# Patient Record
Sex: FEMALE | Race: WHITE | Employment: UNEMPLOYED | URBAN - METROPOLITAN AREA
[De-identification: names, ages, dates, MRNs, and addresses within clinical notes are randomized per-mention and may not be internally consistent; named-entity substitution may affect disease eponyms.]

---

## 2018-09-11 ENCOUNTER — MEDICAL CORRESPONDENCE (OUTPATIENT)
Dept: HEALTH INFORMATION MANAGEMENT | Facility: CLINIC | Age: 48
End: 2018-09-11

## 2018-09-24 ENCOUNTER — APPOINTMENT (OUTPATIENT)
Dept: ULTRASOUND IMAGING | Facility: CLINIC | Age: 48
DRG: 857 | End: 2018-09-24
Attending: PHYSICIAN ASSISTANT
Payer: COMMERCIAL

## 2018-09-24 ENCOUNTER — APPOINTMENT (OUTPATIENT)
Dept: GENERAL RADIOLOGY | Facility: CLINIC | Age: 48
DRG: 857 | End: 2018-09-24
Attending: PHYSICIAN ASSISTANT
Payer: COMMERCIAL

## 2018-09-24 ENCOUNTER — HOSPITAL ENCOUNTER (INPATIENT)
Facility: CLINIC | Age: 48
LOS: 2 days | Discharge: HOME | DRG: 857 | End: 2018-09-26
Attending: EMERGENCY MEDICINE | Admitting: ORTHOPAEDIC SURGERY
Payer: COMMERCIAL

## 2018-09-24 DIAGNOSIS — L03.116 CELLULITIS OF LEFT LOWER EXTREMITY: ICD-10-CM

## 2018-09-24 DIAGNOSIS — B99.9 INFECTION: Primary | ICD-10-CM

## 2018-09-24 DIAGNOSIS — T81.40XA POSTOPERATIVE INFECTION, INITIAL ENCOUNTER: ICD-10-CM

## 2018-09-24 LAB
ANION GAP SERPL CALCULATED.3IONS-SCNC: 11 MMOL/L (ref 3–14)
BASOPHILS # BLD AUTO: 0 10E9/L (ref 0–0.2)
BASOPHILS NFR BLD AUTO: 0.3 %
BUN SERPL-MCNC: 12 MG/DL (ref 7–30)
CALCIUM SERPL-MCNC: 9.1 MG/DL (ref 8.5–10.1)
CHLORIDE SERPL-SCNC: 108 MMOL/L (ref 94–109)
CO2 SERPL-SCNC: 23 MMOL/L (ref 20–32)
CREAT SERPL-MCNC: 0.78 MG/DL (ref 0.52–1.04)
DIFFERENTIAL METHOD BLD: NORMAL
EOSINOPHIL # BLD AUTO: 0.2 10E9/L (ref 0–0.7)
EOSINOPHIL NFR BLD AUTO: 2.3 %
ERYTHROCYTE [DISTWIDTH] IN BLOOD BY AUTOMATED COUNT: 13.5 % (ref 10–15)
GFR SERPL CREATININE-BSD FRML MDRD: 79 ML/MIN/1.7M2
GLUCOSE SERPL-MCNC: 137 MG/DL (ref 70–99)
HCT VFR BLD AUTO: 35.1 % (ref 35–47)
HGB BLD-MCNC: 12.1 G/DL (ref 11.7–15.7)
IMM GRANULOCYTES # BLD: 0 10E9/L (ref 0–0.4)
IMM GRANULOCYTES NFR BLD: 0.2 %
LYMPHOCYTES # BLD AUTO: 1.8 10E9/L (ref 0.8–5.3)
LYMPHOCYTES NFR BLD AUTO: 18.7 %
MCH RBC QN AUTO: 29.1 PG (ref 26.5–33)
MCHC RBC AUTO-ENTMCNC: 34.5 G/DL (ref 31.5–36.5)
MCV RBC AUTO: 84 FL (ref 78–100)
MONOCYTES # BLD AUTO: 0.6 10E9/L (ref 0–1.3)
MONOCYTES NFR BLD AUTO: 6.3 %
NEUTROPHILS # BLD AUTO: 7 10E9/L (ref 1.6–8.3)
NEUTROPHILS NFR BLD AUTO: 72.2 %
NRBC # BLD AUTO: 0 10*3/UL
NRBC BLD AUTO-RTO: 0 /100
PLATELET # BLD AUTO: 317 10E9/L (ref 150–450)
POTASSIUM SERPL-SCNC: 2.9 MMOL/L (ref 3.4–5.3)
RBC # BLD AUTO: 4.16 10E12/L (ref 3.8–5.2)
SODIUM SERPL-SCNC: 142 MMOL/L (ref 133–144)
WBC # BLD AUTO: 9.7 10E9/L (ref 4–11)

## 2018-09-24 PROCEDURE — 12000007 ZZH R&B INTERMEDIATE

## 2018-09-24 PROCEDURE — 87186 SC STD MICRODIL/AGAR DIL: CPT | Performed by: PHYSICIAN ASSISTANT

## 2018-09-24 PROCEDURE — 96375 TX/PRO/DX INJ NEW DRUG ADDON: CPT

## 2018-09-24 PROCEDURE — 99207 ZZC CONSULT E&M CHANGED TO INITIAL LEVEL: CPT | Performed by: PHYSICIAN ASSISTANT

## 2018-09-24 PROCEDURE — 80048 BASIC METABOLIC PNL TOTAL CA: CPT | Performed by: PHYSICIAN ASSISTANT

## 2018-09-24 PROCEDURE — 25000128 H RX IP 250 OP 636: Performed by: EMERGENCY MEDICINE

## 2018-09-24 PROCEDURE — 99222 1ST HOSP IP/OBS MODERATE 55: CPT | Performed by: PHYSICIAN ASSISTANT

## 2018-09-24 PROCEDURE — 25000128 H RX IP 250 OP 636: Performed by: PHYSICIAN ASSISTANT

## 2018-09-24 PROCEDURE — 25000132 ZZH RX MED GY IP 250 OP 250 PS 637: Performed by: PHYSICIAN ASSISTANT

## 2018-09-24 PROCEDURE — 87040 BLOOD CULTURE FOR BACTERIA: CPT | Performed by: PHYSICIAN ASSISTANT

## 2018-09-24 PROCEDURE — 87077 CULTURE AEROBIC IDENTIFY: CPT | Performed by: PHYSICIAN ASSISTANT

## 2018-09-24 PROCEDURE — 73630 X-RAY EXAM OF FOOT: CPT | Mod: LT

## 2018-09-24 PROCEDURE — 99285 EMERGENCY DEPT VISIT HI MDM: CPT | Mod: 25

## 2018-09-24 PROCEDURE — 93971 EXTREMITY STUDY: CPT | Mod: LT

## 2018-09-24 PROCEDURE — 96376 TX/PRO/DX INJ SAME DRUG ADON: CPT

## 2018-09-24 PROCEDURE — 85025 COMPLETE CBC W/AUTO DIFF WBC: CPT | Performed by: PHYSICIAN ASSISTANT

## 2018-09-24 PROCEDURE — 87070 CULTURE OTHR SPECIMN AEROBIC: CPT | Performed by: PHYSICIAN ASSISTANT

## 2018-09-24 PROCEDURE — 87205 SMEAR GRAM STAIN: CPT | Performed by: PHYSICIAN ASSISTANT

## 2018-09-24 PROCEDURE — 96365 THER/PROPH/DIAG IV INF INIT: CPT

## 2018-09-24 RX ORDER — TOPIRAMATE 100 MG/1
100 TABLET, FILM COATED ORAL AT BEDTIME
Status: DISCONTINUED | OUTPATIENT
Start: 2018-09-24 | End: 2018-09-26 | Stop reason: HOSPADM

## 2018-09-24 RX ORDER — PROCHLORPERAZINE MALEATE 10 MG
10 TABLET ORAL EVERY 6 HOURS PRN
Status: DISCONTINUED | OUTPATIENT
Start: 2018-09-24 | End: 2018-09-26 | Stop reason: HOSPADM

## 2018-09-24 RX ORDER — ONDANSETRON 2 MG/ML
4 INJECTION INTRAMUSCULAR; INTRAVENOUS EVERY 6 HOURS PRN
Status: DISCONTINUED | OUTPATIENT
Start: 2018-09-24 | End: 2018-09-26 | Stop reason: HOSPADM

## 2018-09-24 RX ORDER — HYDROMORPHONE HYDROCHLORIDE 1 MG/ML
.3-.5 INJECTION, SOLUTION INTRAMUSCULAR; INTRAVENOUS; SUBCUTANEOUS
Status: DISCONTINUED | OUTPATIENT
Start: 2018-09-24 | End: 2018-09-26 | Stop reason: HOSPADM

## 2018-09-24 RX ORDER — AMMONIUM LACTATE 12 G/100G
CREAM TOPICAL DAILY PRN
COMMUNITY
End: 2019-05-22

## 2018-09-24 RX ORDER — METOCLOPRAMIDE 10 MG/1
10 TABLET ORAL EVERY 6 HOURS PRN
Status: DISCONTINUED | OUTPATIENT
Start: 2018-09-24 | End: 2018-09-26 | Stop reason: HOSPADM

## 2018-09-24 RX ORDER — POTASSIUM CL/LIDO/0.9 % NACL 10MEQ/0.1L
10 INTRAVENOUS SOLUTION, PIGGYBACK (ML) INTRAVENOUS
Status: DISCONTINUED | OUTPATIENT
Start: 2018-09-24 | End: 2018-09-26 | Stop reason: HOSPADM

## 2018-09-24 RX ORDER — NALOXONE HYDROCHLORIDE 0.4 MG/ML
.1-.4 INJECTION, SOLUTION INTRAMUSCULAR; INTRAVENOUS; SUBCUTANEOUS
Status: DISCONTINUED | OUTPATIENT
Start: 2018-09-24 | End: 2018-09-26 | Stop reason: HOSPADM

## 2018-09-24 RX ORDER — AMOXICILLIN 250 MG
2 CAPSULE ORAL 2 TIMES DAILY PRN
Status: DISCONTINUED | OUTPATIENT
Start: 2018-09-24 | End: 2018-09-26 | Stop reason: HOSPADM

## 2018-09-24 RX ORDER — PROCHLORPERAZINE 25 MG
25 SUPPOSITORY, RECTAL RECTAL EVERY 12 HOURS PRN
Status: DISCONTINUED | OUTPATIENT
Start: 2018-09-24 | End: 2018-09-26 | Stop reason: HOSPADM

## 2018-09-24 RX ORDER — CEFAZOLIN SODIUM 1 G/50ML
1250 SOLUTION INTRAVENOUS EVERY 12 HOURS
Status: DISCONTINUED | OUTPATIENT
Start: 2018-09-25 | End: 2018-09-26

## 2018-09-24 RX ORDER — POTASSIUM CHLORIDE 1500 MG/1
20-40 TABLET, EXTENDED RELEASE ORAL
Status: DISCONTINUED | OUTPATIENT
Start: 2018-09-24 | End: 2018-09-26 | Stop reason: HOSPADM

## 2018-09-24 RX ORDER — OXYCODONE AND ACETAMINOPHEN 5; 325 MG/1; MG/1
1-2 TABLET ORAL EVERY 4 HOURS PRN
Status: ON HOLD | COMMUNITY
End: 2018-10-18

## 2018-09-24 RX ORDER — HYDROXYZINE PAMOATE 25 MG/1
25 CAPSULE ORAL EVERY 4 HOURS PRN
Status: DISCONTINUED | OUTPATIENT
Start: 2018-09-24 | End: 2018-09-26 | Stop reason: HOSPADM

## 2018-09-24 RX ORDER — OXYCODONE AND ACETAMINOPHEN 5; 325 MG/1; MG/1
1-2 TABLET ORAL EVERY 4 HOURS PRN
Status: DISCONTINUED | OUTPATIENT
Start: 2018-09-24 | End: 2018-09-26 | Stop reason: HOSPADM

## 2018-09-24 RX ORDER — GABAPENTIN 800 MG/1
800 TABLET ORAL 3 TIMES DAILY
COMMUNITY

## 2018-09-24 RX ORDER — BISACODYL 10 MG
10 SUPPOSITORY, RECTAL RECTAL DAILY PRN
Status: DISCONTINUED | OUTPATIENT
Start: 2018-09-24 | End: 2018-09-26 | Stop reason: HOSPADM

## 2018-09-24 RX ORDER — PHENTERMINE HYDROCHLORIDE 30 MG/1
30 CAPSULE ORAL EVERY MORNING
Status: DISCONTINUED | OUTPATIENT
Start: 2018-09-25 | End: 2018-09-24

## 2018-09-24 RX ORDER — GABAPENTIN 800 MG/1
800 TABLET ORAL 3 TIMES DAILY
Status: DISCONTINUED | OUTPATIENT
Start: 2018-09-24 | End: 2018-09-26 | Stop reason: HOSPADM

## 2018-09-24 RX ORDER — POTASSIUM CHLORIDE 7.45 MG/ML
10 INJECTION INTRAVENOUS
Status: DISCONTINUED | OUTPATIENT
Start: 2018-09-24 | End: 2018-09-26 | Stop reason: HOSPADM

## 2018-09-24 RX ORDER — BUSPIRONE HYDROCHLORIDE 15 MG/1
15 TABLET ORAL 2 TIMES DAILY
Status: DISCONTINUED | OUTPATIENT
Start: 2018-09-24 | End: 2018-09-26 | Stop reason: HOSPADM

## 2018-09-24 RX ORDER — SODIUM CHLORIDE, SODIUM LACTATE, POTASSIUM CHLORIDE, CALCIUM CHLORIDE 600; 310; 30; 20 MG/100ML; MG/100ML; MG/100ML; MG/100ML
INJECTION, SOLUTION INTRAVENOUS CONTINUOUS
Status: DISCONTINUED | OUTPATIENT
Start: 2018-09-24 | End: 2018-09-26 | Stop reason: HOSPADM

## 2018-09-24 RX ORDER — AMOXICILLIN 250 MG
2 CAPSULE ORAL 2 TIMES DAILY
Status: DISCONTINUED | OUTPATIENT
Start: 2018-09-24 | End: 2018-09-26 | Stop reason: HOSPADM

## 2018-09-24 RX ORDER — MORPHINE SULFATE 4 MG/ML
4 INJECTION, SOLUTION INTRAMUSCULAR; INTRAVENOUS
Status: DISCONTINUED | OUTPATIENT
Start: 2018-09-24 | End: 2018-09-26 | Stop reason: HOSPADM

## 2018-09-24 RX ORDER — POTASSIUM CHLORIDE 1.5 G/1.58G
20-40 POWDER, FOR SOLUTION ORAL
Status: DISCONTINUED | OUTPATIENT
Start: 2018-09-24 | End: 2018-09-26 | Stop reason: HOSPADM

## 2018-09-24 RX ORDER — ONDANSETRON 4 MG/1
4 TABLET, ORALLY DISINTEGRATING ORAL EVERY 6 HOURS PRN
Status: DISCONTINUED | OUTPATIENT
Start: 2018-09-24 | End: 2018-09-26 | Stop reason: HOSPADM

## 2018-09-24 RX ORDER — AMOXICILLIN 250 MG
1 CAPSULE ORAL 2 TIMES DAILY
Status: DISCONTINUED | OUTPATIENT
Start: 2018-09-24 | End: 2018-09-26 | Stop reason: HOSPADM

## 2018-09-24 RX ORDER — AMOXICILLIN 250 MG
1 CAPSULE ORAL 2 TIMES DAILY PRN
Status: DISCONTINUED | OUTPATIENT
Start: 2018-09-24 | End: 2018-09-26 | Stop reason: HOSPADM

## 2018-09-24 RX ORDER — METOCLOPRAMIDE HYDROCHLORIDE 5 MG/ML
10 INJECTION INTRAMUSCULAR; INTRAVENOUS EVERY 6 HOURS PRN
Status: DISCONTINUED | OUTPATIENT
Start: 2018-09-24 | End: 2018-09-26 | Stop reason: HOSPADM

## 2018-09-24 RX ORDER — CLINDAMYCIN PHOSPHATE 11.9 MG/ML
SOLUTION TOPICAL
COMMUNITY

## 2018-09-24 RX ORDER — POTASSIUM CHLORIDE 29.8 MG/ML
20 INJECTION INTRAVENOUS
Status: DISCONTINUED | OUTPATIENT
Start: 2018-09-24 | End: 2018-09-26 | Stop reason: HOSPADM

## 2018-09-24 RX ORDER — BUPROPION HYDROCHLORIDE 300 MG/1
300 TABLET ORAL DAILY
Status: DISCONTINUED | OUTPATIENT
Start: 2018-09-25 | End: 2018-09-26 | Stop reason: HOSPADM

## 2018-09-24 RX ORDER — POTASSIUM CHLORIDE 1500 MG/1
80 TABLET, EXTENDED RELEASE ORAL DAILY
Status: DISCONTINUED | OUTPATIENT
Start: 2018-09-25 | End: 2018-09-26 | Stop reason: HOSPADM

## 2018-09-24 RX ADMIN — POTASSIUM CHLORIDE 40 MEQ: 1500 TABLET, EXTENDED RELEASE ORAL at 23:29

## 2018-09-24 RX ADMIN — GABAPENTIN 800 MG: 800 TABLET, FILM COATED ORAL at 22:04

## 2018-09-24 RX ADMIN — SENNOSIDES AND DOCUSATE SODIUM 1 TABLET: 8.6; 5 TABLET ORAL at 21:09

## 2018-09-24 RX ADMIN — POTASSIUM CHLORIDE 40 MEQ: 1500 TABLET, EXTENDED RELEASE ORAL at 21:08

## 2018-09-24 RX ADMIN — VANCOMYCIN HYDROCHLORIDE 1750 MG: 10 INJECTION, POWDER, LYOPHILIZED, FOR SOLUTION INTRAVENOUS at 17:46

## 2018-09-24 RX ADMIN — TOPIRAMATE 100 MG: 100 TABLET, FILM COATED ORAL at 22:04

## 2018-09-24 RX ADMIN — MORPHINE SULFATE 4 MG: 4 INJECTION INTRAVENOUS at 17:13

## 2018-09-24 RX ADMIN — MORPHINE SULFATE 4 MG: 4 INJECTION INTRAVENOUS at 17:53

## 2018-09-24 RX ADMIN — BUSPIRONE HYDROCHLORIDE 15 MG: 15 TABLET ORAL at 22:03

## 2018-09-24 RX ADMIN — OXYCODONE HYDROCHLORIDE AND ACETAMINOPHEN 2 TABLET: 5; 325 TABLET ORAL at 21:08

## 2018-09-24 RX ADMIN — CEFEPIME HYDROCHLORIDE 2 G: 2 INJECTION, POWDER, FOR SOLUTION INTRAVENOUS at 17:13

## 2018-09-24 ASSESSMENT — ACTIVITIES OF DAILY LIVING (ADL): ADLS_ACUITY_SCORE: 17

## 2018-09-24 ASSESSMENT — ENCOUNTER SYMPTOMS
WOUND: 1
COLOR CHANGE: 1
SHORTNESS OF BREATH: 0
FEVER: 0

## 2018-09-24 NOTE — PROGRESS NOTES
RECEIVING UNIT ED HANDOFF REVIEW    ED Nurse Handoff Report was reviewed by: Alvaro Valente on September 24, 2018 at 5:47 PM

## 2018-09-24 NOTE — PHARMACY-ADMISSION MEDICATION HISTORY
Admission medication history interview status for the 9/24/2018  admission is complete. See EPIC admission navigator for prior to admission medications     Medication history source reliability:Good    Actions taken by pharmacist (provider contacted, etc): interview with patient and Care Everywhere.     Additional medication history information not noted on PTA med list :None    Medication reconciliation/reorder completed by provider prior to medication history? No    Time spent in this activity: 20 min    Prior to Admission medications    Medication Sig Last Dose Taking? Auth Provider   ammonium lactate (AMLACTIN) 12 % cream Apply topically daily as needed (to heels)  prn Yes Unknown, Entered By History   BuPROPion HCl (WELLBUTRIN XL PO) Take 300 mg by mouth daily 9/24/2018 at am Yes Reported, Patient   BusPIRone HCl (BUSPAR PO) Take 15 mg by mouth 2 times daily 9/24/2018 at am Yes Reported, Patient   clindamycin (CLEOCIN T) 1 % solution Apply topically At Bedtime 9/23/2018 at hs Yes Unknown, Entered By History   Furosemide (LASIX PO) Take 160 mg by mouth daily  9/23/2018 at am Yes Reported, Patient   gabapentin (NEURONTIN) 800 MG tablet Take 800 mg by mouth 3 times daily Am, supper, hs 9/24/2018 at am Yes Unknown, Entered By History   HydrOXYzine Pamoate (VISTARIL PO) Take 25 mg by mouth every 4 hours as needed  9/24/2018 at 1100 Yes Reported, Patient   Omeprazole (PRILOSEC PO) Take 40 mg by mouth 2 times daily (before meals) 2 CAPSULES IN A.M.  9/24/2018 at am Yes Reported, Patient   Ondansetron HCl (ZOFRAN PO) Take 8 mg by mouth as needed for nausea or vomiting prn Yes Reported, Patient   oxyCODONE-acetaminophen (PERCOCET) 5-325 MG per tablet Take 1-2 tablets by mouth every 4 hours as needed for severe pain 9/24/2018 at 1100 Yes Unknown, Entered By History   phentermine 30 MG capsule Take 30 mg by mouth every morning 9/24/2018 at am Yes Reported, Patient   polyethylene glycol (MIRALAX/GLYCOLAX) packet Take 17 g  by mouth as needed for constipation prn Yes Reported, Patient   Potassium Chloride Shameka CR (K-DUR PO) Take 80 mEq by mouth daily  9/23/2018 at am Yes Reported, Patient   Topiramate (TOPAMAX PO) Take 100 mg by mouth At Bedtime  9/23/2018 at hs Yes Reported, Patient

## 2018-09-24 NOTE — IP AVS SNAPSHOT
[facility]    [address]    [place] [address]    Phone:  [phone]    Fax:  [phone]                                       After Visit Summary   9/24/2018    Shalonda Howard    MRN: [id number]           After Visit Summary Signature Page     I have received my discharge instructions, and my questions have been answered. I have discussed any challenges I see with this plan with the nurse or doctor.    ..........................................................................................................................................  Patient/Patient Representative Signature      ..........................................................................................................................................  Patient Representative Print Name and Relationship to Patient    ..................................................               ................................................  Date                                   Time    ..........................................................................................................................................  Reviewed by Signature/Title    ...................................................              ..............................................  Date                                               Time          22EPIC Rev 08/18

## 2018-09-24 NOTE — ED PROVIDER NOTES
History     Chief Complaint:  Post-op problem     HPI   Shalonda Howard is a 48 year old female who presents with postop problem.  Patient states that at the end of August she had a left bunionectomy of bilateral feet.  The procedure was completed by Dr. Harman of [facility].  Patient states that her right foot has been healing well but that she has noted increase in pain, redness, drainage in her left foot for the past few weeks.  On 9/11 she was seen at the orthopedic clinic and started on clindamycin for likely infection.  Since that time, the patient notes that her symptoms have continued to worsen.  She was then seen this past Friday and placed on ciprofloxacin.  She notes that with the antibiotics which she is continued to not see an improvement in her symptoms.  The redness has continued to increase, pain has worsened, drainage has also been constant.  The patient also notes increased swelling to the foot and ankle as well as discomfort in this area.  She notes that she has been elevating the extremity, using ice and heat to help with her discomfort.  Denies any fever, chest pain, shortness of breath.  She reports he continually taking her antibiotics.  She notes that she does have allergies to sulfa, erythromycin, penicillins. Of note, the patient has a history of peripheral neuropathy.     Allergies:  Sulfa Drugs  Demerol [Meperidine]  Erythromycin  Metformin  Codeine  Penicillins     Medications:      BuPROPion HCl (WELLBUTRIN XL PO)   BusPIRone HCl (BUSPAR PO)   clindamycin (CLINDAMAX) 1 % gel   DULoxetine HCl (CYMBALTA PO)   Furosemide (LASIX PO)   Gabapentin (NEURONTIN PO)   HydrOXYzine Pamoate (VISTARIL PO)   lidocaine-prilocaine (EMLA) cream   Omeprazole (PRILOSEC PO)   Ondansetron HCl (ZOFRAN PO)   Oseltamivir Phosphate (TAMIFLU PO)   oxyCODONE (OXYCONTIN) 20 MG 12 hr tablet   oxyCODONE-acetaminophen (PERCOCET)  MG per tablet   phentermine 30 MG capsule   polyethylene glycol  (MIRALAX/GLYCOLAX) packet   Potassium Chloride Shameka CR (K-DUR PO)   senna-docusate (SENOKOT-S;PERICOLACE) 8.6-50 MG per tablet   Topiramate (TOPAMAX PO)   TRAZODONE HCL PO   triamcinolone (NASACORT) 55 MCG/ACT nasal inhaler   Urea 45 % LOTN     Past Medical History:    Past Medical History:   Diagnosis Date     Allergic rhinitis, cause unspecified      Anxiety state, unspecified      Cellulitis and abscess of leg, except foot      Closed fracture of unspecified part of tibia      Disuse osteoporosis      Dysthymic disorder      Edema      Encounter for long-term (current) use of other medications      Esophageal reflux      Kidney stones      Myalgia and myositis, unspecified      Obesity, unspecified      Open wound of foot except toe(s) alone, complicated      Opioid type dependence, unspecified      Other acne      Other congenital valgus deformity of feet      Other ventral hernia without mention of obstruction or gangrene      Polycystic ovaries      Systemic lupus erythematosus (H)      Tibialis tendinitis      Unspecified hereditary and idiopathic peripheral neuropathy      Past Surgical History:    Past Surgical History:   Procedure Laterality Date     APPENDECTOMY       ARTHRODESIS FOOT Left 2/4/2015    Procedure: ARTHRODESIS FOOT;  Surgeon: Andre Harman MD;  Location: [facility]     DILATION AND CURETTAGE       EXCISE TOENAIL(S) Left 2/4/2015    Procedure: EXCISE TOENAIL(S);  Surgeon: Andre Harman MD;  Location: [facility]     HERNIA REPAIR      umbilical     HERNIA REPAIR      ventral open x 2     TONSILLECTOMY          Family History:    None     Social History:   reports that she quit smoking about 15 years ago. Her smoking use included Cigarettes. She has a 6.00 pack-year smoking history. She does not have any smokeless tobacco history on file. She reports that she drinks alcohol. She reports that she does not use illicit drugs.  Presents to the ED today with her daughter and .  PCP:  "Rosa Weller     Review of Systems   Constitutional: Negative for fever.   Respiratory: Negative for shortness of breath.    Cardiovascular: Positive for leg swelling. Negative for chest pain.   Skin: Positive for color change and wound (Left foot).   All other systems reviewed and are negative.    Physical Exam     Patient Vitals for the past 24 hrs:   BP Temp Temp src Pulse Resp SpO2 Height Weight   09/24/18 1802 142/71 99.8  F (37.7  C) Oral 90 18 97 % - -   09/24/18 1544 141/90 98.8  F (37.1  C) Oral 96 18 95 % 1.651 m (5' 5\") 81.6 kg (180 lb)      Physical Exam  General: Well appearing, well nourished. Normal mood and affect.  Skin: Healing ulcer to the left heel. Edema and erythema to the left foot surrounding the region of previous surgical site. Right foot normal in size, healing surgical lesions. See photos below.      HEENT: Head: Normocephalic, atraumatic, no visible masses.   Eyes: Conjunctiva clear, sclera non-icteric, EOM intact, PERRL.   Cardiac: Mildly elevated heart rate, regular rhythm, no murmur or gallop. Dorsalis pedis and posterior tibialis pulses intact bilaterally. Capillary refill intact in bilateral lower extremities.   Lungs: Clear to auscultation.  Abdomen: Bowel sounds normal, no tenderness, organomegaly, masses, or hernia. No guarding or rebound tenderness.   Musculoskeletal: Tenderness to palpation throughout the left lower calf, ankle, and foot. Able to move toes of the left foot but discomfort with this movement. Difficulty with dorsiflexion and plantar flexion due to pain.   Neurologic: Oriented x 3. Sensation to light touch intact throughout bilateral lower extremities.   Psychiatric: Intact recent and remote memory, judgment and insight, normal mood and affect.     Emergency Department Course   Imaging:  US Lower Extremity Venous Duplex Left   Final Result   IMPRESSION: There is no evidence of deep venous thrombosis in the left   lower extremity.      BURKE DRIVER MD    "   XR Foot Left G/E 3 Views   Final Result   IMPRESSION: Postoperative changes of bunionectomy are noted. Some mild   callus formation is noted at the surgical site within the distal   portion of the left first metatarsal. No convincing radiographic   evidence for osteomyelitis. Additional surgical hardware is noted   involving the tarsal and calcaneal bones. Small plantar calcaneal   spur.      LEVAR CRISTOBAL MD           Laboratory:  Labs Ordered and Resulted from Time of ED Arrival Up to the Time of Departure from the ED   BASIC METABOLIC PANEL - Abnormal; Notable for the following:        Result Value    Potassium 2.9 (*)     Glucose 137 (*)     All other components within normal limits   CBC WITH PLATELETS DIFFERENTIAL   BLOOD CULTURE   BLOOD CULTURE     Procedures:  None     Interventions:  Medications   morphine (PF) injection 4 mg (4 mg Intravenous Given 9/24/18 1753)   ceFEPIme (MAXIPIME) 2 g vial to attach to  ml bag for ADULTS or 50 ml bag for PEDS (0 g Intravenous Stopped 9/24/18 1747)   vancomycin (VANCOCIN) 1,750 mg in sodium chloride 0.9 % 500 mL intermittent infusion (0 mg Intravenous ED Infusing on Admission/transfer 9/24/18 1747)      Emergency Department Course:  Past medical records, nursing notes, and vitals reviewed.  I performed an exam of the patient and obtained history, as documented above.    1610 Discussed the patient with Dr. Harman who came to the ED to see the patient.     I rechecked the patient. Findings and plan explained to the Patient and her family. All questions answered prior to admission.     Impression & Plan    Medical Decision Making:  Shalonda Howard is a 48-year-old female who presented the ED today for evaluation of postoperative problem.  Details of the patient's history can be known in the HPI.  Differential diagnosis included cellulitis, osteomyelitis, DVT, malalignment of hardware, amongst others.  Upon my exam, the patient had surrounding erythema and drainage  to her incisional site of the left foot.  Photographs can be noted above.  Sensation and pulses were intact.  The patient's surgeon, Dr. Harman, did come to the ED to evaluate the patient.  He advised admission and IV antibiotics.  Possible washout was discussed the patient does not begin to improve.  Additional workup here included a CBC, BMP, blood cultures.  Antibiotics were ordered, vancomycin and cefepime.  Morphine was provided for pain control.  X-ray left foot indicated no a evidence for osteomyelitis.  Ultrasound left lower extremity did not show evidence for DVT. All questions were answered prior the patient's admission.  She was in agreement with the treatment plan as stated above.    Diagnosis:    ICD-10-CM   1. Cellulitis of left lower extremity L03.116   2. Postoperative infection, initial encounter T81.4XXA        Dispostion   Admit to Dr. Harman and his staff     9/24/2018   Nia Knowles*     MARIA ESTHER Duval    This was created at least in part with a voice recognition software. Mistakes/typos may be present.        Danya Maynard PA  09/24/18 1814

## 2018-09-24 NOTE — PLAN OF CARE
Problem: Patient Care Overview  Goal: Plan of Care/Patient Progress Review  Outcome: No Change  Pt up from ED at 1800. VSS, denies pain at this time. RLE surgical sites are healed.  LLE red, warm with 2 open sores. Covered wounds and marked red area. Pt states she is NWB on RLE. Ax1 stand pivot to BSC.

## 2018-09-24 NOTE — IP AVS SNAPSHOT
MRN:[id number]                      After Visit Summary   9/24/2018    Shalonda Howard    MRN: [id number]           Thank you!     Thank you for choosing [facility] for your care. Our goal is always to provide you with excellent care. Hearing back from our patients is one way we can continue to improve our services. Please take a few minutes to complete the written survey that you may receive in the mail after you visit with us. Thank you!        Patient Information     Date Of Birth          1970        Designated Caregiver       Most Recent Value    Caregiver    Will someone help with your care after discharge? yes    Name of designated caregiver     Phone number of caregiver [phone]    Caregiver address same      About your hospital stay     You were admitted on:  September 24, 2018 You last received care in the:  [facility] Same Day [facility]    You were discharged on:  September 26, 2018       Who to Call     For medical emergencies, please call 911.  For non-urgent questions about your medical care, please call your primary care provider or clinic, [phone]  For questions related to your surgery, please call your surgery clinic        Attending Provider     Provider Specialty    Nia Knowles MD Emergency Medicine    [facility], Andre BOWER MD Orthopaedic Surgery       Primary Care Provider Office Phone # Fax #    Linda Bernstein PA-C [phone]      After Care Instructions     Discharge Instructions       Review discharge instructions as directed by Provider.            Elevate affected extremity           Ice to affected area       Ice pack to affected area PRN (as needed).            No Aspirin, Ibuprofen or Naproxen products        for 7 - 10 days post surgery            No dressing change       until follow up clinic appointment.            No driving or operating machinery       until the day after procedure            Weight bearing  status - Partial                 Further instructions from your care team       Same Day Surgery Discharge Instructions for  Sedation and General Anesthesia       It's not unusual to feel dizzy, light-headed or faint for up to 24 hours after surgery or while taking pain medication.  If you have these symptoms: sit for a few minutes before standing and have someone assist you when you get up to walk or use the bathroom.      You should rest and relax for the next 24 hours. We recommend you make arrangements to have an adult stay with you for at least 24 hours after your discharge.  Avoid hazardous and strenuous activity.      DO NOT DRIVE any vehicle or operate mechanical equipment for 24 hours following the end of your surgery.  Even though you may feel normal, your reactions may be affected by the medication you have received.      Do not drink alcoholic beverages for 24 hours following surgery.       Slowly progress to your regular diet as you feel able. It's not unusual to feel nauseated and/or vomit after receiving anesthesia.  If you develop these symptoms, drink clear liquids (apple juice, ginger ale, broth, 7-up, etc. ) until you feel better.  If your nausea and vomiting persists for 24 hours, please notify your surgeon.        All narcotic pain medications, along with inactivity and anesthesia, can cause constipation. Drinking plenty of liquids and increasing fiber intake will help.      For any questions of a medical nature, call your surgeon.      Do not make important decisions for 24 hours.      If you had general anesthesia, you may have a sore throat for a couple of days related to the breathing tube used during surgery.  You may use Cepacol lozenges to help with this discomfort.  If it worsens or if you develop a fever, contact your surgeon.       If you feel your pain is not well managed with the pain medications prescribed by your surgeon, please contact your surgeon's office to let them know so  they can address your concerns.         Information for Patients Discharging with a Transderm Scopolamine Patch       Dry mouth is a common side effect.    Drowsiness is another common side effect especially when combined with pain medication.  Please avoid activities that require mental alertness such as driving a car or making important legal decisions.    Since Scopolamine can cause temporary dilation of the pupils and blurred vision if it comes in contact with the eyes; be sure to wash your hands thoroughly with soap and water immediately after handling the patch.   When you remove your patch, please stick it to a tissue or paper towel for disposal.      Remove the patch immediately and contact a physician in the unlikely event that you experience symptoms of acute glaucoma (pain and reddening of the eyes, accompanied by dilated pupils).    Remove the patch if you develop any difficulties urinating.  If you cannot urinate after removing your patch, please notify your surgeon.    Remove the patch 24 hours after surgery.       POST-OPERATIVE INSTRUCTIONS  FOOT AND ANKLE SURGERY  TERESA Harman M.D.  Jon Malik P.A.-C    These precautions MUST be followed for the first 24 hours after surgery:    Upon discharge, go directly home.  POST-OPERATIVE CARE GUIDELINES    The following are general guidelines about what to expect the first days/weeks after surgery.  They are not specific, and your recovery may be slightly different.    Blood clots are not common, but are emergencies.  If you have sudden onset pain in your calf or leg, or have sudden shortness of breath, go to the Emergency Department.      Elevation of your operative foot/ankle is key to reducing the swelling in the immediate post-operative phase (first 3-5 days).  When you are at rest, elevate your foot at or above the level of your heart.  When sitting, your foot should be elevated on a chair or stool; not hanging down.      You should plan to rest  the day after surgery no matter how minor the procedure.  More complicated procedures will require more time to return to normal activity.      Foot and ankle surgery is painful in most cases - use your medication as directed for the first 24 hours after surgery.  It is not unusual for the pain to be worse a day or two after surgery than on the day of.  If your pain is more than 8/10 contact our office.  If you don t have pain, gradually decrease your pain meds by substituting Tylenol.  Please don t use Advil/ibuprofen unless we order it for you.      You will be prescribed Percocet or Vicodin for pain, Vistaril for spasms/pain adjunct, Senokot for constipation.  If you have had trouble taking these meds before or experience nausea or vomiting after surgery from your medication, please advise the recovery room nurses.  If you are already at home, try drinking only clear liquids and/or call our office.      All pain medications, along with inactivity can cause constipation.  Use the Senakot as directed, increase fluid intake to 1 quart per day and increase your dietary fiber.  (The  P  fruits - peaches, plums, pears, and prunes as well as anise/black licorice are recommended.)    It is not unusual to run a low-grade fever after surgery.  If your temperature is elevated above 102 , lasts longer than 24 hours, or is questionable in any way, contact our office.      Drainage from your cast/dressing is normal.  Reinforce your cast/dressing with 4x4 dressings and cover with an Ace wrap.  Wait until 24 hours after surgery to change your dressings; by this time most of your bleeding will have stopped.  If you have drainage through your dressings 2 days after surgery, contact our office.      You cast/dressing will be changed at your 2-week follow up appointment.  They should be kept clean and DRY.  If your cast/dressing is damaged before that, contact our office.      It is normal to experience some bruising in the toes  after surgery and they may appear  dark  when your foot hangs down.  It is important to actively wiggle your toes for at least 5-10 minutes each hour UNLESS you had surgery on those toes.      Use ice on your foot/ankle over the dressing/cast for 20 minutes per hour, 10 or more times per day.  A large bag of frozen peas works well.      Bathing: take a tub or sponge bath instead of a shower if possible during the first two weeks.  If you choose to shower, cover the dressing/cast with a waterproof covering, these may be ordered from www.seal-tight.com or are available at some pharmacies/medical supply stores.  Another option is XeroSox, which is the original vacuum sealed bandage and cast cover.  The cast cover has a vacuum seal and is absolutely waterproof.  [phone] or www.xerosox.[employer] for more information.      Driving:  For surgery on the left foot/ankle, you may drive as soon as narcotic medications are stopped.  For surgery on the right foot/ankle, you may drive when you are out of a cast, off pain medications, and you feel secure with braking.      In general, listen to your foot/ankle.  If you have a sudden, dramatic increase in pain that does not resolve after an hour or so, something is wrong - call our office.  If you fall or bump your foot/ankle and have sudden pain that resolves, give it 24 hours before you call.      Many of your questions can be addressed at your 2-week follow-up appointment - please make a list of things to ask us as they come up during your recovery.      If you had a nerve block it is common to have numbness in your leg and foot for up to 30 hours after surgery.  If your leg or foot is still numb more than 30 hours after surgery, please call the office.      FUTURE DENTIST VISITS:  If you have had a total ankle arthroplasty please be advised you must be on antibiotics prior to ANY dental work.  Please call your dentist office ahead of time and make them aware of this as your  "dentist will be able to order the prescription.  If you have had any other surgery this is not a concern.    If you have any further questions or concerns, please call our office at [phone]         Pending Results     Date and Time Order Name Status Description    9/26/2018 1632 Gram stain In process     9/26/2018 1632 Fluid Culture Aerobic Bacterial In process     9/26/2018 1632 Anaerobic bacterial culture In process     9/24/2018 1935 Wound Culture Aerobic Bacterial Preliminary     9/24/2018 1558 Blood culture Preliminary     9/24/2018 1558 Blood culture Preliminary             Statement of Approval     Ordered          09/26/18 1656  I have reviewed and agree with all the recommendations and orders detailed in this document.  EFFECTIVE NOW     Approved and electronically signed by:  Jett Malik PA-C           09/26/18 1659  I have reviewed and agree with all the recommendations and orders detailed in this document.  EFFECTIVE NOW     Approved and electronically signed by:  Jett Malik PA-C             Admission Information     Date & Time Provider Department Dept. Phone    9/24/2018 Andre Harman MD [facility] Same Day Surgery [phone]      Your Vitals Were     Blood Pressure Pulse Temperature Respirations Height Weight    109/76 74 97.3  F (36.3  C) (Temporal) 12 1.651 m (5' 5\") 81.6 kg (180 lb)    Last Period Pulse Oximetry BMI (Body Mass Index)             09/25/2018 (Exact Date) 93% 29.95 kg/m2         [employer] Information     [facility] lets you send messages to your doctor, view your test results, renew your prescriptions, schedule appointments and more. To sign up, go to [email or web address] . Click on \"Log in\" on the left side of the screen, which will take you to the Welcome page. Then click on \"Sign up Now\" on the right side of the page.     You will be asked to enter the access code listed below, as well as some personal information. Please follow the " directions to create your username and password.     Your access code is: [email or web address]  Expires: 2018  4:57 PM     Your access code will  in 90 days. If you need help or a new code, please call your [facility] clinic or [phone].        Care EveryWhere ID     This is your Care EveryWhere ID. This could be used by other organizations to access your [facility] medical records  [id number]        Equal Access to Services     YUE EDWARDS : Hadii aad ku hadasho Soomaali, waaxda luqadaha, qaybta kaalmada adeegyada, waxay idiin hayaan adecarmencita louisirasematomasa layoseph . So [facility] [phone].    ATENCIÓN: Si habla español, tiene a pascual disposición servicios gratuitos de asistencia lingüística. Llame al [phone].    We comply with applicable federal civil rights laws and [place] laws. We do not discriminate on the basis of race, color, national origin, age, disability, sex, sexual orientation, or gender identity.               Review of your medicines      START taking        Dose / Directions    ciprofloxacin 500 MG tablet   Commonly known as:  CIPRO   Used for:  Postoperative infection, initial encounter        Dose:  500 mg   Take 1 tablet (500 mg) by mouth 2 times daily   Quantity:  14 tablet   Refills:  0         CONTINUE these medicines which may have CHANGED, or have new prescriptions. If we are uncertain of the size of tablets/capsules you have at home, strength may be listed as something that might have changed.        Dose / Directions    * oxyCODONE-acetaminophen 5-325 MG per tablet   Commonly known as:  PERCOCET   This may have changed:  Another medication with the same name was added. Make sure you understand how and when to take each.   Notes to Patient:  2 TABLETS TAKEN AT 6 PM        Dose:  1-2 tablet   Take 1-2 tablets by mouth every 4 hours as needed for severe pain   Refills:  0       * oxyCODONE-acetaminophen 5-325 MG per tablet   Commonly known as:  PERCOCET   This may have changed:  You were  already taking a medication with the same name, and this prescription was added. Make sure you understand how and when to take each.        Dose:  1-2 tablet   Take 1-2 tablets by mouth every 4 hours as needed   Quantity:  20 tablet   Refills:  0       * Notice:  This list has 2 medication(s) that are the same as other medications prescribed for you. Read the directions carefully, and ask your doctor or other care provider to review them with you.      CONTINUE these medicines which have NOT CHANGED        Dose / Directions    ammonium lactate 12 % cream   Commonly known as:  AMLACTIN        Apply topically daily as needed (to heels)   Refills:  0       BUSPAR PO        Dose:  15 mg   Take 15 mg by mouth 2 times daily   Refills:  0       clindamycin 1 % solution   Commonly known as:  CLEOCIN T        Apply topically At Bedtime   Refills:  0       gabapentin 800 MG tablet   Commonly known as:  NEURONTIN        Dose:  800 mg   Take 800 mg by mouth 3 times daily Am, supper, hs   Refills:  0       K-DUR PO        Dose:  80 mEq   Take 80 mEq by mouth daily   Refills:  0       LASIX PO        Dose:  160 mg   Take 160 mg by mouth daily   Refills:  0       phentermine 30 MG capsule        Dose:  30 mg   Take 30 mg by mouth every morning   Refills:  0       polyethylene glycol Packet   Commonly known as:  MIRALAX/GLYCOLAX        Dose:  17 g   Take 17 g by mouth as needed for constipation   Refills:  0       PRILOSEC PO        Dose:  40 mg   Take 40 mg by mouth 2 times daily (before meals) 2 CAPSULES IN A.M.   Refills:  0       TOPAMAX PO        Dose:  100 mg   Take 100 mg by mouth At Bedtime   Refills:  0       VISTARIL PO        Dose:  25 mg   Take 25 mg by mouth every 4 hours as needed   Refills:  0       WELLBUTRIN XL PO        Dose:  300 mg   Take 300 mg by mouth daily   Refills:  0       ZOFRAN PO        Dose:  8 mg   Take 8 mg by mouth as needed for nausea or vomiting   Refills:  0            Where to get your  medicines      These medications were sent to [facility] [place], [place] - [address]  [address], [place] [address]     Phone:  [phone]     ciprofloxacin 500 MG tablet         Some of these will need a paper prescription and others can be bought over the counter. Ask your nurse if you have questions.     Bring a paper prescription for each of these medications     oxyCODONE-acetaminophen 5-325 MG per tablet                Protect others around you: Learn how to safely use, store and throw away your medicines at www.disposemymeds.org.        ANTIBIOTIC INSTRUCTION     You've Been Prescribed an Antibiotic - Now What?  Your healthcare team thinks that you or your loved one might have an infection. Some infections can be treated with antibiotics, which are powerful, life-saving drugs. Like all medications, antibiotics have side effects and should only be used when necessary. There are some important things you should know about your antibiotic treatment.      Your healthcare team may run tests before you start taking an antibiotic.    Your team may take samples (e.g., from your blood, urine or other areas) to run tests to look for bacteria. These test can be important to determine if you need an antibiotic at all and, if you do, which antibiotic will work best.      Within a few days, your healthcare team might change or even stop your antibiotic.    Your team may start you on an antibiotic while they are working to find out what is making you sick.    Your team might change your antibiotic because test results show that a different antibiotic would be better to treat your infection.    In some cases, once your team has more information, they learn that you do not need an antibiotic at all. They may find out that you don't have an infection, or that the antibiotic you're taking won't work against your infection. For example, an infection caused by a virus can't be treated with  antibiotics. Staying on an antibiotic when you don't need it is more likely to be harmful than helpful.      You may experience side effects from your antibiotic.    Like all medications, antibiotics have side effects. Some of these can be serious.    Let you healthcare team know if you have any known allergies when you are admitted to the hospital.    One significant side effect of nearly all antibiotics is the risk of severe and sometimes deadly diarrhea caused by Clostridium difficile (C. Difficile). This occurs when a person takes antibiotics because some good germs are destroyed. Antibiotic use allows C. diificile to take over, putting patients at high risk for this serious infection.    As a patient or caregiver, it is important to understand your or your loved one's antibiotic treatment. It is especially important for caregivers to speak up when patients can't speak for themselves. Here are some important questions to ask your healthcare team.    What infection is this antibiotic treating and how do you know I have that infection?    What side effects might occur from this antibiotic?    How long will I need to take this antibiotic?    Is it safe to take this antibiotic with other medications or supplements (e.g., vitamins) that I am taking?     Are there any special directions I need to know about taking this antibiotic? For example, should I take it with food?    How will I be monitored to know whether my infection is responding to the antibiotic?    What tests may help to make sure the right antibiotic is prescribed for me?      Information provided by:  www.cdc.gov/getsmart  U.S. Department of Health and Human Services  Centers for disease Control and Prevention  National Center for Emerging and Zoonotic Infectious Diseases  Division of Healthcare Quality Promotion        Information about OPIOIDS     PRESCRIPTION OPIOIDS: WHAT YOU NEED TO KNOW   We gave you an opioid (narcotic) pain medicine. It is  important to manage your pain, but opioids are not always the best choice. You should first try all the other options your care team gave you. Take this medicine for as short a time (and as few doses) as possible.    Some activities can increase your pain, such as bandage changes or therapy sessions. It may help to take your pain medicine 30 to 60 minutes before these activities. Reduce your stress by getting enough sleep, working on hobbies you enjoy and practicing relaxation or meditation. Talk to your care team about ways to manage your pain beyond prescription opioids.    These medicines have risks:    DO NOT drive when on new or higher doses of pain medicine. These medicines can affect your alertness and reaction times, and you could be arrested for driving under the influence (DUI). If you need to use opioids long-term, talk to your care team about driving.    DO NOT operate heavy machinery    DO NOT do any other dangerous activities while taking these medicines.    DO NOT drink any alcohol while taking these medicines.     If the opioid prescribed includes acetaminophen, DO NOT take with any other medicines that contain acetaminophen. Read all labels carefully. Look for the word  acetaminophen  or  Tylenol.  Ask your pharmacist if you have questions or are unsure.    You can get addicted to pain medicines, especially if you have a history of addiction (chemical, alcohol or substance dependence). Talk to your care team about ways to reduce this risk.    All opioids tend to cause constipation. Drink plenty of water and eat foods that have a lot of fiber, such as fruits, vegetables, prune juice, apple juice and high-fiber cereal. Take a laxative (Miralax, milk of magnesia, Colace, Senna) if you don t move your bowels at least every other day. Other side effects include upset stomach, sleepiness, dizziness, throwing up, tolerance (needing more of the medicine to have the same effect), physical dependence and  slowed breathing.    Store your pills in a secure place, locked if possible. We will not replace any lost or stolen medicine. If you don t finish your medicine, please throw away (dispose) as directed by your pharmacist. The [place] Pollution Control Agency has more information about safe disposal: https://www.pca.state.[place].us/living-green/managing-unwanted-medications             Medication List: This is a list of all your medications and when to take them. Check marks below indicate your daily home schedule. Keep this list as a reference.      Medications           Morning Afternoon Evening Bedtime As Needed    ammonium lactate 12 % cream   Commonly known as:  AMLACTIN   Apply topically daily as needed (to heels)                                BUSPAR PO   Take 15 mg by mouth 2 times daily   Last time this was given:  15 mg on 9/26/2018  8:39 AM                                ciprofloxacin 500 MG tablet   Commonly known as:  CIPRO   Take 1 tablet (500 mg) by mouth 2 times daily                                clindamycin 1 % solution   Commonly known as:  CLEOCIN T   Apply topically At Bedtime                                gabapentin 800 MG tablet   Commonly known as:  NEURONTIN   Take 800 mg by mouth 3 times daily Am, supper, hs   Last time this was given:  800 mg on 9/26/2018  8:39 AM                                K-DUR PO   Take 80 mEq by mouth daily   Last time this was given:  80 mEq on 9/26/2018  8:39 AM                                LASIX PO   Take 160 mg by mouth daily                                * oxyCODONE-acetaminophen 5-325 MG per tablet   Commonly known as:  PERCOCET   Take 1-2 tablets by mouth every 4 hours as needed for severe pain   Last time this was given:  2 tablets on 9/26/2018 10:56 AM   Notes to Patient:  2 TABLETS TAKEN AT 6 PM                                * oxyCODONE-acetaminophen 5-325 MG per tablet   Commonly known as:  PERCOCET   Take 1-2 tablets by mouth every 4 hours as needed    Last time this was given:  2 tablets on 9/26/2018 10:56 AM                                phentermine 30 MG capsule   Take 30 mg by mouth every morning                                polyethylene glycol Packet   Commonly known as:  MIRALAX/GLYCOLAX   Take 17 g by mouth as needed for constipation                                PRILOSEC PO   Take 40 mg by mouth 2 times daily (before meals) 2 CAPSULES IN A.M.   Last time this was given:  40 mg on 9/26/2018  6:21 AM                                TOPAMAX PO   Take 100 mg by mouth At Bedtime   Last time this was given:  100 mg on 9/25/2018 10:15 PM                                VISTARIL PO   Take 25 mg by mouth every 4 hours as needed                                WELLBUTRIN XL PO   Take 300 mg by mouth daily   Last time this was given:  300 mg on 9/26/2018  8:39 AM                                ZOFRAN PO   Take 8 mg by mouth as needed for nausea or vomiting                                * Notice:  This list has 2 medication(s) that are the same as other medications prescribed for you. Read the directions carefully, and ask your doctor or other care provider to review them with you.

## 2018-09-24 NOTE — ED NOTES
"[facility]  ED Nurse Handoff Report    ED Chief complaint: Post-op Problem (left foot infection post bunion surgery, several antibiotic changes, currently taking Cipro PO)      ED Diagnosis:   Final diagnoses:   Cellulitis of left lower extremity   Postoperative infection, initial encounter       Code Status: Full Code    Allergies:   Allergies   Allergen Reactions     Sulfa Drugs Shortness Of Breath and Rash     Demerol [Meperidine] Nausea and Vomiting     Erythromycin Nausea and Vomiting     Metformin Nausea and Vomiting     Codeine Rash     Penicillins Rash       Activity level - Baseline/Home:  Stand with Assist    Activity Level - Current:   Stand with Assist     Needed?: No    Isolation: No  Infection: Not Applicable  Bariatric?: No    Vital Signs:   Vitals:    09/24/18 1544   BP: 141/90   Pulse: 96   Resp: 18   Temp: 98.8  F (37.1  C)   TempSrc: Oral   SpO2: 95%   Weight: 81.6 kg (180 lb)   Height: 1.651 m (5' 5\")       Cardiac Rhythm: ,        Pain level: 0-10 Pain Scale: 7    Is this patient confused?: No   [place] - Suicide Severity Rating Scale Completed?  Yes  If yes, what color did the patient score?  White    Patient Report: Initial Complaint: Post op foot redness and pain  Focused Assessment: Patient had bunion surgery 8/29.  On abx since 9/11 for increased redness and drainage.  Wound continues to get worse.  Patient says wound is very painful  Tests Performed: labs, ultrasound, xray  Abnormal Results: pending  Treatments provided: pain meds, ultrasound    Family Comments: daughter and  at bedside    OBS brochure/video discussed/provided to patient/family: N/A              Name of person given brochure if not patient:               Relationship to patient:     ED Medications:   Medications   ceFEPIme (MAXIPIME) 2 g vial to attach to  ml bag for ADULTS or 50 ml bag for PEDS (not administered)       Drips infusing?:  Yes    For the majority of the shift this " patient was Green.   Interventions performed were .    Severe Sepsis OR Septic Shock Diagnosis Present: No    To be done/followed up on inpatient unit:  Victorina justin    ED NURSE PHONE NUMBER: [phone]

## 2018-09-25 LAB
ANION GAP SERPL CALCULATED.3IONS-SCNC: 8 MMOL/L (ref 3–14)
BASOPHILS # BLD AUTO: 0 10E9/L (ref 0–0.2)
BASOPHILS NFR BLD AUTO: 0.4 %
BUN SERPL-MCNC: 9 MG/DL (ref 7–30)
CALCIUM SERPL-MCNC: 8.2 MG/DL (ref 8.5–10.1)
CHLORIDE SERPL-SCNC: 110 MMOL/L (ref 94–109)
CO2 SERPL-SCNC: 23 MMOL/L (ref 20–32)
CREAT SERPL-MCNC: 0.74 MG/DL (ref 0.52–1.04)
DIFFERENTIAL METHOD BLD: ABNORMAL
EOSINOPHIL # BLD AUTO: 0.3 10E9/L (ref 0–0.7)
EOSINOPHIL NFR BLD AUTO: 4.8 %
ERYTHROCYTE [DISTWIDTH] IN BLOOD BY AUTOMATED COUNT: 13.5 % (ref 10–15)
GFR SERPL CREATININE-BSD FRML MDRD: 84 ML/MIN/1.7M2
GLUCOSE SERPL-MCNC: 139 MG/DL (ref 70–99)
GRAM STN SPEC: ABNORMAL
GRAM STN SPEC: ABNORMAL
HCT VFR BLD AUTO: 32.7 % (ref 35–47)
HGB BLD-MCNC: 10.9 G/DL (ref 11.7–15.7)
IMM GRANULOCYTES # BLD: 0 10E9/L (ref 0–0.4)
IMM GRANULOCYTES NFR BLD: 0.4 %
LYMPHOCYTES # BLD AUTO: 1.9 10E9/L (ref 0.8–5.3)
LYMPHOCYTES NFR BLD AUTO: 28 %
Lab: ABNORMAL
MCH RBC QN AUTO: 28.5 PG (ref 26.5–33)
MCHC RBC AUTO-ENTMCNC: 33.3 G/DL (ref 31.5–36.5)
MCV RBC AUTO: 85 FL (ref 78–100)
MONOCYTES # BLD AUTO: 0.4 10E9/L (ref 0–1.3)
MONOCYTES NFR BLD AUTO: 6.5 %
NEUTROPHILS # BLD AUTO: 4 10E9/L (ref 1.6–8.3)
NEUTROPHILS NFR BLD AUTO: 59.9 %
NRBC # BLD AUTO: 0 10*3/UL
NRBC BLD AUTO-RTO: 0 /100
PLATELET # BLD AUTO: 283 10E9/L (ref 150–450)
POTASSIUM SERPL-SCNC: 3.1 MMOL/L (ref 3.4–5.3)
POTASSIUM SERPL-SCNC: 3.8 MMOL/L (ref 3.4–5.3)
RBC # BLD AUTO: 3.83 10E12/L (ref 3.8–5.2)
SODIUM SERPL-SCNC: 141 MMOL/L (ref 133–144)
SPECIMEN SOURCE: ABNORMAL
WBC # BLD AUTO: 6.7 10E9/L (ref 4–11)

## 2018-09-25 PROCEDURE — 36415 COLL VENOUS BLD VENIPUNCTURE: CPT | Performed by: PHYSICIAN ASSISTANT

## 2018-09-25 PROCEDURE — 12000007 ZZH R&B INTERMEDIATE

## 2018-09-25 PROCEDURE — 84132 ASSAY OF SERUM POTASSIUM: CPT | Performed by: ORTHOPAEDIC SURGERY

## 2018-09-25 PROCEDURE — 36415 COLL VENOUS BLD VENIPUNCTURE: CPT | Performed by: ORTHOPAEDIC SURGERY

## 2018-09-25 PROCEDURE — 99232 SBSQ HOSP IP/OBS MODERATE 35: CPT | Performed by: INTERNAL MEDICINE

## 2018-09-25 PROCEDURE — 99207 ZZC CDG-MDM COMPONENT: MEETS MODERATE - UP CODED: CPT | Performed by: INTERNAL MEDICINE

## 2018-09-25 PROCEDURE — 25000132 ZZH RX MED GY IP 250 OP 250 PS 637: Performed by: PHYSICIAN ASSISTANT

## 2018-09-25 PROCEDURE — 25000128 H RX IP 250 OP 636: Performed by: ORTHOPAEDIC SURGERY

## 2018-09-25 PROCEDURE — 85025 COMPLETE CBC W/AUTO DIFF WBC: CPT | Performed by: PHYSICIAN ASSISTANT

## 2018-09-25 PROCEDURE — 80048 BASIC METABOLIC PNL TOTAL CA: CPT | Performed by: PHYSICIAN ASSISTANT

## 2018-09-25 RX ADMIN — OXYCODONE HYDROCHLORIDE AND ACETAMINOPHEN 2 TABLET: 5; 325 TABLET ORAL at 10:13

## 2018-09-25 RX ADMIN — BUSPIRONE HYDROCHLORIDE 15 MG: 15 TABLET ORAL at 22:15

## 2018-09-25 RX ADMIN — OXYCODONE HYDROCHLORIDE AND ACETAMINOPHEN 2 TABLET: 5; 325 TABLET ORAL at 23:19

## 2018-09-25 RX ADMIN — POTASSIUM CHLORIDE 40 MEQ: 1500 TABLET, EXTENDED RELEASE ORAL at 08:19

## 2018-09-25 RX ADMIN — BUSPIRONE HYDROCHLORIDE 15 MG: 15 TABLET ORAL at 08:07

## 2018-09-25 RX ADMIN — GABAPENTIN 800 MG: 800 TABLET, FILM COATED ORAL at 08:07

## 2018-09-25 RX ADMIN — OXYCODONE HYDROCHLORIDE AND ACETAMINOPHEN 2 TABLET: 5; 325 TABLET ORAL at 18:33

## 2018-09-25 RX ADMIN — OXYCODONE HYDROCHLORIDE AND ACETAMINOPHEN 2 TABLET: 5; 325 TABLET ORAL at 02:06

## 2018-09-25 RX ADMIN — POTASSIUM CHLORIDE 80 MEQ: 1500 TABLET, EXTENDED RELEASE ORAL at 08:06

## 2018-09-25 RX ADMIN — VANCOMYCIN HYDROCHLORIDE 1250 MG: 5 INJECTION, POWDER, LYOPHILIZED, FOR SOLUTION INTRAVENOUS at 17:32

## 2018-09-25 RX ADMIN — OMEPRAZOLE 40 MG: 20 CAPSULE, DELAYED RELEASE ORAL at 06:09

## 2018-09-25 RX ADMIN — GABAPENTIN 800 MG: 800 TABLET, FILM COATED ORAL at 22:15

## 2018-09-25 RX ADMIN — OXYCODONE HYDROCHLORIDE AND ACETAMINOPHEN 2 TABLET: 5; 325 TABLET ORAL at 14:03

## 2018-09-25 RX ADMIN — OXYCODONE HYDROCHLORIDE AND ACETAMINOPHEN 2 TABLET: 5; 325 TABLET ORAL at 06:09

## 2018-09-25 RX ADMIN — BUPROPION HYDROCHLORIDE 300 MG: 300 TABLET, FILM COATED, EXTENDED RELEASE ORAL at 08:07

## 2018-09-25 RX ADMIN — OMEPRAZOLE 40 MG: 20 CAPSULE, DELAYED RELEASE ORAL at 16:26

## 2018-09-25 RX ADMIN — GABAPENTIN 800 MG: 800 TABLET, FILM COATED ORAL at 16:26

## 2018-09-25 RX ADMIN — SENNOSIDES AND DOCUSATE SODIUM 1 TABLET: 8.6; 5 TABLET ORAL at 08:07

## 2018-09-25 RX ADMIN — VANCOMYCIN HYDROCHLORIDE 1250 MG: 5 INJECTION, POWDER, LYOPHILIZED, FOR SOLUTION INTRAVENOUS at 06:00

## 2018-09-25 RX ADMIN — POTASSIUM CHLORIDE 20 MEQ: 1500 TABLET, EXTENDED RELEASE ORAL at 10:14

## 2018-09-25 RX ADMIN — TOPIRAMATE 100 MG: 100 TABLET, FILM COATED ORAL at 22:15

## 2018-09-25 RX ADMIN — SENNOSIDES AND DOCUSATE SODIUM 1 TABLET: 8.6; 5 TABLET ORAL at 22:15

## 2018-09-25 ASSESSMENT — ACTIVITIES OF DAILY LIVING (ADL)
ADLS_ACUITY_SCORE: 13
ADLS_ACUITY_SCORE: 17
ADLS_ACUITY_SCORE: 13

## 2018-09-25 NOTE — PLAN OF CARE
Problem: Patient Care Overview  Goal: Plan of Care/Patient Progress Review  Outcome: No Change  A/OX4,baseline numbness BLE.Pain well control with Percocet.Dreg to left heel has small drainage. Up with SBA,use scooter.Voiding well per bathroom.Family at bedside.Plan may be likely  I&D tomorrow.

## 2018-09-25 NOTE — PHARMACY
"The following home medications were NOT continued on inpatient admission per \"Discontinuation of nonessential home medications during hospitalization\" policy: Phentermine    If a therapeutic holiday is deemed inappropriate per the prescriber, please notify the pharmacist regarding the medication order.    The pharmacist is available to answer any questions and/or concerns the patient may have regarding discontinuation of non-essential medications.    Please ensure that these medications are restarted as needed upon discharge via the medication reconciliation discharge process and included on the discharge medication reconciliation report.    Thank you,  Elizabeth Sales    "

## 2018-09-25 NOTE — PLAN OF CARE
Problem: Patient Care Overview  Goal: Plan of Care/Patient Progress Review  Pt has wound left medial foot and heal. New dressing in place. PA will evaluate this HS for need of surgery.

## 2018-09-25 NOTE — CONSULTS
Consult Date:  09/24/2018      PRIMARY CARE PROVIDER:  Linda Bernstein PA-C      REQUESTING PHYSICIAN:  Dr. Harman of Orthopedic Surgery.      REASON FOR CONSULTATION:  Hypokalemia.      HISTORY OF PRESENT ILLNESS:  Shalonda Howard is a very pleasant 48-year-old female with a past medical history of systemic lupus erythematosus, polycystic ovaries, lower extremity edema, obesity, GERD, kidney stones, dysthymic disorder and anxiety who presented to the Emergency Department with complaints of right foot pain and swelling postoperatively.  The patient states that in the end of August she had bunionectomy on bilateral feet.  The procedure was completed by Dr. Harman of [facility] and was without any immediate complications.  The patient states that her right foot has been healing well, but she has noted increased pain, redness, drainage and swelling of her left foot for the past couple of weeks.  On 09/11 she was seen in Orthopedic Clinic and was started on a course of clindamycin.  She did not improve on that regimen and then she was switched to ciprofloxacin this past Friday.  Her foot still did not continue to improve, and the redness has continued to increase.  The pain also worsened, and the drainage has been constant.  She notes that the pain extends up into her calf.  She has been elevating the extremity and using ice for her discomfort.  She goes has been using Percocet at home as prescribed, which has had limited effect.  She denies any fever, chills, chest pain, shortness of breath, abdominal pain, nausea, vomiting, diarrhea or dysuria.  SHE DOES HAVE ALLERGIES TO SULFA, ERYTHROMYCIN AND PENICILLIN.  She also has a history of peripheral neuropathy.      In the Emergency Department, patient was evaluated by Dr. Knowles.  There she was found to have blood pressure of 141/90 with heart rate of 96 and temperature 98.8.  Her wound was inspected.  She had imaging including an x-ray that did not show  any signs for osteomyelitis.  She had an ultrasound of the left lower extremity that was negative for DVT.  Lab work showed normal WBC 9.7 with hemoglobin 12.1 and platelet count 317.  Her BMP was significant for potassium of 2.9, creatinine 0.78, glucose 137.  Blood cultures were obtained.  Wound culture was obtained.  The patient was started on IV cefepime and vancomycin.  She is admitted to the hospital under Orthopedic Surgery's care.      The patient is currently resting comfortably in bed on my arrival.  She still has a mild to moderate amount of pain currently.  She is afebrile.  She is on a higher dose of Lasix for chronic leg edema, which is likely the culprit for her low potassium.  She also does take potassium supplementation daily.  She denies any recent changes in medications or changes in her diet.      PAST MEDICAL HISTORY:   1.  Allergic rhinitis.   2.  Anxiety.   3.  Cellulitis.   4.  Closed fracture of tibia.   5.  Osteoporosis.   6.  Dysthymic disorder.   7.  Chronic lower extremity edema.   8.  GERD.   9.  Kidney stones.   10.  Myalgias and myositis.   11.  Obesity.   12.  History of opioid dependence.   13.  Congenital valgus deformity of feet.   14.  Ventral hernia.   15.  Polycystic ovaries.   16.  Systemic lupus erythematosus.   17.  Tibial tendonitis.   18.  Peripheral neuropathy.      PAST SURGICAL HISTORY:    Past Surgical History:   Procedure Laterality Date     APPENDECTOMY       ARTHRODESIS FOOT Left 2/4/2015    Procedure: ARTHRODESIS FOOT;  Surgeon: Andre Harman MD;  Location: [facility]     BUNIONECTOMY RT/LT  08/29/2018     DILATION AND CURETTAGE       EXCISE TOENAIL(S) Left 2/4/2015    Procedure: EXCISE TOENAIL(S);  Surgeon: Andre Harman MD;  Location: [facility]     HERNIA REPAIR      umbilical     HERNIA REPAIR      ventral open x 2     IRRIGATION AND DEBRIDEMENT FOOT, COMBINED Left 9/26/2018    Procedure: COMBINED IRRIGATION AND DEBRIDEMENT FOOT;  IRRIGATION AND DEBRIDEMENT  LEFT FOOT;  Surgeon: Andre Harman MD;  Location: [facility]     TONSILLECTOMY       FAMILY HISTORY:  Reviewed with the patient, noncontributory to this presentation.      SOCIAL HISTORY:  The patient is a former smoker and quit in 2002.  She has a 6-pack year history.  She drinks wine socially.  No illicit drug use.      PRIOR TO ADMISSION MEDICATIONS:    Prior to Admission medications    Medication Sig Last Dose Taking? Auth Provider   ammonium lactate (AMLACTIN) 12 % cream Apply topically daily as needed (to heels)  prn Yes Unknown, Entered By History   BuPROPion HCl (WELLBUTRIN XL PO) Take 300 mg by mouth daily 9/24/2018 at am Yes Reported, Patient   BusPIRone HCl (BUSPAR PO) Take 15 mg by mouth 2 times daily 9/24/2018 at am Yes Reported, Patient   clindamycin (CLEOCIN T) 1 % solution Apply topically At Bedtime 9/23/2018 at hs Yes Unknown, Entered By History   Furosemide (LASIX PO) Take 160 mg by mouth daily  9/23/2018 at am Yes Reported, Patient   gabapentin (NEURONTIN) 800 MG tablet Take 800 mg by mouth 3 times daily Am, supper, hs 9/24/2018 at am Yes Unknown, Entered By History   HydrOXYzine Pamoate (VISTARIL PO) Take 25 mg by mouth every 4 hours as needed  9/24/2018 at 1100 Yes Reported, Patient   Omeprazole (PRILOSEC PO) Take 40 mg by mouth 2 times daily (before meals) 2 CAPSULES IN A.M.  9/24/2018 at am Yes Reported, Patient   Ondansetron HCl (ZOFRAN PO) Take 8 mg by mouth as needed for nausea or vomiting prn Yes Reported, Patient   oxyCODONE-acetaminophen (PERCOCET) 5-325 MG per tablet Take 1-2 tablets by mouth every 4 hours as needed for severe pain 9/24/2018 at 1100 Yes Unknown, Entered By History   phentermine 30 MG capsule Take 30 mg by mouth every morning 9/24/2018 at am Yes Reported, Patient   polyethylene glycol (MIRALAX/GLYCOLAX) packet Take 17 g by mouth as needed for constipation prn Yes Reported, Patient   Potassium Chloride Shameka CR (K-DUR PO) Take 80 mEq by mouth daily  9/23/2018 at am Yes  Reported, Patient   Topiramate (TOPAMAX PO) Take 100 mg by mouth At Bedtime  9/23/2018 at hs Yes Reported, Patient           ALLERGIES:  SULFA DRUGS, DEMEROL, ERYTHROMYCIN, METFORMIN, CODEINE AND PENICILLIN.      REVIEW OF SYSTEMS:  Complete 10-point review of systems was performed and was negative other than the episodes to mention above HPI.      PHYSICAL EXAMINATION:   VITAL SIGNS:  Blood pressure 118/68, heart rate 95 beats per minute, temperature 98.3, respiratory rate 18, oxygen saturation 97% on room air.   GENERAL:  The patient is alert, oriented to person, place and situation, cooperative, lying in bed, no apparent distress.   EYES:  Pupils equal and round.  Extraocular movements grossly intact.   HENT:  Head normocephalic.  Throat, mucosa and tongue appear moist.   NECK:  Supple.  No cervical adenopathy.   CARDIOVASCULAR:  Heart regular rate and rhythm.  No murmurs, rubs or gallops.   EXTREMITIES:  She has 1+ edema to the left foot.  Trace edema on the right foot.   PULMONARY:  Lungs are clear to auscultation bilaterally.  No crackles, wheeze or rhonchi.  Breathing unlabored.     GASTROINTESTINAL:  Abdomen soft, nontender, nondistended with normoactive bowel sounds.   MUSCULOSKELETAL:  The patient's left foot wound has a dressing in place.  Erythema is seen on the dorsum of the foot extending up just proximal to the ankle.  This area has been marked.  There is associated swelling, warmth and tenderness to palpation without any fluid collections felt.  Right lower extremity appears to be well healing.   NEUROLOGIC:  Alert.  Cranial nerves II-XII grossly intact.  Motor function is intact.   SKIN:  Warm, dry.  Erythema to the left foot as described.  Wound is currently with a dressing in place.   PSYCHIATRIC:  Normal mood and affect.      LABORATORY DATA:  CBC:  WBC 9.7, hemoglobin 12.1, hematocrit 35.1, platelet count 317.  BMP:  Potassium 2.9, creatinine 0.78, glucose 137, otherwise within normal limits.   Blood cultures pending.  Wound culture pending.      Ultrasound of left lower extremity.  There is no evidence for DVT of the left lower extremity per Radiology.        X-ray of the left foot per Radiology, postoperative changes of bunionectomy are noted.  Small callus formation is noted at the surgical site within the distal portion of the left first metatarsal.  No convincing radiographic evidence for osteomyelitis.  There is no surgical hardware is noted involving the tarsal and calcaneal bones.  Small plantar calcaneal spur.      ASSESSMENT AND PLAN:  Shalonda Howard is a very pleasant 48-year-old female with a past medical history of dysthymic disorder, anxiety, GERD, polycystic ovaries, systemic lupus erythematosus who underwent recent bilateral bunionectomy in the end of August.  She subsequently has developed a wound on the left foot which has not been responsive to oral antibiotics and thus was admitted to the hospital for further cares.     1.  Postoperative infection of the left foot with associated cellulitis.  The patient has failed outpatient management with antibiotics including clindamycin and ciprofloxacin.  Dr. Harman has evaluated the patient.  She has been started on cefepime and vancomycin.  Blood cultures and wound cultures were obtained.  She does have antibiotic allergies as noted above.  X-ray was without signs of osteomyelitis.  Her ultrasound was negative for any DVT.  The erythema has been marked.  We will monitor for improvement.  Pain control per the primary team.  Currently, she is afebrile and without any leukocytosis.  We will repeat a CBC in the morning.     2.  Hypokalemia.  The patient's potassium was found to be 2.9.  She is on potassium supplementation at home 80 mEq daily.  She is on Lasix at a rather high dose of 160 mg daily for lower extremity edema.  This is likely the culprit for her hypokalemia.  We will hold Lasix right now and place her on a potassium replacement  protocol.  We will recheck potassium and a full BMP.  We will also follow in the morning.     3.  Gastroesophageal reflux disease.  Continue prior to admission omeprazole.     4.  Depressive disorder and generalized anxiety.  Continue with prior to admission Wellbutrin, BuSpar and Topamax.     5.  Peripheral neuropathy.  Continue as prior to admission gabapentin.     6.  Deep venous thrombosis prophylaxis.  We will defer this to the primary service.      CODE STATUS:  Full code.      The patient was discussed with Dr. Jonathon Dyson of [facility].  He in agreement with my assessment and plan of care.         JONATHON DYSON MD       As dictated by OMEGA HERNANDEZ PA-C            D: 2018   T: 2018   MT: JONO      Name:     CASSANDRA FRANCO   MRN:      [id number]        Account:       [id number]   :      1970           Consult Date:  2018      Document: [id number]

## 2018-09-25 NOTE — PLAN OF CARE
Problem: Patient Care Overview  Goal: Plan of Care/Patient Progress Review  Outcome: No Change  VSS. A&O x4. Pain improved with percocet. Left bunionectomy site wound red with small amount of serosanginous/tan drainage. Surrounding skin red, area marked, unchanged overnight. A second wound on her left heel is covered. Mild edema in feet. On IV vanco. Potassium replaced per protocol.

## 2018-09-25 NOTE — PHARMACY-VANCOMYCIN DOSING SERVICE
Pharmacy Vancomycin Initial Note  Date of Service 2018  Patient's  1970  48 year old, female    Indication: Skin and Soft Tissue Infection    Current estimated CrCl = Estimated Creatinine Clearance: 95 mL/min (based on Cr of 0.78).    Creatinine for last 3 days  2018:  4:20 PM Creatinine 0.78 mg/dL    Recent Vancomycin Level(s) for last 3 days  No results found for requested labs within last 72 hours.      Vancomycin IV Administrations (past 72 hours)                   vancomycin (VANCOCIN) 1,750 mg in sodium chloride 0.9 % 500 mL intermittent infusion (mg) 1,750 mg New Bag 18 1746                Nephrotoxins and other renal medications (Future)    Start     Dose/Rate Route Frequency Ordered Stop    18 0600  vancomycin (VANCOCIN) 1,250 mg in sodium chloride 0.9 % 250 mL intermittent infusion      1,250 mg  over 90 Minutes Intravenous EVERY 12 HOURS 18 2037            Contrast Orders - past 72 hours     None                Plan:  1.  Start vancomycin  1250 mg IV q12h.   2.  Goal Trough Level: 10-15 mg/L   3.  Pharmacy will check trough levels as appropriate in 1-3 Days.    4. Serum creatinine levels will be ordered daily for the first week of therapy and at least twice weekly for subsequent weeks.    5. Weston method utilized to dose vancomycin therapy: Method 1    Elizabeth Sales, ChelseaD

## 2018-09-25 NOTE — PROGRESS NOTES
Shalonda Howard  2018  Hospital day 1    L Foot red, painful.  Appearance unchanged from yesterday.  Temperatures:  Current - Temp: 98.5  F (36.9  C); Max - Temp  Av.9  F (37.2  C)  Min: 98.3  F (36.8  C)  Max: 99.8  F (37.7  C)  Pulse range: Pulse  Av.8  Min: 90  Max: 98  Blood pressure range: Systolic (24hrs), Av , Min:117 , Max:142   ; Diastolic (24hrs), Av, Min:67, Max:90    CMS: intact to L LE  Labs:wound culture pending    PLAN:Will recheck this afternoon.  Likely I&D tomorrow.  Continue IV ABX today.

## 2018-09-26 ENCOUNTER — SURGERY (OUTPATIENT)
Age: 48
End: 2018-09-26

## 2018-09-26 ENCOUNTER — ANESTHESIA (OUTPATIENT)
Dept: SURGERY | Facility: CLINIC | Age: 48
DRG: 857 | End: 2018-09-26
Payer: COMMERCIAL

## 2018-09-26 ENCOUNTER — ANESTHESIA EVENT (OUTPATIENT)
Dept: SURGERY | Facility: CLINIC | Age: 48
DRG: 857 | End: 2018-09-26
Payer: COMMERCIAL

## 2018-09-26 VITALS
TEMPERATURE: 97.3 F | RESPIRATION RATE: 16 BRPM | HEART RATE: 74 BPM | OXYGEN SATURATION: 98 % | DIASTOLIC BLOOD PRESSURE: 70 MMHG | WEIGHT: 180 LBS | SYSTOLIC BLOOD PRESSURE: 110 MMHG | HEIGHT: 65 IN | BODY MASS INDEX: 29.99 KG/M2

## 2018-09-26 LAB
ERYTHROCYTE [DISTWIDTH] IN BLOOD BY AUTOMATED COUNT: 13.5 % (ref 10–15)
GRAM STN SPEC: ABNORMAL
GRAM STN SPEC: ABNORMAL
HCG UR QL: NEGATIVE
HCT VFR BLD AUTO: 33 % (ref 35–47)
HGB BLD-MCNC: 11 G/DL (ref 11.7–15.7)
Lab: ABNORMAL
MCH RBC QN AUTO: 28.9 PG (ref 26.5–33)
MCHC RBC AUTO-ENTMCNC: 33.3 G/DL (ref 31.5–36.5)
MCV RBC AUTO: 87 FL (ref 78–100)
PLATELET # BLD AUTO: 264 10E9/L (ref 150–450)
POTASSIUM SERPL-SCNC: 4.2 MMOL/L (ref 3.4–5.3)
RBC # BLD AUTO: 3.8 10E12/L (ref 3.8–5.2)
SPECIMEN SOURCE: ABNORMAL
VANCOMYCIN SERPL-MCNC: 8.9 MG/L
WBC # BLD AUTO: 6.9 10E9/L (ref 4–11)

## 2018-09-26 PROCEDURE — 0QBP0ZZ EXCISION OF LEFT METATARSAL, OPEN APPROACH: ICD-10-PCS | Performed by: ORTHOPAEDIC SURGERY

## 2018-09-26 PROCEDURE — 25000125 ZZHC RX 250: Performed by: NURSE ANESTHETIST, CERTIFIED REGISTERED

## 2018-09-26 PROCEDURE — 36000050 ZZH SURGERY LEVEL 2 1ST 30 MIN: Performed by: ORTHOPAEDIC SURGERY

## 2018-09-26 PROCEDURE — 87070 CULTURE OTHR SPECIMN AEROBIC: CPT | Performed by: ORTHOPAEDIC SURGERY

## 2018-09-26 PROCEDURE — 25000125 ZZHC RX 250: Performed by: ANESTHESIOLOGY

## 2018-09-26 PROCEDURE — 37000008 ZZH ANESTHESIA TECHNICAL FEE, 1ST 30 MIN: Performed by: ORTHOPAEDIC SURGERY

## 2018-09-26 PROCEDURE — 36000052 ZZH SURGERY LEVEL 2 EA 15 ADDTL MIN: Performed by: ORTHOPAEDIC SURGERY

## 2018-09-26 PROCEDURE — 87186 SC STD MICRODIL/AGAR DIL: CPT | Performed by: ORTHOPAEDIC SURGERY

## 2018-09-26 PROCEDURE — 80202 ASSAY OF VANCOMYCIN: CPT | Performed by: ORTHOPAEDIC SURGERY

## 2018-09-26 PROCEDURE — 25000128 H RX IP 250 OP 636: Performed by: ORTHOPAEDIC SURGERY

## 2018-09-26 PROCEDURE — 81025 URINE PREGNANCY TEST: CPT | Performed by: ANESTHESIOLOGY

## 2018-09-26 PROCEDURE — 40000170 ZZH STATISTIC PRE-PROCEDURE ASSESSMENT II: Performed by: ORTHOPAEDIC SURGERY

## 2018-09-26 PROCEDURE — 36415 COLL VENOUS BLD VENIPUNCTURE: CPT | Performed by: INTERNAL MEDICINE

## 2018-09-26 PROCEDURE — 25000132 ZZH RX MED GY IP 250 OP 250 PS 637: Performed by: PHYSICIAN ASSISTANT

## 2018-09-26 PROCEDURE — 0QCP0ZZ EXTIRPATION OF MATTER FROM LEFT METATARSAL, OPEN APPROACH: ICD-10-PCS | Performed by: ORTHOPAEDIC SURGERY

## 2018-09-26 PROCEDURE — 71000013 ZZH RECOVERY PHASE 1 LEVEL 1 EA ADDTL HR: Performed by: ORTHOPAEDIC SURGERY

## 2018-09-26 PROCEDURE — 85027 COMPLETE CBC AUTOMATED: CPT | Performed by: INTERNAL MEDICINE

## 2018-09-26 PROCEDURE — 25000128 H RX IP 250 OP 636: Performed by: NURSE ANESTHETIST, CERTIFIED REGISTERED

## 2018-09-26 PROCEDURE — 87205 SMEAR GRAM STAIN: CPT | Performed by: ORTHOPAEDIC SURGERY

## 2018-09-26 PROCEDURE — 25800025 ZZH RX 258: Performed by: ORTHOPAEDIC SURGERY

## 2018-09-26 PROCEDURE — 37000009 ZZH ANESTHESIA TECHNICAL FEE, EACH ADDTL 15 MIN: Performed by: ORTHOPAEDIC SURGERY

## 2018-09-26 PROCEDURE — 87075 CULTR BACTERIA EXCEPT BLOOD: CPT | Performed by: ORTHOPAEDIC SURGERY

## 2018-09-26 PROCEDURE — 87077 CULTURE AEROBIC IDENTIFY: CPT | Performed by: ORTHOPAEDIC SURGERY

## 2018-09-26 PROCEDURE — 27110028 ZZH OR GENERAL SUPPLY NON-STERILE: Performed by: ORTHOPAEDIC SURGERY

## 2018-09-26 PROCEDURE — 27210794 ZZH OR GENERAL SUPPLY STERILE: Performed by: ORTHOPAEDIC SURGERY

## 2018-09-26 PROCEDURE — 71000012 ZZH RECOVERY PHASE 1 LEVEL 1 FIRST HR: Performed by: ORTHOPAEDIC SURGERY

## 2018-09-26 PROCEDURE — 84132 ASSAY OF SERUM POTASSIUM: CPT | Performed by: INTERNAL MEDICINE

## 2018-09-26 RX ORDER — SODIUM CHLORIDE, SODIUM LACTATE, POTASSIUM CHLORIDE, CALCIUM CHLORIDE 600; 310; 30; 20 MG/100ML; MG/100ML; MG/100ML; MG/100ML
INJECTION, SOLUTION INTRAVENOUS CONTINUOUS PRN
Status: DISCONTINUED | OUTPATIENT
Start: 2018-09-26 | End: 2018-09-26

## 2018-09-26 RX ORDER — NALOXONE HYDROCHLORIDE 0.4 MG/ML
.1-.4 INJECTION, SOLUTION INTRAMUSCULAR; INTRAVENOUS; SUBCUTANEOUS
Status: DISCONTINUED | OUTPATIENT
Start: 2018-09-26 | End: 2018-09-26 | Stop reason: HOSPADM

## 2018-09-26 RX ORDER — PROPOFOL 10 MG/ML
INJECTION, EMULSION INTRAVENOUS CONTINUOUS PRN
Status: DISCONTINUED | OUTPATIENT
Start: 2018-09-26 | End: 2018-09-26

## 2018-09-26 RX ORDER — OXYCODONE AND ACETAMINOPHEN 5; 325 MG/1; MG/1
1-2 TABLET ORAL
Status: COMPLETED | OUTPATIENT
Start: 2018-09-26 | End: 2018-09-26

## 2018-09-26 RX ORDER — CIPROFLOXACIN 500 MG/1
500 TABLET, FILM COATED ORAL 2 TIMES DAILY
Qty: 14 TABLET | Refills: 0 | Status: ON HOLD | OUTPATIENT
Start: 2018-09-26 | End: 2018-11-24

## 2018-09-26 RX ORDER — LIDOCAINE HYDROCHLORIDE 20 MG/ML
INJECTION, SOLUTION INFILTRATION; PERINEURAL PRN
Status: DISCONTINUED | OUTPATIENT
Start: 2018-09-26 | End: 2018-09-26

## 2018-09-26 RX ORDER — ONDANSETRON 4 MG/1
4 TABLET, ORALLY DISINTEGRATING ORAL EVERY 30 MIN PRN
Status: DISCONTINUED | OUTPATIENT
Start: 2018-09-26 | End: 2018-09-26 | Stop reason: HOSPADM

## 2018-09-26 RX ORDER — SCOLOPAMINE TRANSDERMAL SYSTEM 1 MG/1
1 PATCH, EXTENDED RELEASE TRANSDERMAL ONCE
Status: COMPLETED | OUTPATIENT
Start: 2018-09-26 | End: 2018-09-26

## 2018-09-26 RX ORDER — ROPIVACAINE HYDROCHLORIDE 5 MG/ML
INJECTION, SOLUTION EPIDURAL; INFILTRATION; PERINEURAL PRN
Status: DISCONTINUED | OUTPATIENT
Start: 2018-09-26 | End: 2018-09-26 | Stop reason: HOSPADM

## 2018-09-26 RX ORDER — OXYCODONE AND ACETAMINOPHEN 5; 325 MG/1; MG/1
1-2 TABLET ORAL EVERY 4 HOURS PRN
Qty: 20 TABLET | Refills: 0 | Status: ON HOLD | OUTPATIENT
Start: 2018-09-26 | End: 2018-10-15

## 2018-09-26 RX ORDER — EPHEDRINE SULFATE 50 MG/ML
INJECTION, SOLUTION INTRAMUSCULAR; INTRAVENOUS; SUBCUTANEOUS PRN
Status: DISCONTINUED | OUTPATIENT
Start: 2018-09-26 | End: 2018-09-26

## 2018-09-26 RX ORDER — MEPERIDINE HYDROCHLORIDE 25 MG/ML
12.5 INJECTION INTRAMUSCULAR; INTRAVENOUS; SUBCUTANEOUS
Status: DISCONTINUED | OUTPATIENT
Start: 2018-09-26 | End: 2018-09-26 | Stop reason: HOSPADM

## 2018-09-26 RX ORDER — ONDANSETRON 4 MG/1
4 TABLET, ORALLY DISINTEGRATING ORAL
Status: CANCELLED | OUTPATIENT
Start: 2018-09-26

## 2018-09-26 RX ORDER — CEFAZOLIN SODIUM 1 G/3ML
1 INJECTION, POWDER, FOR SOLUTION INTRAMUSCULAR; INTRAVENOUS SEE ADMIN INSTRUCTIONS
Status: DISCONTINUED | OUTPATIENT
Start: 2018-09-26 | End: 2018-09-26 | Stop reason: HOSPADM

## 2018-09-26 RX ORDER — HYDROMORPHONE HYDROCHLORIDE 1 MG/ML
.3-.5 INJECTION, SOLUTION INTRAMUSCULAR; INTRAVENOUS; SUBCUTANEOUS EVERY 10 MIN PRN
Status: DISCONTINUED | OUTPATIENT
Start: 2018-09-26 | End: 2018-09-26 | Stop reason: HOSPADM

## 2018-09-26 RX ORDER — CEFAZOLIN SODIUM 2 G/100ML
2 INJECTION, SOLUTION INTRAVENOUS
Status: DISCONTINUED | OUTPATIENT
Start: 2018-09-26 | End: 2018-09-26 | Stop reason: HOSPADM

## 2018-09-26 RX ORDER — ONDANSETRON 2 MG/ML
4 INJECTION INTRAMUSCULAR; INTRAVENOUS EVERY 30 MIN PRN
Status: DISCONTINUED | OUTPATIENT
Start: 2018-09-26 | End: 2018-09-26 | Stop reason: HOSPADM

## 2018-09-26 RX ORDER — FENTANYL CITRATE 50 UG/ML
25-50 INJECTION, SOLUTION INTRAMUSCULAR; INTRAVENOUS
Status: DISCONTINUED | OUTPATIENT
Start: 2018-09-26 | End: 2018-09-26 | Stop reason: HOSPADM

## 2018-09-26 RX ORDER — FENTANYL CITRATE 50 UG/ML
INJECTION, SOLUTION INTRAMUSCULAR; INTRAVENOUS PRN
Status: DISCONTINUED | OUTPATIENT
Start: 2018-09-26 | End: 2018-09-26

## 2018-09-26 RX ORDER — SODIUM CHLORIDE, SODIUM LACTATE, POTASSIUM CHLORIDE, CALCIUM CHLORIDE 600; 310; 30; 20 MG/100ML; MG/100ML; MG/100ML; MG/100ML
INJECTION, SOLUTION INTRAVENOUS CONTINUOUS
Status: DISCONTINUED | OUTPATIENT
Start: 2018-09-26 | End: 2018-09-26 | Stop reason: HOSPADM

## 2018-09-26 RX ORDER — ONDANSETRON 2 MG/ML
INJECTION INTRAMUSCULAR; INTRAVENOUS PRN
Status: DISCONTINUED | OUTPATIENT
Start: 2018-09-26 | End: 2018-09-26

## 2018-09-26 RX ORDER — PROPOFOL 10 MG/ML
INJECTION, EMULSION INTRAVENOUS PRN
Status: DISCONTINUED | OUTPATIENT
Start: 2018-09-26 | End: 2018-09-26

## 2018-09-26 RX ADMIN — OMEPRAZOLE 40 MG: 20 CAPSULE, DELAYED RELEASE ORAL at 06:21

## 2018-09-26 RX ADMIN — ROPIVACAINE HYDROCHLORIDE 10 ML: 5 INJECTION, SOLUTION EPIDURAL; INFILTRATION; PERINEURAL at 16:39

## 2018-09-26 RX ADMIN — OXYCODONE HYDROCHLORIDE AND ACETAMINOPHEN 2 TABLET: 5; 325 TABLET ORAL at 04:05

## 2018-09-26 RX ADMIN — PROPOFOL 200 MG: 10 INJECTION, EMULSION INTRAVENOUS at 16:21

## 2018-09-26 RX ADMIN — BUPROPION HYDROCHLORIDE 300 MG: 300 TABLET, FILM COATED, EXTENDED RELEASE ORAL at 08:39

## 2018-09-26 RX ADMIN — SODIUM CHLORIDE, POTASSIUM CHLORIDE, SODIUM LACTATE AND CALCIUM CHLORIDE: 600; 310; 30; 20 INJECTION, SOLUTION INTRAVENOUS at 16:19

## 2018-09-26 RX ADMIN — OXYCODONE HYDROCHLORIDE AND ACETAMINOPHEN 2 TABLET: 5; 325 TABLET ORAL at 10:56

## 2018-09-26 RX ADMIN — OXYCODONE HYDROCHLORIDE AND ACETAMINOPHEN 2 TABLET: 5; 325 TABLET ORAL at 02:00

## 2018-09-26 RX ADMIN — OXYCODONE HYDROCHLORIDE AND ACETAMINOPHEN 2 TABLET: 5; 325 TABLET ORAL at 07:00

## 2018-09-26 RX ADMIN — GABAPENTIN 800 MG: 800 TABLET, FILM COATED ORAL at 08:39

## 2018-09-26 RX ADMIN — SCOPALAMINE 1 PATCH: 1 PATCH, EXTENDED RELEASE TRANSDERMAL at 13:41

## 2018-09-26 RX ADMIN — FENTANYL CITRATE 50 MCG: 50 INJECTION, SOLUTION INTRAMUSCULAR; INTRAVENOUS at 16:21

## 2018-09-26 RX ADMIN — GENTAMICIN SULFATE 1000 ML: 40 INJECTION, SOLUTION INTRAMUSCULAR; INTRAVENOUS at 16:50

## 2018-09-26 RX ADMIN — FENTANYL CITRATE 50 MCG: 50 INJECTION, SOLUTION INTRAMUSCULAR; INTRAVENOUS at 16:53

## 2018-09-26 RX ADMIN — POTASSIUM CHLORIDE 80 MEQ: 1500 TABLET, EXTENDED RELEASE ORAL at 08:39

## 2018-09-26 RX ADMIN — DEXMEDETOMIDINE HYDROCHLORIDE 12 MCG: 100 INJECTION, SOLUTION INTRAVENOUS at 16:21

## 2018-09-26 RX ADMIN — Medication 5 MG: at 16:48

## 2018-09-26 RX ADMIN — VANCOMYCIN HYDROCHLORIDE 1750 MG: 5 INJECTION, POWDER, LYOPHILIZED, FOR SOLUTION INTRAVENOUS at 16:23

## 2018-09-26 RX ADMIN — LIDOCAINE HYDROCHLORIDE 60 MG: 20 INJECTION, SOLUTION INFILTRATION; PERINEURAL at 16:21

## 2018-09-26 RX ADMIN — PROPOFOL 200 MCG/KG/MIN: 10 INJECTION, EMULSION INTRAVENOUS at 16:21

## 2018-09-26 RX ADMIN — BUSPIRONE HYDROCHLORIDE 15 MG: 15 TABLET ORAL at 08:39

## 2018-09-26 RX ADMIN — VANCOMYCIN HYDROCHLORIDE 1250 MG: 5 INJECTION, POWDER, LYOPHILIZED, FOR SOLUTION INTRAVENOUS at 06:19

## 2018-09-26 RX ADMIN — ONDANSETRON 4 MG: 2 INJECTION INTRAMUSCULAR; INTRAVENOUS at 16:39

## 2018-09-26 ASSESSMENT — ACTIVITIES OF DAILY LIVING (ADL)
ADLS_ACUITY_SCORE: 13

## 2018-09-26 NOTE — PHARMACY-VANCOMYCIN DOSING SERVICE
Pharmacy Vancomycin Note  Date of Service 2018  Patient's  1970   48 year old, female  ABW = 81.6 kg    Indication: Skin and Soft Tissue Infection   Postoperative infection (bunionectomy) of the left foot with associated cellulitis.  X-ray showed no sign of osteomyelitis.   Goal Trough Level: 10-15 mg/L  Day of Therapy: 3 (started 2018)  Current Vancomycin regimen:  1250 mg IV q12h    Current estimated CrCl = Estimated Creatinine Clearance: 100.2 mL/min (based on Cr of 0.74).    Creatinine for last 3 days  2018:  4:20 PM Creatinine 0.78 mg/dL  2018:  5:50 AM Creatinine 0.74 mg/dL    Recent Vancomycin Levels (past 3 days)  2018:  5:45 AM Vancomycin Level 8.9 mg/L    Vancomycin IV Administrations (past 72 hours)                   vancomycin (VANCOCIN) 1,250 mg in sodium chloride 0.9 % 250 mL intermittent infusion (mg) 1,250 mg New Bag 18 0619     1,250 mg New Bag 18 1732     1,250 mg New Bag  0600    vancomycin (VANCOCIN) 1,750 mg in sodium chloride 0.9 % 500 mL intermittent infusion (mg) 1,750 mg New Bag 18 1746                Nephrotoxins and other renal medications (Future)    Start     Dose/Rate Route Frequency Ordered Stop    18 1600  [Auto Hold]  vancomycin (VANCOCIN) 1,750 mg in sodium chloride 0.9 % 500 mL intermittent infusion     (Auto Hold since 18 1301)    1,750 mg  over 2 Hours Intravenous EVERY 12 HOURS 18 0809               Contrast Orders - past 72 hours     None          Interpretation of levels and current regimen:  Trough level is  Subtherapeutic    Has serum creatinine changed > 50% in last 72 hours: No    Urine output:  unable to determine    Renal Function: Stable    Plan:  1.  Increase Dose to 1750 mg IV q12h  2.  Pharmacy will check trough levels as appropriate in 1-3 Days.    3. Serum creatinine levels will be ordered a minimum of twice weekly.      Dennise ePdraza, ChelseaD        .

## 2018-09-26 NOTE — ANESTHESIA PREPROCEDURE EVALUATION
Anesthesia Evaluation     .             ROS/MED HX    ENT/Pulmonary:      (-) sleep apnea   Neurologic:       Cardiovascular:         METS/Exercise Tolerance:     Hematologic:     (+) Other Hematologic Disorder-SLE      Musculoskeletal:         GI/Hepatic:     (+) GERD Asymptomatic on medication,       Renal/Genitourinary:     (+) Nephrolithiasis ,       Endo:     (+) Obesity, .      Psychiatric:     (+) psychiatric history anxiety and depression      Infectious Disease:         Malignancy:         Other:                     Physical Exam  Normal systems: cardiovascular, pulmonary and dental    Airway   TM distance: >3 FB  Neck ROM: full    Dental     Cardiovascular       Pulmonary                     Anesthesia Plan      History & Physical Review  History and physical reviewed and following examination; no interval change.    ASA Status:  2 .    NPO Status:  > 8 hours    Plan for General and LMA with Intravenous induction. Maintenance will be TIVA.    PONV prophylaxis:  Ondansetron (or other 5HT-3), Dexamethasone or Solumedrol and Scopolamine patch       Postoperative Care  Postoperative pain management:  IV analgesics.      Consents  Anesthetic plan, risks, benefits and alternatives discussed with:  Patient..        Procedure: Procedure(s):  COMBINED IRRIGATION AND DEBRIDEMENT FOOT  Preop diagnosis: UNKNOWN    Allergies   Allergen Reactions     Sulfa Drugs Shortness Of Breath and Rash     Demerol [Meperidine] Nausea and Vomiting     Erythromycin Nausea and Vomiting     Metformin Nausea and Vomiting     Codeine Rash     Penicillins Rash     Past Medical History:   Diagnosis Date     Allergic rhinitis, cause unspecified      Anxiety state, unspecified      Cellulitis and abscess of leg, except foot      Closed fracture of unspecified part of tibia      Disuse osteoporosis      Dysthymic disorder      Edema      Encounter for long-term (current) use of other medications      Esophageal reflux      Kidney stones       Myalgia and myositis, unspecified      Obesity, unspecified      Open wound of foot except toe(s) alone, complicated      Opioid type dependence, unspecified      Other acne      Other congenital valgus deformity of feet      Other ventral hernia without mention of obstruction or gangrene      Polycystic ovaries      Systemic lupus erythematosus (H)      Tibialis tendinitis      Unspecified hereditary and idiopathic peripheral neuropathy      Past Surgical History:   Procedure Laterality Date     APPENDECTOMY       ARTHRODESIS FOOT Left 2/4/2015    Procedure: ARTHRODESIS FOOT;  Surgeon: Andre Harman MD;  Location: [facility]     DILATION AND CURETTAGE       EXCISE TOENAIL(S) Left 2/4/2015    Procedure: EXCISE TOENAIL(S);  Surgeon: Andre Harman MD;  Location: [facility]     HERNIA REPAIR      umbilical     HERNIA REPAIR      ventral open x 2     TONSILLECTOMY       Social History   Substance Use Topics     Smoking status: Former Smoker     Packs/day: 0.50     Years: 12.00     Types: Cigarettes     Quit date: 10/1/2002     Smokeless tobacco: Not on file     Alcohol use Yes      Comment: wine with dinner occas     Prior to Admission medications    Medication Sig Start Date End Date Taking? Authorizing Provider   ammonium lactate (AMLACTIN) 12 % cream Apply topically daily as needed (to heels)    Yes Unknown, Entered By History   BuPROPion HCl (WELLBUTRIN XL PO) Take 300 mg by mouth daily   Yes Reported, Patient   BusPIRone HCl (BUSPAR PO) Take 15 mg by mouth 2 times daily   Yes Reported, Patient   clindamycin (CLEOCIN T) 1 % solution Apply topically At Bedtime   Yes Unknown, Entered By History   Furosemide (LASIX PO) Take 160 mg by mouth daily    Yes Reported, Patient   gabapentin (NEURONTIN) 800 MG tablet Take 800 mg by mouth 3 times daily Am, supper, hs   Yes Unknown, Entered By History   HydrOXYzine Pamoate (VISTARIL PO) Take 25 mg by mouth every 4 hours as needed    Yes Reported, Patient   Omeprazole  (PRILOSEC PO) Take 40 mg by mouth 2 times daily (before meals) 2 CAPSULES IN A.M.    Yes Reported, Patient   Ondansetron HCl (ZOFRAN PO) Take 8 mg by mouth as needed for nausea or vomiting   Yes Reported, Patient   oxyCODONE-acetaminophen (PERCOCET) 5-325 MG per tablet Take 1-2 tablets by mouth every 4 hours as needed for severe pain   Yes Unknown, Entered By History   phentermine 30 MG capsule Take 30 mg by mouth every morning   Yes Reported, Patient   polyethylene glycol (MIRALAX/GLYCOLAX) packet Take 17 g by mouth as needed for constipation   Yes Reported, Patient   Potassium Chloride Shameka CR (K-DUR PO) Take 80 mEq by mouth daily    Yes Reported, Patient   Topiramate (TOPAMAX PO) Take 100 mg by mouth At Bedtime    Yes Reported, Patient     Current Facility-Administered Medications Ordered in Epic   Medication Dose Route Frequency Last Rate Last Dose     [Auto Hold] bisacodyl (DULCOLAX) Suppository 10 mg  10 mg Rectal Daily PRN         [Auto Hold] buPROPion (WELLBUTRIN XL) 24 hr tablet 300 mg  300 mg Oral Daily   300 mg at 09/26/18 0839     [Auto Hold] busPIRone (BUSPAR) tablet 15 mg  15 mg Oral BID   15 mg at 09/26/18 0839     ceFAZolin (ANCEF) 1 g vial to attach to  ml bag for ADULT or 50 ml bag for PEDS  1 g Intravenous See Admin Instructions         ceFAZolin (ANCEF) intermittent infusion 2 g in 100 mL dextrose PRE-MIX  2 g Intravenous Pre-Op/Pre-procedure x 1 dose         [Auto Hold] gabapentin (NEURONTIN) tablet 800 mg  800 mg Oral TID   800 mg at 09/26/18 0839     [Auto Hold] HYDROmorphone (PF) (DILAUDID) injection 0.3-0.5 mg  0.3-0.5 mg Intravenous Q1H PRN         [Auto Hold] hydrOXYzine (VISTARIL) capsule 25 mg  25 mg Oral Q4H PRN         influenza quadrivalent (PF) vacc (FLUZONE or Flulaval or FLUARIX) injection 0.5 mL  0.5 mL Intramuscular Prior to discharge         lactated ringers infusion   Intravenous Continuous         [Auto Hold] melatonin tablet 1 mg  1 mg Oral At Bedtime PRN         [Auto  Hold] metoclopramide (REGLAN) tablet 10 mg  10 mg Oral Q6H PRN        Or     [Auto Hold] metoclopramide (REGLAN) injection 10 mg  10 mg Intravenous Q6H PRN         [Auto Hold] morphine (PF) injection 4 mg  4 mg Intravenous Q15 Min PRN   4 mg at 09/24/18 1753     [Auto Hold] naloxone (NARCAN) injection 0.1-0.4 mg  0.1-0.4 mg Intravenous Q2 Min PRN         [Auto Hold] omeprazole (priLOSEC) CR capsule 40 mg  40 mg Oral BID AC   40 mg at 09/26/18 0621     [Auto Hold] ondansetron (ZOFRAN-ODT) ODT tab 4 mg  4 mg Oral Q6H PRN        Or     [Auto Hold] ondansetron (ZOFRAN) injection 4 mg  4 mg Intravenous Q6H PRN         [Auto Hold] oxyCODONE-acetaminophen (PERCOCET) 5-325 MG per tablet 1-2 tablet  1-2 tablet Oral Q4H PRN   2 tablet at 09/26/18 1056     [Auto Hold] potassium chloride (KLOR-CON) Packet 20-40 mEq  20-40 mEq Oral or Feeding Tube Q2H PRN         [Auto Hold] potassium chloride 10 mEq in 100 mL intermittent infusion with 10 mg lidocaine  10 mEq Intravenous Q1H PRN         [Auto Hold] potassium chloride 10 mEq in 100 mL sterile water intermittent infusion (premix)  10 mEq Intravenous Q1H PRN         [Auto Hold] potassium chloride 20 mEq in 50 mL intermittent infusion  20 mEq Intravenous Q1H PRN         [Auto Hold] potassium chloride SA (K-DUR/KLOR-CON M) CR tablet 20-40 mEq  20-40 mEq Oral Q2H PRN   20 mEq at 09/25/18 1014     [Auto Hold] potassium chloride SA (K-DUR/KLOR-CON M) CR tablet 80 mEq  80 mEq Oral Daily   80 mEq at 09/26/18 0839     [Auto Hold] prochlorperazine (COMPAZINE) injection 10 mg  10 mg Intravenous Q6H PRN        Or     [Auto Hold] prochlorperazine (COMPAZINE) tablet 10 mg  10 mg Oral Q6H PRN        Or     [Auto Hold] prochlorperazine (COMPAZINE) Suppository 25 mg  25 mg Rectal Q12H PRN         [Auto Hold] senna-docusate (SENOKOT-S;PERICOLACE) 8.6-50 MG per tablet 1 tablet  1 tablet Oral BID   1 tablet at 09/25/18 9525    Or     [Auto Hold] senna-docusate (SENOKOT-S;PERICOLACE) 8.6-50 MG per  tablet 2 tablet  2 tablet Oral BID         [Auto Hold] senna-docusate (SENOKOT-S;PERICOLACE) 8.6-50 MG per tablet 1 tablet  1 tablet Oral BID PRN        Or     [Auto Hold] senna-docusate (SENOKOT-S;PERICOLACE) 8.6-50 MG per tablet 2 tablet  2 tablet Oral BID PRN         [Auto Hold] topiramate (TOPAMAX) tablet 100 mg  100 mg Oral At Bedtime   100 mg at 09/25/18 2215     [Auto Hold] vancomycin (VANCOCIN) 1,750 mg in sodium chloride 0.9 % 500 mL intermittent infusion  1,750 mg Intravenous Q12H         No current [facility]-ordered outpatient prescriptions on file.       lactated ringers       Wt Readings from Last 1 Encounters:   09/24/18 81.6 kg (180 lb)     Temp Readings from Last 1 Encounters:   09/26/18 36.4  C (97.6  F) (Oral)     BP Readings from Last 6 Encounters:   09/26/18 106/69   02/04/15 122/78     Pulse Readings from Last 4 Encounters:   09/26/18 74     Resp Readings from Last 1 Encounters:   09/26/18 16     SpO2 Readings from Last 1 Encounters:   09/26/18 97%     Recent Labs   Lab Test  09/26/18   0545  09/25/18   1415  09/25/18   0550  09/24/18   1620   NA   --    --   141  142   POTASSIUM  4.2  3.8  3.1*  2.9*   CHLORIDE   --    --   110*  108   CO2   --    --   23  23   ANIONGAP   --    --   8  11   GLC   --    --   139*  137*   BUN   --    --   9  12   CR   --    --   0.74  0.78   PAULINE   --    --   8.2*  9.1     No results for input(s): AST, ALT, ALKPHOS, BILITOTAL, LIPASE in the last 81773 hours.  Recent Labs   Lab Test  09/26/18   0545  09/25/18   0550   WBC  6.9  6.7   HGB  11.0*  10.9*   PLT  264  283     No results for input(s): ABO, RH in the last 87789 hours.  No results for input(s): INR, PTT in the last 90068 hours.   No results for input(s): TROPI in the last 28101 hours.  No results for input(s): PH, PCO2, PO2, HCO3 in the last 51097 hours.  Recent Labs   Lab Test  02/04/15   0855   HCG  Negative     Recent Results (from the past 744 hour(s))   XR Foot Left G/E 3 Views    Narrative    LEFT FOOT  THREE VIEWS  9/24/2018 4:14 PM     HISTORY: Follow-up bunionectomy.     COMPARISON: None.      Impression    IMPRESSION: Postoperative changes of bunionectomy are noted. Some mild  callus formation is noted at the surgical site within the distal  portion of the left first metatarsal. No convincing radiographic  evidence for osteomyelitis. Additional surgical hardware is noted  involving the tarsal and calcaneal bones. Small plantar calcaneal  spur.    LEVAR CRISTOBAL MD   US Lower Extremity Venous Duplex Left    Narrative    LEFT LOWER EXTREMITY VENOUS DOPPLER ULTRASOUND  9/24/2018 4:57 PM    HISTORY: Left lower extremity pain and swelling. The concern is for  deep venous thrombosis.    COMPARISON: None.    FINDINGS: Color flow and Doppler spectral waveform analysis  demonstrates normal blood flow in the common femoral, femoral,  popliteal, posterior tibial, and greater saphenous veins of the left  lower extremity. No thrombus is seen.      Impression    IMPRESSION: There is no evidence of deep venous thrombosis in the left  lower extremity.    BURKE DRIVER MD       RECENT LABS:   ECG:   ECHO:                       .

## 2018-09-26 NOTE — PLAN OF CARE
Problem: Patient Care Overview  Goal: Plan of Care/Patient Progress Review  Outcome: No Change  Pt A&Ox4. VSS on RA. Up Independently to bathroom, calls appropriately. NPO at midnight for I&D tomorrow morning. CMS intact except baseline BLE numbness and +1-2 Left foot edema. RLE NWB- uses scooter. Left foot wrapped with gauze and nonadherent dressing-CDI. L heel dressing small dried serosanguinous drainage. Continue to monitor.

## 2018-09-26 NOTE — DISCHARGE INSTRUCTIONS
Same Day Surgery Discharge Instructions for  Sedation and General Anesthesia       It's not unusual to feel dizzy, light-headed or faint for up to 24 hours after surgery or while taking pain medication.  If you have these symptoms: sit for a few minutes before standing and have someone assist you when you get up to walk or use the bathroom.      You should rest and relax for the next 24 hours. We recommend you make arrangements to have an adult stay with you for at least 24 hours after your discharge.  Avoid hazardous and strenuous activity.      DO NOT DRIVE any vehicle or operate mechanical equipment for 24 hours following the end of your surgery.  Even though you may feel normal, your reactions may be affected by the medication you have received.      Do not drink alcoholic beverages for 24 hours following surgery.       Slowly progress to your regular diet as you feel able. It's not unusual to feel nauseated and/or vomit after receiving anesthesia.  If you develop these symptoms, drink clear liquids (apple juice, ginger ale, broth, 7-up, etc. ) until you feel better.  If your nausea and vomiting persists for 24 hours, please notify your surgeon.        All narcotic pain medications, along with inactivity and anesthesia, can cause constipation. Drinking plenty of liquids and increasing fiber intake will help.      For any questions of a medical nature, call your surgeon.      Do not make important decisions for 24 hours.      If you had general anesthesia, you may have a sore throat for a couple of days related to the breathing tube used during surgery.  You may use Cepacol lozenges to help with this discomfort.  If it worsens or if you develop a fever, contact your surgeon.       If you feel your pain is not well managed with the pain medications prescribed by your surgeon, please contact your surgeon's office to let them know so they can address your concerns.         Information for Patients Discharging with a  Transderm Scopolamine Patch       Dry mouth is a common side effect.    Drowsiness is another common side effect especially when combined with pain medication.  Please avoid activities that require mental alertness such as driving a car or making important legal decisions.    Since Scopolamine can cause temporary dilation of the pupils and blurred vision if it comes in contact with the eyes; be sure to wash your hands thoroughly with soap and water immediately after handling the patch.   When you remove your patch, please stick it to a tissue or paper towel for disposal.      Remove the patch immediately and contact a physician in the unlikely event that you experience symptoms of acute glaucoma (pain and reddening of the eyes, accompanied by dilated pupils).    Remove the patch if you develop any difficulties urinating.  If you cannot urinate after removing your patch, please notify your surgeon.    Remove the patch 24 hours after surgery.       POST-OPERATIVE INSTRUCTIONS  FOOT AND ANKLE SURGERY  TERESA Harman M.D.  Jon Malik P.A.-C    These precautions MUST be followed for the first 24 hours after surgery:    Upon discharge, go directly home.  POST-OPERATIVE CARE GUIDELINES    The following are general guidelines about what to expect the first days/weeks after surgery.  They are not specific, and your recovery may be slightly different.    Blood clots are not common, but are emergencies.  If you have sudden onset pain in your calf or leg, or have sudden shortness of breath, go to the Emergency Department.      Elevation of your operative foot/ankle is key to reducing the swelling in the immediate post-operative phase (first 3-5 days).  When you are at rest, elevate your foot at or above the level of your heart.  When sitting, your foot should be elevated on a chair or stool; not hanging down.      You should plan to rest the day after surgery no matter how minor the procedure.  More complicated  procedures will require more time to return to normal activity.      Foot and ankle surgery is painful in most cases - use your medication as directed for the first 24 hours after surgery.  It is not unusual for the pain to be worse a day or two after surgery than on the day of.  If your pain is more than 8/10 contact our office.  If you don t have pain, gradually decrease your pain meds by substituting Tylenol.  Please don t use Advil/ibuprofen unless we order it for you.      You will be prescribed Percocet or Vicodin for pain, Vistaril for spasms/pain adjunct, Senokot for constipation.  If you have had trouble taking these meds before or experience nausea or vomiting after surgery from your medication, please advise the recovery room nurses.  If you are already at home, try drinking only clear liquids and/or call our office.      All pain medications, along with inactivity can cause constipation.  Use the Senakot as directed, increase fluid intake to 1 quart per day and increase your dietary fiber.  (The  P  fruits - peaches, plums, pears, and prunes as well as anise/black licorice are recommended.)    It is not unusual to run a low-grade fever after surgery.  If your temperature is elevated above 102 , lasts longer than 24 hours, or is questionable in any way, contact our office.      Drainage from your cast/dressing is normal.  Reinforce your cast/dressing with 4x4 dressings and cover with an Ace wrap.  Wait until 24 hours after surgery to change your dressings; by this time most of your bleeding will have stopped.  If you have drainage through your dressings 2 days after surgery, contact our office.      You cast/dressing will be changed at your 2-week follow up appointment.  They should be kept clean and DRY.  If your cast/dressing is damaged before that, contact our office.      It is normal to experience some bruising in the toes after surgery and they may appear  dark  when your foot hangs down.  It is  important to actively wiggle your toes for at least 5-10 minutes each hour UNLESS you had surgery on those toes.      Use ice on your foot/ankle over the dressing/cast for 20 minutes per hour, 10 or more times per day.  A large bag of frozen peas works well.      Bathing: take a tub or sponge bath instead of a shower if possible during the first two weeks.  If you choose to shower, cover the dressing/cast with a waterproof covering, these may be ordered from www.seal-tight.com or are available at some pharmacies/medical supply stores.  Another option is XeroSox, which is the original vacuum sealed bandage and cast cover.  The cast cover has a vacuum seal and is absolutely waterproof.  1-917.420.8633 or www.xerosox.MetaCert for more information.      Driving:  For surgery on the left foot/ankle, you may drive as soon as narcotic medications are stopped.  For surgery on the right foot/ankle, you may drive when you are out of a cast, off pain medications, and you feel secure with braking.      In general, listen to your foot/ankle.  If you have a sudden, dramatic increase in pain that does not resolve after an hour or so, something is wrong - call our office.  If you fall or bump your foot/ankle and have sudden pain that resolves, give it 24 hours before you call.      Many of your questions can be addressed at your 2-week follow-up appointment - please make a list of things to ask us as they come up during your recovery.      If you had a nerve block it is common to have numbness in your leg and foot for up to 30 hours after surgery.  If your leg or foot is still numb more than 30 hours after surgery, please call the office.      FUTURE DENTIST VISITS:  If you have had a total ankle arthroplasty please be advised you must be on antibiotics prior to ANY dental work.  Please call your dentist office ahead of time and make them aware of this as your dentist will be able to order the prescription.  If you have had any other  surgery this is not a concern.    If you have any further questions or concerns, please call our office at [phone]

## 2018-09-26 NOTE — ANESTHESIA CARE TRANSFER NOTE
Patient: Shalonda Howard    Procedure(s):  IRRIGATION AND DEBRIDEMENT LEFT FOOT - Wound Class: II-Clean Contaminated    Diagnosis: UNKNOWN  Diagnosis Additional Information: No value filed.    Anesthesia Type:   General, LMA     Note:  Airway :Face Mask and LMA  Patient transferred to:PACU  Comments: 1648:  To par spontaneous respirationsHandoff Report: Identifed the Patient, Identified the Reponsible Provider, Reviewed the pertinent medical history, Discussed the surgical course, Reviewed Intra-OP anesthesia mangement and issues during anesthesia, Set expectations for post-procedure period and Allowed opportunity for questions and acknowledgement of understanding      Vitals: (Last set prior to Anesthesia Care Transfer)    CRNA VITALS  9/26/2018 1617 - 9/26/2018 1647      9/26/2018             Pulse: 69    SpO2: 98 %    Resp Rate (set): 10                Electronically Signed By: CRISTIN Lam CRNA  September 26, 2018  4:47 PM

## 2018-09-26 NOTE — PLAN OF CARE
Problem: Patient Care Overview  Goal: Plan of Care/Patient Progress Review  Outcome: Improving  A&Ox4. VSS. LLE +2 edema, hot, mary ellen/red, baseline numbness. RLE +1 edema, warm, mary ellen, baseline numbness. Non wt bear RLE, uses scooter independently. Using percocet for pain control. NPO at midnight. Consent printed and filled out, in chart. On vanco. Plan for I&D today to clean out L foot wound.

## 2018-09-27 LAB
BACTERIA SPEC CULT: ABNORMAL
Lab: ABNORMAL
SPECIMEN SOURCE: ABNORMAL

## 2018-09-27 NOTE — OP NOTE
Procedure Date: 09/26/2018      PREOPERATIVE DIAGNOSIS:  Infected left foot after previous bunion repair.      POSTOPERATIVE DIAGNOSIS:  Infected left foot after previous bunion repair.      PROCEDURES:  Open debridement of skin, subcutaneous tissue, fascia and bone as well as removal of 2 screws from the right first metatarsal.      ANESTHESIA:  General.      SURGEON:  Andre Harman MD       ASSISTANT:  MARIA ESTHER Oshea       PREAMBLE:  Ms. Franco had a bunion repair done about 6 weeks ago.  She became infected a few days ago, and we admitted her for IV antibiotics.  Informed consent was obtained for the above-mentioned procedure.      Two grams of Ancef was given prior to surgery.  There is no need for postoperative antibiotic prophylaxis.      DESCRIPTION OF PROCEDURE:  After adequate induction of a general anesthetic, the patient was positioned supine on the operating table.  The left leg was sterilely prepped and free draped in the usual fashion.  Tourniquet around the thigh was inflated to 300 mmHg.      Ellipse was excised around the skin breakdown over the medial side of the joint.  An excisional debridement was done excising what appear to be infected skin, subcutaneous tissue, fascia and bone.  The 2 screws were loose and both were removed.      Tissue samples were sent for culture and sensitivity.        A pulse lavage was then used to do a thorough soft tissue debridement with saline and clindamycin.      After an adequate debridement, the tourniquet was deflated.  The wound closed loosely with nylon sutures.  A sterile dressing and a light compressive bandage applied.  She tolerated the procedure well and was taken to the recovery room in satisfactory condition.  She can ambulate weightbearing as tolerated.  Sutures will be removed in 2 weeks.         ANDRE HARMAN MD             D: 09/26/2018   T: 09/27/2018   MT: JONO      Name:     CASSANDRA FRANCO   MRN:      [id number]        Account:         [id number]   :      1970           Procedure Date: 2018      Document: [id number]

## 2018-09-27 NOTE — ANESTHESIA POSTPROCEDURE EVALUATION
Patient: Shalonda Howard    Procedure(s):  IRRIGATION AND DEBRIDEMENT LEFT FOOT - Wound Class: II-Clean Contaminated    Diagnosis:UNKNOWN  Diagnosis Additional Information: No value filed.    Anesthesia Type:  General, LMA    Note:  Anesthesia Post Evaluation    Patient location during evaluation: PACU  Patient participation: Able to fully participate in evaluation  Level of consciousness: awake and alert  Pain management: adequate  Airway patency: patent  Cardiovascular status: acceptable and hemodynamically stable  Respiratory status: nonlabored ventilation, unassisted and acceptable  Hydration status: acceptable  PONV: none             Last vitals:  Vitals:    09/26/18 1745 09/26/18 1800 09/26/18 1842   BP: 109/76 104/72 110/70   Pulse:      Resp: 12 19 16   Temp: 36.3  C (97.3  F)     SpO2: 93% 96% 98%         Electronically Signed By: Ghassan Mason MD  September 27, 2018  1:10 AM

## 2018-09-29 LAB
BACTERIA SPEC CULT: ABNORMAL
BACTERIA SPEC CULT: ABNORMAL
Lab: ABNORMAL
SPECIMEN SOURCE: ABNORMAL

## 2018-09-30 LAB
BACTERIA SPEC CULT: NO GROWTH
BACTERIA SPEC CULT: NO GROWTH
Lab: NORMAL
Lab: NORMAL
SPECIMEN SOURCE: NORMAL
SPECIMEN SOURCE: NORMAL

## 2018-10-02 ENCOUNTER — HEALTH MAINTENANCE LETTER (OUTPATIENT)
Age: 48
End: 2018-10-02

## 2018-10-03 LAB
BACTERIA SPEC CULT: NORMAL
Lab: NORMAL
SPECIMEN SOURCE: NORMAL

## 2018-10-08 NOTE — DISCHARGE SUMMARY
Admit Date:     2018   Discharge Date:     2018      ADMISSION DIAGNOSIS:  Left foot infection following left bunion repair.      DISCHARGE DIAGNOSIS:  Left foot infection following a left bunion repair.        Ms. Franco was admitted to [facility] after admission through the emergency department following a worsening of left foot infection.  Cassandra had surgery in early September including bilateral bunion repairs.  The right side did well.  The left side unfortunately had some dehiscence of the surgical incision.  Angela was placed on p.o. antibiotics which did not help.  She ultimately presented to the emergency department with worsening infection, was admitted to the orthopedic floor and it was decided that she needed I and D of the left foot along with hardware removal.  On , Cassandra was taken back to the operating room and I and D was performed including debridement of the skin, subcutaneous tissue, fascia and bone as well as 2 mini-frag screws hardware removal.  The incision was resutured and Cassandra was placed in a soft splint.  She will follow up with us at 2- and 6-week intervals.  She was discharged directly from the postanesthesia care unit.         HUGO LEÓN MD       As dictated by MARIA ESTHER PIERRE            D: 10/08/2018   T: 10/08/2018   MT: BETTYE      Name:     CASSANDRA FRANCO   MRN:      [id number]        Account:        [id number]   :      1970           Admit Date:     2018                                  Discharge Date: 2018      Document: [id number]

## 2018-10-15 ENCOUNTER — HOSPITAL ENCOUNTER (INPATIENT)
Facility: CLINIC | Age: 48
LOS: 3 days | Discharge: HOME | DRG: 920 | End: 2018-10-18
Attending: ORTHOPAEDIC SURGERY | Admitting: ORTHOPAEDIC SURGERY
Payer: COMMERCIAL

## 2018-10-15 DIAGNOSIS — B99.9 INFECTION: ICD-10-CM

## 2018-10-15 DIAGNOSIS — M86.60 CHRONIC OSTEOMYELITIS (H): Primary | ICD-10-CM

## 2018-10-15 LAB
CREAT SERPL-MCNC: 0.83 MG/DL (ref 0.52–1.04)
GFR SERPL CREATININE-BSD FRML MDRD: 74 ML/MIN/1.7M2
GLUCOSE BLDC GLUCOMTR-MCNC: 84 MG/DL (ref 70–99)
HBA1C MFR BLD: 5.6 % (ref 0–5.6)
PLATELET # BLD AUTO: 324 10E9/L (ref 150–450)

## 2018-10-15 PROCEDURE — 00000146 ZZHCL STATISTIC GLUCOSE BY METER IP

## 2018-10-15 PROCEDURE — 85049 AUTOMATED PLATELET COUNT: CPT | Performed by: PHYSICIAN ASSISTANT

## 2018-10-15 PROCEDURE — 25000128 H RX IP 250 OP 636: Performed by: PHYSICIAN ASSISTANT

## 2018-10-15 PROCEDURE — 82565 ASSAY OF CREATININE: CPT | Performed by: PHYSICIAN ASSISTANT

## 2018-10-15 PROCEDURE — 12000007 ZZH R&B INTERMEDIATE

## 2018-10-15 PROCEDURE — 25000132 ZZH RX MED GY IP 250 OP 250 PS 637: Performed by: PHYSICIAN ASSISTANT

## 2018-10-15 PROCEDURE — 83036 HEMOGLOBIN GLYCOSYLATED A1C: CPT | Performed by: PHYSICIAN ASSISTANT

## 2018-10-15 PROCEDURE — 36415 COLL VENOUS BLD VENIPUNCTURE: CPT | Performed by: PHYSICIAN ASSISTANT

## 2018-10-15 PROCEDURE — 25000128 H RX IP 250 OP 636

## 2018-10-15 RX ORDER — TOPIRAMATE 100 MG/1
100 TABLET, FILM COATED ORAL AT BEDTIME
Status: DISCONTINUED | OUTPATIENT
Start: 2018-10-15 | End: 2018-10-18 | Stop reason: HOSPADM

## 2018-10-15 RX ORDER — ONDANSETRON 4 MG/1
4 TABLET, ORALLY DISINTEGRATING ORAL EVERY 6 HOURS PRN
Status: DISCONTINUED | OUTPATIENT
Start: 2018-10-15 | End: 2018-10-18 | Stop reason: HOSPADM

## 2018-10-15 RX ORDER — NALOXONE HYDROCHLORIDE 0.4 MG/ML
.1-.4 INJECTION, SOLUTION INTRAMUSCULAR; INTRAVENOUS; SUBCUTANEOUS
Status: DISCONTINUED | OUTPATIENT
Start: 2018-10-15 | End: 2018-10-15

## 2018-10-15 RX ORDER — POTASSIUM CHLORIDE 7.45 MG/ML
10 INJECTION INTRAVENOUS
Status: DISCONTINUED | OUTPATIENT
Start: 2018-10-15 | End: 2018-10-18 | Stop reason: HOSPADM

## 2018-10-15 RX ORDER — NICOTINE POLACRILEX 4 MG
15-30 LOZENGE BUCCAL
Status: DISCONTINUED | OUTPATIENT
Start: 2018-10-15 | End: 2018-10-18 | Stop reason: HOSPADM

## 2018-10-15 RX ORDER — PROCHLORPERAZINE MALEATE 10 MG
10 TABLET ORAL EVERY 6 HOURS PRN
Status: DISCONTINUED | OUTPATIENT
Start: 2018-10-15 | End: 2018-10-18 | Stop reason: HOSPADM

## 2018-10-15 RX ORDER — POTASSIUM CHLORIDE 29.8 MG/ML
20 INJECTION INTRAVENOUS
Status: DISCONTINUED | OUTPATIENT
Start: 2018-10-15 | End: 2018-10-18 | Stop reason: HOSPADM

## 2018-10-15 RX ORDER — DEXTROSE MONOHYDRATE 25 G/50ML
25-50 INJECTION, SOLUTION INTRAVENOUS
Status: DISCONTINUED | OUTPATIENT
Start: 2018-10-15 | End: 2018-10-18 | Stop reason: HOSPADM

## 2018-10-15 RX ORDER — POTASSIUM CHLORIDE 1.5 G/1.58G
20-40 POWDER, FOR SOLUTION ORAL
Status: DISCONTINUED | OUTPATIENT
Start: 2018-10-15 | End: 2018-10-18 | Stop reason: HOSPADM

## 2018-10-15 RX ORDER — AMOXICILLIN 250 MG
1 CAPSULE ORAL 2 TIMES DAILY
Status: DISCONTINUED | OUTPATIENT
Start: 2018-10-15 | End: 2018-10-18 | Stop reason: HOSPADM

## 2018-10-15 RX ORDER — HYDROMORPHONE HYDROCHLORIDE 1 MG/ML
.3-.5 INJECTION, SOLUTION INTRAMUSCULAR; INTRAVENOUS; SUBCUTANEOUS
Status: DISCONTINUED | OUTPATIENT
Start: 2018-10-15 | End: 2018-10-18 | Stop reason: HOSPADM

## 2018-10-15 RX ORDER — POTASSIUM CL/LIDO/0.9 % NACL 10MEQ/0.1L
10 INTRAVENOUS SOLUTION, PIGGYBACK (ML) INTRAVENOUS
Status: DISCONTINUED | OUTPATIENT
Start: 2018-10-15 | End: 2018-10-18 | Stop reason: HOSPADM

## 2018-10-15 RX ORDER — NALOXONE HYDROCHLORIDE 0.4 MG/ML
.1-.4 INJECTION, SOLUTION INTRAMUSCULAR; INTRAVENOUS; SUBCUTANEOUS
Status: DISCONTINUED | OUTPATIENT
Start: 2018-10-15 | End: 2018-10-18 | Stop reason: HOSPADM

## 2018-10-15 RX ORDER — PROCHLORPERAZINE 25 MG
25 SUPPOSITORY, RECTAL RECTAL EVERY 12 HOURS PRN
Status: DISCONTINUED | OUTPATIENT
Start: 2018-10-15 | End: 2018-10-18 | Stop reason: HOSPADM

## 2018-10-15 RX ORDER — CEFAZOLIN SODIUM 1 G/50ML
1250 SOLUTION INTRAVENOUS EVERY 8 HOURS
Status: DISCONTINUED | OUTPATIENT
Start: 2018-10-15 | End: 2018-10-16

## 2018-10-15 RX ORDER — GABAPENTIN 800 MG/1
800 TABLET ORAL 3 TIMES DAILY
Status: DISCONTINUED | OUTPATIENT
Start: 2018-10-15 | End: 2018-10-18 | Stop reason: HOSPADM

## 2018-10-15 RX ORDER — POTASSIUM CHLORIDE 1500 MG/1
20-40 TABLET, EXTENDED RELEASE ORAL
Status: DISCONTINUED | OUTPATIENT
Start: 2018-10-15 | End: 2018-10-18 | Stop reason: HOSPADM

## 2018-10-15 RX ORDER — AMOXICILLIN 250 MG
2 CAPSULE ORAL 2 TIMES DAILY
Status: DISCONTINUED | OUTPATIENT
Start: 2018-10-15 | End: 2018-10-18 | Stop reason: HOSPADM

## 2018-10-15 RX ORDER — ONDANSETRON 2 MG/ML
4 INJECTION INTRAMUSCULAR; INTRAVENOUS EVERY 6 HOURS PRN
Status: DISCONTINUED | OUTPATIENT
Start: 2018-10-15 | End: 2018-10-18 | Stop reason: HOSPADM

## 2018-10-15 RX ORDER — OXYCODONE HYDROCHLORIDE 5 MG/1
5 TABLET ORAL
Status: DISCONTINUED | OUTPATIENT
Start: 2018-10-15 | End: 2018-10-16

## 2018-10-15 RX ORDER — MAGNESIUM SULFATE HEPTAHYDRATE 40 MG/ML
4 INJECTION, SOLUTION INTRAVENOUS EVERY 4 HOURS PRN
Status: DISCONTINUED | OUTPATIENT
Start: 2018-10-15 | End: 2018-10-18 | Stop reason: HOSPADM

## 2018-10-15 RX ADMIN — VANCOMYCIN HYDROCHLORIDE 1250 MG: 5 INJECTION, POWDER, LYOPHILIZED, FOR SOLUTION INTRAVENOUS at 19:32

## 2018-10-15 RX ADMIN — ENOXAPARIN SODIUM 40 MG: 40 INJECTION SUBCUTANEOUS at 21:38

## 2018-10-15 RX ADMIN — OXYCODONE HYDROCHLORIDE 5 MG: 5 TABLET ORAL at 19:29

## 2018-10-15 RX ADMIN — OXYCODONE HYDROCHLORIDE 5 MG: 5 TABLET ORAL at 22:31

## 2018-10-15 RX ADMIN — SENNOSIDES AND DOCUSATE SODIUM 1 TABLET: 8.6; 5 TABLET ORAL at 21:38

## 2018-10-15 NOTE — PHARMACY-VANCOMYCIN DOSING SERVICE
Pharmacy Vancomycin Initial Note  Date of Service October 15, 2018  Patient's  1970  48 year old, female    Indication: Postoperative Infection with possible osteomyelitis    Current estimated CrCl = Estimated Creatinine Clearance: 105.7 mL/min (based on Cr of 0.74).    Creatinine for last 3 days  No results found for requested labs within last 72 hours.    Recent Vancomycin Level(s) for last 3 days  No results found for requested labs within last 72 hours.      Vancomycin IV Administrations (past 72 hours)      No vancomycin orders with administrations in past 72 hours.                Nephrotoxins and other renal medications (Future)    Start     Dose/Rate Route Frequency Ordered Stop    10/15/18 1815  vancomycin (VANCOCIN) 1,250 mg in sodium chloride 0.9 % 250 mL intermittent infusion      1,250 mg  over 90 Minutes Intravenous EVERY 8 HOURS 10/15/18 181            Contrast Orders - past 72 hours     None                Plan:  1.  Start vancomycin  1250 mg IV q8h.   2.  Goal Trough Level: 15-20 mg/L   3.  Pharmacy will check trough levels as appropriate in 1-3 Days.    4. Serum creatinine levels will be ordered daily for the first week of therapy and at least twice weekly for subsequent weeks.    5. Lexa method utilized to dose vancomycin therapy: Method 1    Louann Marsh

## 2018-10-15 NOTE — IP AVS SNAPSHOT
MRN:[id number]                      After Visit Summary   10/15/2018    Shalonda Howard    MRN: [id number]           Thank you!     Thank you for choosing [facility] for your care. Our goal is always to provide you with excellent care. Hearing back from our patients is one way we can continue to improve our services. Please take a few minutes to complete the written survey that you may receive in the mail after you visit with us. Thank you!        Patient Information     Date Of Birth          1970        Designated Caregiver       Most Recent Value    Caregiver    Will someone help with your care after discharge? yes    Name of designated caregiver Thomas Howard ()    Phone number of caregiver [phone]    Caregiver address on file      About your hospital stay     You were admitted on:  October 15, 2018 You last received care in the:  [place] [address] [facility]    You were discharged on:  October 18, 2018        Reason for your hospital stay       L foot infection.                  Who to Call     For medical emergencies, please call 911.  For non-urgent questions about your medical care, please call your primary care provider or clinic, [phone]          Attending Provider     Provider Specialty    Andre Harman MD Orthopaedic Surgery       Primary Care Provider Office Phone # Fax #    Linda Bernstein PA-C [phone]      After Care Instructions     Activity       Your activity upon discharge: ambulate in house.  Keep L foot elevated as much as possible.            Supplies       List the supplies the pt needs to go home.  Please provide patient with dressing change supplies for wound care until home RN visit.                  Follow-up Appointments     Follow-up and recommended labs and tests        Follow up with Dr. Harman , at (location with clinic name or city) [place], within 2 weeks  to evaluate treatment change. No follow up labs or  test are needed.                  Additional Services     Home care nursing referral       RN skilled nursing visit. RN to assess pain level and activity tolerance and incision for signs/symptoms of infection.    Your provider has ordered home care nursing services. If you have not been contacted within 2 days of your discharge please call the inpatient department phone number at [phone] .            Home infusion referral       Your provider has referred you to: [facility]: [facility] [phone]   [email or web address]    Local Address (if different from home address): N/A    Anticipated Length of Therapy: 6 weeks    Home Infusion Pharmacist to adjust therapy based on labs and clinical assessments: Yes    Labs:  May draw labs from Venous Catheter: Yes  Home Infusion Pharmacist to order labs based on therapy type and clinical assessments: Yes  Call/Fax Lab Results to: Dr. Canada    Agency Staff to assess nursing needs for Infusion Therapy.    Access Device Management:  IV Access Type: PICC  Flush with Heparin and Normal Saline IVP PRN and routine site care (per agency protocol) to maintain access device? Yes            Home infusion referral       Your provider has referred you to: [facility]: [facility] [phone]   [email or web address]    Local Address (if different from home address):     Anticipated Length of Therapy: 4-6 weeks    Home Infusion Pharmacist to adjust therapy based on labs and clinical assessments: Yes    Labs:  May draw labs from Venous Catheter: Yes  Home Infusion Pharmacist to order labs based on therapy type and clinical assessments: Yes  [facility]    Agency Staff to assess nursing needs for Infusion Therapy.    Access Device Management:  IV Access Type: PICC  Flush with Heparin and Normal Saline IVP PRN and routine site care (per agency protocol) to  "maintain access device? Yes                  Pending Results     Date and Time Order Name Status Description    10/16/2018 0632 T4 free In process             Statement of Approval     Ordered          10/18/18 1553  I have reviewed and agree with all the recommendations and orders detailed in this document.  EFFECTIVE NOW     Approved and electronically signed by:  Jett Malik PA-C           10/18/18 1046  I have reviewed and agree with all the recommendations and orders detailed in this document.     Approved and electronically signed by:  Thang Canada MD             Admission Information     Date & Time Provider Department Dept. Phone    10/15/2018 Andre Harman MD [place] [address] [facility] [phone]      Your Vitals Were     Blood Pressure Pulse Temperature Respirations Height Weight    120/73 (BP Location: Left arm) 86 98.2  F (36.8  C) (Oral) 16 1.651 m (5' 5\") 90.7 kg (200 lb)    Last Period Pulse Oximetry BMI (Body Mass Index)             09/25/2018 (Exact Date) 94% 33.28 kg/m2         [facility] Information     [facility] gives you secure access to your electronic health record. If you see a primary care provider, you can also send messages to your care team and make appointments. If you have questions, please call your primary care clinic.  If you do not have a primary care provider, please call [phone] and they will assist you.        Care EveryWhere ID     This is your Care EveryWhere ID. This could be used by other organizations to access your [facility] medical records  [id number]        Equal Access to Services     [facility] : Hadii benito watto Sosia, waaxda luqadaha, qaybta kaalmada abhayyajeferson, waxay aurelia thacker . [facility] [phone].    ATENCIÓN: Si habla español, tiene a pascual disposición servicios gratuitos de asistencia lingüística. Llame al [phone].    We comply with applicable federal civil rights laws and [place] laws. " We do not discriminate on the basis of race, color, national origin, age, disability, sex, sexual orientation, or gender identity.               Review of your medicines      START taking        Dose / Directions    ceFAZolin 1 GM vial   Commonly known as:  ANCEF   Used for:  Chronic osteomyelitis (H)        Dose:  2 g   Inject 2 g into the vein every 8 hours ESR,CRP,CBC with differential, creatinine, SGOT weekly while on this medication to be faxed to Dr. Bocanegra office.   Quantity:  180 g   Refills:  1         CONTINUE these medicines which have NOT CHANGED        Dose / Directions    ammonium lactate 12 % cream   Commonly known as:  AMLACTIN        Apply topically daily as needed (to heels)   Refills:  0       BUSPAR PO        Dose:  15 mg   Take 15 mg by mouth 2 times daily   Refills:  0       ciprofloxacin 500 MG tablet   Commonly known as:  CIPRO   Used for:  Postoperative infection, initial encounter        Dose:  500 mg   Take 1 tablet (500 mg) by mouth 2 times daily   Quantity:  14 tablet   Refills:  0       clindamycin 1 % solution   Commonly known as:  CLEOCIN T        Apply topically nightly as needed   Refills:  0       gabapentin 800 MG tablet   Commonly known as:  NEURONTIN        Dose:  800 mg   Take 800 mg by mouth 3 times daily Am, supper, hs   Refills:  0       K-DUR PO        Dose:  80 mEq   Take 80 mEq by mouth daily   Refills:  0       LASIX PO        Dose:  160 mg   Take 160 mg by mouth daily   Refills:  0       oxyCODONE-acetaminophen 5-325 MG per tablet   Commonly known as:  PERCOCET   Used for:  Infection        Dose:  1-2 tablet   Take 1-2 tablets by mouth every 4 hours as needed for severe pain   Quantity:  30 tablet   Refills:  0       phentermine 30 MG capsule        Dose:  30 mg   Take 30 mg by mouth every morning   Refills:  0       polyethylene glycol Packet   Commonly known as:  MIRALAX/GLYCOLAX        Dose:  17 g   Take 17 g by mouth as needed for constipation   Refills:  0        PRILOSEC PO        Dose:  40 mg   Take 40 mg by mouth 2 times daily (before meals) 2 CAPSULES IN A.M.   Refills:  0       TOPAMAX PO        Dose:  100 mg   Take 100 mg by mouth At Bedtime   Refills:  0       VISTARIL PO        Dose:  25 mg   Take 25 mg by mouth every 4 hours as needed   Refills:  0       WELLBUTRIN XL PO        Dose:  300 mg   Take 300 mg by mouth daily   Refills:  0       ZOFRAN PO        Dose:  8 mg   Take 8 mg by mouth as needed for nausea or vomiting   Refills:  0            Where to get your medicines      Some of these will need a paper prescription and others can be bought over the counter. Ask your nurse if you have questions.     Bring a paper prescription for each of these medications     oxyCODONE-acetaminophen 5-325 MG per tablet         Information about where to get these medications is not yet available     ! Ask your nurse or doctor about these medications     ceFAZolin 1 GM vial                Protect others around you: Learn how to safely use, store and throw away your medicines at www.disposemymeds.org.        ANTIBIOTIC INSTRUCTION     You've Been Prescribed an Antibiotic - Now What?  Your healthcare team thinks that you or your loved one might have an infection. Some infections can be treated with antibiotics, which are powerful, life-saving drugs. Like all medications, antibiotics have side effects and should only be used when necessary. There are some important things you should know about your antibiotic treatment.      Your healthcare team may run tests before you start taking an antibiotic.    Your team may take samples (e.g., from your blood, urine or other areas) to run tests to look for bacteria. These test can be important to determine if you need an antibiotic at all and, if you do, which antibiotic will work best.      Within a few days, your healthcare team might change or even stop your antibiotic.    Your team may start you on an antibiotic while they are working  to find out what is making you sick.    Your team might change your antibiotic because test results show that a different antibiotic would be better to treat your infection.    In some cases, once your team has more information, they learn that you do not need an antibiotic at all. They may find out that you don't have an infection, or that the antibiotic you're taking won't work against your infection. For example, an infection caused by a virus can't be treated with antibiotics. Staying on an antibiotic when you don't need it is more likely to be harmful than helpful.      You may experience side effects from your antibiotic.    Like all medications, antibiotics have side effects. Some of these can be serious.    Let you healthcare team know if you have any known allergies when you are admitted to the hospital.    One significant side effect of nearly all antibiotics is the risk of severe and sometimes deadly diarrhea caused by Clostridium difficile (C. Difficile). This occurs when a person takes antibiotics because some good germs are destroyed. Antibiotic use allows C. diificile to take over, putting patients at high risk for this serious infection.    As a patient or caregiver, it is important to understand your or your loved one's antibiotic treatment. It is especially important for caregivers to speak up when patients can't speak for themselves. Here are some important questions to ask your healthcare team.    What infection is this antibiotic treating and how do you know I have that infection?    What side effects might occur from this antibiotic?    How long will I need to take this antibiotic?    Is it safe to take this antibiotic with other medications or supplements (e.g., vitamins) that I am taking?     Are there any special directions I need to know about taking this antibiotic? For example, should I take it with food?    How will I be monitored to know whether my infection is responding to the  antibiotic?    What tests may help to make sure the right antibiotic is prescribed for me?      Information provided by:  www.cdc.gov/getsmart  U.S. Department of Health and Human Services  Centers for disease Control and Prevention  National Center for Emerging and Zoonotic Infectious Diseases  Division of Healthcare Quality Promotion        Information about OPIOIDS     PRESCRIPTION OPIOIDS: WHAT YOU NEED TO KNOW   We gave you an opioid (narcotic) pain medicine. It is important to manage your pain, but opioids are not always the best choice. You should first try all the other options your care team gave you. Take this medicine for as short a time (and as few doses) as possible.    Some activities can increase your pain, such as bandage changes or therapy sessions. It may help to take your pain medicine 30 to 60 minutes before these activities. Reduce your stress by getting enough sleep, working on hobbies you enjoy and practicing relaxation or meditation. Talk to your care team about ways to manage your pain beyond prescription opioids.    These medicines have risks:    DO NOT drive when on new or higher doses of pain medicine. These medicines can affect your alertness and reaction times, and you could be arrested for driving under the influence (DUI). If you need to use opioids long-term, talk to your care team about driving.    DO NOT operate heavy machinery    DO NOT do any other dangerous activities while taking these medicines.    DO NOT drink any alcohol while taking these medicines.     If the opioid prescribed includes acetaminophen, DO NOT take with any other medicines that contain acetaminophen. Read all labels carefully. Look for the word  acetaminophen  or  Tylenol.  Ask your pharmacist if you have questions or are unsure.    You can get addicted to pain medicines, especially if you have a history of addiction (chemical, alcohol or substance dependence). Talk to your care team about ways to reduce this  risk.    All opioids tend to cause constipation. Drink plenty of water and eat foods that have a lot of fiber, such as fruits, vegetables, prune juice, apple juice and high-fiber cereal. Take a laxative (Miralax, milk of magnesia, Colace, Senna) if you don t move your bowels at least every other day. Other side effects include upset stomach, sleepiness, dizziness, throwing up, tolerance (needing more of the medicine to have the same effect), physical dependence and slowed breathing.    Store your pills in a secure place, locked if possible. We will not replace any lost or stolen medicine. If you don t finish your medicine, please throw away (dispose) as directed by your pharmacist. The [place] Pollution Control Agency has more information about safe disposal: https://www.pca.[facility].[place].us/living-green/managing-unwanted-medications             Medication List: This is a list of all your medications and when to take them. Check marks below indicate your daily home schedule. Keep this list as a reference.      Medications           Morning Afternoon Evening Bedtime As Needed    ammonium lactate 12 % cream   Commonly known as:  AMLACTIN   Apply topically daily as needed (to heels)                                BUSPAR PO   Take 15 mg by mouth 2 times daily   Last time this was given:  15 mg on 10/18/2018  8:13 AM                                ceFAZolin 1 GM vial   Commonly known as:  ANCEF   Inject 2 g into the vein every 8 hours ESR,CRP,CBC with differential, creatinine, SGOT weekly while on this medication to be faxed to Dr. Daljit valles.                                ciprofloxacin 500 MG tablet   Commonly known as:  CIPRO   Take 1 tablet (500 mg) by mouth 2 times daily                                clindamycin 1 % solution   Commonly known as:  CLEOCIN T   Apply topically nightly as needed                                gabapentin 800 MG tablet   Commonly known as:  NEURONTIN   Take 800 mg by mouth 3 times daily  Am, supper, hs   Last time this was given:  800 mg on 10/18/2018  5:24 PM                                K-DUR PO   Take 80 mEq by mouth daily   Last time this was given:  80 mEq on 10/18/2018  5:24 PM                                LASIX PO   Take 160 mg by mouth daily   Last time this was given:  40 mg on 10/18/2018  8:13 AM                                oxyCODONE-acetaminophen 5-325 MG per tablet   Commonly known as:  PERCOCET   Take 1-2 tablets by mouth every 4 hours as needed for severe pain                                phentermine 30 MG capsule   Take 30 mg by mouth every morning                                polyethylene glycol Packet   Commonly known as:  MIRALAX/GLYCOLAX   Take 17 g by mouth as needed for constipation                                PRILOSEC PO   Take 40 mg by mouth 2 times daily (before meals) 2 CAPSULES IN A.M.   Last time this was given:  40 mg on 10/18/2018  5:24 PM                                TOPAMAX PO   Take 100 mg by mouth At Bedtime   Last time this was given:  100 mg on 10/17/2018 10:20 PM                                VISTARIL PO   Take 25 mg by mouth every 4 hours as needed                                WELLBUTRIN XL PO   Take 300 mg by mouth daily   Last time this was given:  300 mg on 10/18/2018  8:13 AM                                ZOFRAN PO   Take 8 mg by mouth as needed for nausea or vomiting

## 2018-10-15 NOTE — IP AVS SNAPSHOT
[address] [facility]    [address] [place] [address]    Phone:  [phone]                                       After Visit Summary   10/15/2018    Shalonda Howard    MRN: [id number]           After Visit Summary Signature Page     I have received my discharge instructions, and my questions have been answered. I have discussed any challenges I see with this plan with the nurse or doctor.    ..........................................................................................................................................  Patient/Patient Representative Signature      ..........................................................................................................................................  Patient Representative Print Name and Relationship to Patient    ..................................................               ................................................  Date                                   Time    ..........................................................................................................................................  Reviewed by Signature/Title    ...................................................              ..............................................  Date                                               Time          22EPIC Rev 08/18

## 2018-10-15 NOTE — PLAN OF CARE
Problem: Patient Care Overview  Goal: Plan of Care/Patient Progress Review  Outcome: No Change  A/OX4,baseline numbness BLE.Regular diet.Up independent with use of the scooter.IV SL.Dreg to left foot has some shadow drainage with dreg in place.Rt foot has boot on.+2 edema on left foot.Oxycodone for pain.Family at bedside.Will continue to monitor.

## 2018-10-15 NOTE — IP AVS SNAPSHOT
"[place] [address] [facility]: [phone]                                              INTERAGENCY TRANSFER FORM - PHYSICIAN ORDERS   10/15/2018                    Hospital Admission Date: 10/15/2018  CASSANDRA FRANCO   : 1970  Sex: Female        Attending Provider: Andre Harman MD     Allergies:  Sulfa Drugs, Demerol [Meperidine], Erythromycin, Metformin, Codeine, Penicillins    Infection:  None   Service:  ORTHOPEDICS    Ht:  1.651 m (5' 5\")   Wt:  90.7 kg (200 lb)   Admission Wt:  90.7 kg (200 lb)    BMI:  33.28 kg/m 2   BSA:  2.04 m 2            Patient PCP Information     Provider PCP Type    Linda Bernstein PA-C General      ED Clinical Impression     Diagnosis Description Comment Added By Time Added    Chronic osteomyelitis (H) [M86.60] Chronic osteomyelitis (H) [M86.60]  Thang Canada MD 10/18/2018 10:45 AM    Infection [B99.9] Infection [B99.9]  Lena Mansfield RN 10/18/2018  3:34 PM      Hospital Problems as of 10/18/2018              Priority Class Noted POA    Infection Medium  2018 Yes      Non-Hospital Problems as of 10/18/2018     None      Code Status History     Date Active Date Inactive Code Status Order ID Comments User Context    2018  7:35 PM 2018  9:28 PM Full Code [id number]  Jett Malik PA-C Inpatient         Medication Review      START taking        Dose / Directions Comments    ceFAZolin 1 GM vial   Commonly known as:  ANCEF   Used for:  Chronic osteomyelitis (H)        Dose:  2 g   Inject 2 g into the vein every 8 hours ESR,CRP,CBC with differential, creatinine, SGOT weekly while on this medication to be faxed to Dr. Bocanegra office.   Quantity:  180 g   Refills:  1          CONTINUE these medications which have NOT CHANGED        Dose / Directions Comments    ammonium lactate 12 % cream   Commonly known as:  AMLACTIN        Apply topically daily as needed (to heels)   Refills:  0        BUSPAR PO        Dose:  15 mg   Take 15 mg by " mouth 2 times daily   Refills:  0        ciprofloxacin 500 MG tablet   Commonly known as:  CIPRO   Used for:  Postoperative infection, initial encounter        Dose:  500 mg   Take 1 tablet (500 mg) by mouth 2 times daily   Quantity:  14 tablet   Refills:  0        clindamycin 1 % solution   Commonly known as:  CLEOCIN T        Apply topically nightly as needed   Refills:  0        gabapentin 800 MG tablet   Commonly known as:  NEURONTIN        Dose:  800 mg   Take 800 mg by mouth 3 times daily Am, supper, hs   Refills:  0        K-DUR PO        Dose:  80 mEq   Take 80 mEq by mouth daily   Refills:  0        LASIX PO        Dose:  160 mg   Take 160 mg by mouth daily   Refills:  0        oxyCODONE-acetaminophen 5-325 MG per tablet   Commonly known as:  PERCOCET   Used for:  Infection        Dose:  1-2 tablet   Take 1-2 tablets by mouth every 4 hours as needed for severe pain   Quantity:  30 tablet   Refills:  0        phentermine 30 MG capsule        Dose:  30 mg   Take 30 mg by mouth every morning   Refills:  0        polyethylene glycol Packet   Commonly known as:  MIRALAX/GLYCOLAX        Dose:  17 g   Take 17 g by mouth as needed for constipation   Refills:  0        PRILOSEC PO        Dose:  40 mg   Take 40 mg by mouth 2 times daily (before meals) 2 CAPSULES IN A.M.   Refills:  0        TOPAMAX PO        Dose:  100 mg   Take 100 mg by mouth At Bedtime   Refills:  0        VISTARIL PO        Dose:  25 mg   Take 25 mg by mouth every 4 hours as needed   Refills:  0        WELLBUTRIN XL PO        Dose:  300 mg   Take 300 mg by mouth daily   Refills:  0        ZOFRAN PO        Dose:  8 mg   Take 8 mg by mouth as needed for nausea or vomiting   Refills:  0                Summary of Visit     Reason for your hospital stay       L foot infection.             After Care     Activity       Your activity upon discharge: ambulate in house.  Keep L foot elevated as much as possible.       Supplies       List the supplies the  pt needs to go home.  Please provide patient with dressing change supplies for wound care until home RN visit.             Referrals     Home care nursing referral       RN skilled nursing visit. RN to assess pain level and activity tolerance and incision for signs/symptoms of infection.    Your provider has ordered home care nursing services. If you have not been contacted within 2 days of your discharge please call the inpatient department phone number at [phone] .       Home infusion referral       Your provider has referred you to: [facility]: [facility] [phone]   [email or web address]    Local Address (if different from home address): N/A    Anticipated Length of Therapy: 6 weeks    Home Infusion Pharmacist to adjust therapy based on labs and clinical assessments: Yes    Labs:  May draw labs from Venous Catheter: Yes  Home Infusion Pharmacist to order labs based on therapy type and clinical assessments: Yes  Call/Fax Lab Results to: Dr. Canada    Agency Staff to assess nursing needs for Infusion Therapy.    Access Device Management:  IV Access Type: PICC  Flush with Heparin and Normal Saline IVP PRN and routine site care (per agency protocol) to maintain access device? Yes       Home infusion referral       Your provider has referred you to: [facility]: [facility] [place] [phone]   [email or web address]    Local Address (if different from home address):     Anticipated Length of Therapy: 4-6 weeks    Home Infusion Pharmacist to adjust therapy based on labs and clinical assessments: Yes    Labs:  May draw labs from Venous Catheter: Yes  Home Infusion Pharmacist to order labs based on therapy type and clinical assessments: Yes  Call/Fax Lab Results to: [facility]    Agency Staff to assess nursing needs for Infusion Therapy.    Access Device Management:  IV Access Type: PICC  Flush with Heparin and  Normal Saline IVP PRN and routine site care (per agency protocol) to maintain access device? Yes             Follow-Up Appointment Instructions     Future Labs/Procedures    Follow-up and recommended labs and tests      Comments:    Follow up with Dr. Harman , at (location with clinic name or city) [place], within 2 weeks  to evaluate treatment change. No follow up labs or test are needed.      Follow-Up Appointment Instructions     Follow-up and recommended labs and tests        Follow up with Dr. Harman at (location with clinic name or city) [place], within 2 weeks  to evaluate treatment change. No follow up labs or test are needed.             Statement of Approval     Ordered          10/18/18 5140  I have reviewed and agree with all the recommendations and orders detailed in this document.  EFFECTIVE NOW     Approved and electronically signed by:  Jett Malik PA-C           10/18/18 8955  I have reviewed and agree with all the recommendations and orders detailed in this document.     Approved and electronically signed by:  Thang Canada MD

## 2018-10-16 ENCOUNTER — HOME INFUSION (PRE-WILLOW HOME INFUSION) (OUTPATIENT)
Dept: PHARMACY | Facility: CLINIC | Age: 48
End: 2018-10-16

## 2018-10-16 LAB
ANION GAP SERPL CALCULATED.3IONS-SCNC: 6 MMOL/L (ref 3–14)
BUN SERPL-MCNC: 13 MG/DL (ref 7–30)
CALCIUM SERPL-MCNC: 8.4 MG/DL (ref 8.5–10.1)
CHLORIDE SERPL-SCNC: 106 MMOL/L (ref 94–109)
CO2 SERPL-SCNC: 27 MMOL/L (ref 20–32)
CREAT SERPL-MCNC: 0.71 MG/DL (ref 0.52–1.04)
CRP SERPL-MCNC: 11.8 MG/L (ref 0–8)
ERYTHROCYTE [DISTWIDTH] IN BLOOD BY AUTOMATED COUNT: 13.8 % (ref 10–15)
ERYTHROCYTE [SEDIMENTATION RATE] IN BLOOD BY WESTERGREN METHOD: 26 MM/H (ref 0–20)
GFR SERPL CREATININE-BSD FRML MDRD: 89 ML/MIN/1.7M2
GLUCOSE BLDC GLUCOMTR-MCNC: 125 MG/DL (ref 70–99)
GLUCOSE BLDC GLUCOMTR-MCNC: 134 MG/DL (ref 70–99)
GLUCOSE BLDC GLUCOMTR-MCNC: 141 MG/DL (ref 70–99)
GLUCOSE BLDC GLUCOMTR-MCNC: 148 MG/DL (ref 70–99)
GLUCOSE SERPL-MCNC: 91 MG/DL (ref 70–99)
HCT VFR BLD AUTO: 38.9 % (ref 35–47)
HGB BLD-MCNC: 13 G/DL (ref 11.7–15.7)
LACTATE BLD-SCNC: 0.8 MMOL/L (ref 0.7–2)
MAGNESIUM SERPL-MCNC: 2.2 MG/DL (ref 1.6–2.3)
MCH RBC QN AUTO: 28.4 PG (ref 26.5–33)
MCHC RBC AUTO-ENTMCNC: 33.4 G/DL (ref 31.5–36.5)
MCV RBC AUTO: 85 FL (ref 78–100)
PLATELET # BLD AUTO: 283 10E9/L (ref 150–450)
POTASSIUM SERPL-SCNC: 3.5 MMOL/L (ref 3.4–5.3)
RBC # BLD AUTO: 4.57 10E12/L (ref 3.8–5.2)
SODIUM SERPL-SCNC: 139 MMOL/L (ref 133–144)
T4 FREE SERPL-MCNC: 0.85 NG/DL (ref 0.76–1.46)
TSH SERPL DL<=0.005 MIU/L-ACNC: 4.36 MU/L (ref 0.4–4)
WBC # BLD AUTO: 7.7 10E9/L (ref 4–11)

## 2018-10-16 PROCEDURE — 36415 COLL VENOUS BLD VENIPUNCTURE: CPT | Performed by: PHYSICIAN ASSISTANT

## 2018-10-16 PROCEDURE — G0463 HOSPITAL OUTPT CLINIC VISIT: HCPCS

## 2018-10-16 PROCEDURE — 00000146 ZZHCL STATISTIC GLUCOSE BY METER IP

## 2018-10-16 PROCEDURE — 84439 ASSAY OF FREE THYROXINE: CPT | Performed by: PHYSICIAN ASSISTANT

## 2018-10-16 PROCEDURE — 85027 COMPLETE CBC AUTOMATED: CPT | Performed by: NURSE PRACTITIONER

## 2018-10-16 PROCEDURE — 99221 1ST HOSP IP/OBS SF/LOW 40: CPT | Performed by: NURSE PRACTITIONER

## 2018-10-16 PROCEDURE — 93010 ELECTROCARDIOGRAM REPORT: CPT | Performed by: INTERNAL MEDICINE

## 2018-10-16 PROCEDURE — 99207 ZZC CONSULT E&M CHANGED TO INITIAL LEVEL: CPT | Performed by: NURSE PRACTITIONER

## 2018-10-16 PROCEDURE — 25000132 ZZH RX MED GY IP 250 OP 250 PS 637: Performed by: PHYSICIAN ASSISTANT

## 2018-10-16 PROCEDURE — 83605 ASSAY OF LACTIC ACID: CPT | Performed by: NURSE PRACTITIONER

## 2018-10-16 PROCEDURE — 86140 C-REACTIVE PROTEIN: CPT | Performed by: PHYSICIAN ASSISTANT

## 2018-10-16 PROCEDURE — 25000128 H RX IP 250 OP 636

## 2018-10-16 PROCEDURE — 25000128 H RX IP 250 OP 636: Performed by: PHYSICIAN ASSISTANT

## 2018-10-16 PROCEDURE — 83735 ASSAY OF MAGNESIUM: CPT | Performed by: PHYSICIAN ASSISTANT

## 2018-10-16 PROCEDURE — 93005 ELECTROCARDIOGRAM TRACING: CPT

## 2018-10-16 PROCEDURE — 12000007 ZZH R&B INTERMEDIATE

## 2018-10-16 PROCEDURE — 36415 COLL VENOUS BLD VENIPUNCTURE: CPT | Performed by: NURSE PRACTITIONER

## 2018-10-16 PROCEDURE — 84443 ASSAY THYROID STIM HORMONE: CPT | Performed by: PHYSICIAN ASSISTANT

## 2018-10-16 PROCEDURE — 90686 IIV4 VACC NO PRSV 0.5 ML IM: CPT | Performed by: PHYSICIAN ASSISTANT

## 2018-10-16 PROCEDURE — 85652 RBC SED RATE AUTOMATED: CPT | Performed by: NURSE PRACTITIONER

## 2018-10-16 PROCEDURE — 25000128 H RX IP 250 OP 636: Performed by: INTERNAL MEDICINE

## 2018-10-16 PROCEDURE — 25000132 ZZH RX MED GY IP 250 OP 250 PS 637: Performed by: ORTHOPAEDIC SURGERY

## 2018-10-16 PROCEDURE — 80048 BASIC METABOLIC PNL TOTAL CA: CPT | Performed by: PHYSICIAN ASSISTANT

## 2018-10-16 PROCEDURE — 25000132 ZZH RX MED GY IP 250 OP 250 PS 637: Performed by: NURSE PRACTITIONER

## 2018-10-16 RX ORDER — OXYCODONE HYDROCHLORIDE 5 MG/1
5-10 TABLET ORAL
Status: DISCONTINUED | OUTPATIENT
Start: 2018-10-16 | End: 2018-10-18 | Stop reason: HOSPADM

## 2018-10-16 RX ORDER — SERTRALINE HYDROCHLORIDE 100 MG/1
100 TABLET, FILM COATED ORAL DAILY
Status: DISCONTINUED | OUTPATIENT
Start: 2018-10-16 | End: 2018-10-18 | Stop reason: HOSPADM

## 2018-10-16 RX ORDER — PRAVASTATIN SODIUM 20 MG
20 TABLET ORAL DAILY
Status: DISCONTINUED | OUTPATIENT
Start: 2018-10-16 | End: 2018-10-18 | Stop reason: HOSPADM

## 2018-10-16 RX ORDER — BUSPIRONE HYDROCHLORIDE 15 MG/1
15 TABLET ORAL 2 TIMES DAILY
Status: DISCONTINUED | OUTPATIENT
Start: 2018-10-16 | End: 2018-10-18 | Stop reason: HOSPADM

## 2018-10-16 RX ORDER — CEFAZOLIN SODIUM 2 G/100ML
2 INJECTION, SOLUTION INTRAVENOUS EVERY 8 HOURS
Status: DISCONTINUED | OUTPATIENT
Start: 2018-10-16 | End: 2018-10-18 | Stop reason: HOSPADM

## 2018-10-16 RX ORDER — BUPROPION HYDROCHLORIDE 300 MG/1
300 TABLET ORAL DAILY
Status: DISCONTINUED | OUTPATIENT
Start: 2018-10-16 | End: 2018-10-18 | Stop reason: HOSPADM

## 2018-10-16 RX ADMIN — BUSPIRONE HYDROCHLORIDE 15 MG: 15 TABLET ORAL at 20:20

## 2018-10-16 RX ADMIN — OXYCODONE HYDROCHLORIDE 10 MG: 5 TABLET ORAL at 19:41

## 2018-10-16 RX ADMIN — TOPIRAMATE 100 MG: 100 TABLET, FILM COATED ORAL at 21:56

## 2018-10-16 RX ADMIN — INFLUENZA A VIRUS A/MICHIGAN/45/2015 X-275 (H1N1) ANTIGEN (FORMALDEHYDE INACTIVATED), INFLUENZA A VIRUS A/SINGAPORE/INFIMH-16-0019/2016 IVR-186 (H3N2) ANTIGEN (FORMALDEHYDE INACTIVATED), INFLUENZA B VIRUS B/PHUKET/3073/2013 ANTIGEN (FORMALDEHYDE INACTIVATED), AND INFLUENZA B VIRUS B/MARYLAND/15/2016 BX-69A ANTIGEN (FORMALDEHYDE INACTIVATED) 0.5 ML: 15; 15; 15; 15 INJECTION, SUSPENSION INTRAMUSCULAR at 09:57

## 2018-10-16 RX ADMIN — GABAPENTIN 800 MG: 800 TABLET, FILM COATED ORAL at 08:47

## 2018-10-16 RX ADMIN — GABAPENTIN 800 MG: 800 TABLET, FILM COATED ORAL at 21:56

## 2018-10-16 RX ADMIN — GABAPENTIN 800 MG: 800 TABLET, FILM COATED ORAL at 15:49

## 2018-10-16 RX ADMIN — OXYCODONE HYDROCHLORIDE 10 MG: 5 TABLET ORAL at 08:56

## 2018-10-16 RX ADMIN — BUPROPION HYDROCHLORIDE 300 MG: 300 TABLET, FILM COATED, EXTENDED RELEASE ORAL at 08:47

## 2018-10-16 RX ADMIN — BUSPIRONE HYDROCHLORIDE 15 MG: 15 TABLET ORAL at 09:57

## 2018-10-16 RX ADMIN — VANCOMYCIN HYDROCHLORIDE 1250 MG: 5 INJECTION, POWDER, LYOPHILIZED, FOR SOLUTION INTRAVENOUS at 03:40

## 2018-10-16 RX ADMIN — OMEPRAZOLE 40 MG: 20 CAPSULE, DELAYED RELEASE ORAL at 08:47

## 2018-10-16 RX ADMIN — OXYCODONE HYDROCHLORIDE 10 MG: 5 TABLET ORAL at 15:49

## 2018-10-16 RX ADMIN — Medication 0.5 MG: at 03:50

## 2018-10-16 RX ADMIN — PRAVASTATIN SODIUM 20 MG: 20 TABLET ORAL at 21:56

## 2018-10-16 RX ADMIN — CEFAZOLIN SODIUM 2 G: 2 INJECTION, SOLUTION INTRAVENOUS at 10:55

## 2018-10-16 RX ADMIN — OXYCODONE HYDROCHLORIDE 5 MG: 5 TABLET ORAL at 01:31

## 2018-10-16 RX ADMIN — ENOXAPARIN SODIUM 40 MG: 40 INJECTION SUBCUTANEOUS at 20:21

## 2018-10-16 RX ADMIN — SENNOSIDES AND DOCUSATE SODIUM 2 TABLET: 8.6; 5 TABLET ORAL at 08:47

## 2018-10-16 RX ADMIN — SERTRALINE HYDROCHLORIDE 100 MG: 100 TABLET ORAL at 09:57

## 2018-10-16 RX ADMIN — OXYCODONE HYDROCHLORIDE 10 MG: 5 TABLET ORAL at 11:57

## 2018-10-16 RX ADMIN — OMEPRAZOLE 40 MG: 20 CAPSULE, DELAYED RELEASE ORAL at 15:49

## 2018-10-16 RX ADMIN — OXYCODONE HYDROCHLORIDE 5 MG: 5 TABLET ORAL at 04:41

## 2018-10-16 RX ADMIN — CEFAZOLIN SODIUM 2 G: 2 INJECTION, SOLUTION INTRAVENOUS at 18:10

## 2018-10-16 RX ADMIN — OXYCODONE HYDROCHLORIDE 10 MG: 5 TABLET ORAL at 22:55

## 2018-10-16 RX ADMIN — SENNOSIDES AND DOCUSATE SODIUM 2 TABLET: 8.6; 5 TABLET ORAL at 20:20

## 2018-10-16 ASSESSMENT — ACTIVITIES OF DAILY LIVING (ADL)
ADLS_ACUITY_SCORE: 11

## 2018-10-16 NOTE — CONSULTS
[facility]    Hospitalist Consultation    Date of Admission:  10/15/2018    Assessment & Plan   Shalonda Howard is a 48 year old female who was admitted on 10/15/2018. I was asked to see the patient for co-medical management. The patient has a PMH of idiopathic peripheral neuropathy, SLE, opioid dependence, esophageal reflex, dysthymic disorder, osteoporosis, anxiety     Suspected Osteomyelitis s/p left bunion repair Aug/2018  S/p I&D, hardware removal 9/26/18: The patient initially underwent bilateral bunionectomies 8/2018, and has subsequently required admission for in 9/2018 for I&D and has been on 6 weeks of PO Clindamycin changed to Ciprofloxacin therapy since without significant improvement. The patient was admitted to the orthopedic service for left foot infection, suspected osteomyelitis. Since admission, BP WNL and stable, afebrile. BMP unremarkable. Of note, the patient has allergies to Sulfa, erythromycin and PCN.  Pre-Operative Risk Stratification: RCRI: Class I risk = 0.4% risk of major cardiac event. HCG negative in 9/2018, patient denies sexual activity since previous admission. History of PONV. EKG pending.  --ID consult, will defer antibiotic regimen, and cultures (Vancomycin has been initiated already)  --Imaging per primary service   --CBC, CRP, ESR, lactic acid pending    Lower Extremity Edema  Hx of Hypokalemia: The patient has historically been on high dose Lasix for her ongoing edema. Hypokalemia has been present in past admissions.   --Hold Lasix for now in light of suspected osteomyelitis  --BMP daily, Magnesium (2.2)    GERD: Chronic and stable on omeprazole  --Continue omeprazole    Depressive Disorder  Anxiety: Labile moods, difficult to manage depression in otupatient setting d/t her family dynamics and health issues. PTA regimen -  BuSpar and Topamax, zoloft and Wellbutrin added recently. PCP has recommended [facility] and behavioral health care. She has not seen a  therapist in the outpatient setting. No recent TSH.  --TSH pending  --Continue PTA regimen    Peripheral Neuropathy: Chronic and stable on Gabapentin  --Continue Gabapentin    Obesity: BMI 33.28. Hgb A1C 5.6% (previously 6.0%). Shalonda denies any formal DM diagnosis. PTA medication list includes phentermine.    --Hold phentermine currently   --Primary team has started QID glucose monitoring with medium sliding scale insulin coverage, if glucose monitoring remains stable, ok to discontinue after 24 hours    Hyperlipidemia: total Cholesterol 220, , triglycerides 195.  --Continue pravastatin    DVT Prophylaxis: Defer to primary service  Code Status: Full Code    Disposition: Expected discharge in 2-3 days at the discretion of orthopedics, disposition identified, and pain well controlled.    CRISTIN [facility]  Hospitalist Service  Pager: [phone] (7a - 6p)    Reason for Consult   Reason for consult: I was asked by Jon Malik PA-C to evaluate this patient for co medical management while hospitalized.    Primary Care Physician   Linda Bernstein    Chief Complaint   Non healing left foot wound    History is obtained from the patient    History of Present Illness   Shalonda Howard is a 48 year old female who presents with a non healing left foot wound following bilateral bunionectomies in 8/2018. The right side has healed well, however the left has required one admission since the initial surgery for I&D, and has been unresponsive to oral antibiotic therapy in the outpatient setting. The patient has a PMH of diopathic peripheral neuropathy, SLE, opioid dependence, esophageal reflex, dysthymic disorder, osteoporosis, anxiety.    I evaluated the patient on the orthopedic floor.  Patient is calm and comfortable sitting up in bed.  Subjectively, she endorses ongoing pain, which is been well controlled in the hospital.  She denies any chest pain, shortness of breath, palpitations, nausea/vomiting, urinary  symptoms.  Patient is concerned that she has a history of of postoperative nausea and vomiting.  We discussed optimization of her ongoing medical issues prior to potential surgical intervention.      Past Medical History    I have reviewed this patient's medical history and updated it with pertinent information if needed.   Past Medical History:   Diagnosis Date     Allergic rhinitis, cause unspecified      Anxiety state, unspecified      Cellulitis and abscess of leg, except foot      Closed fracture of unspecified part of tibia      Disuse osteoporosis      Dysthymic disorder      Edema      Encounter for long-term (current) use of other medications      Esophageal reflux      Kidney stones      Myalgia and myositis, unspecified      Obesity, unspecified      Open wound of foot except toe(s) alone, complicated      Opioid type dependence, unspecified      Other acne      Other congenital valgus deformity of feet      Other ventral hernia without mention of obstruction or gangrene      Polycystic ovaries      PONV (postoperative nausea and vomiting)      Systemic lupus erythematosus (H)      Tibialis tendinitis      Unspecified hereditary and idiopathic peripheral neuropathy        Past Surgical History   I have reviewed this patient's surgical history and updated it with pertinent information if needed.  Past Surgical History:   Procedure Laterality Date     APPENDECTOMY       ARTHRODESIS FOOT Left 2/4/2015    Procedure: ARTHRODESIS FOOT;  Surgeon: Andre Harman MD;  Location: [facility]     BUNIONECTOMY RT/LT  08/29/2018     DILATION AND CURETTAGE       EXCISE TOENAIL(S) Left 2/4/2015    Procedure: EXCISE TOENAIL(S);  Surgeon: Andre Harman MD;  Location: [facility]     HERNIA REPAIR      umbilical     HERNIA REPAIR      ventral open x 2     IRRIGATION AND DEBRIDEMENT FOOT, COMBINED Left 9/26/2018    Procedure: COMBINED IRRIGATION AND DEBRIDEMENT FOOT;  IRRIGATION AND DEBRIDEMENT LEFT FOOT;  Surgeon: Ghazal  Adnre BOWER MD;  Location: [facility]     TONSILLECTOMY         Prior to Admission Medications   Prior to Admission Medications   Prescriptions Last Dose Informant Patient Reported? Taking?   BuPROPion HCl (WELLBUTRIN XL PO) 10/15/2018 at am Self Yes Yes   Sig: Take 300 mg by mouth daily   BusPIRone HCl (BUSPAR PO) 10/15/2018 at am Self Yes Yes   Sig: Take 15 mg by mouth 2 times daily   Furosemide (LASIX PO) 10/15/2018 at am Self Yes Yes   Sig: Take 160 mg by mouth daily    HydrOXYzine Pamoate (VISTARIL PO)  Self Yes No   Sig: Take 25 mg by mouth every 4 hours as needed    Omeprazole (PRILOSEC PO) 10/15/2018 at am Self Yes Yes   Sig: Take 40 mg by mouth 2 times daily (before meals) 2 CAPSULES IN A.M.    Ondansetron HCl (ZOFRAN PO) Past Week at Unknown time Self Yes Yes   Sig: Take 8 mg by mouth as needed for nausea or vomiting   Potassium Chloride Shameka CR (K-DUR PO) 10/14/2018 at pm Self Yes Yes   Sig: Take 80 mEq by mouth daily    Topiramate (TOPAMAX PO) 10/14/2018 at pm Self Yes Yes   Sig: Take 100 mg by mouth At Bedtime    ammonium lactate (AMLACTIN) 12 % cream  Self Yes No   Sig: Apply topically daily as needed (to heels)    ciprofloxacin (CIPRO) 500 MG tablet 10/15/2018 at am  No Yes   Sig: Take 1 tablet (500 mg) by mouth 2 times daily   clindamycin (CLEOCIN T) 1 % solution Past Week at Unknown time Self Yes Yes   Sig: Apply topically nightly as needed    gabapentin (NEURONTIN) 800 MG tablet 10/15/2018 at X2 doses Self Yes Yes   Sig: Take 800 mg by mouth 3 times daily Am, supper, hs   oxyCODONE-acetaminophen (PERCOCET) 5-325 MG per tablet Past Week at Unknown time Self Yes Yes   Sig: Take 1-2 tablets by mouth every 4 hours as needed for severe pain   phentermine 30 MG capsule 10/15/2018 at am Self Yes Yes   Sig: Take 30 mg by mouth every morning   polyethylene glycol (MIRALAX/GLYCOLAX) packet Past Week at Unknown time Self Yes Yes   Sig: Take 17 g by mouth as needed for constipation      Facility-Administered  Medications: None     Allergies   Allergies   Allergen Reactions     Sulfa Drugs Shortness Of Breath and Rash     Demerol [Meperidine] Nausea and Vomiting     Erythromycin Nausea and Vomiting     Metformin Nausea and Vomiting     Codeine Rash     Penicillins Rash       Social History   I have reviewed this patient's social history and updated it with pertinent information if needed. Shalonda Howard  reports that she quit smoking about 16 years ago. Her smoking use included Cigarettes. She has a 6.00 pack-year smoking history. She has never used smokeless tobacco. She reports that she drinks alcohol. She reports that she does not use illicit drugs.    Family History   I have reviewed this patient's family history and updated it with pertinent information if needed.     Father: Esophageal cancer,  at age 52  Paternal grandfather: esophageal cancer,   Mother: Pulmonary hypertension    Review of Systems   CONSTITUTIONAL: NEGATIVE for fever, chills, change in weight  INTEGUMENTARY/SKIN: NEGATIVE for worrisome rashes, moles or lesions -with exception of ongoing left foot wound   ENT/MOUTH: NEGATIVE for ear, mouth and throat problems  RESP: NEGATIVE for significant cough or SOB  CV: NEGATIVE for chest pain, palpitations or peripheral edema  GI: NEGATIVE for nausea, abdominal pain, heartburn, or change in bowel habits  : normal menstrual cycles, negative for, frequency, hematuria and hesitancy  PSYCHIATRIC: POSITIVE foranxiety, depressed mood and Hx depression    Physical Exam   Temp: 98.2  F (36.8  C) Temp src: Oral BP: 129/84 Pulse: 87   Resp: 16 SpO2: 98 % O2 Device: None (Room air)    Vital Signs with Ranges  Temp:  [98  F (36.7  C)-98.4  F (36.9  C)] 98.2  F (36.8  C)  Pulse:  [87] 87  Resp:  [16-18] 16  BP: (118-133)/(74-84) 129/84  SpO2:  [96 %-98 %] 98 %  200 lbs 0 oz    Constitutional: Awake, alert, cooperative, no apparent distress.  Eyes: Conjunctiva and pupils examined and normal.  HEENT: Moist  mucous membranes, normal dentition.  Respiratory: Clear to auscultation bilaterally, no crackles or wheezing. Trace LE edema.  Cardiovascular: Regular rate and rhythm, normal S1 and S2, and no murmur noted.  GI: Soft, non-distended, non-tender, normal bowel sounds.  Skin: Incision is covered, dressing clean dry intact. Pale, warm and dry otherwise  Musculoskeletal: Left foot pain  Neurologic: Cranial nerves 2-12 intact, normal strength and sensation.  Psychiatric: Alert, oriented to person, place and time, no obvious anxiety or depression.    Data   -Data reviewed today: All pertinent laboratory and imaging results from this encounter were reviewed. I personally reviewed no images or EKG's today.    EKG: NSR, no ischemic change      Recent Labs  Lab 10/15/18  2125      CR 0.83       Imaging:  No results found for this or any previous visit (from the past 24 hour(s)).

## 2018-10-16 NOTE — PROGRESS NOTES
[facility] Nurse Inpatient Wound Assessment     Initial Assessment of wound(s) on pt's:   Left foot        Data:   Patient History:      per MD note(s): Shalonda Howard is a 48 year old female who was admitted on 10/15/2018.   Impression:  1. 48 y.o s/p bilateral bunion repair done end of August.   2. Seen in September for concern of infection in the left.   3. S/p I and D with removal of the hardware end of August.   4. Admitted now with continued redness, swelling in the area     Eduardo Assessment and sub scores:   Eduardo Score  Av.3  Min: 20  Max: 21    Positioning: Pillows    Mattress:  Standard , Atmos Air mattress    Moisture Management: continent    Current Diet / Nutrition:           Active Diet Order      Combination Diet    Labs:   Recent Labs   Lab Test  10/16/18   0930  10/16/18   0632  10/15/18   2125   HGB  13.0   --    --    WBC  7.7   --    --    A1C   --    --   5.6   CRP   --   11.8*   --        Wound Assessment (location):   Left foot bunion and plantar left heel  Wound History:  Bilateral bunionectomy 2018.  States soaking in betadine solution and applying dry gauze daily.  Nails on toes 2-5 have come off since soaking in betadine.  Dry gauze sometimes sticks to wound beds.    Wound Base: pink red with approximately 25% yellow slough in bunion wound    Specific Dimensions (length x width x depth, in cm) :   Bunion 4x1.2x0.3 cm; plantar heel 1.2x1.5x0.2 cm    Tunneling:  N/A    Undermining: N/A    Palpation of the wound bed:  normal    Slough appearance:  yellow    Eschar appearance:  none    Periwound Skin: intact, edema and erythema,      Color: red    Temperature  normal     Drainage:  serosanguineous     Odor: none    Pain:  minimal , tender          Intervention:     Patient's chart evaluated.      Wound(s) assessed and redressed    Wound Care: Daily to left foot wounds on bunion area and plantar left heel:    Cleanse with Microklenz; Iodosorb to wound beds  about the thickness of a nickel; gauze secured with gauze wrap and ACE    Orders  Written    Supplies  gathered    Discussed plan of care with Patient             All patient / family questions answered:  YES          Assessment:          Recently I&D wounds on left foot        Plan:     Nursing to notify the Provider(s) and re-consult the WOC Nurse if wound(s) deteriorate(s) or if the wound care plan needs reevaluation.    Plan of care for wound located on Daily to left foot wounds on bunion area and plantar left heel:    Cleanse with Microklenz; Iodosorb to wound beds about the thickness of a nickel; gauze secured with gauze wrap and ACE    WOC Nurse will return: 2 x per week and prn

## 2018-10-16 NOTE — CONSULTS
[facility]    Infectious Disease Consultation     Date of Admission:  10/15/2018  Date of Consult (When I saw the patient): 10/16/18    Assessment & Plan   Shalonda Howard is a 48 year old female who was admitted on 10/15/2018.     Impression:  1. 48 y.o s/p bilateral bunion repair done end of August.   2. Seen in September for concern of infection in the left.   3. S/p I and D with removal of the hardware end of August.   4. Admitted now with continued redness, swelling in the area.     Recommendations:   Continues to have an open incision, surgical plan defer to ortho.   ? Role of MRI to see bone involvement.   Start IV ancef, anticipate 6 weeks course.       Ignacia Bocanegra MD    Reason for Consult   Reason for consult: I was asked by Jett Malik Pa-C to evaluate this patient for post surgical infection of the left foot .    Primary Care Physician   Linda Bernstein    Chief Complaint   Continued redness and swelling of the left foot with open incision     History is obtained from the patient and medical records    History of Present Illness   Shalonda Howard is a 48 year old female who is s/p bunion repair done about 7-8 weeks ago.  She became infected and had a surgery done ( I and D with removal of the screws ) on 9/26. She was prescribed oral antibiotics after the surgery. She is coming in with continued swelling, redness and drainage in the foot. Cultures positive for MSSA at that time.       Past Medical History   I have reviewed this patient's medical history and updated it with pertinent information if needed.   Past Medical History:   Diagnosis Date     Allergic rhinitis, cause unspecified      Anxiety state, unspecified      Cellulitis and abscess of leg, except foot      Closed fracture of unspecified part of tibia      Disuse osteoporosis      Dysthymic disorder      Edema      Encounter for long-term (current) use of other medications      Esophageal reflux      Kidney stones       Myalgia and myositis, unspecified      Obesity, unspecified      Open wound of foot except toe(s) alone, complicated      Opioid type dependence, unspecified      Other acne      Other congenital valgus deformity of feet      Other ventral hernia without mention of obstruction or gangrene      Polycystic ovaries      PONV (postoperative nausea and vomiting)      Systemic lupus erythematosus (H)      Tibialis tendinitis      Unspecified hereditary and idiopathic peripheral neuropathy        Past Surgical History   I have reviewed this patient's surgical history and updated it with pertinent information if needed.  Past Surgical History:   Procedure Laterality Date     APPENDECTOMY       ARTHRODESIS FOOT Left 2/4/2015    Procedure: ARTHRODESIS FOOT;  Surgeon: Andre Harman MD;  Location: [facility]     BUNIONECTOMY RT/LT  08/29/2018     DILATION AND CURETTAGE       EXCISE TOENAIL(S) Left 2/4/2015    Procedure: EXCISE TOENAIL(S);  Surgeon: Andre Harman MD;  Location: [facility]     HERNIA REPAIR      umbilical     HERNIA REPAIR      ventral open x 2     IRRIGATION AND DEBRIDEMENT FOOT, COMBINED Left 9/26/2018    Procedure: COMBINED IRRIGATION AND DEBRIDEMENT FOOT;  IRRIGATION AND DEBRIDEMENT LEFT FOOT;  Surgeon: Andre Harman MD;  Location: [facility]     TONSILLECTOMY         Prior to Admission Medications   Prior to Admission Medications   Prescriptions Last Dose Informant Patient Reported? Taking?   BuPROPion HCl (WELLBUTRIN XL PO) 10/15/2018 at am Self Yes Yes   Sig: Take 300 mg by mouth daily   BusPIRone HCl (BUSPAR PO) 10/15/2018 at am Self Yes Yes   Sig: Take 15 mg by mouth 2 times daily   Furosemide (LASIX PO) 10/15/2018 at am Self Yes Yes   Sig: Take 160 mg by mouth daily    HydrOXYzine Pamoate (VISTARIL PO)  Self Yes No   Sig: Take 25 mg by mouth every 4 hours as needed    Omeprazole (PRILOSEC PO) 10/15/2018 at am Self Yes Yes   Sig: Take 40 mg by mouth 2 times daily (before meals) 2 CAPSULES IN A.M.     Ondansetron HCl (ZOFRAN PO) Past Week at Unknown time Self Yes Yes   Sig: Take 8 mg by mouth as needed for nausea or vomiting   Potassium Chloride Shameka CR (K-DUR PO) 10/14/2018 at pm Self Yes Yes   Sig: Take 80 mEq by mouth daily    Topiramate (TOPAMAX PO) 10/14/2018 at pm Self Yes Yes   Sig: Take 100 mg by mouth At Bedtime    ammonium lactate (AMLACTIN) 12 % cream  Self Yes No   Sig: Apply topically daily as needed (to heels)    ciprofloxacin (CIPRO) 500 MG tablet 10/15/2018 at am  No Yes   Sig: Take 1 tablet (500 mg) by mouth 2 times daily   clindamycin (CLEOCIN T) 1 % solution Past Week at Unknown time Self Yes Yes   Sig: Apply topically nightly as needed    gabapentin (NEURONTIN) 800 MG tablet 10/15/2018 at X2 doses Self Yes Yes   Sig: Take 800 mg by mouth 3 times daily Am, supper, hs   oxyCODONE-acetaminophen (PERCOCET) 5-325 MG per tablet Past Week at Unknown time Self Yes Yes   Sig: Take 1-2 tablets by mouth every 4 hours as needed for severe pain   phentermine 30 MG capsule 10/15/2018 at am Self Yes Yes   Sig: Take 30 mg by mouth every morning   polyethylene glycol (MIRALAX/GLYCOLAX) packet Past Week at Unknown time Self Yes Yes   Sig: Take 17 g by mouth as needed for constipation      Facility-Administered Medications: None     Allergies   Allergies   Allergen Reactions     Sulfa Drugs Shortness Of Breath and Rash     Demerol [Meperidine] Nausea and Vomiting     Erythromycin Nausea and Vomiting     Metformin Nausea and Vomiting     Codeine Rash     Penicillins Rash       Immunization History   Immunization History   Administered Date(s) Administered     Influenza Vaccine IM 3yrs+ 4 Valent IIV4 10/16/2018       Social History   I have reviewed this patient's social history and updated it with pertinent information if needed. Shalonda Howard  reports that she quit smoking about 16 years ago. Her smoking use included Cigarettes. She has a 6.00 pack-year smoking history. She has never used smokeless tobacco.  She reports that she drinks alcohol. She reports that she does not use illicit drugs.    Family History   I have reviewed this patient's family history and updated it with pertinent information if needed.   No family history on file.    Review of Systems   The 10 point Review of Systems is negative other than noted in the HPI or here.     Physical Exam   Temp: 97.8  F (36.6  C) Temp src: Oral BP: 126/64 Pulse: 66   Resp: 16 SpO2: 100 % O2 Device: None (Room air)    Vital Signs with Ranges  Temp:  [97.8  F (36.6  C)-98.4  F (36.9  C)] 97.8  F (36.6  C)  Pulse:  [66-87] 66  Resp:  [16-18] 16  BP: (118-133)/(64-84) 126/64  SpO2:  [96 %-100 %] 100 %  200 lbs 0 oz  Body mass index is 33.28 kg/(m^2).    GENERAL APPEARANCE:  alert and no distress  EYES: Eyes grossly normal to inspection, PERRL and conjunctivae and sclerae normal  HENT: ear canals and TM's normal and nose and mouth without ulcers or lesions  NECK: no adenopathy, no asymmetry, masses, or scars and thyroid normal to palpation  RESP: lungs clear to auscultation - no rales, rhonchi or wheezes  CV: regular rates and rhythm, normal S1 S2, no S3 or S4 and no murmur, click or rub  LYMPHATICS: normal ant/post cervical and supraclavicular nodes  ABDOMEN: soft, nontender, without hepatosplenomegaly or masses and bowel sounds normal  MS: left foot has an incision which is open and an ulcer on the heel   Also multiple nails are missing   SKIN: no suspicious lesions or rashes      Data   Lab Results   Component Value Date    WBC 7.7 10/16/2018    HGB 13.0 10/16/2018    HCT 38.9 10/16/2018     10/16/2018     10/16/2018    POTASSIUM 3.5 10/16/2018    CHLORIDE 106 10/16/2018    CO2 27 10/16/2018    BUN 13 10/16/2018    CR 0.71 10/16/2018    GLC 91 10/16/2018    SED 26 (H) 10/16/2018     No results for input(s): CULT in the last 168 hours.  Recent Labs   Lab Test  09/26/18   1631  09/24/18   2115  09/24/18   1708  09/24/18   1620   CULT  No anaerobes isolated   Light growth  Staphylococcus aureus  *  Light growth  Normal skin kenyatta    Moderate growth  Staphylococcus aureus  *  No growth  No growth

## 2018-10-16 NOTE — PLAN OF CARE
Problem: Patient Care Overview  Goal: Plan of Care/Patient Progress Review  Outcome: No Change  Patient is A&O x4. VSS on RA. CMS intact. Left foot is red in color, +2  edema, palpable pulses, pain around wound. C/o of 6-7/10 foot pain, controlled with prn oxycodone with IV dilaudid x1 for breakthrough. BS audible and active, passing gas, denies nausea. Voiding in BR. Regular diet. Up independently. Plan on continuing to monitor and manage pain.

## 2018-10-16 NOTE — PHARMACY-ADMISSION MEDICATION HISTORY
Admission medication history interview status for the 10/15/2018  admission is complete. See EPIC admission navigator for prior to admission medications     Medication history source reliability:Good    Actions taken by pharmacist (provider contacted, etc):interviewed patient     Additional medication history information not noted on PTA med list :None    Medication reconciliation/reorder completed by provider prior to medication history? No    Time spent in this activity: 10 minutes    Prior to Admission medications    Medication Sig Last Dose Taking? Auth Provider   BuPROPion HCl (WELLBUTRIN XL PO) Take 300 mg by mouth daily 10/15/2018 at am Yes Reported, Patient   BusPIRone HCl (BUSPAR PO) Take 15 mg by mouth 2 times daily 10/15/2018 at am Yes Reported, Patient   ciprofloxacin (CIPRO) 500 MG tablet Take 1 tablet (500 mg) by mouth 2 times daily 10/15/2018 at am Yes Jett Malik, CECILIA   clindamycin (CLEOCIN T) 1 % solution Apply topically nightly as needed  Past Week at Unknown time Yes Unknown, Entered By History   Furosemide (LASIX PO) Take 160 mg by mouth daily  10/15/2018 at am Yes Reported, Patient   gabapentin (NEURONTIN) 800 MG tablet Take 800 mg by mouth 3 times daily Am, supper, hs 10/15/2018 at X2 doses Yes Unknown, Entered By History   Omeprazole (PRILOSEC PO) Take 40 mg by mouth 2 times daily (before meals) 2 CAPSULES IN A.M.  10/15/2018 at am Yes Reported, Patient   Ondansetron HCl (ZOFRAN PO) Take 8 mg by mouth as needed for nausea or vomiting Past Week at Unknown time Yes Reported, Patient   oxyCODONE-acetaminophen (PERCOCET) 5-325 MG per tablet Take 1-2 tablets by mouth every 4 hours as needed for severe pain Past Week at Unknown time Yes Unknown, Entered By History   phentermine 30 MG capsule Take 30 mg by mouth every morning 10/15/2018 at am Yes Reported, Patient   polyethylene glycol (MIRALAX/GLYCOLAX) packet Take 17 g by mouth as needed for constipation Past Week at Unknown time Yes  Reported, Patient   Potassium Chloride Shameka CR (K-DUR PO) Take 80 mEq by mouth daily  10/14/2018 at pm Yes Reported, Patient   Topiramate (TOPAMAX PO) Take 100 mg by mouth At Bedtime  10/14/2018 at pm Yes Reported, Patient   ammonium lactate (AMLACTIN) 12 % cream Apply topically daily as needed (to heels)    Unknown, Entered By History   HydrOXYzine Pamoate (VISTARIL PO) Take 25 mg by mouth every 4 hours as needed    Reported, Patient

## 2018-10-16 NOTE — PROGRESS NOTES
Therapy: IV abx  Insurance: [employer]  Ded: $1000  Met: $1000  Co-Insurance:80%  Max Out of Pocket: $4500   Met: $4500    In reference to admission on 10/15/18 to check IV abx coverage    Please contact Intake with any questions, [phone] or In Basket pool, [facility] ([phone]).

## 2018-10-16 NOTE — PLAN OF CARE
Problem: Patient Care Overview  Goal: Plan of Care/Patient Progress Review  Outcome: No Change  A/OX4,baseline numbness BLE.Pain well control with oxycodone.IV SL.Up independent, use scooter.Voiding well per bathroom.Dreg CDI on left foot,changed by wound nurse.On IV antibiotics.Plan on continue to monitor.

## 2018-10-16 NOTE — PROGRESS NOTES
Shalonda Howard  10/16/2018  Hospital day 1 L foot infection.    Re-admit with L foot infection/non-healing wound.  Still red/draining/painful.  Hardware removed almost 1 month ago.  PO ABX for 5+ weeks without resolution.  Will ask ID to see for antibiotic recs.  Temperatures:  Current - Temp: 98.2  F (36.8  C); Max - Temp  Av.2  F (36.8  C)  Min: 98  F (36.7  C)  Max: 98.4  F (36.9  C)  Pulse range: Pulse  Av  Min: 87  Max: 87  Blood pressure range: Systolic (24hrs), Av , Min:118 , Max:133   ; Diastolic (24hrs), Av, Min:74, Max:84    CMS: intact to L LE  Labs:per ID/Hospitalist service    PLAN:Wound/ID consult.  Do not plan repeat I&D as hardware is already removed.

## 2018-10-16 NOTE — CONSULTS
Patient may need IV antibiotics at discharge. [facility] was contacted and benefits were checked. Please see below for Antibiotic Coverage:    Shalonda Howard MRN [id number]. Pt has a [employer] plan with a ded $1000 (all met), then 75% up to max OOP $4500 (all met). Pt is covered at 100% for IV abx in the home.  Thank you   Daniel Thakkar  Intake    [address] [address]  [email or web address]  Phone: [phone]  Fax: [phone]

## 2018-10-17 ENCOUNTER — APPOINTMENT (OUTPATIENT)
Dept: MRI IMAGING | Facility: CLINIC | Age: 48
DRG: 920 | End: 2018-10-17
Attending: PHYSICIAN ASSISTANT
Payer: COMMERCIAL

## 2018-10-17 LAB
CREAT SERPL-MCNC: 0.77 MG/DL (ref 0.52–1.04)
GFR SERPL CREATININE-BSD FRML MDRD: 81 ML/MIN/1.7M2
GLUCOSE BLDC GLUCOMTR-MCNC: 104 MG/DL (ref 70–99)
GLUCOSE BLDC GLUCOMTR-MCNC: 107 MG/DL (ref 70–99)
GLUCOSE BLDC GLUCOMTR-MCNC: 146 MG/DL (ref 70–99)
GLUCOSE BLDC GLUCOMTR-MCNC: 165 MG/DL (ref 70–99)
GLUCOSE BLDC GLUCOMTR-MCNC: 87 MG/DL (ref 70–99)
NT-PROBNP SERPL-MCNC: 66 PG/ML (ref 0–450)

## 2018-10-17 PROCEDURE — 73720 MRI LWR EXTREMITY W/O&W/DYE: CPT | Mod: LT

## 2018-10-17 PROCEDURE — 25000132 ZZH RX MED GY IP 250 OP 250 PS 637: Performed by: PHYSICIAN ASSISTANT

## 2018-10-17 PROCEDURE — 36415 COLL VENOUS BLD VENIPUNCTURE: CPT | Performed by: ORTHOPAEDIC SURGERY

## 2018-10-17 PROCEDURE — 25000128 H RX IP 250 OP 636: Performed by: PHYSICIAN ASSISTANT

## 2018-10-17 PROCEDURE — 25500064 ZZH RX 255 OP 636: Performed by: ORTHOPAEDIC SURGERY

## 2018-10-17 PROCEDURE — 83880 ASSAY OF NATRIURETIC PEPTIDE: CPT | Performed by: ORTHOPAEDIC SURGERY

## 2018-10-17 PROCEDURE — 82565 ASSAY OF CREATININE: CPT | Performed by: ORTHOPAEDIC SURGERY

## 2018-10-17 PROCEDURE — 25000128 H RX IP 250 OP 636: Performed by: INTERNAL MEDICINE

## 2018-10-17 PROCEDURE — 25000132 ZZH RX MED GY IP 250 OP 250 PS 637: Performed by: ORTHOPAEDIC SURGERY

## 2018-10-17 PROCEDURE — 12000007 ZZH R&B INTERMEDIATE

## 2018-10-17 PROCEDURE — 00000146 ZZHCL STATISTIC GLUCOSE BY METER IP

## 2018-10-17 PROCEDURE — A9585 GADOBUTROL INJECTION: HCPCS | Performed by: ORTHOPAEDIC SURGERY

## 2018-10-17 PROCEDURE — 25000132 ZZH RX MED GY IP 250 OP 250 PS 637: Performed by: HOSPITALIST

## 2018-10-17 PROCEDURE — 25000132 ZZH RX MED GY IP 250 OP 250 PS 637: Performed by: NURSE PRACTITIONER

## 2018-10-17 PROCEDURE — 99232 SBSQ HOSP IP/OBS MODERATE 35: CPT | Performed by: HOSPITALIST

## 2018-10-17 RX ORDER — LORAZEPAM 0.5 MG/1
0.5 TABLET ORAL ONCE
Status: COMPLETED | OUTPATIENT
Start: 2018-10-17 | End: 2018-10-17

## 2018-10-17 RX ORDER — FUROSEMIDE 10 MG/ML
40 INJECTION INTRAMUSCULAR; INTRAVENOUS 2 TIMES DAILY
Status: DISCONTINUED | OUTPATIENT
Start: 2018-10-17 | End: 2018-10-17

## 2018-10-17 RX ORDER — GADOBUTROL 604.72 MG/ML
9 INJECTION INTRAVENOUS ONCE
Status: COMPLETED | OUTPATIENT
Start: 2018-10-17 | End: 2018-10-17

## 2018-10-17 RX ORDER — FUROSEMIDE 40 MG
40 TABLET ORAL
Status: DISCONTINUED | OUTPATIENT
Start: 2018-10-17 | End: 2018-10-18

## 2018-10-17 RX ADMIN — BUSPIRONE HYDROCHLORIDE 15 MG: 15 TABLET ORAL at 20:42

## 2018-10-17 RX ADMIN — GADOBUTROL 9 ML: 604.72 INJECTION INTRAVENOUS at 18:46

## 2018-10-17 RX ADMIN — LORAZEPAM 0.5 MG: 0.5 TABLET ORAL at 17:42

## 2018-10-17 RX ADMIN — OXYCODONE HYDROCHLORIDE 10 MG: 5 TABLET ORAL at 01:53

## 2018-10-17 RX ADMIN — CEFAZOLIN SODIUM 2 G: 2 INJECTION, SOLUTION INTRAVENOUS at 10:47

## 2018-10-17 RX ADMIN — CEFAZOLIN SODIUM 2 G: 2 INJECTION, SOLUTION INTRAVENOUS at 19:16

## 2018-10-17 RX ADMIN — OXYCODONE HYDROCHLORIDE 10 MG: 5 TABLET ORAL at 09:36

## 2018-10-17 RX ADMIN — FUROSEMIDE 40 MG: 40 TABLET ORAL at 17:09

## 2018-10-17 RX ADMIN — TOPIRAMATE 100 MG: 100 TABLET, FILM COATED ORAL at 22:20

## 2018-10-17 RX ADMIN — CEFAZOLIN SODIUM 2 G: 2 INJECTION, SOLUTION INTRAVENOUS at 01:54

## 2018-10-17 RX ADMIN — BUSPIRONE HYDROCHLORIDE 15 MG: 15 TABLET ORAL at 08:24

## 2018-10-17 RX ADMIN — SENNOSIDES AND DOCUSATE SODIUM 2 TABLET: 8.6; 5 TABLET ORAL at 08:24

## 2018-10-17 RX ADMIN — OXYCODONE HYDROCHLORIDE 10 MG: 5 TABLET ORAL at 22:20

## 2018-10-17 RX ADMIN — OXYCODONE HYDROCHLORIDE 10 MG: 5 TABLET ORAL at 19:16

## 2018-10-17 RX ADMIN — OMEPRAZOLE 40 MG: 20 CAPSULE, DELAYED RELEASE ORAL at 17:09

## 2018-10-17 RX ADMIN — SENNOSIDES AND DOCUSATE SODIUM 2 TABLET: 8.6; 5 TABLET ORAL at 20:42

## 2018-10-17 RX ADMIN — GABAPENTIN 800 MG: 800 TABLET, FILM COATED ORAL at 22:20

## 2018-10-17 RX ADMIN — GABAPENTIN 800 MG: 800 TABLET, FILM COATED ORAL at 17:09

## 2018-10-17 RX ADMIN — OMEPRAZOLE 40 MG: 20 CAPSULE, DELAYED RELEASE ORAL at 08:24

## 2018-10-17 RX ADMIN — OXYCODONE HYDROCHLORIDE 10 MG: 5 TABLET ORAL at 05:51

## 2018-10-17 RX ADMIN — GABAPENTIN 800 MG: 800 TABLET, FILM COATED ORAL at 08:24

## 2018-10-17 RX ADMIN — BUPROPION HYDROCHLORIDE 300 MG: 300 TABLET, FILM COATED, EXTENDED RELEASE ORAL at 08:24

## 2018-10-17 RX ADMIN — SERTRALINE HYDROCHLORIDE 100 MG: 100 TABLET ORAL at 08:24

## 2018-10-17 RX ADMIN — ENOXAPARIN SODIUM 40 MG: 40 INJECTION SUBCUTANEOUS at 20:42

## 2018-10-17 RX ADMIN — PRAVASTATIN SODIUM 20 MG: 20 TABLET ORAL at 22:20

## 2018-10-17 RX ADMIN — OXYCODONE HYDROCHLORIDE 10 MG: 5 TABLET ORAL at 14:52

## 2018-10-17 ASSESSMENT — ACTIVITIES OF DAILY LIVING (ADL)
ADLS_ACUITY_SCORE: 10

## 2018-10-17 NOTE — PROGRESS NOTES
[facility]  Hospitalist Progress Note   10/17/2018          Assessment and Plan:       Shalonda Howard is a 48 year old female with medical history of  idiopathic peripheral neuropathy, SLE, opioid dependence, esophageal reflex, dysthymic disorder, osteoporosis, anxiety admitted on 10/15/2018 for suspected osteomyelitis.     Suspected Osteomyelitis - Left forefoot   s/p left bunion repair Aug/2018  S/p I&D, hardware removal 9/26/18:   The patient initially underwent bilateral bunionectomies 8/2018, and has subsequently required admission for in 9/2018 for I&D and has been on 6 weeks of PO Clindamycin changed to Ciprofloxacin therapy since without significant improvement. The patient was admitted to the orthopedic service for left foot infection, suspected osteomyelitis. Since admission, BP WNL and stable, afebrile. BMP unremarkable. Of note, the patient has allergies to Sulfa, erythromycin and PCN.  Sodium 139, creatinine 0.71.  CRP 11.8.  Lactic acid 0.8.  WBC 7.7.  Infectious disease following along, recommend 6 weeks of IV antibiotic.  Orthopedic team recommend MRI of left foot for evaluation of osteomyelitis.  No plan for further surgery or incision and drainage at this time.  Continue wound care.  Pain management as per Ortho team.    Chronic lower Extremity Edema  Hx of Hypokalemia:  PTA on 160 mg oral Lasix daily with potassium supplements.    PTA Lasix was on hold, will start on 40 mg oral twice daily Lasix.  We will check proBNP, hepatic function panel..  Will need to see vascular team and consider echocardiogram as outpatient.      Depressive Disorder  Anxiety: Labile moods, difficult to manage depression in otupatient setting d/t her family dynamics and health issues. PTA regimen -  BuSpar and Topamax, zoloft and Wellbutrin added recently. PCP has recommended [facility] and behavioral health care. She has not seen a therapist in the outpatient setting.   TSH 4.36, T4 0.85.  QTc 423  on EKG.  Continue PTA regimen     Peripheral Neuropathy:   Chronic and stable on Gabapentin  Continue Gabapentin     Obesity: BMI 33.28.   Prediabetes with Hgb A1C 5.6% (previously 6.0%).   Shalonda denies any formal DM diagnosis. PTA medication list includes phentermine.    Hold phentermine currently   Blood sugars have been running between 100-150.  Continue low intensity sliding scale insulin.     Hyperlipidemia:   Total Cholesterol 220, , triglycerides 195.  Continue pravastatin    GERD: Chronic and stable on omeprazole  Continue omeprazole    Active Diet Order      Combination Diet    DVT Prophylaxis:  Sequential compression device.  Code Status: Full Code  Disposition: Expected discharge in 1-2 days pending clinical improvement.    Patient, interdisciplinary team involved in care and agrees with plan.  Total time - Greater than 25 min. More than 50% of time spent in direct patient care, care coordination, patient counseling, and formalizing plan of care.     Hallie Russo MD        Interval History:      Patient appears comfortable.  Lying in bed.  Denies any chest pain or shortness of breath.  Afebrile since admission.  No nausea or vomiting.       Physical Exam   Temp:  [97.9  F (36.6  C)-98.3  F (36.8  C)] 97.9  F (36.6  C)  Pulse:  [79-81] 79  Resp:  [16] 16  BP: (120-135)/(75-85) 135/85  SpO2:  [95 %-99 %] 95 %    Intake/Output Summary (Last 24 hours) at 10/17/18 1111  Last data filed at 10/16/18 2000   Gross per 24 hour   Intake              440 ml   Output                0 ml   Net              440 ml       Admission Weight: 90.7 kg (200 lb)  Current Weight: 90.7 kg (200 lb)    PHYSICAL EXAM  GENERAL: Patient is in no distress. Alert and oriented.  HEART: Regular rate and rhythm. S1S2. No murmurs  LUNGS: Clear to auscultation bilaterally. No expiratory wheeze.  ABDOMEN: Soft, no abdominal tenderness, bowel sounds heard   NEURO: Moving all extremities.  EXTREMITIES: Right foot pedal edema 1+..  2+ peripheral pulses.  Left foot wrapped in Ace wrap.  SKIN: Warm, dry         Medications:          buPROPion (WELLBUTRIN XL) 24 hr tablet 300 mg  300 mg Oral Daily     busPIRone (BUSPAR) tablet 15 mg  15 mg Oral BID     ceFAZolin  2 g Intravenous Q8H     enoxaparin  40 mg Subcutaneous Q24H     gabapentin  800 mg Oral TID     insulin aspart  1-7 Units Subcutaneous TID AC     insulin aspart  1-5 Units Subcutaneous At Bedtime     omeprazole (priLOSEC) CR capsule 40 mg  40 mg Oral BID AC     pravastatin  20 mg Oral Daily     senna-docusate  1 tablet Oral BID    Or     senna-docusate  2 tablet Oral BID     sertraline  100 mg Oral Daily     topiramate (TOPAMAX) tablet 100 mg  100 mg Oral At Bedtime     glucose **OR** dextrose **OR** glucagon, HYDROmorphone, magnesium sulfate, melatonin, naloxone, ondansetron **OR** ondansetron, oxyCODONE IR, potassium chloride, potassium chloride with lidocaine, potassium chloride, potassium chloride, potassium chloride, prochlorperazine **OR** prochlorperazine **OR** prochlorperazine         Data:      All new lab and imaging data was reviewed.   EKG reviewed normal sinus rhythm heart rate 73 .

## 2018-10-17 NOTE — PLAN OF CARE
Problem: Patient Care Overview  Goal: Plan of Care/Patient Progress Review  Outcome: No Change  A/Ox4. Up independently. Voiding, eating and drinking well. IV antibiotics administered. PIV SL. 2 senna given last night. BG of 148 at bedtime and 146 at 0200. Pain controlled with Oxycodone q3h x4. Baseline BLE/UE numbness/neuropathy. Clarified need for BMP lab draw, is not needed today. MRI ordered. Dressing changed this am by PA when he stopped by.  Will continue to monitor

## 2018-10-17 NOTE — PLAN OF CARE
Problem: Patient Care Overview  Goal: Plan of Care/Patient Progress Review  Outcome: Improving  A/OX4,cms intact except with baseline numbness BLE.Up independent and use scooter.Pain well control with Oxycodone.On IV antibiotics,going for MRI now and I got verbal order from MD for  Ativen 0.5 one time dose for her anxiety.IV SL.Will continue to monitor.

## 2018-10-17 NOTE — PROGRESS NOTES
Shalonda Howard  10/17/2018  Hospital Day 2    Pain well-controlled.  Wound looks essentially unchanged.  Re-dressed this AM.  Pain slightly better and well controlled.  Temperatures:  Current - Temp: 98.3  F (36.8  C); Max - Temp  Av.1  F (36.7  C)  Min: 97.8  F (36.6  C)  Max: 98.3  F (36.8  C)  Pulse range: Pulse  Av.7  Min: 66  Max: 81  Blood pressure range: Systolic (24hrs), Av , Min:120 , Max:127   ; Diastolic (24hrs), Av, Min:64, Max:75    CMS: intact to L LE  Labs:per hospitalist/ID    PLAN:MRI of L forefoot to eval for osteomyelitis.  Plan PICC line and 6 week IV Abx course.  Continue wound treatments.  No plan for further surgery/I&D at this point.  Potential discharge tomorrow if pending MRI results.    Additional problems to be followed by medicine physician

## 2018-10-17 NOTE — PROGRESS NOTES
[facility]    Infectious Disease Progress Note    Date of Service (when I saw the patient): 10/17/2018     Assessment & Plan   Shalonda Howard is a 48 year old female who was admitted on 10/15/2018.     Impression:  1. 48 y.o s/p bilateral bunion repair done end of August.   2. Seen in September for concern of infection in the left.   3. S/p I and D with removal of the hardware end of August.   4. Admitted now with continued redness, swelling in the area.      Recommendations:   Continues to have an open incision, surgical plan defer to ortho.   Started IV ancef, anticipate 6 weeks course.   MRI pending will follow.             Ignacia Bocanegra MD    Interval History   Afebrile     Physical Exam   Temp: 97.9  F (36.6  C) Temp src: Oral BP: 135/85 Pulse: 79   Resp: 16 SpO2: 95 % O2 Device: None (Room air)    Vitals:    10/15/18 1730   Weight: 90.7 kg (200 lb)     Vital Signs with Ranges  Temp:  [97.9  F (36.6  C)-98.3  F (36.8  C)] 97.9  F (36.6  C)  Pulse:  [79-81] 79  Resp:  [16] 16  BP: (120-135)/(75-85) 135/85  SpO2:  [95 %-99 %] 95 %    Constitutional: Awake, alert, cooperative, no apparent distress  Lungs: Clear to auscultation bilaterally, no crackles or wheezing  Cardiovascular: Regular rate and rhythm, normal S1 and S2, and no murmur noted  Abdomen: Normal bowel sounds, soft, non-distended, non-tender  Skin: No rashes, no cyanosis, no edema  Other:    Medications       buPROPion (WELLBUTRIN XL) 24 hr tablet 300 mg  300 mg Oral Daily     busPIRone (BUSPAR) tablet 15 mg  15 mg Oral BID     ceFAZolin  2 g Intravenous Q8H     enoxaparin  40 mg Subcutaneous Q24H     furosemide  40 mg Oral BID     gabapentin  800 mg Oral TID     insulin aspart  1-7 Units Subcutaneous TID AC     insulin aspart  1-5 Units Subcutaneous At Bedtime     omeprazole (priLOSEC) CR capsule 40 mg  40 mg Oral BID AC     pravastatin  20 mg Oral Daily     senna-docusate  1 tablet Oral BID    Or     senna-docusate  2 tablet Oral BID      sertraline  100 mg Oral Daily     topiramate (TOPAMAX) tablet 100 mg  100 mg Oral At Bedtime       Data   All microbiology laboratory data reviewed.  Recent Labs   Lab Test  10/16/18   0930  10/15/18   2125  09/26/18   0545  09/25/18   0550   WBC  7.7   --   6.9  6.7   HGB  13.0   --   11.0*  10.9*   HCT  38.9   --   33.0*  32.7*   MCV  85   --   87  85   PLT  283  324  264  283     Recent Labs   Lab Test  10/17/18   0700  10/16/18   0632  10/15/18   2125   CR  0.77  0.71  0.83     Recent Labs   Lab Test  10/16/18   0930   SED  26*     Recent Labs   Lab Test  09/26/18   1631  09/24/18   2115  09/24/18   1708  09/24/18   1620   CULT  No anaerobes isolated  Light growth  Staphylococcus aureus  *  Light growth  Normal skin kenyatta    Moderate growth  Staphylococcus aureus  *  No growth  No growth

## 2018-10-18 ENCOUNTER — HOME INFUSION (PRE-WILLOW HOME INFUSION) (OUTPATIENT)
Dept: PHARMACY | Facility: CLINIC | Age: 48
End: 2018-10-18

## 2018-10-18 VITALS
WEIGHT: 200 LBS | RESPIRATION RATE: 16 BRPM | BODY MASS INDEX: 33.32 KG/M2 | TEMPERATURE: 98.2 F | DIASTOLIC BLOOD PRESSURE: 73 MMHG | HEART RATE: 86 BPM | OXYGEN SATURATION: 94 % | SYSTOLIC BLOOD PRESSURE: 120 MMHG | HEIGHT: 65 IN

## 2018-10-18 LAB
ALBUMIN SERPL-MCNC: 3 G/DL (ref 3.4–5)
ALP SERPL-CCNC: 124 U/L (ref 40–150)
ALT SERPL W P-5'-P-CCNC: 15 U/L (ref 0–50)
ANION GAP SERPL CALCULATED.3IONS-SCNC: 6 MMOL/L (ref 3–14)
AST SERPL W P-5'-P-CCNC: 12 U/L (ref 0–45)
BILIRUB SERPL-MCNC: 0.1 MG/DL (ref 0.2–1.3)
BUN SERPL-MCNC: 13 MG/DL (ref 7–30)
CALCIUM SERPL-MCNC: 8.3 MG/DL (ref 8.5–10.1)
CHLORIDE SERPL-SCNC: 106 MMOL/L (ref 94–109)
CK SERPL-CCNC: 39 U/L (ref 30–225)
CO2 SERPL-SCNC: 27 MMOL/L (ref 20–32)
CREAT SERPL-MCNC: 0.71 MG/DL (ref 0.52–1.04)
CRP SERPL-MCNC: 8.3 MG/L (ref 0–8)
ERYTHROCYTE [DISTWIDTH] IN BLOOD BY AUTOMATED COUNT: 13.8 % (ref 10–15)
GFR SERPL CREATININE-BSD FRML MDRD: 89 ML/MIN/1.7M2
GLUCOSE BLDC GLUCOMTR-MCNC: 106 MG/DL (ref 70–99)
GLUCOSE BLDC GLUCOMTR-MCNC: 135 MG/DL (ref 70–99)
GLUCOSE BLDC GLUCOMTR-MCNC: 92 MG/DL (ref 70–99)
GLUCOSE SERPL-MCNC: 141 MG/DL (ref 70–99)
HCT VFR BLD AUTO: 35.4 % (ref 35–47)
HGB BLD-MCNC: 11.7 G/DL (ref 11.7–15.7)
INTERPRETATION ECG - MUSE: NORMAL
MCH RBC QN AUTO: 28.1 PG (ref 26.5–33)
MCHC RBC AUTO-ENTMCNC: 33.1 G/DL (ref 31.5–36.5)
MCV RBC AUTO: 85 FL (ref 78–100)
PLATELET # BLD AUTO: 289 10E9/L (ref 150–450)
POTASSIUM SERPL-SCNC: 3.2 MMOL/L (ref 3.4–5.3)
PROT SERPL-MCNC: 7.1 G/DL (ref 6.8–8.8)
RBC # BLD AUTO: 4.16 10E12/L (ref 3.8–5.2)
SODIUM SERPL-SCNC: 139 MMOL/L (ref 133–144)
WBC # BLD AUTO: 7.1 10E9/L (ref 4–11)

## 2018-10-18 PROCEDURE — 99232 SBSQ HOSP IP/OBS MODERATE 35: CPT | Performed by: HOSPITALIST

## 2018-10-18 PROCEDURE — 27210219 ZZH KIT SHRLOCK 5FR DBL LUM PWR P

## 2018-10-18 PROCEDURE — 25000132 ZZH RX MED GY IP 250 OP 250 PS 637: Performed by: NURSE PRACTITIONER

## 2018-10-18 PROCEDURE — 80053 COMPREHEN METABOLIC PANEL: CPT | Performed by: HOSPITALIST

## 2018-10-18 PROCEDURE — 82565 ASSAY OF CREATININE: CPT | Performed by: HOSPITALIST

## 2018-10-18 PROCEDURE — 25000132 ZZH RX MED GY IP 250 OP 250 PS 637: Performed by: HOSPITALIST

## 2018-10-18 PROCEDURE — 25000132 ZZH RX MED GY IP 250 OP 250 PS 637: Performed by: PHYSICIAN ASSISTANT

## 2018-10-18 PROCEDURE — 36415 COLL VENOUS BLD VENIPUNCTURE: CPT | Performed by: HOSPITALIST

## 2018-10-18 PROCEDURE — 36569 INSJ PICC 5 YR+ W/O IMAGING: CPT

## 2018-10-18 PROCEDURE — 82550 ASSAY OF CK (CPK): CPT | Performed by: HOSPITALIST

## 2018-10-18 PROCEDURE — 85027 COMPLETE CBC AUTOMATED: CPT | Performed by: HOSPITALIST

## 2018-10-18 PROCEDURE — 25000128 H RX IP 250 OP 636: Performed by: INTERNAL MEDICINE

## 2018-10-18 PROCEDURE — 25000132 ZZH RX MED GY IP 250 OP 250 PS 637: Performed by: ORTHOPAEDIC SURGERY

## 2018-10-18 PROCEDURE — 00000146 ZZHCL STATISTIC GLUCOSE BY METER IP

## 2018-10-18 PROCEDURE — 86140 C-REACTIVE PROTEIN: CPT | Performed by: HOSPITALIST

## 2018-10-18 RX ORDER — POTASSIUM CHLORIDE 1500 MG/1
80 TABLET, EXTENDED RELEASE ORAL DAILY
Status: DISCONTINUED | OUTPATIENT
Start: 2018-10-18 | End: 2018-10-18 | Stop reason: HOSPADM

## 2018-10-18 RX ORDER — OXYCODONE AND ACETAMINOPHEN 5; 325 MG/1; MG/1
1-2 TABLET ORAL EVERY 4 HOURS PRN
Qty: 30 TABLET | Refills: 0 | Status: ON HOLD | OUTPATIENT
Start: 2018-10-18 | End: 2018-11-27

## 2018-10-18 RX ORDER — CEFAZOLIN SODIUM 1 G/3ML
2 INJECTION, POWDER, FOR SOLUTION INTRAMUSCULAR; INTRAVENOUS EVERY 8 HOURS
Qty: 180 G | Refills: 1 | Status: ON HOLD | OUTPATIENT
Start: 2018-10-18 | End: 2019-03-19

## 2018-10-18 RX ADMIN — OXYCODONE HYDROCHLORIDE 10 MG: 5 TABLET ORAL at 04:39

## 2018-10-18 RX ADMIN — BUSPIRONE HYDROCHLORIDE 15 MG: 15 TABLET ORAL at 08:13

## 2018-10-18 RX ADMIN — OMEPRAZOLE 40 MG: 20 CAPSULE, DELAYED RELEASE ORAL at 17:24

## 2018-10-18 RX ADMIN — FUROSEMIDE 40 MG: 40 TABLET ORAL at 08:13

## 2018-10-18 RX ADMIN — OXYCODONE HYDROCHLORIDE 10 MG: 5 TABLET ORAL at 14:06

## 2018-10-18 RX ADMIN — OXYCODONE HYDROCHLORIDE 10 MG: 5 TABLET ORAL at 08:13

## 2018-10-18 RX ADMIN — GABAPENTIN 800 MG: 800 TABLET, FILM COATED ORAL at 08:13

## 2018-10-18 RX ADMIN — POTASSIUM CHLORIDE 80 MEQ: 1500 TABLET, EXTENDED RELEASE ORAL at 17:24

## 2018-10-18 RX ADMIN — SERTRALINE HYDROCHLORIDE 100 MG: 100 TABLET ORAL at 08:13

## 2018-10-18 RX ADMIN — OXYCODONE HYDROCHLORIDE 10 MG: 5 TABLET ORAL at 17:24

## 2018-10-18 RX ADMIN — GABAPENTIN 800 MG: 800 TABLET, FILM COATED ORAL at 17:24

## 2018-10-18 RX ADMIN — CEFAZOLIN SODIUM 2 G: 2 INJECTION, SOLUTION INTRAVENOUS at 17:22

## 2018-10-18 RX ADMIN — SENNOSIDES AND DOCUSATE SODIUM 2 TABLET: 8.6; 5 TABLET ORAL at 08:12

## 2018-10-18 RX ADMIN — CEFAZOLIN SODIUM 2 G: 2 INJECTION, SOLUTION INTRAVENOUS at 01:30

## 2018-10-18 RX ADMIN — OMEPRAZOLE 40 MG: 20 CAPSULE, DELAYED RELEASE ORAL at 06:55

## 2018-10-18 RX ADMIN — OXYCODONE HYDROCHLORIDE 10 MG: 5 TABLET ORAL at 01:26

## 2018-10-18 RX ADMIN — BUPROPION HYDROCHLORIDE 300 MG: 300 TABLET, FILM COATED, EXTENDED RELEASE ORAL at 08:13

## 2018-10-18 RX ADMIN — CEFAZOLIN SODIUM 2 G: 2 INJECTION, SOLUTION INTRAVENOUS at 10:56

## 2018-10-18 RX ADMIN — OXYCODONE HYDROCHLORIDE 10 MG: 5 TABLET ORAL at 11:16

## 2018-10-18 ASSESSMENT — ACTIVITIES OF DAILY LIVING (ADL)
ADLS_ACUITY_SCORE: 10

## 2018-10-18 NOTE — PLAN OF CARE
Problem: Patient Care Overview  Goal: Plan of Care/Patient Progress Review  Outcome: Improving  ([id number]) Pt is A&Ox4, baseline numbness on BLE. Up independent, voiding adequately. Pain well managed w/ oxycodone. Will continue to monitor.

## 2018-10-18 NOTE — PLAN OF CARE
Problem: Patient Care Overview  Goal: Plan of Care/Patient Progress Review  Outcome: Improving  Up independent in room with rolling scooter. Taking oxycodone for pain. Dressing change done with PA here to view incision, pt was sent home with supplies needed for dressing changes at home. Discharge instructions discussed with  present. Medications given to pt at the time of discharge.

## 2018-10-18 NOTE — PROGRESS NOTES
[facility]    Infectious Disease Progress Note    Date of Service (when I saw the patient): 10/18/2018     Assessment & Plan   Shalonda Howard is a 48 year old female who was admitted on 10/15/2018.     Impression:  1. 48 y.o s/p bilateral bunion repair done end of August.   2. Seen in September for concern of infection in the left.   3. S/p I and D with removal of the hardware end of August.   4. Admitted now with continued redness, swelling in the area.      Recommendations:   Continues to have an open incision, surgical plan defer to ortho.     MRI with inflamm and screw still in, no objection to IV course but usual approach to this is I and D repeat and all hardware out, PICC and IV orders in             Thang Canada MD    Interval History   Afebrile MRI seen    Physical Exam   Temp: 97.9  F (36.6  C) Temp src: Oral BP: 144/87 Pulse: 77   Resp: 16 SpO2: 100 % O2 Device: None (Room air)    Vitals:    10/15/18 1730   Weight: 90.7 kg (200 lb)     Vital Signs with Ranges  Temp:  [97.9  F (36.6  C)-98.4  F (36.9  C)] 97.9  F (36.6  C)  Pulse:  [74-84] 77  Resp:  [16] 16  BP: (115-144)/(77-87) 144/87  SpO2:  [100 %] 100 %    Constitutional: Awake, alert, cooperative, no apparent distress  Lungs: Clear to auscultation bilaterally, no crackles or wheezing  Cardiovascular: Regular rate and rhythm, normal S1 and S2, and no murmur noted  Abdomen: Normal bowel sounds, soft, non-distended, non-tender  Skin: No rashes, no cyanosis, no edema  Other:    Medications       buPROPion (WELLBUTRIN XL) 24 hr tablet 300 mg  300 mg Oral Daily     busPIRone (BUSPAR) tablet 15 mg  15 mg Oral BID     ceFAZolin  2 g Intravenous Q8H     enoxaparin  40 mg Subcutaneous Q24H     furosemide  40 mg Oral BID     gabapentin  800 mg Oral TID     insulin aspart  1-7 Units Subcutaneous TID AC     insulin aspart  1-5 Units Subcutaneous At Bedtime     omeprazole (priLOSEC) CR capsule 40 mg  40 mg Oral BID AC     pravastatin  20 mg Oral  Daily     senna-docusate  1 tablet Oral BID    Or     senna-docusate  2 tablet Oral BID     sertraline  100 mg Oral Daily     topiramate (TOPAMAX) tablet 100 mg  100 mg Oral At Bedtime       Data   All microbiology laboratory data reviewed.  Recent Labs   Lab Test  10/18/18   0659  10/16/18   0930  10/15/18   2125  09/26/18   0545   WBC  7.1  7.7   --   6.9   HGB  11.7  13.0   --   11.0*   HCT  35.4  38.9   --   33.0*   MCV  85  85   --   87   PLT  289  283  324  264     Recent Labs   Lab Test  10/18/18   0659  10/17/18   0700  10/16/18   0632   CR  0.71  0.77  0.71     Recent Labs   Lab Test  10/16/18   0930   SED  26*     Recent Labs   Lab Test  09/26/18   1631  09/24/18   2115  09/24/18   1708  09/24/18   1620   CULT  No anaerobes isolated  Light growth  Staphylococcus aureus  *  Light growth  Normal skin kenyatta    Moderate growth  Staphylococcus aureus  *  No growth  No growth

## 2018-10-18 NOTE — PROGRESS NOTES
[facility]  Hospitalist Progress Note   10/18/2018          Assessment and Plan:       Shalonda Howard is a 48 year old female with medical history of  idiopathic peripheral neuropathy, SLE, opioid dependence, esophageal reflex, dysthymic disorder, osteoporosis, anxiety admitted on 10/15/2018 for suspected osteomyelitis.     Suspected Osteomyelitis - Left forefoot   s/p left bunion repair Aug/2018  S/p I&D, hardware removal 9/26/18:   The patient initially underwent bilateral bunionectomies 8/2018, and has subsequently required admission for in 9/2018 for I&D and has been on 6 weeks of PO Clindamycin changed to Ciprofloxacin therapy since without significant improvement. The patient was admitted to the orthopedic service for left foot infection, suspected osteomyelitis. Since admission, BP WNL and stable, afebrile. BMP unremarkable. Of note, the patient has allergies to Sulfa, erythromycin and PCN.  Sodium 139, creatinine 0.71.  CRP 11.8.  Lactic acid 0.8.  WBC 7.7.  MRI left foot - There is a first metatarsal osteotomy. There is extensive bone marrow edema throughout the first metatarsal more than typically seen  for osteotomy. There is a wound on the medial aspect of the foot and surrounding soft tissue edema. Some of the bone marrow edema is likely  related to the osteotomy.    Infectious disease following along, recommend 6 weeks of IV ancef.  PICC line placement today  Orthopedic team -pending follow-up today.  No plan for further surgery or incision and drainage on 10/17.  Continue wound care.  Pain management as per Ortho team.    Chronic lower Extremity Edema  Hx of Hypokalemia:  PTA on 160 mg oral Lasix daily with potassium supplements.    PTA Lasix was on hold since admission, started on 40 mg oral twice daily [10/17] and will increase to 60 mg twice daily today.  Potassium of 3.2, electrolyte replacement protocol in place.  Start PTA 80 mEq oral potassium replacement standing dose  daily.  Will need to see vascular team and consider echocardiogram as outpatient.      Depressive Disorder  Anxiety: Labile moods, difficult to manage depression in otupatient setting d/t her family dynamics and health issues.   PTA regimen -  BuSpar and Topamax, zoloft and Wellbutrin added recently.   PCP has recommended [facility] and behavioral health care. She has not seen a therapist in the outpatient setting.   TSH 4.36, T4 0.85. QTc 423 on EKG.  Continue PTA regimen     Peripheral Neuropathy:   Chronic and stable on Gabapentin  Continue Gabapentin     Obesity: BMI 33.28.   Prediabetes with Hgb A1C 5.6% (previously 6.0%).   Shalonda denies any formal DM diagnosis. PTA medication list includes phentermine.    Hold phentermine currently   Blood sugars have been running between 100-150.  Continue low intensity sliding scale insulin.  Check lipid panel, age appropriate health maintenance as outpatient.    GERD: Chronic and stable on omeprazole  Continue omeprazole    Active Diet Order      Combination Diet    DVT Prophylaxis:  Sequential compression device.  Code Status: Full Code  Disposition: Expected discharge as per Ortho team.    Patient, interdisciplinary team involved in care and agrees with plan.  Total time - 25 min. More than 50% of time spent in direct patient care, care coordination, patient counseling, and formalizing plan of care.     Hallie Russo MD        Interval History:      Patient appears comfortable.  Lying in bed.  Denies any chest pain or shortness of breath.  Afebrile since admission.  No nausea or vomiting.       Physical Exam   Temp:  [98.2  F (36.8  C)-98.4  F (36.9  C)] 98.3  F (36.8  C)  Pulse:  [74-84] 74  Resp:  [16] 16  BP: (115-133)/(77-82) 133/80  SpO2:  [100 %] 100 %    Intake/Output Summary (Last 24 hours) at 10/17/18 1111  Last data filed at 10/16/18 2000   Gross per 24 hour   Intake              440 ml   Output                0 ml   Net              440 ml      Admission Weight: 90.7 kg (200 lb)  Current Weight: 90.7 kg (200 lb)    PHYSICAL EXAM  GENERAL: Patient is in no distress. Alert and oriented.  HEART: Regular rate and rhythm. S1S2. No murmurs  LUNGS: Clear to auscultation bilaterally. No expiratory wheeze.  ABDOMEN: Soft, no abdominal tenderness, bowel sounds heard   NEURO: Moving all extremities.  EXTREMITIES: Right foot pedal edema 1+.. 2+ peripheral pulses.  Left foot wrapped in Ace wrap.  SKIN: Warm, dry         Medications:          buPROPion (WELLBUTRIN XL) 24 hr tablet 300 mg  300 mg Oral Daily     busPIRone (BUSPAR) tablet 15 mg  15 mg Oral BID     ceFAZolin  2 g Intravenous Q8H     enoxaparin  40 mg Subcutaneous Q24H     furosemide  40 mg Oral BID     gabapentin  800 mg Oral TID     insulin aspart  1-7 Units Subcutaneous TID AC     insulin aspart  1-5 Units Subcutaneous At Bedtime     omeprazole (priLOSEC) CR capsule 40 mg  40 mg Oral BID AC     pravastatin  20 mg Oral Daily     senna-docusate  1 tablet Oral BID    Or     senna-docusate  2 tablet Oral BID     sertraline  100 mg Oral Daily     topiramate (TOPAMAX) tablet 100 mg  100 mg Oral At Bedtime     glucose **OR** dextrose **OR** glucagon, HYDROmorphone, magnesium sulfate, melatonin, naloxone, ondansetron **OR** ondansetron, oxyCODONE IR, potassium chloride, potassium chloride with lidocaine, potassium chloride, potassium chloride, potassium chloride, prochlorperazine **OR** prochlorperazine **OR** prochlorperazine         Data:      All new lab and imaging data was reviewed.   EKG reviewed normal sinus rhythm heart rate 73 .

## 2018-10-18 NOTE — PLAN OF CARE
Problem: Patient Care Overview  Goal: Plan of Care/Patient Progress Review  Outcome: Improving  Pt up IND in room. Taking oxycodone for pain. Dressing CDI. Continue to monitor.

## 2018-10-18 NOTE — PROGRESS NOTES
Shalonda Anita  10/18/2018  Hospital Day 3    Doing well.  Wound making progress.  MRI showing inflammation and fluid collection, but without discernable abcess.  Temperatures:  Current - Temp: 98  F (36.7  C); Max - Temp  Av.2  F (36.8  C)  Min: 97.9  F (36.6  C)  Max: 98.4  F (36.9  C)  Pulse range: Pulse  Av.8  Min: 74  Max: 84  Blood pressure range: Systolic (24hrs), Av , Min:106 , Max:144   ; Diastolic (24hrs), Av, Min:57, Max:87    CMS: intact  Labs:per hospitalist/ID    PLAN:DC home on IV Abx. every other day dressing changes. Followup in 2 weeks with Dr. Harman  Additional problems to be followed by medicine physician

## 2018-10-18 NOTE — PROVIDER NOTIFICATION
Called MARIA ESTHER Webb to see if he has looked at the MRI results. Jon stated he has not looked at them but will look at them this afternoon and come to see the pt around 1500, after clinic. Asked if pt should be made NPO in case of surgery and Jon said not at this time.

## 2018-10-19 ENCOUNTER — HOME INFUSION (PRE-WILLOW HOME INFUSION) (OUTPATIENT)
Dept: PHARMACY | Facility: CLINIC | Age: 48
End: 2018-10-19

## 2018-10-19 NOTE — PROGRESS NOTES
This is a recent snapshot of the patient's [facility] medical record.  For current drug dose and complete information and questions, call [phone] or In Basket pool, fv home infusion ([id number])  CSN Number:  [id number]

## 2018-10-22 NOTE — PROGRESS NOTES
This is a recent snapshot of the patient's [facility] medical record.  For current drug dose and complete information and questions, call [phone] or In Basket pool, fv home infusion ([id number])  CSN Number:  [id number]

## 2018-10-23 ENCOUNTER — HOME INFUSION (PRE-WILLOW HOME INFUSION) (OUTPATIENT)
Dept: PHARMACY | Facility: CLINIC | Age: 48
End: 2018-10-23

## 2018-10-23 LAB
AST SERPL W P-5'-P-CCNC: 18 U/L (ref 0–45)
BASOPHILS # BLD AUTO: 0.1 10E9/L (ref 0–0.2)
BASOPHILS NFR BLD AUTO: 0.8 %
BUN SERPL-MCNC: 10 MG/DL (ref 7–30)
CREAT SERPL-MCNC: 0.72 MG/DL (ref 0.52–1.04)
CRP SERPL-MCNC: 3 MG/L (ref 0–8)
DIFFERENTIAL METHOD BLD: NORMAL
EOSINOPHIL # BLD AUTO: 0.2 10E9/L (ref 0–0.7)
EOSINOPHIL NFR BLD AUTO: 2.8 %
ERYTHROCYTE [DISTWIDTH] IN BLOOD BY AUTOMATED COUNT: 13.3 % (ref 10–15)
ERYTHROCYTE [SEDIMENTATION RATE] IN BLOOD BY WESTERGREN METHOD: 14 MM/H (ref 0–20)
GFR SERPL CREATININE-BSD FRML MDRD: 87 ML/MIN/1.7M2
HCT VFR BLD AUTO: 40.6 % (ref 35–47)
HGB BLD-MCNC: 13.1 G/DL (ref 11.7–15.7)
IMM GRANULOCYTES # BLD: 0 10E9/L (ref 0–0.4)
IMM GRANULOCYTES NFR BLD: 0.2 %
LYMPHOCYTES # BLD AUTO: 2.5 10E9/L (ref 0.8–5.3)
LYMPHOCYTES NFR BLD AUTO: 29.5 %
MCH RBC QN AUTO: 27.8 PG (ref 26.5–33)
MCHC RBC AUTO-ENTMCNC: 32.3 G/DL (ref 31.5–36.5)
MCV RBC AUTO: 86 FL (ref 78–100)
MONOCYTES # BLD AUTO: 0.4 10E9/L (ref 0–1.3)
MONOCYTES NFR BLD AUTO: 4.4 %
NEUTROPHILS # BLD AUTO: 5.4 10E9/L (ref 1.6–8.3)
NEUTROPHILS NFR BLD AUTO: 62.3 %
NRBC # BLD AUTO: 0 10*3/UL
NRBC BLD AUTO-RTO: 0 /100
PLATELET # BLD AUTO: 344 10E9/L (ref 150–450)
RBC # BLD AUTO: 4.72 10E12/L (ref 3.8–5.2)
WBC # BLD AUTO: 8.6 10E9/L (ref 4–11)

## 2018-10-23 PROCEDURE — 84450 TRANSFERASE (AST) (SGOT): CPT | Performed by: INTERNAL MEDICINE

## 2018-10-23 PROCEDURE — 82565 ASSAY OF CREATININE: CPT | Performed by: INTERNAL MEDICINE

## 2018-10-23 PROCEDURE — 85025 COMPLETE CBC W/AUTO DIFF WBC: CPT | Performed by: INTERNAL MEDICINE

## 2018-10-23 PROCEDURE — 84520 ASSAY OF UREA NITROGEN: CPT | Performed by: INTERNAL MEDICINE

## 2018-10-23 PROCEDURE — 85652 RBC SED RATE AUTOMATED: CPT | Performed by: INTERNAL MEDICINE

## 2018-10-23 PROCEDURE — 86140 C-REACTIVE PROTEIN: CPT | Performed by: INTERNAL MEDICINE

## 2018-10-24 ENCOUNTER — HOME INFUSION (PRE-WILLOW HOME INFUSION) (OUTPATIENT)
Dept: PHARMACY | Facility: CLINIC | Age: 48
End: 2018-10-24

## 2018-10-24 NOTE — PROGRESS NOTES
This is a recent snapshot of the patient's [facility] medical record.  For current drug dose and complete information and questions, call [phone] or In Basket pool, fv home infusion ([id number])  CSN Number:  [id number]

## 2018-10-25 NOTE — PROGRESS NOTES
This is a recent snapshot of the patient's [facility] medical record.  For current drug dose and complete information and questions, call [phone] or In Basket pool, fv home infusion ([id number])  CSN Number:  [id number]

## 2018-10-26 ENCOUNTER — HOME INFUSION (PRE-WILLOW HOME INFUSION) (OUTPATIENT)
Dept: PHARMACY | Facility: CLINIC | Age: 48
End: 2018-10-26

## 2018-10-29 ENCOUNTER — HOME INFUSION (PRE-WILLOW HOME INFUSION) (OUTPATIENT)
Dept: PHARMACY | Facility: CLINIC | Age: 48
End: 2018-10-29

## 2018-10-29 LAB
AST SERPL W P-5'-P-CCNC: 16 U/L (ref 0–45)
BASOPHILS # BLD AUTO: 0.1 10E9/L (ref 0–0.2)
BASOPHILS NFR BLD AUTO: 0.6 %
BUN SERPL-MCNC: 12 MG/DL (ref 7–30)
CREAT SERPL-MCNC: 0.8 MG/DL (ref 0.52–1.04)
CRP SERPL-MCNC: 10.4 MG/L (ref 0–8)
DIFFERENTIAL METHOD BLD: NORMAL
EOSINOPHIL # BLD AUTO: 0.2 10E9/L (ref 0–0.7)
EOSINOPHIL NFR BLD AUTO: 1.9 %
ERYTHROCYTE [DISTWIDTH] IN BLOOD BY AUTOMATED COUNT: 13.7 % (ref 10–15)
ERYTHROCYTE [SEDIMENTATION RATE] IN BLOOD BY WESTERGREN METHOD: 17 MM/H (ref 0–20)
GFR SERPL CREATININE-BSD FRML MDRD: 77 ML/MIN/1.7M2
HCT VFR BLD AUTO: 39.5 % (ref 35–47)
HGB BLD-MCNC: 12.8 G/DL (ref 11.7–15.7)
IMM GRANULOCYTES # BLD: 0 10E9/L (ref 0–0.4)
IMM GRANULOCYTES NFR BLD: 0.2 %
LYMPHOCYTES # BLD AUTO: 2.9 10E9/L (ref 0.8–5.3)
LYMPHOCYTES NFR BLD AUTO: 27.9 %
MCH RBC QN AUTO: 28.1 PG (ref 26.5–33)
MCHC RBC AUTO-ENTMCNC: 32.4 G/DL (ref 31.5–36.5)
MCV RBC AUTO: 87 FL (ref 78–100)
MONOCYTES # BLD AUTO: 0.6 10E9/L (ref 0–1.3)
MONOCYTES NFR BLD AUTO: 5.7 %
NEUTROPHILS # BLD AUTO: 6.7 10E9/L (ref 1.6–8.3)
NEUTROPHILS NFR BLD AUTO: 63.7 %
NRBC # BLD AUTO: 0 10*3/UL
NRBC BLD AUTO-RTO: 0 /100
PLATELET # BLD AUTO: 378 10E9/L (ref 150–450)
RBC # BLD AUTO: 4.55 10E12/L (ref 3.8–5.2)
WBC # BLD AUTO: 10.4 10E9/L (ref 4–11)

## 2018-10-29 PROCEDURE — 84520 ASSAY OF UREA NITROGEN: CPT | Performed by: INTERNAL MEDICINE

## 2018-10-29 PROCEDURE — 86140 C-REACTIVE PROTEIN: CPT | Performed by: INTERNAL MEDICINE

## 2018-10-29 PROCEDURE — 84450 TRANSFERASE (AST) (SGOT): CPT | Performed by: INTERNAL MEDICINE

## 2018-10-29 PROCEDURE — 82565 ASSAY OF CREATININE: CPT | Performed by: INTERNAL MEDICINE

## 2018-10-29 PROCEDURE — 85652 RBC SED RATE AUTOMATED: CPT | Performed by: INTERNAL MEDICINE

## 2018-10-29 PROCEDURE — 85025 COMPLETE CBC W/AUTO DIFF WBC: CPT | Performed by: INTERNAL MEDICINE

## 2018-10-29 NOTE — PROGRESS NOTES
This is a recent snapshot of the patient's [facility] medical record.  For current drug dose and complete information and questions, call [phone] or In Basket pool, fv home infusion ([id number])  CSN Number:  [id number]

## 2018-10-30 ENCOUNTER — HOME INFUSION (PRE-WILLOW HOME INFUSION) (OUTPATIENT)
Dept: PHARMACY | Facility: CLINIC | Age: 48
End: 2018-10-30

## 2018-10-30 NOTE — PROGRESS NOTES
This is a recent snapshot of the patient's [facility] medical record.  For current drug dose and complete information and questions, call [phone] or In [facility] pool, fv home infusion ([id number])  CSN Number:  [id number]

## 2018-10-31 NOTE — PROGRESS NOTES
This is a recent snapshot of the patient's [facility] medical record.  For current drug dose and complete information and questions, call [phone] or In Basket pool, fv home infusion ([id number])  CSN Number:  [id number]

## 2018-11-05 ENCOUNTER — HOME INFUSION (PRE-WILLOW HOME INFUSION) (OUTPATIENT)
Dept: PHARMACY | Facility: CLINIC | Age: 48
End: 2018-11-05

## 2018-11-05 LAB
AST SERPL W P-5'-P-CCNC: 13 U/L (ref 0–45)
BASOPHILS # BLD AUTO: 0.1 10E9/L (ref 0–0.2)
BASOPHILS NFR BLD AUTO: 0.8 %
BUN SERPL-MCNC: 11 MG/DL (ref 7–30)
CREAT SERPL-MCNC: 0.76 MG/DL (ref 0.52–1.04)
CRP SERPL-MCNC: 9.4 MG/L (ref 0–8)
DIFFERENTIAL METHOD BLD: NORMAL
EOSINOPHIL # BLD AUTO: 0.2 10E9/L (ref 0–0.7)
EOSINOPHIL NFR BLD AUTO: 2.2 %
ERYTHROCYTE [DISTWIDTH] IN BLOOD BY AUTOMATED COUNT: 13.4 % (ref 10–15)
ERYTHROCYTE [SEDIMENTATION RATE] IN BLOOD BY WESTERGREN METHOD: 18 MM/H (ref 0–20)
GFR SERPL CREATININE-BSD FRML MDRD: 81 ML/MIN/1.7M2
HCT VFR BLD AUTO: 41.2 % (ref 35–47)
HGB BLD-MCNC: 13.1 G/DL (ref 11.7–15.7)
IMM GRANULOCYTES # BLD: 0 10E9/L (ref 0–0.4)
IMM GRANULOCYTES NFR BLD: 0.2 %
LYMPHOCYTES # BLD AUTO: 2.3 10E9/L (ref 0.8–5.3)
LYMPHOCYTES NFR BLD AUTO: 27.3 %
MCH RBC QN AUTO: 27.8 PG (ref 26.5–33)
MCHC RBC AUTO-ENTMCNC: 31.8 G/DL (ref 31.5–36.5)
MCV RBC AUTO: 87 FL (ref 78–100)
MONOCYTES # BLD AUTO: 0.5 10E9/L (ref 0–1.3)
MONOCYTES NFR BLD AUTO: 5.9 %
NEUTROPHILS # BLD AUTO: 5.3 10E9/L (ref 1.6–8.3)
NEUTROPHILS NFR BLD AUTO: 63.6 %
NRBC # BLD AUTO: 0 10*3/UL
NRBC BLD AUTO-RTO: 0 /100
PLATELET # BLD AUTO: 395 10E9/L (ref 150–450)
RBC # BLD AUTO: 4.72 10E12/L (ref 3.8–5.2)
WBC # BLD AUTO: 8.4 10E9/L (ref 4–11)

## 2018-11-05 PROCEDURE — 82565 ASSAY OF CREATININE: CPT | Performed by: INTERNAL MEDICINE

## 2018-11-05 PROCEDURE — 84450 TRANSFERASE (AST) (SGOT): CPT | Performed by: INTERNAL MEDICINE

## 2018-11-05 PROCEDURE — 84520 ASSAY OF UREA NITROGEN: CPT | Performed by: INTERNAL MEDICINE

## 2018-11-05 PROCEDURE — 86140 C-REACTIVE PROTEIN: CPT | Performed by: INTERNAL MEDICINE

## 2018-11-05 PROCEDURE — 85025 COMPLETE CBC W/AUTO DIFF WBC: CPT | Performed by: INTERNAL MEDICINE

## 2018-11-05 PROCEDURE — 85652 RBC SED RATE AUTOMATED: CPT | Performed by: INTERNAL MEDICINE

## 2018-11-06 NOTE — PROGRESS NOTES
This is a recent snapshot of the patient's [facility] medical record.  For current drug dose and complete information and questions, call [phone] or In Basket pool, fv home infusion ([id number])  CSN Number:  [id number]

## 2018-11-06 NOTE — DISCHARGE SUMMARY
Admit Date:     10/15/2018   Discharge Date:     10/18/2018      ADMISSION DIAGNOSIS:  Left foot infection.      DISCHARGE DIAGNOSIS:  Left foot infection.      OPERATIVE PROCEDURE:  None.      Ms. Franco was admitted to [facility] via direct admission secondary to continued pain and redness from a nonhealing wound on her left foot.  She underwent a bunion procedure at the beginning of September where the wound unfortunately did not ever heal.  She was admitted to [facility] previously with drainage and infection, and underwent irrigation and debridement of the wound along with hardware removal.  Cassandra unfortunately still has not been improving secondary to likely poor circulation.  She also is likely not compliant with keeping her foot elevated for extended periods of time.  Cassandra continues to have pain in the foot when it is in a dependent position.  There is notable redness, but no appreciable drainage.  There is some devitalized tissue at the base of the wound.  During her admission the wound care/ostomy nurse was consulted.  They did place Iodosorb in the wound beds as well as clean the area with MicroKlenz.  Infectious Disease consult was also obtained and they recommended an MRI scan.  The MRI showed no definitive sign of deep bone infection.  It was therefore decided not to have Cassandra return to the operating room for further irrigation and debridement.  On hospital day #3, Cassandra was able to discharge home.  She will continue on oral antibiotics and with wound care at home.  She will follow up with us in 2 weeks' time for reevaluation.         HUGO LEÓN MD       As dictated by MARIA ESTHER PIERRE            D: 2018   T: 2018   MT: BETTYE      Name:     CASSANDRA FRANCO   MRN:      [id number]        Account:        [id number]   :      1970           Admit Date:     10/15/2018                                  Discharge Date: 10/18/2018       Document: [id number]

## 2018-11-07 ENCOUNTER — HOME INFUSION (PRE-WILLOW HOME INFUSION) (OUTPATIENT)
Dept: PHARMACY | Facility: CLINIC | Age: 48
End: 2018-11-07

## 2018-11-08 NOTE — PROGRESS NOTES
This is a recent snapshot of the patient's [facility] medical record.  For current drug dose and complete information and questions, call [phone] or In Basket pool, fv home infusion ([id number])  CSN Number:  [id number]

## 2018-11-12 ENCOUNTER — HOME INFUSION (PRE-WILLOW HOME INFUSION) (OUTPATIENT)
Dept: PHARMACY | Facility: CLINIC | Age: 48
End: 2018-11-12

## 2018-11-12 LAB
AST SERPL W P-5'-P-CCNC: 15 U/L (ref 0–45)
BASOPHILS # BLD AUTO: 0 10E9/L (ref 0–0.2)
BASOPHILS NFR BLD AUTO: 0.3 %
BUN SERPL-MCNC: 13 MG/DL (ref 7–30)
CREAT SERPL-MCNC: 0.75 MG/DL (ref 0.52–1.04)
CRP SERPL-MCNC: 12 MG/L (ref 0–8)
DIFFERENTIAL METHOD BLD: ABNORMAL
EOSINOPHIL # BLD AUTO: 0.2 10E9/L (ref 0–0.7)
EOSINOPHIL NFR BLD AUTO: 1.7 %
ERYTHROCYTE [DISTWIDTH] IN BLOOD BY AUTOMATED COUNT: 13.9 % (ref 10–15)
ERYTHROCYTE [SEDIMENTATION RATE] IN BLOOD BY WESTERGREN METHOD: 20 MM/H (ref 0–20)
GFR SERPL CREATININE-BSD FRML MDRD: 83 ML/MIN/1.7M2
HCT VFR BLD AUTO: 40 % (ref 35–47)
HGB BLD-MCNC: 13 G/DL (ref 11.7–15.7)
IMM GRANULOCYTES # BLD: 0 10E9/L (ref 0–0.4)
IMM GRANULOCYTES NFR BLD: 0.3 %
LYMPHOCYTES # BLD AUTO: 2.8 10E9/L (ref 0.8–5.3)
LYMPHOCYTES NFR BLD AUTO: 23.8 %
MCH RBC QN AUTO: 28 PG (ref 26.5–33)
MCHC RBC AUTO-ENTMCNC: 32.5 G/DL (ref 31.5–36.5)
MCV RBC AUTO: 86 FL (ref 78–100)
MONOCYTES # BLD AUTO: 0.6 10E9/L (ref 0–1.3)
MONOCYTES NFR BLD AUTO: 5.1 %
NEUTROPHILS # BLD AUTO: 8 10E9/L (ref 1.6–8.3)
NEUTROPHILS NFR BLD AUTO: 68.8 %
NRBC # BLD AUTO: 0 10*3/UL
NRBC BLD AUTO-RTO: 0 /100
PLATELET # BLD AUTO: 387 10E9/L (ref 150–450)
RBC # BLD AUTO: 4.64 10E12/L (ref 3.8–5.2)
WBC # BLD AUTO: 11.7 10E9/L (ref 4–11)

## 2018-11-12 PROCEDURE — 85652 RBC SED RATE AUTOMATED: CPT | Performed by: INTERNAL MEDICINE

## 2018-11-12 PROCEDURE — 86140 C-REACTIVE PROTEIN: CPT | Performed by: INTERNAL MEDICINE

## 2018-11-12 PROCEDURE — 84520 ASSAY OF UREA NITROGEN: CPT | Performed by: INTERNAL MEDICINE

## 2018-11-12 PROCEDURE — 85025 COMPLETE CBC W/AUTO DIFF WBC: CPT | Performed by: INTERNAL MEDICINE

## 2018-11-12 PROCEDURE — 82565 ASSAY OF CREATININE: CPT | Performed by: INTERNAL MEDICINE

## 2018-11-12 PROCEDURE — 84450 TRANSFERASE (AST) (SGOT): CPT | Performed by: INTERNAL MEDICINE

## 2018-11-13 ENCOUNTER — HOME INFUSION (PRE-WILLOW HOME INFUSION) (OUTPATIENT)
Dept: PHARMACY | Facility: CLINIC | Age: 48
End: 2018-11-13

## 2018-11-13 NOTE — PROGRESS NOTES
This is a recent snapshot of the patient's [facility] medical record.  For current drug dose and complete information and questions, call [phone] or In [facility] pool, fv home infusion ([id number])  CSN Number:  [id number]

## 2018-11-15 NOTE — PROGRESS NOTES
This is a recent snapshot of the patient's [facility] medical record.  For current drug dose and complete information and questions, call [phone] or In Basket pool, fv home infusion ([id number])  CSN Number:  [id number]

## 2018-11-19 ENCOUNTER — HOME INFUSION (PRE-WILLOW HOME INFUSION) (OUTPATIENT)
Dept: PHARMACY | Facility: CLINIC | Age: 48
End: 2018-11-19

## 2018-11-19 LAB
AST SERPL W P-5'-P-CCNC: 11 U/L (ref 0–45)
BASOPHILS # BLD AUTO: 0 10E9/L (ref 0–0.2)
BASOPHILS NFR BLD AUTO: 0.3 %
BUN SERPL-MCNC: 14 MG/DL (ref 7–30)
CREAT SERPL-MCNC: 0.64 MG/DL (ref 0.52–1.04)
CRP SERPL-MCNC: 9.8 MG/L (ref 0–8)
DIFFERENTIAL METHOD BLD: NORMAL
EOSINOPHIL # BLD AUTO: 0.2 10E9/L (ref 0–0.7)
EOSINOPHIL NFR BLD AUTO: 2.8 %
ERYTHROCYTE [DISTWIDTH] IN BLOOD BY AUTOMATED COUNT: 14.2 % (ref 10–15)
ERYTHROCYTE [SEDIMENTATION RATE] IN BLOOD BY WESTERGREN METHOD: 27 MM/H (ref 0–20)
GFR SERPL CREATININE-BSD FRML MDRD: >90 ML/MIN/1.7M2
HCT VFR BLD AUTO: 38.5 % (ref 35–47)
HGB BLD-MCNC: 12.3 G/DL (ref 11.7–15.7)
IMM GRANULOCYTES # BLD: 0 10E9/L (ref 0–0.4)
IMM GRANULOCYTES NFR BLD: 0.2 %
LYMPHOCYTES # BLD AUTO: 2.3 10E9/L (ref 0.8–5.3)
LYMPHOCYTES NFR BLD AUTO: 26.3 %
MCH RBC QN AUTO: 27.6 PG (ref 26.5–33)
MCHC RBC AUTO-ENTMCNC: 31.9 G/DL (ref 31.5–36.5)
MCV RBC AUTO: 86 FL (ref 78–100)
MONOCYTES # BLD AUTO: 0.5 10E9/L (ref 0–1.3)
MONOCYTES NFR BLD AUTO: 6.1 %
NEUTROPHILS # BLD AUTO: 5.6 10E9/L (ref 1.6–8.3)
NEUTROPHILS NFR BLD AUTO: 64.3 %
NRBC # BLD AUTO: 0 10*3/UL
NRBC BLD AUTO-RTO: 0 /100
PLATELET # BLD AUTO: 393 10E9/L (ref 150–450)
RBC # BLD AUTO: 4.46 10E12/L (ref 3.8–5.2)
WBC # BLD AUTO: 8.7 10E9/L (ref 4–11)

## 2018-11-19 PROCEDURE — 84450 TRANSFERASE (AST) (SGOT): CPT | Performed by: INTERNAL MEDICINE

## 2018-11-19 PROCEDURE — 85025 COMPLETE CBC W/AUTO DIFF WBC: CPT | Performed by: INTERNAL MEDICINE

## 2018-11-19 PROCEDURE — 82565 ASSAY OF CREATININE: CPT | Performed by: INTERNAL MEDICINE

## 2018-11-19 PROCEDURE — 86140 C-REACTIVE PROTEIN: CPT | Performed by: INTERNAL MEDICINE

## 2018-11-19 PROCEDURE — 85652 RBC SED RATE AUTOMATED: CPT | Performed by: INTERNAL MEDICINE

## 2018-11-19 PROCEDURE — 84520 ASSAY OF UREA NITROGEN: CPT | Performed by: INTERNAL MEDICINE

## 2018-11-20 ENCOUNTER — HOSPITAL ENCOUNTER (EMERGENCY)
Facility: CLINIC | Age: 48
Discharge: HOME | End: 2018-11-20
Attending: EMERGENCY MEDICINE | Admitting: EMERGENCY MEDICINE
Payer: COMMERCIAL

## 2018-11-20 VITALS
HEART RATE: 105 BPM | SYSTOLIC BLOOD PRESSURE: 162 MMHG | BODY MASS INDEX: 33.32 KG/M2 | RESPIRATION RATE: 16 BRPM | HEIGHT: 65 IN | OXYGEN SATURATION: 99 % | DIASTOLIC BLOOD PRESSURE: 95 MMHG | TEMPERATURE: 98.7 F | WEIGHT: 200 LBS

## 2018-11-20 DIAGNOSIS — Z48.89 ENCOUNTER FOR POSTOPERATIVE WOUND CHECK: ICD-10-CM

## 2018-11-20 LAB
ANION GAP SERPL CALCULATED.3IONS-SCNC: 7 MMOL/L (ref 3–14)
BASOPHILS # BLD AUTO: 0.1 10E9/L (ref 0–0.2)
BASOPHILS NFR BLD AUTO: 0.7 %
BUN SERPL-MCNC: 13 MG/DL (ref 7–30)
CALCIUM SERPL-MCNC: 8.5 MG/DL (ref 8.5–10.1)
CHLORIDE SERPL-SCNC: 110 MMOL/L (ref 94–109)
CO2 SERPL-SCNC: 22 MMOL/L (ref 20–32)
CREAT SERPL-MCNC: 0.64 MG/DL (ref 0.52–1.04)
CRP SERPL-MCNC: 7 MG/L (ref 0–8)
DIFFERENTIAL METHOD BLD: NORMAL
EOSINOPHIL # BLD AUTO: 0.2 10E9/L (ref 0–0.7)
EOSINOPHIL NFR BLD AUTO: 1.9 %
ERYTHROCYTE [DISTWIDTH] IN BLOOD BY AUTOMATED COUNT: 14.6 % (ref 10–15)
ERYTHROCYTE [SEDIMENTATION RATE] IN BLOOD BY WESTERGREN METHOD: 49 MM/H (ref 0–20)
GFR SERPL CREATININE-BSD FRML MDRD: >90 ML/MIN/1.7M2
GLUCOSE SERPL-MCNC: 125 MG/DL (ref 70–99)
HCT VFR BLD AUTO: 35.2 % (ref 35–47)
HGB BLD-MCNC: 12.1 G/DL (ref 11.7–15.7)
IMM GRANULOCYTES # BLD: 0 10E9/L (ref 0–0.4)
IMM GRANULOCYTES NFR BLD: 0.2 %
LYMPHOCYTES # BLD AUTO: 2.4 10E9/L (ref 0.8–5.3)
LYMPHOCYTES NFR BLD AUTO: 22.3 %
MCH RBC QN AUTO: 28.6 PG (ref 26.5–33)
MCHC RBC AUTO-ENTMCNC: 34.4 G/DL (ref 31.5–36.5)
MCV RBC AUTO: 83 FL (ref 78–100)
MONOCYTES # BLD AUTO: 0.6 10E9/L (ref 0–1.3)
MONOCYTES NFR BLD AUTO: 5.8 %
NEUTROPHILS # BLD AUTO: 7.4 10E9/L (ref 1.6–8.3)
NEUTROPHILS NFR BLD AUTO: 69.1 %
NRBC # BLD AUTO: 0 10*3/UL
NRBC BLD AUTO-RTO: 0 /100
PLATELET # BLD AUTO: 364 10E9/L (ref 150–450)
POTASSIUM SERPL-SCNC: 3.4 MMOL/L (ref 3.4–5.3)
RBC # BLD AUTO: 4.23 10E12/L (ref 3.8–5.2)
SODIUM SERPL-SCNC: 139 MMOL/L (ref 133–144)
WBC # BLD AUTO: 10.7 10E9/L (ref 4–11)

## 2018-11-20 PROCEDURE — 86140 C-REACTIVE PROTEIN: CPT | Performed by: EMERGENCY MEDICINE

## 2018-11-20 PROCEDURE — 99283 EMERGENCY DEPT VISIT LOW MDM: CPT

## 2018-11-20 PROCEDURE — 85025 COMPLETE CBC W/AUTO DIFF WBC: CPT | Performed by: EMERGENCY MEDICINE

## 2018-11-20 PROCEDURE — 87040 BLOOD CULTURE FOR BACTERIA: CPT | Performed by: EMERGENCY MEDICINE

## 2018-11-20 PROCEDURE — 36415 COLL VENOUS BLD VENIPUNCTURE: CPT

## 2018-11-20 PROCEDURE — 80048 BASIC METABOLIC PNL TOTAL CA: CPT | Performed by: EMERGENCY MEDICINE

## 2018-11-20 PROCEDURE — 25000128 H RX IP 250 OP 636: Performed by: EMERGENCY MEDICINE

## 2018-11-20 PROCEDURE — 85652 RBC SED RATE AUTOMATED: CPT | Performed by: EMERGENCY MEDICINE

## 2018-11-20 RX ADMIN — SODIUM CHLORIDE 1000 ML: 9 INJECTION, SOLUTION INTRAVENOUS at 20:04

## 2018-11-20 ASSESSMENT — ENCOUNTER SYMPTOMS
RHINORRHEA: 0
JOINT SWELLING: 1
DIARRHEA: 0
ARTHRALGIAS: 1
COUGH: 0
WOUND: 1
FEVER: 1

## 2018-11-20 NOTE — ED PROVIDER NOTES
History     Chief Complaint:  Wound Check    HPI   Shalonda Howard is a 48 year old female with a history of osteomyelitis who presents to the ED for a wound check. Ms. Howard had surgery in early September including bilateral bunion repairs.  The right side did well.  The left side unfortunately had some dehiscence of the surgical incision.  Shalonda was placed on PO antibiotics which did not help.  She ultimately presented to the emergency department in September with a worsening infection, was admitted to the orthopedic floor, and it was decided that she needed I&D of the left foot along with hardware removal.  On 09/26, Shalonda was taken back to the operating room and an I&D was performed including debridement of the skin, subcutaneous tissue, fascia and bone as well as 2 mini-frag screws hardware removal.  The incision was resutured and Shalonda was placed in a soft splint.  Approximately one month ago the patient was readmitted with concerns for osteomyelitis in her left foot, localized to her left great toe. This improved with IV antibiotics (she still in on outpt Ancef via PICC). Recently, she began to develop increasing pain and swelling again at the wound that radiates to the ankle. She additionally has been running fevers for the past week (highest 101.9). Today, she ultimately called Dr. Bocanegra who called Dr. Harman, and he referred her to the ED for evaluation. Here in the ED, she denies any other concerns or symptoms.     Allergies:  Sulfa drugs  Demerol  Erythromycin  Metformin  Codeine  Penicillins     Medications:    Amlactin  Ancef  Wellbutrin  Buspar  Clindamycin  Lasix  Gabapentin  Vistaril  Prilosec  Phentermine  Miralax  Topamax    Past Medical History:    Anxiety  Cellulitis  Abscess  Osteoporosis  Dysthymic disorder  Edema  GERD  Kidney stones  Myositis  Obesity  Opioid dependence  Congenital valgus deformity  Hernia  PCOS  Tibialis tendonitis  Peripheral neuropathy    Past Surgical History:   "  Appendectomy  Foot arthrodesis  Bunionectomy  Dilation and curettage  Toenail excision  Hernia repair  I&D  Tonsillectomy    Family History:    No past pertinent family history.    Social History:  Marital Status:   [2]  Former Smoker  Occasional alcohol use     Review of Systems   Constitutional: Positive for fever.   HENT: Negative for rhinorrhea.    Respiratory: Negative for cough.    Gastrointestinal: Negative for diarrhea.   Musculoskeletal: Positive for arthralgias and joint swelling.   Skin: Positive for wound.   All other systems reviewed and are negative.    Physical Exam     Patient Vitals for the past 24 hrs:   BP Temp Temp src Heart Rate Resp SpO2 Height Weight   11/20/18 1628 - - - - - 99 % - -   11/20/18 1627 146/89 - - - - - - -   11/20/18 1523 (!) 191/105 98.7  F (37.1  C) Oral 117 16 99 % 1.651 m (5' 5\") 90.7 kg (200 lb)     Physical Exam  General/Appearance: appears stated age, well-groomed, appears comfortable  Eyes: EOMI, no scleral injection, no icterus  ENT: MMM  Neck: supple, nl ROM, no stiffness  Cardiovascular: tachy but regular, nl S1S2, no m/r/g, 2+ pulses in all 4 extremities, cap refill <2sec  Respiratory: CTAB, good air movement throughout, no wheezes/rhonchi/rales, no increased WOB, no retractions  Back: no lesions  GI: abd soft, non-distended, nttp,  no HSM, no rebound, no guarding, nl BS  MSK: CARRERA, good tone, no bony abnormality  Skin: warm and well-perfused, open wound to heel and L MTP joint with slight erythema (1-2 cm) and ttp but no warmth surrounding R MTP joint, ulceration without surrounding erythema/warmth/ttp to distal L 2nd toe  Neuro: GCS 15, alert and oriented, no gross focal neuro deficits  Psych: interacts appropriately  Heme: no petechia, no purpura, no active bleeding    Emergency Department Course     Laboratory:  CBC: WBC: 10.7, HGB: 12.1, PLT: 364  BMP: Glucose 125 (H), Chloride 110 (H), o/w WNL (Creatinine: 0.64)    CRP: 7.0  ESR: 49 (H)    Blood " cultures (X2): pending    Emergency Department Course:  Nursing notes and vitals reviewed. (1632) I performed an exam of the patient as documented above.     IV inserted. Blood drawn. This was sent to the lab for further testing, results above.    (1903) I consulted with an Orthopedics PA regarding the patient's history and presentation here in the emergency department. She is going to try to get a hold of [facility] or one of the other Orthopedic attendings.     (1925) I consulted with an Orthopedics PA regarding the patient's history and presentation here in the emergency department. Plan will be for discharge home.    (1929) I rechecked the patient and discussed the results of her workup thus far.     Findings and plan explained to the Patient. Patient discharged home with instructions regarding supportive care, medications, and reasons to return. The importance of close follow-up was reviewed.     I personally reviewed the laboratory results with the Patient and answered all related questions prior to discharge.     Impression & Plan      Medical Decision Making:  Shalonda Howard is a 48 year old female with a complicated postoperative course status post bilateral bunionectomy with wound infection of the surgical site on the left foot who presents with concerns for repeat infection.  She is already getting daily Ancef through her PICC line.  She notes some slight increased redness and pain over her forefoot that made her concerned for worsening infection.  Her exam is fairly benign.  Her ESR is increasing but other inflammatory markers are improved.  Her white count is normal and she is afebrile here.  I touched base with the PA of her orthopedist who feels that, if her exam is reassuring, then she can be discharged.  I do feel her exam is reassuring and that she can follow-up as an outpatient.    Diagnosis:    ICD-10-CM    1. Encounter for postoperative wound check Z48.89      Disposition:  discharged to  home    Scribe Disclosure:  I, Lia Orion, am serving as a scribe on 11/20/2018 at 4:03 PM to personally document services performed by Nia Knowles* based on my observations and the provider's statements to me.     Lia Sears  11/20/2018    EMERGENCY DEPARTMENT       Nia Knowles MD  11/20/18 2000

## 2018-11-20 NOTE — PROGRESS NOTES
This is a recent snapshot of the patient's [facility] medical record.  For current drug dose and complete information and questions, call [phone] or In Basket pool, fv home infusion ([id number])  CSN Number:  [id number]

## 2018-11-20 NOTE — ED AVS SNAPSHOT
Emergency Department    [address] [facility] [address]    Phone:  [phone]    Fax:  [phone]                                       Shalonda Howard   MRN: [id number]    Department:   Emergency Department   Date of Visit:  11/20/2018           Patient Information     Date Of Birth          1970        Your diagnoses for this visit were:     Encounter for postoperative wound check        You were seen by Nia Knowles MD.      Follow-up Information     Schedule an appointment as soon as possible for a visit with Andre Harman MD.    Specialty:  Orthopaedic Surgery    Contact information:    [facility]  [address] [facility] [address] [phone]          Follow up with  Emergency Department.    Specialty:  EMERGENCY MEDICINE    Why:  As needed    Contact information:    [address] [facility] [address] [phone]      Discharge References/Attachments     POST OP WOUND CHECK, GENERAL (ENGLISH)    POST OP WOUND CHECK, PAIN (ENGLISH)      24 Hour Appointment Hotline       To make an appointment at any [facility], call [phone] ([phone]). If you don't have a family doctor or clinic, we will help you find one. [facility] are conveniently located to serve the needs of you and your family.             Review of your medicines      Our records show that you are taking the medicines listed below. If these are incorrect, please call your family doctor or clinic.        Dose / Directions Last dose taken    ammonium lactate 12 % cream   Commonly known as:  AMLACTIN        Apply topically daily as needed (to heels)   Refills:  0        BUSPAR PO   Dose:  15 mg        Take 15 mg by mouth 2 times daily   Refills:  0        ceFAZolin 1 GM vial   Commonly known as:  ANCEF   Dose:  2 g   Quantity:  180 g        Inject 2 g into the vein every 8 hours ESR,CRP,CBC with differential, creatinine, SGOT weekly while on this  medication to be faxed to Dr. Bocanegra office.   Refills:  1        ciprofloxacin 500 MG tablet   Commonly known as:  CIPRO   Dose:  500 mg   Quantity:  14 tablet        Take 1 tablet (500 mg) by mouth 2 times daily   Refills:  0        clindamycin 1 % solution   Commonly known as:  CLEOCIN T        Apply topically nightly as needed   Refills:  0        gabapentin 800 MG tablet   Commonly known as:  NEURONTIN   Dose:  800 mg        Take 800 mg by mouth 3 times daily Am, supper, hs   Refills:  0        K-DUR PO   Dose:  80 mEq        Take 80 mEq by mouth daily   Refills:  0        LASIX PO   Dose:  160 mg        Take 160 mg by mouth daily   Refills:  0        oxyCODONE-acetaminophen 5-325 MG per tablet   Commonly known as:  PERCOCET   Dose:  1-2 tablet   Quantity:  30 tablet        Take 1-2 tablets by mouth every 4 hours as needed for severe pain   Refills:  0        phentermine 30 MG capsule   Commonly known as:  ADIPEX-P   Dose:  30 mg        Take 30 mg by mouth every morning   Refills:  0        polyethylene glycol Packet   Commonly known as:  MIRALAX/GLYCOLAX   Dose:  17 g        Take 17 g by mouth as needed for constipation   Refills:  0        PRILOSEC PO   Dose:  40 mg        Take 40 mg by mouth 2 times daily (before meals) 2 CAPSULES IN A.M.   Refills:  0        TOPAMAX PO   Dose:  100 mg        Take 100 mg by mouth At Bedtime   Refills:  0        VISTARIL PO   Dose:  25 mg        Take 25 mg by mouth every 4 hours as needed   Refills:  0        WELLBUTRIN XL PO   Dose:  300 mg        Take 300 mg by mouth daily   Refills:  0        ZOFRAN PO   Dose:  8 mg        Take 8 mg by mouth as needed for nausea or vomiting   Refills:  0                Procedures and tests performed during your visit     Procedure/Test Number of Times Performed    Basic metabolic panel 1    Blood culture 2    CBC with platelets differential 1    CRP inflammation 1    Erythrocyte sedimentation rate auto 1      Orders Needing Specimen  Collection     None      Pending Results     Date and Time Order Name Status Description    11/20/2018 1637 Blood culture Preliminary     11/20/2018 1637 Blood culture Preliminary             Pending Culture Results     Date and Time Order Name Status Description    11/20/2018 1637 Blood culture Preliminary     11/20/2018 1637 Blood culture Preliminary             Pending Results Instructions     If you had any lab results that were not finalized at the time of your Discharge, you can call the ED Lab Result RN at [phone]. You will be contacted by this team for any positive Lab results or changes in treatment. The nurses are available 7 days a week from 10A to 6:30P.  You can leave a message 24 hours per day and they will return your call.        Test Results From Your Hospital Stay        11/20/2018  5:32 PM      Component Results     Component Value Ref Range & Units Status    Sed Rate 49 (H) 0 - 20 mm/h Final         11/20/2018  5:08 PM      Component Results     Component Value Ref Range & Units Status    CRP Inflammation 7.0 0.0 - 8.0 mg/L Final         11/20/2018  4:50 PM      Component Results     Component Value Ref Range & Units Status    WBC 10.7 4.0 - 11.0 10e9/L Final    RBC Count 4.23 3.8 - 5.2 10e12/L Final    Hemoglobin 12.1 11.7 - 15.7 g/dL Final    Hematocrit 35.2 35.0 - 47.0 % Final    MCV 83 78 - 100 fl Final    MCH 28.6 26.5 - 33.0 pg Final    MCHC 34.4 31.5 - 36.5 g/dL Final    RDW 14.6 10.0 - 15.0 % Final    Platelet Count 364 150 - 450 10e9/L Final    Diff Method Automated Method  Final    % Neutrophils 69.1 % Final    % Lymphocytes 22.3 % Final    % Monocytes 5.8 % Final    % Eosinophils 1.9 % Final    % Basophils 0.7 % Final    % Immature Granulocytes 0.2 % Final    Nucleated RBCs 0 0 /100 Final    Absolute Neutrophil 7.4 1.6 - 8.3 10e9/L Final    Absolute Lymphocytes 2.4 0.8 - 5.3 10e9/L Final    Absolute Monocytes 0.6 0.0 - 1.3 10e9/L Final    Absolute Eosinophils 0.2 0.0 - 0.7 10e9/L  Final    Absolute Basophils 0.1 0.0 - 0.2 10e9/L Final    Abs Immature Granulocytes 0.0 0 - 0.4 10e9/L Final    Absolute Nucleated RBC 0.0  Final         11/20/2018  7:44 PM      Component Results     Component    Specimen Description    Blood PICC    Special Requests    Aerobic and anaerobic bottles received    Culture Micro    No growth after 2 hours         11/20/2018  7:44 PM      Component Results     Component    Specimen Description    Blood Left Arm    Special Requests    Aerobic and anaerobic bottles received    Culture Micro    No growth after 2 hours         11/20/2018  5:08 PM      Component Results     Component Value Ref Range & Units Status    Sodium 139 133 - 144 mmol/L Final    Potassium 3.4 3.4 - 5.3 mmol/L Final    Chloride 110 (H) 94 - 109 mmol/L Final    Carbon Dioxide 22 20 - 32 mmol/L Final    Anion Gap 7 3 - 14 mmol/L Final    Glucose 125 (H) 70 - 99 mg/dL Final    Urea Nitrogen 13 7 - 30 mg/dL Final    Creatinine 0.64 0.52 - 1.04 mg/dL Final    GFR Estimate >90 >60 mL/min/1.7m2 Final    Non  GFR Calc    GFR Estimate If Black >90 >60 mL/min/1.7m2 Final    African American GFR Calc    Calcium 8.5 8.5 - 10.1 mg/dL Final                Clinical Quality Measure: Blood Pressure Screening     Your blood pressure was checked while you were in the emergency department today. The last reading we obtained was  BP: 146/89 . Please read the guidelines below about what these numbers mean and what you should do about them.  If your systolic blood pressure (the top number) is less than 120 and your diastolic blood pressure (the bottom number) is less than 80, then your blood pressure is normal. There is nothing more that you need to do about it.  If your systolic blood pressure (the top number) is 120-139 or your diastolic blood pressure (the bottom number) is 80-89, your blood pressure may be higher than it should be. You should have your blood pressure rechecked within a year by a primary  care provider.  If your systolic blood pressure (the top number) is 140 or greater or your diastolic blood pressure (the bottom number) is 90 or greater, you may have high blood pressure. High blood pressure is treatable, but if left untreated over time it can put you at risk for heart attack, stroke, or kidney failure. You should have your blood pressure rechecked by a primary care provider within the next 4 weeks.  If your provider in the emergency department today gave you specific instructions to follow-up with your doctor or provider even sooner than that, you should follow that instruction and not wait for up to 4 weeks for your follow-up visit.        Thank you for choosing [facility]       Thank you for choosing [facility] for your care. Our goal is always to provide you with excellent care. Hearing back from our patients is one way we can continue to improve our services. Please take a few minutes to complete the written survey that you may receive in the mail after you visit with us. Thank you!        [facility] Information     [facility] gives you secure access to your electronic health record. If you see a primary care provider, you can also send messages to your care team and make appointments. If you have questions, please call your primary care clinic.  If you do not have a primary care provider, please call [phone] and they will assist you.        Care EveryWhere ID     This is your Care EveryWhere ID. This could be used by other organizations to access your [facility] medical records  [id number]        Equal Access to Services     HERMAN EDWADRS : Hadii benito Goyal, wanancyda clementine, qaybta davidaloscar whipple. So [facility] [phone].    ATENCIÓN: Si habla español, tiene a pascual disposición servicios gratuitos de asistencia lingüística. Llame al [phone].    We comply with applicable federal civil rights laws and [place] laws. We do not discriminate on  the basis of race, color, national origin, age, disability, sex, sexual orientation, or gender identity.            After Visit Summary       This is your record. Keep this with you and show to your community pharmacist(s) and doctor(s) at your next visit.

## 2018-11-20 NOTE — ED AVS SNAPSHOT
Emergency Department    [address] [place] [address]    Phone:  [phone]    Fax:  [phone]                                       Shalonda Howard   MRN: [id number]    Department:   Emergency Department   Date of Visit:  11/20/2018           After Visit Summary Signature Page     I have received my discharge instructions, and my questions have been answered. I have discussed any challenges I see with this plan with the nurse or doctor.    ..........................................................................................................................................  Patient/Patient Representative Signature      ..........................................................................................................................................  Patient Representative Print Name and Relationship to Patient    ..................................................               ................................................  Date                                   Time    ..........................................................................................................................................  Reviewed by Signature/Title    ...................................................              ..............................................  Date                                               Time          22EPIC Rev 08/18

## 2018-11-24 ENCOUNTER — HOSPITAL ENCOUNTER (INPATIENT)
Facility: CLINIC | Age: 48
LOS: 3 days | Discharge: HOME HEALTH | DRG: 908 | End: 2018-11-27
Attending: ORTHOPAEDIC SURGERY | Admitting: ORTHOPAEDIC SURGERY
Payer: COMMERCIAL

## 2018-11-24 DIAGNOSIS — B99.9 INFECTION: ICD-10-CM

## 2018-11-24 LAB
ANION GAP SERPL CALCULATED.3IONS-SCNC: 6 MMOL/L (ref 3–14)
BUN SERPL-MCNC: 12 MG/DL (ref 7–30)
CALCIUM SERPL-MCNC: 8.5 MG/DL (ref 8.5–10.1)
CHLORIDE SERPL-SCNC: 112 MMOL/L (ref 94–109)
CO2 SERPL-SCNC: 22 MMOL/L (ref 20–32)
CREAT SERPL-MCNC: 0.61 MG/DL (ref 0.52–1.04)
CRP SERPL-MCNC: 13.4 MG/L (ref 0–8)
ERYTHROCYTE [DISTWIDTH] IN BLOOD BY AUTOMATED COUNT: 14.6 % (ref 10–15)
ERYTHROCYTE [SEDIMENTATION RATE] IN BLOOD BY WESTERGREN METHOD: 28 MM/H (ref 0–20)
GFR SERPL CREATININE-BSD FRML MDRD: >90 ML/MIN/1.7M2
GLUCOSE SERPL-MCNC: 86 MG/DL (ref 70–99)
HCT VFR BLD AUTO: 33.3 % (ref 35–47)
HGB BLD-MCNC: 11.4 G/DL (ref 11.7–15.7)
MCH RBC QN AUTO: 28.4 PG (ref 26.5–33)
MCHC RBC AUTO-ENTMCNC: 34.2 G/DL (ref 31.5–36.5)
MCV RBC AUTO: 83 FL (ref 78–100)
PLATELET # BLD AUTO: 395 10E9/L (ref 150–450)
POTASSIUM SERPL-SCNC: 3.5 MMOL/L (ref 3.4–5.3)
RBC # BLD AUTO: 4.01 10E12/L (ref 3.8–5.2)
SODIUM SERPL-SCNC: 140 MMOL/L (ref 133–144)
WBC # BLD AUTO: 10.5 10E9/L (ref 4–11)

## 2018-11-24 PROCEDURE — 12000007 ZZH R&B INTERMEDIATE

## 2018-11-24 PROCEDURE — 25000132 ZZH RX MED GY IP 250 OP 250 PS 637: Performed by: PHYSICIAN ASSISTANT

## 2018-11-24 PROCEDURE — 99222 1ST HOSP IP/OBS MODERATE 55: CPT | Performed by: PHYSICIAN ASSISTANT

## 2018-11-24 PROCEDURE — 85652 RBC SED RATE AUTOMATED: CPT | Performed by: PHYSICIAN ASSISTANT

## 2018-11-24 PROCEDURE — 25000128 H RX IP 250 OP 636: Performed by: PHYSICIAN ASSISTANT

## 2018-11-24 PROCEDURE — 87040 BLOOD CULTURE FOR BACTERIA: CPT | Performed by: PHYSICIAN ASSISTANT

## 2018-11-24 PROCEDURE — 36415 COLL VENOUS BLD VENIPUNCTURE: CPT | Performed by: PHYSICIAN ASSISTANT

## 2018-11-24 PROCEDURE — 80048 BASIC METABOLIC PNL TOTAL CA: CPT | Performed by: PHYSICIAN ASSISTANT

## 2018-11-24 PROCEDURE — 86140 C-REACTIVE PROTEIN: CPT | Performed by: PHYSICIAN ASSISTANT

## 2018-11-24 PROCEDURE — 99207 ZZC CONSULT E&M CHANGED TO INITIAL LEVEL: CPT | Performed by: PHYSICIAN ASSISTANT

## 2018-11-24 PROCEDURE — 85027 COMPLETE CBC AUTOMATED: CPT | Performed by: PHYSICIAN ASSISTANT

## 2018-11-24 RX ORDER — HYDROXYZINE PAMOATE 25 MG/1
25 CAPSULE ORAL EVERY 4 HOURS PRN
Status: DISCONTINUED | OUTPATIENT
Start: 2018-11-24 | End: 2018-11-27 | Stop reason: HOSPADM

## 2018-11-24 RX ORDER — GABAPENTIN 800 MG/1
800 TABLET ORAL 3 TIMES DAILY
Status: DISCONTINUED | OUTPATIENT
Start: 2018-11-24 | End: 2018-11-27 | Stop reason: HOSPADM

## 2018-11-24 RX ORDER — SODIUM CHLORIDE, SODIUM LACTATE, POTASSIUM CHLORIDE, CALCIUM CHLORIDE 600; 310; 30; 20 MG/100ML; MG/100ML; MG/100ML; MG/100ML
INJECTION, SOLUTION INTRAVENOUS CONTINUOUS
Status: DISCONTINUED | OUTPATIENT
Start: 2018-11-24 | End: 2018-11-26

## 2018-11-24 RX ORDER — BUSPIRONE HYDROCHLORIDE 7.5 MG/1
15 TABLET ORAL 2 TIMES DAILY
Status: DISCONTINUED | OUTPATIENT
Start: 2018-11-24 | End: 2018-11-27 | Stop reason: HOSPADM

## 2018-11-24 RX ORDER — HYDROMORPHONE HYDROCHLORIDE 1 MG/ML
.3-.5 INJECTION, SOLUTION INTRAMUSCULAR; INTRAVENOUS; SUBCUTANEOUS
Status: DISCONTINUED | OUTPATIENT
Start: 2018-11-24 | End: 2018-11-27 | Stop reason: HOSPADM

## 2018-11-24 RX ORDER — NALOXONE HYDROCHLORIDE 0.4 MG/ML
.1-.4 INJECTION, SOLUTION INTRAMUSCULAR; INTRAVENOUS; SUBCUTANEOUS
Status: DISCONTINUED | OUTPATIENT
Start: 2018-11-24 | End: 2018-11-26

## 2018-11-24 RX ORDER — CEFAZOLIN SODIUM 1 G/3ML
1 INJECTION, POWDER, FOR SOLUTION INTRAMUSCULAR; INTRAVENOUS EVERY 8 HOURS
Status: DISCONTINUED | OUTPATIENT
Start: 2018-11-24 | End: 2018-11-27 | Stop reason: HOSPADM

## 2018-11-24 RX ORDER — OXYCODONE HYDROCHLORIDE 5 MG/1
5-10 TABLET ORAL
Status: DISCONTINUED | OUTPATIENT
Start: 2018-11-24 | End: 2018-11-25

## 2018-11-24 RX ORDER — TOPIRAMATE 100 MG/1
100 TABLET, FILM COATED ORAL AT BEDTIME
Status: DISCONTINUED | OUTPATIENT
Start: 2018-11-24 | End: 2018-11-27 | Stop reason: HOSPADM

## 2018-11-24 RX ORDER — BUPROPION HYDROCHLORIDE 300 MG/1
300 TABLET ORAL DAILY
Status: DISCONTINUED | OUTPATIENT
Start: 2018-11-25 | End: 2018-11-27 | Stop reason: HOSPADM

## 2018-11-24 RX ADMIN — TOPIRAMATE 100 MG: 100 TABLET, FILM COATED ORAL at 21:04

## 2018-11-24 RX ADMIN — OXYCODONE HYDROCHLORIDE 5 MG: 5 TABLET ORAL at 21:04

## 2018-11-24 RX ADMIN — GABAPENTIN 800 MG: 800 TABLET, FILM COATED ORAL at 21:04

## 2018-11-24 RX ADMIN — OMEPRAZOLE 40 MG: 20 CAPSULE, DELAYED RELEASE ORAL at 20:04

## 2018-11-24 RX ADMIN — BUSPIRONE HYDROCHLORIDE 15 MG: 7.5 TABLET ORAL at 21:04

## 2018-11-24 RX ADMIN — SODIUM CHLORIDE, POTASSIUM CHLORIDE, SODIUM LACTATE AND CALCIUM CHLORIDE: 600; 310; 30; 20 INJECTION, SOLUTION INTRAVENOUS at 17:52

## 2018-11-24 RX ADMIN — OXYCODONE HYDROCHLORIDE 5 MG: 5 TABLET ORAL at 17:51

## 2018-11-24 RX ADMIN — CEFAZOLIN SODIUM 1 G: 1 INJECTION, POWDER, FOR SOLUTION INTRAMUSCULAR; INTRAVENOUS at 20:06

## 2018-11-24 ASSESSMENT — ACTIVITIES OF DAILY LIVING (ADL): ADLS_ACUITY_SCORE: 10

## 2018-11-24 NOTE — IP AVS SNAPSHOT
[address] [facility]    [address] [place] [address]    Phone:  [phone]                                       After Visit Summary   11/24/2018    Shalonda Howard    MRN: [id number]           After Visit Summary Signature Page     I have received my discharge instructions, and my questions have been answered. I have discussed any challenges I see with this plan with the nurse or doctor.    ..........................................................................................................................................  Patient/Patient Representative Signature      ..........................................................................................................................................  Patient Representative Print Name and Relationship to Patient    ..................................................               ................................................  Date                                   Time    ..........................................................................................................................................  Reviewed by Signature/Title    ...................................................              ..............................................  Date                                               Time          22EPIC Rev 08/18

## 2018-11-24 NOTE — IP AVS SNAPSHOT
"[place] [address] [facility]: [phone]                                              INTERAGENCY TRANSFER FORM - PHYSICIAN ORDERS   2018                    Hospital Admission Date: 2018  CASSANDRA FRANCO   : 1970  Sex: Female        Attending Provider: Andre Harman MD     Allergies:  Sulfa Drugs, Demerol [Meperidine], Erythromycin, Metformin, Codeine, Penicillins    Infection:  None   Service:  ORTHOPEDICS    Ht:  1.651 m (5' 5\")   Wt:  90.7 kg (200 lb)   Admission Wt:  93 kg (205 lb)    BMI:  33.28 kg/m 2   BSA:  2.04 m 2            Patient PCP Information     Provider PCP Type    Linda Bernstein PA-C General      ED Clinical Impression     Diagnosis Description Comment Added By Time Added    Infection [B99.9] Infection [B99.9]  Jett Malik PA-C 2018  6:23 AM      Hospital Problems as of 2018              Priority Class Noted POA    Infection Medium  2018 Yes      Non-Hospital Problems as of 2018     None      Code Status History     Date Active Date Inactive Code Status Order ID Comments User Context    2018  4:52 PM 2018  1:45 PM Full Code [id number]  Jett Malik PA-C Inpatient    10/15/2018  8:44 PM 10/18/2018  9:09 PM Full Code [id number]  Jett Malik PA-C Inpatient    2018  7:35 PM 2018  9:28 PM Full Code [id number]  Jett Malik PA-C Inpatient         Medication Review      START taking        Dose / Directions Comments    senna-docusate 8.6-50 MG tablet   Commonly known as:  SENOKOT-S/PERICOLACE   Used for:  Infection        Dose:  1 tablet   Take 1 tablet by mouth 2 times daily   Quantity:  40 tablet   Refills:  1          CONTINUE these medications which have NOT CHANGED        Dose / Directions Comments    ammonium lactate 12 % cream   Commonly known as:  AMLACTIN        Apply topically daily as needed (to heels)   Refills:  0        BUSPAR PO        Dose:  15 mg   Take 15 mg by mouth " 2 times daily   Refills:  0        ceFAZolin 1 GM vial   Commonly known as:  ANCEF   Used for:  Chronic osteomyelitis (H)        Dose:  2 g   Inject 2 g into the vein every 8 hours ESR,CRP,CBC with differential, creatinine, SGOT weekly while on this medication to be faxed to Dr. Bocanegra office.   Quantity:  180 g   Refills:  1        clindamycin 1 % solution   Commonly known as:  CLEOCIN T        Apply topically nightly as needed   Refills:  0        gabapentin 800 MG tablet   Commonly known as:  NEURONTIN        Dose:  800 mg   Take 800 mg by mouth 3 times daily Am, supper, hs   Refills:  0        hydrOXYzine 25 MG capsule   Commonly known as:  VISTARIL   Used for:  Infection        Dose:  25 mg   Take 1 capsule (25 mg) by mouth every 4 hours as needed for anxiety   Quantity:  40 capsule   Refills:  0        K-DUR PO        Dose:  80 mEq   Take 80 mEq by mouth daily   Refills:  0        LASIX PO        Dose:  160 mg   Take 160 mg by mouth daily   Refills:  0        oxyCODONE-acetaminophen 5-325 MG tablet   Commonly known as:  PERCOCET   Used for:  Infection        Dose:  1-2 tablet   Take 1-2 tablets by mouth every 4 hours as needed for severe pain   Quantity:  40 tablet   Refills:  0        phentermine 30 MG capsule   Commonly known as:  ADIPEX-P        Dose:  30 mg   Take 30 mg by mouth every morning   Refills:  0        polyethylene glycol packet   Commonly known as:  MIRALAX/GLYCOLAX        Dose:  17 g   Take 17 g by mouth as needed for constipation   Refills:  0        PRILOSEC PO        Dose:  40 mg   Take 40 mg by mouth 2 times daily (before meals) 2 CAPSULES IN A.M.   Refills:  0        TOPAMAX PO        Dose:  100 mg   Take 100 mg by mouth At Bedtime   Refills:  0        WELLBUTRIN XL PO        Dose:  300 mg   Take 300 mg by mouth daily   Refills:  0        ZOFRAN PO        Dose:  8 mg   Take 8 mg by mouth as needed for nausea or vomiting   Refills:  0                  Further instructions from your care  team       Your doctor has ordered IV antibiotics after your hospital stay.  This service will be provided by [facility]. They will contact you regarding your first visit.   If you have any questions about this service, please call them at [phone].      Summary of Visit     Reason for your hospital stay       L foot infection             After Care     Activity       Your activity upon discharge: ambulate in house.  Maintain partial/NWB on scooter as much as possible.       Diet       Follow this diet upon discharge: Orders Placed This Encounter      Advance Diet as Tolerated: Regular Diet Adult       Wound care and dressings       Instructions to care for your wound at home: keep wound clean and dry. Leave dressings in place until wound care followup.             Follow-Up Appointment Instructions     Future Labs/Procedures    Follow-up and recommended labs and tests      Comments:    Follow up with Dr. Harman , at (location with clinic name or city) [facility] , within 2 weeks  to evaluate after surgery. No follow up labs or test are needed.      Follow-Up Appointment Instructions     Follow-up and recommended labs and tests        Follow up with Dr. Harman , at (location with clinic name or city) [facility] , within 2 weeks  to evaluate after surgery. No follow up labs or test are needed.             Statement of Approval     Ordered          11/27/18 0628  I have reviewed and agree with all the recommendations and orders detailed in this document.  EFFECTIVE NOW     Approved and electronically signed by:  Jett Malik PA-C

## 2018-11-24 NOTE — IP AVS SNAPSHOT
` [facility]: [phone]            Medication Administration Report for Shalonda Howard as of 11/27/18 1142   Legend:    Given Hold Not Given Due Canceled Entry Other Actions    Time Time (Time) Time  Time-Action       Inactive    Active    Linked        Medications 11/21/18 11/22/18 11/23/18 11/24/18 11/25/18 11/26/18 11/27/18    benzocaine-menthol (CHLORASEPTIC) 6-10 MG lozenge 1 lozenge  Dose: 1 lozenge  Freq: EVERY 1 HOUR PRN Route: BU  PRN Reason: sore throat  Start: 11/26/18 1345   Admin Instructions: For sore throat without fever.    Admin. Amount: 1 lozenge  Dispense Loc:  SUYAPA LujanC  POC: Post-procedure               buPROPion (WELLBUTRIN XL) 24 hr tablet 300 mg  Dose: 300 mg  Freq: DAILY Route: PO  Start: 11/25/18 0900   Admin. Amount: 1 tablet (1 × 300 mg tablet)  Last Admin: 11/27/18 0821  Dispense Loc:  SUYAPA Lujan         0803 (300 mg)-Given        0813 (300 mg)-Given       1029-Auto Hold       1342-Unhold        0821 (300 mg)-Given           busPIRone (BUSPAR) tablet 15 mg  Dose: 15 mg  Freq: 2 TIMES DAILY Route: PO  Start: 11/24/18 2100   Admin. Amount: 2 tablet (2 × 7.5 mg tablet)  Last Admin: 11/27/18 0907  Dispense Loc:  SUYAPA Lujan        2104 (15 mg)-Given        0804 (15 mg)-Given       2036 (15 mg)-Given        0813 (15 mg)-Given       1029-Auto Hold       1342-Unhold       2112 (15 mg)-Given        0907 (15 mg)-Given       [ ] 2100           ceFAZolin (ANCEF) 1 g vial to attach to  ml bag for ADULT or 50 ml bag for PEDS  Dose: 1 g  Freq: EVERY 8 HOURS Route: IV  Indications of Use: SKIN AND SOFT TISSUE INFECTION  Start: 11/24/18 2000   Admin. Amount: 1 g  Last Admin: 11/27/18 0323  Dispense Loc:  SUYAPA Lujan  Infused Over: 30 Minutes   Current Line: PICC Single Lumen 10/18/18 Right Basilic       2006 (1 g)-New Bag        0352 (1 g)-New Bag       1117 (1 g)-New Bag       1924 (1 g)-New Bag        0327 (1 g)-New Bag       1029-Auto Hold       1200 Auto  Hold-Automatically Held       1342-Unhold       1917 (1 g)-New Bag        0323 (1 g)-New Bag       [ ] 1200       [ ] 2000           Contraindications to both pharmacological and mechanical prophylaxis (must document contraindications for both in this order)  Freq: CONTINUOUS PRN Route: XX  Start: 11/24/18 1647   Admin Instructions: Contraindications to both pharmacological and mechanical prophylaxis (must document contraindications for both in this order)    Planned surgery Monday AM.  Unable to tolerate mechanical compression secondary to non-healing wound.    Order specific questions:  Contraindication to pharmacological prophylaxis Other Comment (required to enter a comment for reason)  Contraindication to mechanical prophylaxis Other (please detail in the administration instructions)     Dispense Loc:  Main Pharmacy               diazepam (VALIUM) tablet 5 mg  Dose: 5 mg  Freq: EVERY 6 HOURS PRN Route: PO  PRN Reason: other  PRN Comment: muscle spasms  Start: 11/26/18 1345   Admin Instructions: Caution to be used when administering multiple CNS depressing medications within a short time frame.    Admin. Amount: 1 tablet (1 × 5 mg tablet)  Dispense Loc: [facility] 55C  POC: Post-procedure               enoxaparin (LOVENOX) injection 40 mg  Dose: 40 mg  Freq: EVERY 24 HOURS Route: SC  Start: 11/27/18 1200   Admin Instructions: Check to make sure start date/time is 12-24 hours post op unless documented complication, AND no sooner than 22 hours post op if spinal anesthesia used.   Continue until discharge to home. HOLD if platelet count falls below 50% of baseline or less than 100,000/ L and notify provider.    Admin. Amount: 40 mg = 0.4 mL Conc: 40 mg/0.4 mL  Dispense Loc:  [facility]C  Volume: 0.4 mL  POC: Post-procedure           [ ] 1200           gabapentin (NEURONTIN) tablet 800 mg  Dose: 800 mg  Freq: 3 TIMES DAILY Route: PO  Start: 11/24/18 2200   Admin. Amount: 1 tablet (1 × 800 mg tablet)  Last Admin:  "11/27/18 0820  Dispense Loc: [address]        2104 (800 mg)-Given        0803 (800 mg)-Given       1548 (800 mg)-Given       2035 (800 mg)-Given [C]               0813 (800 mg)-Given       1029-Auto Hold       1342-Unhold       1606 (800 mg)-Given       2112 (800 mg)-Given        0820 (800 mg)-Given       [ ] 1600       [ ] 2200           HYDROmorphone (PF) (DILAUDID) injection 0.3-0.5 mg  Dose: 0.3-0.5 mg  Freq: EVERY 1 HOUR PRN Route: IV  PRN Reasons: severe pain,other  PRN Comment: pain control or improvement in physical function. Hold dose for analgesic side effects.  Start: 11/24/18 1651   Admin Instructions: Notify provider to assess for uncontrolled pain or analgesic side effects. Hold while on PCA or with regular IV opioid dosing.  For ordered IV doses 0.1-4 mg give IV Push undiluted. Administer each 2mg over 2-5 minutes.    Admin. Amount: 0.3-0.5 mg  Dispense Loc: [address]          1029-Auto Hold       1342-Unhold            hydrOXYzine (VISTARIL) capsule 25 mg  Dose: 25 mg  Freq: EVERY 4 HOURS PRN Route: PO  PRN Reason: anxiety  Start: 11/24/18 1814   Admin. Amount: 1 capsule (1 × 25 mg capsule)  Last Admin: 11/27/18 0820  Dispense Loc: [address]          1029-Auto Hold       1342-Unhold        0820 (25 mg)-Given           lactated ringers infusion  Rate: 100 mL/hr   Freq: CONTINUOUS Route: IV  Start: 11/26/18 1400   Admin Instructions: Change to saline lock when PO well tolerated.    Last Admin: 11/26/18 1918  Dispense Loc: [facility] Floor Stock  Volume: 1,000 mL  POC: Post-procedure   Current Line: PICC Single Lumen 10/18/18 Right Basilic         1358 ( )-New Bag       1918 ( )-New Bag            lidocaine (LMX4) cream  Freq: EVERY 1 HOUR PRN Route: Top  PRN Reason: pain  PRN Comment: with VAD insertion or accessing implanted port.  Start: 11/26/18 1345   Admin Instructions: Do NOT give if patient has a history of allergy to any local anesthetic or any \"aide\" product.   Apply 30 minutes prior to VAD " "insertion or port access.  MAX Dose:  2.5 g (  of 5 g tube)    Dispense Loc: Contact Rx for dose  POC: Post-procedure               lidocaine 1 % 1 mL  Dose: 1 mL  Freq: EVERY 1 HOUR PRN Route: OTHER  PRN Comment: mild pain with VAD insertion or accessing implanted port  Start: 11/26/18 1345   Admin Instructions: Do NOT give if patient has a history of allergy to any local anesthetic or any \"aide\" product. MAX dose 1 mL subcutaneous OR intradermal in divided doses.    Admin. Amount: 1 mL  Dispense Loc: [address]  Volume: 20 mL  POC: Post-procedure               melatonin tablet 1 mg  Dose: 1 mg  Freq: AT BEDTIME PRN Route: PO  PRN Reason: sleep  Start: 11/24/18 1642   Admin Instructions: Do not give unless at least 6 hours of uninterrupted sleep is expected.    Admin. Amount: 1 tablet (1 × 1 mg tablet)  Dispense Loc: [address]          1029-Auto Hold       1342-Unhold            naloxone (NARCAN) injection 0.1-0.4 mg  Dose: 0.1-0.4 mg  Freq: EVERY 2 MIN PRN Route: IV  PRN Reason: opioid reversal  Start: 11/26/18 1345   Admin Instructions: For respiratory rate LESS than or EQUAL to 8.  Partial reversal dose:  0.1 mg titrated q 2 minutes for Analgesia Side Effects Monitoring Sedation Level of 3 (frequently drowsy, arousable, drifts to sleep during conversation).Full reversal dose:  0.4 mg bolus for Analgesia Side Effects Monitoring Sedation Level of 4 (somnolent, minimal or no response to stimulation).  For ordered IV doses 0.1-2mg give IVP. Give each 0.4mg over 15 seconds in emergency situations. For non-emergent situations further dilute in 9mL of NS to facilitate titration of response.    Admin. Amount: 0.1-0.4 mg = 0.25-1 mL Conc: 0.4 mg/mL  Dispense Loc: [address]  Volume: 1 mL  POC: Post-procedure               omeprazole (priLOSEC) CR capsule 40 mg  Dose: 40 mg  Freq: 2 TIMES DAILY BEFORE MEALS Route: PO  Start: 11/24/18 1830   Admin. Amount: 2 capsule (2 × 20 mg capsule)  Last Admin: 11/27/18 " 0625  Dispense Loc: [facility] 55C         (40 mg)-Given        0803 (40 mg)-Given       1547 (40 mg)-Given        (0640)-Not Given [C]       1029-Auto Hold       1342-Unhold       1606 (40 mg)-Given        0625 (40 mg)-Given              [ ] 1630           ondansetron (ZOFRAN-ODT) ODT tab 4 mg  Dose: 4 mg  Freq: EVERY 30 MIN PRN Route: PO  PRN Reason: nausea  Start: 18   Admin Instructions: MAX total dose = 8 mg, including OR dosing. If not resolved in 15 minutes, then go to step 2 [prochlorperazine (COMPAZINE), if ordered].  With dry hands, peel back foil backing and gently remove tablet; do not push oral disintegrating tablet through foil backing; administer immediately on tongue and oral disintegrating tablet dissolves in seconds; then swallow with saliva; liquid not required.    Admin. Amount: 1 tablet (1 × 4 mg tablet)  Dispense Loc: [facility] 55C  Administrations Remainin              Or  ondansetron (ZOFRAN) injection 4 mg  Dose: 4 mg  Freq: EVERY 30 MIN PRN Route: IV  PRN Reason: nausea  Start: 18   Admin Instructions: MAX total dose = 8 mg, including OR dosing. If not resolved in 15 minutes, then go to step 2 [prochlorperazine (COMPAZINE), if ordered].  Irritant. For ordered IV doses 0.1-4 mg, give IV Push undiluted over 2-5 minutes.    Admin. Amount: 4 mg = 2 mL Conc: 4 mg/2 mL  Dispense Loc: [facility] 55C  Infused Over: 2-5 Minutes  Administrations Remainin  Volume: 2 mL               oxyCODONE (ROXICODONE) tablet 10 mg  Dose: 10 mg  Freq: EVERY 3 HOURS PRN Route: PO  PRN Reason: other  PRN Comment: pain control or improvement in physical function. Hold dose for analgesic side effects.  Start: 18 1421   Admin Instructions: Start with the lowest dose.  May adjust dose by 5 mg every 3 hours as needed. Notify provider to assess for uncontrolled pain or analgesic side effects. Hold while on PCA or with regular IV opioid dosing.    Admin. Amount: 2 tablet (2 × 5 mg tablet)  Last  Admin: 18 0950  Dispense Loc:  [facility]         1424 (10 mg)-Given       1731 (10 mg)-Given       2036 (10 mg)-Given       2358 (10 mg)-Given        0326 (10 mg)-Given       0650 (10 mg)-Given       0948 (10 mg)-Given       1029-Auto Hold       1342-Unhold       1605 (10 mg)-Given       1916 (10 mg)-Given       2330 (10 mg)-Given        0325 (10 mg)-Given       0625 (10 mg)-Given       0950 (10 mg)-Given           prochlorperazine (COMPAZINE) injection 10 mg  Dose: 10 mg  Freq: EVERY 6 HOURS PRN Route: IV  PRN Reasons: nausea,vomiting  Start: 18 1218   Admin Instructions: This is Step 2 of the nausea and vomiting protocol.   If nausea not resolved in 15 minutes, give metoclopramide (REGLAN) if ordered (step 3 of nausea and vomiting protocol)  For ordered IV doses 0.1-10 mg, give IV Push undiluted. Each 5mg over 1 minute.    Admin. Amount: 10 mg = 2 mL Conc: 5 mg/mL  Dispense Loc: [facility] 55C  Infused Over: 1-2 Minutes  Volume: 2 mL               scopolamine (TRANSDERM-SCOP) Patch in Place  Freq: EVERY 8 HOURS Route: TD  Start: 18 1230   End: 18 1229   Admin Instructions: Chart every shift, confirming that patch is still in place on patient (no barcode scan needed). See patch order for dose information.    Last Admin: 18  Dispense Loc:  Main Pharmacy  Administrations Remainin                  ( )-Patch Removed [C]        033 ( )-Given       1229-Med Discontinued       senna-docusate (SENOKOT-S;PERICOLACE) 8.6-50 MG per tablet 1 tablet  Dose: 1 tablet  Freq: 2 TIMES DAILY Route: PO  Start: 18   Admin. Amount: 1 tablet  Last Admin: 18 08  Dispense Loc:  [facility]          (1 tablet)-Given        (0814)-Not Given       1029-Auto Hold       1342-Unhold        (1 tablet)-Given        08 (1 tablet)-Given       [ ] 2100           sodium chloride (PF) 0.9% PF flush 3 mL  Dose: 3 mL  Freq: EVERY 8 HOURS Route: IK  Start: 18 1400   Admin  Instructions: And Q1H PRN, to lock peripheral IV dormant line.    Admin. Amount: 3 mL  Last Admin: 18 0625  Dispense Loc: [facility] Floor Stock  Volume: 3 mL  POC: Post-procedure   Current Line: PICC Single Lumen 10/18/18 Right Basilic         (1402)-Not Given       ()-Not Given        0625 (3 mL)-Given       [ ] 1400       [ ] 2200           sodium chloride (PF) 0.9% PF flush 3 mL  Dose: 3 mL  Freq: EVERY 1 HOUR PRN Route: IK  PRN Reason: line flush  PRN Comment: for peripheral IV flush post IV meds  Start: 18 1345   Admin. Amount: 3 mL  Dispense Loc: [facility] Stock  Volume: 3 mL  POC: Post-procedure               topiramate (TOPAMAX) tablet 100 mg  Dose: 100 mg  Freq: AT BEDTIME Route: PO  Start: 180   Admin. Amount: 1 tablet (1 × 100 mg tablet)  Last Admin: 18  Dispense Loc:  [facility]         (100 mg)-Given         (100 mg)-Given [C]               1029-Auto Hold       1342-Unhold       2113 (100 mg)-Given        [ ] 2200          Future Medications  Medications 18       scopolamine (TRANSDERM-SCOP) patch REMOVAL  Freq: ONCE Route: TD  Start: 18 1600   Admin Instructions: Remove at 16:00 on P0D # 1.  Remove patch 24 hours after it was placed.    Dispense Loc:  Main Pharmacy  Administrations Remainin           [ ] 1600          Completed Medications  Medications 18         Dose: 2 g  Freq: PRE-OP/PRE-PROCEDURE Route: IV  Indications of Use: PERIOPERATIVE PHARMACOPROPHYLAXIS  Start: 18 1033   End: 18 1154   Admin Instructions: Give first dose within 1 hour PRIOR to incision. If patient weight is greater than or equal to 120 kg increase dose to 3 g.    Admin. Amount: 2 g = 100 mL Conc: 2 g/100 mL  Last Admin: 18 1154  Dispense Loc: [facility]  Infused Over: 30 Minutes  Administrations Remainin  Volume: 100 mL  POC:  Pre-procedure          1154 (2 g)-Given              Dose: 9 mL  Freq: ONCE Route: IV  Start: 18 0115   End: 18   Admin. Amount: 9 mL  Last Admin: 18  Dispense Loc: [facility] FLOOR STOCK  Administrations Remainin  Volume: 9 mL   Current Line: PICC Single Lumen 10/18/18 Right Basilic        0107 (9 mL)-Given               Dose: 1 patch  Freq: ONCE Route: TD  Start: 18 1100   End: 18 114   Admin Instructions: Apply patch to skin, behind ear.  Place in Pre-Op.  Remove after 24 hours.  Each 1.5 mg patch delivers 1 mg of scopolamine.    Admin. Amount: 1 patch  Last Admin: 18  Dispense Loc: [facility] SD PACU  Administrations Remainin  POC: Pre-procedure          114 (1 patch)-Given           Discontinued Medications  Medications 18         Dose: 1 g  Freq: SEE ADMIN INSTRUCTIONS Route: IV  Indications of Use: PERIOPERATIVE PHARMACOPROPHYLAXIS  Start: 18 1033   End: 18 1224   Admin Instructions: Intra-Op Dose.  Give every 2 hours while patient in surgery, starting 2 hours after pre-op dose.  DO NOT GIVE intra-op dose if CrCl less than 10 mL/min (on dialysis).  If CrCL less than 50 mL/min, double the time interval between doses.    Admin. Amount: 1 g  Dispense Loc:  Main Pharmacy  Infused Over: 30 Minutes  POC: Pre-procedure          1224-Med Discontinued          Dose: 25-50 mcg  Freq: EVERY 2 MIN PRN Route: IV  PRN Reason: other  PRN Comment: acute pain  Start: 18 1218   End: 18 1357   Admin Instructions: MAX cumulative dose = 250 mcg.   Use fentaNYL (SUBLIMAZE) initially, as a short acting agent for acute pain control. If insufficient, or a longer acting agent is needed, begin morphine or HYDROmorphone (DILAUDID) if ordered.  For ordered IV doses 1-100 mcg give IV Push undiluted over a minimum of 3-5 minutes.    Admin. Amount: 25-50 mcg = 0.5-1 mL Conc: 50 mcg/mL  Last Admin:  11/26/18 1257  Dispense Loc: [facility] 55C  Volume: 2 mL   Current Line: PICC Single Lumen 10/18/18 Right Basilic         1240 (50 mcg)-Given       1257 (50 mcg)-Given       1357-Med Discontinued          Dose: 0.3-0.5 mg  Freq: EVERY 5 MIN PRN Route: IV  PRN Reason: other  PRN Comment: acute pain.  May administer if Respiratory Rate is greater than 10  Start: 11/26/18 1218   End: 11/26/18 1357   Admin Instructions: Max cumulative dose = 2 mg  If fentaNYL (SUBLIMAZE) is also ordered, use HYDROmorphone (DILAUDID) if pain control insufficient with fentaNYL (SUBLIMAZE) or a longer acting agent is needed.  For ordered IV doses 0.1-4 mg give IV Push undiluted. Administer each 2mg over 2-5 minutes.    Admin. Amount: 0.3-0.5 mg  Last Admin: 11/26/18 1315  Dispense Loc: [facility] 55C   Current Line: PICC Single Lumen 10/18/18 Right Basilic         1315 (0.5 mg)-Given       1357-Med Discontinued          Rate: 100 mL/hr   Freq: CONTINUOUS Route: IV  Start: 11/26/18 1230   End: 11/26/18 1357   Admin Instructions: Continue until IV catheter is weaned    Dispense Loc: Count includes the [facility] Floor Stock  Volume: 1,000 mL          1357-Med Discontinued                Rate: 25 mL/hr Dose: 500 mL  Freq: CONTINUOUS Route: IV  Start: 11/26/18 1045   End: 11/26/18 1224   Admin Instructions: IF patient NOT on dialysis.    Admin. Amount: 500 mL  Dispense Loc: Count includes the [facility] Floor Stock  Volume: 500 mL  POC: Pre-procedure                 1224-Med Discontinued          Rate: 10 mL/hr   Freq: CONTINUOUS Route: IV  Start: 11/24/18 1700   End: 11/26/18 1357   Last Admin: 11/25/18 0806  Dispense Loc: Count includes the [facility] Floor Stock  Volume: 1,000 mL   Current Line: PICC Single Lumen 10/18/18 Right Basilic       1752 ( )-New Bag        0806 ( )-Rate/Dose Verify        1222 ( )-Anesthesia Volume Adjustment       1357-Med Discontinued          Freq: EVERY 1 HOUR PRN Route: Top  PRN Reason: pain  PRN Comment: with VAD insertion or accessing implanted port.  Start: 11/26/18 1037   End: 11/26/18  "1224   Admin Instructions: Do NOT give if patient has a history of allergy to any local anesthetic or any \"aide\" product.   Apply 30 minutes prior to VAD insertion or port access.  MAX Dose:  2.5 g (  of 5 g tube)    Dispense Loc: Contact Rx for dose  POC: Pre-procedure          1224-Med Discontinued          Dose: 1 mL  Freq: EVERY 1 HOUR PRN Route: OTHER  PRN Comment: mild pain with VAD insertion or accessing implanted port  Start: 11/26/18 1037   End: 11/26/18 1224   Admin Instructions: Do NOT give if patient has a history of allergy to any local anesthetic or any \"aide\" product. MAX dose 1 mL subcutaneous OR intradermal in divided doses.    Admin. Amount: 1 mL  Dispense Loc: [facility] SD PACU  Volume: 2 mL  POC: Pre-procedure          1224-Med Discontinued          Dose: 0.1-0.4 mg  Freq: EVERY 2 MIN PRN Route: IV  PRN Reason: opioid reversal  Start: 11/26/18 1223   End: 11/26/18 1352   Admin Instructions: For apnea or imminent respiratory arrest: give 0.4 mg IV undiluted Q 2 minutes PRN until desired degree of reversal is obtained, stop opioid and notify provider. Continue monitoring until discharge criteria are met for a minimum of 2 hours.  For severe sedation, decrease in respiratory depth, quality or respiratory rate less than 8: give 0.1 mg IV Q 2 minutes x 3 doses, stop opioid and notify provider.  Try to minimize reversal of analgesia especially in end-of-life patients  For ordered IV doses 0.1-2mg give IVP. Give each 0.4mg over 15 seconds in emergency situations. For non-emergent situations further dilute in 9mL of NS to facilitate titration of response.    Admin. Amount: 0.1-0.4 mg = 0.25-1 mL Conc: 0.4 mg/mL  Dispense Loc:  [facility]  Volume: 1 mL          1352-Med Discontinued          Dose: 0.1-0.4 mg  Freq: EVERY 2 MIN PRN Route: IV  PRN Reason: opioid reversal  Start: 11/24/18 1642   End: 11/26/18 1352   Admin Instructions: For respiratory rate LESS than or EQUAL to 8.  Partial reversal dose:  0.1 " mg titrated q 2 minutes for Analgesia Side Effects Monitoring Sedation Level of 3 (frequently drowsy, arousable, drifts to sleep during conversation).Full reversal dose:  0.4 mg bolus for Analgesia Side Effects Monitoring Sedation Level of 4 (somnolent, minimal or no response to stimulation).  For ordered IV doses 0.1-2mg give IVP. Give each 0.4mg over 15 seconds in emergency situations. For non-emergent situations further dilute in 9mL of NS to facilitate titration of response.    Admin. Amount: 0.1-0.4 mg = 0.25-1 mL Conc: 0.4 mg/mL  Dispense Loc: [facility] 55C  Volume: 1 mL          1029-Auto Hold       1342-Unhold       1352-Med Discontinued          Dose: 5-10 mg  Freq: EVERY 3 HOURS PRN Route: PO  PRN Reason: other  PRN Comment: pain control or improvement in physical function. Hold dose for analgesic side effects.  Start: 11/24/18 1648   End: 11/25/18 1421   Admin Instructions: Start with the lowest dose.  May adjust dose by 5 mg every 3 hours as needed. Notify provider to assess for uncontrolled pain or analgesic side effects. Hold while on PCA or with regular IV opioid dosing.    Admin. Amount: 1-2 tablet (1-2 × 5 mg tablet)  Last Admin: 11/25/18 1117  Dispense Loc: [facility] 55C        1751 (5 mg)-Given       2104 (5 mg)-Given        0015 (10 mg)-Given       0352 (5 mg)-Given       0804 (5 mg)-Given       1117 (10 mg)-Given       1421-Med Discontinued           Freq: PRN  Start: 11/26/18 1215   End: 11/26/18 1342   Last Admin: 11/26/18 1215  POC: Intra-procedure          1215 (30 mL)-Given       1342-Med Discontinued          Dose: 1-2 tablet  Freq: 2 TIMES DAILY PRN Route: PO  PRN Reason: constipation  Start: 11/25/18 1609   End: 11/25/18 1610   Admin. Amount: 1-2 tablet         1610-Med Discontinued           Dose: 3 mL  Freq: EVERY 8 HOURS Route: IK  Start: 11/26/18 1045   End: 11/26/18 1224   Admin Instructions: And Q1H PRN, to lock peripheral IV dormant line.    Admin. Amount: 3 mL  Dispense Loc: [facility] Floor  Stock  Volume: 3 mL  POC: Pre-procedure                 1224-Med Discontinued          Dose: 3 mL  Freq: EVERY 1 HOUR PRN Route: IK  PRN Reason: line flush  PRN Comment: for peripheral IV flush post IV meds  Start: 11/26/18 1037   End: 11/26/18 1224   Admin. Amount: 3 mL  Dispense Loc: [facility] Stock  Volume: 3 mL  POC: Pre-procedure          1224-Med Discontinued          Freq: PRN  Start: 11/26/18 1210   End: 11/26/18 1342   Last Admin: 11/26/18 1210  POC: Intra-procedure          1210 (1,000 mL)-Given       1342-Med Discontinued     Medications 11/21/18 11/22/18 11/23/18 11/24/18 11/25/18 11/26/18 11/27/18

## 2018-11-24 NOTE — IP AVS SNAPSHOT
"` `     [place] [address] [facility]: [phone]                                              INTERAGENCY TRANSFER FORM - NURSING   2018                    Hospital Admission Date: 2018  CASSANDRA FRANCO   : 1970  Sex: Female        Attending Provider: Andre Harman MD     Allergies:  Sulfa Drugs, Demerol [Meperidine], Erythromycin, Metformin, Codeine, Penicillins    Infection:  None   Service:  ORTHOPEDICS    Ht:  1.651 m (5' 5\")   Wt:  90.7 kg (200 lb)   Admission Wt:  93 kg (205 lb)    BMI:  33.28 kg/m 2   BSA:  2.04 m 2            Patient PCP Information     Provider PCP Type    Linda Bernstein PA-C General      Current Code Status     Date Active Code Status Order ID Comments User Context       2018  1:45 PM Full Code [id number]  Jett Malik PA-C Inpatient       Questions for Current Code Status     Question Answer Comment    Code status determined by: Discussion with patient/legal decision maker       Code Status History     Date Active Date Inactive Code Status Order ID Comments User Context    2018  4:52 PM 2018  1:45 PM Full Code [id number]  Jett Malik PA-C Inpatient    10/15/2018  8:44 PM 10/18/2018  9:09 PM Full Code [id number]  Jett Malik PA-C Inpatient    2018  7:35 PM 2018  9:28 PM Full Code [id number]  Jett Malik PA-C Inpatient      Advance Directives        Scanned docmt in ACP Activity?           No scanned doc        Hospital Problems as of 2018              Priority Class Noted POA    Infection Medium  2018 Yes      Non-Hospital Problems as of 2018     None      Immunizations     Name Date      Influenza Vaccine IM 3yrs+ 4 Valent IIV4 10/16/18          END      ASSESSMENT     Discharge Profile Flowsheet     DISCHARGE NEEDS ASSESSMENT     Full except areas not inspected   Heel, left;Foot, left 18 0907    Transportation Available  family or friend will provide 18 1006   " "Inspection under devices  Full 11/27/18 0907    GASTROINTESTINAL (ADULT,PEDIATRIC,OB)     Skin WDL  ex 11/27/18 0907    GI WDL  WDL 11/27/18 0907   Skin Temperature  warm 11/27/18 0907    All Quadrants Bowel Sounds  audible and active in all quadrants 11/27/18 0907   Skin Moisture  dry 11/27/18 0907    Last Bowel Movement  11/23/18 11/25/18 1003   Skin Elasticity  quick return to original state 11/27/18 0907    Passing flatus  yes 11/27/18 0147   Skin Integrity  incision(s);wound(s);scar(s) 11/27/18 0907    COMMUNICATION ASSESSMENT     SAFETY      Patient's communication style  spoken language (English or Bilingual) 11/26/18 1058   Safety WDL  WDL 11/27/18 0907    SKIN     All Alarms  alarm(s) activated and audible 11/27/18 0907    Inspection of bony prominences  Full 11/27/18 0907                      Assessment WDL (Within Defined Limits) Definitions           Safety WDL     Effective: 09/28/15    Row Information: <b>WDL Definition:</b> Bed in low position, wheels locked; call light in reach; upper side rails up x 2; ID band on<br> <font color=\"gray\"><i>Item=AS safety wdl>>List=AS safety wdl>>Version=F14</i></font>      Skin WDL     Effective: 09/28/15    Row Information: <b>WDL Definition:</b> Warm; dry; intact; elastic; without discoloration; pressure points without redness<br> <font color=\"gray\"><i>Item=AS skin wdl>>List=AS skin wdl>>Version=F14</i></font>      Vitals     Vital Signs Flowsheet     VITAL SIGNS     ANALGESIA SIDE EFFECTS MONITORING      Temp  98.1  F (36.7  C) 11/27/18 0748   Side Effects Monitoring: Respiratory Quality  R 11/27/18 1013    Temp src  Oral 11/27/18 0748   Side Effects Monitoring: Respiratory Depth  N 11/27/18 1013    Resp  16 11/27/18 1013   Side Effects Monitoring: Sedation Level  1 11/27/18 1013    Pulse  81 11/27/18 0328   HEIGHT AND WEIGHT      Heart Rate  83 11/27/18 0748   Height  1.651 m (5' 5\") 11/26/18 1056    Pulse/Heart Rate Source  Monitor 11/27/18 0748   Weight  90.7 " kg (200 lb) 11/26/18 1056    BP  123/72 11/27/18 0748   BSA (Calculated - sq m)  2.04 11/26/18 1056    BP Location  Left arm 11/27/18 0748   BMI (Calculated)  33.35 11/26/18 1056    OXYGEN THERAPY     POSITIONING      SpO2  96 % 11/27/18 0748   Body Position  independently positioning 11/27/18 0859    O2 Device  None (Room air) 11/27/18 0748   Head of Bed (HOB)  HOB at 30-45 degrees 11/27/18 0859    Oxygen Delivery  8 LPM 11/26/18 1250   Positioning/Transfer Devices  pillows 11/27/18 0859    PAIN/COMFORT     DAILY CARE      Patient Currently in Pain  yes 11/27/18 0630   Activity Management  ambulated to bathroom 11/27/18 0637    Preferred Pain Scale  number (Numeric Rating Pain Scale) 11/27/18 0630   Activity Assistance Provided  assistance, 1 person 11/27/18 0637    Patient's Stated Pain Goal  2 11/27/18 0602   ECG      0-10 Pain Scale  7 11/27/18 0951   ECG Rhythm  Sinus rhythm 11/26/18 1250    Pain Location  Foot 11/27/18 0630   Lead Monitored  Lead II 11/26/18 1250    Pain Orientation  Left 11/27/18 0630   RESPIRATORY MONITORING      Pain Descriptors  Aching;Constant 11/27/18 0630   Respiratory Monitoring (EtCO2)  44 mmHg 11/26/18 1339    Pain Intervention(s)  Medication (See eMAR) 11/27/18 0630   Integrated Pulmonary Index (IPI)  10 11/26/18 1339    Response to Interventions  Decrease in pain 11/27/18 1013                 Patient Lines/Drains/Airways Status    Active LINES/DRAINS/AIRWAYS     Name: Placement date: Placement time: Site: Days: Last dressing change:    Pressure Injury 11/24/18 Left Heel NA 11/24/18   2017    2             Patient Lines/Drains/Airways Status    Active PICC/CVC     Name: Placement date: Placement time: Site: Days: Additional Info Last dressing change:    PICC Single Lumen 10/18/18 Right Basilic 10/18/18   1431   Basilic   39 External Cath Length (cm): 10 cm            Size (Fr): 4 Fr            Orientation: Right            Extremity Circumference (cm): 33 cm            Catheter  Brand: Bard Solo            Dressing Intervention: Transparent;Securing device;New dressing            Description: Valved;Power PICC            Total Catheter Length (cm) Trimmed: 51 cm            Site Prep: Chlorhexidine            Local Anesthetic: Injectable            Inserted by: Jennifer Zendejas RN            Insertion attempts with ultrasound: 1            Placement Verification: Other (Comment)            Tip location: SVC            Full barrier precautions done: Yes            Consent Signed: Yes            Time Out performed: Yes            Lot #: [id number]            Use for : OK for immediate Use               Intake/Output Detail Report     Date Intake     Output Net    Shift P.O. I.V. IV Piggyback Total Urine Total       Noc 11/25/18 2300 - 11/26/18 0659 -- -- -- -- -- -- 0    Day 11/26/18 0700 - 11/26/18 1459 50 741.66 -- 791.66 -- -- 791.66    Rema 11/26/18 1500 - 11/26/18 2259 -- -- -- -- 250 250 -250    Noc 11/26/18 2300 - 11/27/18 0659 360 1130 -- 1490     Day 11/27/18 0700 - 11/27/18 1459 -- -- -- -- -- -- 0      Last Void/BM       Most Recent Value    Urine Occurrence 1 at 11/26/2018 1918    Stool Occurrence       Case Management/Discharge Planning     Case Management/Discharge Planning Flowsheet     LIVING ENVIRONMENT     ABUSE RISK SCREEN      Lives With  spouse 11/27/18 1006   QUESTION TO PATIENT:  Has a member of your family or a partner(now or in the past) intimidated, hurt, manipulated, or controlled you in any way?  no 11/26/18 1053    Living Arrangements  [place] 11/27/18 1006   QUESTION TO PATIENT: Do you feel safe going back to the place where you are living?  yes 11/26/18 1053    COPING/STRESS     OBSERVATION: Is there reason to believe there has been maltreatment of a vulnerable adult (ie. Physical/Sexual/Emotional abuse, self neglect, lack of adequate food, shelter, medical care, or financial exploitation)?  no 11/26/18 1053    Major Change/Loss/Stressor  none 11/26/18  1058   OTHER      DISCHARGE PLANNING     Are you depressed or being treated for depression?  No 11/26/18 1058    Transportation Available  family or friend will provide 11/27/18 1003

## 2018-11-24 NOTE — PHARMACY-ADMISSION MEDICATION HISTORY
Admission Medication History    Admission medication history interview status for the 11/24/2018 admission is complete. See EPIC admission navigator for prior to admission medications     Medication history source reliability:Good    Actions taken by pharmacist (provider contacted, etc):None     Additional medication history information not noted on PTA med list :None    Medication reconciliation/reorder completed by provider prior to medication history? No    Time spent in this activity: 15 minutes    Prior to Admission medications    Medication Sig Last Dose Taking? Auth Provider   ammonium lactate (AMLACTIN) 12 % cream Apply topically daily as needed (to heels)   at PRN Yes Unknown, Entered By History   BuPROPion HCl (WELLBUTRIN XL PO) Take 300 mg by mouth daily 11/24/2018 at AM Yes Reported, Patient   BusPIRone HCl (BUSPAR PO) Take 15 mg by mouth 2 times daily 11/24/2018 at AM Yes Reported, Patient   ceFAZolin (ANCEF) 1 GM vial Inject 2 g into the vein every 8 hours ESR,CRP,CBC with differential, creatinine, SGOT weekly while on this medication to be faxed to Dr. Bocanegra office. 11/24/2018 at AM Yes Thang Canada MD   clindamycin (CLEOCIN T) 1 % solution Apply topically nightly as needed   at PRN Yes Unknown, Entered By History   Furosemide (LASIX PO) Take 160 mg by mouth daily  11/23/2018 at AM Yes Reported, Patient   gabapentin (NEURONTIN) 800 MG tablet Take 800 mg by mouth 3 times daily Am, supper, hs 11/24/2018 at AM Yes Unknown, Entered By History   HydrOXYzine Pamoate (VISTARIL PO) Take 25 mg by mouth every 4 hours as needed for anxiety  Past Week at PRN Yes Reported, Patient   Omeprazole (PRILOSEC PO) Take 40 mg by mouth 2 times daily (before meals) 2 CAPSULES IN A.M.  11/24/2018 at AM Yes Reported, Patient   Ondansetron HCl (ZOFRAN PO) Take 8 mg by mouth as needed for nausea or vomiting  at PRN Yes Reported, Patient   oxyCODONE-acetaminophen (PERCOCET) 5-325 MG per tablet Take 1-2 tablets by mouth every  4 hours as needed for severe pain 11/24/2018 at PRN Yes Jett Malik, CECILIA   phentermine 30 MG capsule Take 30 mg by mouth every morning 11/24/2018 at AM Yes Reported, Patient   polyethylene glycol (MIRALAX/GLYCOLAX) packet Take 17 g by mouth as needed for constipation  at PRN Yes Reported, Patient   Potassium Chloride Shameka CR (K-DUR PO) Take 80 mEq by mouth daily  11/23/2018 at PM Yes Reported, Patient   Topiramate (TOPAMAX PO) Take 100 mg by mouth At Bedtime  11/23/2018 at PM Yes Reported, Patient       Scooby Villarreal, PharmD

## 2018-11-24 NOTE — IP AVS SNAPSHOT
MRN:[id number]                      After Visit Summary   11/24/2018    Shalonda Howard    MRN: [id number]           Thank you!     Thank you for choosing [facility] for your care. Our goal is always to provide you with excellent care. Hearing back from our patients is one way we can continue to improve our services. Please take a few minutes to complete the written survey that you may receive in the mail after you visit with us. Thank you!        Patient Information     Date Of Birth          1970        Designated Caregiver       Most Recent Value    Caregiver    Will someone help with your care after discharge? yes    Name of designated caregiver Thomas    Phone number of caregiver     Caregiver address same as pt      About your hospital stay     You were admitted on:  November 24, 2018 You last received care in the:  [place] [address] [facility]    You were discharged on:  November 27, 2018        Reason for your hospital stay       L foot infection                  Who to Call     For medical emergencies, please call 911.  For non-urgent questions about your medical care, please call your primary care provider or clinic, [phone]  For questions related to your surgery, please call your surgery clinic        Attending Provider     Provider Specialty    Andre Harman MD Orthopaedic Surgery       Primary Care Provider Office Phone # Fax #    Linda Bernstein PA-C [phone]      After Care Instructions     Activity       Your activity upon discharge: ambulate in house.  Maintain partial/NWB on scooter as much as possible.            Diet       Follow this diet upon discharge: Orders Placed This Encounter      Advance Diet as Tolerated: Regular Diet Adult            Wound care and dressings       Instructions to care for your wound at home: keep wound clean and dry. Leave dressings in place until wound care followup.                  Follow-up  Appointments     Follow-up and recommended labs and tests        Follow up with Dr. Harman , at (location with clinic name or city) [facility] , within 2 weeks  to evaluate after surgery. No follow up labs or test are needed.                  Additional Services     Home care nursing referral       RN skilled nursing visit. RN to assess incision for signs/symptoms of infection.    Your provider has ordered home care nursing services. If you have not been contacted within 2 days of your discharge please call the inpatient department phone number at [phone] .            Home infusion referral       Your provider has referred you to: [facility]: [facility] [place] [phone]   [email or web address]    Local Address (if different from home address):     Anticipated Length of Therapy: 6 weeks    Home Infusion Pharmacist to adjust therapy based on labs and clinical assessments: Yes    Labs:  May draw labs from Venous Catheter: Yes  Home Infusion Pharmacist to order labs based on therapy type and clinical assessments: Yes  Call/Fax Lab Results to: Jessica/Dr. Bocanegra    Agency Staff to assess nursing needs for Infusion Therapy.    Access Device Management:  IV Access Type: PICC  Flush with Heparin and Normal Saline IVP PRN and routine site care (per agency protocol) to maintain access device? Yes                  Further instructions from your care team       Your doctor has ordered IV antibiotics after your hospital stay.  This service will be provided by [facility]. They will contact you regarding your first visit.   If you have any questions about this service, please call them at [phone].      Pending Results     Date and Time Order Name Status Description    11/26/2018 1402 US JJ Doppler with Exercise Bilateral Preliminary     11/26/2018 1217 Wound Culture Aerobic Bacterial Preliminary     11/26/2018 1217 Anaerobic bacterial culture Preliminary      "11/24/2018 1652 Blood culture Preliminary     11/24/2018 1652 Blood culture Preliminary             Statement of Approval     Ordered          11/27/18 1356  I have reviewed and agree with all the recommendations and orders detailed in this document.  EFFECTIVE NOW     Approved and electronically signed by:  Jett Malik PA-C           11/27/18 0628  I have reviewed and agree with all the recommendations and orders detailed in this document.  EFFECTIVE NOW     Approved and electronically signed by:  Jett Malik PA-C             Admission Information     Date & Time Provider Department Dept. Phone    11/24/2018 Andre Harman MD [place] [address] [facility] [phone]      Your Vitals Were     Blood Pressure Pulse Temperature Respirations Height Weight    123/72 (BP Location: Left arm) 81 98.1  F (36.7  C) (Oral) 16 1.651 m (5' 5\") 90.7 kg (200 lb)    Last Period Pulse Oximetry BMI (Body Mass Index)             11/25/2018 96% 33.28 kg/m2         [facility] Information     [facility] gives you secure access to your electronic health record. If you see a primary care provider, you can also send messages to your care team and make appointments. If you have questions, please call your primary care clinic.  If you do not have a primary care provider, please call [phone] and they will assist you.        Care EveryWhere ID     This is your Care EveryWhere ID. This could be used by other organizations to access your [facility] medical records  [id number]        Equal Access to Services     HERMAN EDWARDS AH: Hadii benito dukes Sosia, waaxda luqadaha, qaybta kaalmada oscar reyes adecarmencita pelletier. So [facility] [phone].    ATENCIÓN: Si habla español, tiene a pascual disposición servicios gratuitos de asistencia lingüística. Llame al [phone].    We comply with applicable federal civil rights laws and [place] laws. We do not discriminate on the basis of race, color, " national origin, age, disability, sex, sexual orientation, or gender identity.               Review of your medicines      START taking        Dose / Directions    senna-docusate 8.6-50 MG tablet   Commonly known as:  SENOKOT-S/PERICOLACE   Used for:  Infection        Dose:  1 tablet   Take 1 tablet by mouth 2 times daily   Quantity:  40 tablet   Refills:  1         CONTINUE these medicines which have NOT CHANGED        Dose / Directions    ammonium lactate 12 % cream   Commonly known as:  AMLACTIN        Apply topically daily as needed (to heels)   Refills:  0       BUSPAR PO        Dose:  15 mg   Take 15 mg by mouth 2 times daily   Refills:  0       ceFAZolin 1 GM vial   Commonly known as:  ANCEF   Used for:  Chronic osteomyelitis (H)        Dose:  2 g   Inject 2 g into the vein every 8 hours ESR,CRP,CBC with differential, creatinine, SGOT weekly while on this medication to be faxed to Dr. Bocanegra office.   Quantity:  180 g   Refills:  1       clindamycin 1 % solution   Commonly known as:  CLEOCIN T        Apply topically nightly as needed   Refills:  0       gabapentin 800 MG tablet   Commonly known as:  NEURONTIN        Dose:  800 mg   Take 800 mg by mouth 3 times daily Am, supper, hs   Refills:  0       hydrOXYzine 25 MG capsule   Commonly known as:  VISTARIL   Used for:  Infection        Dose:  25 mg   Take 1 capsule (25 mg) by mouth every 4 hours as needed for anxiety   Quantity:  40 capsule   Refills:  0       K-DUR PO        Dose:  80 mEq   Take 80 mEq by mouth daily   Refills:  0       LASIX PO        Dose:  160 mg   Take 160 mg by mouth daily   Refills:  0       oxyCODONE-acetaminophen 5-325 MG tablet   Commonly known as:  PERCOCET   Used for:  Infection        Dose:  1-2 tablet   Take 1-2 tablets by mouth every 4 hours as needed for severe pain   Quantity:  40 tablet   Refills:  0       phentermine 30 MG capsule   Commonly known as:  ADIPEX-P        Dose:  30 mg   Take 30 mg by mouth every morning    Refills:  0       polyethylene glycol packet   Commonly known as:  MIRALAX/GLYCOLAX        Dose:  17 g   Take 17 g by mouth as needed for constipation   Refills:  0       PRILOSEC PO        Dose:  40 mg   Take 40 mg by mouth 2 times daily (before meals) 2 CAPSULES IN A.M.   Refills:  0       TOPAMAX PO        Dose:  100 mg   Take 100 mg by mouth At Bedtime   Refills:  0       WELLBUTRIN XL PO        Dose:  300 mg   Take 300 mg by mouth daily   Refills:  0       ZOFRAN PO        Dose:  8 mg   Take 8 mg by mouth as needed for nausea or vomiting   Refills:  0            Where to get your medicines      These medications were sent to [facility] [place], [place] - [address]  [address], [place] [address]     Phone:  [phone]     hydrOXYzine 25 MG capsule    senna-docusate 8.6-50 MG tablet         Some of these will need a paper prescription and others can be bought over the counter. Ask your nurse if you have questions.     Bring a paper prescription for each of these medications     oxyCODONE-acetaminophen 5-325 MG tablet                Protect others around you: Learn how to safely use, store and throw away your medicines at www.disposemymeds.org.        Information about OPIOIDS     PRESCRIPTION OPIOIDS: WHAT YOU NEED TO KNOW   We gave you an opioid (narcotic) pain medicine. It is important to manage your pain, but opioids are not always the best choice. You should first try all the other options your care team gave you. Take this medicine for as short a time (and as few doses) as possible.    Some activities can increase your pain, such as bandage changes or therapy sessions. It may help to take your pain medicine 30 to 60 minutes before these activities. Reduce your stress by getting enough sleep, working on hobbies you enjoy and practicing relaxation or meditation. Talk to your care team about ways to manage your pain beyond prescription opioids.    These medicines have  risks:    DO NOT drive when on new or higher doses of pain medicine. These medicines can affect your alertness and reaction times, and you could be arrested for driving under the influence (DUI). If you need to use opioids long-term, talk to your care team about driving.    DO NOT operate heavy machinery    DO NOT do any other dangerous activities while taking these medicines.    DO NOT drink any alcohol while taking these medicines.     If the opioid prescribed includes acetaminophen, DO NOT take with any other medicines that contain acetaminophen. Read all labels carefully. Look for the word  acetaminophen  or  Tylenol.  Ask your pharmacist if you have questions or are unsure.    You can get addicted to pain medicines, especially if you have a history of addiction (chemical, alcohol or substance dependence). Talk to your care team about ways to reduce this risk.    All opioids tend to cause constipation. Drink plenty of water and eat foods that have a lot of fiber, such as fruits, vegetables, prune juice, apple juice and high-fiber cereal. Take a laxative (Miralax, milk of magnesia, Colace, Senna) if you don t move your bowels at least every other day. Other side effects include upset stomach, sleepiness, dizziness, throwing up, tolerance (needing more of the medicine to have the same effect), physical dependence and slowed breathing.    Store your pills in a secure place, locked if possible. We will not replace any lost or stolen medicine. If you don t finish your medicine, please throw away (dispose) as directed by your pharmacist. The [place] [employer]ution [employer] has more information about safe disposal: https://www.pca.[email or web address]/living-green/managing-unwanted-medications             Medication List: This is a list of all your medications and when to take them. Check marks below indicate your daily home schedule. Keep this list as a reference.      Medications           Morning Afternoon Evening Bedtime  As Needed    ammonium lactate 12 % cream   Commonly known as:  AMLACTIN   Apply topically daily as needed (to heels)                                BUSPAR PO   Take 15 mg by mouth 2 times daily                                ceFAZolin 1 GM vial   Commonly known as:  ANCEF   Inject 2 g into the vein every 8 hours ESR,CRP,CBC with differential, creatinine, SGOT weekly while on this medication to be faxed to Dr. Daljit valles.   Last time this was given:  1 g on 11/27/2018 12:32 PM                                clindamycin 1 % solution   Commonly known as:  CLEOCIN T   Apply topically nightly as needed                                gabapentin 800 MG tablet   Commonly known as:  NEURONTIN   Take 800 mg by mouth 3 times daily Am, supper, hs   Last time this was given:  800 mg on 11/27/2018  8:20 AM                                hydrOXYzine 25 MG capsule   Commonly known as:  VISTARIL   Take 1 capsule (25 mg) by mouth every 4 hours as needed for anxiety   Last time this was given:  25 mg on 11/27/2018  8:20 AM                                K-DUR PO   Take 80 mEq by mouth daily                                LASIX PO   Take 160 mg by mouth daily                                oxyCODONE-acetaminophen 5-325 MG tablet   Commonly known as:  PERCOCET   Take 1-2 tablets by mouth every 4 hours as needed for severe pain                                phentermine 30 MG capsule   Commonly known as:  ADIPEX-P   Take 30 mg by mouth every morning                                polyethylene glycol packet   Commonly known as:  MIRALAX/GLYCOLAX   Take 17 g by mouth as needed for constipation                                PRILOSEC PO   Take 40 mg by mouth 2 times daily (before meals) 2 CAPSULES IN A.M.   Last time this was given:  40 mg on 11/27/2018  6:25 AM                                senna-docusate 8.6-50 MG tablet   Commonly known as:  SENOKOT-S/PERICOLACE   Take 1 tablet by mouth 2 times daily   Last time this was given:  1  tablet on 11/27/2018  8:21 AM                                TOPAMAX PO   Take 100 mg by mouth At Bedtime   Last time this was given:  100 mg on 11/26/2018  9:13 PM                                WELLBUTRIN XL PO   Take 300 mg by mouth daily   Last time this was given:  300 mg on 11/27/2018  8:21 AM                                ZOFRAN PO   Take 8 mg by mouth as needed for nausea or vomiting

## 2018-11-25 ENCOUNTER — APPOINTMENT (OUTPATIENT)
Dept: MRI IMAGING | Facility: CLINIC | Age: 48
DRG: 908 | End: 2018-11-25
Attending: PHYSICIAN ASSISTANT
Payer: COMMERCIAL

## 2018-11-25 LAB
ANION GAP SERPL CALCULATED.3IONS-SCNC: 6 MMOL/L (ref 3–14)
BUN SERPL-MCNC: 10 MG/DL (ref 7–30)
CALCIUM SERPL-MCNC: 8 MG/DL (ref 8.5–10.1)
CHLORIDE SERPL-SCNC: 111 MMOL/L (ref 94–109)
CO2 SERPL-SCNC: 25 MMOL/L (ref 20–32)
CREAT SERPL-MCNC: 0.66 MG/DL (ref 0.52–1.04)
ERYTHROCYTE [DISTWIDTH] IN BLOOD BY AUTOMATED COUNT: 14.8 % (ref 10–15)
GFR SERPL CREATININE-BSD FRML MDRD: >90 ML/MIN/1.7M2
GLUCOSE SERPL-MCNC: 124 MG/DL (ref 70–99)
HCT VFR BLD AUTO: 32.3 % (ref 35–47)
HGB BLD-MCNC: 10.8 G/DL (ref 11.7–15.7)
MCH RBC QN AUTO: 27.7 PG (ref 26.5–33)
MCHC RBC AUTO-ENTMCNC: 33.4 G/DL (ref 31.5–36.5)
MCV RBC AUTO: 83 FL (ref 78–100)
PLATELET # BLD AUTO: 354 10E9/L (ref 150–450)
POTASSIUM SERPL-SCNC: 3.2 MMOL/L (ref 3.4–5.3)
RBC # BLD AUTO: 3.9 10E12/L (ref 3.8–5.2)
SODIUM SERPL-SCNC: 142 MMOL/L (ref 133–144)
WBC # BLD AUTO: 8 10E9/L (ref 4–11)

## 2018-11-25 PROCEDURE — A9585 GADOBUTROL INJECTION: HCPCS | Performed by: ORTHOPAEDIC SURGERY

## 2018-11-25 PROCEDURE — 25500064 ZZH RX 255 OP 636: Performed by: ORTHOPAEDIC SURGERY

## 2018-11-25 PROCEDURE — 25000132 ZZH RX MED GY IP 250 OP 250 PS 637: Performed by: PHYSICIAN ASSISTANT

## 2018-11-25 PROCEDURE — 99232 SBSQ HOSP IP/OBS MODERATE 35: CPT | Performed by: INTERNAL MEDICINE

## 2018-11-25 PROCEDURE — 99207 ZZC CDG-MDM COMPONENT: MEETS LOW - DOWN CODED: CPT | Performed by: INTERNAL MEDICINE

## 2018-11-25 PROCEDURE — 99207 ZZC MOONLIGHTING INDICATOR: CPT | Performed by: INTERNAL MEDICINE

## 2018-11-25 PROCEDURE — 73720 MRI LWR EXTREMITY W/O&W/DYE: CPT | Mod: LT

## 2018-11-25 PROCEDURE — 80048 BASIC METABOLIC PNL TOTAL CA: CPT | Performed by: PHYSICIAN ASSISTANT

## 2018-11-25 PROCEDURE — 25000132 ZZH RX MED GY IP 250 OP 250 PS 637: Performed by: INTERNAL MEDICINE

## 2018-11-25 PROCEDURE — 85027 COMPLETE CBC AUTOMATED: CPT | Performed by: PHYSICIAN ASSISTANT

## 2018-11-25 PROCEDURE — 25000128 H RX IP 250 OP 636: Performed by: PHYSICIAN ASSISTANT

## 2018-11-25 PROCEDURE — 12000007 ZZH R&B INTERMEDIATE

## 2018-11-25 RX ORDER — SENNOSIDES 8.6 MG
1-2 TABLET ORAL 2 TIMES DAILY PRN
Status: DISCONTINUED | OUTPATIENT
Start: 2018-11-25 | End: 2018-11-25

## 2018-11-25 RX ORDER — AMOXICILLIN 250 MG
1 CAPSULE ORAL 2 TIMES DAILY
Status: DISCONTINUED | OUTPATIENT
Start: 2018-11-25 | End: 2018-11-27 | Stop reason: HOSPADM

## 2018-11-25 RX ORDER — OXYCODONE HYDROCHLORIDE 5 MG/1
10 TABLET ORAL
Status: DISCONTINUED | OUTPATIENT
Start: 2018-11-25 | End: 2018-11-27 | Stop reason: HOSPADM

## 2018-11-25 RX ORDER — GADOBUTROL 604.72 MG/ML
9 INJECTION INTRAVENOUS ONCE
Status: COMPLETED | OUTPATIENT
Start: 2018-11-25 | End: 2018-11-25

## 2018-11-25 RX ADMIN — OXYCODONE HYDROCHLORIDE 10 MG: 5 TABLET ORAL at 23:58

## 2018-11-25 RX ADMIN — CEFAZOLIN SODIUM 1 G: 1 INJECTION, POWDER, FOR SOLUTION INTRAMUSCULAR; INTRAVENOUS at 11:17

## 2018-11-25 RX ADMIN — SENNOSIDES AND DOCUSATE SODIUM 1 TABLET: 8.6; 5 TABLET ORAL at 20:36

## 2018-11-25 RX ADMIN — OXYCODONE HYDROCHLORIDE 10 MG: 5 TABLET ORAL at 20:36

## 2018-11-25 RX ADMIN — OXYCODONE HYDROCHLORIDE 10 MG: 5 TABLET ORAL at 11:17

## 2018-11-25 RX ADMIN — OXYCODONE HYDROCHLORIDE 10 MG: 5 TABLET ORAL at 14:24

## 2018-11-25 RX ADMIN — BUSPIRONE HYDROCHLORIDE 15 MG: 7.5 TABLET ORAL at 08:04

## 2018-11-25 RX ADMIN — TOPIRAMATE 100 MG: 100 TABLET, FILM COATED ORAL at 20:36

## 2018-11-25 RX ADMIN — GABAPENTIN 800 MG: 800 TABLET, FILM COATED ORAL at 15:48

## 2018-11-25 RX ADMIN — OXYCODONE HYDROCHLORIDE 5 MG: 5 TABLET ORAL at 03:52

## 2018-11-25 RX ADMIN — GADOBUTROL 9 ML: 604.72 INJECTION INTRAVENOUS at 01:07

## 2018-11-25 RX ADMIN — GABAPENTIN 800 MG: 800 TABLET, FILM COATED ORAL at 08:03

## 2018-11-25 RX ADMIN — GABAPENTIN 800 MG: 800 TABLET, FILM COATED ORAL at 20:35

## 2018-11-25 RX ADMIN — OXYCODONE HYDROCHLORIDE 5 MG: 5 TABLET ORAL at 08:04

## 2018-11-25 RX ADMIN — CEFAZOLIN SODIUM 1 G: 1 INJECTION, POWDER, FOR SOLUTION INTRAMUSCULAR; INTRAVENOUS at 03:52

## 2018-11-25 RX ADMIN — OMEPRAZOLE 40 MG: 20 CAPSULE, DELAYED RELEASE ORAL at 15:47

## 2018-11-25 RX ADMIN — OXYCODONE HYDROCHLORIDE 10 MG: 5 TABLET ORAL at 17:31

## 2018-11-25 RX ADMIN — OMEPRAZOLE 40 MG: 20 CAPSULE, DELAYED RELEASE ORAL at 08:03

## 2018-11-25 RX ADMIN — BUSPIRONE HYDROCHLORIDE 15 MG: 7.5 TABLET ORAL at 20:36

## 2018-11-25 RX ADMIN — OXYCODONE HYDROCHLORIDE 10 MG: 5 TABLET ORAL at 00:15

## 2018-11-25 RX ADMIN — CEFAZOLIN SODIUM 1 G: 1 INJECTION, POWDER, FOR SOLUTION INTRAMUSCULAR; INTRAVENOUS at 19:24

## 2018-11-25 RX ADMIN — BUPROPION HYDROCHLORIDE 300 MG: 300 TABLET, FILM COATED, EXTENDED RELEASE ORAL at 08:03

## 2018-11-25 ASSESSMENT — ACTIVITIES OF DAILY LIVING (ADL)
ADLS_ACUITY_SCORE: 10

## 2018-11-25 ASSESSMENT — PAIN DESCRIPTION - DESCRIPTORS
DESCRIPTORS: SHARP;SHOOTING;THROBBING
DESCRIPTORS: SHOOTING;SHARP;THROBBING
DESCRIPTORS: SHARP;SHOOTING;THROBBING

## 2018-11-25 NOTE — PLAN OF CARE
Problem: Patient Care Overview  Goal: Plan of Care/Patient Progress Review  Outcome: No Change  DA.  A&O.  VSS, RA.  CMS intact. Dressing changed on left foot wound and heel ulcer.  Did not put a WOC nurse consult yet for left heel ulcer, waiting for ortho order.  PICC patent and infusing.  Pain managed with oxy.  Bedrest with BR privileges.Culture pending.  Ortho and ID consult pending.  Waiting for MRI, check list faxed.  Continue to monitor.

## 2018-11-25 NOTE — PLAN OF CARE
Problem: Patient Care Overview  Goal: Plan of Care/Patient Progress Review  Outcome: No Change  A&Ox4. CMS LLE baseline numbness. Wound to left heel & side of left foot-dressing changed x2 due to drainage. Bowel sounds audible, +flatus. VSS. Bedrest with bathroom privilages-voiding adequately. Tolerating regular diet, will be NPO at midnight tonight for debridement tomorrow. C/o 6/10 left foot pain, decreased with oxycodone 5mg x1 with little relief, 10mg x2, and ice applied.

## 2018-11-25 NOTE — CONSULTS
[facility]    Infectious Disease Consultation     Date of Admission:  11/24/2018  Date of Consult (When I saw the patient): 11/25/18    Assessment & Plan   Shalonda Howard is a 48 year old female who was admitted on 11/24/2018.     Impression:  1. This is a 48 y.o familiar to me from her prior admission.   2. She has a history of bilateral bunion repair surgery done in August this year. The right is all healed up but the left side the insion has not been healing even after 4 months, 1 I and D and 6 weeks of IV antibiotics and many more weeks of oral antibiotics since the end of August.   3. She is finishing the 6 weeks of IV ancef on the 27th of November, her cultures have previously been positive for MSSA.   4. She also has some hardware in the ankle on that side along with a non healing ulcer on the heel.   5. The plan is for her to have repeat I and D.   6. She is currently on Ancef, her WBC is normal, no fevers, and continued elevated inflammatory markers.     Recommendations:   Continue IV ancef.   Will follow up on the OR cultures  Blood cultures done and pending.     Ignacia Bocanegra MD    Reason for Consult   Reason for consult: I was asked by Rei Moe PA-C  to evaluate this patient for chronic infection in the left foot.    Primary Care Physician   Linda Bernstein    Chief Complaint   Non healing foot incision     History is obtained from the patient and medical records    History of Present Illness    From [facility]   Shalonda Howard is a 48 year old female from [facility] : Shalonda Howard is a pleasant 48-year-old female with a past medical history of systemic lupus erythematosus, polycystic ovarian syndrome, chronic lower extremity edema, GERD, kidney stones, anxiety, dysthymia and obesity who was admitted to the hospital from home at the recommendation of her orthopedic surgeon, Dr. Harman, due to concern for a worsening left foot infection.  The patient had a bilateral bunionectomy in 08/2018, of  which the right foot healed normally but her left foot became infected.  She was admitted in 09/2018 due to wound dehiscence and on 09/26/2018 had I and D as well as hardware removal with Orthopedic Surgery.  She was treated with antibiotics and discharged.  She did improve for a while before returning in 10/2018 due to concerns for worsening infection.  She had an MRI at that time that Orthopedics reviewed and stated did not seem concerning for osteomyelitis.  She was treated with IV Ancef and was seen by Infectious Disease who discharged her on 6 weeks of IV Ancef through a PICC line.  She was most recently seen in the Emergency Department on 11/20/2018 here where there was concern for repeat infection given increasing redness, swelling and drainage.  She has been compliant with her Ancef t.i.d.  Her ESR is increasing, but other inflammatory markers were improved.  Her white blood cell count was normal and she was afebrile but was having fevers at home.  The ED provider did discuss with her orthopedist that felt that if her exam was reassuring that she could be discharged home.  The patient reports that her symptoms have progressed since then and thus, at the recommendation of her orthopedic provider, she was directly admitted to the [facility] orthopedic floor.     Past Medical History   I have reviewed this patient's medical history and updated it with pertinent information if needed.   Past Medical History:   Diagnosis Date     Allergic rhinitis, cause unspecified      Anxiety state, unspecified      Cellulitis and abscess of leg, except foot      Closed fracture of unspecified part of tibia      Disuse osteoporosis      Dysthymic disorder      Edema      Encounter for long-term (current) use of other medications      Esophageal reflux      Kidney stones      Myalgia and myositis, unspecified      Obesity, unspecified      Open wound of foot except toe(s) alone, complicated      Opioid type  dependence, unspecified      Other acne      Other congenital valgus deformity of feet      Other ventral hernia without mention of obstruction or gangrene      Polycystic ovaries      PONV (postoperative nausea and vomiting)      Systemic lupus erythematosus (H)      Tibialis tendinitis      Unspecified hereditary and idiopathic peripheral neuropathy        Past Surgical History   I have reviewed this patient's surgical history and updated it with pertinent information if needed.  Past Surgical History:   Procedure Laterality Date     APPENDECTOMY       ARTHRODESIS FOOT Left 2/4/2015    Procedure: ARTHRODESIS FOOT;  Surgeon: Andre Harman MD;  Location: [facility]     BUNIONECTOMY RT/LT  08/29/2018     DILATION AND CURETTAGE       EXCISE TOENAIL(S) Left 2/4/2015    Procedure: EXCISE TOENAIL(S);  Surgeon: Andre Harman MD;  Location: [facility]     HERNIA REPAIR      umbilical     HERNIA REPAIR      ventral open x 2     IRRIGATION AND DEBRIDEMENT FOOT, COMBINED Left 9/26/2018    Procedure: COMBINED IRRIGATION AND DEBRIDEMENT FOOT;  IRRIGATION AND DEBRIDEMENT LEFT FOOT;  Surgeon: Andre Harman MD;  Location: [facility]     TONSILLECTOMY         Prior to Admission Medications   Prior to Admission Medications   Prescriptions Last Dose Informant Patient Reported? Taking?   BuPROPion HCl (WELLBUTRIN XL PO) 11/24/2018 at AM Self Yes Yes   Sig: Take 300 mg by mouth daily   BusPIRone HCl (BUSPAR PO) 11/24/2018 at AM Self Yes Yes   Sig: Take 15 mg by mouth 2 times daily   Furosemide (LASIX PO) 11/23/2018 at AM Self Yes Yes   Sig: Take 160 mg by mouth daily    HydrOXYzine Pamoate (VISTARIL PO) Past Week at PRN Self Yes Yes   Sig: Take 25 mg by mouth every 4 hours as needed for anxiety    Omeprazole (PRILOSEC PO) 11/24/2018 at AM Self Yes Yes   Sig: Take 40 mg by mouth 2 times daily (before meals) 2 CAPSULES IN A.M.    Ondansetron HCl (ZOFRAN PO)  at PRN Self Yes Yes   Sig: Take 8 mg by mouth as needed for nausea or  vomiting   Potassium Chloride Shameka CR (K-DUR PO) 11/23/2018 at PM Self Yes Yes   Sig: Take 80 mEq by mouth daily    Topiramate (TOPAMAX PO) 11/23/2018 at PM Self Yes Yes   Sig: Take 100 mg by mouth At Bedtime    ammonium lactate (AMLACTIN) 12 % cream  at PRN Self Yes Yes   Sig: Apply topically daily as needed (to heels)    ceFAZolin (ANCEF) 1 GM vial 11/24/2018 at AM Self No Yes   Sig: Inject 2 g into the vein every 8 hours ESR,CRP,CBC with differential, creatinine, SGOT weekly while on this medication to be faxed to Dr. Bocanegra office.   clindamycin (CLEOCIN T) 1 % solution  at PRN Self Yes Yes   Sig: Apply topically nightly as needed    gabapentin (NEURONTIN) 800 MG tablet 11/24/2018 at AM Self Yes Yes   Sig: Take 800 mg by mouth 3 times daily Am, supper, hs   oxyCODONE-acetaminophen (PERCOCET) 5-325 MG per tablet 11/24/2018 at PRN Self No Yes   Sig: Take 1-2 tablets by mouth every 4 hours as needed for severe pain   phentermine 30 MG capsule 11/24/2018 at AM Self Yes Yes   Sig: Take 30 mg by mouth every morning   polyethylene glycol (MIRALAX/GLYCOLAX) packet  at PRN Self Yes Yes   Sig: Take 17 g by mouth as needed for constipation      Facility-Administered Medications: None     Allergies   Allergies   Allergen Reactions     Sulfa Drugs Shortness Of Breath and Rash     Demerol [Meperidine] Nausea and Vomiting     Erythromycin Nausea and Vomiting     Metformin Nausea and Vomiting     Codeine Rash     Penicillins Rash       Immunization History   Immunization History   Administered Date(s) Administered     Influenza Vaccine IM 3yrs+ 4 Valent IIV4 10/16/2018       Social History   I have reviewed this patient's social history and updated it with pertinent information if needed. Shalonda Howard  reports that she quit smoking about 16 years ago. Her smoking use included Cigarettes. She has a 6.00 pack-year smoking history. She has never used smokeless tobacco. She reports that she drinks alcohol. She reports that she  does not use illicit drugs.    Family History   I have reviewed this patient's family history and updated it with pertinent information if needed.   No family history on file.    Review of Systems   The 10 point Review of Systems is negative other than noted in the HPI or here.     Physical Exam   Temp: 98.1  F (36.7  C) Temp src: Oral BP: 122/77   Heart Rate: 75 Resp: 16 SpO2: 96 % O2 Device: None (Room air)    Vital Signs with Ranges  Temp:  [97.7  F (36.5  C)-98.7  F (37.1  C)] 98.1  F (36.7  C)  Heart Rate:  [75-98] 75  Resp:  [16] 16  BP: (122-157)/(77-89) 122/77  SpO2:  [96 %-98 %] 96 %  0 lbs 0 oz  Body mass index is 33.28 kg/(m^2).    GENERAL APPEARANCE:  alert and no distress  EYES: Eyes grossly normal to inspection, PERRL and conjunctivae and sclerae normal  HENT: ear canals and TM's normal and nose and mouth without ulcers or lesions  NECK: no adenopathy, no asymmetry, masses, or scars and thyroid normal to palpation  RESP: lungs clear to auscultation - no rales, rhonchi or wheezes  CV: regular rates and rhythm, normal S1 S2, no S3 or S4 and no murmur, click or rub  LYMPHATICS: normal ant/post cervical and supraclavicular nodes  ABDOMEN: soft, nontender, without hepatosplenomegaly or masses and bowel sounds normal  MS: open area on the incision on the left foot   Also continues to have an ulceration on the heel   SKIN: no suspicious lesions or rashes      Data   Lab Results   Component Value Date    WBC 8.0 11/25/2018    HGB 10.8 (L) 11/25/2018    HCT 32.3 (L) 11/25/2018     11/25/2018     11/25/2018    POTASSIUM 3.2 (L) 11/25/2018    CHLORIDE 111 (H) 11/25/2018    CO2 25 11/25/2018    BUN 10 11/25/2018    CR 0.66 11/25/2018     (H) 11/25/2018    SED 28 (H) 11/24/2018    NTBNPI 66 10/17/2018    AST 11 11/19/2018    ALT 15 10/18/2018    ALKPHOS 124 10/18/2018    BILITOTAL 0.1 (L) 10/18/2018       Recent Labs  Lab 11/24/18  1950 11/24/18  1814 11/20/18  1700 11/20/18  1634   CULT No  growth after 7 hours No growth after 8 hours No growth after 5 days No growth after 5 days     Recent Labs   Lab Test  11/24/18   1950  11/24/18   1814  11/20/18   1700  11/20/18   1634  09/26/18   1631  09/24/18   2115  09/24/18   1708  09/24/18   1620   CULT  No growth after 7 hours  No growth after 8 hours  No growth after 5 days  No growth after 5 days  No anaerobes isolated  Light growth  Staphylococcus aureus  *  Light growth  Normal skin kenyatta    Moderate growth  Staphylococcus aureus  *  No growth  No growth

## 2018-11-25 NOTE — PROGRESS NOTES
[facility]    Hospitalist Progress Note    Assessment & Plan   Shalonda Howard is a pleasant 48-year-old female with a past medical history of lupus, PCOS, chronic lower extremity edema, kidney stones, anxiety, dysthymia and obesity who returns to the hospital with a postoperative nonhealing left foot wound and infection, admitted by Orthopedic Surgery.  The Hospitalist Service was consulted for medical management.      1.  Left postoperative foot infection/nonhealing wound.  As above, the patient had bilateral bunionectomy in 08/2018.  Right foot healed well, but left foot had dehiscence and required I and D and removal of hardware on 09/26/2018.  The patient was hospitalized again in 10/2018 due to concerns for infection.  MRI was not convincing for osteomyelitis at that time per ortho.  She was seen by Infectious Disease who recommended IV Ancef for 6 weeks.  She has remained on this.  She did not have any procedures at that hospitalization.  She returned to the ED on 11/20/2018 due to increased redness, swelling and drainage from her wound.  She also has a new left heel ulceration which is worsening along with edema and redness of her left second and fourth toes.  She ultimately was discharged home but now is admitted under the recommendation of Orthopedic Surgery for further evaluation.    -We will continue IV Ancef. Consult Infectious Disease.  MRI of the foot yesterday showed osteomyelitis.       2.  Chronic lower extremity edema.  This is at baseline per patient, although her left foot and ankle are more swollen than baseline due to her infection.  Prior to admission on Lasix 160 mg daily with potassium 80 mEq daily. Holding for now, likely resume tomorrow.      3.  Dysthymia and anxiety disorder.  Continue with prior to admission BuSpar, Wellbutrin and Topamax.  Continue p.r.n. Vistaril for anxiety.   4.  Gastroesophageal reflux disease.  Continue prior to admission PPI.   5.  Neuropathy.   Continue PTA gabapentin 800 mg t.i.d.   6.  Obesity.  BMI of 32.28.  Management per primary care provider.   7.  Systemic lupus erythematosus.  Not a current concern.   8.  PCOS.  Continue any PTA medication when verified.   9.  DVT prophylaxis.  Mechanical PCDs and ambulation.       CODE STATUS:  Full code.        The Hospitalist Service would like to thank Dr. Harman for the consult.  We will continue to follow.       Amilcar Heredia MD   Text Page (7am to 6pm)    Interval History   Possible surgical debridement tomorrow.     -Data reviewed today: I reviewed all new labs and imaging results over the last 24 hours. I personally reviewed no images or EKG's today.    Physical Exam   Temp: 98.1  F (36.7  C) Temp src: Oral BP: 122/77   Heart Rate: 75 Resp: 16 SpO2: 96 % O2 Device: None (Room air)    There were no vitals filed for this visit.  Vital Signs with Ranges  Temp:  [97.7  F (36.5  C)-98.7  F (37.1  C)] 98.1  F (36.7  C)  Heart Rate:  [75-98] 75  Resp:  [16] 16  BP: (122-157)/(77-89) 122/77  SpO2:  [96 %-98 %] 96 %       EYES:  Pupils equal and round.  Extraocular movements intact.     HENT:  Head normocephalic.  Throat, lips, gums and tongue appear moist.  Posterior pharynx clear.   NECK:  Supple.  No cervical adenopathy.   CARDIOVASCULAR:  Heart regular rate and rhythm.  No murmurs, rubs or gallops.  Distal pulses are intact.  She has 1+ bilateral lower extremity edema.   PULMONARY:  Lungs clear to auscultation bilaterally.  No crackles, wheezes or rhonchi.  Breathing is unlabored.   GASTROINTESTINAL:  Abdomen is soft, obese, nontender, nondistended with normoactive bowel sounds.   MUSCULOSKELETAL:  The patient is able to move all 4 extremities equally with normal strength.  Her right lower extremity is wrapped with a dressing, which was left in place.  I did remove Band-Aids on her second and fourth left toes which reveal significant redness, swelling and desquamation at the end of the toe with loss of toenail.   She also has an ulceration of the left heel to at least the dermis with some associated weeping.  No open sinuses or drainage.  Left foot is red and swollen up to ankle.  Right lower extremity appears to be well-healed.  Pulses are intact bilaterally.   SKIN:  Warm, dry.  Left foot erythema as above.     Medications     - MEDICATION INSTRUCTIONS -       lactated ringers 10 mL/hr at 11/25/18 0806       buPROPion (WELLBUTRIN XL) 24 hr tablet 300 mg  300 mg Oral Daily     busPIRone  15 mg Oral BID     ceFAZolin  1 g Intravenous Q8H     gabapentin  800 mg Oral TID     omeprazole (priLOSEC) CR capsule 40 mg  40 mg Oral BID AC     topiramate (TOPAMAX) tablet 100 mg  100 mg Oral At Bedtime       Data     Recent Labs  Lab 11/25/18  0610 11/24/18  1950 11/20/18  1637 11/19/18  1000   WBC 8.0 10.5 10.7 8.7   HGB 10.8* 11.4* 12.1 12.3   MCV 83 83 83 86    395 364 393    140 139  --    POTASSIUM 3.2* 3.5 3.4  --    CHLORIDE 111* 112* 110*  --    CO2 25 22 22  --    BUN 10 12 13 14   CR 0.66 0.61 0.64 0.64   ANIONGAP 6 6 7  --    PAULINE 8.0* 8.5 8.5  --    * 86 125*  --    AST  --   --   --  11       Imaging:   Recent Results (from the past 24 hour(s))   MR Foot Left w/o & w Contrast    Narrative    MRI LEFT FOOT WITHOUT AND WITH INTRAVENOUS CONTRAST   11/25/2018 1:40  AM    HISTORY: Non-healing left foot wound, evaluate for osteomyelitis.     COMPARISON: An MRI on 10/17/2018.    TECHNIQUE: Multiplanar MR imaging was performed through the left foot  before and after the uneventful intravenous administration of 9 mL  Gadavist contrast.     FINDINGS: This study is moderately limited. There is motion artifact  due to the patient falling asleep and suddenly moving when waking.  Because of this, imaging of the posterior heel wound was not able to  be performed as this area is obscured by motion and surgical metal  artifact. Again seen are surgical changes of a first metatarsal  osteotomy. There continues to be  extensive bone marrow edema on both  sides of the osteotomy. There continues to be an adjacent open wound  with prominent edema in the surrounding soft tissues. A distinct  abscess is not definitely seen.    No other osseous abnormality is demonstrated. No other focal soft  tissue pathology is seen.      Impression    IMPRESSION:  1. Imaging is limited as described above. Evaluation of the heel  including a reported heel wound was not able to be performed.  2. First metatarsal osteotomy. There continues to be extensive marrow  edema in this region, greater than expected following a simple  osteotomy. There is an adjacent soft tissue defect with surrounding  cellulitis. No abscess is seen. This combination of findings is  suspicious for osteomyelitis of the first metatarsal.     BURKE DRIVER MD

## 2018-11-25 NOTE — PROGRESS NOTES
Shalonda Howard  2018  Hospital day 1 L foot non-healing wound/infection    Dressings changed. Bloody/serosang drainage present.  Medicine and ID following.  Plan I&D tomorrow and potential 1st ray amputation secondary to suspected osteomyelitis.  Temperatures:  Current - Temp: 98.1  F (36.7  C); Max - Temp  Av.2  F (36.8  C)  Min: 97.7  F (36.5  C)  Max: 98.7  F (37.1  C)  Pulse range: No Data Recorded  Blood pressure range: Systolic (24hrs), Av , Min:122 , Max:157   ; Diastolic (24hrs), Av, Min:77, Max:89    CMS: intact  Labs:blood cultures negative.    PLAN:NPO after midnight.  Plan OR tomorrow.  Additional problems to be followed by medicine physician     erin all pertinent systems normal

## 2018-11-25 NOTE — PLAN OF CARE
Problem: Patient Care Overview  Goal: Plan of Care/Patient Progress Review  Outcome: No Change  A&Ox4.  VSS.  C/o pain L foot/ankle, managed with PRN oxycodone.  LS clear.  BS normoactive, +flatus.  L foot dressing x2 CDI.  Heel ulcer to be assessed by hospitalist, no WOC consult ordered yet.  MRI completed.  R PICC infusing.  Clear liquid diet.  Bedrest, otherwise independent.

## 2018-11-25 NOTE — PROGRESS NOTES
Sunday [facility] ortho rounder  Left foot infection  [facility] patient  Direct admitted per Dr. Harman yesterday  MRI consistent with first MT osteomyelitis  Patient reports no complaints  No chills or sweats  No CP, SOB, N/V  AVSS  CMS intact  Dressings not taken down  Per patient she may have surgical debridement performed tomorrow  Will keep NPO after midnight pending Dr. Harman evaluation    Scooby Vallejo

## 2018-11-25 NOTE — CONSULTS
Consult Date:  11/24/2018      PRIMARY CARE PROVIDER:  Linda Bernstein PA-C.      REQUESTING PHYSICIAN:  Dr. Harman, Orthopedic Surgery.      REASON FOR CONSULTATION:  Left foot infection and medical management.      HISTORY OF PRESENT ILLNESS:  Shalonda Howard is a pleasant 48-year-old female with a past medical history of systemic lupus erythematosus, polycystic ovarian syndrome, chronic lower extremity edema, GERD, kidney stones, anxiety, dysthymia and obesity who was admitted to the hospital from home at the recommendation of her orthopedic surgeon, Dr. Harman, due to concern for a worsening left foot infection.  The patient had a bilateral bunionectomy in 08/2018, of which the right foot healed normally but her left foot became infected.  She was admitted in 09/2018 due to wound dehiscence and on 09/26/2018 had I and D as well as hardware removal with Orthopedic Surgery.  She was treated with antibiotics and discharged.  She did improve for a while before returning in 10/2018 due to concerns for worsening infection.  She had an MRI at that time that Orthopedics reviewed and stated did not seem concerning for osteomyelitis.  She was treated with IV Ancef and was seen by Infectious Disease who discharged her on 6 weeks of IV Ancef through a PICC line.  She was most recently seen in the Emergency Department on 11/20/2018 here where there was concern for repeat infection given increasing redness, swelling and drainage.  She has been compliant with her Ancef t.i.d.  Her ESR is increasing, but other inflammatory markers were improved.  Her white blood cell count was normal and she was afebrile but was having fevers at home.  The ED provider did discuss with her orthopedist that felt that if her exam was reassuring that she could be discharged home.  The patient reports that her symptoms have progressed since then and thus, at the recommendation of her orthopedic provider, she was directly admitted to the [facility] orthopedic floor.      The patient was currently resting in bed on my arrival.  She was afebrile here, but reports fevers up to 102.5 at home.  Repeat blood cultures are pending, but her blood cultures taken in the ED on 11/20 are so far no growth to date.  Last WBC was 10.7.  She displays no signs for sepsis.  Last CRP is 7.0 and ESR is 49.  She also shows me that she has a left heel ulcer which has progressed since her previous hospitalization as well as increased redness and swelling and desquamation of her left second and fourth toes.  She has been doing frequent foot soaks and trying to elevate her foot as able.  She denies any headache, lightheadedness, chest pain, shortness of breath, abdominal pain, nausea, vomiting, diarrhea, dysuria or focal weakness.  No new injuries to the affected extremity.      PAST MEDICAL HISTORY:   1.  Allergic rhinitis.   2.  Generalized anxiety.   3.  Postoperative left foot infection as detailed above.   4.  Osteoporosis.   5.  Dysthymic disorder.   6.  Chronic bilateral lower extremity edema.   7.  Obesity.   8.  GERD.   9.  Kidney stones.   10.  Myalgia and myositis.   11.  Opioid dependence.   12.  Polycystic ovarian syndrome.   13.  Ventral hernia without obstruction.   14.  PONV.   15.  Systemic lupus erythematosus.   16.  Tibialis tendinitis.   17.  Peripheral neuropathy.      PAST SURGICAL HISTORY:   1.  Tonsillectomy.   2.  Irrigation and debridement of left foot 09/26/2018.   3.  Bilateral bunionectomy in 08/2018.   4.  Hernia repair, umbilical.   5.  Hernia repair, ventral x 2.   6.  D and C.   7.  Left foot arthrodesis 02/2015.   8.  Appendectomy.      FAMILY HISTORY:  Reviewed with patient, noncontributory to this presentation.      SOCIAL HISTORY:  The patient is a former smoker and quit in 2002.  She has a 6 pack-year history.  She has wine occasionally with dinner.  No illicit drug use.  She is .      PRIOR TO ADMISSION MEDICATIONS:     Prior to Admission medications    Medication Sig Last Dose Taking? Auth Provider   ammonium lactate (AMLACTIN) 12 % cream Apply topically daily as needed (to heels)   at PRN Yes Unknown, Entered By History   BuPROPion HCl (WELLBUTRIN XL PO) Take 300 mg by mouth daily 11/24/2018 at AM Yes Reported, Patient   BusPIRone HCl (BUSPAR PO) Take 15 mg by mouth 2 times daily 11/24/2018 at AM Yes Reported, Patient   ceFAZolin (ANCEF) 1 GM vial Inject 2 g into the vein every 8 hours ESR,CRP,CBC with differential, creatinine, SGOT weekly while on this medication to be faxed to Dr. Bocanegra office. 11/24/2018 at AM Yes Thang Canada MD   clindamycin (CLEOCIN T) 1 % solution Apply topically nightly as needed   at PRN Yes Unknown, Entered By History   Furosemide (LASIX PO) Take 160 mg by mouth daily  11/23/2018 at AM Yes Reported, Patient   gabapentin (NEURONTIN) 800 MG tablet Take 800 mg by mouth 3 times daily Am, supper, hs 11/24/2018 at AM Yes Unknown, Entered By History   HydrOXYzine Pamoate (VISTARIL PO) Take 25 mg by mouth every 4 hours as needed for anxiety  Past Week at PRN Yes Reported, Patient   Omeprazole (PRILOSEC PO) Take 40 mg by mouth 2 times daily (before meals) 2 CAPSULES IN A.M.  11/24/2018 at AM Yes Reported, Patient   Ondansetron HCl (ZOFRAN PO) Take 8 mg by mouth as needed for nausea or vomiting  at PRN Yes Reported, Patient   oxyCODONE-acetaminophen (PERCOCET) 5-325 MG per tablet Take 1-2 tablets by mouth every 4 hours as needed for severe pain 11/24/2018 at PRN Yes Jett Malik, PA-C   phentermine 30 MG capsule Take 30 mg by mouth every morning 11/24/2018 at AM Yes Reported, Patient   polyethylene glycol (MIRALAX/GLYCOLAX) packet Take 17 g by mouth as needed for constipation  at PRN Yes Reported, Patient   Potassium Chloride Shameka CR (K-DUR PO) Take 80 mEq by mouth daily  11/23/2018 at PM Yes Reported, Patient   Topiramate (TOPAMAX PO) Take 100 mg by mouth At Bedtime  11/23/2018 at PM Yes Reported,  Patient        ALLERGIES:  SULFA DRUGS, CODEINE, PENICILLINS, METFORMIN, ERYTHROMYCIN AND DEMEROL.      REVIEW OF SYSTEMS:  A complete 10-point review of systems was performed and is negative other than as previously mentioned in above HPI.      PHYSICAL EXAMINATION:   VITAL SIGNS:  Blood pressure 157/89, heart rate 98 beats per minute, temperature 98.7, respiratory rate 16, oxygen saturation 98% on room air.   GENERAL:  The patient is alert, oriented to person, place and situation, cooperative, lying in bed, in no apparent distress.   EYES:  Pupils equal and round.  Extraocular movements intact.     HENT:  Head normocephalic.  Throat, lips, gums and tongue appear moist.  Posterior pharynx clear.   NECK:  Supple.  No cervical adenopathy.   CARDIOVASCULAR:  Heart regular rate and rhythm.  No murmurs, rubs or gallops.  Distal pulses are intact.  She has 1+ bilateral lower extremity edema.   PULMONARY:  Lungs clear to auscultation bilaterally.  No crackles, wheezes or rhonchi.  Breathing is unlabored.   GASTROINTESTINAL:  Abdomen is soft, obese, nontender, nondistended with normoactive bowel sounds.   MUSCULOSKELETAL:  The patient is able to move all 4 extremities equally with normal strength.  Her right lower extremity is wrapped with a dressing, which was left in place.  I did remove Band-Aids on her second and fourth left toes which reveal significant redness, swelling and desquamation at the end of the toe with loss of toenail.  She also has an ulceration of the left heel to at least the dermis with some associated weeping.  No open sinuses or drainage.  Left foot is red and swollen up to ankle.  Right lower extremity appears to be well-healed.  Pulses are intact bilaterally.   SKIN:  Warm, dry.  Left foot erythema as above.   PSYCHIATRIC:  Normal mood and affect.      LABORATORY DATA:  BMP:  Potassium 3.4, chloride 110, glucose 125, otherwise within normal limits.  CBC:  WBC 10.7, hemoglobin 12.1, hematocrit 35.2,  platelet count 364.  CRP 7.0, ESR 49.  Blood cultures from 11/20/2018 are no growth to date.  Blood culture from today is pending.  CBC and BMP from today are also pending.      ASSESSMENT AND PLAN:  Shalonda Howard is a pleasant 48-year-old female with a past medical history of lupus, PCOS, chronic lower extremity edema, kidney stones, anxiety, dysthymia and obesity who returns to the hospital with a postoperative nonhealing left foot wound and infection, admitted by Orthopedic Surgery.  The Hospitalist Service was consulted for medical management.     1.  Left postoperative foot infection/nonhealing wound.  As above, the patient had bilateral bunionectomy in 08/2018.  Right foot healed well, but left foot had dehiscence and required I and D and removal of hardware on 09/26/2018.  The patient was hospitalized again in 10/2018 due to concerns for infection.  MRI was not convincing for osteomyelitis at that time per ortho.  She was seen by Infectious Disease who recommended IV Ancef for 6 weeks.  She has remained on this.  She did not have any procedures at that hospitalization.  She returned to the ED on 11/20/2018 due to increased redness, swelling and drainage from her wound.  She also has a new left heel ulceration which is worsening along with edema and redness of her left second and fourth toes.  She ultimately was discharged home but now is admitted under the recommendation of Orthopedic Surgery for further evaluation.  We will continue IV Ancef. Consult Infectious Disease.  MRI of the foot ordered to assess for osteomyelitis.  Recheck inflammatory markers, CRP and ESR.  Recheck a CBC.  Blood culture pending.  Blood culture from 11/20, no growth to date.  Fevers at home up to 102.5, but afebrile here.  No other signs for sepsis.  IV fluids.  Elevate foot.  Pain medications p.r.n.     2.  Chronic lower extremity edema.  This is at baseline per patient, although her left foot and ankle are more swollen than  baseline due to her infection.  Prior to admission on Lasix 160 mg daily with potassium 80 mEq daily.  We will hold Lasix and potassium pending repeat blood work and likely resume at a lower dose.     3.  Dysthymia and anxiety disorder.  Continue with prior to admission BuSpar, Wellbutrin and Topamax.  Continue p.r.n. Vistaril for anxiety.   4.  Gastroesophageal reflux disease.  Continue prior to admission PPI.   5.  Neuropathy.  Continue PTA gabapentin 800 mg t.i.d.   6.  Obesity.  BMI of 32.28.  Management per primary care provider.   7.  Systemic lupus erythematosus.  Not a current concern.   8.  PCOS.  Continue any PTA medication when verified.   9.  DVT prophylaxis.  Mechanical PCDs and ambulation.      CODE STATUS:  Full code.      The patient was discussed with Dr. Olivia Stanford of the [facility] Hospitalist Service.  She is in agreement with my assessment and plan of care.      The Hospitalist Service would like to thank Dr. Harman for the consult.  We will continue to follow.         OLIVIA STANOFRD MD       As dictated by OMEGA HERNANDEZ PA-C            D: 2018   T: 2018   MT: HOLDEN      Name:     CASSANDRA FRANCO   MRN:      [id number]        Account:       [id number]   :      1970           Consult Date:  2018      Document: [id number]       cc: Andre Bernstein PA-C

## 2018-11-26 ENCOUNTER — ANESTHESIA EVENT (OUTPATIENT)
Dept: SURGERY | Facility: CLINIC | Age: 48
DRG: 908 | End: 2018-11-26
Payer: COMMERCIAL

## 2018-11-26 ENCOUNTER — APPOINTMENT (OUTPATIENT)
Dept: ULTRASOUND IMAGING | Facility: CLINIC | Age: 48
DRG: 908 | End: 2018-11-26
Attending: INTERNAL MEDICINE
Payer: COMMERCIAL

## 2018-11-26 ENCOUNTER — ANESTHESIA (OUTPATIENT)
Dept: SURGERY | Facility: CLINIC | Age: 48
DRG: 908 | End: 2018-11-26
Payer: COMMERCIAL

## 2018-11-26 LAB
ALBUMIN SERPL-MCNC: 2.8 G/DL (ref 3.4–5)
ALP SERPL-CCNC: 117 U/L (ref 40–150)
ALT SERPL W P-5'-P-CCNC: 16 U/L (ref 0–50)
ANION GAP SERPL CALCULATED.3IONS-SCNC: 9 MMOL/L (ref 3–14)
AST SERPL W P-5'-P-CCNC: 12 U/L (ref 0–45)
BACTERIA SPEC CULT: NO GROWTH
BACTERIA SPEC CULT: NO GROWTH
BILIRUB SERPL-MCNC: <0.1 MG/DL (ref 0.2–1.3)
BUN SERPL-MCNC: 14 MG/DL (ref 7–30)
CALCIUM SERPL-MCNC: 7.9 MG/DL (ref 8.5–10.1)
CHLORIDE SERPL-SCNC: 111 MMOL/L (ref 94–109)
CO2 SERPL-SCNC: 23 MMOL/L (ref 20–32)
CREAT SERPL-MCNC: 0.63 MG/DL (ref 0.52–1.04)
ERYTHROCYTE [DISTWIDTH] IN BLOOD BY AUTOMATED COUNT: 14.9 % (ref 10–15)
GFR SERPL CREATININE-BSD FRML MDRD: >90 ML/MIN/1.7M2
GLUCOSE SERPL-MCNC: 117 MG/DL (ref 70–99)
HCG UR QL: NEGATIVE
HCT VFR BLD AUTO: 32.1 % (ref 35–47)
HGB BLD-MCNC: 10.7 G/DL (ref 11.7–15.7)
Lab: NORMAL
Lab: NORMAL
MCH RBC QN AUTO: 27.9 PG (ref 26.5–33)
MCHC RBC AUTO-ENTMCNC: 33.3 G/DL (ref 31.5–36.5)
MCV RBC AUTO: 84 FL (ref 78–100)
PLATELET # BLD AUTO: 353 10E9/L (ref 150–450)
POTASSIUM SERPL-SCNC: 3.3 MMOL/L (ref 3.4–5.3)
PROT SERPL-MCNC: 6.7 G/DL (ref 6.8–8.8)
RBC # BLD AUTO: 3.84 10E12/L (ref 3.8–5.2)
SODIUM SERPL-SCNC: 143 MMOL/L (ref 133–144)
SPECIMEN SOURCE: NORMAL
SPECIMEN SOURCE: NORMAL
WBC # BLD AUTO: 6.4 10E9/L (ref 4–11)

## 2018-11-26 PROCEDURE — 87077 CULTURE AEROBIC IDENTIFY: CPT | Performed by: ORTHOPAEDIC SURGERY

## 2018-11-26 PROCEDURE — 25000132 ZZH RX MED GY IP 250 OP 250 PS 637: Performed by: PHYSICIAN ASSISTANT

## 2018-11-26 PROCEDURE — 80053 COMPREHEN METABOLIC PANEL: CPT | Performed by: INTERNAL MEDICINE

## 2018-11-26 PROCEDURE — 12000007 ZZH R&B INTERMEDIATE

## 2018-11-26 PROCEDURE — 25000128 H RX IP 250 OP 636: Performed by: PHYSICIAN ASSISTANT

## 2018-11-26 PROCEDURE — 25000128 H RX IP 250 OP 636: Performed by: NURSE ANESTHETIST, CERTIFIED REGISTERED

## 2018-11-26 PROCEDURE — 81025 URINE PREGNANCY TEST: CPT | Performed by: ANESTHESIOLOGY

## 2018-11-26 PROCEDURE — 25000132 ZZH RX MED GY IP 250 OP 250 PS 637: Performed by: INTERNAL MEDICINE

## 2018-11-26 PROCEDURE — 27210794 ZZH OR GENERAL SUPPLY STERILE: Performed by: ORTHOPAEDIC SURGERY

## 2018-11-26 PROCEDURE — 87075 CULTR BACTERIA EXCEPT BLOOD: CPT | Performed by: ORTHOPAEDIC SURGERY

## 2018-11-26 PROCEDURE — 36000056 ZZH SURGERY LEVEL 3 1ST 30 MIN: Performed by: ORTHOPAEDIC SURGERY

## 2018-11-26 PROCEDURE — 36000058 ZZH SURGERY LEVEL 3 EA 15 ADDTL MIN: Performed by: ORTHOPAEDIC SURGERY

## 2018-11-26 PROCEDURE — 0L8W3ZZ DIVISION OF LEFT FOOT TENDON, PERCUTANEOUS APPROACH: ICD-10-PCS | Performed by: ORTHOPAEDIC SURGERY

## 2018-11-26 PROCEDURE — 93924 LWR XTR VASC STDY BILAT: CPT

## 2018-11-26 PROCEDURE — 87186 SC STD MICRODIL/AGAR DIL: CPT | Performed by: ORTHOPAEDIC SURGERY

## 2018-11-26 PROCEDURE — 87070 CULTURE OTHR SPECIMN AEROBIC: CPT | Performed by: ORTHOPAEDIC SURGERY

## 2018-11-26 PROCEDURE — 27110028 ZZH OR GENERAL SUPPLY NON-STERILE: Performed by: ORTHOPAEDIC SURGERY

## 2018-11-26 PROCEDURE — 0QBP0ZZ EXCISION OF LEFT METATARSAL, OPEN APPROACH: ICD-10-PCS | Performed by: ORTHOPAEDIC SURGERY

## 2018-11-26 PROCEDURE — 25000125 ZZHC RX 250: Performed by: ANESTHESIOLOGY

## 2018-11-26 PROCEDURE — 25800025 ZZH RX 258: Performed by: ORTHOPAEDIC SURGERY

## 2018-11-26 PROCEDURE — 25000125 ZZHC RX 250: Performed by: NURSE ANESTHETIST, CERTIFIED REGISTERED

## 2018-11-26 PROCEDURE — 85027 COMPLETE CBC AUTOMATED: CPT | Performed by: INTERNAL MEDICINE

## 2018-11-26 PROCEDURE — 99223 1ST HOSP IP/OBS HIGH 75: CPT | Performed by: INTERNAL MEDICINE

## 2018-11-26 PROCEDURE — 71000012 ZZH RECOVERY PHASE 1 LEVEL 1 FIRST HR: Performed by: ORTHOPAEDIC SURGERY

## 2018-11-26 PROCEDURE — 37000008 ZZH ANESTHESIA TECHNICAL FEE, 1ST 30 MIN: Performed by: ORTHOPAEDIC SURGERY

## 2018-11-26 PROCEDURE — 71000013 ZZH RECOVERY PHASE 1 LEVEL 1 EA ADDTL HR: Performed by: ORTHOPAEDIC SURGERY

## 2018-11-26 PROCEDURE — 99232 SBSQ HOSP IP/OBS MODERATE 35: CPT | Performed by: HOSPITALIST

## 2018-11-26 PROCEDURE — 37000009 ZZH ANESTHESIA TECHNICAL FEE, EACH ADDTL 15 MIN: Performed by: ORTHOPAEDIC SURGERY

## 2018-11-26 PROCEDURE — 25000128 H RX IP 250 OP 636: Performed by: ORTHOPAEDIC SURGERY

## 2018-11-26 PROCEDURE — 25000128 H RX IP 250 OP 636: Performed by: ANESTHESIOLOGY

## 2018-11-26 PROCEDURE — 40000170 ZZH STATISTIC PRE-PROCEDURE ASSESSMENT II: Performed by: ORTHOPAEDIC SURGERY

## 2018-11-26 RX ORDER — SCOLOPAMINE TRANSDERMAL SYSTEM 1 MG/1
1 PATCH, EXTENDED RELEASE TRANSDERMAL ONCE
Status: COMPLETED | OUTPATIENT
Start: 2018-11-26 | End: 2018-11-26

## 2018-11-26 RX ORDER — PROPOFOL 10 MG/ML
INJECTION, EMULSION INTRAVENOUS CONTINUOUS PRN
Status: DISCONTINUED | OUTPATIENT
Start: 2018-11-26 | End: 2018-11-26

## 2018-11-26 RX ORDER — ONDANSETRON 4 MG/1
4 TABLET, ORALLY DISINTEGRATING ORAL EVERY 30 MIN PRN
Status: DISCONTINUED | OUTPATIENT
Start: 2018-11-26 | End: 2018-11-27 | Stop reason: HOSPADM

## 2018-11-26 RX ORDER — NALOXONE HYDROCHLORIDE 0.4 MG/ML
.1-.4 INJECTION, SOLUTION INTRAMUSCULAR; INTRAVENOUS; SUBCUTANEOUS
Status: DISCONTINUED | OUTPATIENT
Start: 2018-11-26 | End: 2018-11-27 | Stop reason: HOSPADM

## 2018-11-26 RX ORDER — SODIUM CHLORIDE, SODIUM LACTATE, POTASSIUM CHLORIDE, CALCIUM CHLORIDE 600; 310; 30; 20 MG/100ML; MG/100ML; MG/100ML; MG/100ML
INJECTION, SOLUTION INTRAVENOUS CONTINUOUS
Status: DISCONTINUED | OUTPATIENT
Start: 2018-11-26 | End: 2018-11-27 | Stop reason: HOSPADM

## 2018-11-26 RX ORDER — FENTANYL CITRATE 50 UG/ML
25-50 INJECTION, SOLUTION INTRAMUSCULAR; INTRAVENOUS
Status: DISCONTINUED | OUTPATIENT
Start: 2018-11-26 | End: 2018-11-26

## 2018-11-26 RX ORDER — CEFAZOLIN SODIUM 2 G/100ML
2 INJECTION, SOLUTION INTRAVENOUS
Status: COMPLETED | OUTPATIENT
Start: 2018-11-26 | End: 2018-11-26

## 2018-11-26 RX ORDER — ONDANSETRON 2 MG/ML
INJECTION INTRAMUSCULAR; INTRAVENOUS PRN
Status: DISCONTINUED | OUTPATIENT
Start: 2018-11-26 | End: 2018-11-26

## 2018-11-26 RX ORDER — LIDOCAINE 40 MG/G
CREAM TOPICAL
Status: DISCONTINUED | OUTPATIENT
Start: 2018-11-26 | End: 2018-11-27 | Stop reason: HOSPADM

## 2018-11-26 RX ORDER — DIAZEPAM 5 MG
5 TABLET ORAL EVERY 6 HOURS PRN
Status: DISCONTINUED | OUTPATIENT
Start: 2018-11-26 | End: 2018-11-27 | Stop reason: HOSPADM

## 2018-11-26 RX ORDER — ROPIVACAINE HYDROCHLORIDE 5 MG/ML
INJECTION, SOLUTION EPIDURAL; INFILTRATION; PERINEURAL PRN
Status: DISCONTINUED | OUTPATIENT
Start: 2018-11-26 | End: 2018-11-26 | Stop reason: HOSPADM

## 2018-11-26 RX ORDER — ONDANSETRON 2 MG/ML
4 INJECTION INTRAMUSCULAR; INTRAVENOUS EVERY 30 MIN PRN
Status: DISCONTINUED | OUTPATIENT
Start: 2018-11-26 | End: 2018-11-27 | Stop reason: HOSPADM

## 2018-11-26 RX ORDER — SODIUM CHLORIDE, SODIUM LACTATE, POTASSIUM CHLORIDE, CALCIUM CHLORIDE 600; 310; 30; 20 MG/100ML; MG/100ML; MG/100ML; MG/100ML
500 INJECTION, SOLUTION INTRAVENOUS CONTINUOUS
Status: DISCONTINUED | OUTPATIENT
Start: 2018-11-26 | End: 2018-11-26 | Stop reason: HOSPADM

## 2018-11-26 RX ORDER — NALOXONE HYDROCHLORIDE 0.4 MG/ML
.1-.4 INJECTION, SOLUTION INTRAMUSCULAR; INTRAVENOUS; SUBCUTANEOUS
Status: DISCONTINUED | OUTPATIENT
Start: 2018-11-26 | End: 2018-11-26

## 2018-11-26 RX ORDER — PROPOFOL 10 MG/ML
INJECTION, EMULSION INTRAVENOUS PRN
Status: DISCONTINUED | OUTPATIENT
Start: 2018-11-26 | End: 2018-11-26

## 2018-11-26 RX ORDER — CEFAZOLIN SODIUM 1 G/3ML
1 INJECTION, POWDER, FOR SOLUTION INTRAMUSCULAR; INTRAVENOUS SEE ADMIN INSTRUCTIONS
Status: DISCONTINUED | OUTPATIENT
Start: 2018-11-26 | End: 2018-11-26 | Stop reason: HOSPADM

## 2018-11-26 RX ORDER — HYDROMORPHONE HYDROCHLORIDE 1 MG/ML
.3-.5 INJECTION, SOLUTION INTRAMUSCULAR; INTRAVENOUS; SUBCUTANEOUS EVERY 5 MIN PRN
Status: DISCONTINUED | OUTPATIENT
Start: 2018-11-26 | End: 2018-11-26

## 2018-11-26 RX ORDER — LIDOCAINE 40 MG/G
CREAM TOPICAL
Status: DISCONTINUED | OUTPATIENT
Start: 2018-11-26 | End: 2018-11-26 | Stop reason: HOSPADM

## 2018-11-26 RX ORDER — FENTANYL CITRATE 50 UG/ML
INJECTION, SOLUTION INTRAMUSCULAR; INTRAVENOUS PRN
Status: DISCONTINUED | OUTPATIENT
Start: 2018-11-26 | End: 2018-11-26

## 2018-11-26 RX ORDER — SODIUM CHLORIDE, SODIUM LACTATE, POTASSIUM CHLORIDE, CALCIUM CHLORIDE 600; 310; 30; 20 MG/100ML; MG/100ML; MG/100ML; MG/100ML
INJECTION, SOLUTION INTRAVENOUS CONTINUOUS
Status: DISCONTINUED | OUTPATIENT
Start: 2018-11-26 | End: 2018-11-26

## 2018-11-26 RX ADMIN — OXYCODONE HYDROCHLORIDE 10 MG: 5 TABLET ORAL at 09:48

## 2018-11-26 RX ADMIN — PHENYLEPHRINE HYDROCHLORIDE 100 MCG: 10 INJECTION, SOLUTION INTRAMUSCULAR; INTRAVENOUS; SUBCUTANEOUS at 12:04

## 2018-11-26 RX ADMIN — PROPOFOL 200 MCG/KG/MIN: 10 INJECTION, EMULSION INTRAVENOUS at 11:50

## 2018-11-26 RX ADMIN — OXYCODONE HYDROCHLORIDE 10 MG: 5 TABLET ORAL at 16:05

## 2018-11-26 RX ADMIN — TOPIRAMATE 100 MG: 100 TABLET, FILM COATED ORAL at 21:13

## 2018-11-26 RX ADMIN — SCOLOPAMINE TRANSDERMAL SYSTEM 1 PATCH: 1 PATCH, EXTENDED RELEASE TRANSDERMAL at 11:42

## 2018-11-26 RX ADMIN — MIDAZOLAM 2 MG: 1 INJECTION INTRAMUSCULAR; INTRAVENOUS at 11:44

## 2018-11-26 RX ADMIN — OMEPRAZOLE 40 MG: 20 CAPSULE, DELAYED RELEASE ORAL at 16:06

## 2018-11-26 RX ADMIN — FENTANYL CITRATE 50 MCG: 50 INJECTION, SOLUTION INTRAMUSCULAR; INTRAVENOUS at 12:57

## 2018-11-26 RX ADMIN — FENTANYL CITRATE 50 MCG: 50 INJECTION, SOLUTION INTRAMUSCULAR; INTRAVENOUS at 11:44

## 2018-11-26 RX ADMIN — OXYCODONE HYDROCHLORIDE 10 MG: 5 TABLET ORAL at 23:30

## 2018-11-26 RX ADMIN — CEFAZOLIN SODIUM 1 G: 1 INJECTION, POWDER, FOR SOLUTION INTRAMUSCULAR; INTRAVENOUS at 19:17

## 2018-11-26 RX ADMIN — OXYCODONE HYDROCHLORIDE 10 MG: 5 TABLET ORAL at 06:50

## 2018-11-26 RX ADMIN — BUPROPION HYDROCHLORIDE 300 MG: 300 TABLET, FILM COATED, EXTENDED RELEASE ORAL at 08:13

## 2018-11-26 RX ADMIN — GABAPENTIN 800 MG: 800 TABLET, FILM COATED ORAL at 21:12

## 2018-11-26 RX ADMIN — SODIUM CHLORIDE, POTASSIUM CHLORIDE, SODIUM LACTATE AND CALCIUM CHLORIDE: 600; 310; 30; 20 INJECTION, SOLUTION INTRAVENOUS at 19:18

## 2018-11-26 RX ADMIN — SENNOSIDES AND DOCUSATE SODIUM 1 TABLET: 8.6; 5 TABLET ORAL at 21:13

## 2018-11-26 RX ADMIN — ONDANSETRON 4 MG: 2 INJECTION INTRAMUSCULAR; INTRAVENOUS at 11:55

## 2018-11-26 RX ADMIN — BUSPIRONE HYDROCHLORIDE 15 MG: 7.5 TABLET ORAL at 21:12

## 2018-11-26 RX ADMIN — PROPOFOL 200 MG: 10 INJECTION, EMULSION INTRAVENOUS at 11:50

## 2018-11-26 RX ADMIN — SODIUM CHLORIDE, POTASSIUM CHLORIDE, SODIUM LACTATE AND CALCIUM CHLORIDE: 600; 310; 30; 20 INJECTION, SOLUTION INTRAVENOUS at 13:58

## 2018-11-26 RX ADMIN — FENTANYL CITRATE 50 MCG: 50 INJECTION, SOLUTION INTRAMUSCULAR; INTRAVENOUS at 12:40

## 2018-11-26 RX ADMIN — CEFAZOLIN SODIUM 2 G: 2 INJECTION, SOLUTION INTRAVENOUS at 11:54

## 2018-11-26 RX ADMIN — CEFAZOLIN SODIUM 1 G: 1 INJECTION, POWDER, FOR SOLUTION INTRAMUSCULAR; INTRAVENOUS at 03:27

## 2018-11-26 RX ADMIN — OXYCODONE HYDROCHLORIDE 10 MG: 5 TABLET ORAL at 03:26

## 2018-11-26 RX ADMIN — BUSPIRONE HYDROCHLORIDE 15 MG: 7.5 TABLET ORAL at 08:13

## 2018-11-26 RX ADMIN — Medication 0.5 MG: at 13:15

## 2018-11-26 RX ADMIN — OXYCODONE HYDROCHLORIDE 10 MG: 5 TABLET ORAL at 19:16

## 2018-11-26 RX ADMIN — GABAPENTIN 800 MG: 800 TABLET, FILM COATED ORAL at 08:13

## 2018-11-26 RX ADMIN — GABAPENTIN 800 MG: 800 TABLET, FILM COATED ORAL at 16:06

## 2018-11-26 ASSESSMENT — ACTIVITIES OF DAILY LIVING (ADL)
ADLS_ACUITY_SCORE: 10
ADLS_ACUITY_SCORE: 11
ADLS_ACUITY_SCORE: 11

## 2018-11-26 NOTE — PROGRESS NOTES
Spoke with [facility] this morning to confirm that patient is still open to them. They will continue to follow along and Care Coordinator will inform them when patient discharges.

## 2018-11-26 NOTE — PLAN OF CARE
Problem: Patient Care Overview  Goal: Plan of Care/Patient Progress Review  Outcome: Improving  Pt A&O. Baseline neuropathy-patient states no change. VSS. Up w/ 1-partial WB. Denies pain meds at the moment. DTV after surgery. Continue to monitor.

## 2018-11-26 NOTE — ANESTHESIA CARE TRANSFER NOTE
Patient: Shalonda Howard    Procedure(s):  COMBINED IRRIGATION AND DEBRIDEMENT LEFT FOOT  REPAIR HAMMER TOE    Diagnosis: UNKNOWN  Diagnosis Additional Information: No value filed.    Anesthesia Type:   General, LMA     Note:  Airway :Face Mask  Patient transferred to:PACU  Handoff Report: Identifed the Patient, Identified the Reponsible Provider, Reviewed the pertinent medical history, Discussed the surgical course, Reviewed Intra-OP anesthesia mangement and issues during anesthesia, Set expectations for post-procedure period and Allowed opportunity for questions and acknowledgement of understanding      Vitals: (Last set prior to Anesthesia Care Transfer)    CRNA VITALS  11/26/2018 1152 - 11/26/2018 1227      11/26/2018             Pulse: 71    SpO2: 99 %    Resp Rate (observed): 11                Electronically Signed By: CRISTIN Gillespie CRNA  November 26, 2018  12:27 PM

## 2018-11-26 NOTE — CONSULTS
[facility]    Vascular Medicine Consultation     Date of Admission:  11/24/2018  Date of Consult (When I saw the patient): 11/26/18    Assessment & Plan      1. Non-healing left foot wound / osteomyelitis of the 1st metatarsal     She has had multiple non-healing wounds in her feet in the past with a negative vascular work-up in 2013 and 2/2017 at [facility]. It was felt at that time that her wounds were pressure related rather than vasculogenic. She now presents with a non-healing left foot wound and an MRI suggestive of osteomyelitis. She has strongly palpable pulses in her right lower extremity as well as her left lower extremity, but the left DP pulse was unable to be evaluated due to her surgical dressing. JJ's were obtained to further evaluate. This reveals that she has triphasic distal pulses with normal JJ's. This is therefore not a vasculogenic etiology presently. Therefore, Vascular Medicine will sign off. Please feel free to contact us if there are any additional questions we could be of help for ([phone]).       Reason for Consult   Reason for consult: Asked by Jett Malik PA-C to evaluate for a possible vascular etiology to her non-healing left foot wounds.     Primary Care Physician   Linda Bernstein      History of Present Illness   Shalonda Howard is a 48 year old female former smoker, non-diabetic with a history of obesity, anxiety, possible lupus (diagnosed at one point in past but not on any medication and unsure if truly has it), and peripheral neuropathy who has had trouble with recurrent feet wounds. She has been followed much of the time in the past at [facility]. In 2013 she was seen by Dr. Viramontes of Vascular Surgery, who apparently felt she had no vascular issues.  In 2/2017 she was seen by Dr. Hernandez, also of Vascular. At that time her JJ's were normal. Her TcPO2s were low, but it was felt that in the context of intermittent swelling and wounds her ABIs and clinical  exam were more accurate. No further vascular work-up was felt to be needed at that time and her wounds were more consistent with weight bearing surfaces. In 8/2018 she underwent bilateral bunionectomy. Her right foot healed up well, but the incisions on her left foot broke open. She underwent I & D and hardware removal in 9/2018. In 10/2018 she returned with concerns of a worsening infection in her left foot. MRI was negative for osteomyelitis, but Infectious Disease treated her with a prolonged course of IV Ancef. She now returns again with concerns of a worsening infection. MRI is now concerning for osteomyelitis of the first metatarsal. She was taken to the OR this morning for debridement. We were asked to evaluate for any vascular etiology of this non-healing wound.     Past Medical History   Past Medical History:   Diagnosis Date     Allergic rhinitis, cause unspecified      Anxiety state, unspecified      Cellulitis and abscess of leg, except foot      Closed fracture of unspecified part of tibia      Disuse osteoporosis      Dysthymic disorder      Edema      Encounter for long-term (current) use of other medications      Esophageal reflux      Kidney stones      Myalgia and myositis, unspecified      Obesity, unspecified      Open wound of foot except toe(s) alone, complicated      Opioid type dependence, unspecified      Other acne      Other congenital valgus deformity of feet      Other ventral hernia without mention of obstruction or gangrene      Polycystic ovaries      PONV (postoperative nausea and vomiting)      Systemic lupus erythematosus (H)      Tibialis tendinitis      Unspecified hereditary and idiopathic peripheral neuropathy        Past Surgical History   Past Surgical History:   Procedure Laterality Date     APPENDECTOMY       ARTHRODESIS FOOT Left 2/4/2015    Procedure: ARTHRODESIS FOOT;  Surgeon: Andre Harman MD;  Location:  [facility]     BUNIONECTOMY RT/LT  08/29/2018     DILATION AND  CURETTAGE       EXCISE TOENAIL(S) Left 2/4/2015    Procedure: EXCISE TOENAIL(S);  Surgeon: Andre Harman MD;  Location: [facility]     HERNIA REPAIR      umbilical     HERNIA REPAIR      ventral open x 2     IRRIGATION AND DEBRIDEMENT FOOT, COMBINED Left 9/26/2018    Procedure: COMBINED IRRIGATION AND DEBRIDEMENT FOOT;  IRRIGATION AND DEBRIDEMENT LEFT FOOT;  Surgeon: Andre Harman MD;  Location: [facility]     TONSILLECTOMY         Prior to Admission Medications   Prior to Admission Medications   Prescriptions Last Dose Informant Patient Reported? Taking?   BuPROPion HCl (WELLBUTRIN XL PO) 11/24/2018 at AM Self Yes Yes   Sig: Take 300 mg by mouth daily   BusPIRone HCl (BUSPAR PO) 11/24/2018 at AM Self Yes Yes   Sig: Take 15 mg by mouth 2 times daily   Furosemide (LASIX PO) 11/23/2018 at AM Self Yes Yes   Sig: Take 160 mg by mouth daily    HydrOXYzine Pamoate (VISTARIL PO) Past Week at PRN Self Yes Yes   Sig: Take 25 mg by mouth every 4 hours as needed for anxiety    Omeprazole (PRILOSEC PO) 11/24/2018 at AM Self Yes Yes   Sig: Take 40 mg by mouth 2 times daily (before meals) 2 CAPSULES IN A.M.    Ondansetron HCl (ZOFRAN PO)  at PRN Self Yes Yes   Sig: Take 8 mg by mouth as needed for nausea or vomiting   Potassium Chloride Shameka CR (K-DUR PO) 11/23/2018 at PM Self Yes Yes   Sig: Take 80 mEq by mouth daily    Topiramate (TOPAMAX PO) 11/23/2018 at PM Self Yes Yes   Sig: Take 100 mg by mouth At Bedtime    ammonium lactate (AMLACTIN) 12 % cream  at PRN Self Yes Yes   Sig: Apply topically daily as needed (to heels)    ceFAZolin (ANCEF) 1 GM vial 11/24/2018 at AM Self No Yes   Sig: Inject 2 g into the vein every 8 hours ESR,CRP,CBC with differential, creatinine, SGOT weekly while on this medication to be faxed to Dr. Bocanegra office.   clindamycin (CLEOCIN T) 1 % solution  at PRN Self Yes Yes   Sig: Apply topically nightly as needed    gabapentin (NEURONTIN) 800 MG tablet 11/24/2018 at AM Self Yes Yes   Sig: Take 800 mg  by mouth 3 times daily Am, supper, hs   oxyCODONE-acetaminophen (PERCOCET) 5-325 MG per tablet 11/24/2018 at PRN Self No Yes   Sig: Take 1-2 tablets by mouth every 4 hours as needed for severe pain   phentermine 30 MG capsule 11/24/2018 at AM Self Yes Yes   Sig: Take 30 mg by mouth every morning   polyethylene glycol (MIRALAX/GLYCOLAX) packet  at PRN Self Yes Yes   Sig: Take 17 g by mouth as needed for constipation      Facility-Administered Medications: None     Allergies   Allergies   Allergen Reactions     Sulfa Drugs Shortness Of Breath and Rash     Demerol [Meperidine] Nausea and Vomiting     Erythromycin Nausea and Vomiting     Metformin Nausea and Vomiting     Codeine Rash     Penicillins Rash       Social History   Shalonda Howard  reports that she quit smoking about 16 years ago. Her smoking use included Cigarettes. She has a 6.00 pack-year smoking history. She has never used smokeless tobacco. She reports that she drinks alcohol. She reports that she does not use illicit drugs.    Family History   History reviewed. No pertinent family history.    Review of Systems   The 10 point Review of Systems is negative other than noted in the HPI or here.     Physical Exam   Temp: 97.5  F (36.4  C) Temp src: Oral BP: 116/73 Pulse: 84 Heart Rate: 73 Resp: 15 SpO2: 97 % O2 Device: None (Room air) Oxygen Delivery: 8 LPM  Vital Signs with Ranges  Temp:  [95.8  F (35.4  C)-98.3  F (36.8  C)] 97.5  F (36.4  C)  Pulse:  [79-84] 84  Heart Rate:  [73-84] 73  Resp:  [8-18] 15  BP: (100-142)/(56-86) 116/73  SpO2:  [92 %-100 %] 97 %  200 lbs 0 oz    Constitutional: awake, alert, cooperative, no apparent distress, and appears stated age  Eyes: Lids and lashes normal, pupils equal, round and reactive to light, extra ocular muscles intact, sclera clear, conjunctiva normal  ENT: normocepalic, without obvious abnormality, oropharynx pink and moist  Hematologic / Lymphatic: no lymphadenopathy  Respiratory: No increased work of  breathing, good air exchange, clear to auscultation bilaterally, no crackles or wheezing  Cardiovascular: regular rate and rhythm, normal S1 and S2 and no murmur noted  GI: Normal bowel sounds, soft, non-distended, non-tender  Skin: no redness, warmth, or swelling, no rashes. Surgical site left foot bandaged.   Musculoskeletal: There is no redness, warmth, or swelling of the joints.  Full range of motion noted.  Motor strength is 5 out of 5 all extremities bilaterally.  Tone is normal.  Neurologic: Awake, alert, oriented to name, place and time.  Cranial nerves II-XII are grossly intact.  Motor is 5 out of 5 bilaterally.    Neuropsychiatric:  Normal affect, memory, insight.  Pulses: Palpable right femoral, popliteal, DP, and PT pulses. Palpable left femoral, popliteal, and PT pulses. Unable to palpate or doppler left DP pulse due to surgical dressing. No carotid bruits appreciated.     Data   Most Recent 3 CBC's:  Recent Labs   Lab Test  11/26/18   0630  11/25/18   0610  11/24/18   1950   WBC  6.4  8.0  10.5   HGB  10.7*  10.8*  11.4*   MCV  84  83  83   PLT  353  354  395     Most Recent 3 BMP's:  Recent Labs   Lab Test  11/26/18   0630  11/25/18   0610  11/24/18   1950   NA  143  142  140   POTASSIUM  3.3*  3.2*  3.5   CHLORIDE  111*  111*  112*   CO2  23  25  22   BUN  14  10  12   CR  0.63  0.66  0.61   ANIONGAP  9  6  6   PAULINE  7.9*  8.0*  8.5   GLC  117*  124*  86     Most Recent Hemoglobin A1c:  Recent Labs   Lab Test  10/15/18   2125   A1C  5.6

## 2018-11-26 NOTE — PROGRESS NOTES
[facility]  Hospitalist Progress Note   11/26/2018          Assessment and Plan:       Shalonda Howard is a 48 year old female with history of systemic lupus erythematosus, polycystic ovarian syndrome, chronic lower extremity edema, GERD, kidney stones, anxiety, dysthymia and obesity admitted on 11/24/2018 by her orthopedic surgeon, Dr. Harman for worsening left foot infection.    Left postoperative foot infection/nonhealing wound with concern for osteomyelitis.  Had bilateral bunionectomy in 08/2018, of which the right foot healed normally but her left foot became infected. Admitted in 09/2018 due to wound dehiscence and on 09/26/2018 had I and D as well as hardware removal and treated with antibitoics.  There was concerns for worsening infection in October 2018, MRI did not seem concerning for osteomyelitis.  Treated with IV Ancef for 6 weeks through PICC line. In November 2018 had left heel ulcer had increased redness/desquamation of left second and fourth toes.  CRP 13.4, ESR 49.  WBC 6.4 Blood cultures no growth to date.  MRI left foot showing   First metatarsal osteotomy. There continues to be extensive marrow edema in this region, greater than expected following a simple osteotomy. There is an adjacent soft tissue defect with surrounding cellulitis. This combination of findings is suspicious for osteomyelitis of the first metatarsal.     Continue IV Ancef.  Infectious disease following along.  Orthopedic team following along, plan for I&D today.  Will follow intraoperative culture results.  Pain management with Tylenol for mild pain, oxycodone for moderate pain, IV Dilaudid for severe pain.     Chronic lower extremity edema.    At baseline.  PTA Lasix 160 mg daily with potassium 80 mEq daily on hold since admission.  Has been n.p.o. for I&D today, will consider restarting a.m.  Limb elevation, Ace wraps as able to.  Strict input output monitoring.  Avoid volume overload.  Will check  proBNP.    Mild hypokalemia likely dilutional  Potassium 3.3.  High intensity potassium sliding scale.  Monitor potassium level    Acute anemia likely dilutional.  Presented with hemoglobin of 12.1, currently hemoglobin 10.7.  We will check iron studies.  No acute blood loss noted.  Monitor hemoglobin levels periodically.    Mild hyperglycemia.    Glucose levels fluctuating between 170-120s on fasting.  We will check hemoglobin A1c.    Dysthymia and anxiety disorder.    Continue with prior to admission BuSpar, Wellbutrin and Topamax.    Continue p.r.n. Vistaril for anxiety.     Gastroesophageal reflux disease.    Continue prior to admission PPI.     Neuropathy.    Continue PTA gabapentin 800 mg t.i.d.     Obesity.  BMI of 32.28.    Management per primary care provider.   Serial lifestyle modification with diet and exercise as able to.  Consider sleep studies as outpatient.  PTA phentermine on hold during hospitalization     Systemic lupus erythematosus.    Not a current concern.     PCOS.    No acute issues.      Active Diet Order      NPO per Anesthesia Guidelines for Procedure/Surgery Except for: Meds    DVT Prophylaxis:  Ventral compression device, pharmacological prophylaxis as per orthopedic team.  Code Status: Full Code  Disposition: Expected discharge pending clinical improvement/ per ortho    Patient, interdisciplinary team involved in care and agrees with plan.  Total time - 25 min. More than 50% of time spent in direct patient care, care coordination, patient counseling, and formalizing plan of care.     Hallie Russo MD        Interval History:      Lying in bed.  Denies any chest pain or shortness of breath.  Afebrile in the last 24 hours.  N.p.o. since midnight, plan for I&D left foot         Physical Exam:        Physical Exam   Temp:  [96  F (35.6  C)-98.3  F (36.8  C)] 97.8  F (36.6  C)  Pulse:  [79-84] 84  Heart Rate:  [79-84] 79  Resp:  [16] 16  BP: (117-142)/(56-86) 117/56  SpO2:  [97 %-99 %]  97 %    Intake/Output Summary (Last 24 hours) at 11/26/18 1004  Last data filed at 11/26/18 0800   Gross per 24 hour   Intake           862.33 ml   Output                0 ml   Net           862.33 ml     Admission Weight: 93 kg (205 lb)  Current Weight: 93 kg (205 lb)    PHYSICAL EXAM  GENERAL: Patient is in no distress. Alert and oriented.  HEART: Regular rate and rhythm. S1S2. No murmurs  LUNGS: Clear to auscultation bilaterally. No expiratory wheeze.  ABDOMEN: Soft, no abdominal tenderness, bowel sounds heard   NEURO: moving all extremities   EXTREMITIES: dressing left foot in place, warm peripheries    SKIN: Warm, dry. No rash or bruising.  PSYCHIATRY Cooperative       Medications:          buPROPion (WELLBUTRIN XL) 24 hr tablet 300 mg  300 mg Oral Daily     busPIRone  15 mg Oral BID     ceFAZolin  1 g Intravenous Q8H     gabapentin  800 mg Oral TID     omeprazole (priLOSEC) CR capsule 40 mg  40 mg Oral BID AC     senna-docusate  1 tablet Oral BID     topiramate (TOPAMAX) tablet 100 mg  100 mg Oral At Bedtime     - MEDICATION INSTRUCTIONS -, HYDROmorphone, hydrOXYzine (VISTARIL) capsule 25 mg, melatonin, naloxone, oxyCODONE         Data:      All new lab and imaging data was reviewed.

## 2018-11-26 NOTE — ANESTHESIA PREPROCEDURE EVALUATION
Procedure: Procedure(s):  COMBINED IRRIGATION AND DEBRIDEMENT LEFT FOOT  Preop diagnosis: UNKNOWN    Allergies   Allergen Reactions     Sulfa Drugs Shortness Of Breath and Rash     Demerol [Meperidine] Nausea and Vomiting     Erythromycin Nausea and Vomiting     Metformin Nausea and Vomiting     Codeine Rash     Penicillins Rash     Past Medical History:   Diagnosis Date     Allergic rhinitis, cause unspecified      Anxiety state, unspecified      Cellulitis and abscess of leg, except foot      Closed fracture of unspecified part of tibia      Disuse osteoporosis      Dysthymic disorder      Edema      Encounter for long-term (current) use of other medications      Esophageal reflux      Kidney stones      Myalgia and myositis, unspecified      Obesity, unspecified      Open wound of foot except toe(s) alone, complicated      Opioid type dependence, unspecified      Other acne      Other congenital valgus deformity of feet      Other ventral hernia without mention of obstruction or gangrene      Polycystic ovaries      PONV (postoperative nausea and vomiting)      Systemic lupus erythematosus (H)      Tibialis tendinitis      Unspecified hereditary and idiopathic peripheral neuropathy      Past Surgical History:   Procedure Laterality Date     APPENDECTOMY       ARTHRODESIS FOOT Left 2/4/2015    Procedure: ARTHRODESIS FOOT;  Surgeon: Andre Harman MD;  Location: [facility]     BUNIONECTOMY RT/LT  08/29/2018     DILATION AND CURETTAGE       EXCISE TOENAIL(S) Left 2/4/2015    Procedure: EXCISE TOENAIL(S);  Surgeon: Andre Harman MD;  Location: [facility]     HERNIA REPAIR      umbilical     HERNIA REPAIR      ventral open x 2     IRRIGATION AND DEBRIDEMENT FOOT, COMBINED Left 9/26/2018    Procedure: COMBINED IRRIGATION AND DEBRIDEMENT FOOT;  IRRIGATION AND DEBRIDEMENT LEFT FOOT;  Surgeon: Andre Harman MD;  Location: [facility]     TONSILLECTOMY       Social History   Substance Use Topics     Smoking status:  Former Smoker     Packs/day: 0.50     Years: 12.00     Types: Cigarettes     Quit date: 10/1/2002     Smokeless tobacco: Never Used     Alcohol use Yes      Comment: wine with dinner occas     Prior to Admission medications    Medication Sig Start Date End Date Taking? Authorizing Provider   ammonium lactate (AMLACTIN) 12 % cream Apply topically daily as needed (to heels)    Yes Unknown, Entered By History   BuPROPion HCl (WELLBUTRIN XL PO) Take 300 mg by mouth daily   Yes Reported, Patient   BusPIRone HCl (BUSPAR PO) Take 15 mg by mouth 2 times daily   Yes Reported, Patient   ceFAZolin (ANCEF) 1 GM vial Inject 2 g into the vein every 8 hours ESR,CRP,CBC with differential, creatinine, SGOT weekly while on this medication to be faxed to Dr. Bocanegra office. 10/18/18 11/27/18 Yes Thang Canada MD   clindamycin (CLEOCIN T) 1 % solution Apply topically nightly as needed    Yes Unknown, Entered By History   Furosemide (LASIX PO) Take 160 mg by mouth daily    Yes Reported, Patient   gabapentin (NEURONTIN) 800 MG tablet Take 800 mg by mouth 3 times daily Am, supper, hs   Yes Unknown, Entered By History   HydrOXYzine Pamoate (VISTARIL PO) Take 25 mg by mouth every 4 hours as needed for anxiety    Yes Reported, Patient   Omeprazole (PRILOSEC PO) Take 40 mg by mouth 2 times daily (before meals) 2 CAPSULES IN A.M.    Yes Reported, Patient   Ondansetron HCl (ZOFRAN PO) Take 8 mg by mouth as needed for nausea or vomiting   Yes Reported, Patient   oxyCODONE-acetaminophen (PERCOCET) 5-325 MG per tablet Take 1-2 tablets by mouth every 4 hours as needed for severe pain 10/18/18  Yes Jett Malik PA-C   phentermine 30 MG capsule Take 30 mg by mouth every morning   Yes Reported, Patient   polyethylene glycol (MIRALAX/GLYCOLAX) packet Take 17 g by mouth as needed for constipation   Yes Reported, Patient   Potassium Chloride Shameka CR (K-DUR PO) Take 80 mEq by mouth daily    Yes Reported, Patient   Topiramate (TOPAMAX PO) Take 100  mg by mouth At Bedtime    Yes Reported, Patient     Current Facility-Administered Medications Ordered in Epic   Medication Dose Route Frequency Last Rate Last Dose     buPROPion (WELLBUTRIN XL) 24 hr tablet 300 mg  300 mg Oral Daily   300 mg at 11/26/18 0813     busPIRone (BUSPAR) tablet 15 mg  15 mg Oral BID   15 mg at 11/26/18 0813     ceFAZolin (ANCEF) 1 g vial to attach to  ml bag for ADULT or 50 ml bag for PEDS  1 g Intravenous Q8H   1 g at 11/26/18 0327     Contraindications to both pharmacological and mechanical prophylaxis (must document contraindications for both in this order)   Does not apply Continuous PRN         gabapentin (NEURONTIN) tablet 800 mg  800 mg Oral TID   800 mg at 11/26/18 0813     HYDROmorphone (PF) (DILAUDID) injection 0.3-0.5 mg  0.3-0.5 mg Intravenous Q1H PRN         hydrOXYzine (VISTARIL) capsule 25 mg  25 mg Oral Q4H PRN         lactated ringers infusion   Intravenous Continuous 10 mL/hr at 11/25/18 0806       melatonin tablet 1 mg  1 mg Oral At Bedtime PRN         naloxone (NARCAN) injection 0.1-0.4 mg  0.1-0.4 mg Intravenous Q2 Min PRN         omeprazole (priLOSEC) CR capsule 40 mg  40 mg Oral BID AC   40 mg at 11/25/18 1547     oxyCODONE (ROXICODONE) tablet 10 mg  10 mg Oral Q3H PRN   10 mg at 11/26/18 0650     senna-docusate (SENOKOT-S;PERICOLACE) 8.6-50 MG per tablet 1 tablet  1 tablet Oral BID   1 tablet at 11/25/18 2036     topiramate (TOPAMAX) tablet 100 mg  100 mg Oral At Bedtime   100 mg at 11/25/18 2036     No current [facility]-ordered outpatient prescriptions on file.       - MEDICATION INSTRUCTIONS -       lactated ringers 10 mL/hr at 11/25/18 0806     Wt Readings from Last 1 Encounters:   11/25/18 93 kg (205 lb)     Temp Readings from Last 1 Encounters:   11/26/18 36.6  C (97.8  F) (Oral)     BP Readings from Last 6 Encounters:   11/26/18 117/56   11/20/18 (!) 162/95   10/18/18 120/73   09/26/18 110/70   02/04/15 122/78     Pulse Readings from Last 4 Encounters:    11/25/18 84   11/20/18 105   10/18/18 86   09/26/18 74     Resp Readings from Last 1 Encounters:   11/26/18 16     SpO2 Readings from Last 1 Encounters:   11/26/18 97%     Recent Labs   Lab Test  11/26/18   0630  11/25/18   0610   NA  143  142   POTASSIUM  3.3*  3.2*   CHLORIDE  111*  111*   CO2  23  25   ANIONGAP  9  6   GLC  117*  124*   BUN  14  10   CR  0.63  0.66   PAULINE  7.9*  8.0*     Recent Labs   Lab Test  11/26/18   0630  11/19/18   1000   10/18/18   0659   AST  12  11   < >  12   ALT  16   --    --   15   ALKPHOS  117   --    --   124   BILITOTAL  <0.1*   --    --   0.1*    < > = values in this interval not displayed.     Recent Labs   Lab Test  11/26/18   0630  11/25/18   0610   WBC  6.4  8.0   HGB  10.7*  10.8*   PLT  353  354     No results for input(s): ABO, RH in the last 42699 hours.  No results for input(s): INR, PTT in the last 15286 hours.   No results for input(s): TROPI in the last 19857 hours.  No results for input(s): PH, PCO2, PO2, HCO3 in the last 73164 hours.  Recent Labs   Lab Test  09/26/18   1310   HCG  Negative     Recent Results (from the past 744 hour(s))   MR Foot Left w/o & w Contrast    Narrative    MRI LEFT FOOT WITHOUT AND WITH INTRAVENOUS CONTRAST   11/25/2018 1:40  AM    HISTORY: Non-healing left foot wound, evaluate for osteomyelitis.     COMPARISON: An MRI on 10/17/2018.    TECHNIQUE: Multiplanar MR imaging was performed through the left foot  before and after the uneventful intravenous administration of 9 mL  Gadavist contrast.     FINDINGS: This study is moderately limited. There is motion artifact  due to the patient falling asleep and suddenly moving when waking.  Because of this, imaging of the posterior heel wound was not able to  be performed as this area is obscured by motion and surgical metal  artifact. Again seen are surgical changes of a first metatarsal  osteotomy. There continues to be extensive bone marrow edema on both  sides of the osteotomy. There continues  to be an adjacent open wound  with prominent edema in the surrounding soft tissues. A distinct  abscess is not definitely seen.    No other osseous abnormality is demonstrated. No other focal soft  tissue pathology is seen.      Impression    IMPRESSION:  1. Imaging is limited as described above. Evaluation of the heel  including a reported heel wound was not able to be performed.  2. First metatarsal osteotomy. There continues to be extensive marrow  edema in this region, greater than expected following a simple  osteotomy. There is an adjacent soft tissue defect with surrounding  cellulitis. No abscess is seen. This combination of findings is  suspicious for osteomyelitis of the first metatarsal.     BURKE DRIVER MD     Anesthesia Evaluation     .             ROS/MED HX    ENT/Pulmonary:      (-) sleep apnea   Neurologic:       Cardiovascular:         METS/Exercise Tolerance:     Hematologic:     (+) Other Hematologic Disorder-SLE      Musculoskeletal:         GI/Hepatic:     (+) GERD Asymptomatic on medication,       Renal/Genitourinary:     (+) Nephrolithiasis ,       Endo:     (+) Obesity, .      Psychiatric:     (+) psychiatric history anxiety and depression      Infectious Disease:         Malignancy:         Other:                     Physical Exam  Normal systems: cardiovascular, pulmonary and dental    Airway   TM distance: >3 FB  Neck ROM: full    Dental     Cardiovascular       Pulmonary                         Anesthesia Plan      History & Physical Review  History and physical reviewed and following examination; no interval change.    ASA Status:  2 .    NPO Status:  > 8 hours    Plan for General and LMA with Intravenous induction. Maintenance will be TIVA.    PONV prophylaxis:  Ondansetron (or other 5HT-3), Dexamethasone or Solumedrol and Scopolamine patch       Postoperative Care  Postoperative pain management:  IV analgesics.      Consents  Anesthetic plan, risks, benefits and alternatives  discussed with:  Patient..                            .

## 2018-11-26 NOTE — PROGRESS NOTES
[facility]    Infectious Disease Progress Note    Date of Service (when I saw the patient): 11/26/2018     Assessment & Plan   Shalonda Howard is a 48 year old female who was admitted on 11/24/2018.     Impression:  1. This is a 48 y.o familiar to me from her prior admission.   2. She has a history of bilateral bunion repair surgery done in August this year. The right is all healed up but the left side the insion has not been healing even after 4 months, 1 I and D and 6 weeks of IV antibiotics and many more weeks of oral antibiotics since the end of August.   3. She is finishing the 6 weeks of IV ancef on the 27th of November, her cultures have previously been positive for MSSA.   4. She also has some hardware in the ankle on that side along with a non healing ulcer on the heel.   5. The plan is for her to have repeat I and D. Osteo in the MRI  6. She is currently on Ancef, her WBC is normal, no fevers, and continued elevated inflammatory markers.      Recommendations:   Continue IV ancef.   Will follow up on the OR cultures  Blood cultures done and pending.          Ignacia Bocanegra MD    Interval History   Afebrile   I and D today     Physical Exam   Temp: 97.8  F (36.6  C) Temp src: Oral BP: 117/56 Pulse: 84 Heart Rate: 79 Resp: 16 SpO2: 97 % O2 Device: None (Room air)    Vitals:    11/25/18 1805   Weight: 93 kg (205 lb)     Vital Signs with Ranges  Temp:  [96  F (35.6  C)-98.3  F (36.8  C)] 97.8  F (36.6  C)  Pulse:  [79-84] 84  Heart Rate:  [79-84] 79  Resp:  [16] 16  BP: (117-142)/(56-86) 117/56  SpO2:  [97 %-99 %] 97 %    Constitutional: Awake, alert, cooperative, no apparent distress  Lungs: Clear to auscultation bilaterally, no crackles or wheezing  Cardiovascular: Regular rate and rhythm, normal S1 and S2, and no murmur noted  Abdomen: Normal bowel sounds, soft, non-distended, non-tender  Skin: No rashes, no cyanosis, no edema  Other:    Medications     - MEDICATION INSTRUCTIONS -       lactated  ringers 10 mL/hr at 11/25/18 0806       buPROPion (WELLBUTRIN XL) 24 hr tablet 300 mg  300 mg Oral Daily     busPIRone  15 mg Oral BID     ceFAZolin  1 g Intravenous Q8H     gabapentin  800 mg Oral TID     omeprazole (priLOSEC) CR capsule 40 mg  40 mg Oral BID AC     senna-docusate  1 tablet Oral BID     topiramate (TOPAMAX) tablet 100 mg  100 mg Oral At Bedtime       Data   All microbiology laboratory data reviewed.  Recent Labs   Lab Test  11/26/18   0630 11/25/18   0610  11/24/18 1950   WBC  6.4  8.0  10.5   HGB  10.7*  10.8*  11.4*   HCT  32.1*  32.3*  33.3*   MCV  84  83  83   PLT  353  354  395     Recent Labs   Lab Test  11/26/18   0630  11/25/18   0610  11/24/18 1950   CR  0.63  0.66  0.61     Recent Labs   Lab Test  11/24/18   1950   SED  28*     Recent Labs   Lab Test  11/24/18   1950  11/24/18   1814  11/20/18   1700  11/20/18   1634  09/26/18   1631  09/24/18   2115  09/24/18   1708  09/24/18   1620   CULT  No growth after 2 days  No growth after 2 days  No growth  No growth  No anaerobes isolated  Light growth  Staphylococcus aureus  *  Light growth  Normal skin kenyatta    Moderate growth  Staphylococcus aureus  *  No growth  No growth

## 2018-11-26 NOTE — ANESTHESIA CARE TRANSFER NOTE
Patient: Shalonda Howard    Procedure(s):  COMBINED IRRIGATION AND DEBRIDEMENT LEFT FOOT  REPAIR HAMMER TOE    Diagnosis: UNKNOWN  Diagnosis Additional Information: No value filed.    Anesthesia Type:   General, LMA     Note:  Airway :Face Mask  Patient transferred to:PACU  Comments: Turned supine with alignment maintained. Suctioned and extubated awakw. To PACU with O2      Vitals: (Last set prior to Anesthesia Care Transfer)    CRNA VITALS  11/26/2018 1152 - 11/26/2018 1252      11/26/2018             Pulse: 71    SpO2: 99 %    Resp Rate (observed): 11                Electronically Signed By: CRISTIN Echevarria CRNA  November 26, 2018  1:24 PM

## 2018-11-26 NOTE — PLAN OF CARE
Problem: Patient Care Overview  Goal: Plan of Care/Patient Progress Review  Outcome: No Change  A&O.  VSS, RA.  CMS intact.  Dressing CDI.  Pain managed with oxy.  PICC infusing 10 ml/hr.  Up independent.  NPO after midnight.  Schedule for I&D of left foot tomorrow.  Continue to monitor.

## 2018-11-26 NOTE — ANESTHESIA POSTPROCEDURE EVALUATION
Patient: Shalonda Howard    Procedure(s):  COMBINED IRRIGATION AND DEBRIDEMENT LEFT FOOT  REPAIR HAMMER TOE    Diagnosis:UNKNOWN  Diagnosis Additional Information: No value filed.    Anesthesia Type:  General, LMA    Note:  Anesthesia Post Evaluation    Patient location during evaluation: PACU  Patient participation: Able to fully participate in evaluation  Level of consciousness: awake and alert  Pain management: adequate  Airway patency: patent  Cardiovascular status: acceptable and hemodynamically stable  Respiratory status: acceptable and unassisted  Hydration status: acceptable  PONV: none             Last vitals:  Vitals:    11/26/18 1402 11/26/18 1433 11/26/18 1555   BP: 136/79 116/73 112/67   Pulse:      Resp: 15 15 16   Temp: 36.4  C (97.5  F)  36.7  C (98.1  F)   SpO2: 96% 97% 98%         Electronically Signed By: Keegan Vallejo DO  November 26, 2018  4:07 PM

## 2018-11-26 NOTE — PLAN OF CARE
Problem: Patient Care Overview  Goal: Plan of Care/Patient Progress Review  Outcome: No Change  Pt A/O x4. Baseline numbness in LLE. Dressings are CDI on RLE. VSS on RA. Up independently. Taking oxy Q3 for pain. Remained on bedrest with BR privileges. Voiding adequately. IVF @ 10 mL/hr. Awaiting I&D today. NPO since 0000.

## 2018-11-27 ENCOUNTER — APPOINTMENT (OUTPATIENT)
Dept: PHYSICAL THERAPY | Facility: CLINIC | Age: 48
DRG: 908 | End: 2018-11-27
Attending: PHYSICIAN ASSISTANT
Payer: COMMERCIAL

## 2018-11-27 ENCOUNTER — HOME INFUSION (PRE-WILLOW HOME INFUSION) (OUTPATIENT)
Dept: PHARMACY | Facility: CLINIC | Age: 48
End: 2018-11-27

## 2018-11-27 VITALS
SYSTOLIC BLOOD PRESSURE: 123 MMHG | WEIGHT: 200 LBS | HEART RATE: 81 BPM | HEIGHT: 65 IN | DIASTOLIC BLOOD PRESSURE: 72 MMHG | BODY MASS INDEX: 33.32 KG/M2 | RESPIRATION RATE: 16 BRPM | TEMPERATURE: 98.1 F | OXYGEN SATURATION: 96 %

## 2018-11-27 LAB
ANION GAP SERPL CALCULATED.3IONS-SCNC: 6 MMOL/L (ref 3–14)
BUN SERPL-MCNC: 11 MG/DL (ref 7–30)
CALCIUM SERPL-MCNC: 7.9 MG/DL (ref 8.5–10.1)
CHLORIDE SERPL-SCNC: 110 MMOL/L (ref 94–109)
CO2 SERPL-SCNC: 25 MMOL/L (ref 20–32)
CREAT SERPL-MCNC: 0.62 MG/DL (ref 0.52–1.04)
GFR SERPL CREATININE-BSD FRML MDRD: >90 ML/MIN/1.7M2
GLUCOSE SERPL-MCNC: 91 MG/DL (ref 70–99)
HGB BLD-MCNC: 10.5 G/DL (ref 11.7–15.7)
POTASSIUM SERPL-SCNC: 3.7 MMOL/L (ref 3.4–5.3)
SODIUM SERPL-SCNC: 141 MMOL/L (ref 133–144)

## 2018-11-27 PROCEDURE — 25000128 H RX IP 250 OP 636: Performed by: PHYSICIAN ASSISTANT

## 2018-11-27 PROCEDURE — 25000132 ZZH RX MED GY IP 250 OP 250 PS 637: Performed by: INTERNAL MEDICINE

## 2018-11-27 PROCEDURE — 85018 HEMOGLOBIN: CPT | Performed by: HOSPITALIST

## 2018-11-27 PROCEDURE — 99232 SBSQ HOSP IP/OBS MODERATE 35: CPT | Performed by: HOSPITALIST

## 2018-11-27 PROCEDURE — 25000132 ZZH RX MED GY IP 250 OP 250 PS 637: Performed by: HOSPITALIST

## 2018-11-27 PROCEDURE — 25000132 ZZH RX MED GY IP 250 OP 250 PS 637: Performed by: PHYSICIAN ASSISTANT

## 2018-11-27 PROCEDURE — 80048 BASIC METABOLIC PNL TOTAL CA: CPT | Performed by: HOSPITALIST

## 2018-11-27 PROCEDURE — 97116 GAIT TRAINING THERAPY: CPT | Mod: GP | Performed by: PHYSICAL THERAPIST

## 2018-11-27 PROCEDURE — 40000193 ZZH STATISTIC PT WARD VISIT: Performed by: PHYSICAL THERAPIST

## 2018-11-27 PROCEDURE — 97161 PT EVAL LOW COMPLEX 20 MIN: CPT | Mod: GP | Performed by: PHYSICAL THERAPIST

## 2018-11-27 RX ORDER — OXYCODONE AND ACETAMINOPHEN 5; 325 MG/1; MG/1
1-2 TABLET ORAL EVERY 4 HOURS PRN
Qty: 40 TABLET | Refills: 0 | Status: ON HOLD | OUTPATIENT
Start: 2018-11-27 | End: 2019-01-28

## 2018-11-27 RX ORDER — FUROSEMIDE 40 MG
160 TABLET ORAL DAILY
Status: DISCONTINUED | OUTPATIENT
Start: 2018-11-27 | End: 2018-11-27 | Stop reason: HOSPADM

## 2018-11-27 RX ORDER — POTASSIUM CHLORIDE 1500 MG/1
80 TABLET, EXTENDED RELEASE ORAL DAILY
Status: DISCONTINUED | OUTPATIENT
Start: 2018-11-27 | End: 2018-11-27 | Stop reason: HOSPADM

## 2018-11-27 RX ORDER — AMOXICILLIN 250 MG
1 CAPSULE ORAL 2 TIMES DAILY
Qty: 40 TABLET | Refills: 1 | Status: ON HOLD | OUTPATIENT
Start: 2018-11-27 | End: 2019-04-21

## 2018-11-27 RX ORDER — HYDROXYZINE PAMOATE 25 MG/1
25 CAPSULE ORAL EVERY 4 HOURS PRN
Qty: 40 CAPSULE | Refills: 0 | Status: ON HOLD | OUTPATIENT
Start: 2018-11-27 | End: 2019-04-21

## 2018-11-27 RX ADMIN — ENOXAPARIN SODIUM 40 MG: 40 INJECTION SUBCUTANEOUS at 12:32

## 2018-11-27 RX ADMIN — OMEPRAZOLE 40 MG: 20 CAPSULE, DELAYED RELEASE ORAL at 06:25

## 2018-11-27 RX ADMIN — SENNOSIDES AND DOCUSATE SODIUM 1 TABLET: 8.6; 5 TABLET ORAL at 08:21

## 2018-11-27 RX ADMIN — FUROSEMIDE 160 MG: 40 TABLET ORAL at 14:13

## 2018-11-27 RX ADMIN — BUSPIRONE HYDROCHLORIDE 15 MG: 7.5 TABLET ORAL at 09:07

## 2018-11-27 RX ADMIN — CEFAZOLIN SODIUM 1 G: 1 INJECTION, POWDER, FOR SOLUTION INTRAMUSCULAR; INTRAVENOUS at 03:23

## 2018-11-27 RX ADMIN — OXYCODONE HYDROCHLORIDE 10 MG: 5 TABLET ORAL at 12:32

## 2018-11-27 RX ADMIN — OXYCODONE HYDROCHLORIDE 10 MG: 5 TABLET ORAL at 06:25

## 2018-11-27 RX ADMIN — BUPROPION HYDROCHLORIDE 300 MG: 300 TABLET, FILM COATED, EXTENDED RELEASE ORAL at 08:21

## 2018-11-27 RX ADMIN — HYDROXYZINE PAMOATE 25 MG: 25 CAPSULE ORAL at 08:20

## 2018-11-27 RX ADMIN — POTASSIUM CHLORIDE 80 MEQ: 1500 TABLET, EXTENDED RELEASE ORAL at 14:13

## 2018-11-27 RX ADMIN — OXYCODONE HYDROCHLORIDE 10 MG: 5 TABLET ORAL at 03:25

## 2018-11-27 RX ADMIN — CEFAZOLIN SODIUM 1 G: 1 INJECTION, POWDER, FOR SOLUTION INTRAMUSCULAR; INTRAVENOUS at 12:32

## 2018-11-27 RX ADMIN — GABAPENTIN 800 MG: 800 TABLET, FILM COATED ORAL at 08:20

## 2018-11-27 RX ADMIN — OXYCODONE HYDROCHLORIDE 10 MG: 5 TABLET ORAL at 09:50

## 2018-11-27 ASSESSMENT — ACTIVITIES OF DAILY LIVING (ADL)
ADLS_ACUITY_SCORE: 13
ADLS_ACUITY_SCORE: 10
ADLS_ACUITY_SCORE: 11
ADLS_ACUITY_SCORE: 10

## 2018-11-27 NOTE — PLAN OF CARE
Problem: Patient Care Overview  Goal: Plan of Care/Patient Progress Review  Outcome: Improving  A&O.  VSS, RA.  CMS intact.  Dressing CDI.  Pain managed with oxy.  Up with SBA with eric, partial WB on LLE.  PICC infusing 100 ml/hr.  Vascular signed off.  discharge pending.  Continue to monitor.

## 2018-11-27 NOTE — PLAN OF CARE
Problem: Patient Care Overview  Goal: Plan of Care/Patient Progress Review  Discharge Planner OT   Patient plan for discharge: Home w/ assist  Current status: Orders received and chart reviewed. Pt. is s/p L foot I & D. Per PT, pt. has not skilled OT needs. Will complete order, sign-off.  Barriers to return to prior living situation: Defer to PT  Recommendations for discharge: Defer to PT  Rationale for recommendations: Per PT, no skilled OT needs. Will complete order, sign-off.       Entered by: Shalonda Browne 11/27/2018 1:01 PM

## 2018-11-27 NOTE — PROGRESS NOTES
[facility]    Infectious Disease Progress Note    Date of Service (when I saw the patient): 11/27/2018     Assessment & Plan   Shalonda Howard is a 48 year old female who was admitted on 11/24/2018.     Impression:  1. This is a 48 y.o familiar to me from her prior admission.   2. She has a history of bilateral bunion repair surgery done in August this year. The right is all healed up but the left side the insion has not been healing even after 4 months, 1 I and D and 6 weeks of IV antibiotics and many more weeks of oral antibiotics since the end of August.   3. She is finishing the 6 weeks of IV ancef on the 27th of November, her cultures have previously been positive for MSSA.   4. She also has some hardware in the ankle on that side along with a non healing ulcer on the heel.   5. The plan is for her to have repeat I and D. Osteo in the MRI  6. She is currently on Ancef, her WBC is normal, no fevers, and continued elevated inflammatory markers.      Recommendations:   OR cultures pending, but patient really wants to go home today, continue ancef on discharge, I will follow up o n the OR cultures.        Ignacia Bocanegra MD    Interval History   Afebrile   I and D today     Physical Exam   Temp: 98.1  F (36.7  C) Temp src: Oral BP: 123/72 Pulse: 81 Heart Rate: 83 Resp: 16 SpO2: 96 % O2 Device: None (Room air)    Vitals:    11/25/18 1805 11/26/18 1000   Weight: 93 kg (205 lb) 90.7 kg (200 lb)     Vital Signs with Ranges  Temp:  [97.5  F (36.4  C)-98.4  F (36.9  C)] 98.1  F (36.7  C)  Pulse:  [76-81] 81  Heart Rate:  [73-84] 83  Resp:  [11-18] 16  BP: (100-136)/(67-79) 123/72  SpO2:  [92 %-100 %] 96 %    Constitutional: Awake, alert, cooperative, no apparent distress  Lungs: Clear to auscultation bilaterally, no crackles or wheezing  Cardiovascular: Regular rate and rhythm, normal S1 and S2, and no murmur noted  Abdomen: Normal bowel sounds, soft, non-distended, non-tender  Skin: No rashes, no cyanosis,  no edema  Other:    Medications     - MEDICATION INSTRUCTIONS -       lactated ringers 100 mL/hr at 11/26/18 1918       buPROPion (WELLBUTRIN XL) 24 hr tablet 300 mg  300 mg Oral Daily     busPIRone  15 mg Oral BID     ceFAZolin  1 g Intravenous Q8H     enoxaparin  40 mg Subcutaneous Q24H     gabapentin  800 mg Oral TID     omeprazole (priLOSEC) CR capsule 40 mg  40 mg Oral BID AC     scopolamine   Transdermal Once     senna-docusate  1 tablet Oral BID     sodium chloride (PF)  3 mL Intracatheter Q8H     topiramate (TOPAMAX) tablet 100 mg  100 mg Oral At Bedtime       Data   All microbiology laboratory data reviewed.  Recent Labs   Lab Test  11/27/18   0630  11/26/18   0630  11/25/18   0610  11/24/18   1950   WBC   --   6.4  8.0  10.5   HGB  10.5*  10.7*  10.8*  11.4*   HCT   --   32.1*  32.3*  33.3*   MCV   --   84  83  83   PLT   --   353  354  395     Recent Labs   Lab Test  11/27/18   0630  11/26/18   0630  11/25/18   0610   CR  0.62  0.63  0.66     Recent Labs   Lab Test  11/24/18   1950   SED  28*     Recent Labs   Lab Test  11/26/18   1159  11/24/18   1950  11/24/18   1814  11/20/18   1700  11/20/18   1634  09/26/18   1631  09/24/18   2115  09/24/18   1708  09/24/18   1620   CULT  Culture in progress  PENDING  No growth after 3 days  No growth after 3 days  No growth  No growth  No anaerobes isolated  Light growth  Staphylococcus aureus  *  Light growth  Normal skin kenyatta    Moderate growth  Staphylococcus aureus  *  No growth  No growth

## 2018-11-27 NOTE — DISCHARGE INSTRUCTIONS
Your doctor has ordered IV antibiotics after your hospital stay.  This service will be provided by [facility]. They will contact you regarding your first visit.   If you have any questions about this service, please call them at [phone].

## 2018-11-27 NOTE — PLAN OF CARE
Problem: Patient Care Overview  Goal: Plan of Care/Patient Progress Review  Discharge Planner PT   Patient plan for discharge: Home with assist of  on steps.   Current status: Patient reports she has been NWB on right or left foot frequently lately. She currently has a walking boot on the right and is NWBing on the left. She reports she uses her knee scooter all the time on level surfaces. She has a split level home with all needs on upper level. She has been going up and down the 6 steps to upper level by bumping up and down on her butt.  assists with getting up once she is at the top. She prefers to continue this method as it is working well. She had been having difficult getting out the one step into the garage. Therapist recommended using the walker and patient receptive to trying this. Patient was able to perform this with just CGA and she already has a walker at home. Patient able to demonstrate independence with bed mobility and transfers. Patient reports she dresses herself and has been sponge bathing and prefers to continue this. Therefore no OT needs noted.   Barriers to return to prior living situation: Steps into home and inside home but this has been addressed.   Recommendations for discharge: Home with assist of  on stairs.  Rationale for recommendations: Patient is modified independent on level surface and is able to verbalize how she safely manages steps inside the home. Able to demonstrate how she will safely go up and down one step in garage with assist of .        Entered by: Tasia Mayer 11/27/2018 10:19 AM

## 2018-11-27 NOTE — PROGRESS NOTES
Care Coordination:    Continued to follow to arranging [facility].   Per 11/26 CTS note, pt was open to [facility] previously.     ID did arrive and placed IV antibiotic orders.  Bedside RN helpful in contacting Ortho MD to [facility] orders.     Pt would like to schedule own followups.  I will send a handoff to PCP clinic due to elevated readmission risk per LUMA score.     Concha Barragan RN, BSN  [facility] Care Coordinator   Mobile Phone: [phone]

## 2018-11-27 NOTE — PROGRESS NOTES
Shalonda Anita  2018  POD #1 L foot repeat I&D    No immediate surgical complications identified.  No excessive bleeding  Pain well-controlled.  Temperatures:  Current - Temp: 98.1  F (36.7  C); Max - Temp  Av.5  F (36.4  C)  Min: 95.8  F (35.4  C)  Max: 98.4  F (36.9  C)  Pulse range: Pulse  Av.5  Min: 76  Max: 81  Blood pressure range: Systolic (24hrs), Av , Min:100 , Max:142   ; Diastolic (24hrs), Av, Min:56, Max:82    CMS: intact to baseline  Labs:per medicine/hospitalist    PLAN:ID to see today to determine PICC line duration and Abx course.  OK to discharge from ortho perspective.  Should be partial WB for transfers, but try to keep weight off foot as much as possible  Additional problems to be followed by medicine physician

## 2018-11-27 NOTE — PLAN OF CARE
Problem: Patient Care Overview  Goal: Plan of Care/Patient Progress Review  Outcome: Improving  Pt A/O X4. VSS on RA. CMS intact, except baseline numbness LLE. Partial weight bearing LLE, standby using scooter. Voiding in BR. PICC SL. Managing pain with oxycodone. Continue to monitor.

## 2018-11-27 NOTE — PROGRESS NOTES
[facility]  Hospitalist Progress Note   11/27/2018          Assessment and Plan:       Shalonda Howard is a 48 year old female with history of systemic lupus erythematosus, polycystic ovarian syndrome, chronic lower extremity edema, GERD, kidney stones, anxiety, dysthymia and obesity admitted on 11/24/2018 by her orthopedic surgeon, Dr. Harman for worsening left foot infection.    Left postoperative foot infection/nonhealing wound with concern for osteomyelitis.  Status post I&D left foot 11/26.  Had bilateral bunionectomy in 08/2018, of which the right foot healed normally but her left foot became infected. Admitted in 09/2018 due to wound dehiscence and on 09/26/2018 had I and D as well as hardware removal and treated with antibitoics.  There was concerns for worsening infection in October 2018, MRI did not seem concerning for osteomyelitis.  Treated with IV Ancef for 6 weeks through PICC line. In November 2018 had left heel ulcer had increased redness/desquamation of left second and fourth toes.  CRP 13.4, ESR 49.  WBC 6.4 Blood cultures no growth to date.  MRI left foot showing   First metatarsal osteotomy. There continues to be extensive marrow edema in this region, greater than expected following a simple osteotomy. There is an adjacent soft tissue defect with surrounding cellulitis. This combination of findings is suspicious for osteomyelitis of the first metatarsal.   US JJ bilateral lower extremity - Ankle-brachial indices are in the range of normal.      Evaluated by vascular medicine, had normal ABIs, nonhealing wounds in the feet not vasculogenic etiology.   Continue IV Ancef as per ID  Infectious disease following along.  Follow intraoperative culture results.  Postoperative pain management/weightbearing/rehabilitation as per orthopedic team..     Chronic lower extremity edema.    At baseline.  PTA Lasix 160 mg daily with potassium 80 mEq daily on hold since admission.  Restart PTA  Lasix/potassium today.  Limb elevation, Ace wraps as able to.  Strict input output monitoring.  Avoid volume overload.    Mild hypokalemia likely dilutional - corrected   Potassium 3.3 > 3.7  High intensity potassium sliding scale.  Monitor potassium level    Acute anemia likely dilutional/surgical blood.  Presented with hemoglobin of 12.1, currently hemoglobin 10.7.  No acute blood loss noted.  Monitor hemoglobin levels periodically.    Mild hyperglycemia likely due to stress.    Glucose levels fluctuating between 170-120s on fasting.  hemoglobin A1c 5.6    Dysthymia and anxiety disorder.    Continue with prior to admission BuSpar, Wellbutrin and Topamax.    Continue p.r.n. Vistaril for anxiety.     Gastroesophageal reflux disease.    Continue prior to admission PPI.     Neuropathy.    Continue PTA gabapentin 800 mg t.i.d.     Obesity.  BMI of 32.28.    Management per primary care provider.   Consider lifestyle modification with diet and exercise as able to.  Consider sleep studies as outpatient.  PTA phentermine on hold during hospitalization     Possible lupus.  Diagnosed in the past but not on medication.  No a current concern.     PCOS.    No acute issues.    Active Diet Order      Advance Diet as Tolerated: Regular Diet Adult      Diet    DVT Prophylaxis:  Sequential compression device, pharmacological prophylaxis as per orthopedic team.  Code Status: Full Code  Disposition: Expected discharge per Ortho.    Patient, interdisciplinary team involved in care and agrees with plan.  Total time 25 min. More than 50% of time spent in direct patient care, care coordination, patient counseling, and formalizing plan of care.     Hallie Russo MD        Interval History:      Lying in bed.  Denies any chest pain or shortness of breath.  Tolerating oral diet.  No nausea no vomiting.  Urinary output satisfactory.  Pain ok controlled with pain medications.  Afebrile in the last 24 hours.  Likes to be discharged as soon  as possible today.         Physical Exam:        Physical Exam   Temp:  [97.5  F (36.4  C)-98.4  F (36.9  C)] 98.1  F (36.7  C)  Pulse:  [76-81] 81  Heart Rate:  [73-83] 83  Resp:  [14-16] 16  BP: (111-136)/(67-79) 123/72  SpO2:  [96 %-100 %] 96 %    Intake/Output Summary (Last 24 hours) at 11/26/18 1004  Last data filed at 11/26/18 0800   Gross per 24 hour   Intake           862.33 ml   Output                0 ml   Net           862.33 ml     Admission Weight: 93 kg (205 lb)  Current Weight: 93 kg (205 lb)    PHYSICAL EXAM  GENERAL: Patient is in no distress. Alert and oriented.  HEART: Regular rate and rhythm. S1S2. No murmurs  LUNGS: Clear to auscultation bilaterally. No expiratory wheeze.  ABDOMEN: Soft, no abdominal tenderness, bowel sounds heard   NEURO: moving all extremities   EXTREMITIES: dressing left foot in place, warm peripheries    SKIN: Warm, dry. No rash or bruising.  PSYCHIATRY Cooperative       Medications:          buPROPion (WELLBUTRIN XL) 24 hr tablet 300 mg  300 mg Oral Daily     busPIRone  15 mg Oral BID     ceFAZolin  1 g Intravenous Q8H     enoxaparin  40 mg Subcutaneous Q24H     gabapentin  800 mg Oral TID     omeprazole (priLOSEC) CR capsule 40 mg  40 mg Oral BID AC     scopolamine   Transdermal Once     senna-docusate  1 tablet Oral BID     sodium chloride (PF)  3 mL Intracatheter Q8H     topiramate (TOPAMAX) tablet 100 mg  100 mg Oral At Bedtime     sore throat lozenge, - MEDICATION INSTRUCTIONS -, diazepam, HYDROmorphone, hydrOXYzine (VISTARIL) capsule 25 mg, lidocaine 4%, lidocaine (buffered or not buffered), melatonin, naloxone, ondansetron **OR** ondansetron, oxyCODONE, prochlorperazine, sodium chloride (PF)         Data:      All new lab and imaging data was reviewed.

## 2018-11-27 NOTE — PLAN OF CARE
Problem: Patient Care Overview  Goal: Plan of Care/Patient Progress Review  Outcome: Adequate for Discharge Date Met: 11/27/18  Reviewed discharge instructions and medications with patient. Questions answered. Patient discharged to home with spouse, discharge instructions, medications (atarax, oxycodone and senna ), and belongings at this time. Going to be seen by [facility] for abx.

## 2018-11-27 NOTE — OP NOTE
Procedure Date: 11/26/2018      PREOPERATIVE DIAGNOSIS:  Dehiscence of her left foot wound after previous bunion repair.      POSTOPERATIVE DIAGNOSIS:  Dehiscence of her left foot wound after previous bunion repair.      SURGICAL PROCEDURE:  Ms. Howard presented with ongoing issues with dehiscence of her wound over the medial side of her left foot.  She also has a chronic ulceration of her heel and obviously has a vascular supply issues.  She is having a vascular consult in the next day or so.        Informed consent was obtained for the above-mentioned procedure.      Two grams of Ancef were given prior to surgery.  She will continue with postoperative antibiotic treatment depending upon the cultures.      DESCRIPTION OF PROCEDURE:  After adequate induction of a general anesthetic, the patient was positioned supine on the operating table.  The left leg was sterilely prepped and free draped in the usual fashion.  An ellipse of skin was excised around the wound area.  There was no obvious infection.  There was significant cellulitis, though.  Tissue cultures were sent for culture and sensitivity.      She had significant heterotopic bone formation as part of the healing of her bunion repair, and this was removed.  The pulse lavage system was then used to do an adequate debridement of skin, subcutaneous tissue, fascia and bone.  Excisional debridement was done.      Due to the fact that there was no obvious infection, the primary closure was done using a #1 Prolene suture to loosely close the wound.      Of note, she also had an ulceration at the tip of her second toe due to a hammertoe deformity.      A percutaneous flexor tenotomy was done to correct the deformity.      The tourniquet was deflated.  Hemostasis obtained.  The wounds closed in layers.  A sterile dressing and a light compressive bandage applied.  She tolerated the procedure well and was taken to the recovery room in satisfactory condition.        She  can ambulate weightbearing as tolerated.  Sutures will be removed in 2 weeks.         HUGO LEÓN MD             D: 2018   T: 2018   MT: JONO      Name:     CASSANDRA FRANCO   MRN:      [id number]        Account:        [id number]   :      1970           Procedure Date: 2018      Document: [id number]

## 2018-11-27 NOTE — PROGRESS NOTES
11/27/18 1002   Quick Adds   Type of Visit Initial PT Evaluation   Living Environment   Lives With spouse   Living Arrangements house   Home Accessibility (Split level home)   Number of Stairs to Enter Home 1   Number of Stairs Within Home (6 up and 6 down. )   Transportation Available family or friend will provide   Living Environment Comment Patient reports all her needs are on one level once she gets up the 6 steps. She has been bumping up and down the 6 steps on her butt and  assists her up when she gets to the top. She reports this has been working well. She has had difficulty going out the one step into the garage but going in she holds a shelf and the door.    Self-Care   Activity/Exercise/Self-Care Comment Activity has been limited due to surgeries on both feet. Currently has a walking boot on the right.    Functional Level Prior   Ambulation 1-->assistive equipment   Transferring 1-->assistive equipment   Toileting 1-->assistive equipment   Bathing (Has been sponge bathing since August due to surgeries. )   Dressing 0-->independent   Eating 0-->independent   Communication 0-->understands/communicates without difficulty   Swallowing 0-->swallows foods/liquids without difficulty   Cognition 0 - no cognition issues reported   Fall history within last six months no   Which of the above functional risks had a recent onset or change? none   General Information   Onset of Illness/Injury or Date of Surgery - Date 11/24/18   Referring Physician Jett Womack PA-C   Patient/Family Goals Statement To go home with assist of  on steps   Pertinent History of Current Problem (include personal factors and/or comorbidities that impact the POC) Ms. Howard presented with ongoing issues with dehiscence of her wound over the medial side of her left foot.  She also has a chronic ulceration of her heel and obviously has a vascular supply issues. She has an I and D of left foot.    Precautions/Limitations fall  precautions   Weight-Bearing Status - LLE nonweight-bearing   Weight-Bearing Status - RLE (She is WBAT in walking boot. )   Cognitive Status Examination   Orientation orientation to person, place and time   Level of Consciousness alert   Follows Commands and Answers Questions 100% of the time   Personal Safety and Judgment intact   Memory intact   Pain Assessment   Patient Currently in Pain (6-7 in left foot. Pain meds provided by nurse. )   Integumentary/Edema   Integumentary/Edema Comments Ace bandage over left foot. Walking boot on right foot.   Posture    Posture Not impaired   Range of Motion (ROM)   ROM Comment Right ankle deferred due to boot. Left ankle range WFL. Bilateral UE's WNL.    Strength   Strength Comments UE's functional for using walker on level surface and stairs.    Bed Mobility   Bed Mobility Comments Independent   Transfer Skills   Transfer Comments Independent   Gait   Gait Comments Independent with knee scooter (long distance) and with walker (short distance)   Balance   Balance Comments Good balance with support of walker and with support of knee scooter.    General Therapy Interventions   Planned Therapy Interventions (Stair training on one step with walker. )   Clinical Impression   Criteria for Skilled Therapeutic Intervention yes, treatment indicated   PT Diagnosis Impaired independence on steps.    Influenced by the following impairments NWB on left.   Functional limitations due to impairments Patient has been having difficulty coming down one step into garage due to not having much to hold onto.    Clinical Presentation Stable/Uncomplicated   Clinical Presentation Rationale Patient already fairly independent due to multiple previous surgeries.    Clinical Decision Making (Complexity) Low complexity   Therapy Frequency` (One time only. )   Predicted Duration of Therapy Intervention (days/wks) One time only.    Anticipated Equipment Needs at Discharge (Has a walker and knee scooter  "already.)   Anticipated Discharge Disposition Home with Assist   Risk & Benefits of therapy have been explained Yes   Patient, Family & other staff in agreement with plan of care Yes   [facility] TM \"6 Clicks\"   2016, Trustees of [facility], under license to [employer].  All rights reserved.   6 Clicks Short Forms Basic Mobility Inpatient Short Form   [facility] AM-PAC  \"6 Clicks\" V.2 Basic Mobility Inpatient Short Form   1. Turning from your back to your side while in a flat bed without using bedrails? 4 - None   2. Moving from lying on your back to sitting on the side of a flat bed without using bedrails? 4 - None   3. Moving to and from a bed to a chair (including a wheelchair)? 4 - None   4. Standing up from a chair using your arms (e.g., wheelchair, or bedside chair)? 4 - None   5. To walk in hospital room? 4 - None   6. Climbing 3-5 steps with a railing? 3 - A Little   Basic Mobility Raw Score (Score out of 24.Lower scores equate to lower levels of function) 23   Total Evaluation Time   Total Evaluation Time (Minutes) 5     "

## 2018-11-28 NOTE — PROGRESS NOTES
This is a recent snapshot of the patient's [facility] medical record.  For current drug dose and complete information and questions, call [phone] or In Basket pool, [facility] home infusion ([id number])  CSN Number:  [id number]

## 2018-11-30 ENCOUNTER — HOSPITAL ENCOUNTER (EMERGENCY)
Facility: CLINIC | Age: 48
End: 2018-11-30
Payer: COMMERCIAL

## 2018-11-30 LAB
BACTERIA SPEC CULT: ABNORMAL
BACTERIA SPEC CULT: NO GROWTH
BACTERIA SPEC CULT: NO GROWTH
Lab: ABNORMAL
Lab: NORMAL
Lab: NORMAL
SPECIMEN SOURCE: ABNORMAL
SPECIMEN SOURCE: NORMAL
SPECIMEN SOURCE: NORMAL

## 2018-12-03 LAB
BACTERIA SPEC CULT: NORMAL
Lab: NORMAL
SPECIMEN SOURCE: NORMAL

## 2018-12-04 ENCOUNTER — HOME INFUSION (PRE-WILLOW HOME INFUSION) (OUTPATIENT)
Dept: PHARMACY | Facility: CLINIC | Age: 48
End: 2018-12-04

## 2018-12-04 LAB
AST SERPL W P-5'-P-CCNC: 16 U/L (ref 0–45)
BASOPHILS # BLD AUTO: 0.1 10E9/L (ref 0–0.2)
BASOPHILS NFR BLD AUTO: 0.7 %
BUN SERPL-MCNC: 19 MG/DL (ref 7–30)
CREAT SERPL-MCNC: 0.61 MG/DL (ref 0.52–1.04)
CRP SERPL-MCNC: 14.5 MG/L (ref 0–8)
DIFFERENTIAL METHOD BLD: ABNORMAL
EOSINOPHIL # BLD AUTO: 0.4 10E9/L (ref 0–0.7)
EOSINOPHIL NFR BLD AUTO: 4.5 %
ERYTHROCYTE [DISTWIDTH] IN BLOOD BY AUTOMATED COUNT: 14.1 % (ref 10–15)
ERYTHROCYTE [SEDIMENTATION RATE] IN BLOOD BY WESTERGREN METHOD: 33 MM/H (ref 0–20)
GFR SERPL CREATININE-BSD FRML MDRD: >90 ML/MIN/1.7M2
HCT VFR BLD AUTO: 38 % (ref 35–47)
HGB BLD-MCNC: 11.8 G/DL (ref 11.7–15.7)
IMM GRANULOCYTES # BLD: 0 10E9/L (ref 0–0.4)
IMM GRANULOCYTES NFR BLD: 0.3 %
LYMPHOCYTES # BLD AUTO: 3.4 10E9/L (ref 0.8–5.3)
LYMPHOCYTES NFR BLD AUTO: 39.3 %
MCH RBC QN AUTO: 26.4 PG (ref 26.5–33)
MCHC RBC AUTO-ENTMCNC: 31.1 G/DL (ref 31.5–36.5)
MCV RBC AUTO: 85 FL (ref 78–100)
MONOCYTES # BLD AUTO: 0.6 10E9/L (ref 0–1.3)
MONOCYTES NFR BLD AUTO: 6.3 %
NEUTROPHILS # BLD AUTO: 4.3 10E9/L (ref 1.6–8.3)
NEUTROPHILS NFR BLD AUTO: 48.9 %
NRBC # BLD AUTO: 0 10*3/UL
NRBC BLD AUTO-RTO: 0 /100
PLATELET # BLD AUTO: 407 10E9/L (ref 150–450)
RBC # BLD AUTO: 4.47 10E12/L (ref 3.8–5.2)
WBC # BLD AUTO: 8.8 10E9/L (ref 4–11)

## 2018-12-04 PROCEDURE — 82565 ASSAY OF CREATININE: CPT | Performed by: INTERNAL MEDICINE

## 2018-12-04 PROCEDURE — 86140 C-REACTIVE PROTEIN: CPT | Performed by: INTERNAL MEDICINE

## 2018-12-04 PROCEDURE — 84520 ASSAY OF UREA NITROGEN: CPT | Performed by: INTERNAL MEDICINE

## 2018-12-04 PROCEDURE — 85652 RBC SED RATE AUTOMATED: CPT | Performed by: INTERNAL MEDICINE

## 2018-12-04 PROCEDURE — 84450 TRANSFERASE (AST) (SGOT): CPT | Performed by: INTERNAL MEDICINE

## 2018-12-04 PROCEDURE — 85025 COMPLETE CBC W/AUTO DIFF WBC: CPT | Performed by: INTERNAL MEDICINE

## 2018-12-05 ENCOUNTER — HOME INFUSION (PRE-WILLOW HOME INFUSION) (OUTPATIENT)
Dept: PHARMACY | Facility: CLINIC | Age: 48
End: 2018-12-05

## 2018-12-05 NOTE — PROGRESS NOTES
This is a recent snapshot of the patient's [facility] medical record.  For current drug dose and complete information and questions, call [phone] or In Basket pool, [facility] infusion ([id number])  CSN Number:  [id number]

## 2018-12-06 NOTE — PROGRESS NOTES
This is a recent snapshot of the patient's [facility] medical record.  For current drug dose and complete information and questions, call [phone] or In Basket pool, [facility] infusion ([id number])  CSN Number:  [id number]

## 2018-12-10 NOTE — DISCHARGE SUMMARY
Admit Date:     2018   Discharge Date:     2018      ADMISSION DIAGNOSIS:  Dehiscence left foot with previous surgical incision, also chronic heel ulcer.      DISCHARGE DIAGNOSIS:  Dehiscence left foot with previous surgical incision, also chronic heel ulcer.      OPERATIVE PROCEDURE:  Irrigation and debridement previous surgical would, also heel ulcer.      ATTENDING PHYSICIAN:  Andre Harman MD.      HOSPITAL COURSE:  Ms. Franco was admitted to [facility] on Saturday, , following persistent pain and drainage as well as nonhealing wound of her left foot.  She had a bunion procedure done in 2018, which unfortunately did not do well.  The hardware was removed 10/2018, but she still continues to have a nonhealing wound.  There is some concern for vascular supply.  On 2018, she was taken to the operating room and the aforementioned procedure was performed by Dr. Harman.  She tolerated the procedure well, was transferred from the operating room to the postanesthesia care unit and then to the Orthopedic floor where she underwent continuation of IV antibiotics, which was made by Dr. Bocanegra from Infectious Disease.  She will be on a planned continued course of IV antibiotics.  She just finished a 6-week course of IV antibiotics.  She will continue IV antibiotics at Dr. Bocanegra's discretion pending intraoperative cultures, which were done in the OR on .  She will be sent home with [facility] and will follow up with us accordingly.         ANDRE HARMAN MD       As dictated by MARIA ESTHER PIERRE            D: 2018   T: 2018   MT: DARYL      Name:     CASSANDRA FRANCO   MRN:      [id number]        Account:        [id number]   :      1970           Admit Date:     2018                                  Discharge Date: 2018      Document: [id number]       cc: Linda Bernstein PA-C

## 2018-12-11 ENCOUNTER — HOME INFUSION (PRE-WILLOW HOME INFUSION) (OUTPATIENT)
Dept: PHARMACY | Facility: CLINIC | Age: 48
End: 2018-12-11

## 2018-12-11 LAB
AST SERPL W P-5'-P-CCNC: 17 U/L (ref 0–45)
BASOPHILS # BLD AUTO: 0.1 10E9/L (ref 0–0.2)
BASOPHILS NFR BLD AUTO: 0.7 %
BUN SERPL-MCNC: 18 MG/DL (ref 7–30)
CREAT SERPL-MCNC: 0.73 MG/DL (ref 0.52–1.04)
CRP SERPL-MCNC: 4.4 MG/L (ref 0–8)
DIFFERENTIAL METHOD BLD: ABNORMAL
EOSINOPHIL # BLD AUTO: 0.3 10E9/L (ref 0–0.7)
EOSINOPHIL NFR BLD AUTO: 3.2 %
ERYTHROCYTE [DISTWIDTH] IN BLOOD BY AUTOMATED COUNT: 14.1 % (ref 10–15)
ERYTHROCYTE [SEDIMENTATION RATE] IN BLOOD BY WESTERGREN METHOD: 20 MM/H (ref 0–20)
GFR SERPL CREATININE-BSD FRML MDRD: 86 ML/MIN/1.7M2
HCT VFR BLD AUTO: 40.4 % (ref 35–47)
HGB BLD-MCNC: 12.9 G/DL (ref 11.7–15.7)
IMM GRANULOCYTES # BLD: 0 10E9/L (ref 0–0.4)
IMM GRANULOCYTES NFR BLD: 0.4 %
LYMPHOCYTES # BLD AUTO: 3.6 10E9/L (ref 0.8–5.3)
LYMPHOCYTES NFR BLD AUTO: 34 %
MCH RBC QN AUTO: 27 PG (ref 26.5–33)
MCHC RBC AUTO-ENTMCNC: 31.9 G/DL (ref 31.5–36.5)
MCV RBC AUTO: 85 FL (ref 78–100)
MONOCYTES # BLD AUTO: 0.6 10E9/L (ref 0–1.3)
MONOCYTES NFR BLD AUTO: 6 %
NEUTROPHILS # BLD AUTO: 5.9 10E9/L (ref 1.6–8.3)
NEUTROPHILS NFR BLD AUTO: 55.7 %
NRBC # BLD AUTO: 0 10*3/UL
NRBC BLD AUTO-RTO: 0 /100
PLATELET # BLD AUTO: 456 10E9/L (ref 150–450)
RBC # BLD AUTO: 4.77 10E12/L (ref 3.8–5.2)
WBC # BLD AUTO: 10.6 10E9/L (ref 4–11)

## 2018-12-11 PROCEDURE — 85025 COMPLETE CBC W/AUTO DIFF WBC: CPT | Performed by: INTERNAL MEDICINE

## 2018-12-11 PROCEDURE — 85652 RBC SED RATE AUTOMATED: CPT | Performed by: INTERNAL MEDICINE

## 2018-12-11 PROCEDURE — 84450 TRANSFERASE (AST) (SGOT): CPT | Performed by: INTERNAL MEDICINE

## 2018-12-11 PROCEDURE — 84520 ASSAY OF UREA NITROGEN: CPT | Performed by: INTERNAL MEDICINE

## 2018-12-11 PROCEDURE — 86140 C-REACTIVE PROTEIN: CPT | Performed by: INTERNAL MEDICINE

## 2018-12-11 PROCEDURE — 82565 ASSAY OF CREATININE: CPT | Performed by: INTERNAL MEDICINE

## 2018-12-12 NOTE — PROGRESS NOTES
This is a recent snapshot of the patient's [facility] medical record.  For current drug dose and complete information and questions, call [phone] or In Basket pool, [facility] home infusion ([id number])  CSN Number:  [id number]

## 2018-12-13 ENCOUNTER — AMBULATORY - HEALTHEAST (OUTPATIENT)
Dept: VASCULAR SURGERY | Facility: CLINIC | Age: 48
End: 2018-12-13

## 2018-12-13 DIAGNOSIS — S91.302A OPEN WOUND OF LEFT FOOT: ICD-10-CM

## 2018-12-18 ENCOUNTER — HOME INFUSION (PRE-WILLOW HOME INFUSION) (OUTPATIENT)
Dept: PHARMACY | Facility: CLINIC | Age: 48
End: 2018-12-18

## 2018-12-18 LAB
AST SERPL W P-5'-P-CCNC: 16 U/L (ref 0–45)
BASOPHILS # BLD AUTO: 0 10E9/L (ref 0–0.2)
BASOPHILS NFR BLD AUTO: 0.4 %
BUN SERPL-MCNC: 15 MG/DL (ref 7–30)
CREAT SERPL-MCNC: 0.73 MG/DL (ref 0.52–1.04)
CRP SERPL-MCNC: 7 MG/L (ref 0–8)
DIFFERENTIAL METHOD BLD: NORMAL
EOSINOPHIL # BLD AUTO: 0.2 10E9/L (ref 0–0.7)
EOSINOPHIL NFR BLD AUTO: 2.3 %
ERYTHROCYTE [DISTWIDTH] IN BLOOD BY AUTOMATED COUNT: 14.5 % (ref 10–15)
ERYTHROCYTE [SEDIMENTATION RATE] IN BLOOD BY WESTERGREN METHOD: 22 MM/H (ref 0–20)
GFR SERPL CREATININE-BSD FRML MDRD: 86 ML/MIN/1.7M2
HCT VFR BLD AUTO: 38.8 % (ref 35–47)
HGB BLD-MCNC: 12.3 G/DL (ref 11.7–15.7)
IMM GRANULOCYTES # BLD: 0 10E9/L (ref 0–0.4)
IMM GRANULOCYTES NFR BLD: 0.3 %
LYMPHOCYTES # BLD AUTO: 3.2 10E9/L (ref 0.8–5.3)
LYMPHOCYTES NFR BLD AUTO: 32.3 %
MCH RBC QN AUTO: 26.7 PG (ref 26.5–33)
MCHC RBC AUTO-ENTMCNC: 31.7 G/DL (ref 31.5–36.5)
MCV RBC AUTO: 84 FL (ref 78–100)
MONOCYTES # BLD AUTO: 0.5 10E9/L (ref 0–1.3)
MONOCYTES NFR BLD AUTO: 5.3 %
NEUTROPHILS # BLD AUTO: 5.9 10E9/L (ref 1.6–8.3)
NEUTROPHILS NFR BLD AUTO: 59.4 %
NRBC # BLD AUTO: 0 10*3/UL
NRBC BLD AUTO-RTO: 0 /100
PLATELET # BLD AUTO: 436 10E9/L (ref 150–450)
RBC # BLD AUTO: 4.6 10E12/L (ref 3.8–5.2)
WBC # BLD AUTO: 9.9 10E9/L (ref 4–11)

## 2018-12-18 PROCEDURE — 85652 RBC SED RATE AUTOMATED: CPT | Performed by: INTERNAL MEDICINE

## 2018-12-18 PROCEDURE — 84450 TRANSFERASE (AST) (SGOT): CPT | Performed by: INTERNAL MEDICINE

## 2018-12-18 PROCEDURE — 84520 ASSAY OF UREA NITROGEN: CPT | Performed by: INTERNAL MEDICINE

## 2018-12-18 PROCEDURE — 85025 COMPLETE CBC W/AUTO DIFF WBC: CPT | Performed by: INTERNAL MEDICINE

## 2018-12-18 PROCEDURE — 82565 ASSAY OF CREATININE: CPT | Performed by: INTERNAL MEDICINE

## 2018-12-18 PROCEDURE — 86140 C-REACTIVE PROTEIN: CPT | Performed by: INTERNAL MEDICINE

## 2018-12-19 ENCOUNTER — HOME INFUSION (PRE-WILLOW HOME INFUSION) (OUTPATIENT)
Dept: PHARMACY | Facility: CLINIC | Age: 48
End: 2018-12-19

## 2018-12-19 NOTE — PROGRESS NOTES
This is a recent snapshot of the patient's [facility] medical record.  For current drug dose and complete information and questions, call [phone] or In Basket pool, [facility] home infusion ([id number])  CSN Number:  [id number]

## 2018-12-20 NOTE — PROGRESS NOTES
This is a recent snapshot of the patient's [facility] medical record.  For current drug dose and complete information and questions, call [phone] or In Basket pool, [facility] home infusion ([id number])  CSN Number:  [id number]

## 2018-12-24 ENCOUNTER — HOME INFUSION (PRE-WILLOW HOME INFUSION) (OUTPATIENT)
Dept: PHARMACY | Facility: CLINIC | Age: 48
End: 2018-12-24

## 2018-12-24 LAB
AST SERPL W P-5'-P-CCNC: 13 U/L (ref 0–45)
BASOPHILS # BLD AUTO: 0 10E9/L (ref 0–0.2)
BASOPHILS NFR BLD AUTO: 0.5 %
BUN SERPL-MCNC: 16 MG/DL (ref 7–30)
CREAT SERPL-MCNC: 0.69 MG/DL (ref 0.52–1.04)
CRP SERPL-MCNC: 12.5 MG/L (ref 0–8)
DIFFERENTIAL METHOD BLD: ABNORMAL
EOSINOPHIL # BLD AUTO: 0.3 10E9/L (ref 0–0.7)
EOSINOPHIL NFR BLD AUTO: 3.1 %
ERYTHROCYTE [DISTWIDTH] IN BLOOD BY AUTOMATED COUNT: 14.5 % (ref 10–15)
ERYTHROCYTE [SEDIMENTATION RATE] IN BLOOD BY WESTERGREN METHOD: 19 MM/H (ref 0–20)
GFR SERPL CREATININE-BSD FRML MDRD: >90 ML/MIN/{1.73_M2}
HCT VFR BLD AUTO: 39.4 % (ref 35–47)
HGB BLD-MCNC: 12.3 G/DL (ref 11.7–15.7)
IMM GRANULOCYTES # BLD: 0 10E9/L (ref 0–0.4)
IMM GRANULOCYTES NFR BLD: 0.2 %
LYMPHOCYTES # BLD AUTO: 2.7 10E9/L (ref 0.8–5.3)
LYMPHOCYTES NFR BLD AUTO: 32.1 %
MCH RBC QN AUTO: 26.3 PG (ref 26.5–33)
MCHC RBC AUTO-ENTMCNC: 31.2 G/DL (ref 31.5–36.5)
MCV RBC AUTO: 84 FL (ref 78–100)
MONOCYTES # BLD AUTO: 0.6 10E9/L (ref 0–1.3)
MONOCYTES NFR BLD AUTO: 6.5 %
NEUTROPHILS # BLD AUTO: 4.9 10E9/L (ref 1.6–8.3)
NEUTROPHILS NFR BLD AUTO: 57.6 %
NRBC # BLD AUTO: 0 10*3/UL
NRBC BLD AUTO-RTO: 0 /100
PLATELET # BLD AUTO: 430 10E9/L (ref 150–450)
RBC # BLD AUTO: 4.67 10E12/L (ref 3.8–5.2)
WBC # BLD AUTO: 8.5 10E9/L (ref 4–11)

## 2018-12-24 PROCEDURE — 84520 ASSAY OF UREA NITROGEN: CPT | Performed by: INTERNAL MEDICINE

## 2018-12-24 PROCEDURE — 85652 RBC SED RATE AUTOMATED: CPT | Performed by: INTERNAL MEDICINE

## 2018-12-24 PROCEDURE — 84450 TRANSFERASE (AST) (SGOT): CPT | Performed by: INTERNAL MEDICINE

## 2018-12-24 PROCEDURE — 85025 COMPLETE CBC W/AUTO DIFF WBC: CPT | Performed by: INTERNAL MEDICINE

## 2018-12-24 PROCEDURE — 82565 ASSAY OF CREATININE: CPT | Performed by: INTERNAL MEDICINE

## 2018-12-24 PROCEDURE — 86140 C-REACTIVE PROTEIN: CPT | Performed by: INTERNAL MEDICINE

## 2018-12-26 NOTE — PROGRESS NOTES
This is a recent snapshot of the patient's [facility] medical record.  For current drug dose and complete information and questions, call [phone] or In Basket pool, fv home infusion ([id number])  CSN Number:  [id number]

## 2018-12-27 ENCOUNTER — HOME INFUSION (PRE-WILLOW HOME INFUSION) (OUTPATIENT)
Dept: PHARMACY | Facility: CLINIC | Age: 48
End: 2018-12-27

## 2018-12-28 NOTE — PROGRESS NOTES
This is a recent snapshot of the patient's [facility] medical record.  For current drug dose and complete information and questions, call [phone] or In Basket pool, fv home infusion ([id number])  CSN Number:  [id number]

## 2018-12-31 ENCOUNTER — HOME INFUSION (PRE-WILLOW HOME INFUSION) (OUTPATIENT)
Dept: PHARMACY | Facility: CLINIC | Age: 48
End: 2018-12-31

## 2018-12-31 LAB
AST SERPL W P-5'-P-CCNC: 13 U/L (ref 0–45)
BASOPHILS # BLD AUTO: 0.1 10E9/L (ref 0–0.2)
BASOPHILS NFR BLD AUTO: 0.7 %
BUN SERPL-MCNC: 19 MG/DL (ref 7–30)
CREAT SERPL-MCNC: 0.7 MG/DL (ref 0.52–1.04)
CRP SERPL-MCNC: 13.3 MG/L (ref 0–8)
DIFFERENTIAL METHOD BLD: NORMAL
EOSINOPHIL # BLD AUTO: 0.3 10E9/L (ref 0–0.7)
EOSINOPHIL NFR BLD AUTO: 3.8 %
ERYTHROCYTE [DISTWIDTH] IN BLOOD BY AUTOMATED COUNT: 14.2 % (ref 10–15)
ERYTHROCYTE [SEDIMENTATION RATE] IN BLOOD BY WESTERGREN METHOD: 25 MM/H (ref 0–20)
GFR SERPL CREATININE-BSD FRML MDRD: >90 ML/MIN/{1.73_M2}
HCT VFR BLD AUTO: 37.6 % (ref 35–47)
HGB BLD-MCNC: 12 G/DL (ref 11.7–15.7)
IMM GRANULOCYTES # BLD: 0 10E9/L (ref 0–0.4)
IMM GRANULOCYTES NFR BLD: 0.2 %
LYMPHOCYTES # BLD AUTO: 3.2 10E9/L (ref 0.8–5.3)
LYMPHOCYTES NFR BLD AUTO: 36.6 %
MCH RBC QN AUTO: 26.7 PG (ref 26.5–33)
MCHC RBC AUTO-ENTMCNC: 31.9 G/DL (ref 31.5–36.5)
MCV RBC AUTO: 84 FL (ref 78–100)
MONOCYTES # BLD AUTO: 0.5 10E9/L (ref 0–1.3)
MONOCYTES NFR BLD AUTO: 5.3 %
NEUTROPHILS # BLD AUTO: 4.6 10E9/L (ref 1.6–8.3)
NEUTROPHILS NFR BLD AUTO: 53.4 %
NRBC # BLD AUTO: 0 10*3/UL
NRBC BLD AUTO-RTO: 0 /100
PLATELET # BLD AUTO: 376 10E9/L (ref 150–450)
RBC # BLD AUTO: 4.5 10E12/L (ref 3.8–5.2)
WBC # BLD AUTO: 8.6 10E9/L (ref 4–11)

## 2018-12-31 PROCEDURE — 84450 TRANSFERASE (AST) (SGOT): CPT | Performed by: INTERNAL MEDICINE

## 2018-12-31 PROCEDURE — 82565 ASSAY OF CREATININE: CPT | Performed by: INTERNAL MEDICINE

## 2018-12-31 PROCEDURE — 86140 C-REACTIVE PROTEIN: CPT | Performed by: INTERNAL MEDICINE

## 2018-12-31 PROCEDURE — 85652 RBC SED RATE AUTOMATED: CPT | Performed by: INTERNAL MEDICINE

## 2018-12-31 PROCEDURE — 85025 COMPLETE CBC W/AUTO DIFF WBC: CPT | Performed by: INTERNAL MEDICINE

## 2018-12-31 PROCEDURE — 84520 ASSAY OF UREA NITROGEN: CPT | Performed by: INTERNAL MEDICINE

## 2019-01-01 ENCOUNTER — APPOINTMENT (OUTPATIENT)
Dept: LAB | Facility: CLINIC | Age: 49
End: 2019-01-01
Attending: INTERNAL MEDICINE
Payer: COMMERCIAL

## 2019-01-02 NOTE — PROGRESS NOTES
This is a recent snapshot of the patient's [facility] medical record.  For current drug dose and complete information and questions, call [phone] or In Basket pool, fv home infusion ([id number])  CSN Number:  [id number]

## 2019-01-03 ENCOUNTER — COMMUNICATION - HEALTHEAST (OUTPATIENT)
Dept: VASCULAR SURGERY | Facility: CLINIC | Age: 49
End: 2019-01-03

## 2019-01-04 ENCOUNTER — HOME INFUSION (PRE-WILLOW HOME INFUSION) (OUTPATIENT)
Dept: PHARMACY | Facility: CLINIC | Age: 49
End: 2019-01-04

## 2019-01-22 ENCOUNTER — HOSPITAL ENCOUNTER (INPATIENT)
Facility: CLINIC | Age: 49
LOS: 7 days | Discharge: HOME | DRG: 857 | End: 2019-01-29
Attending: ORTHOPAEDIC SURGERY | Admitting: ORTHOPAEDIC SURGERY
Payer: COMMERCIAL

## 2019-01-22 DIAGNOSIS — T81.40XD POSTOPERATIVE INFECTION, UNSPECIFIED TYPE, SUBSEQUENT ENCOUNTER: ICD-10-CM

## 2019-01-22 DIAGNOSIS — L08.9 LEFT FOOT INFECTION: ICD-10-CM

## 2019-01-22 DIAGNOSIS — T81.42XS INFECTION OF DEEP INCISIONAL SURGICAL SITE AFTER PROCEDURE, SEQUELA: Primary | ICD-10-CM

## 2019-01-22 LAB
CRP SERPL-MCNC: 19.2 MG/L (ref 0–8)
ERYTHROCYTE [SEDIMENTATION RATE] IN BLOOD BY WESTERGREN METHOD: 34 MM/H (ref 0–20)

## 2019-01-22 PROCEDURE — 85652 RBC SED RATE AUTOMATED: CPT | Performed by: PHYSICIAN ASSISTANT

## 2019-01-22 PROCEDURE — 25000132 ZZH RX MED GY IP 250 OP 250 PS 637: Performed by: PHYSICIAN ASSISTANT

## 2019-01-22 PROCEDURE — 87040 BLOOD CULTURE FOR BACTERIA: CPT | Performed by: PHYSICIAN ASSISTANT

## 2019-01-22 PROCEDURE — 99222 1ST HOSP IP/OBS MODERATE 55: CPT | Performed by: PHYSICIAN ASSISTANT

## 2019-01-22 PROCEDURE — 36415 COLL VENOUS BLD VENIPUNCTURE: CPT | Performed by: PHYSICIAN ASSISTANT

## 2019-01-22 PROCEDURE — 25000128 H RX IP 250 OP 636

## 2019-01-22 PROCEDURE — 25000128 H RX IP 250 OP 636: Performed by: PHYSICIAN ASSISTANT

## 2019-01-22 PROCEDURE — 12000000 ZZH R&B MED SURG/OB

## 2019-01-22 PROCEDURE — 86140 C-REACTIVE PROTEIN: CPT | Performed by: PHYSICIAN ASSISTANT

## 2019-01-22 PROCEDURE — 99207 ZZC CONSULT E&M CHANGED TO INITIAL LEVEL: CPT | Performed by: PHYSICIAN ASSISTANT

## 2019-01-22 RX ORDER — SERTRALINE HYDROCHLORIDE 100 MG/1
100 TABLET, FILM COATED ORAL DAILY
COMMUNITY
End: 2019-11-04

## 2019-01-22 RX ORDER — SODIUM CHLORIDE, SODIUM LACTATE, POTASSIUM CHLORIDE, CALCIUM CHLORIDE 600; 310; 30; 20 MG/100ML; MG/100ML; MG/100ML; MG/100ML
INJECTION, SOLUTION INTRAVENOUS CONTINUOUS
Status: DISCONTINUED | OUTPATIENT
Start: 2019-01-22 | End: 2019-01-23

## 2019-01-22 RX ORDER — PROCHLORPERAZINE MALEATE 10 MG
10 TABLET ORAL EVERY 6 HOURS PRN
Status: DISCONTINUED | OUTPATIENT
Start: 2019-01-22 | End: 2019-01-29 | Stop reason: HOSPADM

## 2019-01-22 RX ORDER — SERTRALINE HYDROCHLORIDE 100 MG/1
100 TABLET, FILM COATED ORAL DAILY
Status: DISCONTINUED | OUTPATIENT
Start: 2019-01-23 | End: 2019-01-29 | Stop reason: HOSPADM

## 2019-01-22 RX ORDER — OXYCODONE HYDROCHLORIDE 5 MG/1
5-10 TABLET ORAL
Status: DISCONTINUED | OUTPATIENT
Start: 2019-01-22 | End: 2019-01-29 | Stop reason: HOSPADM

## 2019-01-22 RX ORDER — ONDANSETRON 4 MG/1
4 TABLET, ORALLY DISINTEGRATING ORAL EVERY 6 HOURS PRN
Status: DISCONTINUED | OUTPATIENT
Start: 2019-01-22 | End: 2019-01-29 | Stop reason: HOSPADM

## 2019-01-22 RX ORDER — HYDROXYZINE PAMOATE 25 MG/1
25 CAPSULE ORAL EVERY 4 HOURS PRN
Status: DISCONTINUED | OUTPATIENT
Start: 2019-01-22 | End: 2019-01-29 | Stop reason: HOSPADM

## 2019-01-22 RX ORDER — POTASSIUM CHLORIDE 7.45 MG/ML
10 INJECTION INTRAVENOUS
Status: DISCONTINUED | OUTPATIENT
Start: 2019-01-22 | End: 2019-01-29 | Stop reason: HOSPADM

## 2019-01-22 RX ORDER — BUPROPION HYDROCHLORIDE 300 MG/1
300 TABLET ORAL DAILY
Status: DISCONTINUED | OUTPATIENT
Start: 2019-01-23 | End: 2019-01-29 | Stop reason: HOSPADM

## 2019-01-22 RX ORDER — NALOXONE HYDROCHLORIDE 0.4 MG/ML
.1-.4 INJECTION, SOLUTION INTRAMUSCULAR; INTRAVENOUS; SUBCUTANEOUS
Status: DISCONTINUED | OUTPATIENT
Start: 2019-01-22 | End: 2019-01-29 | Stop reason: HOSPADM

## 2019-01-22 RX ORDER — POTASSIUM CL/LIDO/0.9 % NACL 10MEQ/0.1L
10 INTRAVENOUS SOLUTION, PIGGYBACK (ML) INTRAVENOUS
Status: DISCONTINUED | OUTPATIENT
Start: 2019-01-22 | End: 2019-01-29 | Stop reason: HOSPADM

## 2019-01-22 RX ORDER — PROCHLORPERAZINE 25 MG
25 SUPPOSITORY, RECTAL RECTAL EVERY 12 HOURS PRN
Status: DISCONTINUED | OUTPATIENT
Start: 2019-01-22 | End: 2019-01-29 | Stop reason: HOSPADM

## 2019-01-22 RX ORDER — POTASSIUM CHLORIDE 1.5 G/1.58G
20-40 POWDER, FOR SOLUTION ORAL
Status: DISCONTINUED | OUTPATIENT
Start: 2019-01-22 | End: 2019-01-29 | Stop reason: HOSPADM

## 2019-01-22 RX ORDER — AMOXICILLIN 250 MG
1 CAPSULE ORAL 2 TIMES DAILY
Status: DISCONTINUED | OUTPATIENT
Start: 2019-01-22 | End: 2019-01-29 | Stop reason: HOSPADM

## 2019-01-22 RX ORDER — AMOXICILLIN 250 MG
2 CAPSULE ORAL 2 TIMES DAILY PRN
Status: DISCONTINUED | OUTPATIENT
Start: 2019-01-22 | End: 2019-01-29 | Stop reason: HOSPADM

## 2019-01-22 RX ORDER — TOPIRAMATE 100 MG/1
100 TABLET, FILM COATED ORAL AT BEDTIME
Status: DISCONTINUED | OUTPATIENT
Start: 2019-01-22 | End: 2019-01-29 | Stop reason: HOSPADM

## 2019-01-22 RX ORDER — PHENTERMINE HYDROCHLORIDE 30 MG/1
30 CAPSULE ORAL EVERY MORNING
Status: DISCONTINUED | OUTPATIENT
Start: 2019-01-23 | End: 2019-01-22 | Stop reason: CLARIF

## 2019-01-22 RX ORDER — POTASSIUM CHLORIDE 1500 MG/1
20-40 TABLET, EXTENDED RELEASE ORAL
Status: DISCONTINUED | OUTPATIENT
Start: 2019-01-22 | End: 2019-01-29 | Stop reason: HOSPADM

## 2019-01-22 RX ORDER — METOCLOPRAMIDE HYDROCHLORIDE 5 MG/ML
10 INJECTION INTRAMUSCULAR; INTRAVENOUS EVERY 6 HOURS PRN
Status: DISCONTINUED | OUTPATIENT
Start: 2019-01-22 | End: 2019-01-29 | Stop reason: HOSPADM

## 2019-01-22 RX ORDER — ONDANSETRON 2 MG/ML
4 INJECTION INTRAMUSCULAR; INTRAVENOUS EVERY 6 HOURS PRN
Status: DISCONTINUED | OUTPATIENT
Start: 2019-01-22 | End: 2019-01-29 | Stop reason: HOSPADM

## 2019-01-22 RX ORDER — HYDROMORPHONE HYDROCHLORIDE 1 MG/ML
.3-.5 INJECTION, SOLUTION INTRAMUSCULAR; INTRAVENOUS; SUBCUTANEOUS
Status: DISCONTINUED | OUTPATIENT
Start: 2019-01-22 | End: 2019-01-29 | Stop reason: HOSPADM

## 2019-01-22 RX ORDER — BUSPIRONE HYDROCHLORIDE 5 MG/1
15 TABLET ORAL 2 TIMES DAILY
Status: DISCONTINUED | OUTPATIENT
Start: 2019-01-22 | End: 2019-01-29 | Stop reason: HOSPADM

## 2019-01-22 RX ORDER — AMOXICILLIN 250 MG
1 CAPSULE ORAL 2 TIMES DAILY PRN
Status: DISCONTINUED | OUTPATIENT
Start: 2019-01-22 | End: 2019-01-29 | Stop reason: HOSPADM

## 2019-01-22 RX ORDER — METOCLOPRAMIDE 10 MG/1
10 TABLET ORAL EVERY 6 HOURS PRN
Status: DISCONTINUED | OUTPATIENT
Start: 2019-01-22 | End: 2019-01-29 | Stop reason: HOSPADM

## 2019-01-22 RX ORDER — BISACODYL 10 MG
10 SUPPOSITORY, RECTAL RECTAL DAILY PRN
Status: DISCONTINUED | OUTPATIENT
Start: 2019-01-22 | End: 2019-01-29 | Stop reason: HOSPADM

## 2019-01-22 RX ORDER — GABAPENTIN 800 MG/1
800 TABLET ORAL 3 TIMES DAILY
Status: DISCONTINUED | OUTPATIENT
Start: 2019-01-22 | End: 2019-01-29 | Stop reason: HOSPADM

## 2019-01-22 RX ORDER — AMOXICILLIN 250 MG
2 CAPSULE ORAL 2 TIMES DAILY
Status: DISCONTINUED | OUTPATIENT
Start: 2019-01-22 | End: 2019-01-29 | Stop reason: HOSPADM

## 2019-01-22 RX ORDER — POTASSIUM CHLORIDE 29.8 MG/ML
20 INJECTION INTRAVENOUS
Status: DISCONTINUED | OUTPATIENT
Start: 2019-01-22 | End: 2019-01-29 | Stop reason: HOSPADM

## 2019-01-22 RX ADMIN — TOPIRAMATE 100 MG: 100 TABLET, FILM COATED ORAL at 22:06

## 2019-01-22 RX ADMIN — GABAPENTIN 800 MG: 800 TABLET, FILM COATED ORAL at 22:06

## 2019-01-22 RX ADMIN — VANCOMYCIN HYDROCHLORIDE 2000 MG: 5 INJECTION, POWDER, LYOPHILIZED, FOR SOLUTION INTRAVENOUS at 20:26

## 2019-01-22 RX ADMIN — OXYCODONE HYDROCHLORIDE 10 MG: 5 TABLET ORAL at 20:24

## 2019-01-22 RX ADMIN — BUSPIRONE HYDROCHLORIDE 15 MG: 5 TABLET ORAL at 22:06

## 2019-01-22 RX ADMIN — SODIUM CHLORIDE, POTASSIUM CHLORIDE, SODIUM LACTATE AND CALCIUM CHLORIDE: 600; 310; 30; 20 INJECTION, SOLUTION INTRAVENOUS at 17:50

## 2019-01-22 RX ADMIN — SENNOSIDES AND DOCUSATE SODIUM 2 TABLET: 8.6; 5 TABLET ORAL at 20:25

## 2019-01-22 ASSESSMENT — ACTIVITIES OF DAILY LIVING (ADL)
TOILETING: 0-->INDEPENDENT
ADLS_ACUITY_SCORE: 12
TRANSFERRING: 0-->INDEPENDENT
BATHING: 0-->INDEPENDENT
DRESS: 0-->INDEPENDENT
PRIOR_FUNCTIONAL_LEVEL_COMMENT: INDEPENDENT
COGNITION: 0 - NO COGNITION ISSUES REPORTED
RETIRED_EATING: 0-->INDEPENDENT
FALL_HISTORY_WITHIN_LAST_SIX_MONTHS: NO
SWALLOWING: 0-->SWALLOWS FOODS/LIQUIDS WITHOUT DIFFICULTY
RETIRED_COMMUNICATION: 0-->UNDERSTANDS/COMMUNICATES WITHOUT DIFFICULTY
AMBULATION: 0-->INDEPENDENT

## 2019-01-22 ASSESSMENT — COLUMBIA-SUICIDE SEVERITY RATING SCALE - C-SSRS
1. IN THE PAST MONTH, HAVE YOU WISHED YOU WERE DEAD OR WISHED YOU COULD GO TO SLEEP AND NOT WAKE UP?: NO
2. HAVE YOU ACTUALLY HAD ANY THOUGHTS OF KILLING YOURSELF IN THE PAST MONTH?: NO
6. HAVE YOU EVER DONE ANYTHING, STARTED TO DO ANYTHING, OR PREPARED TO DO ANYTHING TO END YOUR LIFE?: NO

## 2019-01-22 ASSESSMENT — MIFFLIN-ST. JEOR: SCORE: 1543.07

## 2019-01-22 NOTE — PHARMACY-ADMISSION MEDICATION HISTORY
Admission Medication History    Admission medication history interview status for the 1/22/2019 admission is complete. See EPIC admission navigator for prior to admission medications     Medication history source reliability:Good    Actions taken by pharmacist (provider contacted, etc):None     Additional medication history information not noted on PTA med list :None    Medication reconciliation/reorder completed by provider prior to medication history? No    Time spent in this activity: 15 minutes    Prior to Admission medications    Medication Sig Last Dose Taking? Auth Provider   ammonium lactate (AMLACTIN) 12 % cream Apply topically daily as needed (to heels)  Past Week at PRN Yes Unknown, Entered By History   BuPROPion HCl (WELLBUTRIN XL PO) Take 300 mg by mouth daily 1/22/2019 at AM Yes Reported, Patient   BusPIRone HCl (BUSPAR PO) Take 15 mg by mouth 2 times daily 1/22/2019 at AM Yes Reported, Patient   cholecalciferol (VITAMIN D3) 5000 units TABS tablet Take 5,000 Units by mouth daily 1/22/2019 at AM Yes Unknown, Entered By History   clindamycin (CLEOCIN T) 1 % solution Apply topically nightly as needed (Acne outbreak)  Past Month at PRn Yes Unknown, Entered By History   Furosemide (LASIX PO) Take 160 mg by mouth daily  1/22/2019 at AM Yes Reported, Patient   gabapentin (NEURONTIN) 800 MG tablet Take 800 mg by mouth 3 times daily Am, supper, hs 1/22/2019 at x1 Yes Unknown, Entered By History   hydrOXYzine (VISTARIL) 25 MG capsule Take 1 capsule (25 mg) by mouth every 4 hours as needed for anxiety 1/21/2019 at PRN Yes Jett Malik, PAEvgenyC   Omeprazole (PRILOSEC PO) Take 40 mg by mouth 2 times daily (before meals) 2 CAPSULES IN A.M.  1/22/2019 at x1 Yes Reported, Patient   Ondansetron HCl (ZOFRAN PO) Take 8 mg by mouth as needed for nausea or vomiting  at PRN Yes Reported, Patient   oxyCODONE-acetaminophen (PERCOCET) 5-325 MG tablet Take 1-2 tablets by mouth every 4 hours as needed for severe pain  1/22/2019 at PRN Yes Jett Malik PA-C   phentermine 30 MG capsule Take 30 mg by mouth every morning 1/22/2019 at AM Yes Reported, Patient   polyethylene glycol (MIRALAX/GLYCOLAX) packet Take 17 g by mouth as needed for constipation  at PRN Yes Reported, Patient   Potassium Chloride Shameka CR (K-DUR PO) Take 80 mEq by mouth daily  1/22/2019 at AM Yes Reported, Patient   senna-docusate (SENOKOT-S;PERICOLACE) 8.6-50 MG per tablet Take 1 tablet by mouth 2 times daily  Patient taking differently: Take 1 tablet by mouth 2 times daily as needed   at PRN Yes Jett Malik PA-C   sertraline (ZOLOFT) 100 MG tablet Take 100 mg by mouth daily 1/22/2019 at AM Yes Unknown, Entered By History   Topiramate (TOPAMAX PO) Take 100 mg by mouth At Bedtime  1/21/2019 at HS Yes Reported, Patient       Scooby Villarreal, PharmD

## 2019-01-23 LAB
BASOPHILS # BLD AUTO: 0.1 10E9/L (ref 0–0.2)
BASOPHILS NFR BLD AUTO: 0.8 %
CREAT SERPL-MCNC: 0.67 MG/DL (ref 0.52–1.04)
DIFFERENTIAL METHOD BLD: ABNORMAL
EOSINOPHIL # BLD AUTO: 0.3 10E9/L (ref 0–0.7)
EOSINOPHIL NFR BLD AUTO: 3.6 %
ERYTHROCYTE [DISTWIDTH] IN BLOOD BY AUTOMATED COUNT: 14.7 % (ref 10–15)
GFR SERPL CREATININE-BSD FRML MDRD: >90 ML/MIN/{1.73_M2}
HCT VFR BLD AUTO: 33.3 % (ref 35–47)
HGB BLD-MCNC: 10.7 G/DL (ref 11.7–15.7)
IMM GRANULOCYTES # BLD: 0 10E9/L (ref 0–0.4)
IMM GRANULOCYTES NFR BLD: 0.1 %
LYMPHOCYTES # BLD AUTO: 2.2 10E9/L (ref 0.8–5.3)
LYMPHOCYTES NFR BLD AUTO: 29.3 %
MCH RBC QN AUTO: 26 PG (ref 26.5–33)
MCHC RBC AUTO-ENTMCNC: 32.1 G/DL (ref 31.5–36.5)
MCV RBC AUTO: 81 FL (ref 78–100)
MONOCYTES # BLD AUTO: 0.4 10E9/L (ref 0–1.3)
MONOCYTES NFR BLD AUTO: 5.4 %
NEUTROPHILS # BLD AUTO: 4.6 10E9/L (ref 1.6–8.3)
NEUTROPHILS NFR BLD AUTO: 60.8 %
NRBC # BLD AUTO: 0 10*3/UL
NRBC BLD AUTO-RTO: 0 /100
PLATELET # BLD AUTO: 313 10E9/L (ref 150–450)
RBC # BLD AUTO: 4.11 10E12/L (ref 3.8–5.2)
WBC # BLD AUTO: 7.6 10E9/L (ref 4–11)

## 2019-01-23 PROCEDURE — 25000128 H RX IP 250 OP 636: Performed by: PHYSICIAN ASSISTANT

## 2019-01-23 PROCEDURE — 36415 COLL VENOUS BLD VENIPUNCTURE: CPT | Performed by: ORTHOPAEDIC SURGERY

## 2019-01-23 PROCEDURE — G0463 HOSPITAL OUTPT CLINIC VISIT: HCPCS

## 2019-01-23 PROCEDURE — 82565 ASSAY OF CREATININE: CPT | Performed by: ORTHOPAEDIC SURGERY

## 2019-01-23 PROCEDURE — 25000132 ZZH RX MED GY IP 250 OP 250 PS 637: Performed by: PHYSICIAN ASSISTANT

## 2019-01-23 PROCEDURE — 85025 COMPLETE CBC W/AUTO DIFF WBC: CPT | Performed by: ORTHOPAEDIC SURGERY

## 2019-01-23 PROCEDURE — 12000000 ZZH R&B MED SURG/OB

## 2019-01-23 PROCEDURE — 25000128 H RX IP 250 OP 636

## 2019-01-23 PROCEDURE — 99232 SBSQ HOSP IP/OBS MODERATE 35: CPT | Performed by: INTERNAL MEDICINE

## 2019-01-23 PROCEDURE — 25000128 H RX IP 250 OP 636: Performed by: INTERNAL MEDICINE

## 2019-01-23 RX ORDER — PIPERACILLIN SODIUM, TAZOBACTAM SODIUM 3; .375 G/15ML; G/15ML
3.38 INJECTION, POWDER, LYOPHILIZED, FOR SOLUTION INTRAVENOUS EVERY 6 HOURS
Status: DISCONTINUED | OUTPATIENT
Start: 2019-01-23 | End: 2019-01-28

## 2019-01-23 RX ORDER — SODIUM CHLORIDE, SODIUM LACTATE, POTASSIUM CHLORIDE, CALCIUM CHLORIDE 600; 310; 30; 20 MG/100ML; MG/100ML; MG/100ML; MG/100ML
INJECTION, SOLUTION INTRAVENOUS CONTINUOUS
Status: DISCONTINUED | OUTPATIENT
Start: 2019-01-23 | End: 2019-01-24

## 2019-01-23 RX ADMIN — BUPROPION HYDROCHLORIDE 300 MG: 300 TABLET, FILM COATED, EXTENDED RELEASE ORAL at 08:20

## 2019-01-23 RX ADMIN — OXYCODONE HYDROCHLORIDE 10 MG: 5 TABLET ORAL at 11:11

## 2019-01-23 RX ADMIN — SERTRALINE HYDROCHLORIDE 100 MG: 100 TABLET ORAL at 08:19

## 2019-01-23 RX ADMIN — VANCOMYCIN HYDROCHLORIDE 2000 MG: 5 INJECTION, POWDER, LYOPHILIZED, FOR SOLUTION INTRAVENOUS at 08:21

## 2019-01-23 RX ADMIN — HYDROXYZINE PAMOATE 25 MG: 25 CAPSULE ORAL at 02:43

## 2019-01-23 RX ADMIN — TOPIRAMATE 100 MG: 100 TABLET, FILM COATED ORAL at 21:10

## 2019-01-23 RX ADMIN — OXYCODONE HYDROCHLORIDE 10 MG: 5 TABLET ORAL at 21:09

## 2019-01-23 RX ADMIN — BUSPIRONE HYDROCHLORIDE 15 MG: 5 TABLET ORAL at 21:09

## 2019-01-23 RX ADMIN — SENNOSIDES AND DOCUSATE SODIUM 2 TABLET: 8.6; 5 TABLET ORAL at 08:20

## 2019-01-23 RX ADMIN — OMEPRAZOLE 40 MG: 20 CAPSULE, DELAYED RELEASE ORAL at 16:06

## 2019-01-23 RX ADMIN — OMEPRAZOLE 40 MG: 20 CAPSULE, DELAYED RELEASE ORAL at 06:25

## 2019-01-23 RX ADMIN — OXYCODONE HYDROCHLORIDE 10 MG: 5 TABLET ORAL at 06:25

## 2019-01-23 RX ADMIN — SENNOSIDES AND DOCUSATE SODIUM 2 TABLET: 8.6; 5 TABLET ORAL at 21:10

## 2019-01-23 RX ADMIN — BUSPIRONE HYDROCHLORIDE 15 MG: 5 TABLET ORAL at 08:20

## 2019-01-23 RX ADMIN — PIPERACILLIN SODIUM,TAZOBACTAM SODIUM 3.38 G: 3; .375 INJECTION, POWDER, FOR SOLUTION INTRAVENOUS at 16:19

## 2019-01-23 RX ADMIN — OXYCODONE HYDROCHLORIDE 10 MG: 5 TABLET ORAL at 16:31

## 2019-01-23 RX ADMIN — GABAPENTIN 800 MG: 800 TABLET, FILM COATED ORAL at 08:20

## 2019-01-23 RX ADMIN — PIPERACILLIN SODIUM,TAZOBACTAM SODIUM 3.38 G: 3; .375 INJECTION, POWDER, FOR SOLUTION INTRAVENOUS at 09:32

## 2019-01-23 RX ADMIN — OXYCODONE HYDROCHLORIDE 10 MG: 5 TABLET ORAL at 02:43

## 2019-01-23 RX ADMIN — PIPERACILLIN SODIUM,TAZOBACTAM SODIUM 3.38 G: 3; .375 INJECTION, POWDER, FOR SOLUTION INTRAVENOUS at 21:10

## 2019-01-23 RX ADMIN — SODIUM CHLORIDE, POTASSIUM CHLORIDE, SODIUM LACTATE AND CALCIUM CHLORIDE: 600; 310; 30; 20 INJECTION, SOLUTION INTRAVENOUS at 06:31

## 2019-01-23 RX ADMIN — GABAPENTIN 800 MG: 800 TABLET, FILM COATED ORAL at 21:10

## 2019-01-23 RX ADMIN — GABAPENTIN 800 MG: 800 TABLET, FILM COATED ORAL at 16:18

## 2019-01-23 ASSESSMENT — ACTIVITIES OF DAILY LIVING (ADL)
ADLS_ACUITY_SCORE: 10

## 2019-01-23 NOTE — PROGRESS NOTES
Shalonda Howard  2019  Hospital day 1 Recurrent L foot infection/non-healing wound, R foot new onset heel ulcer    No excessive bleeding  Pain well-controlled.  Dressings removed, small amount of devitalized tissue in L bunion wound.  Heel ulcer on L 7-8mm deep.  New ulcer on R heel.  4X4's re-applied.  Temperatures:  Current - Temp: 98  F (36.7  C); Max - Temp  Av.3  F (36.8  C)  Min: 98  F (36.7  C)  Max: 98.8  F (37.1  C)  Pulse range: Pulse  Av  Min: 117  Max: 117  Blood pressure range: Systolic (24hrs), Av , Min:134 , Max:151   ; Diastolic (24hrs), Av, Min:91, Max:96    CMS: intact to B LE  Labs:per ID/hospitalist    PLAN:Awaiting ID consult. OR tomorrow with Dr. Mosher.  Will need indwelling PICC line and IV Abx.  Dr. Mosher plans wound vac placement and bone biopsy.  NPO after midnight tonite.  Additional problems to be followed by medicine physician

## 2019-01-23 NOTE — PLAN OF CARE
Pain controlled w/ Oxycodone. Bedrest w/ bathroom privileges, up independently w/ (L) post-op shoe and (R) air cast/boot. Refused PCDs. Tolerating diet. Voiding in the bathroom. Plan for surgery on Thursday. Continue to monitor.

## 2019-01-23 NOTE — PROGRESS NOTES
"[facility] Nurse Inpatient Pressure Injury Assessment   Reason for consultation: Evaluate and treat BL heels, Ortho following-Left distal foot.      ASSESSMENT  Pressure Injury: on BL heels , present on admission   This is a Medical Device Related Pressure Injury (MDRPI) due to CAM boot to R heel    Pressure Injury is Stage Unstageable     Left heel: Trauma vs neuropathic    Contributing factor of the pressure injury: pressure and immobility  Status: initial assessment  Recommend provider order: Ortho and Orthotic Consult      TREATMENT PLAN  BL heel wounds: Every other day   1. Clean wound with saline or MicroKlenz Spray, pat dry  2. Paint periwound tissue with No Sting Skin Prep (#473130) and allow to dry thoroughly  3. Apply Medihoney (#[id number]) to mepilex and press  Mepilex  Border Dressing (#087871) to the area, making sure to conform nicely to skin curvatures   4. Time and date dressing change and flavio with a \"T\" for treatment of a wound  5. Reposition pt every 1 to 2 hours when in bed, Elevate LE on pillows to offload heels/ankles    Orders Written  WO Nurse follow-up plan:weekly  Nursing to notify the Provider(s) and re-consult the WO Nurse if wound(s) deteriorates or new skin concern.    Patient History  According to provider note(s):  Shalonda Howard is a 49-year-old female with a past medical history significant for lupus, PCOS, bilateral chronic lower extremity edema, GERD, dysthymia, generalized anxiety disorder, obesity, allergic rhinitis, myalgias with myositis, opioid dependence, ventral hernia without incarceration, postoperative nausea and vomiting.  Peripheral neuropathy, hyperlipidemia, history of kidney stones and continued nonhealing left surgical wound with multiple infections who is being evaluated for CO medical management of chronic medical conditions following direct admission by orthopedic service due to left foot nonhealing surgical wound with infection.      Of note, patient has had a " persistent nonhealing surgical wound with infection of the left foot since 8/2018.  The patient has required multiple hospitalizations since initial procedure performed in 8/2018 with a bilateral bunionectomy performed by Dr. Harman.  The patient was subsequently admitted in September due to wound dehiscence and on 9/26 underwent incision and drainage as well as hardware removal.  She had been discharged following that hospitalization with antibiotics, however, this did not improve and patient was subsequently admitted on 10/2018 with concerns for worsening infection.  An MRI was performed at that time which did not reveal or appear to have any concerning signs for osteomyelitis.  She was treated with IV Ancef and followed by infectious disease with a 6 week course of IV Ancef through a PICC line.  The patient was then admitted in November with continued nonhealing surgical wound to the left foot with concerning infection for which she underwent incision and drainage as well as debridement.      The patient has been following with [facility] and indicates that she was started on oral Keflex last Friday, but has shown no improvement in her surgical wound and has noted more redness and pain going up into the foot.  As such, patient was directly admitted to undergo continued workup and evaluation.         Objective Data  Containment of urine/stool: Incontinence Protocol    Current Diet/ Nutrition:  Orders Placed This Encounter      Combination Diet Regular Diet Adult      Output:   No intake/output data recorded.    Risk Assessment:   Sensory Perception: 4-->no impairment  Moisture: 4-->rarely moist  Activity: 3-->walks occasionally  Mobility: 3-->slightly limited  Nutrition: 3-->adequate  Friction and Shear: 3-->no apparent problem  Eduardo Score: 20      Labs:   Recent Labs   Lab 01/23/19  0623 01/22/19  1950   HGB 10.7*  --    WBC 7.6  --    CRP  --  19.2*       Physical Exam  Skin inspection: focused  BL heels      Wound Location:  BL heels  Date of last Photo 1/23/19       Left heel                                                                 Right heel    Wound History: Pt reports she clipped a piece of hanging skin a few years ago and has had a non healing ulcer since. Has been seen by [facility] in past.  Measurements (length x width x depth, in cm) Left 2.5 cm x 2.5 cm  x  0.4 cm                                                                             Right 1.7cm x 1.5cm x 0.5cm   Wound Base:  Left 100 % dermis, darker grey/brown discoloration in center base of the wound                          Right 80% tan/grey non viable tissue, 20% pink dermis    Tunneling N/A  Undermining N/A  Palpation of the wound bed: firm   Periwound skin: dry/scaly  Color: hypopigmented  Temperature: normal   Drainage:, moderate  Description of drainage: serosanguinous  Odor: none  Pain: denies ,  Decreased sensation to BL lower extremities with neuropathy.     Interventions  Current support surface: Standard  Atmos Air mattress  Current off-loading measures: Pillows under calves pt wears CAM boot to RLE  Repositioning aid: Turn and Position System and Pillows  Visual inspection of wound(s) completed   Wound Care: Cleansed with Microklenz and applied Polymem foam until medihoney arrives to unit  Supplies: ordered: Medihoney  Educated provided: importance of repositioning, plan of care, wound progress and Infection prevention   Education provided to: patient  and nurse  Discussed importance of:repositioning every 2 hours, off-loading pressure to wound, wearing off-loading boots and dressing change frequency  Discussed plan of care with Patient and Nurse    Walter Camarillo RN

## 2019-01-23 NOTE — PROGRESS NOTES
[facility]    Hospitalist Progress Note    Interval History   - No acute issues today. Having pain in left foot as usual, worse with walking  - NPO tonight and surgery tomorrow    Assessment & Plan   Summary: Shalonda Howard is a 49 year old female with PMH nonhealing left foot surgical wound, PCOS, bilateral chronic LE sadaf, GERd, PUNEET, obesity, opioid dependence, peripheral neuropathy, HLD who was admitted on 1/22/2019 for further management of her nonhealing left foot surgical wound and infection. Hospitalist consulted for medical co-management.    Persistent postoperative left foot infection with nonhealing wound: Began following bilateral bunionectomy in 8/2018. Admitted 9/2018 for left foot wound dehiscence, s/p I&D and hardware removal. Not improved with further antibiotics. MRI 10/2018 without evidence of osteomyelitis. Then treated with IV ancef by ID for six weeks via PICC. Followed by debridement 11/2018. Now here for further management.  - Orthopedic management   - Vanc   - ID consult   - NPO tonight, I&D tomorrow   - Pain management  - IVF decreased today to 50ml/hr starting at midnight    B/l chronic LE edema: Lasix held this admission  GERD: PTA PPI BID  Anxiety: PTA Wellbutrin, Buspar, Sertraline, Topamax  Peripheral neuropathy: PTA gabapentin  HLD: PTA pravastatin    DVT Prophylaxis: Pneumatic Compression Devices  Code Status: Prior  PT/OT: Ordered    Disposition: Expected discharge per surgical service    Thang Major MD  Text Page  (7am to 6pm)  -Data reviewed today: I reviewed all new labs and imaging results over the last 24 hours.    Physical Exam   Temp: 98.1  F (36.7  C) Temp src: Oral BP: 113/66 Pulse: 78 Heart Rate: 79 Resp: 16 SpO2: 99 % O2 Device: None (Room air)    Vitals:    01/22/19 1605   Weight: 90.7 kg (200 lb)     Vital Signs with Ranges  Temp:  [98  F (36.7  C)-98.8  F (37.1  C)] 98.1  F (36.7  C)  Pulse:  [] 78  Heart Rate:  [] 79  Resp:  [16-18]  16  BP: (113-151)/(66-96) 113/66  SpO2:  [96 %-99 %] 99 %  No intake/output data recorded.  O2 requirements: None    Constitutional: Female in NAD  HEENT: Eyes nonicteric, oral mucosa moist  Cardiovascular: RRR, normal S1/2, no m/r/g  Respiratory: CTAB, no wheezing or crackles  Vascular: LLE wounds covered with dressings, no LE edema  GI: Normoactive bowel sounds, nontender, nondistended  Neuro/Psych: Appropriate affect and mood. A&Ox3, moves all extremities    Medications     - MEDICATION INSTRUCTIONS -       lactated ringers 100 mL/hr at 01/23/19 0631       buPROPion  300 mg Oral Daily     busPIRone  15 mg Oral BID     gabapentin  800 mg Oral TID     omeprazole  40 mg Oral BID AC     piperacillin-tazobactam  3.375 g Intravenous Q6H     senna-docusate  1 tablet Oral BID    Or     senna-docusate  2 tablet Oral BID     sertraline  100 mg Oral Daily     topiramate  100 mg Oral At Bedtime       Data   Recent Labs   Lab 01/23/19  0623   WBC 7.6   HGB 10.7*   MCV 81      CR 0.67       Imaging:   No results found for this or any previous visit (from the past 24 hour(s)).

## 2019-01-23 NOTE — PHARMACY
"The following home medications were NOT continued on inpatient admission per \"Discontinuation of nonessential home medications during hospitalization\" policy: Phentermine for appetite suppression    If a therapeutic holiday is deemed inappropriate per the prescriber, please notify the pharmacist regarding the medication order.    The pharmacist is available to answer any questions and/or concerns the patient may have regarding discontinuation of non-essential medications.    Please ensure that these medications are restarted as needed upon discharge via the medication reconciliation discharge process and included on the discharge medication reconciliation report.    Thank you,  Scooby Villarreal    "

## 2019-01-23 NOTE — CONSULTS
[facility]    Infectious Disease Consultation     Date of Admission:  1/22/2019  Date of Consult (When I saw the patient): 01/23/19    Assessment & Plan   Shalonda Howard is a 49 year old female who was admitted on 1/22/2019.     Impression:  1. This is a 49 y.o familiar to me from her prior admission.   2. She has a history of bilateral bunion repair surgery done in August 2018. The right side healed but the left side the insion has not been healing , 1 I and D and 6 weeks of IV antibiotics and many more weeks of oral antibiotics since the end of August. Then repeat admission in November, more I and D and another course of IV antibiotics.   3. She also has some hardware in the ankle b/l, those incisions look ok. Though both ankle are swollen.   4. She also has chronic heel ulcers bilateral.   5. Last cultures are from the wound and positive for MSSA which was seen previously also in September and acinetobacter and corynebacterium.   6. She has been started on IV vancomycin and zosyn.      Recommendations:   Stop vancomycin, no MRSA in any cultures so far.   Continue zosyn   Bone biopsy in plan will follow along with bone cultures.         Ignacia Bocanegra MD    Reason for Consult   Reason for consult: I was asked by Dr. sweeney  to evaluate this patient for foot osteo.    Primary Care Physician   Linda Bernstein    Chief Complaint   Non healing foot wound    History is obtained from the patient and medical records    History of Present Illness    From the Hospitalist consult note summary:   Shalonda Howard is a 49 year old female  with a past medical history significant for lupus, PCOS, bilateral chronic lower extremity edema, GERD, dysthymia, generalized anxiety disorder, obesity, allergic rhinitis, myalgias with myositis, opioid dependence, ventral hernia without incarceration, postoperative nausea and vomiting.  Peripheral neuropathy, hyperlipidemia, history of kidney stones and continued nonhealing left  surgical wound with multiple infections who is being evaluated for CO medical management of chronic medical conditions following direct admission by orthopedic service due to left foot nonhealing surgical wound with infection.      Of note, patient has had a persistent nonhealing surgical wound with infection of the left foot since 8/2018.  The patient has required multiple hospitalizations since initial procedure performed in 8/2018 with a bilateral bunionectomy performed by Dr. Harman.  The patient was subsequently admitted in September due to wound dehiscence and on 9/26 underwent incision and drainage as well as hardware removal.  She had been discharged following that hospitalization with antibiotics, however, this did not improve and patient was subsequently admitted on 10/2018 with concerns for worsening infection.  An MRI was performed at that time which did not reveal or appear to have any concerning signs for osteomyelitis.  She was treated with IV Ancef and followed by infectious disease with a 6 week course of IV Ancef through a PICC line.  The patient was then admitted in November with continued nonhealing surgical wound to the left foot with concerning infection for which she underwent incision and drainage as well as debridement.      The patient has been following with [facility] and indicates that she was started on oral Keflex last Friday, but has shown no improvement in her surgical wound and has noted more redness and pain going up into the foot.  As such, patient was directly admitted to undergo continued workup and evaluation.      I evaluated the patient in her hospital room.  She offers minimal complaints, denying lightheadedness, dizziness, chest pain, palpitations, shortness of breath, abdominal pain or dysuria.  She states she has occasional numbness and tingling in her feet, worse on the left than the right, and ongoing pain with her foot.  She states that her left foot has  become more swollen and red with tracking up to the dorsum of the foot as well as several toes have become red as well.            Past Medical History   I have reviewed this patient's medical history and updated it with pertinent information if needed.   Past Medical History:   Diagnosis Date     Allergic rhinitis, cause unspecified      Anxiety state, unspecified      Cellulitis and abscess of leg, except foot      Closed fracture of unspecified part of tibia      Disuse osteoporosis      Dysthymic disorder      Edema      Encounter for long-term (current) use of other medications      Esophageal reflux      Kidney stones      Myalgia and myositis, unspecified      Obesity, unspecified      Open wound of foot except toe(s) alone, complicated      Opioid type dependence, unspecified      Other acne      Other congenital valgus deformity of feet      Other ventral hernia without mention of obstruction or gangrene      Polycystic ovaries      PONV (postoperative nausea and vomiting)      Systemic lupus erythematosus (H)      Tibialis tendinitis      Unspecified hereditary and idiopathic peripheral neuropathy        Past Surgical History   I have reviewed this patient's surgical history and updated it with pertinent information if needed.  Past Surgical History:   Procedure Laterality Date     APPENDECTOMY       ARTHRODESIS FOOT Left 2/4/2015    Procedure: ARTHRODESIS FOOT;  Surgeon: Andre Harman MD;  Location: [facility]     BUNIONECTOMY RT/LT  08/29/2018     DILATION AND CURETTAGE       EXCISE TOENAIL(S) Left 2/4/2015    Procedure: EXCISE TOENAIL(S);  Surgeon: Andre Harman MD;  Location: [facility]     HERNIA REPAIR      umbilical     HERNIA REPAIR      ventral open x 2     IRRIGATION AND DEBRIDEMENT FOOT, COMBINED Left 9/26/2018    Procedure: COMBINED IRRIGATION AND DEBRIDEMENT FOOT;  IRRIGATION AND DEBRIDEMENT LEFT FOOT;  Surgeon: Andre Harman MD;  Location: [facility]     IRRIGATION AND DEBRIDEMENT FOOT,  COMBINED Left 11/26/2018    Procedure: COMBINED IRRIGATION AND DEBRIDEMENT LEFT FOOT;  Surgeon: Andre Harman MD;  Location: [facility]     REPAIR HAMMER TOE Left 11/26/2018    Procedure: REPAIR HAMMER TOE;  Surgeon: Andre Harman MD;  Location: [facility]     TONSILLECTOMY         Prior to Admission Medications   Prior to Admission Medications   Prescriptions Last Dose Informant Patient Reported? Taking?   BuPROPion HCl (WELLBUTRIN XL PO) 1/22/2019 at AM Self Yes Yes   Sig: Take 300 mg by mouth daily   BusPIRone HCl (BUSPAR PO) 1/22/2019 at AM Self Yes Yes   Sig: Take 15 mg by mouth 2 times daily   Furosemide (LASIX PO) 1/22/2019 at AM Self Yes Yes   Sig: Take 160 mg by mouth daily    Omeprazole (PRILOSEC PO) 1/22/2019 at x1 Self Yes Yes   Sig: Take 40 mg by mouth 2 times daily (before meals) 2 CAPSULES IN A.M.    Ondansetron HCl (ZOFRAN PO)  at PRN Self Yes Yes   Sig: Take 8 mg by mouth as needed for nausea or vomiting   Potassium Chloride Shameka CR (K-DUR PO) 1/22/2019 at AM Self Yes Yes   Sig: Take 80 mEq by mouth daily    Topiramate (TOPAMAX PO) 1/21/2019 at HS Self Yes Yes   Sig: Take 100 mg by mouth At Bedtime    ammonium lactate (AMLACTIN) 12 % cream Past Week at PRN Self Yes Yes   Sig: Apply topically daily as needed (to heels)    cholecalciferol (VITAMIN D3) 5000 units TABS tablet 1/22/2019 at AM Self Yes Yes   Sig: Take 5,000 Units by mouth daily   clindamycin (CLEOCIN T) 1 % solution Past Month at PRn Self Yes Yes   Sig: Apply topically nightly as needed (Acne outbreak)    gabapentin (NEURONTIN) 800 MG tablet 1/22/2019 at x1 Self Yes Yes   Sig: Take 800 mg by mouth 3 times daily Am, supper, hs   hydrOXYzine (VISTARIL) 25 MG capsule 1/21/2019 at PRN Self No Yes   Sig: Take 1 capsule (25 mg) by mouth every 4 hours as needed for anxiety   oxyCODONE-acetaminophen (PERCOCET) 5-325 MG tablet 1/22/2019 at PRN Self No Yes   Sig: Take 1-2 tablets by mouth every 4 hours as needed for severe pain   phentermine 30  MG capsule 1/22/2019 at AM Self Yes Yes   Sig: Take 30 mg by mouth every morning   polyethylene glycol (MIRALAX/GLYCOLAX) packet  at PRN Self Yes Yes   Sig: Take 17 g by mouth as needed for constipation   senna-docusate (SENOKOT-S;PERICOLACE) 8.6-50 MG per tablet  at PRN Self No Yes   Sig: Take 1 tablet by mouth 2 times daily   Patient taking differently: Take 1 tablet by mouth 2 times daily as needed    sertraline (ZOLOFT) 100 MG tablet 1/22/2019 at AM Self Yes Yes   Sig: Take 100 mg by mouth daily      Facility-Administered Medications: None     Allergies   Allergies   Allergen Reactions     Sulfa Drugs Shortness Of Breath and Rash     Demerol [Meperidine] Nausea and Vomiting     Erythromycin Nausea and Vomiting     Metformin Nausea and Vomiting     Codeine Rash     Penicillins Rash       Immunization History   Immunization History   Administered Date(s) Administered     Influenza Vaccine IM 3yrs+ 4 Valent IIV4 10/16/2018       Social History   I have reviewed this patient's social history and updated it with pertinent information if needed. Shalonda Howard  reports that she quit smoking about 16 years ago. Her smoking use included cigarettes. She has a 6.00 pack-year smoking history. she has never used smokeless tobacco. She reports that she drinks alcohol. She reports that she does not use drugs.    Family History   I have reviewed this patient's family history and updated it with pertinent information if needed.   No family history on file.    Review of Systems   The 10 point Review of Systems is negative other than noted in the HPI or here.     Physical Exam   Temp: 98.6  F (37  C) Temp src: Oral BP: 114/73 Pulse: 117 Heart Rate: 75 Resp: 18 SpO2: 98 % O2 Device: None (Room air)    Vital Signs with Ranges  Temp:  [98  F (36.7  C)-98.8  F (37.1  C)] 98.6  F (37  C)  Pulse:  [117] 117  Heart Rate:  [] 75  Resp:  [16-18] 18  BP: (114-151)/(73-96) 114/73  SpO2:  [96 %-98 %] 98 %  200 lbs 0 oz  Body mass index  is 33.28 kg/m .    GENERAL APPEARANCE:  alert and no distress  EYES: Eyes grossly normal to inspection, PERRL and conjunctivae and sclerae normal  HENT: ear canals and TM's normal and nose and mouth without ulcers or lesions  NECK: no adenopathy, no asymmetry, masses, or scars and thyroid normal to palpation  RESP: lungs clear to auscultation - no rales, rhonchi or wheezes  CV: regular rates and rhythm, normal S1 S2, no S3 or S4 and no murmur, click or rub  LYMPHATICS: normal ant/post cervical and supraclavicular nodes  ABDOMEN: soft, nontender, without hepatosplenomegaly or masses and bowel sounds normal  MS: Both ankle are swollen   Both heel have ulcers   The incision wound on the left foot is open, red and draining   SKIN: no suspicious lesions or rashes      Data   Lab Results   Component Value Date    WBC 7.6 01/23/2019    HGB 10.7 (L) 01/23/2019    HCT 33.3 (L) 01/23/2019     01/23/2019     11/27/2018    POTASSIUM 3.7 11/27/2018    CHLORIDE 110 (H) 11/27/2018    CO2 25 11/27/2018    BUN 19 12/31/2018    CR 0.67 01/23/2019    GLC 91 11/27/2018    SED 34 (H) 01/22/2019    NTBNPI 66 10/17/2018    AST 13 12/31/2018    ALT 16 11/26/2018    ALKPHOS 117 11/26/2018    BILITOTAL <0.1 (L) 11/26/2018     Recent Labs   Lab 01/22/19 1950 01/22/19  1945   CULT No growth after 8 hours No growth after 8 hours     Recent Labs   Lab Test 01/22/19  1950 01/22/19  1945 11/26/18  1159 11/24/18  1950 11/24/18  1814 11/20/18  1700 11/20/18  1634 09/26/18  1631 09/24/18  2115   CULT No growth after 8 hours No growth after 8 hours No anaerobes isolated  Light growth  Staphylococcus aureus  *  Light growth  Acinetobacter baumannii complex  *  Light growth  Corynebacterium species  Identification obtained by MALDI-TOF mass spectrometry research use only database. Test   characteristics determined and verified by the [facility]   ([facility]) [place], [place].  Susceptibility testing not  routinely done  * No growth No growth No growth No growth No anaerobes isolated  Light growth  Staphylococcus aureus  *  Light growth  Normal skin kenyatta   Moderate growth  Staphylococcus aureus  *

## 2019-01-23 NOTE — CONSULTS
Consult Date:  01/22/2019      PRIMARY CARE PROVIDER:  Linda Bernstein PA-C.      REQUESTING PROVIDER:  MARIA ESTHER Stokes.      REASON FOR CONSULTATION:  Assistance with clinical management.      HISTORY OF PRESENT ILLNESS:  Shalonda Howard is a 49-year-old female with a past medical history significant for lupus, PCOS, bilateral chronic lower extremity edema, GERD, dysthymia, generalized anxiety disorder, obesity, allergic rhinitis, myalgias with myositis, opioid dependence, ventral hernia without incarceration, postoperative nausea and vomiting.  Peripheral neuropathy, hyperlipidemia, history of kidney stones and continued nonhealing left surgical wound with multiple infections who is being evaluated for [place] medical management of chronic medical conditions following direct admission by orthopedic service due to left foot nonhealing surgical wound with infection.      Of note, patient has had a persistent nonhealing surgical wound with infection of the left foot since 8/2018.  The patient has required multiple hospitalizations since initial procedure performed in 8/2018 with a bilateral bunionectomy performed by Dr. Harman.  The patient was subsequently admitted in September due to wound dehiscence and on 9/26 underwent incision and drainage as well as hardware removal.  She had been discharged following that hospitalization with antibiotics, however, this did not improve and patient was subsequently admitted on 10/2018 with concerns for worsening infection.  An MRI was performed at that time which did not reveal or appear to have any concerning signs for osteomyelitis.  She was treated with IV Ancef and followed by infectious disease with a 6 week course of IV Ancef through a PICC line.  The patient was then admitted in November with continued nonhealing surgical wound to the left foot with concerning infection for which she underwent incision and drainage as well as debridement.      The patient has been  following with [facility] and indicates that she was started on oral Keflex last Friday, but has shown no improvement in her surgical wound and has noted more redness and pain going up into the foot.  As such, patient was directly admitted to undergo continued workup and evaluation.      I evaluated the patient in her hospital room.  She offers minimal complaints, denying lightheadedness, dizziness, chest pain, palpitations, shortness of breath, abdominal pain or dysuria.  She states she has occasional numbness and tingling in her feet, worse on the left than the right, and ongoing pain with her foot.  She states that her left foot has become more swollen and red with tracking up to the dorsum of the foot as well as several toes have become red as well.      PAST MEDICAL HISTORY:   1.  Systemic lupus erythematosus.   2.  PCOS.   3.  Bilateral chronic lower extremity edema.   4.  GERD.   5.  Dysthymia.   6.  Generalized anxiety disorder.   7.  Obesity.   8.  Allergic rhinitis.   9.  History of myalgias with myositis.   10.  Opioid dependence.   11.  Ventral hernia without incarcerated hernia.   12.  Postoperative nausea and vomiting.   13.  Peripheral neuropathy.   14.  Hyperlipidemia.   15.  History of kidney stones.   16.  Continued left foot postoperative infection and nonhealing surgical site.      PAST SURGICAL HISTORY:   1.  Tonsillectomy.   2.  Irrigation and debridement of the left foot performed in 9/2018 and repeated in 11/2018.   3.  Bilateral bunionectomy performed in 8/2018.   4.  Umbilical hernia repair.   5.  Ventral hernia repair x5.   6.  D and C.   7.  Bilateral foot arthrodesis.   8.  Appendectomy.      PRIOR TO ADMIT MEDICATIONS:    Prior to Admission Medications   Prescriptions Last Dose Informant Patient Reported? Taking?   BuPROPion HCl (WELLBUTRIN XL PO) 1/22/2019 at AM Self Yes Yes   Sig: Take 300 mg by mouth daily   BusPIRone HCl (BUSPAR PO) 1/22/2019 at AM Self Yes Yes   Sig:  Take 15 mg by mouth 2 times daily   Furosemide (LASIX PO) 1/22/2019 at AM Self Yes Yes   Sig: Take 160 mg by mouth daily    Omeprazole (PRILOSEC PO) 1/22/2019 at x1 Self Yes Yes   Sig: Take 40 mg by mouth 2 times daily (before meals) 2 CAPSULES IN A.M.    Ondansetron HCl (ZOFRAN PO)  at PRN Self Yes Yes   Sig: Take 8 mg by mouth as needed for nausea or vomiting   Potassium Chloride Shameka CR (K-DUR PO) 1/22/2019 at AM Self Yes Yes   Sig: Take 80 mEq by mouth daily    Topiramate (TOPAMAX PO) 1/21/2019 at HS Self Yes Yes   Sig: Take 100 mg by mouth At Bedtime    ammonium lactate (AMLACTIN) 12 % cream Past Week at PRN Self Yes Yes   Sig: Apply topically daily as needed (to heels)    cholecalciferol (VITAMIN D3) 5000 units TABS tablet 1/22/2019 at AM Self Yes Yes   Sig: Take 5,000 Units by mouth daily   clindamycin (CLEOCIN T) 1 % solution Past Month at PRn Self Yes Yes   Sig: Apply topically nightly as needed (Acne outbreak)    gabapentin (NEURONTIN) 800 MG tablet 1/22/2019 at x1 Self Yes Yes   Sig: Take 800 mg by mouth 3 times daily Am, supper, hs   hydrOXYzine (VISTARIL) 25 MG capsule 1/21/2019 at PRN Self No Yes   Sig: Take 1 capsule (25 mg) by mouth every 4 hours as needed for anxiety   oxyCODONE-acetaminophen (PERCOCET) 5-325 MG tablet 1/22/2019 at PRN Self No Yes   Sig: Take 1-2 tablets by mouth every 4 hours as needed for severe pain   phentermine 30 MG capsule 1/22/2019 at AM Self Yes Yes   Sig: Take 30 mg by mouth every morning   polyethylene glycol (MIRALAX/GLYCOLAX) packet  at PRN Self Yes Yes   Sig: Take 17 g by mouth as needed for constipation   senna-docusate (SENOKOT-S;PERICOLACE) 8.6-50 MG per tablet  at PRN Self No Yes   Sig: Take 1 tablet by mouth 2 times daily   Patient taking differently: Take 1 tablet by mouth 2 times daily as needed    sertraline (ZOLOFT) 100 MG tablet 1/22/2019 at AM Self Yes Yes   Sig: Take 100 mg by mouth daily      Facility-Administered Medications: None        ALLERGIES:     Allergies   Allergen Reactions     Sulfa Drugs Shortness Of Breath and Rash     Demerol [Meperidine] Nausea and Vomiting     Erythromycin Nausea and Vomiting     Metformin Nausea and Vomiting     Codeine Rash     Penicillins Rash         SOCIAL HISTORY:  The patient currently resides in a house in [place], [place] with her  and daughter.  She is a former smoker and quit in 2002 with a 6-pack-year history.  She indicates she occasionally consumes wine with dinner.  She denies street or illicit drug use.  She does not currently utilize a cane or walker but has on occasion utilized a scooter to support her left foot when it is painful.      FAMILY HISTORY:   1.  Father passed away at the age of 52 due to esophageal cancer.   2.  Mother has pulmonary hypertension.   3.  Paternal grandfather had esophageal cancer.   4.  An uncle (patient's father's half-brother) also had esophageal cancer.      REVIEW OF SYSTEMS:  A 10 point review of systems was performed.  All pertinent positives are listed in the history of present illness, otherwise negative.      PHYSICAL EXAMINATION:   VITAL SIGNS:  Temperature is 98.8 degrees Fahrenheit with a blood pressure of 151/96, heart rate of 117 beats per minute, respiratory rate of 16, O2 saturation 96% on room air.  Patient was rating her pain a 6/10.   GENERAL:  The patient is awake, alert and cooperative, in no apparent distress, alert and oriented x3.   HEENT:  Normocephalic, atraumatic.  Moist mucous membranes present.  No exudates noted in the posterior pharynx.  Uvula is midline.  Eyes, pupils are equal, round, reactive to light.  Extraocular movements are intact.  Normal sclerae.   NECK:  Supple, normal range of motion, no tracheal deviation, no cervical lymphadenopathy present.   CARDIAC:  Regular rate and rhythm though tachycardic, no rubs, murmurs or gallops appreciated.   PULMONARY:  Lungs are clear to auscultation bilaterally, no wheezes, rhonchi is or rales  appreciated.   GASTROINTESTINAL:  Bowel sounds are present in all 4 quadrants, soft, nontender, nondistended, obese.   NEUROLOGIC:  Cranial nerves II-XII are grossly intact.  Patient demonstrates equal sensation, coordination and strength in the upper and lower extremities bilaterally.   EXTREMITIES:  No lower extremity edema noted bilaterally.  Calves were nontender to palpation.   SKIN:  The patient's right lower extremity heel has a pressure ulceration noted.  It does not appear to be infected without erythema or drainage.  The patient has adhesive tape in place for protection.  Left foot with erythema from the medial surgical wound tracking up into the dorsum of the foot with great toe and second toe that are also erythematous and edematous.  The patient also has a right heel pressure ulceration.      ASSESSMENT AND PLAN:  Shalonda Howard is a 49-year-old female with a past medical history significant for lupus, PCOS, bilateral chronic lower extremity edema, GERD, dysthymia, generalized anxiety disorder, obesity, hyperlipidemia, allergic rhinitis, opioid dependence, ventral hernia, postoperative nausea and vomiting, peripheral neuropathy and history of kidney stones and continued postoperative left foot nonhealing wound with infection who was directly admitted from orthopedic clinic due to postoperative left foot infection and nonhealing wound for which the Hospitalist Service has been consulted to assist with medical management.   1.  Persistent postoperative left foot infection with nonhealing wound:  Orthopedic service is managing at this point.  They have ordered for initiation of IV vancomycin and an infectious disease consult.  I believe a plan to proceed with another irrigation and debridement.  Will defer analgesic management to orthopedic service.  We will also defer any imaging study to their service.  I have ordered for blood cultures x2, an ESR and inflammatory CRP to be drawn tonight.   2.  Bilateral  chronic lower extremity edema:  Patient is compliant with Lasix 160 mg daily and 80 mEq of potassium daily.  Will hold both medications at this point as patient is receiving IV fluids at 100 cc per hour.  Will monitor potassium level and initiate replacement protocol as needed.   3.  GERD:  The patient's prior to admit omeprazole 40 mg 2 times daily will be continued.   4.  Dysthymia with anxiety:  Will resume prior to admit Wellbutrin- mg daily, BuSpar 50 mg 2 times daily, sertraline 100 mg daily, and Topamax 100 mg every evening at bedtime.   5.  Peripheral neuropathy:  Patient's prior to admit gabapentin 800 mg 3 times daily will be continued.   6.  Opioid dependence:  Will defer analgesic management to orthopedic service.   7.  Ventral hernia:  Patient indicates she has had 5 ventral hernia repairs and has recurrence of a ventral hernia.  Would recommend follow up with primary care provider and further surgical intervention when able.   10.  Hyperlipidemia:  Patient believes she is on pravastatin.  Will await reconciliation by pharmacy before resumption of any medication.   11.  Obesity:  Patient's BMI is calculated at 33.28.  Will defer ongoing management to primary care provider.   12.  Systemic lupus erythematosus:  Patient indicates that one rheumatologist stated she has lupus and another denied this.  She is not currently on any medications.  No interventions appear necessary.   13.  PCOS:  This appears to be stable.  The patient indicates she is not on any medications.  No interventions appear necessary.   14.  DVT prophylaxis:  Per orthopedic service, we will order for PCDs.      CODE STATUS:  Discussed with the patient.  She would like to be full code.      DISPOSITION:  Ultimately up to Orthopedic Service.      The patient was discussed with Dr. Stew Medrano who agrees with the assessment and plan as stated above.  Dr. Medrano will evaluate the patient independently.      At this time, I would  like to thank physician assistant Jett Malik for consulting the Hospitalist Service.  We will continue to follow.         CAITY SOSA MD       As dictated by BETO FLYNN PA-C            D: 2019   T: 2019   MT: DARYL      Name:     CASSANDRA FRANCO   MRN:      [id number]        Account:       [id number]   :      1970           Consult Date:  2019      Document: [id number]       cc: Linda MAYO

## 2019-01-23 NOTE — PLAN OF CARE
Pt a/ox4. VS stable. C/o L foot pain, oxycodone given. Bedrest w/BR privileges. Small, serous drainage noted on LLE heel drsg. Surgery on Thursday 1/24

## 2019-01-23 NOTE — PHARMACY-VANCOMYCIN DOSING SERVICE
Pharmacy Vancomycin Initial Note  Date of Service 2019  Patient's  1970  49 year old, female    Indication: Postoperative Infection    Current estimated CrCl = Estimated Creatinine Clearance: 110.6 mL/min (based on SCr of 0.7 mg/dL).    Creatinine for last 3 days  No results found for requested labs within last 72 hours.    Recent Vancomycin Level(s) for last 3 days  No results found for requested labs within last 72 hours.      Vancomycin IV Administrations (past 72 hours)      No vancomycin orders with administrations in past 72 hours.                Nephrotoxins and other renal medications (From now, onward)    None          Contrast Orders - past 72 hours (72h ago, onward)    None                Plan:  1.  Start vancomycin  2000 mg IV q12h.   2.  Goal Trough Level: 15-20 mg/L until MRSA can be ruled out  3.  Pharmacy will check trough levels as appropriate in 1-3 Days.    4. Serum creatinine levels will be ordered daily for the first week of therapy and at least twice weekly for subsequent weeks.    5. [place] method utilized to dose vancomycin therapy: Method 2    Scooby Villarreal PharmD

## 2019-01-23 NOTE — CONSULTS
"  BRIEF NUTRITION ASSESSMENT      REASON FOR ASSESSMENT:  Admission screen -  Reduced oral intake over the last month      NUTRITION HISTORY:  Pt follows a regular diet. She states hereappetite has been \"up and down\" since last fall due to her nonhealing foot wound.    CURRENT DIET AND INTAKE:  Diet:  Regular  Pt has not ordered breakfast or lunch, her  is bringing in lunch today.                ANTHROPOMETRICS:  Height: 5' 5\"  Weight: 90.7 kg  BMI: 33.28 kg/m2  IBW:  56.8 kg +/- 10%  Weight Status: Obesity Grade I BMI 30-34.9  %IBW: 160%  Weight History:  Pt verifies that her wt has been stable.  Wt Readings from Last 10 Encounters:   01/22/19 90.7 kg (200 lb)   11/26/18 90.7 kg (200 lb)   11/20/18 90.7 kg (200 lb)   10/15/18 90.7 kg (200 lb)   09/26/18 81.6 kg (180 lb)   02/04/15 121.1 kg (267 lb)         LABS:  Labs noted    MALNUTRITION:  Patient does not meet two of the following criteria necessary for diagnosing malnutrition: significant weight loss, reduced intake, subcutaneous fat loss, muscle loss or fluid retention    NUTRITION INTERVENTION:  Nutrition Diagnosis:  No nutrition diagnosis at this time.    Implementation:  Nutrition Education:  Discussed protein for healing, good sources. Will send ProStat BID for 15 gm protein per serving.    FOLLOW UP/MONITORING:   Will re-evaluate in 7 - 10 days, or sooner, if re-consulted.    Steffi Brandon RD  Pager [phone] (M-F)            [phone] (W/E & Hol)            "

## 2019-01-24 ENCOUNTER — ANESTHESIA EVENT (OUTPATIENT)
Dept: SURGERY | Facility: CLINIC | Age: 49
DRG: 857 | End: 2019-01-24
Payer: COMMERCIAL

## 2019-01-24 ENCOUNTER — ANESTHESIA (OUTPATIENT)
Dept: SURGERY | Facility: CLINIC | Age: 49
DRG: 857 | End: 2019-01-24
Payer: COMMERCIAL

## 2019-01-24 PROBLEM — T81.40XA POST OP INFECTION: Status: ACTIVE | Noted: 2019-01-24

## 2019-01-24 LAB
GRAM STN SPEC: ABNORMAL
HCG UR QL: NEGATIVE
Lab: ABNORMAL
SPECIMEN SOURCE: ABNORMAL

## 2019-01-24 PROCEDURE — 71000012 ZZH RECOVERY PHASE 1 LEVEL 1 FIRST HR: Performed by: ORTHOPAEDIC SURGERY

## 2019-01-24 PROCEDURE — 25000125 ZZHC RX 250: Performed by: ORTHOPAEDIC SURGERY

## 2019-01-24 PROCEDURE — 25000128 H RX IP 250 OP 636: Performed by: INTERNAL MEDICINE

## 2019-01-24 PROCEDURE — 25000128 H RX IP 250 OP 636: Performed by: NURSE ANESTHETIST, CERTIFIED REGISTERED

## 2019-01-24 PROCEDURE — 87070 CULTURE OTHR SPECIMN AEROBIC: CPT | Performed by: ORTHOPAEDIC SURGERY

## 2019-01-24 PROCEDURE — 88311 DECALCIFY TISSUE: CPT | Mod: 26 | Performed by: ORTHOPAEDIC SURGERY

## 2019-01-24 PROCEDURE — 37000008 ZZH ANESTHESIA TECHNICAL FEE, 1ST 30 MIN: Performed by: ORTHOPAEDIC SURGERY

## 2019-01-24 PROCEDURE — 37000009 ZZH ANESTHESIA TECHNICAL FEE, EACH ADDTL 15 MIN: Performed by: ORTHOPAEDIC SURGERY

## 2019-01-24 PROCEDURE — 87186 SC STD MICRODIL/AGAR DIL: CPT | Performed by: ORTHOPAEDIC SURGERY

## 2019-01-24 PROCEDURE — 81025 URINE PREGNANCY TEST: CPT | Performed by: ANESTHESIOLOGY

## 2019-01-24 PROCEDURE — 27210794 ZZH OR GENERAL SUPPLY STERILE: Performed by: ORTHOPAEDIC SURGERY

## 2019-01-24 PROCEDURE — 25000125 ZZHC RX 250: Performed by: NURSE ANESTHETIST, CERTIFIED REGISTERED

## 2019-01-24 PROCEDURE — 87076 CULTURE ANAEROBE IDENT EACH: CPT | Performed by: ORTHOPAEDIC SURGERY

## 2019-01-24 PROCEDURE — 36000067 ZZH SURGERY LEVEL 5 1ST 30 MIN: Performed by: ORTHOPAEDIC SURGERY

## 2019-01-24 PROCEDURE — 0QBP0ZX EXCISION OF LEFT METATARSAL, OPEN APPROACH, DIAGNOSTIC: ICD-10-PCS | Performed by: ORTHOPAEDIC SURGERY

## 2019-01-24 PROCEDURE — 40000170 ZZH STATISTIC PRE-PROCEDURE ASSESSMENT II: Performed by: ORTHOPAEDIC SURGERY

## 2019-01-24 PROCEDURE — 25000128 H RX IP 250 OP 636: Performed by: ANESTHESIOLOGY

## 2019-01-24 PROCEDURE — 88307 TISSUE EXAM BY PATHOLOGIST: CPT | Mod: 26 | Performed by: ORTHOPAEDIC SURGERY

## 2019-01-24 PROCEDURE — 88311 DECALCIFY TISSUE: CPT | Performed by: ORTHOPAEDIC SURGERY

## 2019-01-24 PROCEDURE — 25000128 H RX IP 250 OP 636: Performed by: PHYSICIAN ASSISTANT

## 2019-01-24 PROCEDURE — 87205 SMEAR GRAM STAIN: CPT | Performed by: ORTHOPAEDIC SURGERY

## 2019-01-24 PROCEDURE — 25000125 ZZHC RX 250: Performed by: ANESTHESIOLOGY

## 2019-01-24 PROCEDURE — 0JBR0ZZ EXCISION OF LEFT FOOT SUBCUTANEOUS TISSUE AND FASCIA, OPEN APPROACH: ICD-10-PCS | Performed by: ORTHOPAEDIC SURGERY

## 2019-01-24 PROCEDURE — 25000132 ZZH RX MED GY IP 250 OP 250 PS 637: Performed by: PHYSICIAN ASSISTANT

## 2019-01-24 PROCEDURE — 36000069 ZZH SURGERY LEVEL 5 EA 15 ADDTL MIN: Performed by: ORTHOPAEDIC SURGERY

## 2019-01-24 PROCEDURE — 12000000 ZZH R&B MED SURG/OB

## 2019-01-24 PROCEDURE — 99232 SBSQ HOSP IP/OBS MODERATE 35: CPT | Performed by: INTERNAL MEDICINE

## 2019-01-24 PROCEDURE — 88307 TISSUE EXAM BY PATHOLOGIST: CPT | Performed by: ORTHOPAEDIC SURGERY

## 2019-01-24 PROCEDURE — 87077 CULTURE AEROBIC IDENTIFY: CPT | Performed by: ORTHOPAEDIC SURGERY

## 2019-01-24 PROCEDURE — 87075 CULTR BACTERIA EXCEPT BLOOD: CPT | Performed by: ORTHOPAEDIC SURGERY

## 2019-01-24 RX ORDER — DEXAMETHASONE SODIUM PHOSPHATE 4 MG/ML
INJECTION, SOLUTION INTRA-ARTICULAR; INTRALESIONAL; INTRAMUSCULAR; INTRAVENOUS; SOFT TISSUE PRN
Status: DISCONTINUED | OUTPATIENT
Start: 2019-01-24 | End: 2019-01-24

## 2019-01-24 RX ORDER — HYDROMORPHONE HYDROCHLORIDE 1 MG/ML
.3-.5 INJECTION, SOLUTION INTRAMUSCULAR; INTRAVENOUS; SUBCUTANEOUS EVERY 5 MIN PRN
Status: DISCONTINUED | OUTPATIENT
Start: 2019-01-24 | End: 2019-01-24 | Stop reason: HOSPADM

## 2019-01-24 RX ORDER — FENTANYL CITRATE 50 UG/ML
25-50 INJECTION, SOLUTION INTRAMUSCULAR; INTRAVENOUS
Status: DISCONTINUED | OUTPATIENT
Start: 2019-01-24 | End: 2019-01-24 | Stop reason: HOSPADM

## 2019-01-24 RX ORDER — SCOLOPAMINE TRANSDERMAL SYSTEM 1 MG/1
1 PATCH, EXTENDED RELEASE TRANSDERMAL ONCE
Status: COMPLETED | OUTPATIENT
Start: 2019-01-24 | End: 2019-01-24

## 2019-01-24 RX ORDER — CEFAZOLIN SODIUM 2 G/100ML
2 INJECTION, SOLUTION INTRAVENOUS
Status: DISCONTINUED | OUTPATIENT
Start: 2019-01-24 | End: 2019-01-24 | Stop reason: HOSPADM

## 2019-01-24 RX ORDER — ONDANSETRON 2 MG/ML
4 INJECTION INTRAMUSCULAR; INTRAVENOUS EVERY 30 MIN PRN
Status: DISCONTINUED | OUTPATIENT
Start: 2019-01-24 | End: 2019-01-24 | Stop reason: HOSPADM

## 2019-01-24 RX ORDER — NALOXONE HYDROCHLORIDE 0.4 MG/ML
.1-.4 INJECTION, SOLUTION INTRAMUSCULAR; INTRAVENOUS; SUBCUTANEOUS
Status: DISCONTINUED | OUTPATIENT
Start: 2019-01-24 | End: 2019-01-24

## 2019-01-24 RX ORDER — PROPOFOL 10 MG/ML
INJECTION, EMULSION INTRAVENOUS CONTINUOUS PRN
Status: DISCONTINUED | OUTPATIENT
Start: 2019-01-24 | End: 2019-01-24

## 2019-01-24 RX ORDER — SODIUM CHLORIDE, SODIUM LACTATE, POTASSIUM CHLORIDE, CALCIUM CHLORIDE 600; 310; 30; 20 MG/100ML; MG/100ML; MG/100ML; MG/100ML
INJECTION, SOLUTION INTRAVENOUS CONTINUOUS
Status: DISCONTINUED | OUTPATIENT
Start: 2019-01-24 | End: 2019-01-24 | Stop reason: HOSPADM

## 2019-01-24 RX ORDER — PROPOFOL 10 MG/ML
INJECTION, EMULSION INTRAVENOUS PRN
Status: DISCONTINUED | OUTPATIENT
Start: 2019-01-24 | End: 2019-01-24

## 2019-01-24 RX ORDER — FENTANYL CITRATE 50 UG/ML
INJECTION, SOLUTION INTRAMUSCULAR; INTRAVENOUS PRN
Status: DISCONTINUED | OUTPATIENT
Start: 2019-01-24 | End: 2019-01-24

## 2019-01-24 RX ORDER — CEFAZOLIN SODIUM 1 G/3ML
1 INJECTION, POWDER, FOR SOLUTION INTRAMUSCULAR; INTRAVENOUS SEE ADMIN INSTRUCTIONS
Status: DISCONTINUED | OUTPATIENT
Start: 2019-01-24 | End: 2019-01-24 | Stop reason: HOSPADM

## 2019-01-24 RX ORDER — LIDOCAINE 40 MG/G
CREAM TOPICAL
Status: DISCONTINUED | OUTPATIENT
Start: 2019-01-24 | End: 2019-01-29 | Stop reason: HOSPADM

## 2019-01-24 RX ORDER — MAGNESIUM HYDROXIDE 1200 MG/15ML
LIQUID ORAL PRN
Status: DISCONTINUED | OUTPATIENT
Start: 2019-01-24 | End: 2019-01-24 | Stop reason: HOSPADM

## 2019-01-24 RX ORDER — BUPIVACAINE HYDROCHLORIDE 5 MG/ML
INJECTION, SOLUTION EPIDURAL; INTRACAUDAL
Status: DISCONTINUED
Start: 2019-01-24 | End: 2019-01-24 | Stop reason: HOSPADM

## 2019-01-24 RX ORDER — LIDOCAINE HYDROCHLORIDE 20 MG/ML
INJECTION, SOLUTION INFILTRATION; PERINEURAL PRN
Status: DISCONTINUED | OUTPATIENT
Start: 2019-01-24 | End: 2019-01-24

## 2019-01-24 RX ORDER — ONDANSETRON 4 MG/1
4 TABLET, ORALLY DISINTEGRATING ORAL EVERY 30 MIN PRN
Status: DISCONTINUED | OUTPATIENT
Start: 2019-01-24 | End: 2019-01-24 | Stop reason: HOSPADM

## 2019-01-24 RX ADMIN — HYDROXYZINE PAMOATE 25 MG: 25 CAPSULE ORAL at 00:27

## 2019-01-24 RX ADMIN — SODIUM CHLORIDE, POTASSIUM CHLORIDE, SODIUM LACTATE AND CALCIUM CHLORIDE: 600; 310; 30; 20 INJECTION, SOLUTION INTRAVENOUS at 13:09

## 2019-01-24 RX ADMIN — PROPOFOL 200 MG: 10 INJECTION, EMULSION INTRAVENOUS at 11:00

## 2019-01-24 RX ADMIN — FENTANYL CITRATE 50 MCG: 50 INJECTION, SOLUTION INTRAMUSCULAR; INTRAVENOUS at 11:00

## 2019-01-24 RX ADMIN — SCOPALAMINE 1 PATCH: 1 PATCH, EXTENDED RELEASE TRANSDERMAL at 10:09

## 2019-01-24 RX ADMIN — BUPROPION HYDROCHLORIDE 300 MG: 300 TABLET, FILM COATED, EXTENDED RELEASE ORAL at 13:51

## 2019-01-24 RX ADMIN — HYDROMORPHONE HYDROCHLORIDE 0.5 MG: 1 INJECTION, SOLUTION INTRAMUSCULAR; INTRAVENOUS; SUBCUTANEOUS at 12:16

## 2019-01-24 RX ADMIN — HYDROMORPHONE HYDROCHLORIDE 0.5 MG: 1 INJECTION, SOLUTION INTRAMUSCULAR; INTRAVENOUS; SUBCUTANEOUS at 13:50

## 2019-01-24 RX ADMIN — SENNOSIDES AND DOCUSATE SODIUM 1 TABLET: 8.6; 5 TABLET ORAL at 21:55

## 2019-01-24 RX ADMIN — OXYCODONE HYDROCHLORIDE 10 MG: 5 TABLET ORAL at 18:25

## 2019-01-24 RX ADMIN — LIDOCAINE HYDROCHLORIDE 80 MG: 20 INJECTION, SOLUTION INFILTRATION; PERINEURAL at 11:00

## 2019-01-24 RX ADMIN — OXYCODONE HYDROCHLORIDE 10 MG: 5 TABLET ORAL at 06:49

## 2019-01-24 RX ADMIN — PIPERACILLIN SODIUM,TAZOBACTAM SODIUM 3.38 G: 3; .375 INJECTION, POWDER, FOR SOLUTION INTRAVENOUS at 15:18

## 2019-01-24 RX ADMIN — GABAPENTIN 800 MG: 800 TABLET, FILM COATED ORAL at 15:18

## 2019-01-24 RX ADMIN — CEFAZOLIN SODIUM 2 G: 1 INJECTION, POWDER, FOR SOLUTION INTRAMUSCULAR; INTRAVENOUS at 11:02

## 2019-01-24 RX ADMIN — GABAPENTIN 800 MG: 800 TABLET, FILM COATED ORAL at 10:51

## 2019-01-24 RX ADMIN — SODIUM CHLORIDE, POTASSIUM CHLORIDE, SODIUM LACTATE AND CALCIUM CHLORIDE: 600; 310; 30; 20 INJECTION, SOLUTION INTRAVENOUS at 00:32

## 2019-01-24 RX ADMIN — OMEPRAZOLE 40 MG: 20 CAPSULE, DELAYED RELEASE ORAL at 06:50

## 2019-01-24 RX ADMIN — ONDANSETRON 4 MG: 2 INJECTION INTRAMUSCULAR; INTRAVENOUS at 11:26

## 2019-01-24 RX ADMIN — PIPERACILLIN SODIUM,TAZOBACTAM SODIUM 3.38 G: 3; .375 INJECTION, POWDER, FOR SOLUTION INTRAVENOUS at 09:18

## 2019-01-24 RX ADMIN — PIPERACILLIN SODIUM,TAZOBACTAM SODIUM 3.38 G: 3; .375 INJECTION, POWDER, FOR SOLUTION INTRAVENOUS at 21:57

## 2019-01-24 RX ADMIN — SERTRALINE HYDROCHLORIDE 100 MG: 100 TABLET ORAL at 13:51

## 2019-01-24 RX ADMIN — MIDAZOLAM 2 MG: 1 INJECTION INTRAMUSCULAR; INTRAVENOUS at 10:59

## 2019-01-24 RX ADMIN — PIPERACILLIN SODIUM,TAZOBACTAM SODIUM 3.38 G: 3; .375 INJECTION, POWDER, FOR SOLUTION INTRAVENOUS at 03:31

## 2019-01-24 RX ADMIN — DEXAMETHASONE SODIUM PHOSPHATE 4 MG: 4 INJECTION, SOLUTION INTRA-ARTICULAR; INTRALESIONAL; INTRAMUSCULAR; INTRAVENOUS; SOFT TISSUE at 11:02

## 2019-01-24 RX ADMIN — OXYCODONE HYDROCHLORIDE 10 MG: 5 TABLET ORAL at 00:27

## 2019-01-24 RX ADMIN — OXYCODONE HYDROCHLORIDE 10 MG: 5 TABLET ORAL at 03:31

## 2019-01-24 RX ADMIN — HYDROXYZINE PAMOATE 25 MG: 25 CAPSULE ORAL at 23:34

## 2019-01-24 RX ADMIN — PROPOFOL 175 MCG/KG/MIN: 10 INJECTION, EMULSION INTRAVENOUS at 11:00

## 2019-01-24 RX ADMIN — TOPIRAMATE 100 MG: 100 TABLET, FILM COATED ORAL at 21:55

## 2019-01-24 RX ADMIN — HYDROMORPHONE HYDROCHLORIDE 0.5 MG: 1 INJECTION, SOLUTION INTRAMUSCULAR; INTRAVENOUS; SUBCUTANEOUS at 16:52

## 2019-01-24 RX ADMIN — GABAPENTIN 800 MG: 800 TABLET, FILM COATED ORAL at 21:55

## 2019-01-24 RX ADMIN — OMEPRAZOLE 40 MG: 20 CAPSULE, DELAYED RELEASE ORAL at 16:53

## 2019-01-24 RX ADMIN — OXYCODONE HYDROCHLORIDE 10 MG: 5 TABLET ORAL at 21:55

## 2019-01-24 RX ADMIN — HYDROXYZINE PAMOATE 25 MG: 25 CAPSULE ORAL at 13:51

## 2019-01-24 RX ADMIN — BUSPIRONE HYDROCHLORIDE 15 MG: 5 TABLET ORAL at 21:55

## 2019-01-24 ASSESSMENT — LIFESTYLE VARIABLES: TOBACCO_USE: 0

## 2019-01-24 ASSESSMENT — ACTIVITIES OF DAILY LIVING (ADL)
ADLS_ACUITY_SCORE: 10

## 2019-01-24 NOTE — PLAN OF CARE
Pt is AOx4, CMS intact baseline numbness, dressing CDI, scant serosanguinous drainage, VSS, independent in room, bedrest with bathroom privileges. Pain managed with oxy. NPO at midnight. Pending sx in am.

## 2019-01-24 NOTE — OP NOTE
PREOPERATIVE DIAGNOSIS: Left hallux chronic ulcer with associated wound infection.    POSTOPERATIVE DIAGNOSIS: Left hallux chronic ulcer with associated wound infection.    PROCEDURE(S):   1.  Left hallux wound excisional irrigation and debridement with debridement of nonviable subcutaneous tissue and fascia at the central portion of the wound.  2.  Left medial first metatarsal head bone biopsy.    ATTENDING SURGEON: Dr. Miguel Mosher.    ASSISTANT SURGEON: Kane Foss PA-C.    ANESTHESIA: General.    EBL: Minimal.    SPECIMENS: Left first metatarsal bone sent for culture and permanent pathology.    COMPLICATIONS: None apparent.    A skilled surgical assistant, Kane Foss PA-C, was necessary and utilized during the case to assist with patient positioning, prepping and draping, completing the soft tissue/bone work, wound closure, and dressing/splint application.  The assistant was utilized throughout the entire case.    INDICATIONS: Shalonda is a pleasant 49 year-old female who presented to my clinic for evaluation of a chronic left hallux ulcer with concern for worsening wound infection.  The patient had undergone a previous bunion surgery with my partner last fall and unfortunately has developed significant problems with a nonhealing wound along the medial aspect of the hallux.  The patient has undergone multiple irrigation and debridement procedures and prolonged courses of both IV and oral antibiotics without adequate healing of her wound.  The patient resumed a course of oral antibiotics last week Thursday after noting worsening erythema and drainage around the wound.  The patient has noted persistent drainage and erythema without improvement with oral antibiotics and presented to my clinic this past Tuesday for evaluation as Dr. Harman is currently out of town.  Given the appearance of the wound and lack of improvement on oral antibiotics, admission to the hospital was recommended for IV antibiotics,  infectious disease consultation, and planned return to the operating room today for repeat irrigation and debridement of the wound and to obtain a bone biopsy and bone cultures to help guide further treatment.  After full discussion of the benefits and risks of surgery, the patient provided informed consent to proceed.    The patient was identified in the pre-operative holding area on the date of surgery.  The operative site was marked with indelible marker and the patient was brought back to the operating room and transferred to the operating table in a supine position.  All bony prominences were well-padded.  Anesthesia was administered without complication.  The left lower extremity was prepped and draped in standard sterile fashion.  A pre-operative timeout was performed identifying the correct patient, procedure, operative site, antibiotic administration, and equipment necessary for the procedure.    A curette was utilized to perform sharp debridement of nonviable fibrinous debris and granulation tissue throughout the base of the left hallux ulceration.  Minimal debridement was required for most of the wound though a small portion of subcutaneous tissue and deep fascia were debrided at the very central and deep portion of the wound.    Through the deep portion of the wound, the first MTP joint was obviously exposed.  There was no purulence emanating from the joint.  A deep swab from the joint was obtained and sent for Gram stain and culture.  The medial head of the first metatarsal was identified and there was no significant erosion of the bone encountered.  A rongeur was utilized to collect small bites of bone from the medial first metatarsal head which were sent for both culture and permanent pathology to evaluate for signs of chronic osteomyelitis.  The wound was then copiously irrigated with saline.  Due to the exposed bone and medial first MTP joint at the base of the wound, a wound VAC was applied and set  for 125 mmHg low continuous suction.  The patient was then extubated and brought to the PACU in stable condition for further postoperative care.    Postoperatively, the patient may bear weight on the foot though should take care to limit excessive pressure given her current heel ulcer.  The patient will not require DVT prophylaxis postoperatively given her weightbearing status and negative prior history of DVT.  The patient will return to clinic for follow-up with Dr. Harman in approximately 1-2 weeks for a wound check and to discuss continued treatment for her chronic hallux wound.  The patient will remain in the hospital for IV antibiotic administration and to determine a home antibiotic plan based on the results of the bone biopsy and cultures.    Of note, all counts were correct at the conclusion of the case.

## 2019-01-24 NOTE — PLAN OF CARE
Pt was A  & O x 4. Lungs sound clear, bowel sounds active, cms intact except for edema and intermittent numbness/tingling. Scolopamine patch on. Received dilaudid IV and atarax for pain. IV infusing. Due to void. Dressing is CDI. Wound vac in place. Will continue to monitor.

## 2019-01-24 NOTE — PROGRESS NOTES
"[facility]  Infectious Disease Progress Note          Assessment and Plan:   Assessment & Plan     Shalonda Howard is a 49 year old female who was admitted on 1/22/2019.      Impression:  1. This is a 49 y.o familiar to me from her prior admission.   2. She has a history of bilateral bunion repair surgery done in August 2018. The right side healed but the left side the insion has not been healing , 1 I and D and 6 weeks of IV antibiotics and many more weeks of oral antibiotics since the end of August. Then repeat admission in November, more I and D and another course of IV antibiotics.   3. She also has some hardware in the ankle b/l, those incisions look ok. Though both ankle are swollen.   4. She also has chronic heel ulcers bilateral.   5. Last cultures are from the wound and positive for MSSA which was seen previously also in September and acinetobacter and corynebacterium.   6. She has been started on IV vancomycin and zosyn.      Recommendations:   Stop vancomycin, no MRSA in any cultures so far.   Continue zosyn   Bone biopsy and bone cultures. In OR now                       Interval History:   no new complaints in OR              Medications:       [Auto Hold] buPROPion  300 mg Oral Daily     [Auto Hold] busPIRone  15 mg Oral BID     [Auto Hold] gabapentin  800 mg Oral TID     [Auto Hold] omeprazole  40 mg Oral BID AC     [Auto Hold] piperacillin-tazobactam  3.375 g Intravenous Q6H     [Auto Hold] senna-docusate  1 tablet Oral BID    Or     [Auto Hold] senna-docusate  2 tablet Oral BID     [Auto Hold] sertraline  100 mg Oral Daily     [Auto Hold] topiramate  100 mg Oral At Bedtime                  Physical Exam:   Blood pressure 101/68, pulse 71, temperature 97.6  F (36.4  C), resp. rate 16, height 1.651 m (5' 5\"), weight 90.7 kg (200 lb), SpO2 99 %, not currently breastfeeding.  Wt Readings from Last 2 Encounters:   01/22/19 90.7 kg (200 lb)   11/26/18 90.7 kg (200 lb)     Vital Signs " with Ranges  Temp:  [97.3  F (36.3  C)-98.6  F (37  C)] 97.6  F (36.4  C)  Pulse:  [65-75] 71  Heart Rate:  [73-83] 73  Resp:  [9-20] 16  BP: (100-128)/(67-77) 101/68  SpO2:  [95 %-99 %] 99 %    Constitutional: Awake, alert, cooperative, no apparent distress   Lungs: Clear to auscultation bilaterally, no crackles or wheezing   Cardiovascular: Regular rate and rhythm, normal S1 and S2, and no murmur noted   Abdomen: Normal bowel sounds, soft, non-distended, non-tender   Skin: No rashes, no cyanosis, no edema   Other:           Data:   All microbiology laboratory data reviewed.  Recent Labs   Lab Test 01/23/19  0623 12/31/18  0945 12/24/18  1128   WBC 7.6 8.6 8.5   HGB 10.7* 12.0 12.3   HCT 33.3* 37.6 39.4   MCV 81 84 84    376 430     Recent Labs   Lab Test 01/23/19  0623 12/31/18  0945 12/24/18  1128   CR 0.67 0.70 0.69     Recent Labs   Lab Test 01/22/19  1950   SED 34*     Recent Labs   Lab Test 01/22/19 1950 01/22/19  1945 11/26/18  1159 11/24/18  1950 11/24/18  1814 11/20/18  1700 11/20/18  1634 09/26/18  1631 09/24/18  2115   CULT No growth after 2 days No growth after 2 days No anaerobes isolated  Light growth  Staphylococcus aureus  *  Light growth  Acinetobacter baumannii complex  *  Light growth  Corynebacterium species  Identification obtained by MALDI-TOF mass spectrometry research use only database. Test   characteristics determined and verified by the [facility]   ([facility]) [place], [place].  Susceptibility testing not routinely done  * No growth No growth No growth No growth No anaerobes isolated  Light growth  Staphylococcus aureus  *  Light growth  Normal skin kenyatta   Moderate growth  Staphylococcus aureus  *

## 2019-01-24 NOTE — ANESTHESIA CARE TRANSFER NOTE
Patient: Shalonda Howard    Procedure(s):  LEFT HALLUX WOUND IRRIGATION AND DEBRIDEMENT WITH BONE BIOPSY  WOUND VAC APPLICATION    Diagnosis: LEFT HALLUX WOUND INFECTION  Diagnosis Additional Information: No value filed.    Anesthesia Type:   General, LMA     Note:  Airway :Face Mask  Patient transferred to:PACU  Comments: Transferred to PACU, spontaneous respirations, 6L oxygen via facemask.  All monitors and alarms on and functioning, VSS.  Patient awake, comfortable.  Report to PACU RN.Handoff Report: Identifed the Patient, Identified the Reponsible Provider, Reviewed the pertinent medical history, Discussed the surgical course, Reviewed Intra-OP anesthesia mangement and issues during anesthesia, Set expectations for post-procedure period and Allowed opportunity for questions and acknowledgement of understanding      Vitals: (Last set prior to Anesthesia Care Transfer)    CRNA VITALS  1/24/2019 1110 - 1/24/2019 1145      1/24/2019             Pulse:  82    SpO2:  99 %    Resp Rate (observed):  3  (Abnormal)     Resp Rate (set):  10                Electronically Signed By: CRISTIN Yates CRNA  January 24, 2019  11:45 AM

## 2019-01-24 NOTE — ANESTHESIA POSTPROCEDURE EVALUATION
Patient: Shalonda Howard    Procedure(s):  LEFT HALLUX WOUND IRRIGATION AND DEBRIDEMENT WITH BONE BIOPSY  WOUND VAC APPLICATION    Diagnosis:LEFT HALLUX WOUND INFECTION  Diagnosis Additional Information: No value filed.    Anesthesia Type:  General, LMA    Note:  Anesthesia Post Evaluation    Patient location during evaluation: PACU  Patient participation: Able to fully participate in evaluation  Level of consciousness: awake  Pain management: adequate  Airway patency: patent  Cardiovascular status: acceptable  Respiratory status: acceptable  Hydration status: acceptable  PONV: none     Anesthetic complications: None          Last vitals:  Vitals:    01/24/19 1220 01/24/19 1236 01/24/19 1255   BP: 123/75  106/66   Pulse: 75  69   Resp: 14     Temp:   37.2  C (98.9  F)   SpO2: 99% 98% 96%         Electronically Signed By: Rocío Brennan  January 24, 2019  1:02 PM

## 2019-01-24 NOTE — H&P
[facility]  History and Physical   Orthopaedics/Foot and Ankle Surgery Service    Miguel Mosher MD    Shalonda Howard MRN# [id number]   YOB: 1970 Age: 49 year old      Date of Admission:  1/22/2019           Assessment and Plan:   50 y/o F w/ chronic non-healing L bunion wound, concern for worsening wound and surrounding soft tissue infection despite oral antibiotics over the past 4-5 days.  No concerning signs of systemic infection.  Admitted for IV abx and plan for return to OR on Thursday for I&D with bone biopsy, possible wound VAC.  Concern for deep osteomyelitis limiting continued healing of the wound.  Will obtain ID consultation to assist with antibiotic selection.  Continue dressing changes daily until after OR with new recs to follow.           Code Status:   Full Code         Primary Care Physician:   Linda Bernstein [phone]         Chief Complaint:   L hallux chronic wound infection.         History of Present Illness:   Shalonda Howard is a 49 year old female with chronic non-healing L bunion wound, concern for worsening wound and surrounding soft tissue infection despite oral antibiotics over the past 4-5 days.  Previous management with Dr. Harman who is out of town this week.  Presented to my clinic today for evaluation given worsening appearance of the wound, increased drainage.  Concern for continued wound and surrounding soft tissue infection with failure of PO abx.  Advised to present to the hospital for direct admission for IV antibiotics and anticipated I&D on Thursday.  Please see previous clinic and hospital documentation for details regarding recent history.           Past Medical History:     Patient Active Problem List   Diagnosis     Infection     Left foot infection      Past Medical History:   Diagnosis Date     Allergic rhinitis, cause unspecified      Anxiety state, unspecified      Cellulitis and abscess of leg, except foot      Closed  fracture of unspecified part of tibia      Disuse osteoporosis      Dysthymic disorder      Edema      Encounter for long-term (current) use of other medications      Esophageal reflux      Kidney stones      Myalgia and myositis, unspecified      Obesity, unspecified      Open wound of foot except toe(s) alone, complicated      Opioid type dependence, unspecified      Other acne      Other congenital valgus deformity of feet      Other ventral hernia without mention of obstruction or gangrene      Polycystic ovaries      PONV (postoperative nausea and vomiting)      Systemic lupus erythematosus (H)      Tibialis tendinitis      Unspecified hereditary and idiopathic peripheral neuropathy              Past Surgical History:     Past Surgical History:   Procedure Laterality Date     APPENDECTOMY       ARTHRODESIS FOOT Left 2/4/2015    Procedure: ARTHRODESIS FOOT;  Surgeon: Andre Harman MD;  Location: [facility]     BUNIONECTOMY RT/LT  08/29/2018     DILATION AND CURETTAGE       EXCISE TOENAIL(S) Left 2/4/2015    Procedure: EXCISE TOENAIL(S);  Surgeon: Andre Harman MD;  Location: [facility]     HERNIA REPAIR      umbilical     HERNIA REPAIR      ventral open x 2     IRRIGATION AND DEBRIDEMENT FOOT, COMBINED Left 9/26/2018    Procedure: COMBINED IRRIGATION AND DEBRIDEMENT FOOT;  IRRIGATION AND DEBRIDEMENT LEFT FOOT;  Surgeon: Andre Harman MD;  Location: [facility]     IRRIGATION AND DEBRIDEMENT FOOT, COMBINED Left 11/26/2018    Procedure: COMBINED IRRIGATION AND DEBRIDEMENT LEFT FOOT;  Surgeon: Andre Harman MD;  Location: [facility]     REPAIR HAMMER TOE Left 11/26/2018    Procedure: REPAIR HAMMER TOE;  Surgeon: Andre Harman MD;  Location: [facility]     TONSILLECTOMY              Home Medications:     Prior to Admission medications    Medication Sig Last Dose Taking? Auth Provider   ammonium lactate (AMLACTIN) 12 % cream Apply topically daily as needed (to heels)  Past Week at PRN Yes Unknown, Entered By  History   BuPROPion HCl (WELLBUTRIN XL PO) Take 300 mg by mouth daily 1/22/2019 at AM Yes Reported, Patient   BusPIRone HCl (BUSPAR PO) Take 15 mg by mouth 2 times daily 1/22/2019 at AM Yes Reported, Patient   cholecalciferol (VITAMIN D3) 5000 units TABS tablet Take 5,000 Units by mouth daily 1/22/2019 at AM Yes Unknown, Entered By History   clindamycin (CLEOCIN T) 1 % solution Apply topically nightly as needed (Acne outbreak)  Past Month at PRn Yes Unknown, Entered By History   Furosemide (LASIX PO) Take 160 mg by mouth daily  1/22/2019 at AM Yes Reported, Patient   gabapentin (NEURONTIN) 800 MG tablet Take 800 mg by mouth 3 times daily Am, supper, hs 1/22/2019 at x1 Yes Unknown, Entered By History   hydrOXYzine (VISTARIL) 25 MG capsule Take 1 capsule (25 mg) by mouth every 4 hours as needed for anxiety 1/21/2019 at PRN Yes Jett Malik PA-C   Omeprazole (PRILOSEC PO) Take 40 mg by mouth 2 times daily (before meals) 2 CAPSULES IN A.M.  1/22/2019 at x1 Yes Reported, Patient   Ondansetron HCl (ZOFRAN PO) Take 8 mg by mouth as needed for nausea or vomiting  at PRN Yes Reported, Patient   oxyCODONE-acetaminophen (PERCOCET) 5-325 MG tablet Take 1-2 tablets by mouth every 4 hours as needed for severe pain 1/22/2019 at PRN Yes Jett Malik PA-C   phentermine 30 MG capsule Take 30 mg by mouth every morning 1/22/2019 at AM Yes Reported, Patient   polyethylene glycol (MIRALAX/GLYCOLAX) packet Take 17 g by mouth as needed for constipation  at PRN Yes Reported, Patient   Potassium Chloride Shameka CR (K-DUR PO) Take 80 mEq by mouth daily  1/22/2019 at AM Yes Reported, Patient   senna-docusate (SENOKOT-S;PERICOLACE) 8.6-50 MG per tablet Take 1 tablet by mouth 2 times daily  Patient taking differently: Take 1 tablet by mouth 2 times daily as needed   at PRN Yes Jett Malik PA-C   sertraline (ZOLOFT) 100 MG tablet Take 100 mg by mouth daily 1/22/2019 at AM Yes Unknown, Entered By History   Topiramate  "(TOPAMAX PO) Take 100 mg by mouth At Bedtime  2019 at HS Yes Reported, Patient            Allergies:     Allergies   Allergen Reactions     Sulfa Drugs Shortness Of Breath and Rash     Demerol [Meperidine] Nausea and Vomiting     Erythromycin Nausea and Vomiting     Metformin Nausea and Vomiting     Codeine Rash     Penicillins Rash            Social History:     Social History     Tobacco Use     Smoking status: Former Smoker     Packs/day: 0.50     Years: 12.00     Pack years: 6.00     Types: Cigarettes     Last attempt to quit: 10/1/2002     Years since quittin.3     Smokeless tobacco: Never Used   Substance Use Topics     Alcohol use: Yes     Comment: wine with dinner occas             Family History:   No family history on file.           Review of Systems:   The 10 point Review of Systems is negative other than as noted in the HPI.           Physical Exam:   Blood pressure 116/70, pulse 65, temperature 97.9  F (36.6  C), temperature source Oral, resp. rate 16, height 1.651 m (5' 5\"), weight 90.7 kg (200 lb), SpO2 97 %, not currently breastfeeding.  200 lbs 0 oz      Constitutional:   Awake, alert, cooperative, no apparent distress, and appears stated age.     Musculoskeletal:   L hallux ulcer with worsening erythema, drainage.  Fibrinous and necrotic debris throughout the base of the wound.  No obvious exposed bone.  Stable L heel ulcer with granulation tissue at the base of the ulcer and no concern for acute infection.            Data:   All new lab and imaging data was reviewed.  New inflammatory markers pending at the time of admission.   "

## 2019-01-24 NOTE — ANESTHESIA PREPROCEDURE EVALUATION
Anesthesia Pre-Procedure Evaluation    Patient: Shalonda Howard   MRN: [id number] : 1970          Preoperative Diagnosis: LEFT HALLUX WOUND INFECTION    Procedure(s):  LEFT HALLUX WOUND IRRIGATION AND DEBRIDEMENT WITH BONE BIOPSY  WOUND VAC APPLICATION( MINI C-ARM, WOUND VAC SIZE SMALL)    Past Medical History:   Diagnosis Date     Allergic rhinitis, cause unspecified      Anxiety state, unspecified      Cellulitis and abscess of leg, except foot      Closed fracture of unspecified part of tibia      Disuse osteoporosis      Dysthymic disorder      Edema      Encounter for long-term (current) use of other medications      Esophageal reflux      Kidney stones      Myalgia and myositis, unspecified      Obesity, unspecified      Open wound of foot except toe(s) alone, complicated      Opioid type dependence, unspecified      Other acne      Other congenital valgus deformity of feet      Other ventral hernia without mention of obstruction or gangrene      Polycystic ovaries      PONV (postoperative nausea and vomiting)      Systemic lupus erythematosus (H)      Tibialis tendinitis      Unspecified hereditary and idiopathic peripheral neuropathy      Past Surgical History:   Procedure Laterality Date     APPENDECTOMY       ARTHRODESIS FOOT Left 2015    Procedure: ARTHRODESIS FOOT;  Surgeon: Andre Hamran MD;  Location: [facility]     BUNIONECTOMY RT/LT  2018     DILATION AND CURETTAGE       EXCISE TOENAIL(S) Left 2015    Procedure: EXCISE TOENAIL(S);  Surgeon: Andre Harman MD;  Location: [facility]     HERNIA REPAIR      umbilical     HERNIA REPAIR      ventral open x 2     IRRIGATION AND DEBRIDEMENT FOOT, COMBINED Left 2018    Procedure: COMBINED IRRIGATION AND DEBRIDEMENT FOOT;  IRRIGATION AND DEBRIDEMENT LEFT FOOT;  Surgeon: Andre Harman MD;  Location: [facility]     IRRIGATION AND DEBRIDEMENT FOOT, COMBINED Left 2018    Procedure: COMBINED IRRIGATION AND DEBRIDEMENT LEFT FOOT;   Surgeon: Andre Harman MD;  Location: [facility]     REPAIR HAMMER TOE Left 11/26/2018    Procedure: REPAIR HAMMER TOE;  Surgeon: Andre Harman MD;  Location: [facility]     TONSILLECTOMY         Anesthesia Evaluation     . Pt has had prior anesthetic.     History of anesthetic complications   - PONV        ROS/MED HX    ENT/Pulmonary:      (-) tobacco use, asthma and sleep apnea   Neurologic:     (+)neuropathy     Cardiovascular:  - neg cardiovascular ROS       METS/Exercise Tolerance:     Hematologic:         Musculoskeletal:         GI/Hepatic:     (+) GERD Asymptomatic on medication,       Renal/Genitourinary:     (+) chronic renal disease,       Endo:     (+) Obesity, .      Psychiatric:         Infectious Disease:         Malignancy:         Other: Comment: Cellulitis infection left foot                         Physical Exam      Airway   Mallampati: II  TM distance: >3 FB  Neck ROM: full    Dental   (+) other    Cardiovascular   Rhythm and rate: regular and normal      Pulmonary    breath sounds clear to auscultation            Lab Results   Component Value Date    WBC 7.6 01/23/2019    HGB 10.7 (L) 01/23/2019    HCT 33.3 (L) 01/23/2019     01/23/2019    CRP 19.2 (H) 01/22/2019    SED 34 (H) 01/22/2019     11/27/2018    POTASSIUM 3.7 11/27/2018    CHLORIDE 110 (H) 11/27/2018    CO2 25 11/27/2018    BUN 19 12/31/2018    CR 0.67 01/23/2019    GLC 91 11/27/2018    PAULINE 7.9 (L) 11/27/2018    MAG 2.2 10/16/2018    ALBUMIN 2.8 (L) 11/26/2018    PROTTOTAL 6.7 (L) 11/26/2018    ALT 16 11/26/2018    AST 13 12/31/2018    ALKPHOS 117 11/26/2018    BILITOTAL <0.1 (L) 11/26/2018    TSH 4.36 (H) 10/16/2018    T4 0.85 10/16/2018    HCG Negative 11/26/2018       Preop Vitals  BP Readings from Last 3 Encounters:   01/24/19 116/70   11/27/18 123/72   11/20/18 (!) 162/95    Pulse Readings from Last 3 Encounters:   01/24/19 65   11/27/18 81   11/20/18 105      Resp Readings from Last 3 Encounters:   01/24/19 16  "  11/27/18 16   11/20/18 16    SpO2 Readings from Last 3 Encounters:   01/24/19 97%   11/27/18 96%   11/20/18 99%      Temp Readings from Last 1 Encounters:   01/24/19 36.6  C (97.9  F) (Oral)    Ht Readings from Last 1 Encounters:   01/22/19 1.651 m (5' 5\")      Wt Readings from Last 1 Encounters:   01/22/19 90.7 kg (200 lb)    Estimated body mass index is 33.28 kg/m  as calculated from the following:    Height as of this encounter: 1.651 m (5' 5\").    Weight as of this encounter: 90.7 kg (200 lb).       Anesthesia Plan      History & Physical Review  History and physical reviewed and following examination; no interval change.    ASA Status:  2 .        Plan for General and LMA with Intravenous and Propofol induction. Maintenance will be TIVA.    PONV prophylaxis:  Ondansetron (or other 5HT-3) and Dexamethasone or Solumedrol  Plan for propofol infusion      Postoperative Care  Postoperative pain management:  IV analgesics and Oral pain medications.      Consents  Anesthetic plan, risks, benefits and alternatives discussed with:  Patient..                 Rocío Brennan  "

## 2019-01-24 NOTE — PLAN OF CARE
Pt is A&O x4, Up independent to BR. Baseline neuropathy on BLE per pt. IVF infusing. Pain managed w/ oxycodone. NPO since midnight. Surgery @ 1100. Will continue to monitor.

## 2019-01-24 NOTE — PROGRESS NOTES
[facility]    Hospitalist Progress Note    Interval History   - Left foot I&D today with wound vac, bone biopsy obtained  - Continue abx per ID    Assessment & Plan   Summary: Shalonda Howard is a 49 year old female with PMH nonhealing left foot surgical wound, PCOS, bilateral chronic LE sadaf, GERd, PUNEET, obesity, opioid dependence, peripheral neuropathy, HLD who was admitted on 1/22/2019 for further management of her nonhealing left foot surgical wound and infection. Hospitalist consulted for medical co-management.    Persistent postoperative left foot infection with nonhealing wound: Began following bilateral bunionectomy in 8/2018. Admitted 9/2018 for left foot wound dehiscence, s/p I&D and hardware removal. Not improved with further antibiotics. MRI 10/2018 without evidence of osteomyelitis. Then treated with IV ancef by ID for six weeks via PICC. Followed by debridement 11/2018. Now here for further management.  - Orthopedic management   - Pain management  - ID consulted, appreciate recs   - Continue Zosyn    B/l chronic LE edema: Lasix held this admission, probably restart tomorrow  GERD: PTA PPI BID  Anxiety: PTA Wellbutrin, Buspar, Sertraline, Topamax  Peripheral neuropathy: PTA gabapentin  HLD: PTA pravastatin    DVT Prophylaxis: Pneumatic Compression Devices  Code Status: Prior  PT/OT: Ordered    Disposition: Expected discharge per surgical service    Thang Major MD  Text Page  (7am to 6pm)  -Data reviewed today: I reviewed all new labs and imaging results over the last 24 hours.    Physical Exam   Temp: 98.9  F (37.2  C) Temp src: Oral BP: 106/66 Pulse: 69 Heart Rate: 82 Resp: 14 SpO2: 96 % O2 Device: None (Room air) Oxygen Delivery: 2 LPM  Vitals:    01/22/19 1605   Weight: 90.7 kg (200 lb)     Vital Signs with Ranges  Temp:  [97.3  F (36.3  C)-98.9  F (37.2  C)] 98.9  F (37.2  C)  Pulse:  [65-75] 69  Heart Rate:  [73-83] 82  Resp:  [9-20] 14  BP: (100-128)/(65-77) 106/66  SpO2:   [95 %-99 %] 96 %  I/O last 3 completed shifts:  In: 683 [P.O.:200; I.V.:483]  Out: 100 [Urine:100]  O2 requirements: None    Constitutional: Female in NAD  HEENT: Eyes nonicteric, oral mucosa moist  Cardiovascular: RRR, normal S1/2, no m/r/g  Respiratory: CTAB, no wheezing or crackles  Vascular: LLE wound vac noted  GI: Normoactive bowel sounds, nontender, nondistended  Neuro/Psych: Appropriate affect and mood. A&Ox3, moves all extremities    Medications     - MEDICATION INSTRUCTIONS -       lactated ringers 50 mL/hr at 01/24/19 1309       buPROPion  300 mg Oral Daily     busPIRone  15 mg Oral BID     gabapentin  800 mg Oral TID     omeprazole  40 mg Oral BID AC     piperacillin-tazobactam  3.375 g Intravenous Q6H     scopolamine   Transdermal Q8H     [START ON 1/25/2019] scopolamine   Transdermal Once     senna-docusate  1 tablet Oral BID    Or     senna-docusate  2 tablet Oral BID     sertraline  100 mg Oral Daily     sodium chloride (PF)  3 mL Intracatheter Q8H     topiramate  100 mg Oral At Bedtime       Data   Recent Labs   Lab 01/23/19  0623   WBC 7.6   HGB 10.7*   MCV 81      CR 0.67       Imaging:   No results found for this or any previous visit (from the past 24 hour(s)).

## 2019-01-25 LAB
GLUCOSE BLDC GLUCOMTR-MCNC: 183 MG/DL (ref 70–99)
GRAM STN SPEC: NORMAL
Lab: NORMAL
SPECIMEN SOURCE: NORMAL

## 2019-01-25 PROCEDURE — E2402 NEG PRESS WOUND THERAPY PUMP: HCPCS

## 2019-01-25 PROCEDURE — 12000000 ZZH R&B MED SURG/OB

## 2019-01-25 PROCEDURE — 25000128 H RX IP 250 OP 636: Performed by: INTERNAL MEDICINE

## 2019-01-25 PROCEDURE — 25000132 ZZH RX MED GY IP 250 OP 250 PS 637: Performed by: PHYSICIAN ASSISTANT

## 2019-01-25 PROCEDURE — 00000146 ZZHCL STATISTIC GLUCOSE BY METER IP

## 2019-01-25 PROCEDURE — 99207 ZZC CDG-MDM COMPONENT: MEETS MODERATE - UP CODED: CPT | Performed by: INTERNAL MEDICINE

## 2019-01-25 PROCEDURE — 99232 SBSQ HOSP IP/OBS MODERATE 35: CPT | Performed by: INTERNAL MEDICINE

## 2019-01-25 RX ADMIN — OXYCODONE HYDROCHLORIDE 10 MG: 5 TABLET ORAL at 09:32

## 2019-01-25 RX ADMIN — BUSPIRONE HYDROCHLORIDE 15 MG: 5 TABLET ORAL at 09:29

## 2019-01-25 RX ADMIN — OXYCODONE HYDROCHLORIDE 10 MG: 5 TABLET ORAL at 22:51

## 2019-01-25 RX ADMIN — OXYCODONE HYDROCHLORIDE 10 MG: 5 TABLET ORAL at 19:51

## 2019-01-25 RX ADMIN — PIPERACILLIN SODIUM,TAZOBACTAM SODIUM 3.38 G: 3; .375 INJECTION, POWDER, FOR SOLUTION INTRAVENOUS at 04:07

## 2019-01-25 RX ADMIN — OMEPRAZOLE 40 MG: 20 CAPSULE, DELAYED RELEASE ORAL at 15:34

## 2019-01-25 RX ADMIN — BUPROPION HYDROCHLORIDE 300 MG: 300 TABLET, FILM COATED, EXTENDED RELEASE ORAL at 09:29

## 2019-01-25 RX ADMIN — PIPERACILLIN SODIUM,TAZOBACTAM SODIUM 3.38 G: 3; .375 INJECTION, POWDER, FOR SOLUTION INTRAVENOUS at 09:26

## 2019-01-25 RX ADMIN — SERTRALINE HYDROCHLORIDE 100 MG: 100 TABLET ORAL at 09:29

## 2019-01-25 RX ADMIN — OXYCODONE HYDROCHLORIDE 10 MG: 5 TABLET ORAL at 16:22

## 2019-01-25 RX ADMIN — OMEPRAZOLE 40 MG: 20 CAPSULE, DELAYED RELEASE ORAL at 09:28

## 2019-01-25 RX ADMIN — GABAPENTIN 800 MG: 800 TABLET, FILM COATED ORAL at 21:59

## 2019-01-25 RX ADMIN — PIPERACILLIN SODIUM,TAZOBACTAM SODIUM 3.38 G: 3; .375 INJECTION, POWDER, FOR SOLUTION INTRAVENOUS at 15:07

## 2019-01-25 RX ADMIN — SENNOSIDES AND DOCUSATE SODIUM 1 TABLET: 8.6; 5 TABLET ORAL at 21:59

## 2019-01-25 RX ADMIN — OXYCODONE HYDROCHLORIDE 10 MG: 5 TABLET ORAL at 13:09

## 2019-01-25 RX ADMIN — TOPIRAMATE 100 MG: 100 TABLET, FILM COATED ORAL at 21:59

## 2019-01-25 RX ADMIN — PIPERACILLIN SODIUM,TAZOBACTAM SODIUM 3.38 G: 3; .375 INJECTION, POWDER, FOR SOLUTION INTRAVENOUS at 22:00

## 2019-01-25 RX ADMIN — BUSPIRONE HYDROCHLORIDE 15 MG: 5 TABLET ORAL at 21:58

## 2019-01-25 RX ADMIN — SENNOSIDES AND DOCUSATE SODIUM 2 TABLET: 8.6; 5 TABLET ORAL at 09:29

## 2019-01-25 RX ADMIN — GABAPENTIN 800 MG: 800 TABLET, FILM COATED ORAL at 09:29

## 2019-01-25 RX ADMIN — OXYCODONE HYDROCHLORIDE 10 MG: 5 TABLET ORAL at 05:11

## 2019-01-25 RX ADMIN — GABAPENTIN 800 MG: 800 TABLET, FILM COATED ORAL at 15:34

## 2019-01-25 ASSESSMENT — ACTIVITIES OF DAILY LIVING (ADL)
ADLS_ACUITY_SCORE: 10

## 2019-01-25 NOTE — PROGRESS NOTES
Focus: Lt hallus wound VAC   S:  History, post-op note and progress notes reviewed. Care Coordinator called to request assist with         [facility] VAC paperwork and wound measurements.  Of note Vac was placed in OR following I&D by         Ortho on 1-24-19. Bone bx and cultures taken during procedure. Bone exposed in wound bed.        Ortho and ID note reviewed. Both now recommending pt remain in hospital over w/e until a more definitive plan can be formulated. WOC will plan for VAC change on Monday (if ortho in agreement ) prior to patients discharge. Assisted with completing VAC  Paperwork.    Lena Pringle [facility].   [phone]

## 2019-01-25 NOTE — CONSULTS
Referral sent to [facility] to check benefits. Patient has used [facility] services recently  Per [facility]:  Pt has a [employer] plan with a ded $1000 ($547 met so far), then 75% up to OOP $4500 ($573 met so far). Once ded and OOP are met, pt will be covered at 100% for IV abx.    [employer] order started in [employer]. Await WOC    1500 addendum: [facility] aware and will provide RN for woundvac/dressing changes- will need MWF

## 2019-01-25 NOTE — PROGRESS NOTES
"[facility]  Infectious Disease Progress Note          Assessment and Plan:   Assessment & Plan     Shalonda Howard is a 49 year old female who was admitted on 1/22/2019.      Impression:  1. This is a 49 y.o familiar to me from her prior admission.   2. She has a history of bilateral bunion repair surgery done in August 2018. The right side healed but the left side the insion has not been healing , 1 I and D and 6 weeks of IV antibiotics and many more weeks of oral antibiotics since the end of August. Then repeat admission in November, more I and D and another course of IV antibiotics.   3. She also has some hardware in the ankle b/l, those incisions look ok. Though both ankle are swollen.   4. She also has chronic heel ulcers bilateral.   5. Last cultures are from the wound and positive for MSSA which was seen previously also in September and acinetobacter and corynebacterium.   6 PCN allergy, getting zosyn, sulfa allergy        Recommendations:   Stop vancomycin, no MRSA in any cultures so far.   Continue zosyn while here  Bone biopsy and bone cultures pending, findings noted, wd vac on  Presumably in thru WE, IV get info back to formulate plan, I suppose could ? Discharge on po keflex pending info  Call if issues this WE                       Interval History:   no new complaints in OR              Medications:       buPROPion  300 mg Oral Daily     busPIRone  15 mg Oral BID     gabapentin  800 mg Oral TID     omeprazole  40 mg Oral BID AC     piperacillin-tazobactam  3.375 g Intravenous Q6H     scopolamine   Transdermal Once     senna-docusate  1 tablet Oral BID    Or     senna-docusate  2 tablet Oral BID     sertraline  100 mg Oral Daily     sodium chloride (PF)  3 mL Intracatheter Q8H     topiramate  100 mg Oral At Bedtime                  Physical Exam:   Blood pressure 116/72, pulse 85, temperature 98.3  F (36.8  C), resp. rate 16, height 1.651 m (5' 5\"), weight 90.7 kg (200 lb), SpO2 " 98 %, not currently breastfeeding.  Wt Readings from Last 2 Encounters:   01/22/19 90.7 kg (200 lb)   11/26/18 90.7 kg (200 lb)     Vital Signs with Ranges  Temp:  [96.3  F (35.7  C)-98.9  F (37.2  C)] 98.3  F (36.8  C)  Pulse:  [] 85  Heart Rate:  [73-84] 81  Resp:  [9-17] 16  BP: (100-123)/(44-75) 116/72  SpO2:  [95 %-99 %] 98 %    Constitutional: Awake, alert, cooperative, no apparent distress   Lungs: Clear to auscultation bilaterally, no crackles or wheezing   Cardiovascular: Regular rate and rhythm, normal S1 and S2, and no murmur noted   Abdomen: Normal bowel sounds, soft, non-distended, non-tender   Skin: No rashes, no cyanosis, no edema   Other:           Data:   All microbiology laboratory data reviewed.  Recent Labs   Lab Test 01/23/19  0623 12/31/18  0945 12/24/18  1128   WBC 7.6 8.6 8.5   HGB 10.7* 12.0 12.3   HCT 33.3* 37.6 39.4   MCV 81 84 84    376 430     Recent Labs   Lab Test 01/23/19  0623 12/31/18  0945 12/24/18  1128   CR 0.67 0.70 0.69     Recent Labs   Lab Test 01/22/19  1950   SED 34*     Recent Labs   Lab Test 01/24/19  1122 01/24/19  1120 01/22/19  1950 01/22/19  1945 11/26/18  1159 11/24/18  1950 11/24/18  1814 11/20/18  1700 11/20/18  1634   CULT Culture negative monitoring continues Culture negative monitoring continues  PENDING No growth after 3 days No growth after 3 days No anaerobes isolated  Light growth  Staphylococcus aureus  *  Light growth  Acinetobacter baumannii complex  *  Light growth  Corynebacterium species  Identification obtained by MALDI-TOF mass spectrometry research use only database. Test   characteristics determined and verified by the [facility]   ([facility]) [place], [place].  Susceptibility testing not routinely done  * No growth No growth No growth No growth

## 2019-01-25 NOTE — PLAN OF CARE
Patient A/O, VSS, oxycodone for pain control, wound vac in place, BSC, Long sound clear, continue to monitor.

## 2019-01-25 NOTE — PROGRESS NOTES
[facility]    Hospitalist Progress Note    Interval History   - Awaiting full cultures and biopsy, continue Zosyn, per ID & ortho    Assessment & Plan   Summary: Shalonda Howard is a 49 year old female with PMH nonhealing left foot surgical wound, PCOS, bilateral chronic LE sadaf, GERd, PUNEET, obesity, opioid dependence, peripheral neuropathy, HLD who was admitted on 1/22/2019 for further management of her nonhealing left foot surgical wound and infection. Hospitalist consulted for medical co-management.    Persistent postoperative left foot infection with nonhealing wound: Began following bilateral bunionectomy in 8/2018. Admitted 9/2018 for left foot wound dehiscence, s/p I&D and hardware removal. Not improved with further antibiotics. MRI 10/2018 without evidence of osteomyelitis. Then treated with IV ancef by ID for six weeks via PICC. Followed by debridement 11/2018. Now here for further management.  - Orthopedic management   - Pain management  - ID consulted, appreciate recs   - Continue Zosyn    B/l chronic LE edema: Lasix held this admission, continue to hold  GERD: PTA PPI BID  Anxiety: PTA Wellbutrin, Buspar, Sertraline, Topamax  Peripheral neuropathy: PTA gabapentin  HLD: PTA pravastatin    DVT Prophylaxis: Pneumatic Compression Devices  Code Status: Prior  PT/OT: Ordered    Disposition: Expected discharge on 1/28 or 1/29    Thang Major MD  Text Page  (7am to 6pm)  -Data reviewed today: I reviewed all new labs and imaging results over the last 24 hours.    Physical Exam   Temp: 98.3  F (36.8  C) Temp src: Axillary BP: 116/72 Pulse: 85 Heart Rate: 81 Resp: 16 SpO2: 98 % O2 Device: None (Room air)    Vitals:    01/22/19 1605   Weight: 90.7 kg (200 lb)     Vital Signs with Ranges  Temp:  [96.3  F (35.7  C)-98.9  F (37.2  C)] 98.3  F (36.8  C)  Pulse:  [] 85  Heart Rate:  [74-84] 81  Resp:  [16-17] 16  BP: (100-116)/(44-72) 116/72  SpO2:  [95 %-98 %] 98 %  I/O last 3 completed  shifts:  In: 650 [I.V.:650]  Out: 950 [Urine:950]  O2 requirements: None    Constitutional: Female in NAD  HEENT: Eyes nonicteric, oral mucosa moist  Cardiovascular: RRR, normal S1/2, no m/r/g  Respiratory: CTAB, no wheezing or crackles  Vascular: LLE wound vac noted  GI: Normoactive bowel sounds, nontender, nondistended  Neuro/Psych: Appropriate affect and mood. A&Ox3, moves all extremities    Medications     - MEDICATION INSTRUCTIONS -         buPROPion  300 mg Oral Daily     busPIRone  15 mg Oral BID     gabapentin  800 mg Oral TID     omeprazole  40 mg Oral BID AC     piperacillin-tazobactam  3.375 g Intravenous Q6H     scopolamine   Transdermal Once     senna-docusate  1 tablet Oral BID    Or     senna-docusate  2 tablet Oral BID     sertraline  100 mg Oral Daily     sodium chloride (PF)  3 mL Intracatheter Q8H     topiramate  100 mg Oral At Bedtime       Data   Recent Labs   Lab 01/23/19  0623   WBC 7.6   HGB 10.7*   MCV 81      CR 0.67       Imaging:   No results found for this or any previous visit (from the past 24 hour(s)).

## 2019-01-25 NOTE — PROGRESS NOTES
"[facility]  Orthopaedics/Foot and Ankle Surgery  Daily Post-Op Note    01/25/2019          Assessment and Plan:    Assessment:   Post-operative day #1  Procedure(s):  LEFT HALLUX WOUND IRRIGATION AND DEBRIDEMENT WITH BONE BIOPSY (Left)  WOUND VAC APPLICATION (Left)     Doing well.  Pain is controlled.      Plan:   1. WBAT BLE with assistance. Limit excessive pressure given current heel ulcers.  May keep elevated while at rest to limit swelling and pain.   2. Cont. current pain regimen.  Under appropriate control at this time.  3. Appreciate hospitalist co-management.  4. Continue IV abx per ID.  5. Plan d/c home when ID has definitive results from cultures and pathology to determine appropriate IV abx likely 2-3+ days.              Interval History:   Stable.  Doing well.  Pain is reasonably controlled.  No fevers. WBAT briefly for BMs. The patient is anxious to get home. Denies N/V, tolerating PO intake.              Physical Exam:   Blood pressure 116/72, pulse 85, temperature 98.3  F (36.8  C), resp. rate 16, height 1.651 m (5' 5\"), weight 90.7 kg (200 lb), SpO2 98 %, not currently breastfeeding.  I/O last 3 completed shifts:  In: 650 [I.V.:650]  Out: 950 [Urine:950]    Dressings demonstrate mild serous drainage from bilateral heel ulcers. The ulcers demonstrate red granulation tissue at the base. The left hallux wound vac demonstrates a tight seal with scant serosanguinous drainage noted in the canister. There is no erythema, or cyanosis noted throughout the feet. Patient is able to wiggle her toes. Toes are all warm and well perfused with brisk capillary refill.            Data:   All laboratory data related to this surgery reviewed  Recent Labs   Lab Test 01/23/19  0623 12/31/18  0945 12/24/18  1128 12/18/18  1028 12/11/18  1030   HGB 10.7* 12.0 12.3 12.3 12.9     No lab results found.   Recent Labs   Lab Test 01/23/19  0623  11/27/18  0630   WBC 7.6   < >  --       < >  --    POTASSIUM "  --   --  3.7   CR 0.67   < > 0.62    < > = values in this interval not displayed.

## 2019-01-25 NOTE — PLAN OF CARE
Alert and oriented x 4. Dressing cdi, intermittent numbness-baseline. Wound vac at 125 mmHg continous. Tolerated regular diet. LS clear. Oxycodone and IV dilaudid for pain. IV saline lock. Voids in bsc   Left heel dressing changed

## 2019-01-25 NOTE — PLAN OF CARE
Pt is A & O x 4. Lungs sound clear, bowel sounds active, cms intact except for edema and baseline intermittent numbness. Dressing is CDI. Wound vac in place. On IV antibiotics. Received oxycodone for pain. Will continue to monitor.

## 2019-01-25 NOTE — PROGRESS NOTES
Discussed with Dr Mosher, logical plan given her long difficult course is in thru WE, get path and cx, then formulate a more definitive plan with available data,  Assuming this is plan Dr Bocanegra will Follow-up MON, call if plan changes

## 2019-01-26 LAB
GLUCOSE BLDC GLUCOMTR-MCNC: 123 MG/DL (ref 70–99)
GLUCOSE SERPL-MCNC: 109 MG/DL (ref 70–99)

## 2019-01-26 PROCEDURE — E2402 NEG PRESS WOUND THERAPY PUMP: HCPCS

## 2019-01-26 PROCEDURE — 25000128 H RX IP 250 OP 636: Performed by: INTERNAL MEDICINE

## 2019-01-26 PROCEDURE — 36415 COLL VENOUS BLD VENIPUNCTURE: CPT | Performed by: ORTHOPAEDIC SURGERY

## 2019-01-26 PROCEDURE — 25000132 ZZH RX MED GY IP 250 OP 250 PS 637: Performed by: PHYSICIAN ASSISTANT

## 2019-01-26 PROCEDURE — 12000000 ZZH R&B MED SURG/OB

## 2019-01-26 PROCEDURE — 82947 ASSAY GLUCOSE BLOOD QUANT: CPT | Performed by: ORTHOPAEDIC SURGERY

## 2019-01-26 PROCEDURE — 00000146 ZZHCL STATISTIC GLUCOSE BY METER IP

## 2019-01-26 PROCEDURE — 99232 SBSQ HOSP IP/OBS MODERATE 35: CPT | Performed by: INTERNAL MEDICINE

## 2019-01-26 PROCEDURE — 99207 ZZC CDG-MDM COMPONENT: MEETS MODERATE - UP CODED: CPT | Performed by: INTERNAL MEDICINE

## 2019-01-26 RX ORDER — FUROSEMIDE 40 MG
160 TABLET ORAL DAILY
Status: DISCONTINUED | OUTPATIENT
Start: 2019-01-27 | End: 2019-01-29 | Stop reason: HOSPADM

## 2019-01-26 RX ADMIN — OXYCODONE HYDROCHLORIDE 10 MG: 5 TABLET ORAL at 05:11

## 2019-01-26 RX ADMIN — OXYCODONE HYDROCHLORIDE 10 MG: 5 TABLET ORAL at 14:48

## 2019-01-26 RX ADMIN — OXYCODONE HYDROCHLORIDE 5 MG: 5 TABLET ORAL at 02:01

## 2019-01-26 RX ADMIN — SERTRALINE HYDROCHLORIDE 100 MG: 100 TABLET ORAL at 09:48

## 2019-01-26 RX ADMIN — SENNOSIDES AND DOCUSATE SODIUM 1 TABLET: 8.6; 5 TABLET ORAL at 09:48

## 2019-01-26 RX ADMIN — OXYCODONE HYDROCHLORIDE 10 MG: 5 TABLET ORAL at 11:41

## 2019-01-26 RX ADMIN — GABAPENTIN 800 MG: 800 TABLET, FILM COATED ORAL at 09:48

## 2019-01-26 RX ADMIN — BUSPIRONE HYDROCHLORIDE 15 MG: 5 TABLET ORAL at 21:19

## 2019-01-26 RX ADMIN — OMEPRAZOLE 40 MG: 20 CAPSULE, DELAYED RELEASE ORAL at 16:14

## 2019-01-26 RX ADMIN — OXYCODONE HYDROCHLORIDE 10 MG: 5 TABLET ORAL at 08:22

## 2019-01-26 RX ADMIN — TOPIRAMATE 100 MG: 100 TABLET, FILM COATED ORAL at 21:19

## 2019-01-26 RX ADMIN — PIPERACILLIN SODIUM,TAZOBACTAM SODIUM 3.38 G: 3; .375 INJECTION, POWDER, FOR SOLUTION INTRAVENOUS at 21:26

## 2019-01-26 RX ADMIN — SENNOSIDES AND DOCUSATE SODIUM 1 TABLET: 8.6; 5 TABLET ORAL at 21:19

## 2019-01-26 RX ADMIN — HYDROXYZINE PAMOATE 25 MG: 25 CAPSULE ORAL at 23:14

## 2019-01-26 RX ADMIN — OMEPRAZOLE 40 MG: 20 CAPSULE, DELAYED RELEASE ORAL at 07:05

## 2019-01-26 RX ADMIN — PIPERACILLIN SODIUM,TAZOBACTAM SODIUM 3.38 G: 3; .375 INJECTION, POWDER, FOR SOLUTION INTRAVENOUS at 03:33

## 2019-01-26 RX ADMIN — GABAPENTIN 800 MG: 800 TABLET, FILM COATED ORAL at 21:19

## 2019-01-26 RX ADMIN — OXYCODONE HYDROCHLORIDE 10 MG: 5 TABLET ORAL at 18:41

## 2019-01-26 RX ADMIN — PIPERACILLIN SODIUM,TAZOBACTAM SODIUM 3.38 G: 3; .375 INJECTION, POWDER, FOR SOLUTION INTRAVENOUS at 14:48

## 2019-01-26 RX ADMIN — GABAPENTIN 800 MG: 800 TABLET, FILM COATED ORAL at 16:14

## 2019-01-26 RX ADMIN — PIPERACILLIN SODIUM,TAZOBACTAM SODIUM 3.38 G: 3; .375 INJECTION, POWDER, FOR SOLUTION INTRAVENOUS at 09:44

## 2019-01-26 RX ADMIN — BUSPIRONE HYDROCHLORIDE 15 MG: 5 TABLET ORAL at 09:48

## 2019-01-26 RX ADMIN — BUPROPION HYDROCHLORIDE 300 MG: 300 TABLET, FILM COATED, EXTENDED RELEASE ORAL at 09:48

## 2019-01-26 RX ADMIN — OXYCODONE HYDROCHLORIDE 10 MG: 5 TABLET ORAL at 21:19

## 2019-01-26 ASSESSMENT — COLUMBIA-SUICIDE SEVERITY RATING SCALE - C-SSRS
1. IN THE PAST MONTH, HAVE YOU WISHED YOU WERE DEAD OR WISHED YOU COULD GO TO SLEEP AND NOT WAKE UP?: NO
6. HAVE YOU EVER DONE ANYTHING, STARTED TO DO ANYTHING, OR PREPARED TO DO ANYTHING TO END YOUR LIFE?: NO
2. HAVE YOU ACTUALLY HAD ANY THOUGHTS OF KILLING YOURSELF IN THE PAST MONTH?: NO

## 2019-01-26 ASSESSMENT — ACTIVITIES OF DAILY LIVING (ADL)
TOILETING: 0-->INDEPENDENT
ADLS_ACUITY_SCORE: 10
BATHING: 0-->INDEPENDENT
TRANSFERRING: 0-->INDEPENDENT
ADLS_ACUITY_SCORE: 10
FALL_HISTORY_WITHIN_LAST_SIX_MONTHS: NO
AMBULATION: 0-->INDEPENDENT
RETIRED_EATING: 0-->INDEPENDENT
DRESS: 0-->INDEPENDENT
ADLS_ACUITY_SCORE: 10
COGNITION: 0 - NO COGNITION ISSUES REPORTED
RETIRED_COMMUNICATION: 0-->UNDERSTANDS/COMMUNICATES WITHOUT DIFFICULTY
ADLS_ACUITY_SCORE: 10
SWALLOWING: 0-->SWALLOWS FOODS/LIQUIDS WITHOUT DIFFICULTY

## 2019-01-26 NOTE — PROGRESS NOTES
"[facility]  Orthopaedics/Foot and Ankle Surgery  Daily Post-Op Note    01/26/2019          Assessment and Plan:    Assessment:   Post-operative day #2  Procedure(s):  LEFT HALLUX WOUND IRRIGATION AND DEBRIDEMENT WITH BONE BIOPSY (Left)  WOUND VAC APPLICATION (Left)        Plan:   1. WBAT LLE with cast boot.  Keep elevated while at rest to limit swelling and pain.  2. Cont. current pain regimen.  Under appropriate control at this time.  Better today with keeping on top of medications.  3. Appreciate ID consultation.  Zosyn adequate for covering current organisms noted on 1st MTP joint cultures.  Bone cultures negative to date.  Path pending.  Do anticipate patient will discharge on IV abx and will need PICC line.  Will wait until tomorrow as anticipate sensitivities should be back to confirm plan.  4. Appreciate hospitalist co-management.  5. Wound care visited yesterday.  Continue wound VAC to 125mmHg low, continuous suction.  Change every M and Th until discontinued.  Will need home care for VAC changes and IV abx.  6. Plan d/c likely Monday once abx plan in place and home care approvals met.            Interval History:   No acute events.  Doing well clinically.  Denies f/c.              Physical Exam:   Blood pressure 112/71, pulse 82, temperature 98.6  F (37  C), temperature source Oral, resp. rate 16, height 1.651 m (5' 5\"), weight 90.7 kg (200 lb), SpO2 96 %, not currently breastfeeding.  I/O last 3 completed shifts:  In: -   Out: 950 [Urine:950]    L foot wound VAC intact.  Minimal serosang. drainage.  Markedly improved erythema and swelling throughout forefoot.  Dressing remain intact around b/l heel ulcers.           Data:   All laboratory data related to this surgery reviewed.  1st MTP joint cultures multiple organisms including Staph, Strep, and Enterobacter.  Sensitivities pending.  Bone cultures NGTD.  Bone path pending.    Recent Labs   Lab Test 01/23/19  0623 12/31/18  0945 " 12/24/18  1128 12/18/18  1028 12/11/18  1030   HGB 10.7* 12.0 12.3 12.3 12.9     No lab results found.   Recent Labs   Lab Test 01/23/19  0623  11/27/18  0630   WBC 7.6   < >  --       < >  --    POTASSIUM  --   --  3.7   CR 0.67   < > 0.62    < > = values in this interval not displayed.

## 2019-01-26 NOTE — PLAN OF CARE
Pt A/O X4. VSS on RA, temp 99.4. CMS intact. Wound vac patent, dressing CDI. Up with A1, using scooter. Pt 7/10 pain, oxycodone for pain. Continue to monitor.

## 2019-01-26 NOTE — PLAN OF CARE
Alert and oriented x 4. Dressings cdi, cms intact. Up with assist of 1. VSS on room air. Oxycodone for pain. Wound vac at 125 mmHg continuous. IV saline lock

## 2019-01-26 NOTE — PLAN OF CARE
Pt A&Ox4, VSS, CMS intact with mild edema to bilat feet, dressing C,D,I to left foot with wound vac in place, right heel dressing changed, up with SBA and scooter to BR, voiding well, tolerating regular diet, oxycodone for pain management, IV antibiotics, cultures pending.

## 2019-01-27 LAB
ANION GAP SERPL CALCULATED.3IONS-SCNC: 3 MMOL/L (ref 3–14)
BUN SERPL-MCNC: 10 MG/DL (ref 7–30)
CALCIUM SERPL-MCNC: 8 MG/DL (ref 8.5–10.1)
CHLORIDE SERPL-SCNC: 109 MMOL/L (ref 94–109)
CO2 SERPL-SCNC: 29 MMOL/L (ref 20–32)
CREAT SERPL-MCNC: 0.71 MG/DL (ref 0.52–1.04)
ERYTHROCYTE [DISTWIDTH] IN BLOOD BY AUTOMATED COUNT: 14.5 % (ref 10–15)
GFR SERPL CREATININE-BSD FRML MDRD: >90 ML/MIN/{1.73_M2}
GLUCOSE SERPL-MCNC: 139 MG/DL (ref 70–99)
HCT VFR BLD AUTO: 32 % (ref 35–47)
HGB BLD-MCNC: 10.1 G/DL (ref 11.7–15.7)
MCH RBC QN AUTO: 25.7 PG (ref 26.5–33)
MCHC RBC AUTO-ENTMCNC: 31.6 G/DL (ref 31.5–36.5)
MCV RBC AUTO: 81 FL (ref 78–100)
PLATELET # BLD AUTO: 331 10E9/L (ref 150–450)
POTASSIUM SERPL-SCNC: 2.9 MMOL/L (ref 3.4–5.3)
POTASSIUM SERPL-SCNC: 3.1 MMOL/L (ref 3.4–5.3)
POTASSIUM SERPL-SCNC: 3.1 MMOL/L (ref 3.4–5.3)
POTASSIUM SERPL-SCNC: 3.7 MMOL/L (ref 3.4–5.3)
RBC # BLD AUTO: 3.93 10E12/L (ref 3.8–5.2)
SODIUM SERPL-SCNC: 141 MMOL/L (ref 133–144)
WBC # BLD AUTO: 6.8 10E9/L (ref 4–11)

## 2019-01-27 PROCEDURE — 25000128 H RX IP 250 OP 636: Performed by: INTERNAL MEDICINE

## 2019-01-27 PROCEDURE — 25000132 ZZH RX MED GY IP 250 OP 250 PS 637: Performed by: PHYSICIAN ASSISTANT

## 2019-01-27 PROCEDURE — 25000132 ZZH RX MED GY IP 250 OP 250 PS 637: Performed by: INTERNAL MEDICINE

## 2019-01-27 PROCEDURE — 99232 SBSQ HOSP IP/OBS MODERATE 35: CPT | Performed by: INTERNAL MEDICINE

## 2019-01-27 PROCEDURE — E2402 NEG PRESS WOUND THERAPY PUMP: HCPCS

## 2019-01-27 PROCEDURE — 85027 COMPLETE CBC AUTOMATED: CPT | Performed by: INTERNAL MEDICINE

## 2019-01-27 PROCEDURE — 12000000 ZZH R&B MED SURG/OB

## 2019-01-27 PROCEDURE — 36415 COLL VENOUS BLD VENIPUNCTURE: CPT | Performed by: ORTHOPAEDIC SURGERY

## 2019-01-27 PROCEDURE — 36415 COLL VENOUS BLD VENIPUNCTURE: CPT | Performed by: INTERNAL MEDICINE

## 2019-01-27 PROCEDURE — 84132 ASSAY OF SERUM POTASSIUM: CPT | Performed by: ORTHOPAEDIC SURGERY

## 2019-01-27 PROCEDURE — 80048 BASIC METABOLIC PNL TOTAL CA: CPT | Performed by: INTERNAL MEDICINE

## 2019-01-27 RX ORDER — HEPARIN SODIUM,PORCINE 10 UNIT/ML
2-5 VIAL (ML) INTRAVENOUS
Status: DISCONTINUED | OUTPATIENT
Start: 2019-01-27 | End: 2019-01-29 | Stop reason: HOSPADM

## 2019-01-27 RX ORDER — LIDOCAINE 40 MG/G
CREAM TOPICAL
Status: DISCONTINUED | OUTPATIENT
Start: 2019-01-27 | End: 2019-01-29

## 2019-01-27 RX ORDER — FLUCONAZOLE 150 MG/1
150 TABLET ORAL ONCE
Status: COMPLETED | OUTPATIENT
Start: 2019-01-27 | End: 2019-01-27

## 2019-01-27 RX ADMIN — OMEPRAZOLE 40 MG: 20 CAPSULE, DELAYED RELEASE ORAL at 15:36

## 2019-01-27 RX ADMIN — PIPERACILLIN SODIUM,TAZOBACTAM SODIUM 3.38 G: 3; .375 INJECTION, POWDER, FOR SOLUTION INTRAVENOUS at 03:34

## 2019-01-27 RX ADMIN — POTASSIUM CHLORIDE 20 MEQ: 1.5 POWDER, FOR SOLUTION ORAL at 18:36

## 2019-01-27 RX ADMIN — POTASSIUM CHLORIDE 20 MEQ: 1500 TABLET, EXTENDED RELEASE ORAL at 11:35

## 2019-01-27 RX ADMIN — OXYCODONE HYDROCHLORIDE 10 MG: 5 TABLET ORAL at 06:28

## 2019-01-27 RX ADMIN — BUSPIRONE HYDROCHLORIDE 15 MG: 5 TABLET ORAL at 08:48

## 2019-01-27 RX ADMIN — OXYCODONE HYDROCHLORIDE 10 MG: 5 TABLET ORAL at 00:31

## 2019-01-27 RX ADMIN — POTASSIUM CHLORIDE 40 MEQ: 1500 TABLET, EXTENDED RELEASE ORAL at 08:53

## 2019-01-27 RX ADMIN — PIPERACILLIN SODIUM,TAZOBACTAM SODIUM 3.38 G: 3; .375 INJECTION, POWDER, FOR SOLUTION INTRAVENOUS at 08:55

## 2019-01-27 RX ADMIN — OXYCODONE HYDROCHLORIDE 10 MG: 5 TABLET ORAL at 21:34

## 2019-01-27 RX ADMIN — OXYCODONE HYDROCHLORIDE 10 MG: 5 TABLET ORAL at 03:34

## 2019-01-27 RX ADMIN — TOPIRAMATE 100 MG: 100 TABLET, FILM COATED ORAL at 21:34

## 2019-01-27 RX ADMIN — SENNOSIDES AND DOCUSATE SODIUM 2 TABLET: 8.6; 5 TABLET ORAL at 21:34

## 2019-01-27 RX ADMIN — GABAPENTIN 800 MG: 800 TABLET, FILM COATED ORAL at 08:49

## 2019-01-27 RX ADMIN — BUPROPION HYDROCHLORIDE 300 MG: 300 TABLET, FILM COATED, EXTENDED RELEASE ORAL at 08:49

## 2019-01-27 RX ADMIN — FLUCONAZOLE 150 MG: 150 TABLET ORAL at 19:03

## 2019-01-27 RX ADMIN — OXYCODONE HYDROCHLORIDE 10 MG: 5 TABLET ORAL at 09:28

## 2019-01-27 RX ADMIN — OXYCODONE HYDROCHLORIDE 10 MG: 5 TABLET ORAL at 18:36

## 2019-01-27 RX ADMIN — POTASSIUM CHLORIDE 40 MEQ: 1.5 POWDER, FOR SOLUTION ORAL at 16:28

## 2019-01-27 RX ADMIN — PIPERACILLIN SODIUM,TAZOBACTAM SODIUM 3.38 G: 3; .375 INJECTION, POWDER, FOR SOLUTION INTRAVENOUS at 15:36

## 2019-01-27 RX ADMIN — SERTRALINE HYDROCHLORIDE 100 MG: 100 TABLET ORAL at 08:49

## 2019-01-27 RX ADMIN — BUSPIRONE HYDROCHLORIDE 15 MG: 5 TABLET ORAL at 21:34

## 2019-01-27 RX ADMIN — SENNOSIDES AND DOCUSATE SODIUM 2 TABLET: 8.6; 5 TABLET ORAL at 08:53

## 2019-01-27 RX ADMIN — OXYCODONE HYDROCHLORIDE 10 MG: 5 TABLET ORAL at 13:43

## 2019-01-27 RX ADMIN — GABAPENTIN 800 MG: 800 TABLET, FILM COATED ORAL at 15:36

## 2019-01-27 RX ADMIN — OMEPRAZOLE 40 MG: 20 CAPSULE, DELAYED RELEASE ORAL at 06:30

## 2019-01-27 RX ADMIN — GABAPENTIN 800 MG: 800 TABLET, FILM COATED ORAL at 21:34

## 2019-01-27 RX ADMIN — PIPERACILLIN SODIUM,TAZOBACTAM SODIUM 3.38 G: 3; .375 INJECTION, POWDER, FOR SOLUTION INTRAVENOUS at 21:34

## 2019-01-27 RX ADMIN — FUROSEMIDE 160 MG: 40 TABLET ORAL at 08:48

## 2019-01-27 ASSESSMENT — ACTIVITIES OF DAILY LIVING (ADL)
ADLS_ACUITY_SCORE: 10

## 2019-01-27 NOTE — PROGRESS NOTES
[facility]    Hospitalist Progress Note    Interval History   - Awaiting full cultures sensitivities, biopsy results    Assessment & Plan   Summary: Shalonda Howard is a 49 year old female with PMH nonhealing left foot surgical wound, PCOS, bilateral chronic LE sadaf, GERd, PUNEET, obesity, opioid dependence, peripheral neuropathy, HLD who was admitted on 1/22/2019 for further management of her nonhealing left foot surgical wound and infection. Hospitalist consulted for medical co-management.    Persistent postoperative left foot infection with nonhealing wound: Began following bilateral bunionectomy in 8/2018. Admitted 9/2018 for left foot wound dehiscence, s/p I&D and hardware removal. Not improved with further antibiotics. MRI 10/2018 without evidence of osteomyelitis. Then treated with IV ancef by ID for six weeks via PICC. Followed by debridement 11/2018. Now here for further management. Wound cultures growing polymicrobial kenyatta.  - Orthopedic management   - Pain management  - ID consulted, appreciate recs   - Continue Zosyn    B/l chronic LE edema: Resume Lasix 160mg daily tomorrow    GERD: PTA PPI BID  Anxiety: PTA Wellbutrin, Buspar, Sertraline, Topamax  Peripheral neuropathy: PTA gabapentin  HLD: PTA pravastatin    DVT Prophylaxis: Pneumatic Compression Devices  Code Status: Prior  PT/OT: Ordered    Disposition: Expected discharge on 1/28 or 1/29    Thang Major MD  Text Page  (7am to 6pm)  -Data reviewed today: I reviewed all new labs and imaging results over the last 24 hours.    Physical Exam   Temp: 98.8  F (37.1  C) Temp src: Oral BP: 127/82 Pulse: 83 Heart Rate: 82 Resp: 16 SpO2: 96 % O2 Device: None (Room air)    Vitals:    01/22/19 1605   Weight: 90.7 kg (200 lb)     Vital Signs with Ranges  Temp:  [98.6  F (37  C)-99.8  F (37.7  C)] 98.8  F (37.1  C)  Pulse:  [82-83] 83  Heart Rate:  [82-89] 82  Resp:  [16] 16  BP: (112-129)/(71-82) 127/82  SpO2:  [94 %-96 %] 96 %  I/O last 3  completed shifts:  In: 780 [P.O.:780]  Out: 400 [Urine:400]  O2 requirements: None    Constitutional: Female in NAD  HEENT: Eyes nonicteric, oral mucosa moist  Cardiovascular: RRR, normal S1/2, no m/r/g  Respiratory: CTAB, no wheezing or crackles  Vascular: LLE wound vac noted, trace to 1+ BLE pitting edema  GI: Normoactive bowel sounds, nontender, nondistended  Neuro/Psych: Appropriate affect and mood. A&Ox3, moves all extremities    Medications     - MEDICATION INSTRUCTIONS -         buPROPion  300 mg Oral Daily     busPIRone  15 mg Oral BID     gabapentin  800 mg Oral TID     omeprazole  40 mg Oral BID AC     piperacillin-tazobactam  3.375 g Intravenous Q6H     senna-docusate  1 tablet Oral BID    Or     senna-docusate  2 tablet Oral BID     sertraline  100 mg Oral Daily     sodium chloride (PF)  3 mL Intracatheter Q8H     topiramate  100 mg Oral At Bedtime       Data   Recent Labs   Lab 01/26/19  0633 01/23/19  0623   WBC  --  7.6   HGB  --  10.7*   MCV  --  81   PLT  --  313   CR  --  0.67   *  --        Imaging:   No results found for this or any previous visit (from the past 24 hour(s)).

## 2019-01-27 NOTE — PROGRESS NOTES
Potassium at 1301 is not accurate due to be drawn to soon.  New lab draw to recheck K+ entered for 1530.

## 2019-01-27 NOTE — PLAN OF CARE
Pt A/O X4. VSS on RA. CMS intact. Pressure wound bilateral heels, dressing CDI. Wound vac. Up with standby using scooter. Oxycodone for pain, hydroxyzine X1. Continue to monitor.

## 2019-01-27 NOTE — PLAN OF CARE
Alert and oriented x 4. Dressings cdi, cms intact. Wound vac at 125 continuous. LEs elevated in pillows. Voids in bathroom. Up with assist of 1 and scooter. Oxycodone for pain.

## 2019-01-27 NOTE — PROGRESS NOTES
[facility]    Hospitalist Progress Note    Interval History   - Ordered PICC to be inserted tomorrow  - One time dose fluconazole for some vaginal irritation when urinating    Assessment & Plan   Summary: Shalonda Howard is a 49 year old female with PMH nonhealing left foot surgical wound, PCOS, bilateral chronic LE sadaf, GERd, PUNEET, obesity, opioid dependence, peripheral neuropathy, HLD who was admitted on 1/22/2019 for further management of her nonhealing left foot surgical wound and infection. Hospitalist consulted for medical co-management.    Persistent postoperative left foot infection with nonhealing wound: Began following bilateral bunionectomy in 8/2018. Admitted 9/2018 for left foot wound dehiscence, s/p I&D and hardware removal. Not improved with further antibiotics. MRI 10/2018 without evidence of osteomyelitis. Then treated with IV ancef by ID for six weeks via PICC. Followed by debridement 11/2018. Now here for further management. Wound cultures growing polymicrobial kenyatta.  - Orthopedic management   - Pain management  - ID consulted, appreciate recs   - Continue Zosyn  - PICC ordered today, probably to be inserted tomorrow    B/l chronic LE edema: Resume Lasix 160mg daily tomorrow    GERD: PTA PPI BID  Anxiety: PTA Wellbutrin, Buspar, Sertraline, Topamax  Peripheral neuropathy: PTA gabapentin  HLD: PTA pravastatin  Probable vaginal candidiasis: Fluconazole 150mg one time    DVT Prophylaxis: Pneumatic Compression Devices  Code Status: Prior  PT/OT: Ordered    Disposition: Expected discharge on 1/28 pending final antibiotic plan    Thang Major MD  Text Page  (7am to 6pm)  -Data reviewed today: I reviewed all new labs and imaging results over the last 24 hours.    Physical Exam   Temp: 98.9  F (37.2  C) Temp src: Oral BP: 114/68 Pulse: 84 Heart Rate: 86 Resp: 16 SpO2: 95 % O2 Device: None (Room air)    Vitals:    01/22/19 1605   Weight: 90.7 kg (200 lb)     Vital Signs with  Ranges  Temp:  [98.1  F (36.7  C)-98.9  F (37.2  C)] 98.9  F (37.2  C)  Pulse:  [84-94] 84  Heart Rate:  [80-86] 86  Resp:  [16-17] 16  BP: (114-144)/(68-91) 114/68  SpO2:  [95 %-97 %] 95 %  I/O last 3 completed shifts:  In: 420 [P.O.:420]  Out: 350 [Urine:350]  O2 requirements: None    Constitutional: Female in NAD  HEENT: Eyes nonicteric, oral mucosa moist  Cardiovascular: RRR, normal S1/2, no m/r/g  Respiratory: CTAB, no wheezing or crackles  Vascular: LLE wound vac noted, trace to 1+ BLE pitting edema  GI: Normoactive bowel sounds, nontender, nondistended  Neuro/Psych: Appropriate affect and mood. A&Ox3, moves all extremities    Medications     - MEDICATION INSTRUCTIONS -         buPROPion  300 mg Oral Daily     busPIRone  15 mg Oral BID     fluconazole  150 mg Oral Once     furosemide  160 mg Oral Daily     gabapentin  800 mg Oral TID     omeprazole  40 mg Oral BID AC     piperacillin-tazobactam  3.375 g Intravenous Q6H     senna-docusate  1 tablet Oral BID    Or     senna-docusate  2 tablet Oral BID     sertraline  100 mg Oral Daily     sodium chloride (PF)  3 mL Intracatheter Q8H     topiramate  100 mg Oral At Bedtime       Data   Recent Labs   Lab 01/27/19  1534 01/27/19  1301 01/27/19  0715 01/26/19  0633 01/23/19  0623   WBC  --   --  6.8  --  7.6   HGB  --   --  10.1*  --  10.7*   MCV  --   --  81  --  81   PLT  --   --  331  --  313   NA  --   --  141  --   --    POTASSIUM 3.1* 2.9* 3.1*  --   --    CHLORIDE  --   --  109  --   --    CO2  --   --  29  --   --    BUN  --   --  10  --   --    CR  --   --  0.71  --  0.67   ANIONGAP  --   --  3  --   --    PAULINE  --   --  8.0*  --   --    GLC  --   --  139* 109*  --        Imaging:   No results found for this or any previous visit (from the past 24 hour(s)).

## 2019-01-28 ENCOUNTER — APPOINTMENT (OUTPATIENT)
Dept: GENERAL RADIOLOGY | Facility: CLINIC | Age: 49
DRG: 857 | End: 2019-01-28
Attending: ORTHOPAEDIC SURGERY
Payer: COMMERCIAL

## 2019-01-28 LAB
ANION GAP SERPL CALCULATED.3IONS-SCNC: 6 MMOL/L (ref 3–14)
BACTERIA SPEC CULT: ABNORMAL
BACTERIA SPEC CULT: NO GROWTH
BACTERIA SPEC CULT: NO GROWTH
BUN SERPL-MCNC: 10 MG/DL (ref 7–30)
CALCIUM SERPL-MCNC: 8.3 MG/DL (ref 8.5–10.1)
CHLORIDE SERPL-SCNC: 105 MMOL/L (ref 94–109)
CO2 SERPL-SCNC: 26 MMOL/L (ref 20–32)
CREAT SERPL-MCNC: 0.64 MG/DL (ref 0.52–1.04)
GFR SERPL CREATININE-BSD FRML MDRD: >90 ML/MIN/{1.73_M2}
GLUCOSE SERPL-MCNC: 89 MG/DL (ref 70–99)
Lab: ABNORMAL
Lab: NORMAL
Lab: NORMAL
POTASSIUM SERPL-SCNC: 3.5 MMOL/L (ref 3.4–5.3)
SODIUM SERPL-SCNC: 137 MMOL/L (ref 133–144)
SPECIMEN SOURCE: ABNORMAL
SPECIMEN SOURCE: NORMAL
SPECIMEN SOURCE: NORMAL

## 2019-01-28 PROCEDURE — 71045 X-RAY EXAM CHEST 1 VIEW: CPT

## 2019-01-28 PROCEDURE — G0463 HOSPITAL OUTPT CLINIC VISIT: HCPCS

## 2019-01-28 PROCEDURE — E2402 NEG PRESS WOUND THERAPY PUMP: HCPCS

## 2019-01-28 PROCEDURE — 36569 INSJ PICC 5 YR+ W/O IMAGING: CPT

## 2019-01-28 PROCEDURE — 27210218 ZZH KIT SHRLOCK 4FR SNGL LUM PICC

## 2019-01-28 PROCEDURE — 25000132 ZZH RX MED GY IP 250 OP 250 PS 637: Performed by: INTERNAL MEDICINE

## 2019-01-28 PROCEDURE — 36415 COLL VENOUS BLD VENIPUNCTURE: CPT | Performed by: INTERNAL MEDICINE

## 2019-01-28 PROCEDURE — 25000132 ZZH RX MED GY IP 250 OP 250 PS 637: Performed by: PHYSICIAN ASSISTANT

## 2019-01-28 PROCEDURE — 40000901 ZZH STATISTIC WOC PT EDUCATION, 0-15 MIN

## 2019-01-28 PROCEDURE — 25000128 H RX IP 250 OP 636: Performed by: INTERNAL MEDICINE

## 2019-01-28 PROCEDURE — 97605 NEG PRS WND THER DME<=50SQCM: CPT

## 2019-01-28 PROCEDURE — 80048 BASIC METABOLIC PNL TOTAL CA: CPT | Performed by: INTERNAL MEDICINE

## 2019-01-28 PROCEDURE — 12000000 ZZH R&B MED SURG/OB

## 2019-01-28 PROCEDURE — 25000125 ZZHC RX 250: Performed by: INTERNAL MEDICINE

## 2019-01-28 RX ORDER — OXYCODONE HYDROCHLORIDE 5 MG/1
5-10 TABLET ORAL EVERY 4 HOURS PRN
Qty: 30 TABLET | Refills: 0 | Status: ON HOLD | OUTPATIENT
Start: 2019-01-28 | End: 2019-03-19

## 2019-01-28 RX ORDER — POTASSIUM CHLORIDE 1500 MG/1
80 TABLET, EXTENDED RELEASE ORAL DAILY
Status: DISCONTINUED | OUTPATIENT
Start: 2019-01-28 | End: 2019-01-29 | Stop reason: HOSPADM

## 2019-01-28 RX ORDER — CEFAZOLIN SODIUM 2 G/100ML
2 INJECTION, SOLUTION INTRAVENOUS EVERY 8 HOURS
Status: DISCONTINUED | OUTPATIENT
Start: 2019-01-28 | End: 2019-01-29 | Stop reason: HOSPADM

## 2019-01-28 RX ORDER — ERTAPENEM 1 G/1
1 INJECTION, POWDER, LYOPHILIZED, FOR SOLUTION INTRAMUSCULAR; INTRAVENOUS EVERY 24 HOURS
Status: DISCONTINUED | OUTPATIENT
Start: 2019-01-28 | End: 2019-01-29 | Stop reason: HOSPADM

## 2019-01-28 RX ORDER — CEFAZOLIN SODIUM 1 G/3ML
2 INJECTION, POWDER, FOR SOLUTION INTRAMUSCULAR; INTRAVENOUS EVERY 8 HOURS
Qty: 180 G | Refills: 0 | Status: ON HOLD | DISCHARGE
Start: 2019-01-28 | End: 2019-03-19

## 2019-01-28 RX ADMIN — GABAPENTIN 800 MG: 800 TABLET, FILM COATED ORAL at 15:30

## 2019-01-28 RX ADMIN — BUPROPION HYDROCHLORIDE 300 MG: 300 TABLET, FILM COATED, EXTENDED RELEASE ORAL at 08:32

## 2019-01-28 RX ADMIN — GABAPENTIN 800 MG: 800 TABLET, FILM COATED ORAL at 08:33

## 2019-01-28 RX ADMIN — OXYCODONE HYDROCHLORIDE 10 MG: 5 TABLET ORAL at 14:47

## 2019-01-28 RX ADMIN — POTASSIUM CHLORIDE 80 MEQ: 1500 TABLET, EXTENDED RELEASE ORAL at 17:44

## 2019-01-28 RX ADMIN — GABAPENTIN 800 MG: 800 TABLET, FILM COATED ORAL at 21:36

## 2019-01-28 RX ADMIN — OMEPRAZOLE 40 MG: 20 CAPSULE, DELAYED RELEASE ORAL at 07:16

## 2019-01-28 RX ADMIN — OXYCODONE HYDROCHLORIDE 10 MG: 5 TABLET ORAL at 04:11

## 2019-01-28 RX ADMIN — FUROSEMIDE 160 MG: 40 TABLET ORAL at 08:32

## 2019-01-28 RX ADMIN — CEFAZOLIN SODIUM 2 G: 2 INJECTION, SOLUTION INTRAVENOUS at 21:38

## 2019-01-28 RX ADMIN — OXYCODONE HYDROCHLORIDE 10 MG: 5 TABLET ORAL at 10:05

## 2019-01-28 RX ADMIN — ERTAPENEM SODIUM 1 G: 1 INJECTION, POWDER, LYOPHILIZED, FOR SOLUTION INTRAMUSCULAR; INTRAVENOUS at 15:23

## 2019-01-28 RX ADMIN — SENNOSIDES AND DOCUSATE SODIUM 1 TABLET: 8.6; 5 TABLET ORAL at 08:33

## 2019-01-28 RX ADMIN — OXYCODONE HYDROCHLORIDE 10 MG: 5 TABLET ORAL at 07:16

## 2019-01-28 RX ADMIN — HYDROXYZINE PAMOATE 25 MG: 25 CAPSULE ORAL at 15:31

## 2019-01-28 RX ADMIN — OXYCODONE HYDROCHLORIDE 10 MG: 5 TABLET ORAL at 22:31

## 2019-01-28 RX ADMIN — PIPERACILLIN SODIUM,TAZOBACTAM SODIUM 3.38 G: 3; .375 INJECTION, POWDER, FOR SOLUTION INTRAVENOUS at 08:32

## 2019-01-28 RX ADMIN — LIDOCAINE HYDROCHLORIDE 2 ML: 10 INJECTION, SOLUTION INFILTRATION; PERINEURAL at 11:32

## 2019-01-28 RX ADMIN — BUSPIRONE HYDROCHLORIDE 15 MG: 5 TABLET ORAL at 08:32

## 2019-01-28 RX ADMIN — CEFAZOLIN SODIUM 2 G: 2 INJECTION, SOLUTION INTRAVENOUS at 14:42

## 2019-01-28 RX ADMIN — OXYCODONE HYDROCHLORIDE 10 MG: 5 TABLET ORAL at 19:24

## 2019-01-28 RX ADMIN — OXYCODONE HYDROCHLORIDE 10 MG: 5 TABLET ORAL at 01:18

## 2019-01-28 RX ADMIN — SERTRALINE HYDROCHLORIDE 100 MG: 100 TABLET ORAL at 08:33

## 2019-01-28 RX ADMIN — SENNOSIDES AND DOCUSATE SODIUM 2 TABLET: 8.6; 5 TABLET ORAL at 21:36

## 2019-01-28 RX ADMIN — OMEPRAZOLE 40 MG: 20 CAPSULE, DELAYED RELEASE ORAL at 15:30

## 2019-01-28 RX ADMIN — TOPIRAMATE 100 MG: 100 TABLET, FILM COATED ORAL at 21:36

## 2019-01-28 RX ADMIN — PIPERACILLIN SODIUM,TAZOBACTAM SODIUM 3.38 G: 3; .375 INJECTION, POWDER, FOR SOLUTION INTRAVENOUS at 03:43

## 2019-01-28 RX ADMIN — BUSPIRONE HYDROCHLORIDE 15 MG: 5 TABLET ORAL at 21:37

## 2019-01-28 ASSESSMENT — ACTIVITIES OF DAILY LIVING (ADL)
ADLS_ACUITY_SCORE: 10

## 2019-01-28 NOTE — PROGRESS NOTES
"[facility]  Orthopaedics/Foot and Ankle Surgery  Daily Post-Op Note    01/28/2019          Assessment and Plan:    Assessment:   Post-operative day #4  Procedure(s):  LEFT HALLUX WOUND IRRIGATION AND DEBRIDEMENT WITH BONE BIOPSY (Left)  WOUND VAC APPLICATION (Left)        Plan:   1. WBAT LLE with cast boot.  Keep elevated while at rest to limit swelling and pain.  2. Cont. current pain regimen.  Under appropriate control at this time.    3. Appreciate ID consultation.  Zosyn adequate for covering current organisms noted on 1st MTP joint cultures. Cultures and sensitivities are back. Waiting on PICC line placement and IV abx treatment plan.  4. Appreciate hospitalist co-management.  5. Wound care visiting today.  Continue wound VAC to 125mmHg low, continuous suction.  Change every M and Th until discontinued.  Home care ordered for VAC changes and IV abx.  6. Plan discharge home today pending ID consultation, PICC placement, and home care approval.            Interval History:   No acute events.  Doing well clinically.  Denies f/c.              Physical Exam:   Blood pressure 129/78, pulse 94, temperature 99.1  F (37.3  C), temperature source Oral, resp. rate 16, height 1.651 m (5' 5\"), weight 90.7 kg (200 lb), SpO2 97 %, not currently breastfeeding.  I/O last 3 completed shifts:  In: 550 [P.O.:550]  Out: -     L foot wound vac dressing removed.  Minimal serosang. Drainage noted on wet to dry dressing.  Minimal erythema and swelling throughout forefoot.  Dressing remain intact around b/l heel ulcers.           Data:   All laboratory data related to this surgery reviewed.  1st MTP joint cultures multiple organisms including Staph, Strep, and Enterobacter.  Sensitivities show resistance to penicillin.  Bone cultures NGTD.  Bone path pending.    Recent Labs   Lab Test 01/27/19  0715 01/23/19  0623 12/31/18  0945 12/24/18  1128 12/18/18  1028   HGB 10.1* 10.7* 12.0 12.3 12.3     No lab results found. "   Recent Labs   Lab Test 01/27/19  2205  01/27/19  0715   WBC  --   --  6.8   PLT  --   --  331   POTASSIUM 3.7   < > 3.1*   CR  --   --  0.71    < > = values in this interval not displayed.

## 2019-01-28 NOTE — PROGRESS NOTES
Picc order from Dr. Major noted.  Will await line placement until after ID sees patient and determines type of antibiotic and length of treatment.  This will determine whether patient needs a picc line versus a midline.  Discussed with unit RN.

## 2019-01-28 NOTE — PLAN OF CARE
Pt A&O. CMS intact. VSS. Up independently in room. Taking oxy for pain. Voiding adequately. Awaiting PICC placement and discharge pending ID and WOC nurse. Continue to monitor.

## 2019-01-28 NOTE — DISCHARGE INSTRUCTIONS
Post-Operative Instruction Sheet for Foot and Ankle Surgery  Miguel Mosher M.D.      These precautions MUST be followed for the first 24 hours after surgery:    Upon discharge, go directly home.    You MUST make arrangements for a responsible adult to stay with you the first night following surgery.  Surgery may be cancelled if you do not have someone that can stay with you.    DO NOT DRINK ALCOHOLIC BEVERAGES.    It is not unusual to feel lightheaded up to 24 hours after surgery or while taking pain medications.  If you feel lightheaded, sit up for a few minutes before standing and have someone assist you when you get up to walk or use the restroom.    Do not use any mechanical equipment or heavy machinery.    Do not make any important or legal decisions for 24 hours or while on pain medication.    You may experience dry mouth, sore throat, or sleep disturbances from the anesthesia and medications used during surgery.  Generalized muscle aches can sometimes occur.  These symptoms generally disappear by 24 hours.    The following are general guidelines and instructions about what to expect the first weeks following surgery.  These are not specific, and your recovery may be slightly different.  Please follow the instructions that are specifically written out for you after the surgery if there are any questions.    Pain Management   If you have received a nerve block, the pain relief can last anywhere from 12-30 hours, this also means you may not have sensation or movement in your foot for that amount of time. You will have pain after the block wears off!  Anticipate this and start pain meds prior to the block wearing off.     Take your first dose of the prescribed pain medication a few hours after you get home, even if you have no pain.     Continue to take your medications as prescribed for the first 24-48 hours - ensure that the patient (you) are alert and have no difficulty breathing before taking the  medication.  Often it may be helpful to set an alarm throughout the night to ensure you don t miss a dose of the medication and wake up with a lot more pain.    You can expect that the first two nights will be the most painful and uncomfortable. I will give you strong medication to make you as comfortable as possible, but you may still have some break-through pain.  This is not unexpected, as there are many nerve endings in the foot and ankle and many patients state the pain from foot surgery is worse than most other injuries or procedures!    After the first few days, take the medication as needed for pain.    As your pain improves, you can gradually decrease your pain meds by utilizing Tylenol or an anti-inflammatory medication.  You should also use these medications as directed early in the post-operative process to supplement the narcotic pain medication.    Dressing   Bleeding through the dressings is quite common.    This usually occurs for the first 1-2 hours after surgery. The actual bleeding has stopped by the time you see the drainage through your dressings.    You can reinforce the outside of the dressing with gauze and an Ace wrap unless otherwise directed.  In most cases, your first dressing change will be performed at your first clinic follow-up visit.  In some cases, I may allow you to change your dressings sooner.  Your dressing should be kept DRY at all times - do not shower, bathe, or wet your dressing in any way after surgery!    Wound Care  Once your stitches are removed, you can shower with soapy water and gently cleanse the incision if it is completely dry.     Do not shower or wash the incision(s) if parts of the incision(s) are open or still draining.    Do not soak the incision(s) in a bathtub or hot tub until the incision(s) is completely dry for one week.    Do not soak the incision(s) in lake or ocean water for at least one month post-operatively.    Elevation   I recommend strict  elevation of your foot above the level of your heart for 4-5 days.  Elevation of the foot remains important up to two weeks after surgery to limit swelling and help wound healing.    Elevate your foot/ankle to at least your waist level but above your heart would be best. The more you elevate your foot and ankle, the less pain you will have.     In the first few days following surgery, restrict the time your foot is  down  to 10 minutes or less at a time.    It is also good to keep your blood flowing through the operated leg and limit the risk of blood clots.  The first day following surgery, I would encourage you to get up and move around the house for a few minutes every hour and then return to elevating the foot.    After the strict elevation period (see above) is completed, you may gradually become more active. You should  listen  to your foot/ankle as to when to get off of it and elevate it again.  This may help even months after surgery.  Remember: avoid anything that hurts or makes your foot/ankle swell!    Icing   Icing can be very useful to decrease the pain and swelling of the foot.     Start by first placing a large garbage bag over the dressing.  Ice may then be placed around the extremity by using either bags of ice taped around the extremity   or you may place the extremity in a bucket filled with ice (the dressing MUST be covered with a plastic bag!).  Bags of frozen peas work well!    You should ice for no more than 20 minutes at a time and repeat at 2 hour intervals.     Do NOT place ice directly on your skin or dressing.     Activity     In most cases (unless otherwise instructed), you may not bear weight on your operated foot/ankle for 2 weeks after surgery.  That means the foot may not touch the ground when upright!  Specific weight bearing instructions for your surgery will be provided on the day of surgery.    Your toes may experience bruising after surgery and become darker when the foot hangs  down.  Unless you had surgery on those toes, it is important to actively wiggle your toes for 5-10 minutes each hour.    Many of your questions can be addressed at your 2-week follow-up appointment - please make a list of things to ask us as they come up during your recovery.    What to watch for      Severe swelling and/or pain in the calf: this could indicate a deep vein thrombosis (blood clot in leg) which requires urgent evaluation and treatment!    Profuse bleeding: that which soaks through your dressing and increases in size throughout the first day after surgery.    Blue or white toes: this indicates a lack of blood flow to the foot.     Fever greater than 101.5: fevers less than this are very common the first few days after surgery and are unlikely to indicate infection or any unexpected problem.    Severe pain: that which does not improve after pain medication, except for the first two nights.   If you have any of the above problems or any concerns, please contact my office ([phone]) and further instructions will be provided.  If you are unable to reach anyone or feel you have a medical emergency, please do not hesitate to go to the nearest urgent care or emergency department.    Medications   *Medications may take up to 24 hours to be refilled by my office.*    All pain medications, along with inactivity following surgery, can cause constipation.  Use the stool softeners as recommended, increase fluid intake to at least 1 quart per day, and increase your dietary fiber.  (The  p  fruits - peaches, plums, pears, and prunes - as well as anise/black licorice are generally helpful.)      Antibiotics may be prescribed to limit the risk of infection only in limited circumstances.  Kelfex (cephalexin) 500 mg - This is an antibiotic given in addition to the antibiotic given during surgery to help reduce the chance of post-operative infection.   Dosage: 1 tablet by mouth 4 times a day.  OR   Cleocin  (clindamycin) 600 mg - This is an antibiotic given to those patients who are allergic to penicillin in addition to the antibiotic given during surgery to help reduce the chance of post-operative infection.   Dosage: 1 tablet by mouth 3 times a day.      Anti-nausea and spasms  Hydroxyzine 25 mg  - This medicine should be taken if you experience any nausea or vomiting. If you know you are sensitive to narcotics please take 1 tablet 30 minutes prior to pain medication. This may also help with any mild itching experienced with the pain medication or muscle spasms.  Dosage: 1 tablet by mouth every 6 hours as needed for nausea, itching, spasms, or adjuvant pain control.      Pain medications (you will only be given a prescription for ONE of the following!)   Ultram (tramadol) 50mg - This is a pain medication that should be taken EVERY 4 TO 6 HOURS for pain relief. You should start the medication when you arrive home after surgery, before the nerve block wears off.  The first 1-2 nights you may need to take 2 tablets every 4 hours.  You should be given enough medication to last to the first office visit.  This medication cannot be refilled over the phone!  Dosage: 1-2 tablets every 4-6 hours as needed for pain relief.  OR  Oxycodone 5mg - This is a pain medication that may be taken EVERY 3 to 4 HOURS for pain relief.  You should start the medication when you arrive home after surgery, before the nerve block wears off.  The first 1-2 nights you may need to take up to 2 or even 3 tablets every 3-4 hours. You should be given enough medication to last to the first office visit.  This medication cannot be refilled over the phone!  Dosage: 1-2 (or 3) tablets every 3-4 hours as needed for pain relief.  OR  Norco (hydrocodone/acetominophen) 5/325 mg - This is a pain medication that should be taken EVERY 4 TO 6 HOURS for pain relief. You should start the medication when you arrive home after surgery, before the nerve block wears  off.  The first 1-2 nights you may need to take 2 tablets every 4 hours.  You should be given enough medication to last to the first office visit.  This medication cannot be refilled over the phone!  Dosage: 1-2 tablets every 4-6 hours as needed for pain relief.  *Do NOT take any Tylenol while taking this medication!  You may alternate Tylenol with this medication provided you do not take greater than 3 grams of Tylenol in total over a 24 hour time period.      Blood thinner  Depending on the type of surgery and your personal risk factors, I may prescribe a medication to help limit the risk of developing a blood clot after your surgery.  The length of time to take the recommended medication will be provided and typically lasts until you are moving about normally or bearing weight on the operated foot/ankle.  ECASA (enteric coated aspirin) 325mg tablet daily.  Lovenox (enoxaparin) 40mg subcutaneous injection daily.  Xarelto 10mg tablet daily.      Stool Softener  Take an over the counter stool softener such as senna or Miralax starting the day after your surgery to prevent constipation. This is a common side effect of the narcotic pain medications.  You may stop taking this after you have regular bowel movements or no longer require use of narcotic pain medications.    Dental Implications  Dental procedures should be avoided until your incisions are healed. Furthermore, surgical procedures including hardware or an allograft require taking an antibiotic within one hour of all dental work within 6 months of surgery. Total ankle replacements require indefinite use of antibiotics prior to dental procedures. We would be happy to provide you with the necessary prescription upon request.      You can often find additional information about your procedure or condition on the [facility] website at [email or web address] or [email or web address].    Additional information from  reputable orthopaedic foot and ankle surgeons affiliated with the American Orthopaedic Foot and Ankle Society can be found at [email or web address].footcaremd.com.    Post-operative Foot and Ankle Surgery Instructions and Tips for Pain Control  Dr. Mosher, [facility]    Non-medication Interventions    Read your post-operative instruction handout!    Elevate the leg at the heart level 95% of the time for the first 2-3 days.    Limit the amount of time the foot and ankle are  down  for no more than 10 minutes at a time the first few days.    Ice consistently for the first 2-3 days.  o If on bare skin or a thin dressing, limit icing to 20 minutes per hour.  o If around a splint, apply the ice behind the knee for 20 minutes per hours or over the splint around the ankle as much as tolerated.    Do not plan extra activity for the first 2-3 days after surgery.    Expect the foot or ankle will be painful - surgery hurts!  The pain will get better.  Trust the process.    Non-opiate Medications  Start these medications right away after you get home from surgery and continue on a regular schedule for at least 3 days.  As you pain allows, start to use as needed until your first clinic visit after surgery.  It may be helpful to alternate the ibuprofen/Celebrex and Tylenol to maximize pain relief.  In rare cases, I may recommend avoiding ibuprofen or aleve after surgery - this will be made clear at the time of surgery.    Motrin or Advil (ibuprofen) 600mg every 6 hours OR Celebrex 100mg every 12 hours.    Tylenol (acetaminophen) 650mg every 6 hours.      Neurontin (gabapentin) 300mg every 8 hours for the first 3 days only.      Hydroxyzine 25mg (or 10mg if >65 years of age) every 6 hours.    Opiate Medications  These medications should be used sparingly, just as needed, and for the first few days after surgery.  Please see the below guidelines for details.    Ultram (tramadol) 50mg, 1-2 tablets every 4 hours as  needed.  OR    Oxycodone 5mg, 1-2 tablets every 3 hours as needed.    Opiate pain medications can be very effective, but carry a number of potentially harmful side effects including tolerance and addiction if used inappropriately.  They will be the most effective the first few days following surgery in the following situations:    If you had a nerve block before surgery, take one pill a few hours after you get home from the surgery center.  Keep to a regular schedule with the medication, taking just one pill every 4 hours until the block wears off (tingling, pins and needles, increased movement in the toes, increased pain, etc.).  Take 1-2 pills again 4 hours later.    Take 1-2 pills at bedtime the first few days after surgery to help sleep and limit pain through the night.    Take 1-2 pills for  rescue  when pain is not controlled by the regularly scheduled medications and non-medication interventions.    You should generally feel less need for the opiate pain medication after the first few days after surgery.  Remember, foot and ankle surgery hurts!  The goal of medication management is to  take the edge off  and keep the pain level tolerable.  Trust the process - the pain will get better!    Multiple studies indicate that opioid pain medication is not needed beyond a few days postoperatively and prolonged use of these medications can actually lead to increased perception of pain and tolerance/addiction issues.  The CDC and state boards have been recommending and enforcing restrictions on opioid prescribing in light of the significant consequences that arise from chronic opioid use and [facility] is adopting many of these recommendations for your safety and well-being.      1. Doc et al, Satisfaction with Pain Relief After Operative Treatment of an Ankle Fracture, Injury. 2012; 43(11):1958-61.  2. Doc et al, Pain Relief After Operative Treatment of an Extremity Fracture, JBJS Am. 2017;  99:1908-15.  3. Kinjal et al, Support for Safer Opioid Prescribing Practices, [facility] Am. 2017; 99:1945-55.  4. Ezio et al, Liposomal Bupivacaine in Forefoot Surgery, Foot Ankle Int. 2015; 36(5):503-7.  5. Elizabeth wolf al, Addition of Pregabalin to Multimodal Analgesic Therapy Following Ankle Surgery, Reg Anesth Pain Med. 2012; 37(3):302-7.    Wound care to Bialteral heels and Lt hallux I&D site (dressings last changed on 1-28-19)  -KCI VAC dressing to Lt Hallux and Lt heel: change VAC dressing M-W-F  -Lt heel and hallux sponge can be bridged  -VAC @ 125mm/hg cont suction  -Rt heel dressing: Change dressing M-W-F(dressing changed on 1-18-19)  -Rt heel: Medihoney   -

## 2019-01-28 NOTE — PROGRESS NOTES
Unable to get approval from [facility] for the vac-pt was given financial assistance papers. She has a co coverage of 25%-$31.25 per day. Patient spoke with Chandrika from [facility] regarding cost.

## 2019-01-28 NOTE — DISCHARGE SUMMARY
[facility] Discharge Summary        Shalonda Howard MRN# [id number]   YOB: 1970 Age: 49 year old     Date of Admission:  1/22/2019  Date of Discharge:  1/28/2019  Admitting Physician:  Miguel Mosher MD  Discharge Physician: Miguel Mosher,*  Discharging Service: Orthopaedics/Foot and Ankle Surgery     Primary Provider: Linda Bernstein  Primary Care Physician Phone Number: [phone]         Discharge Diagnoses/Problem Oriented Hospital Course (Providers):    Shalonda Howard was admitted on 1/22/2019 by Miguel Mosher MD and I would refer you to their history and physical.  The following problems were addressed during her hospitalization:  LEFT HALLUX WOUND IRRIGATION AND DEBRIDEMENT WITH BONE BIOPSY, LEFT HALLUX WOUND VAC PLACEMENT. IV ANTIBIOTIC TREATMENT.         Code Status:      Full Code        Brief Hospital Stay Summary Sent Home With Patient in AVS:        Reason for your hospital stay      S/P LEFT HALLUX WOUND IRRIGATION AND DEBRIDEMENT WITH BONE BIOPSY, WITH   WOUND VAC PLACEMENT. IV ANTIBIOTIC TREATMENT.                  Pending Results:        Unresulted Labs Ordered in the Past 30 Days of this Admission     Date and Time Order Name Status Description    1/24/2019 1126 Surgical pathology exam In process     1/24/2019 1126 Anaerobic bacterial culture Preliminary     1/24/2019 1126 Anaerobic bacterial culture Preliminary     1/24/2019 1122 Bone Culture Aerobic Bacterial Preliminary             Discharge Instructions and Follow-Up:      Follow-up Appointments     Follow-up and recommended labs and tests       Follow up with Dr. Harman in 1 week.               Discharge Disposition:      Discharged to home         Discharge Medications:        Current Discharge Medication List      START taking these medications    Details   ceFAZolin (ANCEF) 1 GM vial Inject 2 g into the vein every 8 hours CBC with differential, creatinine, SGOT weekly while on  this medication to be faxed to Dr. Canada office.  Qty: 180 g, Refills: 0    Associated Diagnoses: Left foot infection; Postoperative infection, unspecified type, subsequent encounter      ertapenem (INVANZ) 1 GM injection Inject 1 g into the vein every 24 hours CBC with differential, creatinine, SGOT weekly while on this medication to be faxed to Dr. Canada office.  Qty: 420 mL, Refills: 0    Associated Diagnoses: Left foot infection; Postoperative infection, unspecified type, subsequent encounter      oxyCODONE (ROXICODONE) 5 MG tablet Take 1-2 tablets (5-10 mg) by mouth every 4 hours as needed for moderate to severe pain  Qty: 30 tablet, Refills: 0    Associated Diagnoses: Infection of deep incisional surgical site after procedure, sequela         CONTINUE these medications which have NOT CHANGED    Details   ammonium lactate (AMLACTIN) 12 % cream Apply topically daily as needed (to heels)       BuPROPion HCl (WELLBUTRIN XL PO) Take 300 mg by mouth daily      BusPIRone HCl (BUSPAR PO) Take 15 mg by mouth 2 times daily      cholecalciferol (VITAMIN D3) 5000 units TABS tablet Take 5,000 Units by mouth daily      clindamycin (CLEOCIN T) 1 % solution Apply topically nightly as needed (Acne outbreak)       Furosemide (LASIX PO) Take 160 mg by mouth daily       gabapentin (NEURONTIN) 800 MG tablet Take 800 mg by mouth 3 times daily Am, supper, hs      hydrOXYzine (VISTARIL) 25 MG capsule Take 1 capsule (25 mg) by mouth every 4 hours as needed for anxiety  Qty: 40 capsule, Refills: 0    Associated Diagnoses: Infection      Omeprazole (PRILOSEC PO) Take 40 mg by mouth 2 times daily (before meals) 2 CAPSULES IN A.M.       Ondansetron HCl (ZOFRAN PO) Take 8 mg by mouth as needed for nausea or vomiting      phentermine 30 MG capsule Take 30 mg by mouth every morning      polyethylene glycol (MIRALAX/GLYCOLAX) packet Take 17 g by mouth as needed for constipation      Potassium Chloride Shameka CR (K-DUR PO) Take 80 mEq by mouth  "daily       senna-docusate (SENOKOT-S;PERICOLACE) 8.6-50 MG per tablet Take 1 tablet by mouth 2 times daily  Qty: 40 tablet, Refills: 1    Associated Diagnoses: Infection      sertraline (ZOLOFT) 100 MG tablet Take 100 mg by mouth daily      Topiramate (TOPAMAX PO) Take 100 mg by mouth At Bedtime          STOP taking these medications       oxyCODONE-acetaminophen (PERCOCET) 5-325 MG tablet Comments:   Reason for Stopping:                 Allergies:         Allergies   Allergen Reactions     Sulfa Drugs Shortness Of Breath and Rash     Demerol [Meperidine] Nausea and Vomiting     Erythromycin Nausea and Vomiting     Metformin Nausea and Vomiting     Codeine Rash     Penicillins Rash           Consultations This Hospital Stay:      Consultation during this admission received from infectious disease and internal medicine         Condition and Physical on Discharge:      Discharge condition: Stable   Vitals: Blood pressure 116/78, pulse 83, temperature 98.1  F (36.7  C), temperature source Oral, resp. rate 16, height 1.651 m (5' 5\"), weight 90.7 kg (200 lb), SpO2 97 %, not currently breastfeeding.  200 lbs 0 oz      Constitutional: A&Ox3 in no acute distress.   Musculoskeletal: Wound vac placement over 1st MTP joint on the left hallux.   Skin: Minimal erythema is noted throughout the left forefoot. Bilateral heel ulcers are present with red granulation tissue healing in. Scant serosanguinous drainage is noted from heel ulcers and left hallux wound.   Other: Toes are all warm and well perfused with brisk capillary refill. Dorsalis pedis pulses are intact. Sensation is grossly intact distally. The patient is able to wiggle her toes. She denies any focal calf tenderness bilaterally           Discharge Time:      N/A        Image Results From This Hospital Stay (For Non-EPIC Providers):        Results for orders placed or performed during the hospital encounter of 01/22/19   XR Chest 1 View    Narrative    XR CHEST 1 VW " 1/28/2019 1:33 PM    COMPARISON: None.    HISTORY: PICC placement.      Impression    IMPRESSION: RIGHT upper cavity PICC tip in the high SVC. Lungs are  clear. No pleural effusion or pneumothorax on either side.    ANNA ROSALES MD           Most Recent Lab Results In EP[facility] (For Non-EPIC Providers):      Results for orders placed or performed during the hospital encounter of 01/22/19 (from the past 24 hour(s))   Potassium   Result Value Ref Range    Potassium 3.1 (L) 3.4 - 5.3 mmol/L   Potassium   Result Value Ref Range    Potassium 3.7 3.4 - 5.3 mmol/L   Basic metabolic panel   Result Value Ref Range    Sodium 137 133 - 144 mmol/L    Potassium 3.5 3.4 - 5.3 mmol/L    Chloride 105 94 - 109 mmol/L    Carbon Dioxide 26 20 - 32 mmol/L    Anion Gap 6 3 - 14 mmol/L    Glucose 89 70 - 99 mg/dL    Urea Nitrogen 10 7 - 30 mg/dL    Creatinine 0.64 0.52 - 1.04 mg/dL    GFR Estimate >90 >60 mL/min/[1.73_m2]    GFR Estimate If Black >90 >60 mL/min/[1.73_m2]    Calcium 8.3 (L) 8.5 - 10.1 mg/dL   XR Chest 1 View    Narrative    XR CHEST 1 VW 1/28/2019 1:33 PM    COMPARISON: None.    HISTORY: PICC placement.      Impression    IMPRESSION: RIGHT upper cavity PICC tip in the high SVC. Lungs are  clear. No pleural effusion or pneumothorax on either side.    ANNA ROSALES MD

## 2019-01-28 NOTE — PROGRESS NOTES
Pt sent down to ED Xray for picc placement verification.    Verified in proper place per result:  Study Result     XR CHEST 1 VW 1/28/2019 1:33 PM     COMPARISON: None.     HISTORY: PICC placement.                                                                      IMPRESSION: RIGHT upper cavity PICC tip in the high SVC. Lungs are  clear. No pleural effusion or pneumothorax on either side.     ANNA ROSALES MD

## 2019-01-28 NOTE — PROGRESS NOTES
[facility]    Hospitalist Progress Note    Assessment & Plan   Shalonda Howard is a 49 year old female with PMH nonhealing left foot surgical wound, PCOS, bilateral chronic LE sadaf, GERd, PUNEET, obesity, opioid dependence, peripheral neuropathy, HLD who was admitted on 1/22/2019 for further management of her nonhealing left foot surgical wound and infection. Hospitalist consulted for medical co-management.     Persistent postoperative left foot infection with nonhealing wound: Began following bilateral bunionectomy in 8/2018. Admitted 9/2018 for left foot wound dehiscence, s/p I&D and hardware removal. Not improved with further antibiotics. MRI 10/2018 without evidence of osteomyelitis. Then treated with IV ancef by ID for six weeks via PICC. Followed by debridement 11/2018. Now here for further management. Wound cultures growing polymicrobial kenyatta.  Bone bx an bone cx pending.   - Orthopedic management                - Pain management  - ID consulted, appreciate recs                -  Ancef and ertapenem for 6 weeks iv, picc place today     B/l chronic LE edema: Resume Lasix 160mg daily today with kcl, bmp in 5 days with pcp     GERD: PTA PPI BID  Anxiety: PTA Wellbutrin, Buspar, Sertraline, Topamax  Peripheral neuropathy: PTA gabapentin  HLD: PTA pravastatin  Probable vaginal candidiasis: Fluconazole 150mg one time     DVT Prophylaxis: Pneumatic Compression Devices  Code Status: Prior  PT/OT: Ordered     Disposition: Expected discharge tomorrow    hospitalist service will sign off, call with questions        Daniel Aponte MD  Text Page  (7am to 6pm)  Interval History   Doing well. No n/v/d/abd pain, cp or sob  Discharge held  Given issue with abx coverage      -Data reviewed today: I reviewed all new labs and imaging results over the last 24 hours. I personally reviewed labs today    Physical Exam   Temp: 98.1  F (36.7  C) Temp src: Oral BP: 116/78 Pulse: 83 Heart Rate: 86 Resp: 16 SpO2: 97 % O2  Device: None (Room air)    Vitals:    01/22/19 1605   Weight: 90.7 kg (200 lb)     Vital Signs with Ranges  Temp:  [98.1  F (36.7  C)-99.1  F (37.3  C)] 98.1  F (36.7  C)  Pulse:  [83-94] 83  Heart Rate:  [86-92] 86  Resp:  [16] 16  BP: (116-129)/(78) 116/78  SpO2:  [97 %] 97 %  I/O last 3 completed shifts:  In: 250 [P.O.:250]  Out: -     Constitutional: Nad, nontoxic, sitting on edge of bed  Respiratory: cTAB  Cardiovascular: RRR no r/g/m  GI: soft, obese, nT, Nd, umbilical hernia- NT  Skin/Integumen: no rash or edema  Msk: wound vac bilateral heels  Neuro: grossly nonfocal, alert, appropriate, nl speech  Psych: nl affect,       Medications     - MEDICATION INSTRUCTIONS -         buPROPion  300 mg Oral Daily     busPIRone  15 mg Oral BID     ceFAZolin  2 g Intravenous Q8H     ertapenem (INVanz) IV  1 g Intravenous Q24H     furosemide  160 mg Oral Daily     gabapentin  800 mg Oral TID     omeprazole  40 mg Oral BID AC     senna-docusate  1 tablet Oral BID    Or     senna-docusate  2 tablet Oral BID     sertraline  100 mg Oral Daily     sodium chloride (PF)  10 mL Intracatheter Q8H     sodium chloride (PF)  3 mL Intracatheter Q8H     topiramate  100 mg Oral At Bedtime       Data   Recent Labs   Lab 01/28/19  1246 01/27/19  2205 01/27/19  1534  01/27/19  0715 01/26/19  0633 01/23/19  0623   WBC  --   --   --   --  6.8  --  7.6   HGB  --   --   --   --  10.1*  --  10.7*   MCV  --   --   --   --  81  --  81   PLT  --   --   --   --  331  --  313     --   --   --  141  --   --    POTASSIUM 3.5 3.7 3.1*   < > 3.1*  --   --    CHLORIDE 105  --   --   --  109  --   --    CO2 26  --   --   --  29  --   --    BUN 10  --   --   --  10  --   --    CR 0.64  --   --   --  0.71  --  0.67   ANIONGAP 6  --   --   --  3  --   --    PAULINE 8.3*  --   --   --  8.0*  --   --    GLC 89  --   --   --  139* 109*  --     < > = values in this interval not displayed.       Imaging:   Recent Results (from the past 24 hour(s))   XR Chest 1  View    Narrative    XR CHEST 1 VW 1/28/2019 1:33 PM    COMPARISON: None.    HISTORY: PICC placement.      Impression    IMPRESSION: RIGHT upper cavity PICC tip in the high SVC. Lungs are  clear. No pleural effusion or pneumothorax on either side.    ANNA ROSALES MD

## 2019-01-28 NOTE — PROGRESS NOTES
"[facility] Nurse Inpatient Wound Assessment  Reason for consultation: Evaluate and treat BL heel PI - community acquired                                           Lt hallux I&D site with KCI VAC dressing       ASSESSMENT  Pressure Injury: on BL heels , present on admission   This is a Medical Device Related Pressure Injury (MDRPI) due to CAM boot to R heel    Pressure Injury is Stage Unstageable     Left heel: Trauma vs neuropathic    1-24-19:  LEFT HALLUX WOUND IRRIGATION AND DEBRIDEMENT WITH BONE BIOPSY (Left)  WOUND VAC APPLICATION to  Hallux in OR        Contributing factor of the pressure injury: pressure and immobility  Status: initial assessment  Recommend provider order: Ortho and Orthotic Consult      TREATMENT PLAN  Rt heel:  M-W-F  1. Clean wound with saline or MicroKlenz Spray, pat dry  2. Paint periwound tissue with No Sting Skin Prep (#[id number]) and allow to dry thoroughly  3. Apply Medihoney ) to  2x2  and press to the area, secure with tape  4. Time and date dressing change and flavio with a \"T\" for treatment of a wound  5. Reposition pt every 1 to 2 hours when in bed, Elevate LE on pillows to offload heels/ankles    Lt heel PI  and Lt hallux I&D site: KCI wound VAC  1. Clean wound with MicroKlenz  2. No Sting 3M Skin Prep to nida-wound skin. Let dry  3. Black sponge to each site. Secure with VAC drape  4.  Secure with VAC drape  5.  Pad and bridge adaptor to anterior lower Lt leg  6. VAC @ 125mm/hg cont suction  7.  Posterior Lt hallux: medihoney to 1/2 polymen and and apply to wound bed    Orders Written  Discharge order completed  Nursing to notify the Provider(s) and re-consult the [facility] Nurse if wound(s) deteriorates or new skin concern.    Patient History  According to provider note(s):  Shalonda Howard is a 49-year-old female with a past medical history significant for lupus, PCOS, bilateral chronic lower extremity edema, GERD, dysthymia, generalized anxiety disorder, obesity, allergic rhinitis, myalgias " with myositis, opioid dependence, ventral hernia without incarceration, postoperative nausea and vomiting.  Peripheral neuropathy, hyperlipidemia, history of kidney stones and continued nonhealing left surgical wound with multiple infections who is being evaluated for CO medical management of chronic medical conditions following direct admission by orthopedic service due to left foot nonhealing surgical wound with infection.      Of note, patient has had a persistent nonhealing surgical wound with infection of the left foot since 8/2018.  The patient has required multiple hospitalizations since initial procedure performed in 8/2018 with a bilateral bunionectomy performed by Dr. Harman.  The patient was subsequently admitted in September due to wound dehiscence and on 9/26 underwent incision and drainage as well as hardware removal.  She had been discharged following that hospitalization with antibiotics, however, this did not improve and patient was subsequently admitted on 10/2018 with concerns for worsening infection.  An MRI was performed at that time which did not reveal or appear to have any concerning signs for osteomyelitis.  She was treated with IV Ancef and followed by infectious disease with a 6 week course of IV Ancef through a PICC line.  The patient was then admitted in November with continued nonhealing surgical wound to the left foot with concerning infection for which she underwent incision and drainage as well as debridement.      The patient has been following with [facility] and indicates that she was started on oral Keflex last Friday, but has shown no improvement in her surgical wound and has noted more redness and pain going up into the foot.  As such, patient was directly admitted to undergo continued workup and evaluation.     -24-19:  LEFT HALLUX WOUND IRRIGATION AND DEBRIDEMENT WITH BONE BIOPSY (Left)  WOUND VAC APPLICATION to  Hallux in OR      Objective Data  Containment of  urine/stool: Incontinence Protocol prn    Current Diet/ Nutrition:  Orders Placed This Encounter      Regular Diet Adult    Moisture: Not related to wounds    Risk Assessment:   Sensory Perception: 4-->no impairment  Moisture: 4-->rarely moist  Activity: 3-->walks occasionally  Mobility: 3-->slightly limited  Nutrition: 3-->adequate  Friction and Shear: 3-->no apparent problem  Eduardo Score: 20      Labs:   Recent Labs   Lab Test 01/27/19  0715  01/22/19  1950  11/26/18  0630  10/15/18  2125   ALBUMIN  --   --   --   --  2.8*   < >  --    HGB 10.1*   < >  --    < > 10.7*   < >  --    WBC 6.8   < >  --    < > 6.4   < >  --    A1C  --   --   --   --   --   --  5.6   CRP  --   --  19.2*   < >  --    < >  --     < > = values in this interval not displayed.       Physical Exam  Skin inspection: focused BL heels      Wound Location:  BL heels  Date of last Photo 1/23/19       Left heel                                                                 Right heel    Wound History:   #1& #2: Bilateral heels:   Pt reports she clipped a piece of hanging skin a few years ago and has had a non healing ulcer since. Has been seen by [facility] in past.  Measurements (length x width x depth, in cm) Left 2.5 cm x 2.5 cm  x  0.4 cm                                                                             Right 1.7cm x 1.5cm x 0.5cm   Wound Base:  Left 100 % red base today with diligent off loading                          Right; 70% yellow loosening slough today    Tunneling N/A  Undermining N/A  Palpation of the wound bed: firm   Periwound skin: dry  callous  Color: hypopigmented  Temperature: normal   Description of drainage: scant serosanguinous  Odor: none  Pain: denies ,  Decreased sensation to BL lower extremities with neuropathy.     #3: Lt hallux I&D site:   Size: comma shaped wounnd 3.0cm x 2.0cm x .5cm central  Wound bed: 100% granula. No bone palpable with q-tip today  Drainage; serous  Shyanne-wound skin: dry scaly  with linear 3.0cm x .5cm x .3cm wound in crease on posterior aspect of hallux, 100% red base,  no               Drainage  Odor: none  Pain: tenderness         Interventions  Current support surface: Standard  Atmos Air mattress  Current off-loading measures: Pillows under calves pt wears CAM boot to RLE  Repositioning aid: Turn and Position System and Pillows  Visual inspection of wound(s) completed   Wound Care: Rt heel and posterior lt hallux                         Cleansed with Microklenz                          Rt heel and Lt posterior hallux: Medihoney to wound bed                         Cover rt heel with 2x2 and Lt posterior hallux with polymen.                                                            Lt heel PI  and Lt hallux I&D site: KCI wound VAC                       1. Clean wound with MicroKlenz                       2. No Sting 3M Skin Prep to nida-wound skin. Let dry                       3. Black sponge to each site. Secure with VAC drape                       4.  Secure with VAC drape                       5.  Pad and bridge adaptor to anterior lower Lt leg                       6. VAC @ 125mm/hg cont suction. No air leak noted                       7.  Posterior Lt hallux: medihoney to 1/2 polymen and and apply to wound bed      Educated provided: On VAC dressing changes and general management    Discussed plan of care with Patient  Nurse and care coordinator    Anitra Pringle, RN

## 2019-01-28 NOTE — PROVIDER NOTIFICATION
Paged MD Bocanegra regarding IV abx length so PICC line can be placed this am. Awaiting call back.

## 2019-01-28 NOTE — PROGRESS NOTES
Proof of delivery signed and vac in room for home.  Unable to get vac approved in time to get patient out by 4pm. Faxed h/p op note and proof of delivery to [facility]

## 2019-01-28 NOTE — PROGRESS NOTES
"[facility]  Infectious Disease Progress Note          Assessment and Plan:   Assessment & Plan     Shalonda Howard is a 49 year old female who was admitted on 1/22/2019.      Impression:  1. This is a 49 y.o familiar to me from her prior admission.   2. She has a history of bilateral bunion repair surgery done in August 2018. The right side healed but the left side the insion has not been healing , 1 I and D and 6 weeks of IV antibiotics and many more weeks of oral antibiotics since the end of August. Then repeat admission in November, more I and D and another course of IV antibiotics.   3. She also has some hardware in the ankle b/l, those incisions look ok. Though both ankle are swollen.   4. She also has chronic heel ulcers bilateral.   5. Last cultures are from the wound and positive for MSSA which was seen previously also in September and acinetobacter and corynebacterium.   6.  PCN allergy, getting zosyn, sulfa allergy        Recommendations:   Bone biopsy and bone cultures pending, findings noted, wd vac on  Joint fluid cultures has MSSA, Enterobacter and strep in it along with corynebacterium.   Ancef + ertapenem for IV for 6 weeks.                      Interval History:   no new complaints              Medications:       buPROPion  300 mg Oral Daily     busPIRone  15 mg Oral BID     ceFAZolin  2 g Intravenous Q8H     ertapenem (INVanz) IV  1 g Intravenous Q24H     furosemide  160 mg Oral Daily     gabapentin  800 mg Oral TID     omeprazole  40 mg Oral BID AC     senna-docusate  1 tablet Oral BID    Or     senna-docusate  2 tablet Oral BID     sertraline  100 mg Oral Daily     sodium chloride (PF)  10 mL Intracatheter Q8H     sodium chloride (PF)  3 mL Intracatheter Q8H     topiramate  100 mg Oral At Bedtime                  Physical Exam:   Blood pressure 129/78, pulse 94, temperature 99.1  F (37.3  C), temperature source Oral, resp. rate 16, height 1.651 m (5' 5\"), weight 90.7 kg (200 " lb), SpO2 97 %, not currently breastfeeding.  Wt Readings from Last 2 Encounters:   01/22/19 90.7 kg (200 lb)   11/26/18 90.7 kg (200 lb)     Vital Signs with Ranges  Temp:  [98.2  F (36.8  C)-99.1  F (37.3  C)] 99.1  F (37.3  C)  Pulse:  [84-94] 94  Heart Rate:  [80-92] 92  Resp:  [16-17] 16  BP: (114-129)/(68-85) 129/78  SpO2:  [95 %-97 %] 97 %    Constitutional: Awake, alert, cooperative, no apparent distress   Lungs: Clear to auscultation bilaterally, no crackles or wheezing   Cardiovascular: Regular rate and rhythm, normal S1 and S2, and no murmur noted   Abdomen: Normal bowel sounds, soft, non-distended, non-tender   Skin: No rashes, no cyanosis, no edema   Other:           Data:   All microbiology laboratory data reviewed.  Recent Labs   Lab Test 01/27/19  0715 01/23/19  0623 12/31/18  0945   WBC 6.8 7.6 8.6   HGB 10.1* 10.7* 12.0   HCT 32.0* 33.3* 37.6   MCV 81 81 84    313 376     Recent Labs   Lab Test 01/28/19  1246 01/27/19  0715 01/23/19  0623   CR 0.64 0.71 0.67     Recent Labs   Lab Test 01/22/19  1950   SED 34*     Recent Labs   Lab Test 01/24/19  1122 01/24/19  1120 01/22/19  1950 01/22/19  1945 11/26/18  1159 11/24/18  1950 11/24/18  1814 11/20/18  1700 11/20/18  1634   CULT Culture negative monitoring continues  Culture negative monitoring continues Light growth  Staphylococcus aureus  *  On day 1, isolated in broth only:  Enterobacter cloacae complex  *  Light growth  Streptococcus agalactiae sero group B  *  Single colony  Corynebacterium species  Identification obtained by MALDI-TOF mass spectrometry research use only database. Test   characteristics determined and verified by the [facility] Diagnostic [facility]   ([facility]) [place], [place].  Susceptibility testing not routinely done  These bacteria are part of normal skin kenyatta, but on occasion, may be true pathogens.    Clinical correlation must be applied to interpreting this microbiology result.  *  Culture negative  monitoring continues No growth No growth No anaerobes isolated  Light growth  Staphylococcus aureus  *  Light growth  Acinetobacter baumannii complex  *  Light growth  Corynebacterium species  Identification obtained by MALDI-TOF mass spectrometry research use only database. Test   characteristics determined and verified by the Infect[facility] Diseases Diagnostic Laboratory   ([facility]) [place], [place].  Susceptibility testing not routinely done  * No growth No growth No growth No growth

## 2019-01-28 NOTE — PLAN OF CARE
A&OX4.  Up independent in room with scooter for LLE.  Wound Vac continuous @125.  Taking oxycodone for pain.  K+ replaced and recheck stayed the same at 3.1 second replacement complete and recheck is at 2230.  C/o yeast infection symptoms, one time dose of diflucan ordered , awaiting dose from pharmacy.   PICC orders put in by hospitalist this evening. Possible discharge home tomorrow after PICC placement.

## 2019-01-29 ENCOUNTER — HOME INFUSION (PRE-WILLOW HOME INFUSION) (OUTPATIENT)
Dept: PHARMACY | Facility: CLINIC | Age: 49
End: 2019-01-29

## 2019-01-29 VITALS
RESPIRATION RATE: 16 BRPM | HEIGHT: 65 IN | HEART RATE: 82 BPM | BODY MASS INDEX: 33.32 KG/M2 | TEMPERATURE: 98.2 F | DIASTOLIC BLOOD PRESSURE: 80 MMHG | OXYGEN SATURATION: 99 % | SYSTOLIC BLOOD PRESSURE: 123 MMHG | WEIGHT: 200 LBS

## 2019-01-29 LAB
ANION GAP SERPL CALCULATED.3IONS-SCNC: 5 MMOL/L (ref 3–14)
BUN SERPL-MCNC: 9 MG/DL (ref 7–30)
CALCIUM SERPL-MCNC: 7.9 MG/DL (ref 8.5–10.1)
CHLORIDE SERPL-SCNC: 108 MMOL/L (ref 94–109)
CO2 SERPL-SCNC: 24 MMOL/L (ref 20–32)
COPATH REPORT: NORMAL
CREAT SERPL-MCNC: 0.61 MG/DL (ref 0.52–1.04)
GFR SERPL CREATININE-BSD FRML MDRD: >90 ML/MIN/{1.73_M2}
GLUCOSE SERPL-MCNC: 146 MG/DL (ref 70–99)
POTASSIUM SERPL-SCNC: 3.9 MMOL/L (ref 3.4–5.3)
SODIUM SERPL-SCNC: 137 MMOL/L (ref 133–144)

## 2019-01-29 PROCEDURE — 80048 BASIC METABOLIC PNL TOTAL CA: CPT | Performed by: INTERNAL MEDICINE

## 2019-01-29 PROCEDURE — 25000132 ZZH RX MED GY IP 250 OP 250 PS 637: Performed by: INTERNAL MEDICINE

## 2019-01-29 PROCEDURE — 25000132 ZZH RX MED GY IP 250 OP 250 PS 637: Performed by: PHYSICIAN ASSISTANT

## 2019-01-29 PROCEDURE — 25000128 H RX IP 250 OP 636: Performed by: INTERNAL MEDICINE

## 2019-01-29 RX ADMIN — SERTRALINE HYDROCHLORIDE 100 MG: 100 TABLET ORAL at 08:07

## 2019-01-29 RX ADMIN — HYDROXYZINE PAMOATE 25 MG: 25 CAPSULE ORAL at 05:43

## 2019-01-29 RX ADMIN — SENNOSIDES AND DOCUSATE SODIUM 1 TABLET: 8.6; 5 TABLET ORAL at 08:07

## 2019-01-29 RX ADMIN — BUPROPION HYDROCHLORIDE 300 MG: 300 TABLET, FILM COATED, EXTENDED RELEASE ORAL at 08:07

## 2019-01-29 RX ADMIN — OXYCODONE HYDROCHLORIDE 10 MG: 5 TABLET ORAL at 08:07

## 2019-01-29 RX ADMIN — OXYCODONE HYDROCHLORIDE 10 MG: 5 TABLET ORAL at 11:06

## 2019-01-29 RX ADMIN — FUROSEMIDE 160 MG: 40 TABLET ORAL at 08:06

## 2019-01-29 RX ADMIN — BUSPIRONE HYDROCHLORIDE 15 MG: 5 TABLET ORAL at 08:06

## 2019-01-29 RX ADMIN — OMEPRAZOLE 40 MG: 20 CAPSULE, DELAYED RELEASE ORAL at 06:30

## 2019-01-29 RX ADMIN — OXYCODONE HYDROCHLORIDE 10 MG: 5 TABLET ORAL at 01:34

## 2019-01-29 RX ADMIN — POTASSIUM CHLORIDE 80 MEQ: 1500 TABLET, EXTENDED RELEASE ORAL at 08:06

## 2019-01-29 RX ADMIN — OXYCODONE HYDROCHLORIDE 10 MG: 5 TABLET ORAL at 05:13

## 2019-01-29 RX ADMIN — GABAPENTIN 800 MG: 800 TABLET, FILM COATED ORAL at 08:07

## 2019-01-29 RX ADMIN — CEFAZOLIN SODIUM 2 G: 2 INJECTION, SOLUTION INTRAVENOUS at 06:26

## 2019-01-29 RX ADMIN — HYDROXYZINE PAMOATE 25 MG: 25 CAPSULE ORAL at 01:34

## 2019-01-29 ASSESSMENT — ACTIVITIES OF DAILY LIVING (ADL)
ADLS_ACUITY_SCORE: 10

## 2019-01-29 NOTE — PLAN OF CARE
A&Ox4, VSS on RA. C/o 8/10 pain managed with PRN oxycodone and atarax. Ice appled to foot. L foot wound CDI, wound vac continuous at 125. Up ad alicia with scooter. Reg diet, voiding adequately. R PICC line in place, pt will go home with it. Plan to DC today with  - unsure of time. Will continue to monitor.

## 2019-01-29 NOTE — PLAN OF CARE
Paperwork and medications reviewed with pt. No further questions. All belongings sent with pt. Home infusion to call and arrange time along with wound care, sent with I pump. Discharged home at 1145.

## 2019-01-29 NOTE — CONSULTS
Plan is for patient to discharge to home today. [facility] was called to update them on patient's discharge. All orders are in. [facility] has been in touch with patient and they will be coming out to see patient this afternoon. [facility] will be taking care of the wound vac dressing changes and the dressing changes required on the right foot. No further discharge needs at this time.

## 2019-01-29 NOTE — PLAN OF CARE
Pt A&O x4, VSS. CMS intact. L foot on wound vac 125 continuous. On regular diet. Up independently with scooter to bathroom. Had BM this evening. Taking oxycodone for pain. PICC in place for abx. Plan for discharge home tomorrow.

## 2019-01-29 NOTE — PROGRESS NOTES
SPIRITUAL HEALTH SERVICES Progress Note  [facility]    Visited per LOS.    Pt expressed she is going home and dunne no needs at this time.     SH has no plans to follow.     Simon Newby  Chaplain Resident

## 2019-01-29 NOTE — PLAN OF CARE
Pt is A&Ox4, VSS on RA. Rates LLE pain 7/10, oxy and atarax given and mildly effective. +CMS. Wound vac to continuous pressure, dressing redressed by WOC RN. Picc line has dried drainage, warm packs for comfort. Up IND to BR. LLE elevated. Fair appetite. Plan for pt to discharge tomorrow- whenever  is able to pick pt up.

## 2019-01-29 NOTE — PROGRESS NOTES
[facility]  Infectious Disease Progress Note          Assessment and Plan:   Assessment & Plan     Shalonda Howard is a 49 year old female who was admitted on 1/22/2019.      Impression:  1. This is a 49 y.o familiar to me from her prior admission.   2. She has a history of bilateral bunion repair surgery done in August 2018. The right side healed but the left side the insion has not been healing , 1 I and D and 6 weeks of IV antibiotics and many more weeks of oral antibiotics since the end of August. Then repeat admission in November, more I and D and another course of IV antibiotics.   3. She also has some hardware in the ankle b/l, those incisions look ok. Though both ankle are swollen.   4. She also has chronic heel ulcers bilateral.   5. Last cultures are from the wound and positive for MSSA which was seen previously also in September and acinetobacter and corynebacterium.   6.  PCN allergy, getting zosyn, sulfa allergy        Recommendations:   Bone biopsy and bone cultures pending, findings noted, wd vac on  Joint fluid cultures has MSSA, Enterobacter and strep in it along with corynebacterium.   Ancef + ertapenem for IV for 6 weeks. Orders are in the chart                      Interval History:   no new complaints              Medications:       buPROPion  300 mg Oral Daily     busPIRone  15 mg Oral BID     ceFAZolin  2 g Intravenous Q8H     ertapenem (INVanz) IV  1 g Intravenous Q24H     furosemide  160 mg Oral Daily     gabapentin  800 mg Oral TID     omeprazole  40 mg Oral BID AC     potassium chloride ER  80 mEq Oral Daily     senna-docusate  1 tablet Oral BID    Or     senna-docusate  2 tablet Oral BID     sertraline  100 mg Oral Daily     sodium chloride (PF)  10 mL Intracatheter Q8H     sodium chloride (PF)  3 mL Intracatheter Q8H     topiramate  100 mg Oral At Bedtime                  Physical Exam:   Blood pressure 123/80, pulse 82, temperature 98.2  F (36.8  C), temperature  "source Oral, resp. rate 16, height 1.651 m (5' 5\"), weight 90.7 kg (200 lb), SpO2 99 %, not currently breastfeeding.  Wt Readings from Last 2 Encounters:   01/22/19 90.7 kg (200 lb)   11/26/18 90.7 kg (200 lb)     Vital Signs with Ranges  Temp:  [95.7  F (35.4  C)-98.2  F (36.8  C)] 98.2  F (36.8  C)  Pulse:  [82-93] 82  Heart Rate:  [81-92] 81  Resp:  [14-16] 16  BP: (116-123)/(58-80) 123/80  SpO2:  [95 %-99 %] 99 %    Constitutional: Awake, alert, cooperative, no apparent distress   Lungs: Clear to auscultation bilaterally, no crackles or wheezing   Cardiovascular: Regular rate and rhythm, normal S1 and S2, and no murmur noted   Abdomen: Normal bowel sounds, soft, non-distended, non-tender   Skin: No rashes, no cyanosis, no edema   Other:           Data:   All microbiology laboratory data reviewed.  Recent Labs   Lab Test 01/27/19  0715 01/23/19  0623 12/31/18  0945   WBC 6.8 7.6 8.6   HGB 10.1* 10.7* 12.0   HCT 32.0* 33.3* 37.6   MCV 81 81 84    313 376     Recent Labs   Lab Test 01/29/19  0545 01/28/19  1246 01/27/19  0715   CR 0.61 0.64 0.71     Recent Labs   Lab Test 01/22/19  1950   SED 34*     Recent Labs   Lab Test 01/24/19  1122 01/24/19  1120 01/22/19  1950 01/22/19  1945 11/26/18  1159 11/24/18  1950 11/24/18  1814 11/20/18  1700 11/20/18  1634   CULT Culture negative monitoring continues  Culture negative monitoring continues Light growth  Staphylococcus aureus  *  On day 1, isolated in broth only:  Enterobacter cloacae complex  *  Light growth  Streptococcus agalactiae sero group B  *  Single colony  Corynebacterium species  Identification obtained by MALDI-TOF mass spectrometry research use only database. Test   characteristics determined and verified by the Infectious Diseases Diagnostic Laboratory   ([facility]) [place], [place].  Susceptibility testing not routinely done  These bacteria are part of normal skin kenyatta, but on occasion, may be true pathogens.    Clinical correlation must be " applied to interpreting this microbiology result.  *  Culture negative monitoring continues No growth No growth No anaerobes isolated  Light growth  Staphylococcus aureus  *  Light growth  Acinetobacter baumannii complex  *  Light growth  Corynebacterium species  Identification obtained by MALDI-TOF mass spectrometry research use only database. Test   characteristics determined and verified by the Infectious Diseases Diagnostic Laboratory   ([facility]) [place], [place].  Susceptibility testing not routinely done  * No growth No growth No growth No growth

## 2019-01-30 NOTE — PROGRESS NOTES
Chart reviewed  Cindy dupont. Reviewed results with patient. Med rec for psych and diuretics/kcl reviewed  Discussed plan with patient  Pt to discharge home today per primary ortho service  Daniel Aponte MD

## 2019-01-30 NOTE — PROGRESS NOTES
This is a recent snapshot of the patient's [facility] medical record.  For current drug dose and complete information and questions, call [phone] or In Basket pool, fv home infusion ([id number])  CSN Number:  [id number]

## 2019-01-31 ENCOUNTER — HOME INFUSION (PRE-WILLOW HOME INFUSION) (OUTPATIENT)
Dept: PHARMACY | Facility: CLINIC | Age: 49
End: 2019-01-31

## 2019-01-31 LAB
BACTERIA SPEC CULT: ABNORMAL
BACTERIA SPEC CULT: NO GROWTH
BACTERIA SPEC CULT: NORMAL
Lab: ABNORMAL
Lab: NORMAL
SPECIMEN SOURCE: ABNORMAL
SPECIMEN SOURCE: NORMAL
SPECIMEN SOURCE: NORMAL

## 2019-02-01 NOTE — PROGRESS NOTES
This is a recent snapshot of the patient's [facility] medical record.  For current drug dose and complete information and questions, call [phone] or In Basket pool, fv home infusion ([id number])  CSN Number:  [id number]

## 2019-02-04 ENCOUNTER — HOME INFUSION (PRE-WILLOW HOME INFUSION) (OUTPATIENT)
Dept: PHARMACY | Facility: CLINIC | Age: 49
End: 2019-02-04

## 2019-02-04 LAB
AST SERPL W P-5'-P-CCNC: 16 U/L (ref 0–45)
BASOPHILS # BLD AUTO: 0.1 10E9/L (ref 0–0.2)
BASOPHILS NFR BLD AUTO: 0.6 %
CREAT SERPL-MCNC: 0.59 MG/DL (ref 0.52–1.04)
DIFFERENTIAL METHOD BLD: ABNORMAL
EOSINOPHIL # BLD AUTO: 0.3 10E9/L (ref 0–0.7)
EOSINOPHIL NFR BLD AUTO: 2.8 %
ERYTHROCYTE [DISTWIDTH] IN BLOOD BY AUTOMATED COUNT: 14 % (ref 10–15)
GFR SERPL CREATININE-BSD FRML MDRD: >90 ML/MIN/{1.73_M2}
HCT VFR BLD AUTO: 38.5 % (ref 35–47)
HGB BLD-MCNC: 12 G/DL (ref 11.7–15.7)
IMM GRANULOCYTES # BLD: 0.1 10E9/L (ref 0–0.4)
IMM GRANULOCYTES NFR BLD: 0.5 %
LYMPHOCYTES # BLD AUTO: 2.2 10E9/L (ref 0.8–5.3)
LYMPHOCYTES NFR BLD AUTO: 22.7 %
MCH RBC QN AUTO: 25.5 PG (ref 26.5–33)
MCHC RBC AUTO-ENTMCNC: 31.2 G/DL (ref 31.5–36.5)
MCV RBC AUTO: 82 FL (ref 78–100)
MONOCYTES # BLD AUTO: 0.5 10E9/L (ref 0–1.3)
MONOCYTES NFR BLD AUTO: 4.9 %
NEUTROPHILS # BLD AUTO: 6.7 10E9/L (ref 1.6–8.3)
NEUTROPHILS NFR BLD AUTO: 68.5 %
NRBC # BLD AUTO: 0 10*3/UL
NRBC BLD AUTO-RTO: 0 /100
PLATELET # BLD AUTO: 408 10E9/L (ref 150–450)
RBC # BLD AUTO: 4.7 10E12/L (ref 3.8–5.2)
WBC # BLD AUTO: 9.7 10E9/L (ref 4–11)

## 2019-02-04 PROCEDURE — 84450 TRANSFERASE (AST) (SGOT): CPT | Performed by: INTERNAL MEDICINE

## 2019-02-04 PROCEDURE — 85025 COMPLETE CBC W/AUTO DIFF WBC: CPT | Performed by: INTERNAL MEDICINE

## 2019-02-04 PROCEDURE — 82565 ASSAY OF CREATININE: CPT | Performed by: INTERNAL MEDICINE

## 2019-02-05 ENCOUNTER — HOME INFUSION (PRE-WILLOW HOME INFUSION) (OUTPATIENT)
Dept: PHARMACY | Facility: CLINIC | Age: 49
End: 2019-02-05

## 2019-02-05 NOTE — PROGRESS NOTES
This is a recent snapshot of the patient's [facility] medical record.  For current drug dose and complete information and questions, call [phone] or In Basket pool, fv home infusion ([id number])  CSN Number:  [id number]

## 2019-02-07 ENCOUNTER — HOME INFUSION (PRE-WILLOW HOME INFUSION) (OUTPATIENT)
Dept: PHARMACY | Facility: CLINIC | Age: 49
End: 2019-02-07

## 2019-02-07 NOTE — PROGRESS NOTES
This is a recent snapshot of the patient's [facility] medical record.  For current drug dose and complete information and questions, call [phone] or In Basket pool, fv home infusion ([id number])  CSN Number:  [id number]

## 2019-02-08 ENCOUNTER — HOME INFUSION (PRE-WILLOW HOME INFUSION) (OUTPATIENT)
Dept: PHARMACY | Facility: CLINIC | Age: 49
End: 2019-02-08

## 2019-02-11 ENCOUNTER — HOME INFUSION (PRE-WILLOW HOME INFUSION) (OUTPATIENT)
Dept: PHARMACY | Facility: CLINIC | Age: 49
End: 2019-02-11

## 2019-02-11 LAB
AST SERPL W P-5'-P-CCNC: 16 U/L (ref 0–45)
BASOPHILS # BLD AUTO: 0.1 10E9/L (ref 0–0.2)
BASOPHILS NFR BLD AUTO: 0.5 %
CREAT SERPL-MCNC: 0.68 MG/DL (ref 0.52–1.04)
DIFFERENTIAL METHOD BLD: ABNORMAL
EOSINOPHIL # BLD AUTO: 0.3 10E9/L (ref 0–0.7)
EOSINOPHIL NFR BLD AUTO: 2.9 %
ERYTHROCYTE [DISTWIDTH] IN BLOOD BY AUTOMATED COUNT: 14.6 % (ref 10–15)
GFR SERPL CREATININE-BSD FRML MDRD: >90 ML/MIN/{1.73_M2}
HCT VFR BLD AUTO: 37.6 % (ref 35–47)
HGB BLD-MCNC: 11.5 G/DL (ref 11.7–15.7)
IMM GRANULOCYTES # BLD: 0 10E9/L (ref 0–0.4)
IMM GRANULOCYTES NFR BLD: 0.2 %
LYMPHOCYTES # BLD AUTO: 2.1 10E9/L (ref 0.8–5.3)
LYMPHOCYTES NFR BLD AUTO: 21.7 %
MCH RBC QN AUTO: 25.2 PG (ref 26.5–33)
MCHC RBC AUTO-ENTMCNC: 30.6 G/DL (ref 31.5–36.5)
MCV RBC AUTO: 82 FL (ref 78–100)
MONOCYTES # BLD AUTO: 0.5 10E9/L (ref 0–1.3)
MONOCYTES NFR BLD AUTO: 4.8 %
NEUTROPHILS # BLD AUTO: 6.7 10E9/L (ref 1.6–8.3)
NEUTROPHILS NFR BLD AUTO: 69.9 %
NRBC # BLD AUTO: 0 10*3/UL
NRBC BLD AUTO-RTO: 0 /100
PLATELET # BLD AUTO: 420 10E9/L (ref 150–450)
RBC # BLD AUTO: 4.57 10E12/L (ref 3.8–5.2)
WBC # BLD AUTO: 9.6 10E9/L (ref 4–11)

## 2019-02-11 PROCEDURE — 82565 ASSAY OF CREATININE: CPT | Performed by: INTERNAL MEDICINE

## 2019-02-11 PROCEDURE — 85025 COMPLETE CBC W/AUTO DIFF WBC: CPT | Performed by: INTERNAL MEDICINE

## 2019-02-11 PROCEDURE — 84450 TRANSFERASE (AST) (SGOT): CPT | Performed by: INTERNAL MEDICINE

## 2019-02-12 ENCOUNTER — HOME INFUSION (PRE-WILLOW HOME INFUSION) (OUTPATIENT)
Dept: PHARMACY | Facility: CLINIC | Age: 49
End: 2019-02-12

## 2019-02-12 NOTE — PROGRESS NOTES
This is a recent snapshot of the patient's [facility] medical record.  For current drug dose and complete information and questions, call [phone] or In Basket pool, fv home infusion ([id number])  CSN Number:  [id number]

## 2019-02-12 NOTE — PROGRESS NOTES
This is a recent snapshot of the patient's [facility] medical record.  For current drug dose and complete information and questions, call [phone] or In Basket pool, fv home infusion ([id number])  CSN Number:  [id number]

## 2019-02-13 NOTE — PROGRESS NOTES
This is a recent snapshot of the patient's [facility] medical record.  For current drug dose and complete information and questions, call [phone] or In Basket pool, fv home infusion ([id number])  CSN Number:  [id number]

## 2019-02-14 ENCOUNTER — HOME INFUSION (PRE-WILLOW HOME INFUSION) (OUTPATIENT)
Dept: PHARMACY | Facility: CLINIC | Age: 49
End: 2019-02-14

## 2019-02-15 NOTE — PROGRESS NOTES
This is a recent snapshot of the patient's [facility] medical record.  For current drug dose and complete information and questions, call [phone] or In Basket pool, fv home infusion ([id number])  CSN Number:  [id number]

## 2019-02-18 ENCOUNTER — HOME INFUSION (PRE-WILLOW HOME INFUSION) (OUTPATIENT)
Dept: PHARMACY | Facility: CLINIC | Age: 49
End: 2019-02-18

## 2019-02-18 LAB
AST SERPL W P-5'-P-CCNC: 14 U/L (ref 0–45)
BASOPHILS # BLD AUTO: 0 10E9/L (ref 0–0.2)
BASOPHILS NFR BLD AUTO: 0.6 %
CREAT SERPL-MCNC: 0.64 MG/DL (ref 0.52–1.04)
DIFFERENTIAL METHOD BLD: ABNORMAL
EOSINOPHIL # BLD AUTO: 0.2 10E9/L (ref 0–0.7)
EOSINOPHIL NFR BLD AUTO: 3.5 %
ERYTHROCYTE [DISTWIDTH] IN BLOOD BY AUTOMATED COUNT: 15.4 % (ref 10–15)
GFR SERPL CREATININE-BSD FRML MDRD: >90 ML/MIN/{1.73_M2}
HCT VFR BLD AUTO: 38.6 % (ref 35–47)
HGB BLD-MCNC: 11.8 G/DL (ref 11.7–15.7)
IMM GRANULOCYTES # BLD: 0 10E9/L (ref 0–0.4)
IMM GRANULOCYTES NFR BLD: 0.3 %
LYMPHOCYTES # BLD AUTO: 2.1 10E9/L (ref 0.8–5.3)
LYMPHOCYTES NFR BLD AUTO: 31 %
MCH RBC QN AUTO: 25.2 PG (ref 26.5–33)
MCHC RBC AUTO-ENTMCNC: 30.6 G/DL (ref 31.5–36.5)
MCV RBC AUTO: 83 FL (ref 78–100)
MONOCYTES # BLD AUTO: 0.3 10E9/L (ref 0–1.3)
MONOCYTES NFR BLD AUTO: 4.7 %
NEUTROPHILS # BLD AUTO: 4.1 10E9/L (ref 1.6–8.3)
NEUTROPHILS NFR BLD AUTO: 59.9 %
NRBC # BLD AUTO: 0 10*3/UL
NRBC BLD AUTO-RTO: 0 /100
PLATELET # BLD AUTO: 433 10E9/L (ref 150–450)
RBC # BLD AUTO: 4.68 10E12/L (ref 3.8–5.2)
WBC # BLD AUTO: 6.8 10E9/L (ref 4–11)

## 2019-02-18 PROCEDURE — 85025 COMPLETE CBC W/AUTO DIFF WBC: CPT | Performed by: INTERNAL MEDICINE

## 2019-02-18 PROCEDURE — 82565 ASSAY OF CREATININE: CPT | Performed by: INTERNAL MEDICINE

## 2019-02-18 PROCEDURE — 84450 TRANSFERASE (AST) (SGOT): CPT | Performed by: INTERNAL MEDICINE

## 2019-02-19 ENCOUNTER — HOME INFUSION (PRE-WILLOW HOME INFUSION) (OUTPATIENT)
Dept: PHARMACY | Facility: CLINIC | Age: 49
End: 2019-02-19

## 2019-02-19 NOTE — PROGRESS NOTES
This is a recent snapshot of the patient's [facility] medical record.  For current drug dose and complete information and questions, call [phone] or In Basket pool, fv home infusion ([id number])  CSN Number:  [id number]

## 2019-02-20 NOTE — PROGRESS NOTES
This is a recent snapshot of the patient's [facility] medical record.  For current drug dose and complete information and questions, call [phone] or In Basket pool, fv home infusion ([id number])  CSN Number:  [id number]

## 2019-02-21 ENCOUNTER — HOME INFUSION (PRE-WILLOW HOME INFUSION) (OUTPATIENT)
Dept: PHARMACY | Facility: CLINIC | Age: 49
End: 2019-02-21

## 2019-02-22 NOTE — PROGRESS NOTES
This is a recent snapshot of the patient's [facility] medical record.  For current drug dose and complete information and questions, call [phone] or In [facility] pool, fv home infusion ([id number])  CSN Number:  [id number]

## 2019-02-23 NOTE — PROGRESS NOTES
[facility]    Hospitalist Progress Note    Date of Service (when I saw the patient): 09/25/2018    Assessment & Plan   Ms. Howard is a 49 y/o female with a h/o SLE, PCO, LE edema, obesity, GERD, nephrolithiasis, dysthymic d/o and anxiety who presented to the hospital with R foot pain and swelling. The hospitalist service was consulted to address hypokalemia.     Hypokalemia  In the setting of normal-to-low blood pressures and 180 mg furosemide daily, suspect kaliuresis 2/2 diuretic  - furosemide currently on hold  - monitor K+, 9/24 admit at 2.9, am 9/25 at 3.1  - K supplement as well as K protocol    Foot infection  Involving her L foot. In setting of chronic edema as well as h/o recent surgery on her R foot. U/S L foot negative for DVT  - continues on vanco/ cefepime  - bld cx negative, wound with GPC  - management as per ortho    GERD  Continue PTA omeprazole    Depression/ anxiety  Continue prior to admission wellbutrin, buspar and topamax    Peripheral neuropathy  Continue pta gabapentin    # Pain Assessment:  Current Pain Score 9/25/2018   Patient currently in pain? yes   Pain score (0-10) 7   Pain location Foot   Pain descriptors Aching   - pain management deferred to primary service    FEN (fluids, electrolytes and nutrition): regular diet  DVT Prophylaxis: Defer to primary service  Code Status: Full Code    Disposition: Expected discharge as per orthopedi s    Nir San MD  [phone] (P)  [phone] (C)  Text Page until 6 pm (after call answering service)    Interval History   Doing ok. Pain reasonably controlled. Denies cp/sob.     -Data reviewed today: I reviewed all new labs and imaging results over the last 24 hours. I personally reviewed no images or EKG's today.    Physical Exam   Temp: 97.9  F (36.6  C) Temp src: Oral BP: 109/63 Pulse: 79   Resp: 16 SpO2: 96 % O2 Device: None (Room air)    Vitals:    09/24/18 1544   Weight: 81.6 kg (180 lb)     Vital Signs with  Ranges  Temp:  [97.9  F (36.6  C)-99.8  F (37.7  C)] 97.9  F (36.6  C)  Pulse:  [79-98] 79  Resp:  [16-18] 16  BP: (109-142)/(63-90) 109/63  SpO2:  [94 %-97 %] 96 %  I/O last 3 completed shifts:  In: -   Out: 500 [Urine:500]    Constitutional: Alert, oriented, no acute distress  Respiratory: Lungs clear to auscultation bilaterally, tr peripheral edema  Cardiovascular: Regular rate and rhythm, no murmurs  GI: Soft, non-tender, non-disteneded, good bowel sounds  Skin/Integumen: No erythema, cyanosis or edema. L foot in wrap. R foot wounds look good  Other:      Medications     lactated ringers         buPROPion (WELLBUTRIN XL) 24 hr tablet 300 mg  300 mg Oral Daily     busPIRone (BUSPAR) tablet 15 mg  15 mg Oral BID     gabapentin  800 mg Oral TID     [START ON 9/26/2018] influenza quadrivalent (PF) vacc  0.5 mL Intramuscular Prior to discharge     omeprazole (priLOSEC) CR capsule 40 mg  40 mg Oral BID AC     potassium chloride SA (K-DUR/KLOR-CON M) CR tablet 80 mEq  80 mEq Oral Daily     senna-docusate  1 tablet Oral BID    Or     senna-docusate  2 tablet Oral BID     topiramate (TOPAMAX) tablet 100 mg  100 mg Oral At Bedtime     vancomycin (VANCOCIN) IV  1,250 mg Intravenous Q12H       Data     Recent Labs  Lab 09/25/18  0550 09/24/18  1620   WBC 6.7 9.7   HGB 10.9* 12.1   MCV 85 84    317    142   POTASSIUM 3.1* 2.9*   CHLORIDE 110* 108   CO2 23 23   BUN 9 12   CR 0.74 0.78   ANIONGAP 8 11   PAULINE 8.2* 9.1   * 137*       Recent Results (from the past 24 hour(s))   XR Foot Left G/E 3 Views    Narrative    LEFT FOOT THREE VIEWS  9/24/2018 4:14 PM     HISTORY: Follow-up bunionectomy.     COMPARISON: None.      Impression    IMPRESSION: Postoperative changes of bunionectomy are noted. Some mild  callus formation is noted at the surgical site within the distal  portion of the left first metatarsal. No convincing radiographic  evidence for osteomyelitis. Additional surgical hardware is noted  involving  the tarsal and calcaneal bones. Small plantar calcaneal  spur.    LEVAR CRISTOBAL MD   US Lower Extremity Venous Duplex Left    Narrative    LEFT LOWER EXTREMITY VENOUS DOPPLER ULTRASOUND  9/24/2018 4:57 PM    HISTORY: Left lower extremity pain and swelling. The concern is for  deep venous thrombosis.    COMPARISON: None.    FINDINGS: Color flow and Doppler spectral waveform analysis  demonstrates normal blood flow in the common femoral, femoral,  popliteal, posterior tibial, and greater saphenous veins of the left  lower extremity. No thrombus is seen.      Impression    IMPRESSION: There is no evidence of deep venous thrombosis in the left  lower extremity.    BURKE DRIVER MD        No

## 2019-02-25 ENCOUNTER — HOME INFUSION (PRE-WILLOW HOME INFUSION) (OUTPATIENT)
Dept: PHARMACY | Facility: CLINIC | Age: 49
End: 2019-02-25

## 2019-02-25 LAB
AST SERPL W P-5'-P-CCNC: 15 U/L (ref 0–45)
BASOPHILS # BLD AUTO: 0 10E9/L (ref 0–0.2)
BASOPHILS NFR BLD AUTO: 0.6 %
CREAT SERPL-MCNC: 0.7 MG/DL (ref 0.52–1.04)
DIFFERENTIAL METHOD BLD: ABNORMAL
EOSINOPHIL # BLD AUTO: 0.3 10E9/L (ref 0–0.7)
EOSINOPHIL NFR BLD AUTO: 4.3 %
ERYTHROCYTE [DISTWIDTH] IN BLOOD BY AUTOMATED COUNT: 15 % (ref 10–15)
GFR SERPL CREATININE-BSD FRML MDRD: >90 ML/MIN/{1.73_M2}
HCT VFR BLD AUTO: 38.2 % (ref 35–47)
HGB BLD-MCNC: 11.7 G/DL (ref 11.7–15.7)
IMM GRANULOCYTES # BLD: 0 10E9/L (ref 0–0.4)
IMM GRANULOCYTES NFR BLD: 0.3 %
LYMPHOCYTES # BLD AUTO: 1.9 10E9/L (ref 0.8–5.3)
LYMPHOCYTES NFR BLD AUTO: 27.6 %
MCH RBC QN AUTO: 25.1 PG (ref 26.5–33)
MCHC RBC AUTO-ENTMCNC: 30.6 G/DL (ref 31.5–36.5)
MCV RBC AUTO: 82 FL (ref 78–100)
MONOCYTES # BLD AUTO: 0.4 10E9/L (ref 0–1.3)
MONOCYTES NFR BLD AUTO: 5.6 %
NEUTROPHILS # BLD AUTO: 4.2 10E9/L (ref 1.6–8.3)
NEUTROPHILS NFR BLD AUTO: 61.6 %
NRBC # BLD AUTO: 0 10*3/UL
NRBC BLD AUTO-RTO: 0 /100
PLATELET # BLD AUTO: 426 10E9/L (ref 150–450)
RBC # BLD AUTO: 4.67 10E12/L (ref 3.8–5.2)
WBC # BLD AUTO: 6.8 10E9/L (ref 4–11)

## 2019-02-25 PROCEDURE — 85025 COMPLETE CBC W/AUTO DIFF WBC: CPT | Performed by: INTERNAL MEDICINE

## 2019-02-25 PROCEDURE — 82565 ASSAY OF CREATININE: CPT | Performed by: INTERNAL MEDICINE

## 2019-02-25 PROCEDURE — 84450 TRANSFERASE (AST) (SGOT): CPT | Performed by: INTERNAL MEDICINE

## 2019-02-26 ENCOUNTER — HOME INFUSION (PRE-WILLOW HOME INFUSION) (OUTPATIENT)
Dept: PHARMACY | Facility: CLINIC | Age: 49
End: 2019-02-26

## 2019-02-26 NOTE — PROGRESS NOTES
This is a recent snapshot of the patient's [facility] medical record.  For current drug dose and complete information and questions, call [phone] or In Basket pool, fv home infusion ([id number])  CSN Number:  [id number]

## 2019-02-27 ENCOUNTER — HOME INFUSION (PRE-WILLOW HOME INFUSION) (OUTPATIENT)
Dept: PHARMACY | Facility: CLINIC | Age: 49
End: 2019-02-27

## 2019-02-27 NOTE — PROGRESS NOTES
This is a recent snapshot of the patient's [facility] medical record.  For current drug dose and complete information and questions, call [phone] or In Basket pool, fv home infusion ([id number])  CSN Number:  [id number]

## 2019-02-28 ENCOUNTER — HOME INFUSION (PRE-WILLOW HOME INFUSION) (OUTPATIENT)
Dept: PHARMACY | Facility: CLINIC | Age: 49
End: 2019-02-28

## 2019-02-28 NOTE — PROGRESS NOTES
This is a recent snapshot of the patient's [facility] medical record.  For current drug dose and complete information and questions, call [phone] or In Basket pool, fv home infusion ([id number])  CSN Number:  [id number]

## 2019-03-03 ENCOUNTER — HOME INFUSION (PRE-WILLOW HOME INFUSION) (OUTPATIENT)
Dept: PHARMACY | Facility: CLINIC | Age: 49
End: 2019-03-03

## 2019-03-03 LAB
AST SERPL W P-5'-P-CCNC: 12 U/L (ref 0–45)
BASOPHILS # BLD AUTO: 0.1 10E9/L (ref 0–0.2)
BASOPHILS NFR BLD AUTO: 0.7 %
CREAT SERPL-MCNC: 0.73 MG/DL (ref 0.52–1.04)
DIFFERENTIAL METHOD BLD: ABNORMAL
EOSINOPHIL # BLD AUTO: 0.2 10E9/L (ref 0–0.7)
EOSINOPHIL NFR BLD AUTO: 3.2 %
ERYTHROCYTE [DISTWIDTH] IN BLOOD BY AUTOMATED COUNT: 15.4 % (ref 10–15)
GFR SERPL CREATININE-BSD FRML MDRD: >90 ML/MIN/{1.73_M2}
HCT VFR BLD AUTO: 37.9 % (ref 35–47)
HGB BLD-MCNC: 11.4 G/DL (ref 11.7–15.7)
IMM GRANULOCYTES # BLD: 0 10E9/L (ref 0–0.4)
IMM GRANULOCYTES NFR BLD: 0.1 %
LYMPHOCYTES # BLD AUTO: 2.1 10E9/L (ref 0.8–5.3)
LYMPHOCYTES NFR BLD AUTO: 29.2 %
MCH RBC QN AUTO: 24.8 PG (ref 26.5–33)
MCHC RBC AUTO-ENTMCNC: 30.1 G/DL (ref 31.5–36.5)
MCV RBC AUTO: 82 FL (ref 78–100)
MONOCYTES # BLD AUTO: 0.5 10E9/L (ref 0–1.3)
MONOCYTES NFR BLD AUTO: 6.5 %
NEUTROPHILS # BLD AUTO: 4.3 10E9/L (ref 1.6–8.3)
NEUTROPHILS NFR BLD AUTO: 60.3 %
NRBC # BLD AUTO: 0 10*3/UL
NRBC BLD AUTO-RTO: 0 /100
PLATELET # BLD AUTO: 391 10E9/L (ref 150–450)
RBC # BLD AUTO: 4.6 10E12/L (ref 3.8–5.2)
WBC # BLD AUTO: 7.1 10E9/L (ref 4–11)

## 2019-03-03 PROCEDURE — 82565 ASSAY OF CREATININE: CPT | Performed by: INTERNAL MEDICINE

## 2019-03-03 PROCEDURE — 85025 COMPLETE CBC W/AUTO DIFF WBC: CPT | Performed by: INTERNAL MEDICINE

## 2019-03-03 PROCEDURE — 84450 TRANSFERASE (AST) (SGOT): CPT | Performed by: INTERNAL MEDICINE

## 2019-03-04 NOTE — PROGRESS NOTES
This is a recent snapshot of the patient's [facility] medical record.  For current drug dose and complete information and questions, call [phone] or In Basket pool, fv home infusion ([id number])  CSN Number:  [id number]

## 2019-03-05 ENCOUNTER — HOME INFUSION (PRE-WILLOW HOME INFUSION) (OUTPATIENT)
Dept: PHARMACY | Facility: CLINIC | Age: 49
End: 2019-03-05

## 2019-03-06 ENCOUNTER — HOME INFUSION (PRE-WILLOW HOME INFUSION) (OUTPATIENT)
Dept: PHARMACY | Facility: CLINIC | Age: 49
End: 2019-03-06

## 2019-03-06 NOTE — PROGRESS NOTES
This is a recent snapshot of the patient's [facility] medical record.  For current drug dose and complete information and questions, call [phone] or In Basket pool, fv home infusion ([id number])  CSN Number:  [id number]

## 2019-03-07 NOTE — PROGRESS NOTES
This is a recent snapshot of the patient's [facility] medical record.  For current drug dose and complete information and questions, call [phone] or In Basket pool, fv home infusion ([id number])  CSN Number:  [id number]

## 2019-03-07 NOTE — PROGRESS NOTES
This is a recent snapshot of the patient's [facility] medical record.  For current drug dose and complete information and questions, call [phone] or In Basket pool, fv home infusion ([id number])  CSN Number:  [id number]

## 2019-03-08 ENCOUNTER — HOME INFUSION (PRE-WILLOW HOME INFUSION) (OUTPATIENT)
Dept: PHARMACY | Facility: CLINIC | Age: 49
End: 2019-03-08

## 2019-03-11 ENCOUNTER — HOME INFUSION (PRE-WILLOW HOME INFUSION) (OUTPATIENT)
Dept: PHARMACY | Facility: CLINIC | Age: 49
End: 2019-03-11

## 2019-03-12 ENCOUNTER — HOME INFUSION (PRE-WILLOW HOME INFUSION) (OUTPATIENT)
Dept: PHARMACY | Facility: CLINIC | Age: 49
End: 2019-03-12

## 2019-03-12 NOTE — PROGRESS NOTES
This is a recent snapshot of the patient's [facility] medical record.  For current drug dose and complete information and questions, call [phone] or In Basket pool, fv home infusion ([id number])  CSN Number:  [id number]

## 2019-03-12 NOTE — PROGRESS NOTES
This is a recent snapshot of the patient's [facility] medical record.  For current drug dose and complete information and questions, call [phone] or In Basket pool, fv home infusion ([id number])  CSN Number:  [id number]

## 2019-03-13 NOTE — PROGRESS NOTES
This is a recent snapshot of the patient's [facility] medical record.  For current drug dose and complete information and questions, call [phone] or In Basket pool, fv home infusion ([id number])  CSN Number:  [id number]

## 2019-03-19 ENCOUNTER — HOSPITAL ENCOUNTER (INPATIENT)
Facility: CLINIC | Age: 49
LOS: 3 days | Discharge: HOME HEALTH | DRG: 857 | End: 2019-03-22
Attending: ORTHOPAEDIC SURGERY | Admitting: HOSPITALIST
Payer: COMMERCIAL

## 2019-03-19 DIAGNOSIS — L08.9 LEFT FOOT INFECTION: Primary | ICD-10-CM

## 2019-03-19 LAB
ALBUMIN SERPL-MCNC: 3.2 G/DL (ref 3.4–5)
ALP SERPL-CCNC: 121 U/L (ref 40–150)
ALT SERPL W P-5'-P-CCNC: 18 U/L (ref 0–50)
ANION GAP SERPL CALCULATED.3IONS-SCNC: 11 MMOL/L (ref 3–14)
AST SERPL W P-5'-P-CCNC: 13 U/L (ref 0–45)
BASOPHILS # BLD AUTO: 0 10E9/L (ref 0–0.2)
BASOPHILS NFR BLD AUTO: 0.3 %
BILIRUB SERPL-MCNC: 0.2 MG/DL (ref 0.2–1.3)
BUN SERPL-MCNC: 12 MG/DL (ref 7–30)
CALCIUM SERPL-MCNC: 8.6 MG/DL (ref 8.5–10.1)
CHLORIDE SERPL-SCNC: 104 MMOL/L (ref 94–109)
CO2 SERPL-SCNC: 24 MMOL/L (ref 20–32)
CREAT SERPL-MCNC: 0.71 MG/DL (ref 0.52–1.04)
CRP SERPL-MCNC: 37.4 MG/L (ref 0–8)
DIFFERENTIAL METHOD BLD: ABNORMAL
EOSINOPHIL # BLD AUTO: 0.2 10E9/L (ref 0–0.7)
EOSINOPHIL NFR BLD AUTO: 2.6 %
ERYTHROCYTE [DISTWIDTH] IN BLOOD BY AUTOMATED COUNT: 15.3 % (ref 10–15)
ERYTHROCYTE [SEDIMENTATION RATE] IN BLOOD BY WESTERGREN METHOD: 51 MM/H (ref 0–20)
GFR SERPL CREATININE-BSD FRML MDRD: >90 ML/MIN/{1.73_M2}
GLUCOSE SERPL-MCNC: 92 MG/DL (ref 70–99)
HCT VFR BLD AUTO: 33.9 % (ref 35–47)
HGB BLD-MCNC: 10.8 G/DL (ref 11.7–15.7)
IMM GRANULOCYTES # BLD: 0 10E9/L (ref 0–0.4)
IMM GRANULOCYTES NFR BLD: 0.4 %
LYMPHOCYTES # BLD AUTO: 1.8 10E9/L (ref 0.8–5.3)
LYMPHOCYTES NFR BLD AUTO: 19.4 %
MCH RBC QN AUTO: 25 PG (ref 26.5–33)
MCHC RBC AUTO-ENTMCNC: 31.9 G/DL (ref 31.5–36.5)
MCV RBC AUTO: 79 FL (ref 78–100)
MONOCYTES # BLD AUTO: 0.5 10E9/L (ref 0–1.3)
MONOCYTES NFR BLD AUTO: 5.2 %
NEUTROPHILS # BLD AUTO: 6.5 10E9/L (ref 1.6–8.3)
NEUTROPHILS NFR BLD AUTO: 72.1 %
NRBC # BLD AUTO: 0 10*3/UL
NRBC BLD AUTO-RTO: 0 /100
PLATELET # BLD AUTO: 415 10E9/L (ref 150–450)
POTASSIUM SERPL-SCNC: 3.5 MMOL/L (ref 3.4–5.3)
PROT SERPL-MCNC: 7.8 G/DL (ref 6.8–8.8)
RBC # BLD AUTO: 4.32 10E12/L (ref 3.8–5.2)
SODIUM SERPL-SCNC: 139 MMOL/L (ref 133–144)
WBC # BLD AUTO: 9.1 10E9/L (ref 4–11)

## 2019-03-19 PROCEDURE — 85652 RBC SED RATE AUTOMATED: CPT | Performed by: PHYSICIAN ASSISTANT

## 2019-03-19 PROCEDURE — 86140 C-REACTIVE PROTEIN: CPT | Performed by: PHYSICIAN ASSISTANT

## 2019-03-19 PROCEDURE — 80053 COMPREHEN METABOLIC PANEL: CPT | Performed by: PHYSICIAN ASSISTANT

## 2019-03-19 PROCEDURE — 99222 1ST HOSP IP/OBS MODERATE 55: CPT | Mod: AI | Performed by: PHYSICIAN ASSISTANT

## 2019-03-19 PROCEDURE — 25000132 ZZH RX MED GY IP 250 OP 250 PS 637: Performed by: PHYSICIAN ASSISTANT

## 2019-03-19 PROCEDURE — 85025 COMPLETE CBC W/AUTO DIFF WBC: CPT | Performed by: PHYSICIAN ASSISTANT

## 2019-03-19 PROCEDURE — 99207 ZZC CONSULT E&M CHANGED TO INITIAL LEVEL: CPT | Performed by: PHYSICIAN ASSISTANT

## 2019-03-19 PROCEDURE — 12000000 ZZH R&B MED SURG/OB

## 2019-03-19 PROCEDURE — 36415 COLL VENOUS BLD VENIPUNCTURE: CPT | Performed by: PHYSICIAN ASSISTANT

## 2019-03-19 PROCEDURE — 25800030 ZZH RX IP 258 OP 636: Performed by: PHYSICIAN ASSISTANT

## 2019-03-19 RX ORDER — HYDROXYZINE PAMOATE 25 MG/1
25 CAPSULE ORAL AT BEDTIME
COMMUNITY
End: 2020-02-04

## 2019-03-19 RX ORDER — HYDROXYZINE PAMOATE 25 MG/1
25 CAPSULE ORAL AT BEDTIME
Status: DISCONTINUED | OUTPATIENT
Start: 2019-03-19 | End: 2019-03-22 | Stop reason: HOSPADM

## 2019-03-19 RX ORDER — HYDROXYZINE PAMOATE 25 MG/1
25 CAPSULE ORAL EVERY 4 HOURS PRN
Status: DISCONTINUED | OUTPATIENT
Start: 2019-03-19 | End: 2019-03-22 | Stop reason: HOSPADM

## 2019-03-19 RX ORDER — TOPIRAMATE 100 MG/1
100 TABLET, FILM COATED ORAL AT BEDTIME
Status: DISCONTINUED | OUTPATIENT
Start: 2019-03-19 | End: 2019-03-22 | Stop reason: HOSPADM

## 2019-03-19 RX ORDER — AMOXICILLIN 250 MG
1 CAPSULE ORAL 2 TIMES DAILY
Status: DISCONTINUED | OUTPATIENT
Start: 2019-03-19 | End: 2019-03-22 | Stop reason: HOSPADM

## 2019-03-19 RX ORDER — SERTRALINE HYDROCHLORIDE 100 MG/1
100 TABLET, FILM COATED ORAL DAILY
Status: DISCONTINUED | OUTPATIENT
Start: 2019-03-20 | End: 2019-03-22 | Stop reason: HOSPADM

## 2019-03-19 RX ORDER — PHENTERMINE HYDROCHLORIDE 30 MG/1
30 CAPSULE ORAL EVERY MORNING
Status: DISCONTINUED | OUTPATIENT
Start: 2019-03-20 | End: 2019-03-19

## 2019-03-19 RX ORDER — PRAVASTATIN SODIUM 20 MG
20 TABLET ORAL EVERY EVENING
COMMUNITY

## 2019-03-19 RX ORDER — NALOXONE HYDROCHLORIDE 0.4 MG/ML
.1-.4 INJECTION, SOLUTION INTRAMUSCULAR; INTRAVENOUS; SUBCUTANEOUS
Status: DISCONTINUED | OUTPATIENT
Start: 2019-03-19 | End: 2019-03-20

## 2019-03-19 RX ORDER — AMOXICILLIN 250 MG
2 CAPSULE ORAL 2 TIMES DAILY
Status: DISCONTINUED | OUTPATIENT
Start: 2019-03-19 | End: 2019-03-22 | Stop reason: HOSPADM

## 2019-03-19 RX ORDER — HYDROMORPHONE HYDROCHLORIDE 1 MG/ML
.3-.5 INJECTION, SOLUTION INTRAMUSCULAR; INTRAVENOUS; SUBCUTANEOUS
Status: DISCONTINUED | OUTPATIENT
Start: 2019-03-19 | End: 2019-03-22 | Stop reason: HOSPADM

## 2019-03-19 RX ORDER — OXYCODONE HYDROCHLORIDE 5 MG/1
5-10 TABLET ORAL
Status: DISCONTINUED | OUTPATIENT
Start: 2019-03-19 | End: 2019-03-22 | Stop reason: HOSPADM

## 2019-03-19 RX ORDER — CEFAZOLIN SODIUM 2 G/100ML
2 INJECTION, SOLUTION INTRAVENOUS
Status: COMPLETED | OUTPATIENT
Start: 2019-03-20 | End: 2019-03-20

## 2019-03-19 RX ORDER — PROCHLORPERAZINE 25 MG
25 SUPPOSITORY, RECTAL RECTAL EVERY 12 HOURS PRN
Status: DISCONTINUED | OUTPATIENT
Start: 2019-03-19 | End: 2019-03-22 | Stop reason: HOSPADM

## 2019-03-19 RX ORDER — ONDANSETRON 4 MG/1
4 TABLET, ORALLY DISINTEGRATING ORAL EVERY 6 HOURS PRN
Status: DISCONTINUED | OUTPATIENT
Start: 2019-03-19 | End: 2019-03-22 | Stop reason: HOSPADM

## 2019-03-19 RX ORDER — AMOXICILLIN 250 MG
1 CAPSULE ORAL 2 TIMES DAILY PRN
Status: DISCONTINUED | OUTPATIENT
Start: 2019-03-19 | End: 2019-03-22 | Stop reason: HOSPADM

## 2019-03-19 RX ORDER — CEFAZOLIN SODIUM 1 G/3ML
1 INJECTION, POWDER, FOR SOLUTION INTRAMUSCULAR; INTRAVENOUS SEE ADMIN INSTRUCTIONS
Status: DISCONTINUED | OUTPATIENT
Start: 2019-03-20 | End: 2019-03-20 | Stop reason: HOSPADM

## 2019-03-19 RX ORDER — ONDANSETRON 2 MG/ML
4 INJECTION INTRAMUSCULAR; INTRAVENOUS EVERY 6 HOURS PRN
Status: DISCONTINUED | OUTPATIENT
Start: 2019-03-19 | End: 2019-03-22 | Stop reason: HOSPADM

## 2019-03-19 RX ORDER — BUPROPION HYDROCHLORIDE 300 MG/1
300 TABLET ORAL DAILY
Status: DISCONTINUED | OUTPATIENT
Start: 2019-03-20 | End: 2019-03-22 | Stop reason: HOSPADM

## 2019-03-19 RX ORDER — CETIRIZINE HYDROCHLORIDE 10 MG/1
10 TABLET ORAL DAILY
Status: DISCONTINUED | OUTPATIENT
Start: 2019-03-20 | End: 2019-03-22 | Stop reason: HOSPADM

## 2019-03-19 RX ORDER — PRAVASTATIN SODIUM 20 MG
20 TABLET ORAL EVERY EVENING
Status: DISCONTINUED | OUTPATIENT
Start: 2019-03-19 | End: 2019-03-22 | Stop reason: HOSPADM

## 2019-03-19 RX ORDER — SODIUM CHLORIDE, SODIUM LACTATE, POTASSIUM CHLORIDE, CALCIUM CHLORIDE 600; 310; 30; 20 MG/100ML; MG/100ML; MG/100ML; MG/100ML
INJECTION, SOLUTION INTRAVENOUS CONTINUOUS
Status: DISCONTINUED | OUTPATIENT
Start: 2019-03-19 | End: 2019-03-20

## 2019-03-19 RX ORDER — GABAPENTIN 800 MG/1
800 TABLET ORAL 3 TIMES DAILY
Status: DISCONTINUED | OUTPATIENT
Start: 2019-03-19 | End: 2019-03-22 | Stop reason: HOSPADM

## 2019-03-19 RX ORDER — BUSPIRONE HYDROCHLORIDE 5 MG/1
15 TABLET ORAL 2 TIMES DAILY
Status: DISCONTINUED | OUTPATIENT
Start: 2019-03-19 | End: 2019-03-22 | Stop reason: HOSPADM

## 2019-03-19 RX ORDER — CETIRIZINE HYDROCHLORIDE 10 MG/1
10 TABLET ORAL DAILY
COMMUNITY

## 2019-03-19 RX ORDER — PROCHLORPERAZINE MALEATE 10 MG
10 TABLET ORAL EVERY 6 HOURS PRN
Status: DISCONTINUED | OUTPATIENT
Start: 2019-03-19 | End: 2019-03-22 | Stop reason: HOSPADM

## 2019-03-19 RX ORDER — AMOXICILLIN 250 MG
2 CAPSULE ORAL 2 TIMES DAILY PRN
Status: DISCONTINUED | OUTPATIENT
Start: 2019-03-19 | End: 2019-03-22 | Stop reason: HOSPADM

## 2019-03-19 RX ADMIN — HYDROXYZINE PAMOATE 25 MG: 25 CAPSULE ORAL at 22:21

## 2019-03-19 RX ADMIN — TOPIRAMATE 100 MG: 100 TABLET, FILM COATED ORAL at 22:21

## 2019-03-19 RX ADMIN — BUSPIRONE HYDROCHLORIDE 15 MG: 5 TABLET ORAL at 22:21

## 2019-03-19 RX ADMIN — SENNOSIDES AND DOCUSATE SODIUM 1 TABLET: 8.6; 5 TABLET ORAL at 22:20

## 2019-03-19 RX ADMIN — PRAVASTATIN SODIUM 20 MG: 20 TABLET ORAL at 22:21

## 2019-03-19 RX ADMIN — SODIUM CHLORIDE, POTASSIUM CHLORIDE, SODIUM LACTATE AND CALCIUM CHLORIDE: 600; 310; 30; 20 INJECTION, SOLUTION INTRAVENOUS at 20:42

## 2019-03-19 RX ADMIN — GABAPENTIN 800 MG: 800 TABLET, FILM COATED ORAL at 22:21

## 2019-03-19 RX ADMIN — OXYCODONE HYDROCHLORIDE 10 MG: 5 TABLET ORAL at 20:42

## 2019-03-19 RX ADMIN — OXYCODONE HYDROCHLORIDE 10 MG: 5 TABLET ORAL at 17:27

## 2019-03-19 RX ADMIN — OXYCODONE HYDROCHLORIDE 10 MG: 5 TABLET ORAL at 23:48

## 2019-03-19 ASSESSMENT — ACTIVITIES OF DAILY LIVING (ADL)
ADLS_ACUITY_SCORE: 10
BATHING: 0-->INDEPENDENT
TRANSFERRING: 0-->INDEPENDENT
RETIRED_COMMUNICATION: 0-->UNDERSTANDS/COMMUNICATES WITHOUT DIFFICULTY
RETIRED_EATING: 0-->INDEPENDENT
SWALLOWING: 0-->SWALLOWS FOODS/LIQUIDS WITHOUT DIFFICULTY
COGNITION: 0 - NO COGNITION ISSUES REPORTED
TOILETING: 0-->INDEPENDENT
DRESS: 0-->INDEPENDENT
FALL_HISTORY_WITHIN_LAST_SIX_MONTHS: NO
AMBULATION: 0-->INDEPENDENT

## 2019-03-19 NOTE — PHARMACY-ADMISSION MEDICATION HISTORY
Admission Medication History    Admission medication history interview status for the 3/19/2019 admission is complete. See EPIC admission navigator for prior to admission medications     Medication history source reliability: Good, Patient is good historian    Actions taken by pharmacist (provider contacted, etc):None     Additional medication history information not noted on PTA med list :None    Medication reconciliation/reorder completed by provider prior to medication history? No    Time spent in this activity: 15 minutes    Prior to Admission medications    Medication Sig Last Dose Taking? Auth Provider   ammonium lactate (AMLACTIN) 12 % cream Apply topically daily as needed (to heels)  Past Week at PRN Yes Unknown, Entered By History   BuPROPion HCl (WELLBUTRIN XL PO) Take 300 mg by mouth daily 3/19/2019 at AM Yes Reported, Patient   BusPIRone HCl (BUSPAR PO) Take 15 mg by mouth 2 times daily 3/19/2019 at x1 Yes Reported, Patient   cetirizine (ZYRTEC) 10 MG tablet Take 10 mg by mouth daily 3/19/2019 at AM Yes Unknown, Entered By History   cholecalciferol (VITAMIN D3) 5000 units TABS tablet Take 5,000 Units by mouth daily 3/19/2019 at AM Yes Unknown, Entered By History   clindamycin (CLEOCIN T) 1 % solution Apply topically nightly as needed (Acne outbreak)   at PRN Yes Unknown, Entered By History   Furosemide (LASIX PO) Take 160 mg by mouth daily  3/19/2019 at AM Yes Reported, Patient   gabapentin (NEURONTIN) 800 MG tablet Take 800 mg by mouth 3 times daily Am, supper, hs 3/19/2019 at x1 Yes Unknown, Entered By History   hydrOXYzine (VISTARIL) 25 MG capsule Take 25 mg by mouth At Bedtime 3/18/2019 at HS Yes Unknown, Entered By History   hydrOXYzine (VISTARIL) 25 MG capsule Take 1 capsule (25 mg) by mouth every 4 hours as needed for anxiety Past Week at PRN Yes Jett Malik, PA-C   Omeprazole (PRILOSEC PO) Take 40 mg by mouth 2 times daily (before meals) 2 CAPSULES IN A.M.  3/19/2019 at x1 Yes Reported,  Patient   Ondansetron HCl (ZOFRAN PO) Take 8 mg by mouth as needed for nausea or vomiting  at PRN Yes Reported, Patient   phentermine 30 MG capsule Take 30 mg by mouth every morning 3/19/2019 at AM Yes Reported, Patient   polyethylene glycol (MIRALAX/GLYCOLAX) packet Take 17 g by mouth as needed for constipation  at PRN Yes Reported, Patient   Potassium Chloride Shameka CR (K-DUR PO) Take 80 mEq by mouth daily  3/19/2019 at AM Yes Reported, Patient   pravastatin (PRAVACHOL) 20 MG tablet Take 20 mg by mouth every evening 3/18/2019 at PM Yes Unknown, Entered By History   sertraline (ZOLOFT) 100 MG tablet Take 100 mg by mouth daily 3/19/2019 at AM Yes Unknown, Entered By History   Topiramate (TOPAMAX PO) Take 100 mg by mouth At Bedtime  3/18/2019 at HS Yes Reported, Patient   senna-docusate (SENOKOT-S;PERICOLACE) 8.6-50 MG per tablet Take 1 tablet by mouth 2 times daily  Patient taking differently: Take 1 tablet by mouth 2 times daily as needed   at PRN  Jett Malik, CECILIA Villarreal, PharmD

## 2019-03-19 NOTE — CONSULTS
[facility]  Consult Note - Hospitalist Service     Date of Admission:  3/19/2019  Consult Requested by: Dr. Harman  Reason for Consult: Pre-Op H&P    Assessment & Plan   Shalonda Howard is a 49 year old female admitted on 3/19/2019.     Past medical history significant for lupus, PCOS, bilateral chronic lower extremity edema, GERD, dysthymia, generalized anxiety disorder, obesity, allergic rhinitis, myalgias with myositis, opioid dependence, ventral hernia without incarceration, postoperative nausea and vomiting, peripheral neuropathy, hyperlipidemia, history of kidney stones and continued nonhealing left surgical wound with multiple infections who is being evaluated for CO medical management of chronic medical conditions following direct admission by orthopedic service due to left foot nonhealing surgical wound with infection.     Persistent postoperative left foot infection with nonhealing wound and pre-op H&P:    Orthopedic service is managing at this point. Patient is without chest pain or dysuria and no heart history.  Patient indicated the plan is to remove bone.  --Defer analgesic management to orthopedic service.    --Defer any imaging study to their service.    --Defer Antibiotics and ID consult to Ortho (might want to wait until bone sample can be obtained).    --ESR and inflammatory CRP.    Bilateral chronic lower extremity edema:    Patient is compliant with Lasix 160 mg daily and 80 mEq of potassium daily.    --Hold both medications at this point as patient is receiving IV fluids.  --Will monitor potassium level and initiate replacement protocol as needed.     GERD:    --The patient's prior to admit omeprazole 40 mg 2 times daily will be continued.     Dysthymia with anxiety:    --Resume prior to admit Wellbutrin- mg daily, BuSpar 50 mg 2 times daily, sertraline 100 mg daily, and Topamax 100 mg every evening at bedtime.     Peripheral neuropathy:    --Prior to admit gabapentin 800  mg 3 times daily will be continued.     Opioid dependence:    --Will defer analgesic management to orthopedic service.     Ventral hernia:    Noted history of 5 ventral hernia repairs and has recurrence of a ventral hernia.  Would recommend follow up with primary care provider and further surgical intervention when able.     Hyperlipidemia:    --Resume PTA pravastatin.      Obesity:    Patient's BMI is calculated at 33.28.    --Will defer ongoing management to primary care provider.   --Hold PTA phentermine.      Systemic lupus erythematosus:    Patient indicates that one rheumatologist stated she has lupus and another denied this.  She is not currently on any medications.  No interventions appear necessary.     PCOS:    This appears to be stable.  The patient indicates she is not on any medications.  No interventions appear necessary.     DVT prophylaxis:    --Per orthopedic service, we will order for PCDs.     The patient has been discussed with Dr. Medrano, who agrees with the assessment and plan at this time. Dr. Medrano will evaluate the patient independently.       Robbin Crowe PA-C  [facility]    ______________________________________________________________________    Chief Complaint   Non-healing surgical wound of the left foot.      History is obtained from the patient and EMR.      History of Present Illness   Shalonda Howard is a 49 year old female with a past medical history significant for lupus, PCOS, bilateral chronic lower extremity edema, GERD, dysthymia, generalized anxiety disorder, obesity, allergic rhinitis, myalgias with myositis, opioid dependence, ventral hernia without incarceration, postoperative nausea and vomiting.  Peripheral neuropathy, hyperlipidemia, history of kidney stones and continued nonhealing left surgical wound with multiple infections who is being evaluated for CO medical management of chronic medical conditions following direct admission by  orthopedic service due to left foot nonhealing surgical wound with infection.     Patient has had a persistent nonhealing surgical wound with infection of the left foot since 8/2018.  The patient has required multiple hospitalizations since initial procedure performed in 8/2018 with a bilateral bunionectomy performed by Dr. Harman.  The patient was subsequently admitted in September due to wound dehiscence and on 9/26 underwent incision and drainage as well as hardware removal.  She had been discharged following that hospitalization with antibiotics, however, this did not improve and patient was subsequently admitted on 10/2018 with concerns for worsening infection.  An MRI was performed at that time which did not reveal or appear to have any concerning signs for osteomyelitis.  She was treated with IV Ancef and followed by infectious disease with a 6 week course of IV Ancef through a PICC line.  The patient was then admitted in November with continued nonhealing surgical wound to the left foot with concerning infection for which she underwent incision and drainage as well as debridement.     Patient was again admitted for non-healing surgical wound on 1/22/2019.  She was discharged on 1/28/2019 after a left hallux wound irrigation and debridement with bone biopsy and wound vac placement.       Patient completed IV antibiotics last week (3/11/2019) with PICC removed the following day.  She had a wound vac in place until yesterday.  She has continued to have pain with redness and oozing at the surgical wound.  She notes that she has continued fevers and chills.  She notes swelling in both feet which are both painful.         Review of Systems   The 10 point Review of Systems is negative other than noted in the HPI.    Past Medical History    I have reviewed this patient's medical history and updated it with pertinent information if needed.   Past Medical History:   Diagnosis Date     Allergic rhinitis, cause  unspecified      Anxiety state, unspecified      Cellulitis and abscess of leg, except foot      Closed fracture of unspecified part of tibia      Disuse osteoporosis      Dysthymic disorder      Edema      Encounter for long-term (current) use of other medications      Esophageal reflux      Kidney stones      Myalgia and myositis, unspecified      Obesity, unspecified      Open wound of foot except toe(s) alone, complicated      Opioid type dependence, unspecified      Other acne      Other congenital valgus deformity of feet      Other ventral hernia without mention of obstruction or gangrene      Polycystic ovaries      PONV (postoperative nausea and vomiting)      Systemic lupus erythematosus (H)      Tibialis tendinitis      Unspecified hereditary and idiopathic peripheral neuropathy    1.  Systemic lupus erythematosus.   2.  PCOS.   3.  Bilateral chronic lower extremity edema.   4.  GERD.   5.  Dysthymia.   6.  Generalized anxiety disorder.   7.  Obesity.   8.  Allergic rhinitis.   9.  History of myalgias with myositis.   10.  Opioid dependence.   11.  Ventral hernia without incarcerated hernia.   12.  Postoperative nausea and vomiting.   13.  Peripheral neuropathy.   14.  Hyperlipidemia.   15.  History of kidney stones.   16.  Continued left foot postoperative infection and nonhealing surgical site.     Past Surgical History   I have reviewed this patient's surgical history and updated it with pertinent information if needed.  Past Surgical History:   Procedure Laterality Date     APPENDECTOMY       APPLY WOUND VAC Left 1/24/2019    Procedure: WOUND VAC APPLICATION;  Surgeon: Miguel Mosher MD;  Location:  OR     ARTHRODESIS FOOT Left 2/4/2015    Procedure: ARTHRODESIS FOOT;  Surgeon: Andre Harman MD;  Location:  [facility]     BUNIONECTOMY RT/LT  08/29/2018     DILATION AND CURETTAGE       EXCISE TOENAIL(S) Left 2/4/2015    Procedure: EXCISE TOENAIL(S);  Surgeon: Andre Harman MD;   Location:  [facility]     HERNIA REPAIR      umbilical     HERNIA REPAIR      ventral open x 2     IRRIGATION AND DEBRIDEMENT FOOT, COMBINED Left 2018    Procedure: COMBINED IRRIGATION AND DEBRIDEMENT FOOT;  IRRIGATION AND DEBRIDEMENT LEFT FOOT;  Surgeon: Andre Harman MD;  Location:  [facility]     IRRIGATION AND DEBRIDEMENT FOOT, COMBINED Left 2018    Procedure: COMBINED IRRIGATION AND DEBRIDEMENT LEFT FOOT;  Surgeon: Andre Harman MD;  Location:  SD     IRRIGATION AND DEBRIDEMENT FOOT, COMBINED Left 2019    Procedure: LEFT HALLUX WOUND IRRIGATION AND DEBRIDEMENT WITH BONE BIOPSY;  Surgeon: Miguel Mosher MD;  Location:  OR     REPAIR HAMMER TOE Left 2018    Procedure: REPAIR HAMMER TOE;  Surgeon: Andre Harman MD;  Location: [facility]     TONSILLECTOMY     1.  Tonsillectomy.   2.  Irrigation and debridement of the left foot performed in 2018 and repeated in 2018, 2019.    3.  Bilateral bunionectomy performed in 2018.   4.  Umbilical hernia repair.   5.  Ventral hernia repair x5.   6.  D and C.   7.  Bilateral foot arthrodesis.   8.  Appendectomy.     Social History   I have reviewed this patient's social history and updated it with pertinent information if needed.  Social History     Tobacco Use     Smoking status: Former Smoker     Packs/day: 0.50     Years: 12.00     Pack years: 6.00     Types: Cigarettes     Last attempt to quit: 10/1/2002     Years since quittin.4     Smokeless tobacco: Never Used   Substance Use Topics     Alcohol use: Yes     Comment: wine with dinner occas     Drug use: No   The patient currently resides in a house in [place], [place] with her  and daughter.  She is a former smoker and quit in  with a 6-pack-year history.  She indicates she occasionally consumes wine with dinner.  She denies street or illicit drug use.  She does not currently utilize a cane or walker but has on occasion utilized a scooter to support her  left foot when it is painful.     Family History   I have reviewed this patient's family history and updated it with pertinent information if needed.   No family history on file.   1.  Father passed away at the age of 52 due to esophageal cancer.   2.  Mother has pulmonary hypertension.   3.  Paternal grandfather had esophageal cancer.   4.  An uncle (patient's father's half-brother) also had esophageal cancer.     Medications   Medications Prior to Admission   Medication Sig Dispense Refill Last Dose     ammonium lactate (AMLACTIN) 12 % cream Apply topically daily as needed (to heels)    Past Week at PRN     BuPROPion HCl (WELLBUTRIN XL PO) Take 300 mg by mouth daily   3/19/2019 at AM     BusPIRone HCl (BUSPAR PO) Take 15 mg by mouth 2 times daily   3/19/2019 at x1     cetirizine (ZYRTEC) 10 MG tablet Take 10 mg by mouth daily   3/19/2019 at AM     cholecalciferol (VITAMIN D3) 5000 units TABS tablet Take 5,000 Units by mouth daily   3/19/2019 at AM     clindamycin (CLEOCIN T) 1 % solution Apply topically nightly as needed (Acne outbreak)     at PRN     Furosemide (LASIX PO) Take 160 mg by mouth daily    3/19/2019 at AM     gabapentin (NEURONTIN) 800 MG tablet Take 800 mg by mouth 3 times daily Am, supper, hs   3/19/2019 at x1     hydrOXYzine (VISTARIL) 25 MG capsule Take 25 mg by mouth At Bedtime   3/18/2019 at HS     hydrOXYzine (VISTARIL) 25 MG capsule Take 1 capsule (25 mg) by mouth every 4 hours as needed for anxiety 40 capsule 0 Past Week at PRN     Omeprazole (PRILOSEC PO) Take 40 mg by mouth 2 times daily (before meals) 2 CAPSULES IN A.M.    3/19/2019 at x1     Ondansetron HCl (ZOFRAN PO) Take 8 mg by mouth as needed for nausea or vomiting    at PRN     phentermine 30 MG capsule Take 30 mg by mouth every morning   3/19/2019 at AM     polyethylene glycol (MIRALAX/GLYCOLAX) packet Take 17 g by mouth as needed for constipation    at PRN     Potassium Chloride Shameka CR (K-DUR PO) Take 80 mEq by mouth daily     3/19/2019 at AM     pravastatin (PRAVACHOL) 20 MG tablet Take 20 mg by mouth every evening   3/18/2019 at PM     sertraline (ZOLOFT) 100 MG tablet Take 100 mg by mouth daily   3/19/2019 at AM     Topiramate (TOPAMAX PO) Take 100 mg by mouth At Bedtime    3/18/2019 at HS     senna-docusate (SENOKOT-S;PERICOLACE) 8.6-50 MG per tablet Take 1 tablet by mouth 2 times daily (Patient taking differently: Take 1 tablet by mouth 2 times daily as needed ) 40 tablet 1  at PRN       Allergies   Allergies   Allergen Reactions     Sulfa Drugs Shortness Of Breath and Rash     Demerol [Meperidine] Nausea and Vomiting     Erythromycin Nausea and Vomiting     Metformin Nausea and Vomiting     Codeine Rash     Penicillins Rash       Physical Exam   Vital Signs: Temp: 98.5  F (36.9  C) Temp src: Axillary BP: 130/80 Pulse: 105   Resp: 16 SpO2: 97 % O2 Device: None (Room air)    Weight: 0 lbs 0 oz      Constitutional: Awake, alert, cooperative, no apparent distress.    ENT: Normocephalic, without obvious abnormality, atraumatic, oral pharynx with moist mucus membranes, tonsils without erythema or exudates.  Eyes pupils are equal, round and reactive to light; extra occular movements intact.  Normal sclera.    Neck: Supple, symmetrical, trachea midline, no adenopathy.  Pulmonary: No increased work of breathing, good air exchange, clear to auscultation bilaterally, no crackles or wheezing.  Hoarse voice.    Cardiovascular: Regular rate and rhythm, normal S1 and S2, no S3 or S4, and no murmur noted.  GI: Normal bowel sounds, soft, non-distended, non-tender.  Obese.  Skin/Integumen: Clear.  Neuro: CN II-XII grossly intact.  Psych:  Alert and oriented x 3. Normal affect.  Extremities: No lower extremity edema noted, and non-TTP bilaterally. Bilateral foot dressing in place.  Erythema noted bilaterally at the lower third of both lower extremities.      Data   I personally reviewed no images or EKG's today.

## 2019-03-20 ENCOUNTER — HOME INFUSION (PRE-WILLOW HOME INFUSION) (OUTPATIENT)
Dept: PHARMACY | Facility: CLINIC | Age: 49
End: 2019-03-20

## 2019-03-20 ENCOUNTER — ANESTHESIA EVENT (OUTPATIENT)
Dept: SURGERY | Facility: CLINIC | Age: 49
DRG: 857 | End: 2019-03-20
Payer: COMMERCIAL

## 2019-03-20 ENCOUNTER — ANESTHESIA (OUTPATIENT)
Dept: SURGERY | Facility: CLINIC | Age: 49
DRG: 857 | End: 2019-03-20
Payer: COMMERCIAL

## 2019-03-20 PROBLEM — M86.9: Status: ACTIVE | Noted: 2019-03-20

## 2019-03-20 PROBLEM — M32.9 SYSTEMIC LUPUS ERYTHEMATOSUS (H): Status: ACTIVE | Noted: 2019-03-20

## 2019-03-20 LAB
GRAM STN SPEC: ABNORMAL
HCG UR QL: NEGATIVE
SPECIMEN SOURCE: ABNORMAL

## 2019-03-20 PROCEDURE — 37000009 ZZH ANESTHESIA TECHNICAL FEE, EACH ADDTL 15 MIN: Performed by: ORTHOPAEDIC SURGERY

## 2019-03-20 PROCEDURE — 25000125 ZZHC RX 250: Performed by: NURSE ANESTHETIST, CERTIFIED REGISTERED

## 2019-03-20 PROCEDURE — 36000071 ZZH SURGERY LEVEL 5 W FLUORO 1ST 30 MIN: Performed by: ORTHOPAEDIC SURGERY

## 2019-03-20 PROCEDURE — 25000128 H RX IP 250 OP 636: Performed by: PHYSICIAN ASSISTANT

## 2019-03-20 PROCEDURE — 36000069 ZZH SURGERY LEVEL 5 EA 15 ADDTL MIN: Performed by: ORTHOPAEDIC SURGERY

## 2019-03-20 PROCEDURE — 25000132 ZZH RX MED GY IP 250 OP 250 PS 637: Performed by: PHYSICIAN ASSISTANT

## 2019-03-20 PROCEDURE — 87077 CULTURE AEROBIC IDENTIFY: CPT | Performed by: ORTHOPAEDIC SURGERY

## 2019-03-20 PROCEDURE — 88300 SURGICAL PATH GROSS: CPT | Mod: 26 | Performed by: ORTHOPAEDIC SURGERY

## 2019-03-20 PROCEDURE — 0Y6N0Z9 DETACHMENT AT LEFT FOOT, PARTIAL 1ST RAY, OPEN APPROACH: ICD-10-PCS | Performed by: ORTHOPAEDIC SURGERY

## 2019-03-20 PROCEDURE — 71000013 ZZH RECOVERY PHASE 1 LEVEL 1 EA ADDTL HR: Performed by: ORTHOPAEDIC SURGERY

## 2019-03-20 PROCEDURE — 25000128 H RX IP 250 OP 636: Performed by: NURSE ANESTHETIST, CERTIFIED REGISTERED

## 2019-03-20 PROCEDURE — 87070 CULTURE OTHR SPECIMN AEROBIC: CPT | Performed by: ORTHOPAEDIC SURGERY

## 2019-03-20 PROCEDURE — 37000008 ZZH ANESTHESIA TECHNICAL FEE, 1ST 30 MIN: Performed by: ORTHOPAEDIC SURGERY

## 2019-03-20 PROCEDURE — 25800030 ZZH RX IP 258 OP 636: Performed by: ANESTHESIOLOGY

## 2019-03-20 PROCEDURE — 27210794 ZZH OR GENERAL SUPPLY STERILE: Performed by: ORTHOPAEDIC SURGERY

## 2019-03-20 PROCEDURE — 25000125 ZZHC RX 250: Performed by: ORTHOPAEDIC SURGERY

## 2019-03-20 PROCEDURE — 0QPM04Z REMOVAL OF INTERNAL FIXATION DEVICE FROM LEFT TARSAL, OPEN APPROACH: ICD-10-PCS | Performed by: ORTHOPAEDIC SURGERY

## 2019-03-20 PROCEDURE — 40000169 ZZH STATISTIC PRE-PROCEDURE ASSESSMENT I: Performed by: ORTHOPAEDIC SURGERY

## 2019-03-20 PROCEDURE — 25800030 ZZH RX IP 258 OP 636: Performed by: NURSE ANESTHETIST, CERTIFIED REGISTERED

## 2019-03-20 PROCEDURE — 81025 URINE PREGNANCY TEST: CPT | Performed by: ANESTHESIOLOGY

## 2019-03-20 PROCEDURE — 25000566 ZZH SEVOFLURANE, EA 15 MIN: Performed by: ORTHOPAEDIC SURGERY

## 2019-03-20 PROCEDURE — 99232 SBSQ HOSP IP/OBS MODERATE 35: CPT | Performed by: INTERNAL MEDICINE

## 2019-03-20 PROCEDURE — 87176 TISSUE HOMOGENIZATION CULTR: CPT | Performed by: ORTHOPAEDIC SURGERY

## 2019-03-20 PROCEDURE — 88300 SURGICAL PATH GROSS: CPT | Performed by: ORTHOPAEDIC SURGERY

## 2019-03-20 PROCEDURE — 71000012 ZZH RECOVERY PHASE 1 LEVEL 1 FIRST HR: Performed by: ORTHOPAEDIC SURGERY

## 2019-03-20 PROCEDURE — 87186 SC STD MICRODIL/AGAR DIL: CPT | Performed by: ORTHOPAEDIC SURGERY

## 2019-03-20 PROCEDURE — 87075 CULTR BACTERIA EXCEPT BLOOD: CPT | Performed by: ORTHOPAEDIC SURGERY

## 2019-03-20 PROCEDURE — 87076 CULTURE ANAEROBE IDENT EACH: CPT | Performed by: ORTHOPAEDIC SURGERY

## 2019-03-20 PROCEDURE — 12000000 ZZH R&B MED SURG/OB

## 2019-03-20 PROCEDURE — 25000125 ZZHC RX 250: Performed by: ANESTHESIOLOGY

## 2019-03-20 PROCEDURE — 25000128 H RX IP 250 OP 636: Performed by: ANESTHESIOLOGY

## 2019-03-20 PROCEDURE — 0JBR0ZZ EXCISION OF LEFT FOOT SUBCUTANEOUS TISSUE AND FASCIA, OPEN APPROACH: ICD-10-PCS | Performed by: ORTHOPAEDIC SURGERY

## 2019-03-20 PROCEDURE — 87205 SMEAR GRAM STAIN: CPT | Performed by: ORTHOPAEDIC SURGERY

## 2019-03-20 RX ORDER — CEFAZOLIN SODIUM 2 G/100ML
2 INJECTION, SOLUTION INTRAVENOUS EVERY 8 HOURS
Status: COMPLETED | OUTPATIENT
Start: 2019-03-21 | End: 2019-03-21

## 2019-03-20 RX ORDER — PROPOFOL 10 MG/ML
INJECTION, EMULSION INTRAVENOUS PRN
Status: DISCONTINUED | OUTPATIENT
Start: 2019-03-20 | End: 2019-03-20

## 2019-03-20 RX ORDER — PROPOFOL 10 MG/ML
INJECTION, EMULSION INTRAVENOUS CONTINUOUS PRN
Status: DISCONTINUED | OUTPATIENT
Start: 2019-03-20 | End: 2019-03-20

## 2019-03-20 RX ORDER — SODIUM CHLORIDE, SODIUM LACTATE, POTASSIUM CHLORIDE, CALCIUM CHLORIDE 600; 310; 30; 20 MG/100ML; MG/100ML; MG/100ML; MG/100ML
INJECTION, SOLUTION INTRAVENOUS CONTINUOUS PRN
Status: DISCONTINUED | OUTPATIENT
Start: 2019-03-20 | End: 2019-03-20

## 2019-03-20 RX ORDER — ONDANSETRON 2 MG/ML
4 INJECTION INTRAMUSCULAR; INTRAVENOUS EVERY 30 MIN PRN
Status: DISCONTINUED | OUTPATIENT
Start: 2019-03-20 | End: 2019-03-20 | Stop reason: HOSPADM

## 2019-03-20 RX ORDER — HYDROMORPHONE HYDROCHLORIDE 1 MG/ML
.3-.5 INJECTION, SOLUTION INTRAMUSCULAR; INTRAVENOUS; SUBCUTANEOUS EVERY 5 MIN PRN
Status: DISCONTINUED | OUTPATIENT
Start: 2019-03-20 | End: 2019-03-20 | Stop reason: HOSPADM

## 2019-03-20 RX ORDER — CALCIUM CARBONATE 500 MG/1
1000 TABLET, CHEWABLE ORAL 4 TIMES DAILY PRN
Status: DISCONTINUED | OUTPATIENT
Start: 2019-03-20 | End: 2019-03-22 | Stop reason: HOSPADM

## 2019-03-20 RX ORDER — ONDANSETRON 2 MG/ML
INJECTION INTRAMUSCULAR; INTRAVENOUS PRN
Status: DISCONTINUED | OUTPATIENT
Start: 2019-03-20 | End: 2019-03-20

## 2019-03-20 RX ORDER — FENTANYL CITRATE 50 UG/ML
25-50 INJECTION, SOLUTION INTRAMUSCULAR; INTRAVENOUS
Status: DISCONTINUED | OUTPATIENT
Start: 2019-03-20 | End: 2019-03-20 | Stop reason: HOSPADM

## 2019-03-20 RX ORDER — DEXAMETHASONE SODIUM PHOSPHATE 4 MG/ML
INJECTION, SOLUTION INTRA-ARTICULAR; INTRALESIONAL; INTRAMUSCULAR; INTRAVENOUS; SOFT TISSUE PRN
Status: DISCONTINUED | OUTPATIENT
Start: 2019-03-20 | End: 2019-03-20

## 2019-03-20 RX ORDER — NALOXONE HYDROCHLORIDE 0.4 MG/ML
.1-.4 INJECTION, SOLUTION INTRAMUSCULAR; INTRAVENOUS; SUBCUTANEOUS
Status: ACTIVE | OUTPATIENT
Start: 2019-03-20 | End: 2019-03-21

## 2019-03-20 RX ORDER — MAGNESIUM HYDROXIDE 1200 MG/15ML
LIQUID ORAL PRN
Status: DISCONTINUED | OUTPATIENT
Start: 2019-03-20 | End: 2019-03-20 | Stop reason: HOSPADM

## 2019-03-20 RX ORDER — FENTANYL CITRATE 50 UG/ML
INJECTION, SOLUTION INTRAMUSCULAR; INTRAVENOUS PRN
Status: DISCONTINUED | OUTPATIENT
Start: 2019-03-20 | End: 2019-03-20

## 2019-03-20 RX ORDER — SODIUM CHLORIDE, SODIUM LACTATE, POTASSIUM CHLORIDE, CALCIUM CHLORIDE 600; 310; 30; 20 MG/100ML; MG/100ML; MG/100ML; MG/100ML
INJECTION, SOLUTION INTRAVENOUS CONTINUOUS
Status: DISCONTINUED | OUTPATIENT
Start: 2019-03-20 | End: 2019-03-20 | Stop reason: HOSPADM

## 2019-03-20 RX ORDER — SODIUM CHLORIDE, SODIUM LACTATE, POTASSIUM CHLORIDE, CALCIUM CHLORIDE 600; 310; 30; 20 MG/100ML; MG/100ML; MG/100ML; MG/100ML
INJECTION, SOLUTION INTRAVENOUS CONTINUOUS
Status: DISCONTINUED | OUTPATIENT
Start: 2019-03-20 | End: 2019-03-22 | Stop reason: HOSPADM

## 2019-03-20 RX ORDER — ONDANSETRON 4 MG/1
4 TABLET, ORALLY DISINTEGRATING ORAL EVERY 30 MIN PRN
Status: DISCONTINUED | OUTPATIENT
Start: 2019-03-20 | End: 2019-03-20 | Stop reason: HOSPADM

## 2019-03-20 RX ORDER — LIDOCAINE 40 MG/G
CREAM TOPICAL
Status: DISCONTINUED | OUTPATIENT
Start: 2019-03-20 | End: 2019-03-22 | Stop reason: HOSPADM

## 2019-03-20 RX ORDER — LIDOCAINE HYDROCHLORIDE 20 MG/ML
INJECTION, SOLUTION INFILTRATION; PERINEURAL PRN
Status: DISCONTINUED | OUTPATIENT
Start: 2019-03-20 | End: 2019-03-20

## 2019-03-20 RX ORDER — SCOLOPAMINE TRANSDERMAL SYSTEM 1 MG/1
1 PATCH, EXTENDED RELEASE TRANSDERMAL ONCE
Status: COMPLETED | OUTPATIENT
Start: 2019-03-20 | End: 2019-03-20

## 2019-03-20 RX ADMIN — BUSPIRONE HYDROCHLORIDE 15 MG: 5 TABLET ORAL at 09:23

## 2019-03-20 RX ADMIN — MIDAZOLAM 2 MG: 1 INJECTION INTRAMUSCULAR; INTRAVENOUS at 16:49

## 2019-03-20 RX ADMIN — HYDROMORPHONE HYDROCHLORIDE 0.5 MG: 1 INJECTION, SOLUTION INTRAMUSCULAR; INTRAVENOUS; SUBCUTANEOUS at 18:30

## 2019-03-20 RX ADMIN — PROPOFOL 200 MG: 10 INJECTION, EMULSION INTRAVENOUS at 16:49

## 2019-03-20 RX ADMIN — SCOPALAMINE 1 PATCH: 1 PATCH, EXTENDED RELEASE TRANSDERMAL at 16:37

## 2019-03-20 RX ADMIN — ONDANSETRON 4 MG: 2 INJECTION INTRAMUSCULAR; INTRAVENOUS at 17:04

## 2019-03-20 RX ADMIN — CEFAZOLIN SODIUM 2 G: 2 INJECTION, SOLUTION INTRAVENOUS at 06:36

## 2019-03-20 RX ADMIN — SERTRALINE HYDROCHLORIDE 100 MG: 100 TABLET ORAL at 09:23

## 2019-03-20 RX ADMIN — DEXAMETHASONE SODIUM PHOSPHATE 4 MG: 4 INJECTION, SOLUTION INTRA-ARTICULAR; INTRALESIONAL; INTRAMUSCULAR; INTRAVENOUS; SOFT TISSUE at 17:04

## 2019-03-20 RX ADMIN — RANITIDINE 150 MG: 150 TABLET ORAL at 20:54

## 2019-03-20 RX ADMIN — ASPIRIN 325 MG: 325 TABLET, DELAYED RELEASE ORAL at 20:53

## 2019-03-20 RX ADMIN — OXYCODONE HYDROCHLORIDE 10 MG: 5 TABLET ORAL at 06:36

## 2019-03-20 RX ADMIN — PROPOFOL 120 MCG/KG/MIN: 10 INJECTION, EMULSION INTRAVENOUS at 16:53

## 2019-03-20 RX ADMIN — GABAPENTIN 800 MG: 800 TABLET, FILM COATED ORAL at 09:23

## 2019-03-20 RX ADMIN — DEXMEDETOMIDINE HYDROCHLORIDE 8 MCG: 100 INJECTION, SOLUTION INTRAVENOUS at 17:19

## 2019-03-20 RX ADMIN — OMEPRAZOLE 40 MG: 20 CAPSULE, DELAYED RELEASE ORAL at 06:36

## 2019-03-20 RX ADMIN — BUSPIRONE HYDROCHLORIDE 15 MG: 5 TABLET ORAL at 20:50

## 2019-03-20 RX ADMIN — OXYCODONE HYDROCHLORIDE 10 MG: 5 TABLET ORAL at 20:50

## 2019-03-20 RX ADMIN — SENNOSIDES AND DOCUSATE SODIUM 2 TABLET: 8.6; 5 TABLET ORAL at 09:23

## 2019-03-20 RX ADMIN — SENNOSIDES AND DOCUSATE SODIUM 2 TABLET: 8.6; 5 TABLET ORAL at 20:50

## 2019-03-20 RX ADMIN — SODIUM CHLORIDE, POTASSIUM CHLORIDE, SODIUM LACTATE AND CALCIUM CHLORIDE: 600; 310; 30; 20 INJECTION, SOLUTION INTRAVENOUS at 16:46

## 2019-03-20 RX ADMIN — CEFAZOLIN 2 G: 1 INJECTION, POWDER, FOR SOLUTION INTRAMUSCULAR; INTRAVENOUS at 16:57

## 2019-03-20 RX ADMIN — OXYCODONE HYDROCHLORIDE 10 MG: 5 TABLET ORAL at 12:29

## 2019-03-20 RX ADMIN — PHENYLEPHRINE HYDROCHLORIDE 100 MCG: 10 INJECTION, SOLUTION INTRAMUSCULAR; INTRAVENOUS; SUBCUTANEOUS at 17:26

## 2019-03-20 RX ADMIN — HYDROMORPHONE HYDROCHLORIDE 0.5 MG: 1 INJECTION, SOLUTION INTRAMUSCULAR; INTRAVENOUS; SUBCUTANEOUS at 18:15

## 2019-03-20 RX ADMIN — BUPROPION HYDROCHLORIDE 300 MG: 300 TABLET, FILM COATED, EXTENDED RELEASE ORAL at 09:23

## 2019-03-20 RX ADMIN — PHENYLEPHRINE HYDROCHLORIDE 100 MCG: 10 INJECTION, SOLUTION INTRAMUSCULAR; INTRAVENOUS; SUBCUTANEOUS at 17:37

## 2019-03-20 RX ADMIN — FENTANYL CITRATE 50 MCG: 50 INJECTION, SOLUTION INTRAMUSCULAR; INTRAVENOUS at 16:58

## 2019-03-20 RX ADMIN — LIDOCAINE HYDROCHLORIDE 100 MG: 20 INJECTION, SOLUTION INFILTRATION; PERINEURAL at 16:49

## 2019-03-20 RX ADMIN — HYDROMORPHONE HYDROCHLORIDE 0.5 MG: 1 INJECTION, SOLUTION INTRAMUSCULAR; INTRAVENOUS; SUBCUTANEOUS at 18:00

## 2019-03-20 RX ADMIN — HYDROMORPHONE HYDROCHLORIDE 0.5 MG: 1 INJECTION, SOLUTION INTRAMUSCULAR; INTRAVENOUS; SUBCUTANEOUS at 18:45

## 2019-03-20 RX ADMIN — CHOLECALCIFEROL CAP 125 MCG (5000 UNIT) 5000 UNITS: 125 CAP at 09:23

## 2019-03-20 RX ADMIN — SODIUM CHLORIDE, POTASSIUM CHLORIDE, SODIUM LACTATE AND CALCIUM CHLORIDE: 600; 310; 30; 20 INJECTION, SOLUTION INTRAVENOUS at 16:37

## 2019-03-20 RX ADMIN — GABAPENTIN 800 MG: 800 TABLET, FILM COATED ORAL at 20:50

## 2019-03-20 RX ADMIN — CETIRIZINE HYDROCHLORIDE 10 MG: 10 TABLET ORAL at 09:23

## 2019-03-20 RX ADMIN — PRAVASTATIN SODIUM 20 MG: 20 TABLET ORAL at 20:50

## 2019-03-20 RX ADMIN — FENTANYL CITRATE 50 MCG: 50 INJECTION, SOLUTION INTRAMUSCULAR; INTRAVENOUS at 16:49

## 2019-03-20 ASSESSMENT — ACTIVITIES OF DAILY LIVING (ADL)
ADLS_ACUITY_SCORE: 12
ADLS_ACUITY_SCORE: 10

## 2019-03-20 ASSESSMENT — LIFESTYLE VARIABLES: TOBACCO_USE: 0

## 2019-03-20 NOTE — PLAN OF CARE
A&O X4. Baseline neuropathy to LLE, otherwise CMS intact. Drgs to wounds changed. Pain is being managed with po pain meds. Uo ind. Eating, drinking, and voiding adequately. Npo after midnight. VSS on RA.

## 2019-03-20 NOTE — PROGRESS NOTES
[facility]    Hospitalist Progress Note    Assessment & Plan   Shalonda Howard is a 49 year old female with possible Lupus (not on meds), had bunion surgery Aug 2018 and wound got infected, and hasn't healed, and now admitted on 3/19/2019 and appears to have osteomyelitis left first Metatarsal:    Impression:     Persistent postoperative left foot infection now with Probable Osteomyelitis Left 1st Metatarsal   -- Left 1st toe amputation today per Othropedics  -- getting IV Ancef perioperatively     Bilateral chronic lower extremity edema:    Patient is compliant with Lasix 160 mg daily and 80 mEq of potassium daily.    --Both medications at this point as patient is receiving IV fluids.  -- Check BMP in AM, resume Lasix and Potassium when taking PO (?tomorrow)     GERD:    -- omeprazole 40 mg 2 times daily will be continued.      Dysthymia with anxiety:    --Wellbutrin- mg daily, BuSpar 50 mg 2 times daily, sertraline 100 mg daily, and Topamax 100 mg every evening at bedtime.      Peripheral neuropathy:    --Gabapentin 800 mg 3 times daily will be continued.      Opioid dependence:    --Will defer analgesic management to orthopedic service.      Ventral hernia:    Noted history of 5 ventral hernia repairs and has recurrence of a ventral hernia.  Would recommend follow up with primary care provider and further surgical intervention electively as an outpatient     Hyperlipidemia:    --Pravastatin.       Obesity:    Patient's BMI is calculated at 33.28.    --Will defer ongoing management to primary care provider.   --Hold PTA phentermine.       Systemic lupus erythematosus:    Patient indicates that one rheumatologist stated she has lupus and another denied this.  She is not currently on any medications for this.      PCOS:    Stable     DVT prophylaxis:    --Per orthopedic service, we will order for PCDs.       Plan:  Left foot 1st toe/?ray amputation today at 4 PM.  Keep NPO until then, and adjust  antibiotics based on cultures results from any tissue/bone intraoperative cultures obtained.     DVT Prophylaxis: Pneumatic Compression Devices  Code Status: Prior    Disposition: Expected discharge ?possibly Saturday if all goes well, home if able to ambulate safely.     Armando Mccarty MD  Pager [phone]  Cell Phone [phone]  Text Page (7am to 6pm)    Interval History   No new complaints.  Her left foot surgery was initially left 1st MTP bunion surgery on 8/29/18 which got infected and hasn't healed yet, and MRI     Physical Exam   Temp: 98  F (36.7  C) Temp src: Oral BP: 128/79 Pulse: 77   Resp: 16 SpO2: 98 % O2 Device: None (Room air)    There were no vitals filed for this visit.  Vital Signs with Ranges  Temp:  [98  F (36.7  C)-98.5  F (36.9  C)] 98  F (36.7  C)  Pulse:  [] 77  Resp:  [16] 16  BP: (128-132)/(77-80) 128/79  SpO2:  [97 %-98 %] 98 %  I/O last 3 completed shifts:  In: 800 [P.O.:800]  Out: -     # Pain Assessment:  Current Pain Score 3/20/2019   Patient currently in pain? -   Pain score (0-10) 7   Pain location -   Pain descriptors -   - Shalonda is experiencing pain due to left foot wound infection . Pain management was discussed and the plan was created in a collaborative fashion.  Shalonda's response to the current recommendations: engaged  - see orders    Constitutional: Awake, alert, cooperative, no apparent distress  Respiratory: Clear to auscultation bilaterally, no crackles or wheezing  Cardiovascular: Regular rate and rhythm, normal S1 and S2, and no murmur noted  GI: Normal bowel sounds, soft, non-distended, non-tender, moderately obese  Extrem: No calf tenderness, no ankle edema, with foot with gauze wrap  Neuro: Ox3, no focal motor or sensory deficits    Medications     - MEDICATION INSTRUCTIONS -       lactated ringers 10 mL/hr at 03/19/19 2042       buPROPion  300 mg Oral Daily     busPIRone  15 mg Oral BID     ceFAZolin  1 g Intravenous See Admin Instructions      cetirizine  10 mg Oral Daily     cholecalciferol  5,000 Units Oral Daily     gabapentin  800 mg Oral TID     hydrOXYzine  25 mg Oral At Bedtime     omeprazole  40 mg Oral BID AC     pravastatin  20 mg Oral QPM     senna-docusate  1 tablet Oral BID    Or     senna-docusate  2 tablet Oral BID     sertraline  100 mg Oral Daily     topiramate  100 mg Oral At Bedtime       Data   Recent Labs   Lab 03/19/19  1941   WBC 9.1   HGB 10.8*   MCV 79         POTASSIUM 3.5   CHLORIDE 104   CO2 24   BUN 12   CR 0.71   ANIONGAP 11   PAULINE 8.6   GLC 92   ALBUMIN 3.2*   PROTTOTAL 7.8   BILITOTAL 0.2   ALKPHOS 121   ALT 18   AST 13       Imaging:   No results found for this or any previous visit (from the past 24 hour(s)).

## 2019-03-20 NOTE — PROGRESS NOTES
[place] this morning for wound evaluation.  Non-healing, malodorous 1st MTP wound on L foot.  Non-healing heel ulcers B feet.  Osteomyelitis of L 1st ray.  Plan L 1st ray resection and hardware removal later this afternoon with Dr. Harman.  NPO.

## 2019-03-20 NOTE — PROGRESS NOTES
Therapy: IV abx  Insurance: [employer]   Ded: $1000  Met: $1000    Co-Insurance: 75%  Max Out of Pocket: $4500  Met: $4500    In reference to admission on 3/19/19 to check IV abx coverage    Please contact Intake with any questions, [phone] or In Basket pool, [facility] ([phone]).

## 2019-03-20 NOTE — ANESTHESIA CARE TRANSFER NOTE
Patient: Shalonda Howard    Procedure(s):  LEFT FOOT IRRIGATION AND  DEBRIDEMENT, FIRST RAY RESECTION AND HARDWARE REMOVAL FROM LEFT FOOT  REMOVE HARDWARE LOWER EXTREMITY    Diagnosis: OPEN WOUND LEFT FOOT  Diagnosis Additional Information: No value filed.    Anesthesia Type:   General, LMA     Note:  Airway :Face Mask  Patient transferred to:PACU  Comments: Pt exhibits spontaneous respirations, follows commands, suctioned, LMA removed, exchanging well, transferred to pacu with O2 @ 10L via mask, all monitors and alarms on, VSS, patent IV, report and transfer of care to RN.  Handoff Report: Identifed the Patient, Identified the Reponsible Provider, Reviewed the pertinent medical history, Discussed the surgical course, Reviewed Intra-OP anesthesia mangement and issues during anesthesia, Set expectations for post-procedure period and Allowed opportunity for questions and acknowledgement of understanding      Vitals: (Last set prior to Anesthesia Care Transfer)    CRNA VITALS  3/20/2019 1717 - 3/20/2019 1755      3/20/2019             NIBP:  112/71    Pulse:  79    NIBP Mean:  86    SpO2:  98 %    Resp Rate (observed):  12                Electronically Signed By: CRISTIN Joy CRNA  March 20, 2019  5:55 PM

## 2019-03-20 NOTE — PHARMACY-PHARMACOTHERAPY NOTE
"The following home medications were NOT continued on inpatient admission per \"Discontinuation of nonessential home medications during hospitalization\" policy:  Phentermine    If a therapeutic holiday is deemed inappropriate per the prescriber, please notify the pharmacist regarding the medication order.    The pharmacist is available to answer any questions and/or concerns the patient may have regarding discontinuation of non-essential medications.    Please ensure that these medications are restarted as needed upon discharge via the medication reconciliation discharge process and included on the discharge medication reconciliation report.    Thank you,  Scooby Villarreal    "

## 2019-03-20 NOTE — PLAN OF CARE
Pt is A/Ox4, VSS on RA, pain in BL feet managed with PRN Oxycodone, Baseline neuropathy to LLE, otherwise CMS intact. BLE wound dressings CDIDrgs to wounds changed. +BS, +flatus, AUO. Full Liquids up until 8am d/t schd I&D @ 4234 3/20 w/ [employer]. Up Ind. IV TKO w/ LR infusing 10mL/hr. D/C pending.

## 2019-03-20 NOTE — CONSULTS
Anticipate patient may need IV antibiotics at discharge, benefits checked. See below.    Referral for Shalonda Howard, MRN [id number]. Pt has a [employer] plan with a ded $1000 (all met), then 75% up to max OOP $4500 (all met). Pt is covered at 100% for IV abx.   Thank you  Daniel Thakkar  Intake    [facility]

## 2019-03-20 NOTE — ANESTHESIA PREPROCEDURE EVALUATION
Anesthesia Pre-Procedure Evaluation    Patient: Shalonda Howard   MRN: [id number] : 1970          Preoperative Diagnosis: OPEN WOUND LEFT FOOT    Procedure(s):  FIRST METATARSAL DEBRIDEMENT AND SECONDARY WOUND CLOSURE  CLOSE SECONDARY WOUND LOWER EXTREMITY    Past Medical History:   Diagnosis Date     Allergic rhinitis, cause unspecified      Anxiety state, unspecified      Cellulitis and abscess of leg, except foot      Closed fracture of unspecified part of tibia      Disuse osteoporosis      Dysthymic disorder      Edema      Encounter for long-term (current) use of other medications      Esophageal reflux      Kidney stones      Myalgia and myositis, unspecified      Obesity, unspecified      Open wound of foot except toe(s) alone, complicated      Opioid type dependence, unspecified      Other acne      Other congenital valgus deformity of feet      Other ventral hernia without mention of obstruction or gangrene      Polycystic ovaries      PONV (postoperative nausea and vomiting)      Systemic lupus erythematosus (H)      Tibialis tendinitis      Unspecified hereditary and idiopathic peripheral neuropathy      Past Surgical History:   Procedure Laterality Date     APPENDECTOMY       APPLY WOUND VAC Left 2019    Procedure: WOUND VAC APPLICATION;  Surgeon: Miguel Mosher MD;  Location:  OR     ARTHRODESIS FOOT Left 2015    Procedure: ARTHRODESIS FOOT;  Surgeon: Andre Harman MD;  Location: [facility]     BUNIONECTOMY RT/LT  2018     DILATION AND CURETTAGE       EXCISE TOENAIL(S) Left 2015    Procedure: EXCISE TOENAIL(S);  Surgeon: Andre Harman MD;  Location: [facility]     HERNIA REPAIR      umbilical     HERNIA REPAIR      ventral open x 2     IRRIGATION AND DEBRIDEMENT FOOT, COMBINED Left 2018    Procedure: COMBINED IRRIGATION AND DEBRIDEMENT FOOT;  IRRIGATION AND DEBRIDEMENT LEFT FOOT;  Surgeon: Andre Harman MD;  Location: [facility]     IRRIGATION AND  DEBRIDEMENT FOOT, COMBINED Left 11/26/2018    Procedure: COMBINED IRRIGATION AND DEBRIDEMENT LEFT FOOT;  Surgeon: Andre Harman MD;  [facility]     IRRIGATION AND DEBRIDEMENT FOOT, COMBINED Left 1/24/2019    Procedure: LEFT HALLUX WOUND IRRIGATION AND DEBRIDEMENT WITH BONE BIOPSY;  Surgeon: Miguel Mosher MD;  Location:  OR     REPAIR HAMMER TOE Left 11/26/2018    Procedure: REPAIR HAMMER TOE;  Surgeon: Andre Harman MD;  Location:  [facility]     TONSILLECTOMY         Anesthesia Evaluation     . Pt has had prior anesthetic.     History of anesthetic complications          ROS/MED HX    ENT/Pulmonary:      (-) tobacco use, asthma and sleep apnea   Neurologic:     (+)neuropathy    (-) CVA and TIA   Cardiovascular:         METS/Exercise Tolerance:     Hematologic:         Musculoskeletal:         GI/Hepatic:     (+) GERD Asymptomatic on medication,       Renal/Genitourinary:     (+) chronic renal disease,       Endo:     (+) Obesity, .      Psychiatric:         Infectious Disease:         Malignancy:         Other: Comment: Osteomyelitis    Lupus    No nausea or vomiting. gerd is well controlled and patient is assymptomatic                         Physical Exam  Normal systems: dental    Airway   Mallampati: II  TM distance: >3 FB  Neck ROM: full    Dental     Cardiovascular   Rhythm and rate: regular and normal      Pulmonary    breath sounds clear to auscultation            Lab Results   Component Value Date    WBC 9.1 03/19/2019    HGB 10.8 (L) 03/19/2019    HCT 33.9 (L) 03/19/2019     03/19/2019    CRP 37.4 (H) 03/19/2019    SED 51 (H) 03/19/2019     03/19/2019    POTASSIUM 3.5 03/19/2019    CHLORIDE 104 03/19/2019    CO2 24 03/19/2019    BUN 12 03/19/2019    CR 0.71 03/19/2019    GLC 92 03/19/2019    PAULINE 8.6 03/19/2019    MAG 2.2 10/16/2018    ALBUMIN 3.2 (L) 03/19/2019    PROTTOTAL 7.8 03/19/2019    ALT 18 03/19/2019    AST 13 03/19/2019    ALKPHOS 121 03/19/2019     "BILITOTAL 0.2 03/19/2019    TSH 4.36 (H) 10/16/2018    T4 0.85 10/16/2018    HCG Negative 03/20/2019       Preop Vitals  BP Readings from Last 3 Encounters:   03/20/19 128/79   01/29/19 123/80   11/27/18 123/72    Pulse Readings from Last 3 Encounters:   03/20/19 77   01/29/19 82   11/27/18 81      Resp Readings from Last 3 Encounters:   03/20/19 16   01/29/19 16   11/27/18 16    SpO2 Readings from Last 3 Encounters:   03/20/19 98%   01/29/19 99%   11/27/18 96%      Temp Readings from Last 1 Encounters:   03/20/19 36.7  C (98  F) (Oral)    Ht Readings from Last 1 Encounters:   01/22/19 1.651 m (5' 5\")      Wt Readings from Last 1 Encounters:   01/22/19 90.7 kg (200 lb)    Estimated body mass index is 33.28 kg/m  as calculated from the following:    Height as of 1/22/19: 1.651 m (5' 5\").    Weight as of 1/22/19: 90.7 kg (200 lb).       Anesthesia Plan      History & Physical Review  History and physical reviewed and following examination; no interval change.    ASA Status:  2 .        Plan for General and LMA with Intravenous induction. Maintenance will be Balanced.    PONV prophylaxis:  Ondansetron (or other 5HT-3), Dexamethasone or Solumedrol and Scopolamine patch  Propofol infusion      Postoperative Care  Postoperative pain management:  IV analgesics and Oral pain medications.      Consents  Anesthetic plan, risks, benefits and alternatives discussed with:  Patient..                 Rocío Brennan  "

## 2019-03-20 NOTE — PLAN OF CARE
A&O x4, VSS on RA, Baseline numbness on BLE, Drng moderate dried drainage BLE but CDI, pt gets up independtly but calls approprietly and steady gait for help, Voiding adequately in BR, Pain controlled w/ oxycodone, pt's been NPO for procedure. Continue to monitor.

## 2019-03-20 NOTE — PROGRESS NOTES
[facility]  Patient is currently open to home care services with [facility]. The patient is currently receiving Skilled Nursing services. [facility]  and team have been notified of patient admission. [facility] liaison will continue to follow patient during stay. If appropriate provide orders to resume home care at time of discharge.

## 2019-03-21 ENCOUNTER — APPOINTMENT (OUTPATIENT)
Dept: PHYSICAL THERAPY | Facility: CLINIC | Age: 49
DRG: 857 | End: 2019-03-21
Attending: PHYSICIAN ASSISTANT
Payer: COMMERCIAL

## 2019-03-21 LAB — GLUCOSE BLDC GLUCOMTR-MCNC: 183 MG/DL (ref 70–99)

## 2019-03-21 PROCEDURE — 25000132 ZZH RX MED GY IP 250 OP 250 PS 637: Performed by: PHYSICIAN ASSISTANT

## 2019-03-21 PROCEDURE — 12000000 ZZH R&B MED SURG/OB

## 2019-03-21 PROCEDURE — 40000894 ZZH STATISTIC OT IP EVAL DEFER

## 2019-03-21 PROCEDURE — 97161 PT EVAL LOW COMPLEX 20 MIN: CPT | Mod: GP

## 2019-03-21 PROCEDURE — 40000901 ZZH STATISTIC WOC PT EDUCATION, 0-15 MIN

## 2019-03-21 PROCEDURE — 25000128 H RX IP 250 OP 636: Performed by: PHYSICIAN ASSISTANT

## 2019-03-21 PROCEDURE — G0463 HOSPITAL OUTPT CLINIC VISIT: HCPCS

## 2019-03-21 PROCEDURE — 97530 THERAPEUTIC ACTIVITIES: CPT | Mod: GP

## 2019-03-21 PROCEDURE — 00000146 ZZHCL STATISTIC GLUCOSE BY METER IP

## 2019-03-21 PROCEDURE — 99232 SBSQ HOSP IP/OBS MODERATE 35: CPT | Performed by: HOSPITALIST

## 2019-03-21 RX ORDER — FUROSEMIDE 40 MG
160 TABLET ORAL DAILY
Status: DISCONTINUED | OUTPATIENT
Start: 2019-03-21 | End: 2019-03-22 | Stop reason: HOSPADM

## 2019-03-21 RX ORDER — KETOROLAC TROMETHAMINE 30 MG/ML
30 INJECTION, SOLUTION INTRAMUSCULAR; INTRAVENOUS EVERY 6 HOURS PRN
Status: DISCONTINUED | OUTPATIENT
Start: 2019-03-21 | End: 2019-03-22 | Stop reason: HOSPADM

## 2019-03-21 RX ORDER — POTASSIUM CHLORIDE 1500 MG/1
80 TABLET, EXTENDED RELEASE ORAL DAILY
Status: DISCONTINUED | OUTPATIENT
Start: 2019-03-21 | End: 2019-03-22 | Stop reason: HOSPADM

## 2019-03-21 RX ADMIN — SENNOSIDES AND DOCUSATE SODIUM 2 TABLET: 8.6; 5 TABLET ORAL at 21:03

## 2019-03-21 RX ADMIN — SERTRALINE HYDROCHLORIDE 100 MG: 100 TABLET ORAL at 08:01

## 2019-03-21 RX ADMIN — OXYCODONE HYDROCHLORIDE 10 MG: 5 TABLET ORAL at 06:23

## 2019-03-21 RX ADMIN — GABAPENTIN 800 MG: 800 TABLET, FILM COATED ORAL at 15:15

## 2019-03-21 RX ADMIN — HYDROXYZINE PAMOATE 25 MG: 25 CAPSULE ORAL at 17:32

## 2019-03-21 RX ADMIN — CHOLECALCIFEROL CAP 125 MCG (5000 UNIT) 5000 UNITS: 125 CAP at 08:02

## 2019-03-21 RX ADMIN — OXYCODONE HYDROCHLORIDE 10 MG: 5 TABLET ORAL at 17:32

## 2019-03-21 RX ADMIN — OMEPRAZOLE 40 MG: 20 CAPSULE, DELAYED RELEASE ORAL at 06:23

## 2019-03-21 RX ADMIN — CETIRIZINE HYDROCHLORIDE 10 MG: 10 TABLET ORAL at 08:01

## 2019-03-21 RX ADMIN — GABAPENTIN 800 MG: 800 TABLET, FILM COATED ORAL at 08:02

## 2019-03-21 RX ADMIN — OMEPRAZOLE 40 MG: 20 CAPSULE, DELAYED RELEASE ORAL at 15:45

## 2019-03-21 RX ADMIN — SENNOSIDES AND DOCUSATE SODIUM 2 TABLET: 8.6; 5 TABLET ORAL at 08:01

## 2019-03-21 RX ADMIN — BUPROPION HYDROCHLORIDE 300 MG: 300 TABLET, FILM COATED, EXTENDED RELEASE ORAL at 08:01

## 2019-03-21 RX ADMIN — HYDROXYZINE PAMOATE 25 MG: 25 CAPSULE ORAL at 21:03

## 2019-03-21 RX ADMIN — RANITIDINE 150 MG: 150 TABLET ORAL at 08:01

## 2019-03-21 RX ADMIN — OXYCODONE HYDROCHLORIDE 10 MG: 5 TABLET ORAL at 14:29

## 2019-03-21 RX ADMIN — HYDROXYZINE PAMOATE 25 MG: 25 CAPSULE ORAL at 01:19

## 2019-03-21 RX ADMIN — BUSPIRONE HYDROCHLORIDE 15 MG: 5 TABLET ORAL at 08:01

## 2019-03-21 RX ADMIN — CEFAZOLIN SODIUM 2 G: 2 INJECTION, SOLUTION INTRAVENOUS at 00:00

## 2019-03-21 RX ADMIN — TOPIRAMATE 100 MG: 100 TABLET, FILM COATED ORAL at 01:19

## 2019-03-21 RX ADMIN — BUSPIRONE HYDROCHLORIDE 15 MG: 5 TABLET ORAL at 21:03

## 2019-03-21 RX ADMIN — GABAPENTIN 800 MG: 800 TABLET, FILM COATED ORAL at 21:03

## 2019-03-21 RX ADMIN — ASPIRIN 325 MG: 325 TABLET, DELAYED RELEASE ORAL at 08:01

## 2019-03-21 RX ADMIN — RANITIDINE 150 MG: 150 TABLET ORAL at 21:03

## 2019-03-21 RX ADMIN — KETOROLAC TROMETHAMINE 30 MG: 30 INJECTION, SOLUTION INTRAMUSCULAR at 16:54

## 2019-03-21 RX ADMIN — OXYCODONE HYDROCHLORIDE 10 MG: 5 TABLET ORAL at 11:14

## 2019-03-21 RX ADMIN — PRAVASTATIN SODIUM 20 MG: 20 TABLET ORAL at 21:03

## 2019-03-21 RX ADMIN — TOPIRAMATE 100 MG: 100 TABLET, FILM COATED ORAL at 21:03

## 2019-03-21 RX ADMIN — HYDROMORPHONE HYDROCHLORIDE 0.5 MG: 1 INJECTION, SOLUTION INTRAMUSCULAR; INTRAVENOUS; SUBCUTANEOUS at 15:44

## 2019-03-21 RX ADMIN — OXYCODONE HYDROCHLORIDE 10 MG: 5 TABLET ORAL at 21:03

## 2019-03-21 RX ADMIN — CEFAZOLIN SODIUM 2 G: 2 INJECTION, SOLUTION INTRAVENOUS at 08:01

## 2019-03-21 RX ADMIN — OXYCODONE HYDROCHLORIDE 10 MG: 5 TABLET ORAL at 01:18

## 2019-03-21 ASSESSMENT — ACTIVITIES OF DAILY LIVING (ADL)
ADLS_ACUITY_SCORE: 10
ADLS_ACUITY_SCORE: 12
ADLS_ACUITY_SCORE: 10
ADLS_ACUITY_SCORE: 12
ADLS_ACUITY_SCORE: 10
ADLS_ACUITY_SCORE: 12

## 2019-03-21 NOTE — PROGRESS NOTES
03/21/19 1000   Quick Adds   Type of Visit Initial PT Evaluation   Living Environment   Lives With spouse   Living Arrangements house   Home Accessibility stairs to enter home   Number of Stairs, Main Entrance 1   Stair Railings, Main Entrance none  (has the door jam that pt has hold onto)   Living Environment Comment Pt lives with spouse and daughter in a 2SH with bed and bathroom on the main floor and 1 MJ   Self-Care   Usual Activity Tolerance good   Current Activity Tolerance moderate   Regular Exercise No   Equipment Currently Used at Home walker, rolling;other (see comments)  (knee scooter)   Activity/Exercise/Self-Care Comment Pt was mod ind with all ADL's.   Functional Level Prior   Ambulation 1-->assistive equipment   Transferring 1-->assistive equipment   Toileting 0-->independent   Bathing 0-->independent   Communication 0-->understands/communicates without difficulty   Swallowing 0-->swallows foods/liquids without difficulty   Cognition 0 - no cognition issues reported   Fall history within last six months no   Which of the above functional risks had a recent onset or change? none   Prior Functional Level Comment Pt uses a knee scooter at home. Pt also has a RW    General Information   Onset of Illness/Injury or Date of Surgery - Date 03/19/19   Referring Physician Jett Malik, CECILIA   Patient/Family Goals Statement Go home   Pertinent History of Current Problem (include personal factors and/or comorbidities that impact the POC) Pt is a 49 yr old female admitted with non healing wound and infected L foot. Pt underwent 1st ray amputation and I&D on 03/20/2019.   Precautions/Limitations fall precautions   Weight-Bearing Status - LLE other (see comments)  (Flat foot WB with aircast boot on for transfers.)   Weight-Bearing Status - RLE weight-bearing as tolerated  (post op shoes)   General Info Comments Activity: Flat foot WB with Aircast boot on, Per op note from 03/20/2019: can ambualte WBAT.     Cognitive Status Examination   Orientation orientation to person, place and time   Level of Consciousness alert   Follows Commands and Answers Questions 100% of the time   Personal Safety and Judgment intact   Memory intact   Pain Assessment   Patient Currently in Pain Yes, see Vital Sign flowsheet  (7/10 at rest and with mobility)   Range of Motion (ROM)   ROM Comment BLE: WFL, L foot NT   Strength   Strength Comments B foot: NT, otherwise: 4/5 bilaterally   Bed Mobility   Bed Mobility Comments supine>sit: independent, Sit>supine: SBA   Transfer Skills   Transfer Comments STS at SBA    Gait   Gait Comments 20 ft using RW and SBA   Balance   Balance Comments Sitting: good   Sensory Examination   Sensory Perception Comments pt with hx of peripheral neuropathy in RLe, however WNL for touch awareness and localization   Coordination   Coordination no deficits were identified   Muscle Tone   Muscle Tone no deficits were identified   General Therapy Interventions   Planned Therapy Interventions balance training;bed mobility training;gait training;strengthening;transfer training;wheelchair management/propulsion training;risk factor education;home program guidelines;progressive activity/exercise   Clinical Impression   Criteria for Skilled Therapeutic Intervention yes, treatment indicated   PT Diagnosis impaired gait   Influenced by the following impairments Decreased strength, pain, decreased activity toelrance   Functional limitations due to impairments assistance needed with mobility   Clinical Presentation Stable/Uncomplicated   Clinical Presentation Rationale Current clinical and fucntional presenation   Clinical Decision Making (Complexity) Low complexity   Therapy Frequency` daily   Predicted Duration of Therapy Intervention (days/wks) 2   Anticipated Discharge Disposition Home with Assist   Risk & Benefits of therapy have been explained Yes   Patient, Family & other staff in agreement with plan of care Yes  "  Clinical Impression Comments Pt presents with decreased strength, activiyt tolerance and pain limting fucntional mobility. Pt will benefit from continued skilled PT to acheive PLOF and independence.    [facility] AM-PAC  \"6 Clicks\" V.2 Basic Mobility Inpatient Short Form   1. Turning from your back to your side while in a flat bed without using bedrails? 4 - None   2. Moving from lying on your back to sitting on the side of a flat bed without using bedrails? 4 - None   3. Moving to and from a bed to a chair (including a wheelchair)? 3 - A Little   4. Standing up from a chair using your arms (e.g., wheelchair, or bedside chair)? 3 - A Little   5. To walk in hospital room? 3 - A Little   6. Climbing 3-5 steps with a railing? 3 - A Little   Basic Mobility Raw Score (Score out of 24.Lower scores equate to lower levels of function) 20   Total Evaluation Time   Total Evaluation Time (Minutes) 15     "

## 2019-03-21 NOTE — PLAN OF CARE
Came back from PACU around 1900.  VSS, MERY.  CMS intact.  Dressing with small drainage, provider aware.  Pain manage with oxy.  Up with 1 to BSC.  Voiding adequate amount.  Continue to monitor.

## 2019-03-21 NOTE — PLAN OF CARE
A&O x4. VSS on RA. CMS intact-baseline numbness. Dressing to left foot changed x2. Bilateral foot dressings remain CDI. Flatfoot weight bearing. Up A1 with gait belt and walker. C/o pain to left foot managed with PRN oxycodone. Regular diet. Tolerating well. LS clear. BS active, + Flatus. Progressing toward plan of care.

## 2019-03-21 NOTE — PLAN OF CARE
Discharge Planner OT   Patient plan for discharge: Home with support from family    Current status: Order received and chart reviewed. Pt currently requires SBA with walker for functional transfers/mobility. Per discussion with PT, pt verbalized no concerns re: OT needs. Pt has all necessary DME for home use already.     Barriers to return to prior living situation: Stairs to enter home; however, PT addressing    Recommendations for discharge: Home with A from family as needed    Rationale for recommendations: Pt limited by pain and WB status; however, doing well and has adequate A from family at discharge. PT and pt in agreement with no skilled OT needs during hospitalization or following discharge.        Entered by: Kemi Blake 03/21/2019 11:30 AM

## 2019-03-21 NOTE — PROGRESS NOTES
Shalonda Howard  3/21/2019  POD #1 L 1st ray amputation, hardware removal.    Doing well.  No immediate surgical complications identified.  No excessive bleeding  Pain well-controlled.  Incisions CDI without active drainage.    Temperatures:  Current - Temp: 98.3  F (36.8  C); Max - Temp  Av.1  F (36.7  C)  Min: 97.7  F (36.5  C)  Max: 98.3  F (36.8  C)  Pulse range: Pulse  Av.2  Min: 75  Max: 80  Blood pressure range: Systolic (24hrs), Av , Min:99 , Max:130   ; Diastolic (24hrs), Av, Min:62, Max:83    CMS: intact to L LE  Labs:none    PLAN:Wound eval today, will need home wound care for heel ulcerations.  Flatfoot WB in Aircast boot.  Additional problems to be followed by medicine physician.

## 2019-03-21 NOTE — PROGRESS NOTES
[facility]    Medicine Progress Note - Hospitalist Service       Date of Admission:  3/19/2019  Assessment & Plan   Shalonda Howard is a 49 year old female admitted on 3/19/2019.  Past history of possible lupus (not on meds), chronic LE edema, anxiety, opioid dependence who had bunion surgery Aug 2018 with subsequent wound infection and poor healing, admitted on 3/19/2019 with apparent osteomyelitis left first Metatarsal now s/p ray amputation.        Persistent postoperative left foot infection with osteomyelitis Left 1st Metatarsal s/p 1st ray amputation, hardware removal and ulcer debridement 3/20  Tissue culture growing Strep dysgalactiae, Staph aureus and Enterobacter cloacae.  - follow sensitivities; currently not on antibiotics - defer ID involvement to Ortho - ?all infected tissue removed  - post-op management per Ortho     Bilateral chronic lower extremity edema:    Patient is compliant with Lasix 160 mg daily and 80 mEq of potassium daily.    - resume PTA lasix and potassium supplement, check BMP in AM     GERD:    - omeprazole 40 mg 2 times daily will be continued.      Dysthymia with anxiety:    - Wellbutrin- mg daily, BuSpar 50 mg 2 times daily, sertraline 100 mg daily, and Topamax 100 mg every evening at bedtime.      Peripheral neuropathy:    - Gabapentin 800 mg 3 times daily will be continued     Opioid dependence:    - Will defer analgesic management to orthopedic service.      Ventral hernia:    Noted history of 5 ventral hernia repairs and has recurrence of a ventral hernia.  Would recommend follow up with primary care provider and further surgical intervention electively as an outpatient     Hyperlipidemia:    - continue PTA Pravastatin.       Obesity:    Patient's BMI is calculated at 33.28.    - Will defer ongoing management to primary care provider.   - Hold PTA phentermine.       Systemic lupus erythematosus:    Patient indicates that one rheumatologist stated she has  lupus and another denied this.  She is not currently on any medications for this.      PCOS:    Stable         Diet: Advance Diet as Tolerated: Regular Diet Adult    DVT Prophylaxis: Defer to primary service  Salgado Catheter: not present  Code Status: Full code    Disposition Plan   Expected discharge: Per Ortho.   Entered: Stew Medrano MD 03/21/2019, 2:18 PM       The patient's care was discussed with the Bedside Nurse and Patient.    Stew Medrano MD  Hospitalist Service  [facility]    ______________________________________________________________________    Interval History   Feeling well.  Pain is well controlled.  No fever/chills, dyspnea, edema or other complaints.    Data reviewed today: I reviewed all medications, new labs and imaging results over the last 24 hours. I personally reviewed no images or EKG's today.    Physical Exam   Vital Signs: Temp: 98.6  F (37  C) Temp src: Oral BP: 106/59 Pulse: 85 Heart Rate: 89 Resp: 16 SpO2: 96 % O2 Device: None (Room air) Oxygen Delivery: 2 LPM  Weight: 0 lbs 0 oz  General Appearance: Well developed, well nourished female in NAD  Respiratory: lungs CTAB, no wheezes or crackles  Cardiovascular: RRR, normal s1/s2 without murmur  GI:  Skin: bilateral feet wrapped in ACE  Other: Alert and appropriate, cranial nerves grossly intact     Data   Recent Labs   Lab 03/19/19  1941   WBC 9.1   HGB 10.8*   MCV 79         POTASSIUM 3.5   CHLORIDE 104   CO2 24   BUN 12   CR 0.71   ANIONGAP 11   PAULINE 8.6   GLC 92   ALBUMIN 3.2*   PROTTOTAL 7.8   BILITOTAL 0.2   ALKPHOS 121   ALT 18   AST 13

## 2019-03-21 NOTE — PLAN OF CARE
A&Ox4. VSS on RA. L/s clear. Pain managed with prn Oxycodone and schedule hydroxyzine. CMS intact-able to wiggle toes. Dressings to BLE changed by Ortho PA this AM-CDI. Flat foot wt bearing. Up assist x1 to BSC. +BS. +flatus. Voiding. Discharge pending progress. Continue to monitor.

## 2019-03-21 NOTE — OP NOTE
Procedure Date: 03/20/2019      PREOPERATIVE DIAGNOSES:   1.  Infected first ray, left foot with a chronic over the medial side of the foot, as well as plantar.   2.  Retained hardware from a previous talonavicular fusion.      POSTOPERATIVE DIAGNOSES:     1.  Infected first ray, left foot with a chronic over the medial side of the foot, as well as plantar.   2.  Retained hardware from a previous talonavicular fusion.      SURGICAL PROCEDURES:   1.  First ray amputation, left foot.   2.  Removal of plates and screws from the left talonavicular joint.   3.  Debridement of a large 4 x 4 ulcer, plantar aspect, left foot.      ANESTHESIA:  General.      SURGEON:  Andre Harman MD      ASSISTANT:  MARIA ESTHER Stokes      PREAMBLE:  Ms. Howard presented with a nonhealing wound over the medial side of her foot.  She had longstanding issues with her foot with infection and nonhealing wound.  She also has large ulcers over the plantar aspect of her heels.      Informed consent was obtained for the above-mentioned procedure.      Two grams of Ancef was given prior to surgery.  She will receive antibiotics depending upon the cultures.      DESCRIPTION OF PROCEDURE:  After adequate induction of a general anesthetic, the patient was positioned supine on the operating table.  The left leg was sterilely prepped and free draped in the usual fashion.  Tourniquet around the thigh was inflated to 300 mmHg.        An incision was made over the talonavicular joint.  The plate and 4 screws were removed including a broken screw.  The interfragmentary screw was also removed.  There was no sign of infection.      This was followed by exposing the first metatarsal.  There appeared to be a chronic osteitis of the first metatarsal, and it was therefore decided to do a first ray amputation in view of the fact that she has been on antibiotics now for many months.      A first ray amputation was done, maintaining the neurovascular  structures to the lateral side of her foot.      Samples were taken from the bone and tissue for culture and sensitivity.      The wound was thoroughly rinsed and then closed with nylon sutures.      This was followed by a debridement of the large ulcer over the plantar aspect of her foot by removing the thick cutaneous and subcutaneous tissue with an excisional debridement using a knife.      A sterile dressing and a light compressive bandage were applied.  She tolerated the procedure well and was taken to the recovery room in satisfactory condition.      She can ambulate weightbearing as tolerated.  Sutures will be removed in 2 weeks.         HUGO LEÓN MD             D: 2019   T: 2019   MT:       Name:     CASSANDRA FRANCO   MRN:      [id number]        Account:        [id number]   :      1970           Procedure Date: 2019      Document: [id number]

## 2019-03-21 NOTE — PROGRESS NOTES
"[facility] Nurse Inpatient Wound Assessment  Reason for consultation: Evaluate and treat BL heel PI - community acquired                                              ASSESSMENT  Pressure Injury: on BL heels , present on admission   This is a Medical Device Related Pressure Injury (MDRPI) due to CAM boot to R heel    Pressure Injury is Stage Unstageable     Left heel: Trauma vs neuropathic    1-24-19:  LEFT HALLUX WOUND IRRIGATION AND DEBRIDEMENT WITH BONE BIOPSY (Left)  WOUND VAC APPLICATION to  Hallux in OR    3-20-19: [place]    SURGICAL PROCEDURES:   1.  First ray amputation, left foot.   2.  Removal of plates and screws from the left talonavicular joint.   3.  Debridement of a large 4 x 4 ulcer, plantar aspect, left foot.     Contributing factor of the pressure injury: pressure and immobility  Status: initial assessment       TREATMENT PLAN  Rt heel:  Every other day  1. Clean wound with saline or MicroKlenz Spray, pat dry  2. Paint periwound tissue with No Sting Skin Prep (#020474) and allow to dry thoroughly  3. Apply iodosorb gel  to  2x2  and press to wound bed. Cover with a Mepilex border  5. Betadine to Rt great to and 4th toe scab. Let dry   4. Time and date dressing change and flavio with a \"T\" for treatment of a wound  5. Reposition pt every 1 to 2 hours when in bed, Elevate LE on pillows to offload heels/ankles    Lt heel PI: every other day  1. Clean wound with MicroKlenz  2. No Sting 3M Skin Prep to nida-wound skin. Let dry  3. Apply iodosorb gel  to  2x2  and press to wound bed. Cover with a Mepilex border   4. Follow directions by ortho of Lt 1st ray amp site incision  5.  Cover ABD  6.  Secure kerlix  7.  Secure ACE wrap    Orders Written  Discharge order completed  Nursing to notify the Provider(s) and re-consult the [facility] Nurse if wound(s) deteriorates or new skin concern.    Patient History  According to provider note(s):  Shalonda Howard is a 49-year-old female with a past medical history significant for " lupus, PCOS, bilateral chronic lower extremity edema, GERD, dysthymia, generalized anxiety disorder, obesity, allergic rhinitis, myalgias with myositis, opioid dependence, ventral hernia without incarceration, postoperative nausea and vomiting.  Peripheral neuropathy, hyperlipidemia, history of kidney stones and continued nonhealing left surgical wound with multiple infections who is being evaluated for CO medical management of chronic medical conditions following direct admission by orthopedic service due to left foot nonhealing surgical wound with infection.      Of note, patient has had a persistent nonhealing surgical wound with infection of the left foot since 8/2018.  The patient has required multiple hospitalizations since initial procedure performed in 8/2018 with a bilateral bunionectomy performed by Dr. Harman.  The patient was subsequently admitted in September due to wound dehiscence and on 9/26 underwent incision and drainage as well as hardware removal.  She had been discharged following that hospitalization with antibiotics, however, this did not improve and patient was subsequently admitted on 10/2018 with concerns for worsening infection.  An MRI was performed at that time which did not reveal or appear to have any concerning signs for osteomyelitis.  She was treated with IV Ancef and followed by infectious disease with a 6 week course of IV Ancef through a PICC line.  The patient was then admitted in November with continued nonhealing surgical wound to the left foot with concerning infection for which she underwent incision and drainage as well as debridement.      The patient has been following with [facility] and indicates that she was started on oral Keflex last Friday, but has shown no improvement in her surgical wound and has noted more redness and pain going up into the foot.  As such, patient was directly admitted to undergo continued workup and evaluation.     1-24-19:  LEFT  HALLUX WOUND IRRIGATION AND DEBRIDEMENT WITH BONE BIOPSY (Left)  WOUND VAC APPLICATION to  Hallux in OR    Ms. Howard presented with a nonhealing wound over the medial side of her foot.  She had longstanding issues with her foot with infection and nonhealing wound.  She also has large ulcers over the plantar aspect of her heels.      3-20-19: [place]    SURGICAL PROCEDURES:   1.  First ray amputation, left foot.   2.  Removal of plates and screws from the left talonavicular joint.   3.  Debridement of a large 4 x 4 ulcer, plantar aspect, left foot.               Objective Data  Containment of urine/stool: Incontinence Protocol prn    Current Diet/ Nutrition:  Orders Placed This Encounter      Regular Diet Adult    Moisture: Not related to wounds    Risk Assessment:   Eduardo Risk Assessment    Sensory Perception: 4-->no impairment    Moisture: 4-->rarely moist   Activity: 3-->walks occasionally     Mobility: 3-->slightly limited   Nutrition: 3-->adequate   Friction and Shear: 3-->no apparent problem  Eduardo Score: 20     Labs:   Recent Labs   Lab Test 03/19/19  1941  10/15/18  2125   ALBUMIN 3.2*   < >  --    HGB 10.8*   < >  --    WBC 9.1   < >  --    A1C  --   --  5.6   CRP 37.4*   < >  --     < > = values in this interval not displayed.       Physical Exam  Skin inspection: focused BL heels    Wound History:   #1& #2: Bilateral heels:   Pt reports she clipped a piece of hanging skin a few years ago and has had a non healing ulcer since. Has been seen by [facility] in past.  Measurements (length x width x depth, in cm) Left 4.0 cm x 4.0 cm  x  0.3 cm                                                                             Right 2.5cm x 2.5cm x 0.5cm   Wound Base:  Left 100 % red base today following I & D                          Right:  100% red base     Tunneling N/A  Undermining N/A  Palpation of the wound bed: firm   Periwound skin: dry  callous  Color: hypopigmented  Temperature: normal  "  Description of drainage: scant serosanguinous  Odor: none  Pain: denies ,  Decreased sensation to BL lower extremities with neuropathy.            Interventions  Current support surface: Standard  Atmos Air mattress  Current off-loading measures: Pillows under calves pt wears CAM boot to RLE  Repositioning aid: Turn and Position System and Pillows  Visual inspection of wound(s) completed   Wound Care:   Rt heel:  Every other day  1. Clean wound with saline or MicroKlenz Spray, pat dry  2. Paint periwound tissue with No Sting Skin Prep (#[id number]) and allow to dry thoroughly  3. Apply iodosorb gel  to  2x2  and press to wound bed. Cover with a Mepilex border  5. Betadine to Rt great to and 4th toe scab. Let dry   4. Time and date dressing change and flavio with a \"T\" for treatment of a wound  5. Reposition pt every 1 to 2 hours when in bed, Elevate LE on pillows to offload heels/ankles    Lt heel PI: every other day  1. Clean wound with MicroKlenz  2. No Sting 3M Skin Prep to nida-wound skin. Let dry  3. Apply iodosorb gel  to  2x2  and press to wound bed. Cover with a Mepilex border   4. Follow directions by ortho of Lt 1st ray amp site incision  5.  Cover ABD  6.  Secure kerlix  7.  Secure ACE wrap    Discussed plan of care with Patient      WOC will return on am to drop off [facility] for discharge  "

## 2019-03-21 NOTE — PROGRESS NOTES
MD Notification    Notified Person: MD    Notified Person Name: Jon MARIA ESTHER Malik    Notification Date/Time: 3/21/19 @1431     Notification Interaction: TC    Purpose of Notification: Pt cultures resulted.    Orders Received: ID consult placed.    Comments:

## 2019-03-21 NOTE — ANESTHESIA POSTPROCEDURE EVALUATION
Patient: Shalonda Howard    Procedure(s):  LEFT FOOT IRRIGATION AND  DEBRIDEMENT, FIRST RAY RESECTION AND HARDWARE REMOVAL FROM LEFT FOOT  REMOVE HARDWARE LOWER EXTREMITY    Diagnosis:OPEN WOUND LEFT FOOT  Diagnosis Additional Information: No value filed.    Anesthesia Type:  General, LMA    Note:  Anesthesia Post Evaluation    Patient location during evaluation: PACU  Patient participation: Able to fully participate in evaluation  Level of consciousness: awake and alert  Pain management: adequate  Airway patency: patent  Cardiovascular status: acceptable and hemodynamically stable  Respiratory status: nonlabored ventilation, unassisted and acceptable  Hydration status: acceptable  PONV: none             Last vitals:  Vitals:    03/20/19 2125 03/20/19 2140 03/20/19 2355   BP:  119/76 123/72   Pulse:      Resp: 16  16   Temp:   36.5  C (97.7  F)   SpO2:   95%         Electronically Signed By: Ghassan Mason MD  March 21, 2019  12:27 AM

## 2019-03-21 NOTE — PLAN OF CARE
Discharge Planner PT   Patient plan for discharge: Home with family support.   Current status: PT eval completed, treatment initiated. Pt is admitted with L foot infection with non healing wound. Pt underwent I&D of the left foot along with 1st ray amputation on 03/20/2019. Pt lives with spouse and daughter in a 2SH with 1 MJ and all need are met on the main floor. Pt uses a knee scooter for mobility at home and has a RW as well. Pt reported being mod I with all ADL's. Currently pt is at independent with all aspects of bed mobility. Pt is educated on flat foot WB on LLE using Aircast boot on  for transfers per ortho orders. Pt also has a post op shoes for R foot. STS transfers at SBA and ambulated 20 ft using RW and maintaining flat foot WB. Pt was assisted back to bed with SBA for safety. Pt stated that her  will be bringing the knee scooter later today. Pt reported having no concerns for OT at this time, stating that she has been performing all her ADL's at a mod I level.   Barriers to return to prior living situation:1 Step to enter home, otherwise none anticipated.   Recommendations for discharge: Home with family support/assist as needed.   Rationale for recommendations: pt will need family assist/support for household chores due to WB precautions and decreased activity tolerance.        Entered by: Marcellus Cardoza 03/21/2019 10:28 AM

## 2019-03-22 ENCOUNTER — APPOINTMENT (OUTPATIENT)
Dept: PHYSICAL THERAPY | Facility: CLINIC | Age: 49
DRG: 857 | End: 2019-03-22
Attending: ORTHOPAEDIC SURGERY
Payer: COMMERCIAL

## 2019-03-22 VITALS
SYSTOLIC BLOOD PRESSURE: 108 MMHG | TEMPERATURE: 98.5 F | RESPIRATION RATE: 16 BRPM | DIASTOLIC BLOOD PRESSURE: 63 MMHG | OXYGEN SATURATION: 98 % | HEART RATE: 90 BPM

## 2019-03-22 LAB
ANION GAP SERPL CALCULATED.3IONS-SCNC: 7 MMOL/L (ref 3–14)
BUN SERPL-MCNC: 17 MG/DL (ref 7–30)
CALCIUM SERPL-MCNC: 8.4 MG/DL (ref 8.5–10.1)
CHLORIDE SERPL-SCNC: 110 MMOL/L (ref 94–109)
CO2 SERPL-SCNC: 26 MMOL/L (ref 20–32)
COPATH REPORT: NORMAL
CREAT SERPL-MCNC: 0.81 MG/DL (ref 0.52–1.04)
GFR SERPL CREATININE-BSD FRML MDRD: 86 ML/MIN/{1.73_M2}
GLUCOSE SERPL-MCNC: 125 MG/DL (ref 70–99)
POTASSIUM SERPL-SCNC: 3.8 MMOL/L (ref 3.4–5.3)
SODIUM SERPL-SCNC: 143 MMOL/L (ref 133–144)

## 2019-03-22 PROCEDURE — 99239 HOSP IP/OBS DSCHRG MGMT >30: CPT | Performed by: INTERNAL MEDICINE

## 2019-03-22 PROCEDURE — 99207 ZZC CDG-CODE INCORRECT PER BILLING BASED ON TIME: CPT | Performed by: INTERNAL MEDICINE

## 2019-03-22 PROCEDURE — 25000128 H RX IP 250 OP 636: Performed by: INTERNAL MEDICINE

## 2019-03-22 PROCEDURE — 80048 BASIC METABOLIC PNL TOTAL CA: CPT | Performed by: HOSPITALIST

## 2019-03-22 PROCEDURE — 97116 GAIT TRAINING THERAPY: CPT | Mod: GP | Performed by: PHYSICAL THERAPIST

## 2019-03-22 PROCEDURE — 36415 COLL VENOUS BLD VENIPUNCTURE: CPT | Performed by: HOSPITALIST

## 2019-03-22 PROCEDURE — 25000132 ZZH RX MED GY IP 250 OP 250 PS 637: Performed by: HOSPITALIST

## 2019-03-22 PROCEDURE — 25000132 ZZH RX MED GY IP 250 OP 250 PS 637: Performed by: PHYSICIAN ASSISTANT

## 2019-03-22 RX ORDER — ASPIRIN 325 MG
325 TABLET, DELAYED RELEASE (ENTERIC COATED) ORAL DAILY
Qty: 30 TABLET | Refills: 1 | Status: SHIPPED | OUTPATIENT
Start: 2019-03-22 | End: 2019-07-29

## 2019-03-22 RX ORDER — LEVOFLOXACIN 500 MG/1
500 TABLET, FILM COATED ORAL DAILY
Qty: 10 TABLET | Refills: 0 | Status: ON HOLD | OUTPATIENT
Start: 2019-03-22 | End: 2019-04-21

## 2019-03-22 RX ORDER — HYDROXYZINE PAMOATE 25 MG/1
25 CAPSULE ORAL 3 TIMES DAILY PRN
Qty: 40 CAPSULE | Refills: 1 | Status: ON HOLD | OUTPATIENT
Start: 2019-03-22 | End: 2019-04-21

## 2019-03-22 RX ORDER — CEPHALEXIN 500 MG/1
500 CAPSULE ORAL 3 TIMES DAILY
Qty: 30 CAPSULE | Refills: 0 | Status: SHIPPED | OUTPATIENT
Start: 2019-03-22 | End: 2019-05-22

## 2019-03-22 RX ORDER — OXYCODONE HYDROCHLORIDE 5 MG/1
5-10 TABLET ORAL
Qty: 50 TABLET | Refills: 0 | Status: ON HOLD | OUTPATIENT
Start: 2019-03-22 | End: 2019-04-25

## 2019-03-22 RX ORDER — ERTAPENEM 1 G/1
1 INJECTION, POWDER, LYOPHILIZED, FOR SOLUTION INTRAMUSCULAR; INTRAVENOUS EVERY 24 HOURS
Status: DISCONTINUED | OUTPATIENT
Start: 2019-03-22 | End: 2019-03-22 | Stop reason: HOSPADM

## 2019-03-22 RX ORDER — AMOXICILLIN 250 MG
1 CAPSULE ORAL 2 TIMES DAILY
Qty: 40 TABLET | Refills: 1 | Status: ON HOLD | OUTPATIENT
Start: 2019-03-22 | End: 2019-04-21

## 2019-03-22 RX ADMIN — GABAPENTIN 800 MG: 800 TABLET, FILM COATED ORAL at 15:14

## 2019-03-22 RX ADMIN — OMEPRAZOLE 40 MG: 20 CAPSULE, DELAYED RELEASE ORAL at 06:49

## 2019-03-22 RX ADMIN — POTASSIUM CHLORIDE 80 MEQ: 1500 TABLET, EXTENDED RELEASE ORAL at 08:34

## 2019-03-22 RX ADMIN — OXYCODONE HYDROCHLORIDE 10 MG: 5 TABLET ORAL at 10:23

## 2019-03-22 RX ADMIN — RANITIDINE 150 MG: 150 TABLET ORAL at 08:34

## 2019-03-22 RX ADMIN — HYDROXYZINE PAMOATE 25 MG: 25 CAPSULE ORAL at 10:23

## 2019-03-22 RX ADMIN — GABAPENTIN 800 MG: 800 TABLET, FILM COATED ORAL at 08:34

## 2019-03-22 RX ADMIN — CHOLECALCIFEROL CAP 125 MCG (5000 UNIT) 5000 UNITS: 125 CAP at 08:34

## 2019-03-22 RX ADMIN — HYDROXYZINE PAMOATE 25 MG: 25 CAPSULE ORAL at 04:02

## 2019-03-22 RX ADMIN — FUROSEMIDE 160 MG: 40 TABLET ORAL at 08:34

## 2019-03-22 RX ADMIN — ASPIRIN 325 MG: 325 TABLET, DELAYED RELEASE ORAL at 08:35

## 2019-03-22 RX ADMIN — HYDROXYZINE PAMOATE 25 MG: 25 CAPSULE ORAL at 00:18

## 2019-03-22 RX ADMIN — OXYCODONE HYDROCHLORIDE 10 MG: 5 TABLET ORAL at 00:18

## 2019-03-22 RX ADMIN — BUSPIRONE HYDROCHLORIDE 15 MG: 5 TABLET ORAL at 08:33

## 2019-03-22 RX ADMIN — SENNOSIDES AND DOCUSATE SODIUM 2 TABLET: 8.6; 5 TABLET ORAL at 08:34

## 2019-03-22 RX ADMIN — OXYCODONE HYDROCHLORIDE 10 MG: 5 TABLET ORAL at 06:49

## 2019-03-22 RX ADMIN — CETIRIZINE HYDROCHLORIDE 10 MG: 10 TABLET ORAL at 08:35

## 2019-03-22 RX ADMIN — SERTRALINE HYDROCHLORIDE 100 MG: 100 TABLET ORAL at 08:35

## 2019-03-22 RX ADMIN — HYDROXYZINE PAMOATE 25 MG: 25 CAPSULE ORAL at 13:49

## 2019-03-22 RX ADMIN — BUPROPION HYDROCHLORIDE 300 MG: 300 TABLET, FILM COATED, EXTENDED RELEASE ORAL at 08:34

## 2019-03-22 RX ADMIN — OXYCODONE HYDROCHLORIDE 10 MG: 5 TABLET ORAL at 04:02

## 2019-03-22 RX ADMIN — ERTAPENEM SODIUM 1 G: 1 INJECTION, POWDER, LYOPHILIZED, FOR SOLUTION INTRAMUSCULAR; INTRAVENOUS at 15:13

## 2019-03-22 RX ADMIN — OXYCODONE HYDROCHLORIDE 10 MG: 5 TABLET ORAL at 13:49

## 2019-03-22 ASSESSMENT — ACTIVITIES OF DAILY LIVING (ADL)
ADLS_ACUITY_SCORE: 12

## 2019-03-22 NOTE — PLAN OF CARE
Pt remains A/O. Up with SBA and scooter. IvSL. Oxycodone/vistaril/iv dilaudid for pain. Flat foot WB. post op shoe on LLE and CAM on RLE when OOB. Baseline neuropathy. Plan for discontinue tomorrow pending pain control. Continue to monitor.

## 2019-03-22 NOTE — CONSULTS
[facility]    Infectious Disease Consultation     Date of Admission:  3/19/2019  Date of Consult (When I saw the patient): 03/22/19    Assessment & Plan   Shalonda Howard is a 49 year old female who was admitted on 3/19/2019.     Impression:  1. This is a 49 y.o familiar to me from multiple prior admissions.   2. She has a history of bilateral bunion repair surgery done in August 2018. The right side healed but the left side the insion has not been healing , 1 I and D and 6 weeks of IV antibiotics and many more weeks of oral antibiotics since the end of August. Then repeat admission in November, more I and D and another course of IV antibiotics. then another admission in January, again, wound non healing, s/p another I and D and 2 abscesses for 6 weeks.   3. She also has some hardware in the ankle b/l, those incisions look ok. Though both ankle are swollen.   4. She also has chronic heel ulcers bilateral.   5. PCN allergy, tolerates zosyn/cephalosporins, sulfa allergy.   6. Admitted now with continued wound non healing, concern for underlying osteo, s/p first ray amputation, hardware removal and ulcer debridement 3/20.        Recommendations:   Spoke with Ortho all infection deemed surgically removed. Based on prior RASHEL Keflex + levaquin for 7 days when ready for discharge, ertapenem while admitted.            Ignacia Bocanegra MD    Reason for Consult   Reason for consult: I was asked by Dr. Medrano  to evaluate this patient for foot osteo.    Primary Care Physician   Linda Bernstein    Chief Complaint   Non healing foot wound    History is obtained from the patient and medical records    History of Present Illness   Shalonda Howard is a 49 year old female admitted on 3/19/2019.  Past history of possible lupus (not on meds), chronic LE edema, anxiety, opioid dependence who had bunion surgery Aug 2018 with subsequent wound infection and poor healing, admitted on 3/19/2019 with apparent osteomyelitis left  first Metatarsal now s/p ray amputation.           Past Medical History   I have reviewed this patient's medical history and updated it with pertinent information if needed.   Past Medical History:   Diagnosis Date     Allergic rhinitis, cause unspecified      Anxiety state, unspecified      Cellulitis and abscess of leg, except foot      Closed fracture of unspecified part of tibia      Disuse osteoporosis      Dysthymic disorder      Edema      Encounter for long-term (current) use of other medications      Esophageal reflux      Kidney stones      Myalgia and myositis, unspecified      Obesity, unspecified      Open wound of foot except toe(s) alone, complicated      Opioid type dependence, unspecified      Other acne      Other congenital valgus deformity of feet      Other ventral hernia without mention of obstruction or gangrene      Polycystic ovaries      PONV (postoperative nausea and vomiting)      Systemic lupus erythematosus (H)      Tibialis tendinitis      Unspecified hereditary and idiopathic peripheral neuropathy        Past Surgical History   I have reviewed this patient's surgical history and updated it with pertinent information if needed.  Past Surgical History:   Procedure Laterality Date     APPENDECTOMY       APPLY WOUND VAC Left 1/24/2019    Procedure: WOUND VAC APPLICATION;  Surgeon: Miguel Mosher MD;  Location:  OR     ARTHRODESIS FOOT Left 2/4/2015    Procedure: ARTHRODESIS FOOT;  Surgeon: Andre Harman MD;  Location:  [facility]     BUNIONECTOMY RT/LT  08/29/2018     DILATION AND CURETTAGE       EXCISE TOENAIL(S) Left 2/4/2015    Procedure: EXCISE TOENAIL(S);  Surgeon: Andre Harman MD;  Location: [facility]     HERNIA REPAIR      umbilical     HERNIA REPAIR      ventral open x 2     IRRIGATION AND DEBRIDEMENT FOOT, COMBINED Left 9/26/2018    Procedure: COMBINED IRRIGATION AND DEBRIDEMENT FOOT;  IRRIGATION AND DEBRIDEMENT LEFT FOOT;  Surgeon: Andre Harman MD;   Location:  SD     IRRIGATION AND DEBRIDEMENT FOOT, COMBINED Left 11/26/2018    Procedure: COMBINED IRRIGATION AND DEBRIDEMENT LEFT FOOT;  Surgeon: Andre Harman MD;  Location:  SD     IRRIGATION AND DEBRIDEMENT FOOT, COMBINED Left 1/24/2019    Procedure: LEFT HALLUX WOUND IRRIGATION AND DEBRIDEMENT WITH BONE BIOPSY;  Surgeon: Miguel Mosher MD;  Location:  OR     IRRIGATION AND DEBRIDEMENT FOOT, COMBINED Left 3/20/2019    Procedure: LEFT FOOT IRRIGATION AND  DEBRIDEMENT, FIRST RAY RESECTION AND HARDWARE REMOVAL FROM LEFT FOOT;  Surgeon: Andre Harman MD;  Location:  OR     REMOVE HARDWARE LOWER EXTREMITY Left 3/20/2019    Procedure: REMOVE HARDWARE LOWER EXTREMITY;  Surgeon: Andre Harman MD;  Location:  OR     REPAIR HAMMER TOE Left 11/26/2018    Procedure: REPAIR HAMMER TOE;  Surgeon: Andre Harman MD;  Location:  [facility]     TONSILLECTOMY         Prior to Admission Medications   Prior to Admission Medications   Prescriptions Last Dose Informant Patient Reported? Taking?   BuPROPion HCl (WELLBUTRIN XL PO) 3/19/2019 at AM Self Yes Yes   Sig: Take 300 mg by mouth daily   BusPIRone HCl (BUSPAR PO) 3/19/2019 at x1 Self Yes Yes   Sig: Take 15 mg by mouth 2 times daily   Furosemide (LASIX PO) 3/19/2019 at AM Self Yes Yes   Sig: Take 160 mg by mouth daily    Omeprazole (PRILOSEC PO) 3/19/2019 at x1 Self Yes Yes   Sig: Take 40 mg by mouth 2 times daily (before meals) 2 CAPSULES IN A.M.    Ondansetron HCl (ZOFRAN PO)  at PRN Self Yes Yes   Sig: Take 8 mg by mouth as needed for nausea or vomiting   Potassium Chloride Shameka CR (K-DUR PO) 3/19/2019 at AM Self Yes Yes   Sig: Take 80 mEq by mouth daily    Topiramate (TOPAMAX PO) 3/18/2019 at HS Self Yes Yes   Sig: Take 100 mg by mouth At Bedtime    ammonium lactate (AMLACTIN) 12 % cream Past Week at PRN Self Yes Yes   Sig: Apply topically daily as needed (to heels)    cetirizine (ZYRTEC) 10 MG tablet 3/19/2019 at AM Self Yes Yes    Sig: Take 10 mg by mouth daily   cholecalciferol (VITAMIN D3) 5000 units TABS tablet 3/19/2019 at AM Self Yes Yes   Sig: Take 5,000 Units by mouth daily   clindamycin (CLEOCIN T) 1 % solution  at PRN Self Yes Yes   Sig: Apply topically nightly as needed (Acne outbreak)    gabapentin (NEURONTIN) 800 MG tablet 3/19/2019 at x1 Self Yes Yes   Sig: Take 800 mg by mouth 3 times daily Am, supper, hs   hydrOXYzine (VISTARIL) 25 MG capsule Past Week at PRN Self No Yes   Sig: Take 1 capsule (25 mg) by mouth every 4 hours as needed for anxiety   hydrOXYzine (VISTARIL) 25 MG capsule 3/18/2019 at HS Self Yes Yes   Sig: Take 25 mg by mouth At Bedtime   phentermine 30 MG capsule 3/19/2019 at AM Self Yes Yes   Sig: Take 30 mg by mouth every morning   polyethylene glycol (MIRALAX/GLYCOLAX) packet  at PRN Self Yes Yes   Sig: Take 17 g by mouth as needed for constipation   pravastatin (PRAVACHOL) 20 MG tablet 3/18/2019 at PM Self Yes Yes   Sig: Take 20 mg by mouth every evening   senna-docusate (SENOKOT-S;PERICOLACE) 8.6-50 MG per tablet  at PRN Self No No   Sig: Take 1 tablet by mouth 2 times daily   Patient taking differently: Take 1 tablet by mouth 2 times daily as needed    sertraline (ZOLOFT) 100 MG tablet 3/19/2019 at AM Self Yes Yes   Sig: Take 100 mg by mouth daily      Facility-Administered Medications: None     Allergies   Allergies   Allergen Reactions     Sulfa Drugs Shortness Of Breath and Rash     Demerol [Meperidine] Nausea and Vomiting     Erythromycin Nausea and Vomiting     Metformin Nausea and Vomiting     Codeine Rash     Penicillins Rash       Immunization History   Immunization History   Administered Date(s) Administered     Influenza Vaccine IM 3yrs+ 4 Valent IIV4 10/16/2018       Social History   I have reviewed this patient's social history and updated it with pertinent information if needed. Shalonda Howard  reports that she quit smoking about 16 years ago. Her smoking use included cigarettes. She has a 6.00  pack-year smoking history. she has never used smokeless tobacco. She reports that she drinks alcohol. She reports that she does not use drugs.    Family History   I have reviewed this patient's family history and updated it with pertinent information if needed.   No family history on file.    Review of Systems   The 10 point Review of Systems is negative other than noted in the HPI or here.     Physical Exam   Temp: 98.1  F (36.7  C) Temp src: Oral BP: 101/64 Pulse: 84 Heart Rate: 81 Resp: 16 SpO2: 98 % O2 Device: None (Room air)    Vital Signs with Ranges  Temp:  [98.1  F (36.7  C)-98.7  F (37.1  C)] 98.1  F (36.7  C)  Pulse:  [84] 84  Heart Rate:  [81-89] 81  Resp:  [16] 16  BP: (101-113)/(59-67) 101/64  SpO2:  [96 %-98 %] 98 %  0 lbs 0 oz  There is no height or weight on file to calculate BMI.    GENERAL APPEARANCE:  alert and no distress  EYES: Eyes grossly normal to inspection, PERRL and conjunctivae and sclerae normal  HENT: ear canals and TM's normal and nose and mouth without ulcers or lesions  NECK: no adenopathy, no asymmetry, masses, or scars and thyroid normal to palpation  RESP: lungs clear to auscultation - no rales, rhonchi or wheezes  CV: regular rates and rhythm, normal S1 S2, no S3 or S4 and no murmur, click or rub  LYMPHATICS: normal ant/post cervical and supraclavicular nodes  ABDOMEN: soft, nontender, without hepatosplenomegaly or masses and bowel sounds normal  MS: dressing on bilateral foot   SKIN: no suspicious lesions or rashes      Data   Lab Results   Component Value Date    WBC 9.1 03/19/2019    HGB 10.8 (L) 03/19/2019    HCT 33.9 (L) 03/19/2019     03/19/2019     03/22/2019    POTASSIUM 3.8 03/22/2019    CHLORIDE 110 (H) 03/22/2019    CO2 26 03/22/2019    BUN 17 03/22/2019    CR 0.81 03/22/2019     (H) 03/22/2019    SED 51 (H) 03/19/2019    NTBNPI 66 10/17/2018    AST 13 03/19/2019    ALT 18 03/19/2019    ALKPHOS 121 03/19/2019    BILITOTAL 0.2 03/19/2019     Recent  Labs   Lab 03/20/19  1722   CULT Moderate growth  Streptococcus dysgalactiae serogroup C/G  *  Moderate growth  Probable  Staphylococcus aureus  *  Light growth  Probable  Enterobacter cloacae complex  *  Culture in progress  Culture negative monitoring continues     Recent Labs   Lab Test 03/20/19  1722 01/24/19  1122 01/24/19  1120 01/22/19  1950 01/22/19  1945 11/26/18  1159 11/24/18  1950 11/24/18  1814 11/20/18  1700   CULT Moderate growth  Streptococcus dysgalactiae serogroup C/G  *  Moderate growth  Probable  Staphylococcus aureus  *  Light growth  Probable  Enterobacter cloacae complex  *  Culture in progress  Culture negative monitoring continues On day 5, isolated in broth only:  Propionibacterium (Cutibacterium) acnes  Susceptibility testing of Propionibacterium species is not done from this source. Our   antibiogram indicates that Propionibacterum species is susceptible to penicillin and   cefotaxime and most are susceptible to clindamycin.  Liat Mcdonald M.D., Medical Director  *  No growth No anaerobes isolated  Light growth  Staphylococcus aureus  *  On day 1, isolated in broth only:  Enterobacter cloacae complex  *  Light growth  Streptococcus agalactiae sero group B  *  Single colony  Corynebacterium species  Identification obtained by MALDI-TOF mass spectrometry research use only database. Test   characteristics determined and verified by the [facility]   ([facility]) [place], [place].  Susceptibility testing not routinely done  These bacteria are part of normal skin kenyatta, but on occasion, may be true pathogens.    Clinical correlation must be applied to interpreting this microbiology result.  * No growth No growth No anaerobes isolated  Light growth  Staphylococcus aureus  *  Light growth  Acinetobacter baumannii complex  *  Light growth  Corynebacterium species  Identification obtained by MALDI-TOF mass spectrometry research use only database. Test    characteristics determined and verified by the [facility]   ([facility]) [place], [place].  Susceptibility testing not routinely done  * No growth No growth No growth

## 2019-03-22 NOTE — PROGRESS NOTES
Focus: Incision discharge instructions  S: Ortho incision assessed yesterday 3-21-19 by Jon Malik PA-C.  See Progress note. Incision care with adaptic, covered by ABD and all secured with kerlix and ace. Per Nursing will place discharge incision orders in AVS. Will continue Ortho incision protocol from yesterday. If questions pt to f/u with ortho.   I/P: Discussed with nursing        AVS updated

## 2019-03-22 NOTE — DISCHARGE SUMMARY
[facility]    Discharge Summary  Hospitalist    Date of Admission:  3/19/2019  Date of Discharge:  3/22/2019  Discharging Provider: Armando Mccarty MD    Discharge Diagnoses   Principal Problem:    Osteomyelitis Left 1st metatarsal   Active Problems:    Left foot Postop Wound infection    Systemic lupus erythematosus (H)      History of Present Illness   49 year old female with a past medical history significant for lupus, PCOS, bilateral chronic lower extremity edema, GERD, dysthymia, generalized anxiety disorder, obesity, allergic rhinitis, myalgias with myositis, opioid dependence, ventral hernia without incarceration, postoperative nausea and vomiting.  Peripheral neuropathy, hyperlipidemia, history of kidney stones and continued nonhealing left surgical wound with multiple infections who is being evaluated for CO medical management of chronic medical conditions following direct admission by orthopedic service due to left foot nonhealing surgical wound with infection.      Patient has had a persistent nonhealing surgical wound with infection of the left foot since 8/2018.  The patient has required multiple hospitalizations since initial procedure performed in 8/2018 with a bilateral bunionectomy performed by Dr. Harman.  The patient was subsequently admitted in September due to wound dehiscence and on 9/26 underwent incision and drainage as well as hardware removal.  She had been discharged following that hospitalization with antibiotics, however, this did not improve and patient was subsequently admitted on 10/2018 with concerns for worsening infection.  An MRI was performed at that time which did not reveal or appear to have any concerning signs for osteomyelitis.  She was treated with IV Ancef and followed by infectious disease with a 6 week course of IV Ancef through a PICC line.  The patient was then admitted in November with continued nonhealing surgical wound to the left foot  with concerning infection for which she underwent incision and drainage as well as debridement.      Patient was again admitted for non-healing surgical wound on 1/22/2019.  She was discharged on 1/28/2019 after a left hallux wound irrigation and debridement with bone biopsy and wound vac placement.        Patient completed IV antibiotics last week (3/11/2019) with PICC removed the following day.  She had a wound vac in place until yesterday.  She has continued to have pain with redness and oozing at the surgical wound.  She notes that she has continued fevers and chills.  She notes swelling in both feet which are both painful.       Hospital Course   Admitted to Orthopedic floor, seen by orthopedics, MRI of left foot obtain which was suspicious for osteomyelitis of left 1st metatarsal.  Dr. Andre Harman discussed options with her, and proceeded with surgery.  At time of surgery found:  1.  Infected first ray, left foot with a chronic over the medial side of the foot, as well as plantar.   2.  Retained hardware from a previous talonavicular fusion.      SURGICAL PROCEDURES:   1.  First ray amputation, left foot.   2.  Removal of plates and screws from the left talonavicular joint.   3.  Debridement of a large 4 x 4 ulcer, plantar aspect, left foot.     Postop was seen by infectious disease and initially treated with IV Cefazolin, and then switched to IV Meropenem.  Tissue cultures grew Strep and Staph Aureus, and enterobacter.  She will be treated with Keflex and Levaquin for 1 week on discharge, with the thought being the infected bone was all removed at time of surgery.      Armando Mccarty MD  Pager: [phone]  Cell Phone:  [phone]       Significant Results and Procedures   As above    Pending Results   These results will be followed up by Dr. Mccarty  Unresulted Labs Ordered in the Past 30 Days of this Admission     Date and Time Order Name Status Description    3/20/2019 1724 Tissue Culture  Aerobic Bacterial Preliminary     3/20/2019 1724 Anaerobic bacterial culture Preliminary           Code Status   Full Code       Primary Care Physician   Linda Bernstein    Physical Exam   Temp: 98.1  F (36.7  C) Temp src: Oral BP: 101/64 Pulse: 84 Heart Rate: 81 Resp: 16 SpO2: 98 % O2 Device: None (Room air)    There were no vitals filed for this visit.  Vital Signs with Ranges  Temp:  [98.1  F (36.7  C)-98.7  F (37.1  C)] 98.1  F (36.7  C)  Pulse:  [84] 84  Heart Rate:  [81-87] 81  Resp:  [16] 16  BP: (101-113)/(62-67) 101/64  SpO2:  [96 %-98 %] 98 %  I/O last 3 completed shifts:  In: 240 [P.O.:240]  Out: -     Exam on discharge:   Both feet with dressing on them  Alert, Ox3    # Discharge Pain Plan:   - During her hospitalization, Shalonda experienced pain due to foot surgery.  The pain plan for discharge was discussed with Shalonda and the plan was created in a collaborative fashion.    - see orders      Discharge Disposition   Discharged to home  Condition at discharge: Good    Consultations This Hospital Stay   HOSPITALIST IP CONSULT  OCCUPATIONAL THERAPY ADULT IP CONSULT  PHYSICAL THERAPY ADULT IP CONSULT  WOUND OSTOMY CONTINENCE NURSE  IP CONSULT  INFECTIOUS DISEASES IP CONSULT  CARE TRANSITION RN/SW IP CONSULT    Time Spent on this Encounter   I spent a total of 35 minutes discharging this patient.     Discharge Orders      Home care nursing referral      Reason for your hospital stay    L foot infection     Follow-up and recommended labs and tests     Follow up with Dr. Harman , at (location with clinic name or city) [facility], within 2 weeks  to evaluate after surgery. No follow up labs or test are needed.     Activity    Your activity upon discharge: ambulate in house     Diet    Follow this diet upon discharge: Orders Placed This Encounter      Advance Diet as Tolerated: Regular Diet Adult     Discharge Medications   Current Discharge Medication List      START taking these medications    Details   aspirin  (ASA) 325 MG EC tablet Take 1 tablet (325 mg) by mouth daily  Qty: 30 tablet, Refills: 1    Associated Diagnoses: Left foot infection      cephALEXin (KEFLEX) 500 MG capsule Take 1 capsule (500 mg) by mouth 3 times daily  Qty: 30 capsule, Refills: 0    Associated Diagnoses: Left foot infection      !! hydrOXYzine (VISTARIL) 25 MG capsule Take 1 capsule (25 mg) by mouth 3 times daily as needed for itching  Qty: 40 capsule, Refills: 1    Associated Diagnoses: Left foot infection      levofloxacin (LEVAQUIN) 500 MG tablet Take 1 tablet (500 mg) by mouth daily  Qty: 10 tablet, Refills: 0    Associated Diagnoses: Left foot infection      oxyCODONE (ROXICODONE) 5 MG tablet Take 1-2 tablets (5-10 mg) by mouth every 3 hours as needed for breakthrough pain  Qty: 50 tablet, Refills: 0    Associated Diagnoses: Left foot infection      !! senna-docusate (SENOKOT-S/PERICOLACE) 8.6-50 MG tablet Take 1 tablet by mouth 2 times daily  Qty: 40 tablet, Refills: 1    Associated Diagnoses: Left foot infection       !! - Potential duplicate medications found. Please discuss with provider.      CONTINUE these medications which have NOT CHANGED    Details   ammonium lactate (AMLACTIN) 12 % cream Apply topically daily as needed (to heels)       BuPROPion HCl (WELLBUTRIN XL PO) Take 300 mg by mouth daily      BusPIRone HCl (BUSPAR PO) Take 15 mg by mouth 2 times daily      cetirizine (ZYRTEC) 10 MG tablet Take 10 mg by mouth daily      cholecalciferol (VITAMIN D3) 5000 units TABS tablet Take 5,000 Units by mouth daily      clindamycin (CLEOCIN T) 1 % solution Apply topically nightly as needed (Acne outbreak)       Furosemide (LASIX PO) Take 160 mg by mouth daily       gabapentin (NEURONTIN) 800 MG tablet Take 800 mg by mouth 3 times daily Am, supper, hs      !! hydrOXYzine (VISTARIL) 25 MG capsule Take 25 mg by mouth At Bedtime      !! hydrOXYzine (VISTARIL) 25 MG capsule Take 1 capsule (25 mg) by mouth every 4 hours as needed for  anxiety  Qty: 40 capsule, Refills: 0    Associated Diagnoses: Infection      Omeprazole (PRILOSEC PO) Take 40 mg by mouth 2 times daily (before meals) 2 CAPSULES IN A.M.       Ondansetron HCl (ZOFRAN PO) Take 8 mg by mouth as needed for nausea or vomiting      phentermine 30 MG capsule Take 30 mg by mouth every morning      polyethylene glycol (MIRALAX/GLYCOLAX) packet Take 17 g by mouth as needed for constipation      Potassium Chloride Shameka CR (K-DUR PO) Take 80 mEq by mouth daily       pravastatin (PRAVACHOL) 20 MG tablet Take 20 mg by mouth every evening      sertraline (ZOLOFT) 100 MG tablet Take 100 mg by mouth daily      Topiramate (TOPAMAX PO) Take 100 mg by mouth At Bedtime       !! senna-docusate (SENOKOT-S;PERICOLACE) 8.6-50 MG per tablet Take 1 tablet by mouth 2 times daily  Qty: 40 tablet, Refills: 1    Associated Diagnoses: Infection       !! - Potential duplicate medications found. Please discuss with provider.        Allergies   Allergies   Allergen Reactions     Sulfa Drugs Shortness Of Breath and Rash     Demerol [Meperidine] Nausea and Vomiting     Erythromycin Nausea and Vomiting     Metformin Nausea and Vomiting     Codeine Rash     Penicillins Rash     Data   Most Recent 3 CBC's:  Recent Labs   Lab Test 03/19/19 1941 03/03/19 1130 02/25/19  1045   WBC 9.1 7.1 6.8   HGB 10.8* 11.4* 11.7   MCV 79 82 82    391 426      Most Recent 3 BMP's:  Recent Labs   Lab Test 03/22/19  0629 03/19/19 1941 03/03/19 1130 01/29/19  0545    139  --   --  137   POTASSIUM 3.8 3.5  --   --  3.9   CHLORIDE 110* 104  --   --  108   CO2 26 24  --   --  24   BUN 17 12  --   --  9   CR 0.81 0.71 0.73   < > 0.61   ANIONGAP 7 11  --   --  5   PAULINE 8.4* 8.6  --   --  7.9*   * 92  --   --  146*    < > = values in this interval not displayed.     Most Recent 2 LFT's:  Recent Labs   Lab Test 03/19/19 1941 03/03/19 1130 11/26/18  0630   AST 13 12   < > 12   ALT 18  --   --  16   ALKPHOS 121  --   --   117   BILITOTAL 0.2  --   --  <0.1*    < > = values in this interval not displayed.     Most Recent INR's and Anticoagulation Dosing History:  Anticoagulation Dose History     There is no flowsheet data to display.        Most Recent 3 Troponin's:No lab results found.  Most Recent Cholesterol Panel:No lab results found.  Most Recent 6 Bacteria Isolates From Any Culture (See EPIC Reports for Culture Details):  Recent Labs   Lab Test 03/20/19  1722 01/24/19  1122 01/24/19  1120 01/22/19  1950 01/22/19  1945 11/26/18  1159   CULT Moderate growth  Streptococcus dysgalactiae serogroup C/G  Susceptibility testing in progress  *  Moderate growth  Staphylococcus aureus  Susceptibility testing in progress  *  Light growth  Enterobacter cloacae complex  Susceptibility testing in progress  *  Moderate growth  Streptococcus agalactiae sero group B  *  Culture in progress  Culture negative monitoring continues On day 5, isolated in broth only:  Propionibacterium (Cutibacterium) acnes  Susceptibility testing of Propionibacterium species is not done from this source. Our   antibiogram indicates that Propionibacterum species is susceptible to penicillin and   cefotaxime and most are susceptible to clindamycin.  Liat Mcdonald M.D., Medical Director  *  No growth No anaerobes isolated  Light growth  Staphylococcus aureus  *  On day 1, isolated in broth only:  Enterobacter cloacae complex  *  Light growth  Streptococcus agalactiae sero group B  *  Single colony  Corynebacterium species  Identification obtained by MALDI-TOF mass spectrometry research use only database. Test   characteristics determined and verified by the [facility]   ([facility]) [place], [place].  Susceptibility testing not routinely done  These bacteria are part of normal skin kenyatta, but on occasion, may be true pathogens.    Clinical correlation must be applied to interpreting this microbiology result.  * No growth No growth  No anaerobes isolated  Light growth  Staphylococcus aureus  *  Light growth  Acinetobacter baumannii complex  *  Light growth  Corynebacterium species  Identification obtained by MALDI-TOF mass spectrometry research use only database. Test   characteristics determined and verified by the [facility]   ([facility]) [place], [place].  Susceptibility testing not routinely done  *     Most Recent TSH, T4 and A1c Labs:  Recent Labs   Lab Test 10/16/18  0632 10/15/18  9941   TSH 4.36*  --    T4 0.85  --    A1C  --  5.6

## 2019-03-22 NOTE — DISCHARGE INSTRUCTIONS
Rt heel:  Every other day  1. Clean wound with saline or MicroKlenz Spray, pat dry  2. Paint periwound tissue with No Sting Skin Prep  and allow to dry thoroughly  3. Apply medihoney gel  to  2x2  and press to wound bed. Cover with a foam dressing ( Mepilex; Allevyn; Optifoam or equivalent) to abosrb drainage to prevent maceration  4. Betadine to Rt great to and 4th toe scab. Let dry   5, Secure Light ace wrap  6.Elevate LE on pillows to offload heels/ankles     Lt heel PI and incision: every other day  1. Clean wound with MicroKlenz  2. No Sting 3M Skin Prep to nida-wound skin. Let dry  3. Apply Medihones  to  2x2  and press to wound bed. Cover with a foam dressing ( Mepilex; Allevyn; Optifoam or equivalent) to abosrb drainage to prevent maceration  4. Lt foot Incision: gently clean with MicroKlenz.  Adaptic over incision. Cover ABD  5.  Secure kerlix  6.  Secure ACE wrap

## 2019-03-22 NOTE — PLAN OF CARE
Pt  A&Ox4. VSS on RA. CMS intact with exception to baseline neuropathy.  Up with SBA and scooter. IvSL. Oxycodone & vistaril for pain. Flat foot WB and post op shoe on LLE and CAM on RLE when out of bed.  Plan for discontinue today pending pain control. Continue to observe.

## 2019-03-22 NOTE — CONSULTS
[facility]erral sent. Wound care instructions on AVS

## 2019-03-22 NOTE — PROGRESS NOTES
Shalonda Howard  3/22/2019  POD #2 L foot 1st ray amputation    Doing well.  No immediate surgical complications identified.  No excessive bleeding  Pain well-controlled.  Tolerating physical therapy and rehabilitation well.  Temperatures:  Current - Temp: 98.7  F (37.1  C); Max - Temp  Av.5  F (36.9  C)  Min: 98.3  F (36.8  C)  Max: 98.7  F (37.1  C)  Pulse range: Pulse  Av.5  Min: 84  Max: 85  Blood pressure range: Systolic (24hrs), Av , Min:103 , Max:113   ; Diastolic (24hrs), Av, Min:59, Max:67    CMS: intact to baseline neuropathy  Labs:none    PLAN:DC home with wound care/Abx per wound RN and ID team

## 2019-03-22 NOTE — PLAN OF CARE
Discharge Planner PT   Patient plan for discharge: Return home.  Current status: Pt reported L foot pain of 7/10 at rest. Pt demonstrated ability to karlee B aircast boots independently. Bed mobility and transfers on/off knee scooter independently. Pt navigated 200 feet x 2 with knee scooter and SBA for safety. Pt navigated platform step x 3 with SBA, cues provided for appropriate sequencing. Pt returned to supine at end of session.   Barriers to return to prior living situation: None indicated  Recommendations for discharge: Return home with assist from spouse for mobility and household tasks as needed.   Rationale for recommendation: Pt as demonstrated ability to complete mobility safely with SBA. Pt reports her spouse will be able to assist as needed. No further IP PT needs indicated.        Entered by: Varsha Ramirez 03/22/2019 1:01 PM     Physical Therapy Discharge Summary    Reason for therapy discharge:    Goals sufficiently met for safe discharge home with spouse.      Progress towards therapy goal(s). See goals on Care Plan in [facility] electronic health record for goal details.  Goals partially met.  Barriers to achieving goals:   discharge from facility.    Therapy recommendation(s):    No further therapy is recommended.

## 2019-03-22 NOTE — PLAN OF CARE
A/O. Up independently in room. LS clear. BS active. VSS. CMS intact. Reports pain of 7/10 treated regularly with PRN oxy and vistaril. Started on IV antibiotics per infectious diseases. Due to discharge today. Dressings changed. Contected Ortho regarding disposition. States that she may leave. With wound care giving discharge instructions for care of wounds. Infectious disease okayed to discharge. Awaiting okay from hospitalist.

## 2019-03-24 LAB
BACTERIA SPEC CULT: ABNORMAL
SPECIMEN SOURCE: ABNORMAL

## 2019-03-27 LAB
BACTERIA SPEC CULT: ABNORMAL
Lab: ABNORMAL
SPECIMEN SOURCE: ABNORMAL

## 2019-04-01 ENCOUNTER — APPOINTMENT (OUTPATIENT)
Dept: LAB | Facility: CLINIC | Age: 49
End: 2019-04-01
Attending: INTERNAL MEDICINE
Payer: COMMERCIAL

## 2019-04-21 ENCOUNTER — HOSPITAL ENCOUNTER (INPATIENT)
Facility: CLINIC | Age: 49
LOS: 4 days | Discharge: HOME HEALTH | DRG: 493 | End: 2019-04-25
Attending: ORTHOPAEDIC SURGERY | Admitting: ORTHOPAEDIC SURGERY
Payer: COMMERCIAL

## 2019-04-21 DIAGNOSIS — B99.9 INFECTION: ICD-10-CM

## 2019-04-21 DIAGNOSIS — L08.9 LEFT FOOT INFECTION: Primary | ICD-10-CM

## 2019-04-21 DIAGNOSIS — T81.40XD POSTOPERATIVE INFECTION, UNSPECIFIED TYPE, SUBSEQUENT ENCOUNTER: ICD-10-CM

## 2019-04-21 DIAGNOSIS — M86.9 OSTEOMYELITIS OF METATARSAL (H): ICD-10-CM

## 2019-04-21 PROCEDURE — 25000128 H RX IP 250 OP 636: Performed by: PHYSICIAN ASSISTANT

## 2019-04-21 PROCEDURE — 25000132 ZZH RX MED GY IP 250 OP 250 PS 637: Performed by: PHYSICIAN ASSISTANT

## 2019-04-21 PROCEDURE — 12000000 ZZH R&B MED SURG/OB

## 2019-04-21 PROCEDURE — 25800030 ZZH RX IP 258 OP 636: Performed by: PHYSICIAN ASSISTANT

## 2019-04-21 PROCEDURE — 87040 BLOOD CULTURE FOR BACTERIA: CPT | Performed by: PHYSICIAN ASSISTANT

## 2019-04-21 PROCEDURE — 99222 1ST HOSP IP/OBS MODERATE 55: CPT | Performed by: PHYSICIAN ASSISTANT

## 2019-04-21 PROCEDURE — 99207 ZZC CONSULT E&M CHANGED TO INITIAL LEVEL: CPT | Performed by: PHYSICIAN ASSISTANT

## 2019-04-21 PROCEDURE — 36415 COLL VENOUS BLD VENIPUNCTURE: CPT | Performed by: PHYSICIAN ASSISTANT

## 2019-04-21 PROCEDURE — 25000128 H RX IP 250 OP 636

## 2019-04-21 PROCEDURE — 25000128 H RX IP 250 OP 636: Performed by: ORTHOPAEDIC SURGERY

## 2019-04-21 RX ORDER — POLYETHYLENE GLYCOL 3350 17 G/17G
17 POWDER, FOR SOLUTION ORAL 2 TIMES DAILY PRN
Status: DISCONTINUED | OUTPATIENT
Start: 2019-04-21 | End: 2019-04-25 | Stop reason: HOSPADM

## 2019-04-21 RX ORDER — POTASSIUM CHLORIDE 29.8 MG/ML
20 INJECTION INTRAVENOUS
Status: DISCONTINUED | OUTPATIENT
Start: 2019-04-21 | End: 2019-04-25 | Stop reason: HOSPADM

## 2019-04-21 RX ORDER — SERTRALINE HYDROCHLORIDE 100 MG/1
100 TABLET, FILM COATED ORAL DAILY
Status: DISCONTINUED | OUTPATIENT
Start: 2019-04-22 | End: 2019-04-25 | Stop reason: HOSPADM

## 2019-04-21 RX ORDER — NALOXONE HYDROCHLORIDE 0.4 MG/ML
.1-.4 INJECTION, SOLUTION INTRAMUSCULAR; INTRAVENOUS; SUBCUTANEOUS
Status: DISCONTINUED | OUTPATIENT
Start: 2019-04-21 | End: 2019-04-21

## 2019-04-21 RX ORDER — TOPIRAMATE 100 MG/1
100 TABLET, FILM COATED ORAL AT BEDTIME
Status: DISCONTINUED | OUTPATIENT
Start: 2019-04-21 | End: 2019-04-25 | Stop reason: HOSPADM

## 2019-04-21 RX ORDER — CETIRIZINE HYDROCHLORIDE 10 MG/1
10 TABLET ORAL DAILY
Status: DISCONTINUED | OUTPATIENT
Start: 2019-04-22 | End: 2019-04-25 | Stop reason: HOSPADM

## 2019-04-21 RX ORDER — HYDROMORPHONE HYDROCHLORIDE 1 MG/ML
INJECTION, SOLUTION INTRAMUSCULAR; INTRAVENOUS; SUBCUTANEOUS
Status: COMPLETED
Start: 2019-04-21 | End: 2019-04-21

## 2019-04-21 RX ORDER — PROCHLORPERAZINE MALEATE 10 MG
10 TABLET ORAL EVERY 6 HOURS PRN
Status: DISCONTINUED | OUTPATIENT
Start: 2019-04-21 | End: 2019-04-25 | Stop reason: HOSPADM

## 2019-04-21 RX ORDER — NALOXONE HYDROCHLORIDE 0.4 MG/ML
.1-.4 INJECTION, SOLUTION INTRAMUSCULAR; INTRAVENOUS; SUBCUTANEOUS
Status: DISCONTINUED | OUTPATIENT
Start: 2019-04-21 | End: 2019-04-22

## 2019-04-21 RX ORDER — POTASSIUM CHLORIDE 7.45 MG/ML
10 INJECTION INTRAVENOUS
Status: DISCONTINUED | OUTPATIENT
Start: 2019-04-21 | End: 2019-04-25 | Stop reason: HOSPADM

## 2019-04-21 RX ORDER — HYDROXYZINE PAMOATE 25 MG/1
25 CAPSULE ORAL AT BEDTIME
Status: DISCONTINUED | OUTPATIENT
Start: 2019-04-21 | End: 2019-04-25 | Stop reason: HOSPADM

## 2019-04-21 RX ORDER — OXYCODONE HYDROCHLORIDE 5 MG/1
5-10 TABLET ORAL
Status: DISCONTINUED | OUTPATIENT
Start: 2019-04-21 | End: 2019-04-25 | Stop reason: HOSPADM

## 2019-04-21 RX ORDER — AMOXICILLIN 250 MG
2 CAPSULE ORAL 2 TIMES DAILY PRN
Status: DISCONTINUED | OUTPATIENT
Start: 2019-04-21 | End: 2019-04-25 | Stop reason: HOSPADM

## 2019-04-21 RX ORDER — SODIUM CHLORIDE 9 MG/ML
INJECTION, SOLUTION INTRAVENOUS CONTINUOUS
Status: DISCONTINUED | OUTPATIENT
Start: 2019-04-21 | End: 2019-04-25 | Stop reason: HOSPADM

## 2019-04-21 RX ORDER — AMOXICILLIN 250 MG
1 CAPSULE ORAL 2 TIMES DAILY PRN
COMMUNITY
End: 2019-09-09

## 2019-04-21 RX ORDER — BUPROPION HYDROCHLORIDE 300 MG/1
300 TABLET ORAL DAILY
Status: DISCONTINUED | OUTPATIENT
Start: 2019-04-22 | End: 2019-04-25 | Stop reason: HOSPADM

## 2019-04-21 RX ORDER — AMOXICILLIN 250 MG
2 CAPSULE ORAL 2 TIMES DAILY
Status: DISCONTINUED | OUTPATIENT
Start: 2019-04-21 | End: 2019-04-25 | Stop reason: HOSPADM

## 2019-04-21 RX ORDER — CEFAZOLIN SODIUM 2 G/100ML
2 INJECTION, SOLUTION INTRAVENOUS EVERY 8 HOURS
Status: DISCONTINUED | OUTPATIENT
Start: 2019-04-21 | End: 2019-04-22

## 2019-04-21 RX ORDER — AMOXICILLIN 250 MG
1 CAPSULE ORAL 2 TIMES DAILY
Status: DISCONTINUED | OUTPATIENT
Start: 2019-04-21 | End: 2019-04-25 | Stop reason: HOSPADM

## 2019-04-21 RX ORDER — HYDROMORPHONE HYDROCHLORIDE 1 MG/ML
.3-.5 INJECTION, SOLUTION INTRAMUSCULAR; INTRAVENOUS; SUBCUTANEOUS
Status: DISCONTINUED | OUTPATIENT
Start: 2019-04-21 | End: 2019-04-25 | Stop reason: HOSPADM

## 2019-04-21 RX ORDER — POTASSIUM CHLORIDE 1.5 G/1.58G
20-40 POWDER, FOR SOLUTION ORAL
Status: DISCONTINUED | OUTPATIENT
Start: 2019-04-21 | End: 2019-04-25 | Stop reason: HOSPADM

## 2019-04-21 RX ORDER — ONDANSETRON 2 MG/ML
4 INJECTION INTRAMUSCULAR; INTRAVENOUS EVERY 6 HOURS PRN
Status: DISCONTINUED | OUTPATIENT
Start: 2019-04-21 | End: 2019-04-25 | Stop reason: HOSPADM

## 2019-04-21 RX ORDER — GABAPENTIN 800 MG/1
800 TABLET ORAL 3 TIMES DAILY
Status: DISCONTINUED | OUTPATIENT
Start: 2019-04-21 | End: 2019-04-25 | Stop reason: HOSPADM

## 2019-04-21 RX ORDER — POTASSIUM CL/LIDO/0.9 % NACL 10MEQ/0.1L
10 INTRAVENOUS SOLUTION, PIGGYBACK (ML) INTRAVENOUS
Status: DISCONTINUED | OUTPATIENT
Start: 2019-04-21 | End: 2019-04-25 | Stop reason: HOSPADM

## 2019-04-21 RX ORDER — BUSPIRONE HYDROCHLORIDE 15 MG/1
15 TABLET ORAL 2 TIMES DAILY
Status: DISCONTINUED | OUTPATIENT
Start: 2019-04-21 | End: 2019-04-25 | Stop reason: HOSPADM

## 2019-04-21 RX ORDER — POTASSIUM CHLORIDE 1500 MG/1
20-40 TABLET, EXTENDED RELEASE ORAL
Status: DISCONTINUED | OUTPATIENT
Start: 2019-04-21 | End: 2019-04-25 | Stop reason: HOSPADM

## 2019-04-21 RX ORDER — PROCHLORPERAZINE 25 MG
25 SUPPOSITORY, RECTAL RECTAL EVERY 12 HOURS PRN
Status: DISCONTINUED | OUTPATIENT
Start: 2019-04-21 | End: 2019-04-25 | Stop reason: HOSPADM

## 2019-04-21 RX ORDER — PRAVASTATIN SODIUM 20 MG
20 TABLET ORAL EVERY EVENING
Status: DISCONTINUED | OUTPATIENT
Start: 2019-04-21 | End: 2019-04-25 | Stop reason: HOSPADM

## 2019-04-21 RX ORDER — AMOXICILLIN 250 MG
1 CAPSULE ORAL 2 TIMES DAILY PRN
Status: DISCONTINUED | OUTPATIENT
Start: 2019-04-21 | End: 2019-04-21

## 2019-04-21 RX ORDER — AMOXICILLIN 250 MG
1 CAPSULE ORAL 2 TIMES DAILY PRN
Status: DISCONTINUED | OUTPATIENT
Start: 2019-04-21 | End: 2019-04-25 | Stop reason: HOSPADM

## 2019-04-21 RX ORDER — HYDROMORPHONE HYDROCHLORIDE 1 MG/ML
.3-.5 INJECTION, SOLUTION INTRAMUSCULAR; INTRAVENOUS; SUBCUTANEOUS
Status: DISCONTINUED | OUTPATIENT
Start: 2019-04-21 | End: 2019-04-21

## 2019-04-21 RX ORDER — ONDANSETRON 4 MG/1
4 TABLET, ORALLY DISINTEGRATING ORAL EVERY 6 HOURS PRN
Status: DISCONTINUED | OUTPATIENT
Start: 2019-04-21 | End: 2019-04-25 | Stop reason: HOSPADM

## 2019-04-21 RX ADMIN — OXYCODONE HYDROCHLORIDE 5 MG: 5 TABLET ORAL at 21:49

## 2019-04-21 RX ADMIN — TOPIRAMATE 100 MG: 100 TABLET, FILM COATED ORAL at 21:00

## 2019-04-21 RX ADMIN — SENNOSIDES AND DOCUSATE SODIUM 2 TABLET: 8.6; 5 TABLET ORAL at 20:57

## 2019-04-21 RX ADMIN — Medication 0.5 MG: at 17:02

## 2019-04-21 RX ADMIN — OMEPRAZOLE 40 MG: 20 CAPSULE, DELAYED RELEASE ORAL at 19:31

## 2019-04-21 RX ADMIN — CEFAZOLIN SODIUM 2 G: 2 INJECTION, SOLUTION INTRAVENOUS at 15:21

## 2019-04-21 RX ADMIN — HYDROXYZINE PAMOATE 25 MG: 25 CAPSULE ORAL at 21:00

## 2019-04-21 RX ADMIN — HYDROMORPHONE HYDROCHLORIDE 0.5 MG: 1 INJECTION, SOLUTION INTRAMUSCULAR; INTRAVENOUS; SUBCUTANEOUS at 17:04

## 2019-04-21 RX ADMIN — HYDROMORPHONE HYDROCHLORIDE 0.5 MG: 1 INJECTION, SOLUTION INTRAMUSCULAR; INTRAVENOUS; SUBCUTANEOUS at 23:32

## 2019-04-21 RX ADMIN — SODIUM CHLORIDE: 9 INJECTION, SOLUTION INTRAVENOUS at 15:21

## 2019-04-21 RX ADMIN — OXYCODONE HYDROCHLORIDE 5 MG: 5 TABLET ORAL at 20:57

## 2019-04-21 RX ADMIN — PRAVASTATIN SODIUM 20 MG: 20 TABLET ORAL at 20:58

## 2019-04-21 RX ADMIN — BUSPIRONE HYDROCHLORIDE 15 MG: 15 TABLET ORAL at 20:58

## 2019-04-21 RX ADMIN — GABAPENTIN 800 MG: 800 TABLET, FILM COATED ORAL at 21:00

## 2019-04-21 RX ADMIN — CEFAZOLIN SODIUM 2 G: 2 INJECTION, SOLUTION INTRAVENOUS at 21:49

## 2019-04-21 RX ADMIN — HYDROMORPHONE HYDROCHLORIDE 0.5 MG: 1 INJECTION, SOLUTION INTRAMUSCULAR; INTRAVENOUS; SUBCUTANEOUS at 19:30

## 2019-04-21 ASSESSMENT — ACTIVITIES OF DAILY LIVING (ADL)
AMBULATION: 1-->ASSISTIVE EQUIPMENT
ADLS_ACUITY_SCORE: 13
SWALLOWING: 0-->SWALLOWS FOODS/LIQUIDS WITHOUT DIFFICULTY
DRESS: 0-->INDEPENDENT
TOILETING: 0-->INDEPENDENT
DRESS: 0-->INDEPENDENT
BATHING: 0-->INDEPENDENT
FALL_HISTORY_WITHIN_LAST_SIX_MONTHS: NO
COGNITION: 0 - NO COGNITION ISSUES REPORTED
RETIRED_EATING: 0-->INDEPENDENT
RETIRED_COMMUNICATION: 0-->UNDERSTANDS/COMMUNICATES WITHOUT DIFFICULTY
RETIRED_COMMUNICATION: 0-->UNDERSTANDS/COMMUNICATES WITHOUT DIFFICULTY
SWALLOWING: 0-->SWALLOWS FOODS/LIQUIDS WITHOUT DIFFICULTY
COGNITION: 0 - NO COGNITION ISSUES REPORTED
AMBULATION: 1-->ASSISTIVE EQUIPMENT
BATHING: 0-->INDEPENDENT
TOILETING: 0-->INDEPENDENT
TRANSFERRING: 1-->ASSISTIVE EQUIPMENT
TRANSFERRING: 1-->ASSISTIVE EQUIPMENT
RETIRED_EATING: 0-->INDEPENDENT
FALL_HISTORY_WITHIN_LAST_SIX_MONTHS: NO

## 2019-04-21 NOTE — PHARMACY-ADMISSION MEDICATION HISTORY
Admission medication history interview status for the 4/21/2019  admission is complete. See EPIC admission navigator for prior to admission medications     Medication history source reliability:Good    Actions taken by pharmacist (provider contacted, etc):None     Additional medication history information not noted on PTA med list :None    Medication reconciliation/reorder completed by provider prior to medication history? No    Time spent in this activity: 20min    Prior to Admission medications    Medication Sig Last Dose Taking? Auth Provider   ammonium lactate (AMLACTIN) 12 % cream Apply topically daily as needed (to heels)   Yes Unknown, Entered By History   aspirin (ASA) 325 MG EC tablet Take 1 tablet (325 mg) by mouth daily 4/21/2019 at Unknown time Yes Jett Malik PA-C   BuPROPion HCl (WELLBUTRIN XL PO) Take 300 mg by mouth daily 4/21/2019 at Unknown time Yes Reported, Patient   BusPIRone HCl (BUSPAR PO) Take 15 mg by mouth 2 times daily 4/21/2019 at Unknown time Yes Reported, Patient   cephALEXin (KEFLEX) 500 MG capsule Take 1 capsule (500 mg) by mouth 3 times daily 4/21/2019 at Unknown time Yes Jett Malik PA-C   cetirizine (ZYRTEC) 10 MG tablet Take 10 mg by mouth daily 4/21/2019 at Unknown time Yes Unknown, Entered By History   cholecalciferol (VITAMIN D3) 5000 units TABS tablet Take 5,000 Units by mouth daily 4/21/2019 at Unknown time Yes Unknown, Entered By History   clindamycin (CLEOCIN T) 1 % solution Apply topically nightly as needed (Acne outbreak)   Yes Unknown, Entered By History   Furosemide (LASIX PO) Take 160 mg by mouth daily  4/21/2019 at Unknown time Yes Reported, Patient   gabapentin (NEURONTIN) 800 MG tablet Take 800 mg by mouth 3 times daily Am, supper, hs 4/21/2019 at Unknown time Yes Unknown, Entered By History   hydrOXYzine (VISTARIL) 25 MG capsule Take 25 mg by mouth At Bedtime 4/20/2019 at Unknown time Yes Unknown, Entered By History   Omeprazole (PRILOSEC PO) Take  40 mg by mouth 2 times daily (before meals) 2 CAPSULES IN A.M.  4/21/2019 at Unknown time Yes Reported, Patient   Ondansetron HCl (ZOFRAN PO) Take 8 mg by mouth as needed for nausea or vomiting  Yes Reported, Patient   oxyCODONE (ROXICODONE) 5 MG tablet Take 1-2 tablets (5-10 mg) by mouth every 3 hours as needed for breakthrough pain  Yes Jett Malik, CECILIA   phentermine 30 MG capsule Take 30 mg by mouth every morning 4/21/2019 at Unknown time Yes Reported, Patient   polyethylene glycol (MIRALAX/GLYCOLAX) packet Take 17 g by mouth as needed for constipation  Yes Reported, Patient   Potassium Chloride Shameka CR (K-DUR PO) Take 160 mEq by mouth daily  4/21/2019 at Unknown time Yes Reported, Patient   pravastatin (PRAVACHOL) 20 MG tablet Take 20 mg by mouth every evening 4/20/2019 at Unknown time Yes Unknown, Entered By History   senna-docusate (SENOKOT-S/PERICOLACE) 8.6-50 MG tablet Take 1 tablet by mouth 2 times daily as needed for constipation  Yes Unknown, Entered By History   sertraline (ZOLOFT) 100 MG tablet Take 100 mg by mouth daily 4/21/2019 at Unknown time Yes Unknown, Entered By History   Topiramate (TOPAMAX PO) Take 100 mg by mouth At Bedtime  4/20/2019 at Unknown time Yes Reported, Patient

## 2019-04-22 ENCOUNTER — ANESTHESIA (OUTPATIENT)
Dept: SURGERY | Facility: CLINIC | Age: 49
DRG: 493 | End: 2019-04-22
Payer: COMMERCIAL

## 2019-04-22 ENCOUNTER — ANESTHESIA EVENT (OUTPATIENT)
Dept: SURGERY | Facility: CLINIC | Age: 49
DRG: 493 | End: 2019-04-22
Payer: COMMERCIAL

## 2019-04-22 LAB
ANION GAP SERPL CALCULATED.3IONS-SCNC: 7 MMOL/L (ref 3–14)
BASOPHILS # BLD AUTO: 0 10E9/L (ref 0–0.2)
BASOPHILS NFR BLD AUTO: 0 %
BUN SERPL-MCNC: 14 MG/DL (ref 7–30)
CALCIUM SERPL-MCNC: 8.2 MG/DL (ref 8.5–10.1)
CHLORIDE SERPL-SCNC: 107 MMOL/L (ref 94–109)
CO2 SERPL-SCNC: 25 MMOL/L (ref 20–32)
CREAT SERPL-MCNC: 0.7 MG/DL (ref 0.52–1.04)
DIFFERENTIAL METHOD BLD: ABNORMAL
EOSINOPHIL # BLD AUTO: 0.2 10E9/L (ref 0–0.7)
EOSINOPHIL NFR BLD AUTO: 3 %
ERYTHROCYTE [DISTWIDTH] IN BLOOD BY AUTOMATED COUNT: 15.8 % (ref 10–15)
GFR SERPL CREATININE-BSD FRML MDRD: >90 ML/MIN/{1.73_M2}
GLUCOSE SERPL-MCNC: 114 MG/DL (ref 70–99)
GRAM STN SPEC: NORMAL
HCG UR QL: NEGATIVE
HCT VFR BLD AUTO: 30.9 % (ref 35–47)
HGB BLD-MCNC: 9.7 G/DL (ref 11.7–15.7)
LYMPHOCYTES # BLD AUTO: 1.8 10E9/L (ref 0.8–5.3)
LYMPHOCYTES NFR BLD AUTO: 24 %
Lab: NORMAL
MCH RBC QN AUTO: 24 PG (ref 26.5–33)
MCHC RBC AUTO-ENTMCNC: 31.4 G/DL (ref 31.5–36.5)
MCV RBC AUTO: 77 FL (ref 78–100)
MONOCYTES # BLD AUTO: 0.5 10E9/L (ref 0–1.3)
MONOCYTES NFR BLD AUTO: 6 %
NEUTROPHILS # BLD AUTO: 5.1 10E9/L (ref 1.6–8.3)
NEUTROPHILS NFR BLD AUTO: 67 %
OVALOCYTES BLD QL SMEAR: SLIGHT
PLATELET # BLD AUTO: 385 10E9/L (ref 150–450)
PLATELET # BLD EST: ABNORMAL 10*3/UL
POTASSIUM SERPL-SCNC: 3.9 MMOL/L (ref 3.4–5.3)
RBC # BLD AUTO: 4.04 10E12/L (ref 3.8–5.2)
SODIUM SERPL-SCNC: 139 MMOL/L (ref 133–144)
SPECIMEN SOURCE: NORMAL
WBC # BLD AUTO: 7.6 10E9/L (ref 4–11)

## 2019-04-22 PROCEDURE — 25000566 ZZH SEVOFLURANE, EA 15 MIN: Performed by: ORTHOPAEDIC SURGERY

## 2019-04-22 PROCEDURE — 87075 CULTR BACTERIA EXCEPT BLOOD: CPT | Performed by: ORTHOPAEDIC SURGERY

## 2019-04-22 PROCEDURE — 25000128 H RX IP 250 OP 636: Performed by: PHYSICIAN ASSISTANT

## 2019-04-22 PROCEDURE — 87186 SC STD MICRODIL/AGAR DIL: CPT | Performed by: ORTHOPAEDIC SURGERY

## 2019-04-22 PROCEDURE — 87205 SMEAR GRAM STAIN: CPT | Performed by: ORTHOPAEDIC SURGERY

## 2019-04-22 PROCEDURE — 87077 CULTURE AEROBIC IDENTIFY: CPT | Performed by: ORTHOPAEDIC SURGERY

## 2019-04-22 PROCEDURE — 36000056 ZZH SURGERY LEVEL 3 1ST 30 MIN: Performed by: ORTHOPAEDIC SURGERY

## 2019-04-22 PROCEDURE — 25000132 ZZH RX MED GY IP 250 OP 250 PS 637: Performed by: ANESTHESIOLOGY

## 2019-04-22 PROCEDURE — 25000128 H RX IP 250 OP 636: Performed by: NURSE ANESTHETIST, CERTIFIED REGISTERED

## 2019-04-22 PROCEDURE — 25000128 H RX IP 250 OP 636: Performed by: ORTHOPAEDIC SURGERY

## 2019-04-22 PROCEDURE — 0SBG4ZZ EXCISION OF LEFT ANKLE JOINT, PERCUTANEOUS ENDOSCOPIC APPROACH: ICD-10-PCS | Performed by: ORTHOPAEDIC SURGERY

## 2019-04-22 PROCEDURE — 25800030 ZZH RX IP 258 OP 636: Performed by: ANESTHESIOLOGY

## 2019-04-22 PROCEDURE — 25000132 ZZH RX MED GY IP 250 OP 250 PS 637: Performed by: PHYSICIAN ASSISTANT

## 2019-04-22 PROCEDURE — 25800025 ZZH RX 258: Performed by: ORTHOPAEDIC SURGERY

## 2019-04-22 PROCEDURE — 36415 COLL VENOUS BLD VENIPUNCTURE: CPT | Performed by: PHYSICIAN ASSISTANT

## 2019-04-22 PROCEDURE — 37000009 ZZH ANESTHESIA TECHNICAL FEE, EACH ADDTL 15 MIN: Performed by: ORTHOPAEDIC SURGERY

## 2019-04-22 PROCEDURE — 71000012 ZZH RECOVERY PHASE 1 LEVEL 1 FIRST HR: Performed by: ORTHOPAEDIC SURGERY

## 2019-04-22 PROCEDURE — 25000128 H RX IP 250 OP 636: Performed by: ANESTHESIOLOGY

## 2019-04-22 PROCEDURE — 85025 COMPLETE CBC W/AUTO DIFF WBC: CPT | Performed by: PHYSICIAN ASSISTANT

## 2019-04-22 PROCEDURE — 0QPM04Z REMOVAL OF INTERNAL FIXATION DEVICE FROM LEFT TARSAL, OPEN APPROACH: ICD-10-PCS | Performed by: ORTHOPAEDIC SURGERY

## 2019-04-22 PROCEDURE — 25000125 ZZHC RX 250: Performed by: NURSE ANESTHETIST, CERTIFIED REGISTERED

## 2019-04-22 PROCEDURE — 82565 ASSAY OF CREATININE: CPT | Performed by: PHYSICIAN ASSISTANT

## 2019-04-22 PROCEDURE — 37000008 ZZH ANESTHESIA TECHNICAL FEE, 1ST 30 MIN: Performed by: ORTHOPAEDIC SURGERY

## 2019-04-22 PROCEDURE — 25000125 ZZHC RX 250: Performed by: ANESTHESIOLOGY

## 2019-04-22 PROCEDURE — 81025 URINE PREGNANCY TEST: CPT | Performed by: ANESTHESIOLOGY

## 2019-04-22 PROCEDURE — 87070 CULTURE OTHR SPECIMN AEROBIC: CPT | Performed by: ORTHOPAEDIC SURGERY

## 2019-04-22 PROCEDURE — 36000058 ZZH SURGERY LEVEL 3 EA 15 ADDTL MIN: Performed by: ORTHOPAEDIC SURGERY

## 2019-04-22 PROCEDURE — 40000170 ZZH STATISTIC PRE-PROCEDURE ASSESSMENT II: Performed by: ORTHOPAEDIC SURGERY

## 2019-04-22 PROCEDURE — 25800030 ZZH RX IP 258 OP 636: Performed by: PHYSICIAN ASSISTANT

## 2019-04-22 PROCEDURE — 12000000 ZZH R&B MED SURG/OB

## 2019-04-22 PROCEDURE — 80048 BASIC METABOLIC PNL TOTAL CA: CPT | Performed by: PHYSICIAN ASSISTANT

## 2019-04-22 PROCEDURE — 27210794 ZZH OR GENERAL SUPPLY STERILE: Performed by: ORTHOPAEDIC SURGERY

## 2019-04-22 PROCEDURE — 25800030 ZZH RX IP 258 OP 636: Performed by: NURSE ANESTHETIST, CERTIFIED REGISTERED

## 2019-04-22 PROCEDURE — 99232 SBSQ HOSP IP/OBS MODERATE 35: CPT | Performed by: INTERNAL MEDICINE

## 2019-04-22 PROCEDURE — G0463 HOSPITAL OUTPT CLINIC VISIT: HCPCS

## 2019-04-22 RX ORDER — SODIUM CHLORIDE, SODIUM LACTATE, POTASSIUM CHLORIDE, CALCIUM CHLORIDE 600; 310; 30; 20 MG/100ML; MG/100ML; MG/100ML; MG/100ML
INJECTION, SOLUTION INTRAVENOUS CONTINUOUS
Status: DISCONTINUED | OUTPATIENT
Start: 2019-04-22 | End: 2019-04-25 | Stop reason: HOSPADM

## 2019-04-22 RX ORDER — OXYCODONE HYDROCHLORIDE 5 MG/1
5 TABLET ORAL EVERY 4 HOURS PRN
Status: DISCONTINUED | OUTPATIENT
Start: 2019-04-22 | End: 2019-04-22

## 2019-04-22 RX ORDER — MEPERIDINE HYDROCHLORIDE 25 MG/ML
12.5 INJECTION INTRAMUSCULAR; INTRAVENOUS; SUBCUTANEOUS
Status: DISCONTINUED | OUTPATIENT
Start: 2019-04-22 | End: 2019-04-22 | Stop reason: HOSPADM

## 2019-04-22 RX ORDER — ROPIVACAINE HYDROCHLORIDE 5 MG/ML
INJECTION, SOLUTION EPIDURAL; INFILTRATION; PERINEURAL
Status: DISCONTINUED
Start: 2019-04-22 | End: 2019-04-22 | Stop reason: HOSPADM

## 2019-04-22 RX ORDER — SCOLOPAMINE TRANSDERMAL SYSTEM 1 MG/1
1 PATCH, EXTENDED RELEASE TRANSDERMAL
Status: COMPLETED | OUTPATIENT
Start: 2019-04-22 | End: 2019-04-22

## 2019-04-22 RX ORDER — CEFAZOLIN SODIUM 2 G/100ML
2 INJECTION, SOLUTION INTRAVENOUS EVERY 8 HOURS
Status: DISCONTINUED | OUTPATIENT
Start: 2019-04-22 | End: 2019-04-22

## 2019-04-22 RX ORDER — NALOXONE HYDROCHLORIDE 0.4 MG/ML
.1-.4 INJECTION, SOLUTION INTRAMUSCULAR; INTRAVENOUS; SUBCUTANEOUS
Status: DISCONTINUED | OUTPATIENT
Start: 2019-04-22 | End: 2019-04-22 | Stop reason: HOSPADM

## 2019-04-22 RX ORDER — PROPOFOL 10 MG/ML
INJECTION, EMULSION INTRAVENOUS CONTINUOUS PRN
Status: DISCONTINUED | OUTPATIENT
Start: 2019-04-22 | End: 2019-04-22

## 2019-04-22 RX ORDER — ROPIVACAINE HYDROCHLORIDE 5 MG/ML
INJECTION, SOLUTION EPIDURAL; INFILTRATION; PERINEURAL PRN
Status: DISCONTINUED | OUTPATIENT
Start: 2019-04-22 | End: 2019-04-22 | Stop reason: HOSPADM

## 2019-04-22 RX ORDER — FENTANYL CITRATE 50 UG/ML
25-50 INJECTION, SOLUTION INTRAMUSCULAR; INTRAVENOUS
Status: DISCONTINUED | OUTPATIENT
Start: 2019-04-22 | End: 2019-04-22 | Stop reason: HOSPADM

## 2019-04-22 RX ORDER — CEFAZOLIN SODIUM 2 G/100ML
2 INJECTION, SOLUTION INTRAVENOUS
Status: COMPLETED | OUTPATIENT
Start: 2019-04-22 | End: 2019-04-22

## 2019-04-22 RX ORDER — CEFAZOLIN SODIUM 2 G/100ML
2 INJECTION, SOLUTION INTRAVENOUS EVERY 8 HOURS
Status: DISCONTINUED | OUTPATIENT
Start: 2019-04-22 | End: 2019-04-25

## 2019-04-22 RX ORDER — CALCIUM CARBONATE 500 MG/1
1000 TABLET, CHEWABLE ORAL 4 TIMES DAILY PRN
Status: DISCONTINUED | OUTPATIENT
Start: 2019-04-22 | End: 2019-04-25 | Stop reason: HOSPADM

## 2019-04-22 RX ORDER — ACETAMINOPHEN 325 MG/1
975 TABLET ORAL ONCE
Status: COMPLETED | OUTPATIENT
Start: 2019-04-22 | End: 2019-04-22

## 2019-04-22 RX ORDER — FENTANYL CITRATE 50 UG/ML
INJECTION, SOLUTION INTRAMUSCULAR; INTRAVENOUS PRN
Status: DISCONTINUED | OUTPATIENT
Start: 2019-04-22 | End: 2019-04-22

## 2019-04-22 RX ORDER — LIDOCAINE HYDROCHLORIDE 20 MG/ML
INJECTION, SOLUTION INFILTRATION; PERINEURAL PRN
Status: DISCONTINUED | OUTPATIENT
Start: 2019-04-22 | End: 2019-04-22

## 2019-04-22 RX ORDER — ONDANSETRON 2 MG/ML
INJECTION INTRAMUSCULAR; INTRAVENOUS PRN
Status: DISCONTINUED | OUTPATIENT
Start: 2019-04-22 | End: 2019-04-22

## 2019-04-22 RX ORDER — HYDROMORPHONE HYDROCHLORIDE 1 MG/ML
.3-.5 INJECTION, SOLUTION INTRAMUSCULAR; INTRAVENOUS; SUBCUTANEOUS EVERY 10 MIN PRN
Status: DISCONTINUED | OUTPATIENT
Start: 2019-04-22 | End: 2019-04-22 | Stop reason: HOSPADM

## 2019-04-22 RX ORDER — SODIUM CHLORIDE, SODIUM LACTATE, POTASSIUM CHLORIDE, CALCIUM CHLORIDE 600; 310; 30; 20 MG/100ML; MG/100ML; MG/100ML; MG/100ML
INJECTION, SOLUTION INTRAVENOUS CONTINUOUS PRN
Status: DISCONTINUED | OUTPATIENT
Start: 2019-04-22 | End: 2019-04-22

## 2019-04-22 RX ORDER — SODIUM CHLORIDE, SODIUM LACTATE, POTASSIUM CHLORIDE, CALCIUM CHLORIDE 600; 310; 30; 20 MG/100ML; MG/100ML; MG/100ML; MG/100ML
INJECTION, SOLUTION INTRAVENOUS CONTINUOUS
Status: DISCONTINUED | OUTPATIENT
Start: 2019-04-22 | End: 2019-04-22 | Stop reason: HOSPADM

## 2019-04-22 RX ORDER — ONDANSETRON 4 MG/1
4 TABLET, ORALLY DISINTEGRATING ORAL EVERY 30 MIN PRN
Status: DISCONTINUED | OUTPATIENT
Start: 2019-04-22 | End: 2019-04-22 | Stop reason: HOSPADM

## 2019-04-22 RX ORDER — LIDOCAINE 40 MG/G
CREAM TOPICAL
Status: DISCONTINUED | OUTPATIENT
Start: 2019-04-22 | End: 2019-04-25 | Stop reason: HOSPADM

## 2019-04-22 RX ORDER — ONDANSETRON 2 MG/ML
4 INJECTION INTRAMUSCULAR; INTRAVENOUS EVERY 30 MIN PRN
Status: DISCONTINUED | OUTPATIENT
Start: 2019-04-22 | End: 2019-04-22 | Stop reason: HOSPADM

## 2019-04-22 RX ORDER — BUPIVACAINE HYDROCHLORIDE 5 MG/ML
INJECTION, SOLUTION EPIDURAL; INTRACAUDAL
Status: DISCONTINUED
Start: 2019-04-22 | End: 2019-04-22 | Stop reason: HOSPADM

## 2019-04-22 RX ORDER — PROPOFOL 10 MG/ML
INJECTION, EMULSION INTRAVENOUS PRN
Status: DISCONTINUED | OUTPATIENT
Start: 2019-04-22 | End: 2019-04-22

## 2019-04-22 RX ORDER — CEFAZOLIN SODIUM 1 G/3ML
1 INJECTION, POWDER, FOR SOLUTION INTRAMUSCULAR; INTRAVENOUS SEE ADMIN INSTRUCTIONS
Status: DISCONTINUED | OUTPATIENT
Start: 2019-04-22 | End: 2019-04-22 | Stop reason: HOSPADM

## 2019-04-22 RX ORDER — NALOXONE HYDROCHLORIDE 0.4 MG/ML
.1-.4 INJECTION, SOLUTION INTRAMUSCULAR; INTRAVENOUS; SUBCUTANEOUS
Status: DISCONTINUED | OUTPATIENT
Start: 2019-04-22 | End: 2019-04-25 | Stop reason: HOSPADM

## 2019-04-22 RX ORDER — DEXAMETHASONE SODIUM PHOSPHATE 4 MG/ML
INJECTION, SOLUTION INTRA-ARTICULAR; INTRALESIONAL; INTRAMUSCULAR; INTRAVENOUS; SOFT TISSUE PRN
Status: DISCONTINUED | OUTPATIENT
Start: 2019-04-22 | End: 2019-04-22

## 2019-04-22 RX ADMIN — OXYCODONE HYDROCHLORIDE 10 MG: 5 TABLET ORAL at 06:06

## 2019-04-22 RX ADMIN — OXYCODONE HYDROCHLORIDE 10 MG: 5 TABLET ORAL at 21:31

## 2019-04-22 RX ADMIN — FENTANYL CITRATE 50 MCG: 50 INJECTION, SOLUTION INTRAMUSCULAR; INTRAVENOUS at 14:51

## 2019-04-22 RX ADMIN — SENNOSIDES AND DOCUSATE SODIUM 1 TABLET: 8.6; 5 TABLET ORAL at 21:42

## 2019-04-22 RX ADMIN — PROPOFOL 100 MCG/KG/MIN: 10 INJECTION, EMULSION INTRAVENOUS at 14:19

## 2019-04-22 RX ADMIN — BUSPIRONE HYDROCHLORIDE 15 MG: 15 TABLET ORAL at 08:06

## 2019-04-22 RX ADMIN — MIDAZOLAM 2 MG: 1 INJECTION INTRAMUSCULAR; INTRAVENOUS at 14:16

## 2019-04-22 RX ADMIN — OXYCODONE HYDROCHLORIDE 10 MG: 5 TABLET ORAL at 09:36

## 2019-04-22 RX ADMIN — CEFAZOLIN SODIUM 2 G: 2 INJECTION, SOLUTION INTRAVENOUS at 21:41

## 2019-04-22 RX ADMIN — FENTANYL CITRATE 75 MCG: 50 INJECTION, SOLUTION INTRAMUSCULAR; INTRAVENOUS at 14:04

## 2019-04-22 RX ADMIN — SENNOSIDES AND DOCUSATE SODIUM 1 TABLET: 8.6; 5 TABLET ORAL at 08:06

## 2019-04-22 RX ADMIN — ONDANSETRON 4 MG: 2 INJECTION INTRAMUSCULAR; INTRAVENOUS at 14:52

## 2019-04-22 RX ADMIN — SCOPALAMINE 1 PATCH: 1 PATCH, EXTENDED RELEASE TRANSDERMAL at 13:29

## 2019-04-22 RX ADMIN — SODIUM CHLORIDE, POTASSIUM CHLORIDE, SODIUM LACTATE AND CALCIUM CHLORIDE: 600; 310; 30; 20 INJECTION, SOLUTION INTRAVENOUS at 14:00

## 2019-04-22 RX ADMIN — GABAPENTIN 800 MG: 800 TABLET, FILM COATED ORAL at 08:06

## 2019-04-22 RX ADMIN — SODIUM CHLORIDE, POTASSIUM CHLORIDE, SODIUM LACTATE AND CALCIUM CHLORIDE: 600; 310; 30; 20 INJECTION, SOLUTION INTRAVENOUS at 17:07

## 2019-04-22 RX ADMIN — DEXMEDETOMIDINE HYDROCHLORIDE 8 MCG: 100 INJECTION, SOLUTION INTRAVENOUS at 14:26

## 2019-04-22 RX ADMIN — GABAPENTIN 800 MG: 800 TABLET, FILM COATED ORAL at 21:41

## 2019-04-22 RX ADMIN — DEXMEDETOMIDINE HYDROCHLORIDE 12 MCG: 100 INJECTION, SOLUTION INTRAVENOUS at 14:21

## 2019-04-22 RX ADMIN — DEXAMETHASONE SODIUM PHOSPHATE 4 MG: 4 INJECTION, SOLUTION INTRA-ARTICULAR; INTRALESIONAL; INTRAMUSCULAR; INTRAVENOUS; SOFT TISSUE at 14:26

## 2019-04-22 RX ADMIN — FENTANYL CITRATE 25 MCG: 50 INJECTION, SOLUTION INTRAMUSCULAR; INTRAVENOUS at 14:31

## 2019-04-22 RX ADMIN — HYDROMORPHONE HYDROCHLORIDE 0.5 MG: 1 INJECTION, SOLUTION INTRAMUSCULAR; INTRAVENOUS; SUBCUTANEOUS at 12:16

## 2019-04-22 RX ADMIN — PROPOFOL 200 MG: 10 INJECTION, EMULSION INTRAVENOUS at 14:18

## 2019-04-22 RX ADMIN — BUPROPION HYDROCHLORIDE 300 MG: 300 TABLET, FILM COATED, EXTENDED RELEASE ORAL at 08:06

## 2019-04-22 RX ADMIN — BUSPIRONE HYDROCHLORIDE 15 MG: 15 TABLET ORAL at 21:42

## 2019-04-22 RX ADMIN — PRAVASTATIN SODIUM 20 MG: 20 TABLET ORAL at 19:42

## 2019-04-22 RX ADMIN — ACETAMINOPHEN 975 MG: 325 TABLET, FILM COATED ORAL at 13:29

## 2019-04-22 RX ADMIN — SODIUM CHLORIDE, POTASSIUM CHLORIDE, SODIUM LACTATE AND CALCIUM CHLORIDE: 600; 310; 30; 20 INJECTION, SOLUTION INTRAVENOUS at 13:22

## 2019-04-22 RX ADMIN — CETIRIZINE HYDROCHLORIDE 10 MG: 10 TABLET ORAL at 08:06

## 2019-04-22 RX ADMIN — HYDROXYZINE PAMOATE 25 MG: 25 CAPSULE ORAL at 21:42

## 2019-04-22 RX ADMIN — Medication 0.5 MG: at 15:55

## 2019-04-22 RX ADMIN — OMEPRAZOLE 40 MG: 20 CAPSULE, DELAYED RELEASE ORAL at 08:06

## 2019-04-22 RX ADMIN — SERTRALINE HYDROCHLORIDE 100 MG: 100 TABLET ORAL at 08:06

## 2019-04-22 RX ADMIN — TOPIRAMATE 100 MG: 100 TABLET, FILM COATED ORAL at 21:42

## 2019-04-22 RX ADMIN — LIDOCAINE HYDROCHLORIDE 80 MG: 20 INJECTION, SOLUTION INFILTRATION; PERINEURAL at 14:18

## 2019-04-22 RX ADMIN — OXYCODONE HYDROCHLORIDE 10 MG: 5 TABLET ORAL at 00:47

## 2019-04-22 RX ADMIN — HYDROMORPHONE HYDROCHLORIDE 0.5 MG: 1 INJECTION, SOLUTION INTRAMUSCULAR; INTRAVENOUS; SUBCUTANEOUS at 22:30

## 2019-04-22 RX ADMIN — CEFAZOLIN SODIUM 2 G: 2 INJECTION, SOLUTION INTRAVENOUS at 14:22

## 2019-04-22 RX ADMIN — HYDROMORPHONE HYDROCHLORIDE 0.5 MG: 1 INJECTION, SOLUTION INTRAMUSCULAR; INTRAVENOUS; SUBCUTANEOUS at 19:42

## 2019-04-22 RX ADMIN — CEFAZOLIN SODIUM 2 G: 2 INJECTION, SOLUTION INTRAVENOUS at 06:07

## 2019-04-22 RX ADMIN — OMEPRAZOLE 40 MG: 20 CAPSULE, DELAYED RELEASE ORAL at 17:52

## 2019-04-22 RX ADMIN — OXYCODONE HYDROCHLORIDE 10 MG: 5 TABLET ORAL at 17:52

## 2019-04-22 RX ADMIN — RANITIDINE 150 MG: 150 TABLET ORAL at 21:41

## 2019-04-22 ASSESSMENT — ACTIVITIES OF DAILY LIVING (ADL)
ADLS_ACUITY_SCORE: 13

## 2019-04-22 ASSESSMENT — LIFESTYLE VARIABLES: TOBACCO_USE: 0

## 2019-04-22 NOTE — PROGRESS NOTES
[facility]    Hospitalist Progress Note    Date of Service (when I saw the patient): 04/22/2019    Assessment & Plan   Shalonda Howard is a 49 year old female with hx of peripheral neuropathy, chronic BLE edema, and hx of opioid use/dependence who was admitted to the Orthopedic Surgery service on 4/21/2019 for suspected septic arthritis of the left ankle. Hospital service was consulted for medical co-management    Suspected left ankle septic arthritis  Has been following with Dr. Harman for ongoing left ankle swelling. Underwent arthrocentesis in clinic and was directly admitted once cultures reportedly returned positive. On admission, she was initiated on IV cefazolin by Ortho  - Management as per Ortho. Continues on cefazolin    Recent RLE cellulitis  On cephalexin PTA  - Holding cephalexin while on IV cefazolin    Opioid use disorder  - Judicious use of opioids. On pain regimen as per Ortho    Bilateral chronic LE edema  Chronic and stable on Lasix 160 mg daily    GERD  Chronic and stable on omeprazole    Recurrent ventral hernia  Noted history of 5 ventral hernia repairs, now with recurrence.   - Follow up with PCP    Dysthymia with anxiety  Chronic and stable on bupropion, buspirone, sertraline, and hydroxyzine    Peripheral neuropathy  Chronic and stable on gabapentin and topiramate    Dyslipidemia  Chronic and stable on pravastatin    Morbid obesity.  BMI 33.28. Follow up with PCP    Possible systemic lupus erythematosus  Patient indicates that one rheumatologist stated she has lupus and another denied this. Not being actively managed.    DVT Prophylaxis: Pneumatic Compression Devices  Code Status: Full Code    Disposition: Expected discharge as per Ortho    Sofi Mason    Interval History   No events overnight. Struggling with pain. Denies cp/sob, dizziness/lightheadedness, or nausea  - No change in management by me    -Data reviewed today: I reviewed all new labs and imaging results  over the last 24 hours. I personally reviewed no images or EKG's today.    Physical Exam   Temp: 98.8  F (37.1  C) Temp src: Oral BP: 114/74 Pulse: 110 Heart Rate: 89 Resp: 18 SpO2: 96 % O2 Device: None (Room air)    There were no vitals filed for this visit.  Vital Signs with Ranges  Temp:  [98.8  F (37.1  C)-99.6  F (37.6  C)] 98.8  F (37.1  C)  Pulse:  [110] 110  Heart Rate:  [89-94] 89  Resp:  [16-18] 18  BP: (114-140)/(74-89) 114/74  SpO2:  [96 %-97 %] 96 %  No intake/output data recorded.    Constitutional: In mild distress due to pain  Respiratory: Breathing non-labored. Lungs CTAB - no wheezes, crackles, or rhonchi  Cardiovascular: Heart mildly tachycardic and regular. Trace pedal edema  GI: +BS, abd soft/NT  Skin/Integumen: No rash  Neuro: Alert and appropriate, CARRERA  Psych: Calm and cooperative    Medications     - MEDICATION INSTRUCTIONS -       sodium chloride 10 mL/hr at 04/22/19 0808       buPROPion  300 mg Oral Daily     busPIRone  15 mg Oral BID     ceFAZolin  2 g Intravenous Q8H     cetirizine  10 mg Oral Daily     gabapentin  800 mg Oral TID     hydrOXYzine  25 mg Oral At Bedtime     omeprazole  40 mg Oral BID AC     pravastatin  20 mg Oral QPM     senna-docusate  1 tablet Oral BID    Or     senna-docusate  2 tablet Oral BID     sertraline  100 mg Oral Daily     topiramate  100 mg Oral At Bedtime     Data   Recent Labs   Lab 04/22/19  0641   WBC 7.6   HGB 9.7*   MCV 77*         POTASSIUM 3.9   CHLORIDE 107   CO2 25   BUN 14   CR 0.70   ANIONGAP 7   PAULINE 8.2*   *       No results found for this or any previous visit (from the past 24 hour(s)).

## 2019-04-22 NOTE — ANESTHESIA POSTPROCEDURE EVALUATION
Patient: Shalonda Howard    Procedure(s):  ARTHROSCOPIC IRRIGATION AND DEBRIDEMENT LEFT ANKLE. HARDWARE REMOVAL    Diagnosis:ANKLE INFECTION.  Diagnosis Additional Information: No value filed.    Anesthesia Type:  General, LMA    Note:  Anesthesia Post Evaluation    Patient location during evaluation: PACU  Patient participation: Able to fully participate in evaluation  Level of consciousness: awake and alert  Pain management: adequate  Airway patency: patent  Cardiovascular status: acceptable and hemodynamically stable  Respiratory status: acceptable  Hydration status: euvolemic  PONV: none     Anesthetic complications: None          Last vitals:  Vitals:    04/22/19 1550 04/22/19 1600 04/22/19 1610   BP: 131/80 110/69 112/70   Pulse: 82 81 81   Resp: 10 18 15   Temp: 36.6  C (97.9  F) 36.6  C (97.9  F) 36.7  C (98.1  F)   SpO2: 96% 95% 95%         Electronically Signed By: Kane Barnes MD  April 22, 2019  4:21 PM

## 2019-04-22 NOTE — ANESTHESIA CARE TRANSFER NOTE
Patient: Shalonda Howard    Procedure(s):  ARTHROSCOPIC IRRIGATION AND DEBRIDEMENT LEFT ANKLE. HARDWARE REMOVAL    Diagnosis: ANKLE INFECTION.  Diagnosis Additional Information: No value filed.    Anesthesia Type:   General, LMA     Note:  Airway :Face Mask  Patient transferred to:PACU  Comments: Pt exhibits spont resps, all monitors and alarms on in pacu, report given to RN, vss.Handoff Report: Identifed the Patient, Identified the Reponsible Provider, Reviewed the pertinent medical history, Discussed the surgical course, Reviewed Intra-OP anesthesia mangement and issues during anesthesia, Set expectations for post-procedure period and Allowed opportunity for questions and acknowledgement of understanding      Vitals: (Last set prior to Anesthesia Care Transfer)    CRNA VITALS  4/22/2019 1447 - 4/22/2019 1517      4/22/2019             Resp Rate (set):  10                Electronically Signed By: CRISTIN English CRNA  April 22, 2019  3:17 PM

## 2019-04-22 NOTE — PROGRESS NOTES
[facility] Nurse Inpatient Wound Assessment     Initial Assessment of wound(s) on pt's:   Bilateral plantar heels        Data:   Patient History:      per MD note(s): 49 year old female with hx of peripheral neuropathy, chronic BLE edema, and hx of opioid use/dependence who was admitted to the Orthopedic Surgery service on 4/21/2019 for suspected septic arthritis of the left ankle. Hospital service was consulted for medical co-management       Eduardo Risk Assessment  Sensory Perception: 3-->slightly limited    Moisture: 4-->rarely moist   Activity: 3-->walks occasionally     Mobility: 3-->slightly limited   Nutrition: 3-->adequate   Eduardo Score: 19    Positioning: Pillows,     Mattress:  Standard , Atmos Air mattress    Moisture Management:  dressings    Current Diet / Nutrition:       None        Labs:   Recent Labs   Lab Test 04/22/19  0641 03/19/19  1941  10/15/18  2125   ALBUMIN  --  3.2*   < >  --    HGB 9.7* 10.8*   < >  --    WBC 7.6 9.1   < >  --    A1C  --   --   --  5.6   CRP  --  37.4*   < >  --     < > = values in this interval not displayed.       Wound Assessment (location):   Bilateral plantar heel  Wound History:  Pt has an extensive history with both wounds.  She says she is not diabetic, has neuropathy bilateral feet, but has some sensation in her feet.  t says she has decreased sensation in her bilateral feet but does have sensation.   When I arrived into the wound for my assessment she had her bare feet with gauze dressing on the heels flat on the floor, not even with socks on.  The second toe on the left foot had a old dried blood around the nail, no obvious wound.    Right middle second toe looks black-brown necrotic with underlying normal-feeling tissue.  Minimal response to assessing the site.   No drainge  Bilateral plantar heel wounds with similar appearance.  The wounds are circular with a base that is soft, pink to almost grayish looking.  The very center of each  "wound has an area that is a little extra soft and on the right side I feel that I am almost able to probe with the wooden tip of a qtip.  Pt states she has some sensation in her feet but I think she has less than she may believe, she did not flinch with my assessment  periwound tissue is a thick ring of moist, raised, intact callus, no erythema          Intervention:     Patient's chart evaluated.      Wound(s) assessed as noted above    Wound Care: covered with Mepilex Dressings, pt was being picked up for a procedure    Orders  Written    Supplies  reviewed, gathered, placed at the bedside and discussed with RN    Discussed plan of care with Patient and Nurse             All patient / family questions answered:  YES          Assessment:          Slightly infected looking, \"quiet looking\" wounds on bilateral plantar feet.  Will recommend using Iodosorb Gel to both wounds, keeping feet elevated and not putting bare feet on the floor.  Additionally, recommend following up with podiatry for wound management and assessment of fit of walking boots.         Plan:     Nursing to notify the Provider(s) and re-consult the [facility] Nurse if wound(s) deteriorate(s) or if the wound care plan needs reevaluation.    Plan of care for wound located on bilateral plantar heels: daily and prn  1. Clean wound with saline or MicKlenz Spray, pat dry  2. Paint with No Sting Skin Prep (#[id number])) and allow to dry thoroughly  3. Apply small dab of Iodosorb Gel (#[id number]) to wound bed only  4. Press a Mepilex  Border Dressing (#[id number]) to the area, making sure to conform nicely to skin curvatures   (begin placing the Mepilex at the most distal aspect first, smooth into place in an upward direction, then smooth side to side)   5. Time and date dressing change and flavio with a \"T\" for treatment of a wound  6. Reposition pt every 1 to 2 hours when in bed and hourly when up to the chair to relieve pressure and promote perfusion to tissue  NOTE:  " Iodosorb Gel should not be used longer than 14 days. After 14 days the gel should be stopped and a new plan established, this may mean only a simple, gauze dressing.  The Iodosorb Gel should be stopped after use on May 10, 2019   Keep heels elevated when in bed  No bare feet on the floor, EVER .       WOC Nurse will return: weekly and prn

## 2019-04-22 NOTE — PLAN OF CARE
Nursing note  Pt a/o x 4, up independently to the BR with cam boot. Dressing c/d/I. Pt taking dilaudid and oxycodone for pain. Denies any n/v. VSS. On RA. BLE'e elevated on the pillow. Pt is currently in OR now.

## 2019-04-22 NOTE — ANESTHESIA PREPROCEDURE EVALUATION
Anesthesia Pre-Procedure Evaluation    Patient: Shalonda Howard   MRN: [id number] : 1970          Preoperative Diagnosis:  WOUND LEFT FOOT    Procedure(s):  ARTHROSCOPIC IRRIGATION AND DEBRIDEMENT LEFT ANKLE    Past Medical History:   Diagnosis Date     Allergic rhinitis, cause unspecified      Anxiety state, unspecified      Cellulitis and abscess of leg, except foot      Closed fracture of unspecified part of tibia      Disuse osteoporosis      Dysthymic disorder      Edema      Encounter for long-term (current) use of other medications      Esophageal reflux      Kidney stones      Myalgia and myositis, unspecified      Obesity, unspecified      Open wound of foot except toe(s) alone, complicated      Opioid type dependence, unspecified      Other acne      Other congenital valgus deformity of feet      Other ventral hernia without mention of obstruction or gangrene      Polycystic ovaries      PONV (postoperative nausea and vomiting)      Systemic lupus erythematosus (H)      Tibialis tendinitis      Unspecified hereditary and idiopathic peripheral neuropathy      Past Surgical History:   Procedure Laterality Date     APPENDECTOMY       APPLY WOUND VAC Left 2019    Procedure: WOUND VAC APPLICATION;  Surgeon: Miguel Mosher MD;  Location:  OR     ARTHRODESIS FOOT Left 2015    Procedure: ARTHRODESIS FOOT;  Surgeon: Andre Harman MD;  Location: [facility]     BUNIONECTOMY RT/LT  2018     DILATION AND CURETTAGE       EXCISE TOENAIL(S) Left 2015    Procedure: EXCISE TOENAIL(S);  Surgeon: Andre Harman MD;  Location: [facility]     HERNIA REPAIR      umbilical     HERNIA REPAIR      ventral open x 2     IRRIGATION AND DEBRIDEMENT FOOT, COMBINED Left 2018    Procedure: COMBINED IRRIGATION AND DEBRIDEMENT FOOT;  IRRIGATION AND DEBRIDEMENT LEFT FOOT;  Surgeon: Andre Harman MD;  Location: [facility]     IRRIGATION AND DEBRIDEMENT FOOT, COMBINED Left 2018    Procedure:  COMBINED IRRIGATION AND DEBRIDEMENT LEFT FOOT;  Surgeon: Andre Harman MD;  Location:  [facility]     IRRIGATION AND DEBRIDEMENT FOOT, COMBINED Left 1/24/2019    Procedure: LEFT HALLUX WOUND IRRIGATION AND DEBRIDEMENT WITH BONE BIOPSY;  Surgeon: Miguel Mosher MD;  Location:  OR     IRRIGATION AND DEBRIDEMENT FOOT, COMBINED Left 3/20/2019    Procedure: LEFT FOOT IRRIGATION AND  DEBRIDEMENT, FIRST RAY RESECTION AND HARDWARE REMOVAL FROM LEFT FOOT;  Surgeon: Andre Harman MD;  Location:  OR     REMOVE HARDWARE LOWER EXTREMITY Left 3/20/2019    Procedure: REMOVE HARDWARE LOWER EXTREMITY;  Surgeon: Andre Harman MD;  Location:  OR     REPAIR HAMMER TOE Left 11/26/2018    Procedure: REPAIR HAMMER TOE;  Surgeon: Andre Harman MD;  Location: [facility]     TONSILLECTOMY         Anesthesia Evaluation     . Pt has had prior anesthetic.     History of anesthetic complications          ROS/MED HX    ENT/Pulmonary:      (-) tobacco use, asthma and sleep apnea   Neurologic:     (+)neuropathy    (-) CVA and TIA   Cardiovascular:         METS/Exercise Tolerance:     Hematologic:         Musculoskeletal:         GI/Hepatic:     (+) GERD Asymptomatic on medication,       Renal/Genitourinary:     (+) chronic renal disease,       Endo:     (+) Obesity, .      Psychiatric:         Infectious Disease:         Malignancy:         Other: Comment: Osteomyelitis    Lupus    No nausea or vomiting. gerd is well controlled and patient is assymptomatic                           Physical Exam  Normal systems: dental    Airway   Mallampati: II  TM distance: >3 FB  Neck ROM: full    Dental     Cardiovascular   Rhythm and rate: regular and normal      Pulmonary    breath sounds clear to auscultation            Lab Results   Component Value Date    WBC 7.6 04/22/2019    HGB 9.7 (L) 04/22/2019    HCT 30.9 (L) 04/22/2019     04/22/2019    CRP 37.4 (H) 03/19/2019    SED 51 (H) 03/19/2019     04/22/2019     "POTASSIUM 3.9 04/22/2019    CHLORIDE 107 04/22/2019    CO2 25 04/22/2019    BUN 14 04/22/2019    CR 0.70 04/22/2019     (H) 04/22/2019    PAULINE 8.2 (L) 04/22/2019    MAG 2.2 10/16/2018    ALBUMIN 3.2 (L) 03/19/2019    PROTTOTAL 7.8 03/19/2019    ALT 18 03/19/2019    AST 13 03/19/2019    ALKPHOS 121 03/19/2019    BILITOTAL 0.2 03/19/2019    TSH 4.36 (H) 10/16/2018    T4 0.85 10/16/2018    HCG Negative 04/22/2019       Preop Vitals  BP Readings from Last 3 Encounters:   04/22/19 120/76   03/22/19 108/63   01/29/19 123/80    Pulse Readings from Last 3 Encounters:   04/22/19 86   03/22/19 90   01/29/19 82      Resp Readings from Last 3 Encounters:   04/22/19 16   03/22/19 16   01/29/19 16    SpO2 Readings from Last 3 Encounters:   04/22/19 98%   03/22/19 98%   01/29/19 99%      Temp Readings from Last 1 Encounters:   04/22/19 36.4  C (97.5  F) (Temporal)    Ht Readings from Last 1 Encounters:   01/22/19 1.651 m (5' 5\")      Wt Readings from Last 1 Encounters:   01/22/19 90.7 kg (200 lb)    Estimated body mass index is 33.28 kg/m  as calculated from the following:    Height as of 1/22/19: 1.651 m (5' 5\").    Weight as of 1/22/19: 90.7 kg (200 lb).       Anesthesia Plan      History & Physical Review  History and physical reviewed and following examination; no interval change.    ASA Status:  2 .        Plan for General and LMA with Intravenous induction. Maintenance will be Balanced.    PONV prophylaxis:  Ondansetron (or other 5HT-3), Dexamethasone or Solumedrol and Scopolamine patch  Propofol infusion  Scopolamine patch preop      Postoperative Care  Postoperative pain management:  IV analgesics and Oral pain medications.      Consents  Anesthetic plan, risks, benefits and alternatives discussed with:  Patient..                   Kane Barnes MD  "

## 2019-04-22 NOTE — PLAN OF CARE
A&OX4.  Up independently in room.  IV dilaudid given for pain.  LE elevated on pillows.  Bilateral bottom of feet uclers, covered with dressing., WOC consult ordered.  Baseline intermittent LE neuropathy.  IV Ancef every 8 hours.  Plan for surgery tomorrow, NPO at midnight.

## 2019-04-22 NOTE — CONSULTS
[facility]    Infectious Disease Consultation     Date of Admission:  4/21/2019  Date of Consult (When I saw the patient): 04/22/19    Assessment & Plan   Shalonda Howard is a 49 year old female who was admitted on 4/21/2019.     Impression:    1. This is a 49 y.o familiar to me from multiple prior admissions.   2. She has a history of bilateral bunion repair surgery done in August 2018. The right side healed but the left side the incision has not been healing , I and D and 6 weeks of IV antibiotics and many more weeks of oral antibiotics since the end of August 2018. Then repeat admission in November, more I and D and another course of IV antibiotics. then another admission in January, again, wound non healing, s/p another I and D and 2 antibiotics for 6 weeks.   3. She also has some hardware in the ankle b/l, those incisions look ok. Though both ankle are swollen.   4. She also has chronic heel ulcers bilateral.   5. PCN allergy, tolerates zosyn/cephalosporins, sulfa allergy.   6. Admitted in March continued wound non healing, concern for underlying osteo, s/p first ray amputation, hardware removal and ulcer debridement 3/20.   7. Admitted this occasion with concern of septic ankle. Gram stain from the ankle positive for GPC, report not in the [place] epic.       Recommendations:   1. Discussed with ortho.   2. Stays on ancef given deep cultures from before were pos  for  MSSA.   3. FOLLOW UP on the cultures from the aspirates sent from [facility] clinic.   4. Follow up on the cultures here.       Ignacia Bocanegra MD    Reason for Consult   Reason for consult: I was asked by MARIA ESTHER Malik  to evaluate this patient for septic joint.     Primary Care Physician   Linda Bernstein    Chief Complaint   Admission for surgery     History is obtained from the patient and medical records    History of Present Illness   Shalonda Howard is a 49 year old female who was directly admitted to the hospital after cultures from  the ankle pos. Detailed history mentioned above.   Complain was left ankle swelling. She has history of hardware in the ankle.     Past Medical History   I have reviewed this patient's medical history and updated it with pertinent information if needed.   Past Medical History:   Diagnosis Date     Allergic rhinitis, cause unspecified      Anxiety state, unspecified      Cellulitis and abscess of leg, except foot      Closed fracture of unspecified part of tibia      Disuse osteoporosis      Dysthymic disorder      Edema      Encounter for long-term (current) use of other medications      Esophageal reflux      Kidney stones      Myalgia and myositis, unspecified      Obesity, unspecified      Open wound of foot except toe(s) alone, complicated      Opioid type dependence, unspecified      Other acne      Other congenital valgus deformity of feet      Other ventral hernia without mention of obstruction or gangrene      Polycystic ovaries      PONV (postoperative nausea and vomiting)      Systemic lupus erythematosus (H)      Tibialis tendinitis      Unspecified hereditary and idiopathic peripheral neuropathy        Past Surgical History   I have reviewed this patient's surgical history and updated it with pertinent information if needed.  Past Surgical History:   Procedure Laterality Date     APPENDECTOMY       APPLY WOUND VAC Left 1/24/2019    Procedure: WOUND VAC APPLICATION;  Surgeon: Miguel Mosher MD;  Location:  OR     ARTHRODESIS FOOT Left 2/4/2015    Procedure: ARTHRODESIS FOOT;  Surgeon: Andre Harman MD;  [facility]:  [facility]     BUNIONECTOMY RT/LT  08/29/2018     DILATION AND CURETTAGE       EXCISE TOENAIL(S) Left 2/4/2015    Procedure: EXCISE TOENAIL(S);  Surgeon: Andre Harman MD;  Location: [facility]     HERNIA REPAIR      umbilical     HERNIA REPAIR      ventral open x 2     IRRIGATION AND DEBRIDEMENT FOOT, COMBINED Left 9/26/2018    Procedure: COMBINED IRRIGATION AND DEBRIDEMENT FOOT;   IRRIGATION AND DEBRIDEMENT LEFT FOOT;  Surgeon: Andre Harman MD;  [facility]:  [facility]     IRRIGATION AND DEBRIDEMENT FOOT, COMBINED Left 11/26/2018    Procedure: COMBINED IRRIGATION AND DEBRIDEMENT LEFT FOOT;  Surgeon: Andre Harman MD;  Location:  [facility]     IRRIGATION AND DEBRIDEMENT FOOT, COMBINED Left 1/24/2019    Procedure: LEFT HALLUX WOUND IRRIGATION AND DEBRIDEMENT WITH BONE BIOPSY;  Surgeon: Miguel Mosher MD;  Location:  OR     IRRIGATION AND DEBRIDEMENT FOOT, COMBINED Left 3/20/2019    Procedure: LEFT FOOT IRRIGATION AND  DEBRIDEMENT, FIRST RAY RESECTION AND HARDWARE REMOVAL FROM LEFT FOOT;  Surgeon: Andre Harman MD;  Location:  OR     REMOVE HARDWARE LOWER EXTREMITY Left 3/20/2019    Procedure: REMOVE HARDWARE LOWER EXTREMITY;  Surgeon: Andre Harman MD;  Location:  OR     REPAIR HAMMER TOE Left 11/26/2018    Procedure: REPAIR HAMMER TOE;  Surgeon: Andre Harman MD;  Location:  [facility]     TONSILLECTOMY         Prior to Admission Medications   Prior to Admission Medications   Prescriptions Last Dose Informant Patient Reported? Taking?   BuPROPion HCl (WELLBUTRIN XL PO) 4/21/2019 at Unknown time Self Yes Yes   Sig: Take 300 mg by mouth daily   BusPIRone HCl (BUSPAR PO) 4/21/2019 at Unknown time Self Yes Yes   Sig: Take 15 mg by mouth 2 times daily   Furosemide (LASIX PO) 4/21/2019 at Unknown time Self Yes Yes   Sig: Take 160 mg by mouth daily    Omeprazole (PRILOSEC PO) 4/21/2019 at Unknown time Self Yes Yes   Sig: Take 40 mg by mouth 2 times daily (before meals) 2 CAPSULES IN A.M.    Ondansetron HCl (ZOFRAN PO)  Self Yes Yes   Sig: Take 8 mg by mouth as needed for nausea or vomiting   Potassium Chloride Shameka CR (K-DUR PO) 4/21/2019 at Unknown time Self Yes Yes   Sig: Take 160 mEq by mouth daily    Topiramate (TOPAMAX PO) 4/20/2019 at Unknown time Self Yes Yes   Sig: Take 100 mg by mouth At Bedtime    ammonium lactate (AMLACTIN) 12 % cream  Self Yes Yes   Sig:  Apply topically daily as needed (to heels)    aspirin (ASA) 325 MG EC tablet 4/21/2019 at Unknown time  No Yes   Sig: Take 1 tablet (325 mg) by mouth daily   cephALEXin (KEFLEX) 500 MG capsule 4/21/2019 at Unknown time  No Yes   Sig: Take 1 capsule (500 mg) by mouth 3 times daily   cetirizine (ZYRTEC) 10 MG tablet 4/21/2019 at Unknown time Self Yes Yes   Sig: Take 10 mg by mouth daily   cholecalciferol (VITAMIN D3) 5000 units TABS tablet 4/21/2019 at Unknown time Self Yes Yes   Sig: Take 5,000 Units by mouth daily   clindamycin (CLEOCIN T) 1 % solution  Self Yes Yes   Sig: Apply topically nightly as needed (Acne outbreak)    gabapentin (NEURONTIN) 800 MG tablet 4/21/2019 at Unknown time Self Yes Yes   Sig: Take 800 mg by mouth 3 times daily Am, supper, hs   hydrOXYzine (VISTARIL) 25 MG capsule 4/20/2019 at Unknown time Self Yes Yes   Sig: Take 25 mg by mouth At Bedtime   oxyCODONE (ROXICODONE) 5 MG tablet   No Yes   Sig: Take 1-2 tablets (5-10 mg) by mouth every 3 hours as needed for breakthrough pain   phentermine 30 MG capsule 4/21/2019 at Unknown time Self Yes Yes   Sig: Take 30 mg by mouth every morning   polyethylene glycol (MIRALAX/GLYCOLAX) packet  Self Yes Yes   Sig: Take 17 g by mouth as needed for constipation   pravastatin (PRAVACHOL) 20 MG tablet 4/20/2019 at Unknown time Self Yes Yes   Sig: Take 20 mg by mouth every evening   senna-docusate (SENOKOT-S/PERICOLACE) 8.6-50 MG tablet   Yes Yes   Sig: Take 1 tablet by mouth 2 times daily as needed for constipation   sertraline (ZOLOFT) 100 MG tablet 4/21/2019 at Unknown time Self Yes Yes   Sig: Take 100 mg by mouth daily      Facility-Administered Medications: None     Allergies   Allergies   Allergen Reactions     Sulfa Drugs Shortness Of Breath and Rash     Demerol [Meperidine] Nausea and Vomiting     Erythromycin Nausea and Vomiting     Metformin Nausea and Vomiting     Codeine Rash     Penicillins Rash       Immunization History   Immunization History    Administered Date(s) Administered     Influenza Vaccine IM 3yrs+ 4 Valent IIV4 10/16/2018       Social History   I have reviewed this patient's social history and updated it with pertinent information if needed. Shalonda Howard  reports that she quit smoking about 16 years ago. Her smoking use included cigarettes. She has a 6.00 pack-year smoking history. She has never used smokeless tobacco. She reports that she drinks alcohol. She reports that she does not use drugs.    Family History   I have reviewed this patient's family history and updated it with pertinent information if needed.   No family history on file.    Review of Systems   The 10 point Review of Systems is negative other than noted in the HPI or here.     Physical Exam   Temp: 98.8  F (37.1  C) Temp src: Oral BP: 114/74 Pulse: 110 Heart Rate: 89 Resp: 16 SpO2: 96 % O2 Device: None (Room air)    Vital Signs with Ranges  Temp:  [98.8  F (37.1  C)-99.6  F (37.6  C)] 98.8  F (37.1  C)  Pulse:  [110] 110  Heart Rate:  [89-94] 89  Resp:  [16-18] 16  BP: (114-140)/(74-89) 114/74  SpO2:  [96 %-97 %] 96 %  0 lbs 0 oz  There is no height or weight on file to calculate BMI.    GENERAL APPEARANCE:  alert and no distress  EYES: Eyes grossly normal to inspection, PERRL and conjunctivae and sclerae normal  HENT: ear canals and TM's normal and nose and mouth without ulcers or lesions  NECK: no adenopathy, no asymmetry, masses, or scars and thyroid normal to palpation  RESP: lungs clear to auscultation - no rales, rhonchi or wheezes  CV: regular rates and rhythm, normal S1 S2, no S3 or S4 and no murmur, click or rub  LYMPHATICS: normal ant/post cervical and supraclavicular nodes  ABDOMEN: soft, nontender, without hepatosplenomegaly or masses and bowel sounds normal  MS: pic in the chart  SKIN: no suspicious lesions or rashes      Data   Lab Results   Component Value Date    WBC 7.6 04/22/2019    HGB 9.7 (L) 04/22/2019    HCT 30.9 (L) 04/22/2019     04/22/2019      04/22/2019    POTASSIUM 3.9 04/22/2019    CHLORIDE 107 04/22/2019    CO2 25 04/22/2019    BUN 14 04/22/2019    CR 0.70 04/22/2019     (H) 04/22/2019    SED 51 (H) 03/19/2019    NTBNPI 66 10/17/2018    AST 13 03/19/2019    ALT 18 03/19/2019    ALKPHOS 121 03/19/2019    BILITOTAL 0.2 03/19/2019     Recent Labs   Lab 04/21/19  1836 04/21/19  1827   CULT No growth after 9 hours No growth after 9 hours     Recent Labs   Lab Test 04/21/19  1836 04/21/19  1827 03/20/19  1722 01/24/19  1122 01/24/19  1120 01/22/19  1950 01/22/19  1945 11/26/18  1159 11/24/18  1950   CULT No growth after 9 hours No growth after 9 hours Light growth  Finegoldia magna (Peptostreptococcus ana)  Susceptibility testing not routinely done  *  Moderate growth  Streptococcus dysgalactiae serogroup C/G  *  Moderate growth  Staphylococcus aureus  *  Light growth  Enterobacter cloacae complex  *  Moderate growth  Streptococcus agalactiae sero group B  * On day 5, isolated in broth only:  Propionibacterium (Cutibacterium) acnes  Susceptibility testing of Propionibacterium species is not done from this source. Our   antibiogram indicates that Propionibacterum species is susceptible to penicillin and   cefotaxime and most are susceptible to clindamycin.  Liat Mcdonald M.D., Medical Director  *  No growth No anaerobes isolated  Light growth  Staphylococcus aureus  *  On day 1, isolated in broth only:  Enterobacter cloacae complex  *  Light growth  Streptococcus agalactiae sero group B  *  Single colony  Corynebacterium species  Identification obtained by MALDI-TOF mass spectrometry research use only database. Test   characteristics determined and verified by the Infect[facility] Diseases Diagnostic Laboratory   ([facility]) [place], [place].  Susceptibility testing not routinely done  These bacteria are part of normal skin kenyatta, but on occasion, may be true pathogens.    Clinical correlation must be applied to interpreting this  microbiology result.  * No growth No growth No anaerobes isolated  Light growth  Staphylococcus aureus  *  Light growth  Acinetobacter baumannii complex  *  Light growth  Corynebacterium species  Identification obtained by MALDI-TOF mass spectrometry research use only database. Test   characteristics determined and verified by the Infectious Diseases Diagnostic Laboratory   ([facility]) [place], [place].  Susceptibility testing not routinely done  * No growth

## 2019-04-22 NOTE — PLAN OF CARE
Pt is alert/oriented x4, independent in room, VSS on RA. Pt complains of 7-8/10 pain in L heel from wound. Moderate relief with PRN PO oxycodone with breakthrough IV dilaudid. Bilateral heel dressings changed, large/moderate drainage on both dressings. Plan for ID/WOC consult today. NPO since midnight.

## 2019-04-23 ENCOUNTER — APPOINTMENT (OUTPATIENT)
Dept: PHYSICAL THERAPY | Facility: CLINIC | Age: 49
DRG: 493 | End: 2019-04-23
Attending: PHYSICIAN ASSISTANT
Payer: COMMERCIAL

## 2019-04-23 ENCOUNTER — APPOINTMENT (OUTPATIENT)
Dept: ULTRASOUND IMAGING | Facility: CLINIC | Age: 49
DRG: 493 | End: 2019-04-23
Attending: PHYSICIAN ASSISTANT
Payer: COMMERCIAL

## 2019-04-23 PROCEDURE — 99223 1ST HOSP IP/OBS HIGH 75: CPT | Performed by: INTERNAL MEDICINE

## 2019-04-23 PROCEDURE — 99232 SBSQ HOSP IP/OBS MODERATE 35: CPT | Performed by: INTERNAL MEDICINE

## 2019-04-23 PROCEDURE — 25000132 ZZH RX MED GY IP 250 OP 250 PS 637: Performed by: PHYSICIAN ASSISTANT

## 2019-04-23 PROCEDURE — 93922 UPR/L XTREMITY ART 2 LEVELS: CPT

## 2019-04-23 PROCEDURE — 25000128 H RX IP 250 OP 636: Performed by: PHYSICIAN ASSISTANT

## 2019-04-23 PROCEDURE — 97161 PT EVAL LOW COMPLEX 20 MIN: CPT | Mod: GP

## 2019-04-23 PROCEDURE — 93970 EXTREMITY STUDY: CPT

## 2019-04-23 PROCEDURE — 12000000 ZZH R&B MED SURG/OB

## 2019-04-23 RX ADMIN — OXYCODONE HYDROCHLORIDE 10 MG: 5 TABLET ORAL at 04:18

## 2019-04-23 RX ADMIN — HYDROXYZINE PAMOATE 25 MG: 25 CAPSULE ORAL at 21:31

## 2019-04-23 RX ADMIN — CETIRIZINE HYDROCHLORIDE 10 MG: 10 TABLET ORAL at 08:19

## 2019-04-23 RX ADMIN — OXYCODONE HYDROCHLORIDE 10 MG: 5 TABLET ORAL at 07:50

## 2019-04-23 RX ADMIN — OXYCODONE HYDROCHLORIDE 10 MG: 5 TABLET ORAL at 11:22

## 2019-04-23 RX ADMIN — SERTRALINE HYDROCHLORIDE 100 MG: 100 TABLET ORAL at 08:19

## 2019-04-23 RX ADMIN — BUPROPION HYDROCHLORIDE 300 MG: 300 TABLET, FILM COATED, EXTENDED RELEASE ORAL at 08:19

## 2019-04-23 RX ADMIN — SENNOSIDES AND DOCUSATE SODIUM 1 TABLET: 8.6; 5 TABLET ORAL at 08:19

## 2019-04-23 RX ADMIN — GABAPENTIN 800 MG: 800 TABLET, FILM COATED ORAL at 08:19

## 2019-04-23 RX ADMIN — OMEPRAZOLE 40 MG: 20 CAPSULE, DELAYED RELEASE ORAL at 15:59

## 2019-04-23 RX ADMIN — GABAPENTIN 800 MG: 800 TABLET, FILM COATED ORAL at 21:30

## 2019-04-23 RX ADMIN — SENNOSIDES AND DOCUSATE SODIUM 1 TABLET: 8.6; 5 TABLET ORAL at 21:31

## 2019-04-23 RX ADMIN — OXYCODONE HYDROCHLORIDE 10 MG: 5 TABLET ORAL at 01:29

## 2019-04-23 RX ADMIN — BUSPIRONE HYDROCHLORIDE 15 MG: 15 TABLET ORAL at 21:31

## 2019-04-23 RX ADMIN — CEFAZOLIN SODIUM 2 G: 2 INJECTION, SOLUTION INTRAVENOUS at 21:30

## 2019-04-23 RX ADMIN — HYDROMORPHONE HYDROCHLORIDE 0.5 MG: 1 INJECTION, SOLUTION INTRAMUSCULAR; INTRAVENOUS; SUBCUTANEOUS at 09:54

## 2019-04-23 RX ADMIN — OXYCODONE HYDROCHLORIDE 10 MG: 5 TABLET ORAL at 21:07

## 2019-04-23 RX ADMIN — OMEPRAZOLE 40 MG: 20 CAPSULE, DELAYED RELEASE ORAL at 06:53

## 2019-04-23 RX ADMIN — OXYCODONE HYDROCHLORIDE 10 MG: 5 TABLET ORAL at 15:59

## 2019-04-23 RX ADMIN — RANITIDINE 150 MG: 150 TABLET ORAL at 21:30

## 2019-04-23 RX ADMIN — BUSPIRONE HYDROCHLORIDE 15 MG: 15 TABLET ORAL at 08:19

## 2019-04-23 RX ADMIN — GABAPENTIN 800 MG: 800 TABLET, FILM COATED ORAL at 15:59

## 2019-04-23 RX ADMIN — HYDROMORPHONE HYDROCHLORIDE 0.5 MG: 1 INJECTION, SOLUTION INTRAMUSCULAR; INTRAVENOUS; SUBCUTANEOUS at 18:19

## 2019-04-23 RX ADMIN — ENOXAPARIN SODIUM 40 MG: 40 INJECTION SUBCUTANEOUS at 09:53

## 2019-04-23 RX ADMIN — TOPIRAMATE 100 MG: 100 TABLET, FILM COATED ORAL at 21:31

## 2019-04-23 RX ADMIN — CEFAZOLIN SODIUM 2 G: 2 INJECTION, SOLUTION INTRAVENOUS at 06:53

## 2019-04-23 RX ADMIN — CEFAZOLIN SODIUM 2 G: 2 INJECTION, SOLUTION INTRAVENOUS at 14:20

## 2019-04-23 RX ADMIN — RANITIDINE 150 MG: 150 TABLET ORAL at 08:19

## 2019-04-23 RX ADMIN — PRAVASTATIN SODIUM 20 MG: 20 TABLET ORAL at 21:31

## 2019-04-23 RX ADMIN — HYDROMORPHONE HYDROCHLORIDE 0.5 MG: 1 INJECTION, SOLUTION INTRAMUSCULAR; INTRAVENOUS; SUBCUTANEOUS at 14:24

## 2019-04-23 RX ADMIN — HYDROMORPHONE HYDROCHLORIDE 0.5 MG: 1 INJECTION, SOLUTION INTRAMUSCULAR; INTRAVENOUS; SUBCUTANEOUS at 00:37

## 2019-04-23 ASSESSMENT — ACTIVITIES OF DAILY LIVING (ADL)
ADLS_ACUITY_SCORE: 13

## 2019-04-23 NOTE — PROGRESS NOTES
04/23/19 1130   Quick Adds   Type of Visit Initial PT Evaluation   Living Environment   Lives With child(adam), adult;spouse   Living Arrangements house   Number of Stairs, Main Entrance 5   Stair Railings, Main Entrance railings safe and in good condition   Transportation Anticipated family or friend will provide   Living Environment Comment Pt lives in split level home but once inside everything on main level   Self-Care   Usual Activity Tolerance moderate   Current Activity Tolerance fair   Regular Exercise No   Equipment Currently Used at Home other (see comments);walker, rolling  (knee scooter)   Activity/Exercise/Self-Care Comment Pt independent at baseline, uses knee scooter or walker as needed   Functional Level Prior   Ambulation 1-->assistive equipment   Transferring 1-->assistive equipment   Fall history within last six months no   Prior Functional Level Comment Pt has a FWW but normally uses knee scooter, independent with ADLs   General Information   Onset of Illness/Injury or Date of Surgery - Date 04/21/19   Referring Physician Jett Malik, CECILIA   Patient/Family Goals Statement To go home   Pertinent History of Current Problem (include personal factors and/or comorbidities that impact the POC) Pt is 49 year old female adm on 4/21/19 due to septic L ankle. Pt has had complicated course since original surgery in 8/2018 with infections, non-healing wounds, and previous surgeries to remove hardware. On 4/22/19 pt to OR for I&D of L ankle and removal of screw.    Weight-Bearing Status - LLE weight-bearing as tolerated   Cognitive Status Examination   Orientation orientation to person, place and time   Level of Consciousness alert   Pain Assessment   Patient Currently in Pain Yes, see Vital Sign flowsheet  (7-8/10 B ankle pain)   Posture    Posture Not impaired   Range of Motion (ROM)   ROM Comment B LE ROM WFL except B ankles limited by pain   Strength   Strength Comments Grossly 3/5 throughout  "  Bed Mobility   Bed Mobility Comments Independent with supine to sit and sit to supine   Transfer Skills   Transfer Comments Mod I using FWW   Gait   Gait Comments Pt able to ambulate 100' with antalgic gait but steady, no LOB. Pt currently holding IV pole, educated on using FWW to assist with taking pressure of ankles if in pain. Pt stating understanding and also stating she has knee walker at home that she uses as needed. Pt declining stair assessment at this time stating she is very familiar with getting up and down the stairs as needed.   Balance   Balance Comments Good   Sensory Examination   Sensory Perception Comments Denies change in sensation from baseline   Clinical Impression   Criteria for Skilled Therapeutic Intervention no;evaluation only   Clinical Presentation Stable/Uncomplicated   Clinical Presentation Rationale Current presentation, [facility]   Clinical Decision Making (Complexity) Low complexity   Anticipated Discharge Disposition Home   Risk & Benefits of therapy have been explained Yes   Patient, Family & other staff in agreement with plan of care Yes   Clinical Impression Comments Pt is able to complete all mobility necessary for discharge to home, no further skilled PT indicated at this time. Pt may benefit from outpatient PT in the future.   [facility] AM-PAC  \"6 Clicks\" V.2 Basic Mobility Inpatient Short Form   1. Turning from your back to your side while in a flat bed without using bedrails? 4 - None   2. Moving from lying on your back to sitting on the side of a flat bed without using bedrails? 4 - None   3. Moving to and from a bed to a chair (including a wheelchair)? 4 - None   4. Standing up from a chair using your arms (e.g., wheelchair, or bedside chair)? 4 - None   5. To walk in hospital room? 4 - None   6. Climbing 3-5 steps with a railing? 4 - None   Basic Mobility Raw Score (Score out of 24.Lower scores equate to lower levels of function) 24   Total Evaluation Time   Total " Evaluation Time (Minutes) 15

## 2019-04-23 NOTE — OP NOTE
Procedure Date: 04/22/2019      PREOPERATIVE DIAGNOSIS:     1.  Infected left ankle joint.   2.  Retained hardware from a previous subtalar fusion.      POSTOPERATIVE DIAGNOSIS:     1.  Infected left ankle joint.   2.  Retained hardware from a previous subtalar fusion.      PROCEDURES:   1.  Arthroscopic debridement and lavage of the right ankle.   2.  Removal of a screw from the left calcaneus.      ANESTHESIA:  General.      SURGEON:  Andre Harman MD      ASSISTANT:  MARIA ESTHER Stokes      PREAMBLE:  Ms. Howard has very significant peripheral vascular insufficiency.  She recently had a first ray amputation done due to a nonhealing infected first metatarsal.  She has large stasis ulcers over the heels.  She has peripheral neuropathy.  She presented a few days ago with increasing pain in her ankle and an aspiration of her ankle showed an infection.  We therefore admitted her to the hospital for arthroscopic debridement and lavage today.  She also still has a screw in her subtalar joint that we will remove as well.      Informed consent was obtained.      DESCRIPTION OF PROCEDURE:  After adequate induction of a general anesthetic, the patient was positioned supine on the operating table.  The left leg was sterilely prepped and free draped in the usual fashion.  Tourniquet around the thigh was inflated to 300 mmHg.      A standard arthroscopy was done with medial and lateral portals.  Prior to inserting the scope, a culture was taken from the fluid that came from the ankle itself.  It was clear synovial type fluid, but a fairly large amount drained out.      The scope was then inserted.  There was no obvious sign of infection, but significant synovial hypertrophy.  The shaver was used to remove all the inflamed synovium.  Articular cartilage was still in good condition.        Fluoroscopy anterior, medial, lateral and posterior debridement was done.  Five liters of saline was run through the ankle to do a  thorough lavage,      The scope was then removed.  A separate incision was made posteriorly over the heel.  The large fragment screw was exposed and removed.  There was no obvious sign of infection over the calcaneus.      The tourniquet was deflated.  The wounds closed with nylon sutures.  A sterile dressing and a light compressive bandage were applied.      She will probably start with IV antibiotics.  We will also ask Vascular Surgery to see her again in consultation to see if anything can be done for her chronic ulcers.         HUGO LEÓN MD             D: 2019   T: 2019   MT: MADIHA      Name:     CASSANDRA FRANCO   MRN:      [id number]        Account:        [id number]   :      1970           Procedure Date: 2019      Document: [id number]

## 2019-04-23 NOTE — PROGRESS NOTES
Shalonda Howard  2019  POD #1 L ankle arthroscopic debridement, hardware removal    Doing well.  No immediate surgical complications identified.  No excessive bleeding  Pain under reasonable control  Temperatures:  Current - Temp: 98  F (36.7  C); Max - Temp  Av.2  F (36.8  C)  Min: 97.5  F (36.4  C)  Max: 99.4  F (37.4  C)  Pulse range: Pulse  Av.1  Min: 81  Max: 92  Blood pressure range: Systolic (24hrs), Av , Min:95 , Max:135   ; Diastolic (24hrs), Av, Min:61, Max:102    CMS: intact to L LE  Labs:per ID    PLAN:Vascular consult pending.  Venous stasis on R calf.  Suspect vascular compromise. ID following cultures.  Additional problems to be followed by medicine physician

## 2019-04-23 NOTE — PROGRESS NOTES
[facility]  Patient is currently open to home care services with [facility]. The patient is currently receiving Skilled Nursing services. [facility]  and team have been notified of patient admission. [facility] liaison will continue to follow patient during stay. If appropriate provide orders to resume home care at time of discharge.

## 2019-04-23 NOTE — PROGRESS NOTES
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 She is now s/p arthroscopic debridement and hardware removal by Ortho. Hospital service was consulted for medical co-management    Suspected left ankle septic arthritis s/p arthroscopic debridement and hardware removal on 4/22/2019  Has been following with Dr. Harman for ongoing left ankle swelling. Underwent arthrocentesis in clinic and was directly admitted once cultures reportedly returned positive. On admission, she was initiated on IV cefazolin by Dr. Harman and then arthroscopic debridement and lavage as well as a removal of a screw from the left calcaneus.  - Management as per Ortho and ID. Continues on cefazolin  - 4/22 left ankle cultures pending  - 4/21 blood cultures x2 NGTD    Chronic bilateral heel ulcers  - Vascular Surgery consult ordered by Ortho    Recent RLE cellulitis  On cephalexin PTA  - Holding cephalexin while on IV cefazolin    Opioid use disorder  - Judicious use of opioids. On pain regimen as per Ortho    Bilateral chronic LE edema  Chronic and stable on Lasix 160 mg daily    GERD  Chronic and stable on omeprazole    Recurrent ventral hernia  Noted history of 5 ventral hernia repairs, now with recurrence.   - Follow up with PCP    Dysthymia with anxiety  Chronic and stable on bupropion, buspirone, sertraline, and hydroxyzine    Peripheral neuropathy  Chronic and stable on gabapentin and topiramate    Dyslipidemia  Chronic and stable on pravastatin    Morbid obesity.  BMI 33.28. Follow up with  PCP    Possible systemic lupus erythematosus  Patient indicates that one rheumatologist stated she has lupus and another denied this. Not being actively managed.    DVT Prophylaxis: Enoxaparin as per Ortho  Code Status: Full Code    Disposition: Expected discharge as per Ortho    Sofi Mason    Interval History   No events overnight. Pain adequately controlled. Denies cp/sob, dizziness/lightheadedness, or nausea  - No change in management by me  - Vascular Surgery consult as per Ortho    -Data reviewed today: I reviewed all new labs and imaging results over the last 24 hours. I personally reviewed no images or EKG's today.    Physical Exam   Temp: 98.4  F (36.9  C) Temp src: Oral BP: 106/62 Pulse: 81 Heart Rate: 76 Resp: 18 SpO2: 95 % O2 Device: None (Room air) Oxygen Delivery: 8 LPM  There were no vitals filed for this visit.  Vital Signs with Ranges  Temp:  [97.5  F (36.4  C)-99.4  F (37.4  C)] 98.4  F (36.9  C)  Pulse:  [81-92] 81  Heart Rate:  [] 76  Resp:  [10-18] 18  BP: ()/() 106/62  SpO2:  [94 %-98 %] 95 %  I/O last 3 completed shifts:  In: 1751.66 [I.V.:1751.66]  Out: 1780 [Urine:1775; Blood:5]    Constitutional: NAD  Respiratory: Breathing non-labored. Lungs CTAB - no wheezes, crackles, or rhonchi  Cardiovascular: Heart RRR, no m/r/g. No pedal edema  GI: +BS, abd soft/NT  Skin/Integumen: No rash  Neuro: Alert and appropriate, CARRERA  Psych: Calm and cooperative    Medications     - MEDICATION INSTRUCTIONS -       lactated ringers Stopped (04/23/19 0308)     sodium chloride 10 mL/hr at 04/23/19 0821       buPROPion  300 mg Oral Daily     busPIRone  15 mg Oral BID     ceFAZolin  2 g Intravenous Q8H     cetirizine  10 mg Oral Daily     enoxaparin  40 mg Subcutaneous Q24H     ranitidine  150 mg Oral Q12H    Or     famotidine  20 mg Intravenous Q12H     gabapentin  800 mg Oral TID     hydrOXYzine  25 mg Oral At Bedtime     omeprazole  40 mg Oral BID AC     pravastatin  20 mg Oral QPM      senna-docusate  1 tablet Oral BID    Or     senna-docusate  2 tablet Oral BID     sertraline  100 mg Oral Daily     sodium chloride (PF)  3 mL Intracatheter Q8H     topiramate  100 mg Oral At Bedtime     Data   Recent Labs   Lab 04/22/19  0641   WBC 7.6   HGB 9.7*   MCV 77*         POTASSIUM 3.9   CHLORIDE 107   CO2 25   BUN 14   CR 0.70   ANIONGAP 7   PAULINE 8.2*   *       No results found for this or any previous visit (from the past 24 hour(s)).

## 2019-04-23 NOTE — PLAN OF CARE
A&Ox4. VSS on RA. L dressing intact. R heel dressing changed, CDI. CMS with unchanged numbness/tingling. Pain in L ankle managed with PRN oxcodone x2, dilaudid x1. Up ind, WBAT. Regular diet. Voiding adequately.

## 2019-04-23 NOTE — PLAN OF CARE
Discharge Planner PT   Patient plan for discharge: Home  Current status: Pt is 49 year old female adm on 4/21/19 with septic L ankle, to OR 4/22/19 for I&D and removal of screw. At baseline, pt lives in split level home with 5 steps to enter, spouse and daughter available to assist in afternoons and evenings. Pt independent with use of knee scooter or rolling walker. PT evaluation completed, pt independent with bed mobility, mod I with transfers and ambulation x100' WBAT. Pt's activity limited by pain, otherwise no LOB or physical assist needed.  Barriers to return to prior living situation: None  Recommendations for discharge: Home  Rationale for recommendations: Pt is able to complete all mobility necessary for discharge to home, no further skilled PT needs. PT orders completed at this time.       Entered by: Sherin Duong 04/23/2019 11:41 AM

## 2019-04-23 NOTE — PROGRESS NOTES
[facility]    Infectious Disease Progress Note    Date of Service (when I saw the patient): 04/23/2019     Assessment & Plan   Shalonda Howard is a 49 year old female who was admitted on 4/21/2019.     Impression:     1. This is a 49 y.o familiar to me from multiple prior admissions.   2. She has a history of bilateral bunion repair surgery done in August 2018. The right side healed but the left side the incision has not been healing , I and D and 6 weeks of IV antibiotics and many more weeks of oral antibiotics since the end of August 2018. Then repeat admission in November, more I and D and another course of IV antibiotics. then another admission in January, again, wound non healing, s/p another I and D and 2 antibiotics for 6 weeks.   3. She also has some hardware in the ankle b/l, those incisions look ok. Though both ankle are swollen.   4. She also has chronic heel ulcers bilateral.   5. PCN allergy, tolerates zosyn/cephalosporins, sulfa allergy.   6. Admitted in March continued wound non healing, concern for underlying osteo, s/p first ray amputation, hardware removal and ulcer debridement 3/20.   7. Admitted this occasion with concern of septic ankle. Gram stain from the ankle positive for GPC, report not in the [place] epic.         Recommendations:   1. vascular work up underway.   2. Stays on ancef, cultures from the aspiration fluid positive for Staph hominis, sensitive to Ancef.   3. Follow up on the cultures here.                   Ignacia Bocanegra MD    Interval History   Some pain in the ankle   Afebrile   OR cultures are pending.     Physical Exam   Temp: 99.5  F (37.5  C) Temp src: Oral BP: 116/70 Pulse: 81 Heart Rate: 89 Resp: 16 SpO2: 95 % O2 Device: None (Room air) Oxygen Delivery: 8 LPM  There were no vitals filed for this visit.  Vital Signs with Ranges  Temp:  [97.5  F (36.4  C)-99.5  F (37.5  C)] 99.5  F (37.5  C)  Pulse:  [81-92] 81  Heart Rate:  [] 89  Resp:  [10-18]  16  BP: ()/() 116/70  SpO2:  [94 %-98 %] 95 %    Constitutional: Awake, alert, cooperative, no apparent distress  Lungs: Clear to auscultation bilaterally, no crackles or wheezing  Cardiovascular: Regular rate and rhythm, normal S1 and S2, and no murmur noted  Abdomen: Normal bowel sounds, soft, non-distended, non-tender  Skin: No rashes, no cyanosis, no edema  Other:    Medications     - MEDICATION INSTRUCTIONS -       lactated ringers Stopped (04/23/19 0308)     sodium chloride 10 mL/hr at 04/23/19 0821       buPROPion  300 mg Oral Daily     busPIRone  15 mg Oral BID     ceFAZolin  2 g Intravenous Q8H     cetirizine  10 mg Oral Daily     enoxaparin  40 mg Subcutaneous Q24H     ranitidine  150 mg Oral Q12H    Or     famotidine  20 mg Intravenous Q12H     gabapentin  800 mg Oral TID     hydrOXYzine  25 mg Oral At Bedtime     omeprazole  40 mg Oral BID AC     pravastatin  20 mg Oral QPM     senna-docusate  1 tablet Oral BID    Or     senna-docusate  2 tablet Oral BID     sertraline  100 mg Oral Daily     sodium chloride (PF)  3 mL Intracatheter Q8H     topiramate  100 mg Oral At Bedtime       Data   All microbiology laboratory data reviewed.  Recent Labs   Lab Test 04/22/19  0641 03/19/19 1941 03/03/19  1130   WBC 7.6 9.1 7.1   HGB 9.7* 10.8* 11.4*   HCT 30.9* 33.9* 37.9   MCV 77* 79 82    415 391     Recent Labs   Lab Test 04/22/19  0641 03/22/19  0629 03/19/19 1941   CR 0.70 0.81 0.71     Recent Labs   Lab Test 03/19/19 1941   SED 51*     Recent Labs   Lab Test 04/22/19  1446 04/21/19  1836 04/21/19  1827 03/20/19  1722 01/24/19  1122 01/24/19  1120 01/22/19  1950 01/22/19  1945 11/26/18  1159   CULT Culture negative monitoring continues  PENDING No growth after 2 days No growth after 2 days Light growth  Finegoldia magna (Peptostreptococcus ana)  Susceptibility testing not routinely done  *  Moderate growth  Streptococcus dysgalactiae serogroup C/G  *  Moderate growth  Staphylococcus  aureus  *  Light growth  Enterobacter cloacae complex  *  Moderate growth  Streptococcus agalactiae sero group B  * On day 5, isolated in broth only:  Propionibacterium (Cutibacterium) acnes  Susceptibility testing of Propionibacterium species is not done from this source. Our   antibiogram indicates that Propionibacterum species is susceptible to penicillin and   cefotaxime and most are susceptible to clindamycin.  Liat Mcdonald M.D., Medical Director  *  No growth No anaerobes isolated  Light growth  Staphylococcus aureus  *  On day 1, isolated in broth only:  Enterobacter cloacae complex  *  Light growth  Streptococcus agalactiae sero group B  *  Single colony  Corynebacterium species  Identification obtained by MALDI-TOF mass spectrometry research use only database. Test   characteristics determined and verified by the [facility]   ([facility]) [place], [place].  Susceptibility testing not routinely done  These bacteria are part of normal skin kenyatta, but on occasion, may be true pathogens.    Clinical correlation must be applied to interpreting this microbiology result.  * No growth No growth No anaerobes isolated  Light growth  Staphylococcus aureus  *  Light growth  Acinetobacter baumannii complex  *  Light growth  Corynebacterium species  Identification obtained by MALDI-TOF mass spectrometry research use only database. Test   characteristics determined and verified by the [facility]   ([facility]) [place], [place].  Susceptibility testing not routinely done  *

## 2019-04-23 NOTE — PLAN OF CARE
Nursing note  Pt a/o x 4, pod # 1 of I & D of left foot doing ok. Up independently in the room. Pt denies any dizziness or lightheadedness. Pt denies any n/v. Cms wise pt has baseline n/t BLE's. Dressing changed bilateral heels. Elevated both foot on a pillow. Doppler US done this afternoon on LE result still pending. Pain under control with prn oxycodone and dilaudid iv. VSS./70 (BP Location: Right arm)   Pulse 81   Temp 99.5  F (37.5  C) (Oral)   Resp 16   SpO2 95%   Will continue to monitor.

## 2019-04-23 NOTE — PLAN OF CARE
Discharge Planner OT   Patient plan for discharge: Home  Current status: Pt is 49 year old female adm on 4/21/19 with septic L ankle, to OR 4/22/19 for I&D and removal of screw. Per chart/PT--At baseline, pt lives in split level home with 5 steps to enter, spouse and daughter available to assist in afternoons and evenings. Pt independent with use of knee scooter or rolling walker. Per PT--pt. currently independent with bed mobility, mod I with transfers and ambulation x100' WBAT. Pt's activity limited by pain, otherwise no LOB or physical assist needed. Per PT--no skilled OT needs. Will complete order, sign-off.    Barriers to return to prior living situation: Per PT--no barriers  Recommendations for discharge: Per PT--home  Rationale for recommendations: Per PT, who screened for OT--no skilled OT needed. Will complete order, sign-off.       Entered by: Shalonda Browne 04/23/2019 11:58 AM

## 2019-04-23 NOTE — CONSULTS
[facility]    Vascular Medicine Consultation     Date of Admission:  4/21/2019  Date of Consult (When I saw the patient): 04/23/19         Physician Supervisory Attestation:   I have reviewed and discussed with the physician assistant their history, physical and plan and independently interviewed and examined Shalonda Howard and agree with the plan as stated in the physician assistant note.    Reviewed previous vascular evaluation 6 months ago resting JJ normal bilaterally, 8 months ago bilateral lower extremity venous duplex negative for DVT.  She also underwent extensive workup outside system reviewed imaging studies and evaluation in [facility] everywhere.  Bilateral heel ulcers and also infected left ankle joint underwent arthroscopic debridement and lavage of the left ankle, hardware removal yesterday.  Left ankle dressing in place POD 1 did not open  Right DP and PT palpable and bounding.  Both feet are well perfused  She is non-smoker, based on location and vascular exam this clinically appears neuropathic ulcers.  She was nonambulatory, nonweightbearing for a while      Will get bilateral lower extremity venous duplex to rule out DVT  Will get resting JJ with TBI (she is unable to walk on treadmill) and based on these results will decide if she needs arterial duplex.  Postoperative cares, ulcer care per orthopedic surgery.    Thank you for the consultation  We will follow with you.    Copy of this dictation to orthopedic surgery service, hospitalist service and  primary care provider    Fatimah Torre MD, [facility]  Vascular medicine service   4/23/2019          Assessment & Plan   1. Non-healing bilateral heel ulcerations / non-healing left foot surgical wounds     She has had multiple non-healing wounds in her feet in the past with a negative vascular work-up in 2013 and 2/2017 at [facility] followed by again in 11/2018 at [place]. It was felt at that time that her wounds were  pressure related rather than vasculogenic. She does have evidence of likely venous stasis in her right medial calf, but there is no ulceration in this area and it would not result in heel ulceration. She has strongly palpable pulses in her right lower extremity (unable to assess left lower extremity due to surgical dressing).  JJ's will be obtained again this admission to reassess as it has been 5 months since last checked. A lower extremity venous duplex will also be obtained to rule out any DVT, although this is unlikely.     It is most likely that these ulcerations are pressure/neuropathic ulcerations from her special boots in the setting of peripheral neuropathy. Will follow up on these imaging studies and provide further recommendations at that time based upon results.       Reason for Consult   Reason for consult: Asked by Dr. Harman (Ortho) to evaluate for possible vascular insufficiency given bilateral non-healing heel ulcerations.     Primary Care Physician   Linda Bernstein      History of Present Illness   Shalonda Howard is a 49 year old female former smoker, non-diabetic with a history of obesity, anxiety, possible lupus (diagnosed at one point in past but not on any medication and unsure if truly has it), and peripheral neuropathy who has had trouble with recurrent feet wounds. She has been followed much of the time in the past at [place]. In 2013 she was seen by Dr. Viramontes of Vascular Surgery, who apparently felt she had no vascular issues.  In 2/2017 she was seen by Dr. Hernandez, also of Vascular. At that time her JJ's were normal. Her TcPO2s were low, but it was felt that in the context of intermittent swelling and wounds her ABIs and clinical exam were more accurate. No further vascular work-up was felt to be needed at that time and her wounds were more consistent with weight bearing surfaces. In 8/2018 she underwent bilateral bunionectomy. Her right foot healed up well, but the incisions on her  left foot broke open. She underwent I & D and hardware removal in 9/2018. In 10/2018 she returned with concerns of a worsening infection in her left foot. MRI was negative for osteomyelitis, but Infectious Disease treated her with a prolonged course of IV Ancef. She then returned again in 11/2018 with concerns of a worsening infection and an MRI concerning for osteomyelitis of the first metatarsal. She underwent debridement. During that hospitalization, Dr. Salgado of Vascular Medicine saw her. She underwent ABIs, which were normal and she had triphasic distal pulses. Again, it was felt that there was not a vasculogenic etiology to her wounds.     She returned to the hospital in 1/2019 and underwent left hallux wound irrigation and debridement with bone biopsy. She then went out on IV antibiotics until 3/11/19. However, she still had erythema and oozing at the surgical site and underwent left first ray amputation, removal of hardware from left ankle, and heel debridement 3/20/19.  When seen in follow up she complained of left ankle swelling and pain. AN MRI for the ankle on 4/12/19 at [facility] noted fluid in the ankle. Joint aspiration was performed and this returned bloody and gram stain showed gram positive cocci. She was admitted out of concern for a septic left ankle and started on IV antibiotics per ID. She underwent debridement and removal of a retained screw in the left ankle on 4/22/19. Vascular Medicine has now again been consulted to evaluate for any vascular insufficiency given her non-healing bilateral heel wounds.           Past Medical History   Past Medical History:   Diagnosis Date     Allergic rhinitis, cause unspecified      Anxiety state, unspecified      Cellulitis and abscess of leg, except foot      Closed fracture of unspecified part of tibia      Disuse osteoporosis      Dysthymic disorder      Edema      Encounter for long-term (current) use of other medications      Esophageal reflux      Kidney  stones      Myalgia and myositis, unspecified      Obesity, unspecified      Open wound of foot except toe(s) alone, complicated      Opioid type dependence, unspecified      Other acne      Other congenital valgus deformity of feet      Other ventral hernia without mention of obstruction or gangrene      Polycystic ovaries      PONV (postoperative nausea and vomiting)      Systemic lupus erythematosus (H)      Tibialis tendinitis      Unspecified hereditary and idiopathic peripheral neuropathy        Past Surgical History   Past Surgical History:   Procedure Laterality Date     APPENDECTOMY       APPLY WOUND VAC Left 1/24/2019    Procedure: WOUND VAC APPLICATION;  Surgeon: Miguel Mosher MD;  Location:  OR     ARTHRODESIS FOOT Left 2/4/2015    Procedure: ARTHRODESIS FOOT;  Surgeon: Andre Harman MD;  Location: [facility]     BUNIONECTOMY RT/LT  08/29/2018     DILATION AND CURETTAGE       EXCISE TOENAIL(S) Left 2/4/2015    Procedure: EXCISE TOENAIL(S);  Surgeon: Andre Harman MD;  Location: [facility]     HERNIA REPAIR      umbilical     HERNIA REPAIR      ventral open x 2     IRRIGATION AND DEBRIDEMENT FOOT, COMBINED Left 9/26/2018    Procedure: COMBINED IRRIGATION AND DEBRIDEMENT FOOT;  IRRIGATION AND DEBRIDEMENT LEFT FOOT;  Surgeon: Andre Harman MD;  Location: [facility]     IRRIGATION AND DEBRIDEMENT FOOT, COMBINED Left 11/26/2018    Procedure: COMBINED IRRIGATION AND DEBRIDEMENT LEFT FOOT;  Surgeon: Andre Harman MD;  Location: [facility]     IRRIGATION AND DEBRIDEMENT FOOT, COMBINED Left 1/24/2019    Procedure: LEFT HALLUX WOUND IRRIGATION AND DEBRIDEMENT WITH BONE BIOPSY;  Surgeon: Miguel Mosher MD;  Location:  OR     IRRIGATION AND DEBRIDEMENT FOOT, COMBINED Left 3/20/2019    Procedure: LEFT FOOT IRRIGATION AND  DEBRIDEMENT, FIRST RAY RESECTION AND HARDWARE REMOVAL FROM LEFT FOOT;  Surgeon: Andre Harman MD;  Location:  OR     REMOVE HARDWARE LOWER EXTREMITY Left  3/20/2019    Procedure: REMOVE HARDWARE LOWER EXTREMITY;  Surgeon: Andre Harman MD;  Location:  OR     REPAIR HAMMER TOE Left 11/26/2018    Procedure: REPAIR HAMMER TOE;  Surgeon: Andre Harman MD;  Location:  [facility]     TONSILLECTOMY         Prior to Admission Medications   Prior to Admission Medications   Prescriptions Last Dose Informant Patient Reported? Taking?   BuPROPion HCl (WELLBUTRIN XL PO) 4/21/2019 at Unknown time Self Yes Yes   Sig: Take 300 mg by mouth daily   BusPIRone HCl (BUSPAR PO) 4/21/2019 at Unknown time Self Yes Yes   Sig: Take 15 mg by mouth 2 times daily   Furosemide (LASIX PO) 4/21/2019 at Unknown time Self Yes Yes   Sig: Take 160 mg by mouth daily    Omeprazole (PRILOSEC PO) 4/21/2019 at Unknown time Self Yes Yes   Sig: Take 40 mg by mouth 2 times daily (before meals) 2 CAPSULES IN A.M.    Ondansetron HCl (ZOFRAN PO)  Self Yes Yes   Sig: Take 8 mg by mouth as needed for nausea or vomiting   Potassium Chloride Shameka CR (K-DUR PO) 4/21/2019 at Unknown time Self Yes Yes   Sig: Take 160 mEq by mouth daily    Topiramate (TOPAMAX PO) 4/20/2019 at Unknown time Self Yes Yes   Sig: Take 100 mg by mouth At Bedtime    ammonium lactate (AMLACTIN) 12 % cream  Self Yes Yes   Sig: Apply topically daily as needed (to heels)    aspirin (ASA) 325 MG EC tablet 4/21/2019 at Unknown time  No Yes   Sig: Take 1 tablet (325 mg) by mouth daily   cephALEXin (KEFLEX) 500 MG capsule 4/21/2019 at Unknown time  No Yes   Sig: Take 1 capsule (500 mg) by mouth 3 times daily   cetirizine (ZYRTEC) 10 MG tablet 4/21/2019 at Unknown time Self Yes Yes   Sig: Take 10 mg by mouth daily   cholecalciferol (VITAMIN D3) 5000 units TABS tablet 4/21/2019 at Unknown time Self Yes Yes   Sig: Take 5,000 Units by mouth daily   clindamycin (CLEOCIN T) 1 % solution  Self Yes Yes   Sig: Apply topically nightly as needed (Acne outbreak)    gabapentin (NEURONTIN) 800 MG tablet 4/21/2019 at Unknown time Self Yes Yes   Sig: Take 800  mg by mouth 3 times daily Am, supper, hs   hydrOXYzine (VISTARIL) 25 MG capsule 4/20/2019 at Unknown time Self Yes Yes   Sig: Take 25 mg by mouth At Bedtime   oxyCODONE (ROXICODONE) 5 MG tablet   No Yes   Sig: Take 1-2 tablets (5-10 mg) by mouth every 3 hours as needed for breakthrough pain   phentermine 30 MG capsule 4/21/2019 at Unknown time Self Yes Yes   Sig: Take 30 mg by mouth every morning   polyethylene glycol (MIRALAX/GLYCOLAX) packet  Self Yes Yes   Sig: Take 17 g by mouth as needed for constipation   pravastatin (PRAVACHOL) 20 MG tablet 4/20/2019 at Unknown time Self Yes Yes   Sig: Take 20 mg by mouth every evening   senna-docusate (SENOKOT-S/PERICOLACE) 8.6-50 MG tablet   Yes Yes   Sig: Take 1 tablet by mouth 2 times daily as needed for constipation   sertraline (ZOLOFT) 100 MG tablet 4/21/2019 at Unknown time Self Yes Yes   Sig: Take 100 mg by mouth daily      Facility-Administered Medications: None     Allergies   Allergies   Allergen Reactions     Sulfa Drugs Shortness Of Breath and Rash     Demerol [Meperidine] Nausea and Vomiting     Erythromycin Nausea and Vomiting     Metformin Nausea and Vomiting     Codeine Rash     Penicillins Rash       Social History   Shalonda Howard  reports that she quit smoking about 16 years ago. Her smoking use included cigarettes. She has a 6.00 pack-year smoking history. She has never used smokeless tobacco. She reports that she drinks alcohol. She reports that she does not use drugs. She is not presently working. She lives with her  and daughter.      Family History   History reviewed. No pertinent family history.    Review of Systems   The 10 point Review of Systems is negative other than noted in the HPI or here.    Physical Exam   Temp: 98.4  F (36.9  C) Temp src: Oral BP: 106/62 Pulse: 81 Heart Rate: 76 Resp: 18 SpO2: 95 % O2 Device: None (Room air) Oxygen Delivery: 8 LPM  Vital Signs with Ranges  Temp:  [97.5  F (36.4  C)-99.4  F (37.4  C)] 98.4  F (36.9   C)  Pulse:  [81-92] 81  Heart Rate:  [] 76  Resp:  [10-18] 18  BP: ()/() 106/62  SpO2:  [94 %-98 %] 95 %  0 lbs 0 oz    Constitutional: awake, alert, cooperative, no apparent distress, and appears stated age  Eyes: Lids and lashes normal, pupils equal, round and reactive to light, extra ocular muscles intact, sclera clear, conjunctiva normal  ENT: normocepalic, without obvious abnormality, oropharynx pink and moist  Hematologic / Lymphatic: no lymphadenopathy  Respiratory: No increased work of breathing, good air exchange, clear to auscultation bilaterally, no crackles or wheezing  Cardiovascular: regular rate and rhythm, normal S1 and S2 and no murmur noted  GI: Normal bowel sounds, soft, non-distended, non-tender  Skin: Venous stasis changes right calf. Ulcerations bilateral heels bandaged.   Musculoskeletal: There is no redness, warmth, or swelling of the joints.  Full range of motion noted.  Motor strength is 5 out of 5 all extremities bilaterally.  Tone is normal. Left great toe amputated.   Neurologic: Awake, alert, oriented to name, place and time.  Cranial nerves II-XII are grossly intact.  Motor is 5 out of 5 bilaterally.    Neuropsychiatric:  Normal affect, memory, insight.  Pulses: Bounding right DP and PT pulses. Unable to palpate left pedal pulses due to surgical dressing. No carotid bruits appreciated.     Data   Most Recent 3 CBC's:  Recent Labs   Lab Test 04/22/19  0641 03/19/19 1941 03/03/19  1130   WBC 7.6 9.1 7.1   HGB 9.7* 10.8* 11.4*   MCV 77* 79 82    415 391     Most Recent 3 BMP's:  Recent Labs   Lab Test 04/22/19  0641 03/22/19  0629 03/19/19 1941    143 139   POTASSIUM 3.9 3.8 3.5   CHLORIDE 107 110* 104   CO2 25 26 24   BUN 14 17 12   CR 0.70 0.81 0.71   ANIONGAP 7 7 11   PAULINE 8.2* 8.4* 8.6   * 125* 92     Most Recent Cholesterol Panel:No lab results found.  Most Recent Hemoglobin A1c:  Recent Labs   Lab Test 10/15/18  2125   A1C 5.6

## 2019-04-23 NOTE — PLAN OF CARE
A&Ox4. Ind in room. Tolerating reg diet. CMS intact ex baseline numbness. R ankle dressing with small amount drainage on heel. R heel dressing changed twice d/t dressing falling off. Pain not controlled with oxycodone and dilaudid at this point. Rating 9/10. Refused capno. VSS on RA ex T max 99.4.

## 2019-04-23 NOTE — PLAN OF CARE
POD 1 Left ankle I & D. Dr. Dey. Up independently with CAM boot. Pain 7/10. This is a normal level for her. PRN Oxy administered and IV dilaudid. Patient states it took the edge off. LS clear. BS active CMS intact. Wound dressing intact.

## 2019-04-24 LAB
ANION GAP SERPL CALCULATED.3IONS-SCNC: 9 MMOL/L (ref 3–14)
BUN SERPL-MCNC: 12 MG/DL (ref 7–30)
CALCIUM SERPL-MCNC: 8.2 MG/DL (ref 8.5–10.1)
CHLORIDE SERPL-SCNC: 112 MMOL/L (ref 94–109)
CO2 SERPL-SCNC: 22 MMOL/L (ref 20–32)
CREAT SERPL-MCNC: 0.75 MG/DL (ref 0.52–1.04)
GFR SERPL CREATININE-BSD FRML MDRD: >90 ML/MIN/{1.73_M2}
GLUCOSE SERPL-MCNC: 157 MG/DL (ref 70–99)
POTASSIUM SERPL-SCNC: 3.7 MMOL/L (ref 3.4–5.3)
SODIUM SERPL-SCNC: 143 MMOL/L (ref 133–144)

## 2019-04-24 PROCEDURE — 25000132 ZZH RX MED GY IP 250 OP 250 PS 637: Performed by: INTERNAL MEDICINE

## 2019-04-24 PROCEDURE — 80048 BASIC METABOLIC PNL TOTAL CA: CPT | Performed by: INTERNAL MEDICINE

## 2019-04-24 PROCEDURE — 25000128 H RX IP 250 OP 636: Performed by: PHYSICIAN ASSISTANT

## 2019-04-24 PROCEDURE — 40000239 ZZH STATISTIC VAT ROUNDS

## 2019-04-24 PROCEDURE — 99233 SBSQ HOSP IP/OBS HIGH 50: CPT | Performed by: INTERNAL MEDICINE

## 2019-04-24 PROCEDURE — 25800030 ZZH RX IP 258 OP 636: Performed by: PHYSICIAN ASSISTANT

## 2019-04-24 PROCEDURE — 36415 COLL VENOUS BLD VENIPUNCTURE: CPT | Performed by: INTERNAL MEDICINE

## 2019-04-24 PROCEDURE — 36569 INSJ PICC 5 YR+ W/O IMAGING: CPT

## 2019-04-24 PROCEDURE — 99207 ZZC NON-BILLABLE SERV PER CHARTING: CPT | Performed by: INTERNAL MEDICINE

## 2019-04-24 PROCEDURE — 12000000 ZZH R&B MED SURG/OB

## 2019-04-24 PROCEDURE — 40000257 ZZH STATISTIC CONSULT NO CHARGE VASC ACCESS

## 2019-04-24 PROCEDURE — 25000132 ZZH RX MED GY IP 250 OP 250 PS 637: Performed by: PHYSICIAN ASSISTANT

## 2019-04-24 PROCEDURE — 27210218 ZZH KIT SHRLOCK 4FR SNGL LUM PICC

## 2019-04-24 RX ORDER — FLUCONAZOLE 150 MG/1
150 TABLET ORAL ONCE
Status: COMPLETED | OUTPATIENT
Start: 2019-04-24 | End: 2019-04-24

## 2019-04-24 RX ORDER — CEFAZOLIN SODIUM 1 G/3ML
2 INJECTION, POWDER, FOR SOLUTION INTRAMUSCULAR; INTRAVENOUS EVERY 8 HOURS
Qty: 1 EACH | DISCHARGE
Start: 2019-04-24 | End: 2019-04-25

## 2019-04-24 RX ORDER — OXYCODONE AND ACETAMINOPHEN 5; 325 MG/1; MG/1
1-2 TABLET ORAL EVERY 4 HOURS PRN
Qty: 40 TABLET | Refills: 0 | Status: ON HOLD | OUTPATIENT
Start: 2019-04-24 | End: 2019-05-24

## 2019-04-24 RX ADMIN — OXYCODONE HYDROCHLORIDE 10 MG: 5 TABLET ORAL at 16:05

## 2019-04-24 RX ADMIN — SENNOSIDES AND DOCUSATE SODIUM 1 TABLET: 8.6; 5 TABLET ORAL at 21:12

## 2019-04-24 RX ADMIN — OMEPRAZOLE 40 MG: 20 CAPSULE, DELAYED RELEASE ORAL at 16:05

## 2019-04-24 RX ADMIN — OMEPRAZOLE 40 MG: 20 CAPSULE, DELAYED RELEASE ORAL at 06:11

## 2019-04-24 RX ADMIN — CEFAZOLIN SODIUM 2 G: 2 INJECTION, SOLUTION INTRAVENOUS at 13:46

## 2019-04-24 RX ADMIN — ENOXAPARIN SODIUM 40 MG: 40 INJECTION SUBCUTANEOUS at 09:34

## 2019-04-24 RX ADMIN — OXYCODONE HYDROCHLORIDE 10 MG: 5 TABLET ORAL at 07:30

## 2019-04-24 RX ADMIN — SODIUM CHLORIDE: 9 INJECTION, SOLUTION INTRAVENOUS at 06:04

## 2019-04-24 RX ADMIN — CEFAZOLIN SODIUM 2 G: 2 INJECTION, SOLUTION INTRAVENOUS at 21:11

## 2019-04-24 RX ADMIN — FLUCONAZOLE 150 MG: 150 TABLET ORAL at 14:57

## 2019-04-24 RX ADMIN — SERTRALINE HYDROCHLORIDE 100 MG: 100 TABLET ORAL at 09:34

## 2019-04-24 RX ADMIN — OXYCODONE HYDROCHLORIDE 10 MG: 5 TABLET ORAL at 19:18

## 2019-04-24 RX ADMIN — GABAPENTIN 800 MG: 800 TABLET, FILM COATED ORAL at 09:34

## 2019-04-24 RX ADMIN — BUSPIRONE HYDROCHLORIDE 15 MG: 15 TABLET ORAL at 21:12

## 2019-04-24 RX ADMIN — SENNOSIDES AND DOCUSATE SODIUM 1 TABLET: 8.6; 5 TABLET ORAL at 09:33

## 2019-04-24 RX ADMIN — RANITIDINE 150 MG: 150 TABLET ORAL at 09:34

## 2019-04-24 RX ADMIN — OXYCODONE HYDROCHLORIDE 10 MG: 5 TABLET ORAL at 11:50

## 2019-04-24 RX ADMIN — CEFAZOLIN SODIUM 2 G: 2 INJECTION, SOLUTION INTRAVENOUS at 06:11

## 2019-04-24 RX ADMIN — RANITIDINE 150 MG: 150 TABLET ORAL at 21:12

## 2019-04-24 RX ADMIN — HYDROXYZINE PAMOATE 25 MG: 25 CAPSULE ORAL at 21:11

## 2019-04-24 RX ADMIN — OXYCODONE HYDROCHLORIDE 10 MG: 5 TABLET ORAL at 00:47

## 2019-04-24 RX ADMIN — BUPROPION HYDROCHLORIDE 300 MG: 300 TABLET, FILM COATED, EXTENDED RELEASE ORAL at 09:33

## 2019-04-24 RX ADMIN — GABAPENTIN 800 MG: 800 TABLET, FILM COATED ORAL at 16:05

## 2019-04-24 RX ADMIN — PRAVASTATIN SODIUM 20 MG: 20 TABLET ORAL at 19:18

## 2019-04-24 RX ADMIN — BUSPIRONE HYDROCHLORIDE 15 MG: 15 TABLET ORAL at 09:33

## 2019-04-24 RX ADMIN — CETIRIZINE HYDROCHLORIDE 10 MG: 10 TABLET ORAL at 09:33

## 2019-04-24 RX ADMIN — GABAPENTIN 800 MG: 800 TABLET, FILM COATED ORAL at 21:12

## 2019-04-24 RX ADMIN — TOPIRAMATE 100 MG: 100 TABLET, FILM COATED ORAL at 21:12

## 2019-04-24 RX ADMIN — OXYCODONE HYDROCHLORIDE 10 MG: 5 TABLET ORAL at 04:01

## 2019-04-24 RX ADMIN — OXYCODONE HYDROCHLORIDE 10 MG: 5 TABLET ORAL at 22:35

## 2019-04-24 ASSESSMENT — ACTIVITIES OF DAILY LIVING (ADL)
ADLS_ACUITY_SCORE: 13

## 2019-04-24 NOTE — PROGRESS NOTES
[facility]    Infectious Disease Progress Note    Date of Service (when I saw the patient): 04/24/2019     Assessment & Plan   Shalonda Howard is a 49 year old female who was admitted on 4/21/2019.     Impression:     1. This is a 49 y.o familiar to me from multiple prior admissions.   2. She has a history of bilateral bunion repair surgery done in August 2018. The right side healed but the left side the incision has not been healing , I and D and 6 weeks of IV antibiotics and many more weeks of oral antibiotics since the end of August 2018. Then repeat admission in November, more I and D and another course of IV antibiotics. then another admission in January, again, wound non healing, s/p another I and D and 2 antibiotics for 6 weeks.   3. She also has some hardware in the ankle b/l, those incisions look ok. Though both ankle are swollen.   4. She also has chronic heel ulcers bilateral.   5. PCN allergy, tolerates zosyn/cephalosporins, sulfa allergy.   6. Admitted in March continued wound non healing, concern for underlying osteo, s/p first ray amputation, hardware removal and ulcer debridement 3/20.   7. Admitted this occasion with concern of septic ankle. Gram stain from the ankle positive for GPC, further identified as staph hominis sensitive to Oxacillin.      Recommendations:   1. Stays on ancef, cultures from the aspiration fluid positive for Staph hominis, sensitive to Ancef.   2. Cultures here from the OR also positive for GPC pending ID. Will follow.                     Ignacia Bocanegra MD    Interval History   Still  pain in the ankle   Afebrile   OR cultures are pending.     Physical Exam   Temp: 98.9  F (37.2  C) Temp src: Oral BP: 123/85 Pulse: 85 Heart Rate: 82 Resp: 16 SpO2: 98 % O2 Device: None (Room air)    There were no vitals filed for this visit.  Vital Signs with Ranges  Temp:  [98.7  F (37.1  C)-99.5  F (37.5  C)] 98.9  F (37.2  C)  Pulse:  [85] 85  Heart Rate:  [82-97] 82  Resp:   [16-18] 16  BP: (103-124)/(62-85) 123/85  SpO2:  [95 %-98 %] 98 %    Constitutional: Awake, alert, cooperative, no apparent distress  Lungs: Clear to auscultation bilaterally, no crackles or wheezing  Cardiovascular: Regular rate and rhythm, normal S1 and S2, and no murmur noted  Abdomen: Normal bowel sounds, soft, non-distended, non-tender  Skin: No rashes, no cyanosis, no edema  Other:    Medications     - MEDICATION INSTRUCTIONS -       lactated ringers Stopped (04/23/19 0308)     sodium chloride 10 mL/hr at 04/24/19 0604       buPROPion  300 mg Oral Daily     busPIRone  15 mg Oral BID     ceFAZolin  2 g Intravenous Q8H     cetirizine  10 mg Oral Daily     enoxaparin  40 mg Subcutaneous Q24H     ranitidine  150 mg Oral Q12H    Or     famotidine  20 mg Intravenous Q12H     gabapentin  800 mg Oral TID     hydrOXYzine  25 mg Oral At Bedtime     omeprazole  40 mg Oral BID AC     pravastatin  20 mg Oral QPM     senna-docusate  1 tablet Oral BID    Or     senna-docusate  2 tablet Oral BID     sertraline  100 mg Oral Daily     sodium chloride (PF)  3 mL Intracatheter Q8H     topiramate  100 mg Oral At Bedtime       Data   All microbiology laboratory data reviewed.  Recent Labs   Lab Test 04/22/19  0641 03/19/19  1941 03/03/19  1130   WBC 7.6 9.1 7.1   HGB 9.7* 10.8* 11.4*   HCT 30.9* 33.9* 37.9   MCV 77* 79 82    415 391     Recent Labs   Lab Test 04/24/19  0636 04/22/19  0641 03/22/19  0629   CR 0.75 0.70 0.81     Recent Labs   Lab Test 03/19/19  1941   SED 51*     Recent Labs   Lab Test 04/22/19  1446 04/21/19  1836 04/21/19  1827 03/20/19  1722 01/24/19  1122 01/24/19  1120 01/22/19  1950 01/22/19  1945 11/26/18  1159   CULT On day 2, isolated in broth only:  Gram positive cocci in clusters  *  Critical Value/Significant Value, preliminary result only, called to and read back by  Tyra Carlos RN at 0965 on 4.24.19 [facility].    Culture negative monitoring continues No growth after 3 days No growth after 3  days Light growth  Finegoldia magna (Peptostreptococcus ana)  Susceptibility testing not routinely done  *  Moderate growth  Streptococcus dysgalactiae serogroup C/G  *  Moderate growth  Staphylococcus aureus  *  Light growth  Enterobacter cloacae complex  *  Moderate growth  Streptococcus agalactiae sero group B  * On day 5, isolated in broth only:  Propionibacterium (Cutibacterium) acnes  Susceptibility testing of Propionibacterium species is not done from this source. Our   antibiogram indicates that Propionibacterum species is susceptible to penicillin and   cefotaxime and most are susceptible to clindamycin.  Liat Mcdonald M.D., Medical Director  *  No growth No anaerobes isolated  Light growth  Staphylococcus aureus  *  On day 1, isolated in broth only:  Enterobacter cloacae complex  *  Light growth  Streptococcus agalactiae sero group B  *  Single colony  Corynebacterium species  Identification obtained by MALDI-TOF mass spectrometry research use only database. Test   characteristics determined and verified by the [facility]   ([facility]) [place], [place].  Susceptibility testing not routinely done  These bacteria are part of normal skin kenyatta, but on occasion, may be true pathogens.    Clinical correlation must be applied to interpreting this microbiology result.  * No growth No growth No anaerobes isolated  Light growth  Staphylococcus aureus  *  Light growth  Acinetobacter baumannii complex  *  Light growth  Corynebacterium species  Identification obtained by MALDI-TOF mass spectrometry research use only database. Test   characteristics determined and verified by the [facility]   ([facility]) [place], [place].  Susceptibility testing not routinely done  *

## 2019-04-24 NOTE — PROGRESS NOTES
Hospitalist chart check    Chart reviewed and discussed with RN. No events overnight. Discharge anticipated today. Pertinent discharge med rec reviewed and completed by me. We will sign off.     JUNAID Mason MD  Hospitalist  [phone]    Addendum: Notified by RN that patient believes she has a yeast infection. Fluconazole 150 mg PO x1 ordered.

## 2019-04-24 NOTE — PROGRESS NOTES
[facility]    Vascular Medicine Progress Note    Date of Service (when I saw the patient): 04/24/2019          Physician Supervisory Attestation:   I have reviewed and discussed with the physician assistant their history, physical and plan and independently interviewed and examined Shalonda Howard and agree with the plan as stated in the physician assistant note.    Reviewed results with the patient this morning .JJ/TBI normal with triphasic waveforms  No DVT in bilateral lower extremity venous duplex  No signs of arterial or venous insufficiency  No additional workup needed  Continue current care wound care per orthopedic surgery service    She had lot of questions , all of them were answered    Vascular medicine service will sign off please call us with any questions    Patient care time spent today 35 minutes    Fatimah Torre MD ,[facility],FA    4/24/2019           Assessment & Plan   1. Non-healing bilateral heel ulcerations / non-healing left foot surgical wounds    Assessment:    -has had multiple non-healing wounds in her feet in the past with a negative vascular work-up in 2013 and 2/2017 at [facility] followed by again in 11/2018 at [place]. It was felt at that time that her wounds were pressure related rather than vasculogenic.   -Strongly palpable pulses in her right lower extremity (unable to assess left lower extremity due to surgical dressing).    -JJ's and TBIs with no evidence of arterial insufficiency.  -Venous duplex negative for DVT. Has some mild venous stasis changes to right lower calf, but no open ulcerations there and venous insufficiency unlikely to cause heel ulcerations.    -Most likely that these ulcerations are pressure/neuropathic ulcerations from her special boots in the setting of peripheral neuropathy.     Plan:   -Per ortho and ID.   -Nothing further to add from vascular standpoint. We will sign off. Please call if we can be of any further assistance this  admission or in the future ([phone]). Thanks.       Interval History   Doing well. Sore at site of surgery and wounds.     Physical Exam   Temp: 98.9  F (37.2  C) Temp src: Oral BP: 123/85 Pulse: 85 Heart Rate: 82 Resp: 16 SpO2: 98 % O2 Device: None (Room air)    There were no vitals filed for this visit.  Vital Signs with Ranges  Temp:  [98.7  F (37.1  C)-99.5  F (37.5  C)] 98.9  F (37.2  C)  Pulse:  [85] 85  Heart Rate:  [82-97] 82  Resp:  [16-18] 16  BP: (103-124)/(62-85) 123/85  SpO2:  [95 %-98 %] 98 %  I/O last 3 completed shifts:  In: 1773 [P.O.:440; I.V.:1333]  Out: 750 [Urine:750]    Constitutional: Awake, alert, cooperative, no apparent distress, and appears stated age.  Eyes: Lids and lashes normal, sclera clear, conjunctiva normal.  ENT: Normocephalic, without obvious abnormality, atraumatic, oral pharynx with moist mucus membranes  Respiratory: No increased work of breathing, good air exchange, clear to auscultation bilaterally, no crackles or wheezing.  Cardiovascular: Regular rate and rhythm, normal S1 and S2, no S3 or S4, and no murmur noted.  GI: Normal bowel sounds, soft, non-distended, non-tender  Skin: Wounds bilateral heels with some oozing through the dressings.   Musculoskeletal: Deformities of feet noted. Left ankle dressed.     Neurologic: Awake, alert, oriented to name, place and time.  Cranial nerves II-XII are grossly intact.    Neuropsychiatric: Calm, normal eye contact, alert, normal affect, oriented to self, place, time and situation, memory for past and recent events intact and thought process normal.  Pulses: Strong, palpable pedal pulses.     Medications     - MEDICATION INSTRUCTIONS -       lactated ringers Stopped (04/23/19 0308)     sodium chloride 10 mL/hr at 04/24/19 0604       buPROPion  300 mg Oral Daily     busPIRone  15 mg Oral BID     ceFAZolin  2 g Intravenous Q8H     cetirizine  10 mg Oral Daily     enoxaparin  40 mg Subcutaneous Q24H     ranitidine  150 mg Oral Q12H     Or     famotidine  20 mg Intravenous Q12H     gabapentin  800 mg Oral TID     hydrOXYzine  25 mg Oral At Bedtime     omeprazole  40 mg Oral BID AC     pravastatin  20 mg Oral QPM     senna-docusate  1 tablet Oral BID    Or     senna-docusate  2 tablet Oral BID     sertraline  100 mg Oral Daily     sodium chloride (PF)  3 mL Intracatheter Q8H     topiramate  100 mg Oral At Bedtime       Data   Recent Labs   Lab 04/24/19  0636 04/22/19  0641   WBC  --  7.6   HGB  --  9.7*   MCV  --  77*   PLT  --  385    139   POTASSIUM 3.7 3.9   CHLORIDE 112* 107   CO2 22 25   BUN 12 14   CR 0.75 0.70   ANIONGAP 9 7   PAULINE 8.2* 8.2*   * 114*     Recent Results (from the past 24 hour(s))   US Lower Extremity Venous Duplex Bilateral    Narrative    VENOUS ULTRASOUND BOTH LEGS  4/23/2019 2:06 PM     HISTORY: Rule out DVT, ulcerations.    COMPARISON: None.    FINDINGS:  Examination of the deep veins with graded compression and  color flow Doppler with spectral wave form analysis was performed.   There is no evidence for DVT in the lower extremities.      Impression    IMPRESSION: No evidence of deep venous thrombosis.    ZOEY FERGUSON MD   US JJ Doppler No Exercise    Narrative    US JJ DOPPLER NO EXERCISE, 1-2 LEVELS,??? BILAT  4/23/2019 2:07 PM     HISTORY: Bilateral non-healing heel ulcerations. TBIs also if able.    COMPARISON: None.    FINDINGS:   The resting right and left ankle-brachial indices are 1.23 and 1.20  respectively. Waveform analysis indicates triphasic waveforms in the  distal tibial arteries. Digital waveforms are unremarkable.      Impression    IMPRESSION: Ankle-brachial indices are within normal limits and do not  indicate significant lower extremity arterial insufficiency.      JJ Diagnostic Criteria      >/= 1.3          Non compressible  0.95-1.0          Normal  0.90-0.94        Mild PAD  0.50-0.89        Moderate PAD  0.20-0.49        Severe PAD  <0.20               Alaina GREER  MD MARTY

## 2019-04-24 NOTE — PROVIDER NOTIFICATION
1500 Still waiting on call back from Dr Bocanegra.    Paged Dr Bocanegra about when she will round on 525 and what the plan is going forward with antibiotics and a PICC line.

## 2019-04-24 NOTE — PROVIDER NOTIFICATION
1500 Called back and gave order for one time dose of fluconazole.     Paged Dr. Mason    Pt states she thinks she has a yeast infection. Is there something you would like to give? Thanks.

## 2019-04-24 NOTE — PROVIDER NOTIFICATION
1000 Dr Bocanegra called back confirming the results    Paged Dr Bocanegra to give critical result within return call time.

## 2019-04-24 NOTE — PLAN OF CARE
AOx4. VSS on RA. CMS intact, baseline neuropathy in feet. Pain controlled with oxycodone and ice. R foot wound dressing changed, wound WDL. Cultures came back gram(+) cocci clusters, broth only. IVF @ 10ml/hr. Regular diet. Up ad alicia. Ortho and hospitalist signed off, waiting on Dr Bocanegra to sign off for discharge.

## 2019-04-24 NOTE — PLAN OF CARE
Date/Time 4/24/19 0700  Trauma/Ortho/Medical (Choose one) Ortho  Diagnosis: Left ankle I and D  POD#: 2  Mental Status: A/Ox4   Activity/dangle: Independent with bilateral CAM boot  Diet: Regular  Pain: Controlled with PRN oxycodone Q3  Salgado/Voiding: BR, voiding adequately   Tele/Restraints/Iso: NA  02/LDA: RA. PIV inf NS @ TKO   D/C Date: Pending   Other Info: POD 2. PRN oxycodone managing L foot pain. Incision/wound to L foot with scant drainage- not extended beyond markings. L great toe amputated. Wound to R foot/heel dressings with scant drainage, dressing reinforced and not extended. CMS intact ex baseline neuropathy numbness and tingling in R foot > L foot. CAM boots to be used to ambulate independently in room. Continued antibiotics administered

## 2019-04-25 ENCOUNTER — HOME INFUSION (PRE-WILLOW HOME INFUSION) (OUTPATIENT)
Dept: PHARMACY | Facility: CLINIC | Age: 49
End: 2019-04-25

## 2019-04-25 VITALS
TEMPERATURE: 98.9 F | DIASTOLIC BLOOD PRESSURE: 62 MMHG | HEART RATE: 87 BPM | OXYGEN SATURATION: 95 % | RESPIRATION RATE: 16 BRPM | SYSTOLIC BLOOD PRESSURE: 111 MMHG

## 2019-04-25 LAB
BACTERIA SPEC CULT: ABNORMAL
BACTERIA SPEC CULT: ABNORMAL
Lab: ABNORMAL
PLATELET # BLD AUTO: 369 10E9/L (ref 150–450)
SPECIMEN SOURCE: ABNORMAL

## 2019-04-25 PROCEDURE — 25000132 ZZH RX MED GY IP 250 OP 250 PS 637: Performed by: PHYSICIAN ASSISTANT

## 2019-04-25 PROCEDURE — 85049 AUTOMATED PLATELET COUNT: CPT | Performed by: ORTHOPAEDIC SURGERY

## 2019-04-25 PROCEDURE — 25000128 H RX IP 250 OP 636: Performed by: PHYSICIAN ASSISTANT

## 2019-04-25 PROCEDURE — 40000239 ZZH STATISTIC VAT ROUNDS

## 2019-04-25 RX ORDER — VANCOMYCIN HYDROCHLORIDE 1 G/20ML
1000 INJECTION, POWDER, LYOPHILIZED, FOR SOLUTION INTRAVENOUS EVERY 12 HOURS
Qty: 600 ML | Refills: 0 | Status: ON HOLD | DISCHARGE
Start: 2019-04-25 | End: 2019-11-14

## 2019-04-25 RX ORDER — OXYCODONE HCL 10 MG/1
10 TABLET, FILM COATED, EXTENDED RELEASE ORAL EVERY 12 HOURS
Qty: 20 TABLET | Refills: 0 | Status: SHIPPED | OUTPATIENT
Start: 2019-04-25 | End: 2019-05-22

## 2019-04-25 RX ORDER — OXYCODONE HCL 10 MG/1
10 TABLET, FILM COATED, EXTENDED RELEASE ORAL EVERY 12 HOURS
Status: DISCONTINUED | OUTPATIENT
Start: 2019-04-25 | End: 2019-04-25 | Stop reason: HOSPADM

## 2019-04-25 RX ADMIN — SENNOSIDES AND DOCUSATE SODIUM 2 TABLET: 8.6; 5 TABLET ORAL at 08:36

## 2019-04-25 RX ADMIN — RANITIDINE 150 MG: 150 TABLET ORAL at 08:36

## 2019-04-25 RX ADMIN — BUPROPION HYDROCHLORIDE 300 MG: 300 TABLET, FILM COATED, EXTENDED RELEASE ORAL at 08:36

## 2019-04-25 RX ADMIN — CEFAZOLIN SODIUM 2 G: 2 INJECTION, SOLUTION INTRAVENOUS at 05:59

## 2019-04-25 RX ADMIN — SERTRALINE HYDROCHLORIDE 100 MG: 100 TABLET ORAL at 08:36

## 2019-04-25 RX ADMIN — OMEPRAZOLE 40 MG: 20 CAPSULE, DELAYED RELEASE ORAL at 06:32

## 2019-04-25 RX ADMIN — GABAPENTIN 800 MG: 800 TABLET, FILM COATED ORAL at 08:36

## 2019-04-25 RX ADMIN — BUSPIRONE HYDROCHLORIDE 15 MG: 15 TABLET ORAL at 08:37

## 2019-04-25 RX ADMIN — OXYCODONE HYDROCHLORIDE 10 MG: 10 TABLET, FILM COATED, EXTENDED RELEASE ORAL at 05:59

## 2019-04-25 RX ADMIN — OXYCODONE HYDROCHLORIDE 10 MG: 5 TABLET ORAL at 01:31

## 2019-04-25 RX ADMIN — OXYCODONE HYDROCHLORIDE 10 MG: 5 TABLET ORAL at 14:13

## 2019-04-25 RX ADMIN — CETIRIZINE HYDROCHLORIDE 10 MG: 10 TABLET ORAL at 08:36

## 2019-04-25 RX ADMIN — CEFAZOLIN SODIUM 2 G: 2 INJECTION, SOLUTION INTRAVENOUS at 14:11

## 2019-04-25 RX ADMIN — OXYCODONE HYDROCHLORIDE 10 MG: 5 TABLET ORAL at 08:36

## 2019-04-25 RX ADMIN — GABAPENTIN 800 MG: 800 TABLET, FILM COATED ORAL at 15:43

## 2019-04-25 ASSESSMENT — ACTIVITIES OF DAILY LIVING (ADL)
ADLS_ACUITY_SCORE: 13

## 2019-04-25 NOTE — PLAN OF CARE
A&OX4. VSS on RA. Baseline numbness. Dressing to LLE, CDI. Scant dried drainage to RLE. Up IND in room. Taking oxycodone for pain. Rating pain at 7-8/10, but pt looks comfortable in bed. RUE PICC in place, scant drainage to dressing otherwise site intact. Educate to apply heat above PICC site for 20min 4 times a day. Continue to monitor.

## 2019-04-25 NOTE — DISCHARGE INSTRUCTIONS
GENERAL GUIDELINES FOR CARE AT HOME FOLLOWING SURGERY  Your surgeon will answer any questions about your progress. General guidelines for your care are listed below. Your surgeon may give additional instructions for your care at home. Please follow them carefully.    Activity Level  1. Physical activity may be resumed gradually according to your comfort level and your surgeon s instructions.  2. Walking is usually important to your recovery. Increase your time and distance as you gain strength.  3. Allow yourself rest periods during the day. As you regain strength, you will need fewer rest periods.  4. Please check with your surgeon before you resume work or your normal activity level.    Good Health Practice  1. Maintain an adequate fluid intake and eat a well balanced diet.  2. Be sure to include the basic food groups such as dairy products, meat/fish, vegetables, and fruit. Each of these foods contribute toyour wound heal and increasing your strength.  3. Surgery, decreased activity and pain medication all contribute to a decrease in bowel activity that can result in constipation. It is recommended that you increase your liquid intake, add fiber to your diet, increase activity, and decrease pain medication use. If you have any problems, notify your surgeon.    Incision and Dressing Care  1. Keep incision clean and dry. Cover incision if you are still having drainage.  2. Ask your surgeon if you may shower when you get home. After showering, be sure your incision is thoroughly dry before covering with a dressing or putting on clothes.    Things to Watch For  1. Notify your surgeon if there is redness or swelling near the incision,  or if you experience drainage, fever,or chilling episodes. This may  indicate an infection.  2. Pain or discomfort that is not relieved by Tylenol  or the pain  prescription the doctor gave you, should be discussed with your surgeon.

## 2019-04-25 NOTE — PROGRESS NOTES
[facility]    Infectious Disease Progress Note    Date of Service (when I saw the patient): 04/25/2019     Assessment & Plan   Shalonda Howard is a 49 year old female who was admitted on 4/21/2019.     Impression:     1. This is a 49 y.o familiar to me from multiple prior admissions.   2. She has a history of bilateral bunion repair surgery done in August 2018. The right side healed but the left side the incision has not been healing , I and D and 6 weeks of IV antibiotics and many more weeks of oral antibiotics since the end of August 2018. Then repeat admission in November, more I and D and another course of IV antibiotics. then another admission in January, again, wound non healing, s/p another I and D and 2 antibiotics for 6 weeks.   3. She also has some hardware in the ankle b/l, those incisions look ok. Though both ankle are swollen.   4. She also has chronic heel ulcers bilateral.   5. PCN allergy, tolerates zosyn/cephalosporins, sulfa allergy.   6. Admitted in March continued wound non healing, concern for underlying osteo, s/p first ray amputation, hardware removal and ulcer debridement 3/20.   7. Admitted this occasion with concern of septic ankle. Gram stain from the ankle positive for GPC, further identified as staph hominis sensitive to Oxacillin.      Recommendations:   1. Unfortunately OR cultures back with staph epi, irrespective of RASHEL would prefer Vancomycin for this. It will still cover Staph hominis which was back from the aspiration cultures.   Will change discharge antibiotics.             PATIENT UNFORTUNATELY LEFT BEFORE I COULD SEE HER, CHARGE NURSE WILL REACH OUT TO [facility] WITH CHANGES IN MEDICATIONS         Ignacia Bocanegra MD    Interval History   .     Physical Exam   Temp: 98.9  F (37.2  C) Temp src: Oral BP: 111/62 Pulse: 87 Heart Rate: 81 Resp: 16 SpO2: 95 % O2 Device: None (Room air)    There were no vitals filed for this visit.  Vital Signs with Ranges  Temp:  [98.7  F  (37.1  C)-99.3  F (37.4  C)] 98.9  F (37.2  C)  Pulse:  [87] 87  Heart Rate:  [81-87] 81  Resp:  [15-18] 16  BP: (111-133)/(62-80) 111/62  SpO2:  [95 %-98 %] 95 %      Medications     - MEDICATION INSTRUCTIONS -       lactated ringers Stopped (04/23/19 0308)     sodium chloride 10 mL/hr at 04/24/19 0604       buPROPion  300 mg Oral Daily     busPIRone  15 mg Oral BID     ceFAZolin  2 g Intravenous Q8H     cetirizine  10 mg Oral Daily     ranitidine  150 mg Oral Q12H    Or     famotidine  20 mg Intravenous Q12H     gabapentin  800 mg Oral TID     hydrOXYzine  25 mg Oral At Bedtime     omeprazole  40 mg Oral BID AC     oxyCODONE  10 mg Oral Q12H     pravastatin  20 mg Oral QPM     senna-docusate  1 tablet Oral BID    Or     senna-docusate  2 tablet Oral BID     sertraline  100 mg Oral Daily     sodium chloride (PF)  10 mL Intracatheter Q8H     sodium chloride (PF)  3 mL Intracatheter Q8H     topiramate  100 mg Oral At Bedtime       Data   All microbiology laboratory data reviewed.  Recent Labs   Lab Test 04/25/19  0630 04/22/19  0641 03/19/19 1941 03/03/19  1130   WBC  --  7.6 9.1 7.1   HGB  --  9.7* 10.8* 11.4*   HCT  --  30.9* 33.9* 37.9   MCV  --  77* 79 82    385 415 391     Recent Labs   Lab Test 04/24/19  0636 04/22/19  0641 03/22/19  0629   CR 0.75 0.70 0.81     Recent Labs   Lab Test 03/19/19  1941   SED 51*     Recent Labs   Lab Test 04/22/19  1446 04/21/19  1836 04/21/19  1827 03/20/19  1722 01/24/19  1122 01/24/19  1120 01/22/19  1950 01/22/19  1945 11/26/18  1159   CULT On day 2, isolated in broth only:  Gram positive cocci in clusters  *  Critical Value/Significant Value, preliminary result only, called to and read back by  Tyra Carlos RN at 0999 on 4.24.19 [facility].    Culture negative monitoring continues No growth after 4 days No growth after 4 days Light growth  Finegoldia magna (Peptostreptococcus ana)  Susceptibility testing not routinely done  *  Moderate growth  Streptococcus  dysgalactiae serogroup C/G  *  Moderate growth  Staphylococcus aureus  *  Light growth  Enterobacter cloacae complex  *  Moderate growth  Streptococcus agalactiae sero group B  * On day 5, isolated in broth only:  Propionibacterium (Cutibacterium) acnes  Susceptibility testing of Propionibacterium species is not done from this source. Our   antibiogram indicates that Propionibacterum species is susceptible to penicillin and   cefotaxime and most are susceptible to clindamycin.  Liat Mcdonald M.D., Medical Director  *  No growth No anaerobes isolated  Light growth  Staphylococcus aureus  *  On day 1, isolated in broth only:  Enterobacter cloacae complex  *  Light growth  Streptococcus agalactiae sero group B  *  Single colony  Corynebacterium species  Identification obtained by MALDI-TOF mass spectrometry research use only database. Test   characteristics determined and verified by the [facility]   ([facility]) [place], [place].  Susceptibility testing not routinely done  These bacteria are part of normal skin kenyatta, but on occasion, may be true pathogens.    Clinical correlation must be applied to interpreting this microbiology result.  * No growth No growth No anaerobes isolated  Light growth  Staphylococcus aureus  *  Light growth  Acinetobacter baumannii complex  *  Light growth  Corynebacterium species  Identification obtained by MALDI-TOF mass spectrometry research use only database. Test   characteristics determined and verified by the [facility] Diagnostic [facility]   ([facility]) [place], [place].  Susceptibility testing not routinely done  *

## 2019-04-25 NOTE — PROGRESS NOTES
Roc  is expected to discharge today on Ancef IV. She has been on service with [facility] in the past and has self administered Ancef via IV push. Shalonda will need a teach today to remind her of the administration details for the IV push med.    Met with Shalonda at bedside and explained administration method for Ancef. Instructed in administration using teaching sheets and practice equipment. Extension tubing added to PICC. Line flushed well and had good blood return. Delivery of med and supplies will be to patient's home and nurse  will contact pt to set up home nursing visit.  Informed pt that first SNV will be when labs or CLC is due.  Pt verbalized understanding.  Informed pt of 24/7 on call, storage of meds, emergency plan, SAS flushing. Instructed on Q 8h ancef and her first self administered dose is to be at 10 PM.    Shalonda verbalized understanding of dc plans. Able to repeat back administration/teach back with good understanding/good technique and stated she feels comfortable self-administering Ancef. No concerns about Shalonda's ability to manage home IV therapy.    Elizabeth Cason RN, BSN, SARAH  [facility]  [email or web address]

## 2019-04-25 NOTE — PROGRESS NOTES
Pt was A & O x 4. Lungs sound clear, bowel sounds active,cms intact except for numbness and tigling in Danis LE. Dressing on right heel changed. Received oxycodone for pain. Discharge med and instructions reviewed and given. PICC line intact. Was discharge home.    Addendum: Dr Bocanegra came to unit @0409 to tell RN that pt culture grew something different and has ordered vancomycin for pt discharge antibiotics instead of Ancef. MD said pt should not have any issue with vanco because pt had been on in the past. [facility] and [facility] pharmacy was notified. Pharmacist will sent vanco to pt home today. Pt was called and notified about the change. New discharge instruction will be mailed to pt.

## 2019-04-25 NOTE — PLAN OF CARE
Pt A&O. VSS on ra. Up with independent. Regular diet. Pain controlled with oxycodone. PICC line RUE, patent. WB- AT. Numbness LE baseline. Discharge today.

## 2019-04-25 NOTE — CONSULTS
Plan on discharge. Orders are in for Home Infusion. Writer talked with [facility] Intake and RN @ [phone]. They will be in contact with patient for teaching at bedside before discharge today or at her home this evening. Patient will have 1400 Ancef dose before discharge. Orders are in to resume C RN thru [facility]. Patient requesting continued care with Robert Jenkins RN. Writer discussed with [facility] RN Ed Jose. She will communicate [facility] to coordinate. WOC directions on AVS

## 2019-04-26 LAB
BACTERIA SPEC CULT: ABNORMAL
BACTERIA SPEC CULT: ABNORMAL
Lab: ABNORMAL
SPECIMEN SOURCE: ABNORMAL

## 2019-04-26 NOTE — PROGRESS NOTES
This is a recent snapshot of the patient's [facility] medical record.  For current drug dose and complete information and questions, call [phone] or In Basket pool, fv home infusion ([id number])  CSN Number:  [id number]

## 2019-04-27 ENCOUNTER — HOME INFUSION (PRE-WILLOW HOME INFUSION) (OUTPATIENT)
Dept: PHARMACY | Facility: CLINIC | Age: 49
End: 2019-04-27

## 2019-04-27 LAB
ANION GAP SERPL CALCULATED.3IONS-SCNC: NORMAL MMOL/L (ref 6–17)
BACTERIA SPEC CULT: NO GROWTH
BACTERIA SPEC CULT: NO GROWTH
BUN SERPL-MCNC: 10 MG/DL (ref 7–30)
BUN SERPL-MCNC: NORMAL MG/DL (ref 7–30)
CALCIUM SERPL-MCNC: NORMAL MG/DL (ref 8.5–10.1)
CHLORIDE SERPL-SCNC: NORMAL MMOL/L (ref 94–109)
CO2 SERPL-SCNC: NORMAL MMOL/L (ref 20–32)
CREAT SERPL-MCNC: NORMAL MG/DL (ref 0.52–1.04)
GFR SERPL CREATININE-BSD FRML MDRD: NORMAL ML/MIN/{1.73_M2}
GLUCOSE SERPL-MCNC: NORMAL MG/DL (ref 70–99)
Lab: NORMAL
Lab: NORMAL
POTASSIUM SERPL-SCNC: NORMAL MMOL/L (ref 3.4–5.3)
SODIUM SERPL-SCNC: NORMAL MMOL/L (ref 133–144)
SPECIMEN SOURCE: NORMAL
SPECIMEN SOURCE: NORMAL
VANCOMYCIN SERPL-MCNC: 5.5 MG/L

## 2019-04-27 PROCEDURE — 80202 ASSAY OF VANCOMYCIN: CPT | Performed by: INTERNAL MEDICINE

## 2019-04-27 PROCEDURE — 84520 ASSAY OF UREA NITROGEN: CPT | Performed by: INTERNAL MEDICINE

## 2019-04-29 NOTE — PROGRESS NOTES
This is a recent snapshot of the patient's [facility] medical record.  For current drug dose and complete information and questions, call [phone] or In Basket pool, fv home infusion ([id number])  CSN Number:  [id number]

## 2019-04-30 ENCOUNTER — MEDICAL CORRESPONDENCE (OUTPATIENT)
Dept: HEALTH INFORMATION MANAGEMENT | Facility: CLINIC | Age: 49
End: 2019-04-30

## 2019-04-30 LAB
BASOPHILS # BLD AUTO: 0.1 10E9/L (ref 0–0.2)
BASOPHILS NFR BLD AUTO: 0.6 %
BUN SERPL-MCNC: 9 MG/DL (ref 7–30)
CREAT SERPL-MCNC: 0.64 MG/DL (ref 0.52–1.04)
CRP SERPL-MCNC: 8.8 MG/L (ref 0–8)
DIFFERENTIAL METHOD BLD: ABNORMAL
EOSINOPHIL # BLD AUTO: 0.2 10E9/L (ref 0–0.7)
EOSINOPHIL NFR BLD AUTO: 2.8 %
ERYTHROCYTE [DISTWIDTH] IN BLOOD BY AUTOMATED COUNT: 15.6 % (ref 10–15)
ERYTHROCYTE [SEDIMENTATION RATE] IN BLOOD BY WESTERGREN METHOD: 47 MM/H (ref 0–20)
GFR SERPL CREATININE-BSD FRML MDRD: >90 ML/MIN/{1.73_M2}
HCT VFR BLD AUTO: 34.9 % (ref 35–47)
HGB BLD-MCNC: 10.4 G/DL (ref 11.7–15.7)
IMM GRANULOCYTES # BLD: 0 10E9/L (ref 0–0.4)
IMM GRANULOCYTES NFR BLD: 0.4 %
LYMPHOCYTES # BLD AUTO: 2 10E9/L (ref 0.8–5.3)
LYMPHOCYTES NFR BLD AUTO: 23.6 %
MCH RBC QN AUTO: 22.8 PG (ref 26.5–33)
MCHC RBC AUTO-ENTMCNC: 29.8 G/DL (ref 31.5–36.5)
MCV RBC AUTO: 76 FL (ref 78–100)
MONOCYTES # BLD AUTO: 0.5 10E9/L (ref 0–1.3)
MONOCYTES NFR BLD AUTO: 6.4 %
NEUTROPHILS # BLD AUTO: 5.6 10E9/L (ref 1.6–8.3)
NEUTROPHILS NFR BLD AUTO: 66.2 %
NRBC # BLD AUTO: 0 10*3/UL
NRBC BLD AUTO-RTO: 0 /100
PLATELET # BLD AUTO: 432 10E9/L (ref 150–450)
RBC # BLD AUTO: 4.57 10E12/L (ref 3.8–5.2)
WBC # BLD AUTO: 8.5 10E9/L (ref 4–11)

## 2019-04-30 PROCEDURE — 82565 ASSAY OF CREATININE: CPT | Performed by: INTERNAL MEDICINE

## 2019-04-30 PROCEDURE — 84520 ASSAY OF UREA NITROGEN: CPT | Performed by: INTERNAL MEDICINE

## 2019-04-30 PROCEDURE — 86140 C-REACTIVE PROTEIN: CPT | Performed by: INTERNAL MEDICINE

## 2019-04-30 PROCEDURE — 85652 RBC SED RATE AUTOMATED: CPT | Performed by: INTERNAL MEDICINE

## 2019-04-30 PROCEDURE — 85025 COMPLETE CBC W/AUTO DIFF WBC: CPT | Performed by: INTERNAL MEDICINE

## 2019-05-01 ENCOUNTER — MEDICAL CORRESPONDENCE (OUTPATIENT)
Dept: HEALTH INFORMATION MANAGEMENT | Facility: CLINIC | Age: 49
End: 2019-05-01

## 2019-05-01 ENCOUNTER — APPOINTMENT (OUTPATIENT)
Dept: LAB | Facility: CLINIC | Age: 49
End: 2019-05-01
Attending: INTERNAL MEDICINE
Payer: COMMERCIAL

## 2019-05-01 ENCOUNTER — HOME INFUSION (PRE-WILLOW HOME INFUSION) (OUTPATIENT)
Dept: PHARMACY | Facility: CLINIC | Age: 49
End: 2019-05-01

## 2019-05-01 LAB — VANCOMYCIN SERPL-MCNC: 14.6 MG/L

## 2019-05-01 PROCEDURE — 80202 ASSAY OF VANCOMYCIN: CPT | Performed by: INTERNAL MEDICINE

## 2019-05-02 LAB
BACTERIA SPEC CULT: NORMAL
Lab: NORMAL
SPECIMEN SOURCE: NORMAL

## 2019-05-02 NOTE — PROGRESS NOTES
This is a recent snapshot of the patient's [facility] medical record.  For current drug dose and complete information and questions, call [phone] or In Basket pool, fv home infusion ([id number])  CSN Number:  [id number]

## 2019-05-05 NOTE — DISCHARGE SUMMARY
Admit Date:     2019   Discharge Date:     2019      ADMISSION DIAGNOSIS:  Left ankle infection.      DISCHARGE DIAGNOSIS:  Left ankle infection.      OPERATIVE PROCEDURES:   1.  Left ankle arthroscopic debridement and irrigation   2.  Retained hardware from previous subtalar fusion removal.      ATTENDING PHYSICIAN:  Andre Harman MD      HOSPITAL COURSE:  Ms. Franco as admitted to [facility] after undergoing the aforementioned procedure at [facility] on 2019.  She tolerated the procedure well, was transferred from the operating room to the postanesthesia care unit and then to the orthopedic floor where she underwent IV, as well as p.o. pain management.  Also Infectious Disease Service was consulted secondary to her long infection history.  Dr. Bocanegra followed her on the floor once she returned to Unit 55.  Cassandra was placed on p.o. antibiotics per Dr. Bocanegra.  She will be partial weightbearing on her left lower extremity.  She had a relatively uneventful hospital course following her I and D.      She will continue partial weightbearing with her left lower extremity, weightbearing as tolerated on the right.  We will see her back at 2- and 6-week intervals.  We did provide her with a longer-acting pain medicine until her followup with her pain clinic, including sustained-release oxycodone.  Her pain clinic followup is 2019.         ANDRE HARMAN MD       As dictated by MARIA ESTHER PIERRE            D: 2019   T: 2019   MT: REID      Name:     CASSANDRA FRANCO   MRN:      [id number]        Account:        [id number]   :      1970           Admit Date:     2019                                  Discharge Date: 2019      Document: [id number]       cc: Linda Bernstein PA-C

## 2019-05-06 ENCOUNTER — MEDICAL CORRESPONDENCE (OUTPATIENT)
Dept: HEALTH INFORMATION MANAGEMENT | Facility: CLINIC | Age: 49
End: 2019-05-06

## 2019-05-06 LAB
BACTERIA SPEC CULT: NORMAL
BASOPHILS # BLD AUTO: 0.1 10E9/L (ref 0–0.2)
BASOPHILS NFR BLD AUTO: 0.9 %
BUN SERPL-MCNC: 11 MG/DL (ref 7–30)
CREAT SERPL-MCNC: 0.73 MG/DL (ref 0.52–1.04)
CRP SERPL-MCNC: 13.6 MG/L (ref 0–8)
DIFFERENTIAL METHOD BLD: ABNORMAL
EOSINOPHIL # BLD AUTO: 0.3 10E9/L (ref 0–0.7)
EOSINOPHIL NFR BLD AUTO: 4.3 %
ERYTHROCYTE [DISTWIDTH] IN BLOOD BY AUTOMATED COUNT: 15.4 % (ref 10–15)
ERYTHROCYTE [SEDIMENTATION RATE] IN BLOOD BY WESTERGREN METHOD: 40 MM/H (ref 0–20)
GFR SERPL CREATININE-BSD FRML MDRD: >90 ML/MIN/{1.73_M2}
HCT VFR BLD AUTO: 34.1 % (ref 35–47)
HGB BLD-MCNC: 10.1 G/DL (ref 11.7–15.7)
IMM GRANULOCYTES # BLD: 0 10E9/L (ref 0–0.4)
IMM GRANULOCYTES NFR BLD: 0.2 %
LYMPHOCYTES # BLD AUTO: 1.8 10E9/L (ref 0.8–5.3)
LYMPHOCYTES NFR BLD AUTO: 29 %
Lab: NORMAL
MCH RBC QN AUTO: 22.9 PG (ref 26.5–33)
MCHC RBC AUTO-ENTMCNC: 29.6 G/DL (ref 31.5–36.5)
MCV RBC AUTO: 77 FL (ref 78–100)
MONOCYTES # BLD AUTO: 0.5 10E9/L (ref 0–1.3)
MONOCYTES NFR BLD AUTO: 8.2 %
NEUTROPHILS # BLD AUTO: 3.7 10E9/L (ref 1.6–8.3)
NEUTROPHILS NFR BLD AUTO: 57.4 %
NRBC # BLD AUTO: 0 10*3/UL
NRBC BLD AUTO-RTO: 0 /100
PLATELET # BLD AUTO: 383 10E9/L (ref 150–450)
RBC # BLD AUTO: 4.42 10E12/L (ref 3.8–5.2)
SPECIMEN SOURCE: NORMAL
VANCOMYCIN SERPL-MCNC: 14.6 MG/L
WBC # BLD AUTO: 6.4 10E9/L (ref 4–11)

## 2019-05-06 PROCEDURE — 85025 COMPLETE CBC W/AUTO DIFF WBC: CPT | Performed by: INTERNAL MEDICINE

## 2019-05-06 PROCEDURE — 86140 C-REACTIVE PROTEIN: CPT | Performed by: INTERNAL MEDICINE

## 2019-05-06 PROCEDURE — 85652 RBC SED RATE AUTOMATED: CPT | Performed by: INTERNAL MEDICINE

## 2019-05-06 PROCEDURE — 80202 ASSAY OF VANCOMYCIN: CPT | Performed by: INTERNAL MEDICINE

## 2019-05-06 PROCEDURE — 84520 ASSAY OF UREA NITROGEN: CPT | Performed by: INTERNAL MEDICINE

## 2019-05-06 PROCEDURE — 82565 ASSAY OF CREATININE: CPT | Performed by: INTERNAL MEDICINE

## 2019-05-07 ENCOUNTER — HOME INFUSION (PRE-WILLOW HOME INFUSION) (OUTPATIENT)
Dept: PHARMACY | Facility: CLINIC | Age: 49
End: 2019-05-07

## 2019-05-13 ENCOUNTER — MEDICAL CORRESPONDENCE (OUTPATIENT)
Dept: HEALTH INFORMATION MANAGEMENT | Facility: CLINIC | Age: 49
End: 2019-05-13

## 2019-05-13 LAB
BASOPHILS # BLD AUTO: 0.1 10E9/L (ref 0–0.2)
BASOPHILS NFR BLD AUTO: 0.9 %
BUN SERPL-MCNC: 10 MG/DL (ref 7–30)
CREAT SERPL-MCNC: 0.69 MG/DL (ref 0.52–1.04)
CRP SERPL-MCNC: 12.7 MG/L (ref 0–8)
DIFFERENTIAL METHOD BLD: ABNORMAL
EOSINOPHIL # BLD AUTO: 0.3 10E9/L (ref 0–0.7)
EOSINOPHIL NFR BLD AUTO: 3.6 %
ERYTHROCYTE [DISTWIDTH] IN BLOOD BY AUTOMATED COUNT: 15.9 % (ref 10–15)
ERYTHROCYTE [SEDIMENTATION RATE] IN BLOOD BY WESTERGREN METHOD: 31 MM/H (ref 0–20)
GFR SERPL CREATININE-BSD FRML MDRD: >90 ML/MIN/{1.73_M2}
HCT VFR BLD AUTO: 36.5 % (ref 35–47)
HGB BLD-MCNC: 10.9 G/DL (ref 11.7–15.7)
IMM GRANULOCYTES # BLD: 0 10E9/L (ref 0–0.4)
IMM GRANULOCYTES NFR BLD: 0.3 %
LYMPHOCYTES # BLD AUTO: 1.3 10E9/L (ref 0.8–5.3)
LYMPHOCYTES NFR BLD AUTO: 17.1 %
MCH RBC QN AUTO: 22.7 PG (ref 26.5–33)
MCHC RBC AUTO-ENTMCNC: 29.9 G/DL (ref 31.5–36.5)
MCV RBC AUTO: 76 FL (ref 78–100)
MONOCYTES # BLD AUTO: 0.4 10E9/L (ref 0–1.3)
MONOCYTES NFR BLD AUTO: 5.8 %
NEUTROPHILS # BLD AUTO: 5.5 10E9/L (ref 1.6–8.3)
NEUTROPHILS NFR BLD AUTO: 72.3 %
NRBC # BLD AUTO: 0 10*3/UL
NRBC BLD AUTO-RTO: 0 /100
PLATELET # BLD AUTO: 408 10E9/L (ref 150–450)
RBC # BLD AUTO: 4.8 10E12/L (ref 3.8–5.2)
VANCOMYCIN SERPL-MCNC: 17.1 MG/L
WBC # BLD AUTO: 7.5 10E9/L (ref 4–11)

## 2019-05-13 PROCEDURE — 85652 RBC SED RATE AUTOMATED: CPT | Performed by: INTERNAL MEDICINE

## 2019-05-13 PROCEDURE — 86140 C-REACTIVE PROTEIN: CPT | Performed by: INTERNAL MEDICINE

## 2019-05-13 PROCEDURE — 80202 ASSAY OF VANCOMYCIN: CPT | Performed by: INTERNAL MEDICINE

## 2019-05-13 PROCEDURE — 84520 ASSAY OF UREA NITROGEN: CPT | Performed by: INTERNAL MEDICINE

## 2019-05-13 PROCEDURE — 82565 ASSAY OF CREATININE: CPT | Performed by: INTERNAL MEDICINE

## 2019-05-13 PROCEDURE — 85025 COMPLETE CBC W/AUTO DIFF WBC: CPT | Performed by: INTERNAL MEDICINE

## 2019-05-14 ENCOUNTER — HOME INFUSION (PRE-WILLOW HOME INFUSION) (OUTPATIENT)
Dept: PHARMACY | Facility: CLINIC | Age: 49
End: 2019-05-14

## 2019-05-14 NOTE — PROGRESS NOTES
This is a recent snapshot of the patient's [facility] medical record.  For current drug dose and complete information and questions, call [phone] or In Basket pool, fv home infusion ([id number])  CSN Number:  [id number]

## 2019-05-15 NOTE — PROGRESS NOTES
This is a recent snapshot of the patient's [facility] medical record.  For current drug dose and complete information and questions, call [phone] or In Basket pool, fv home infusion ([id number])  CSN Number:  [id number]

## 2019-05-20 ENCOUNTER — MEDICAL CORRESPONDENCE (OUTPATIENT)
Dept: HEALTH INFORMATION MANAGEMENT | Facility: CLINIC | Age: 49
End: 2019-05-20

## 2019-05-20 LAB
BASOPHILS # BLD AUTO: 0.1 10E9/L (ref 0–0.2)
BASOPHILS NFR BLD AUTO: 0.9 %
BUN SERPL-MCNC: 11 MG/DL (ref 7–30)
CREAT SERPL-MCNC: 0.68 MG/DL (ref 0.52–1.04)
CRP SERPL-MCNC: 13.9 MG/L (ref 0–8)
DIFFERENTIAL METHOD BLD: ABNORMAL
EOSINOPHIL # BLD AUTO: 0.2 10E9/L (ref 0–0.7)
EOSINOPHIL NFR BLD AUTO: 3.3 %
ERYTHROCYTE [DISTWIDTH] IN BLOOD BY AUTOMATED COUNT: 15.9 % (ref 10–15)
ERYTHROCYTE [SEDIMENTATION RATE] IN BLOOD BY WESTERGREN METHOD: 35 MM/H (ref 0–20)
GFR SERPL CREATININE-BSD FRML MDRD: >90 ML/MIN/{1.73_M2}
HCT VFR BLD AUTO: 35.4 % (ref 35–47)
HGB BLD-MCNC: 10.6 G/DL (ref 11.7–15.7)
IMM GRANULOCYTES # BLD: 0 10E9/L (ref 0–0.4)
IMM GRANULOCYTES NFR BLD: 0.2 %
LYMPHOCYTES # BLD AUTO: 1.5 10E9/L (ref 0.8–5.3)
LYMPHOCYTES NFR BLD AUTO: 27.7 %
MCH RBC QN AUTO: 22.3 PG (ref 26.5–33)
MCHC RBC AUTO-ENTMCNC: 29.9 G/DL (ref 31.5–36.5)
MCV RBC AUTO: 74 FL (ref 78–100)
MONOCYTES # BLD AUTO: 0.5 10E9/L (ref 0–1.3)
MONOCYTES NFR BLD AUTO: 8.9 %
NEUTROPHILS # BLD AUTO: 3.3 10E9/L (ref 1.6–8.3)
NEUTROPHILS NFR BLD AUTO: 59 %
NRBC # BLD AUTO: 0 10*3/UL
NRBC BLD AUTO-RTO: 0 /100
PLATELET # BLD AUTO: 419 10E9/L (ref 150–450)
RBC # BLD AUTO: 4.76 10E12/L (ref 3.8–5.2)
VANCOMYCIN SERPL-MCNC: 17.4 MG/L
WBC # BLD AUTO: 5.5 10E9/L (ref 4–11)

## 2019-05-20 PROCEDURE — 85652 RBC SED RATE AUTOMATED: CPT | Performed by: INTERNAL MEDICINE

## 2019-05-20 PROCEDURE — 86140 C-REACTIVE PROTEIN: CPT | Performed by: INTERNAL MEDICINE

## 2019-05-20 PROCEDURE — 84520 ASSAY OF UREA NITROGEN: CPT | Performed by: INTERNAL MEDICINE

## 2019-05-20 PROCEDURE — 80202 ASSAY OF VANCOMYCIN: CPT | Performed by: INTERNAL MEDICINE

## 2019-05-20 PROCEDURE — 85025 COMPLETE CBC W/AUTO DIFF WBC: CPT | Performed by: INTERNAL MEDICINE

## 2019-05-20 PROCEDURE — 82565 ASSAY OF CREATININE: CPT | Performed by: INTERNAL MEDICINE

## 2019-05-21 ENCOUNTER — HOME INFUSION (PRE-WILLOW HOME INFUSION) (OUTPATIENT)
Dept: PHARMACY | Facility: CLINIC | Age: 49
End: 2019-05-21

## 2019-05-22 ENCOUNTER — HOSPITAL ENCOUNTER (INPATIENT)
Facility: CLINIC | Age: 49
LOS: 2 days | Discharge: HOME HEALTH | DRG: 638 | End: 2019-05-24
Attending: EMERGENCY MEDICINE | Admitting: INTERNAL MEDICINE
Payer: COMMERCIAL

## 2019-05-22 ENCOUNTER — APPOINTMENT (OUTPATIENT)
Dept: GENERAL RADIOLOGY | Facility: CLINIC | Age: 49
DRG: 638 | End: 2019-05-22
Attending: EMERGENCY MEDICINE
Payer: COMMERCIAL

## 2019-05-22 ENCOUNTER — APPOINTMENT (OUTPATIENT)
Dept: GENERAL RADIOLOGY | Facility: CLINIC | Age: 49
DRG: 638 | End: 2019-05-22
Attending: INTERNAL MEDICINE
Payer: COMMERCIAL

## 2019-05-22 DIAGNOSIS — B99.9 INFECTION: ICD-10-CM

## 2019-05-22 DIAGNOSIS — L03.115 CELLULITIS OF RIGHT LOWER EXTREMITY: ICD-10-CM

## 2019-05-22 PROBLEM — L03.119 CELLULITIS OF FOOT: Status: RESOLVED | Noted: 2019-05-22 | Resolved: 2019-05-22

## 2019-05-22 PROBLEM — L03.119 CELLULITIS OF FOOT: Status: ACTIVE | Noted: 2019-05-22

## 2019-05-22 PROBLEM — L97.509 FOOT ULCER (H): Status: ACTIVE | Noted: 2019-05-22

## 2019-05-22 LAB
ANION GAP SERPL CALCULATED.3IONS-SCNC: 7 MMOL/L (ref 3–14)
BASOPHILS # BLD AUTO: 0.1 10E9/L (ref 0–0.2)
BASOPHILS NFR BLD AUTO: 0.8 %
BUN SERPL-MCNC: 8 MG/DL (ref 7–30)
CALCIUM SERPL-MCNC: 8.8 MG/DL (ref 8.5–10.1)
CHLORIDE SERPL-SCNC: 106 MMOL/L (ref 94–109)
CO2 SERPL-SCNC: 22 MMOL/L (ref 20–32)
CREAT SERPL-MCNC: 0.68 MG/DL (ref 0.52–1.04)
CRP SERPL-MCNC: 10.9 MG/L (ref 0–8)
DIFFERENTIAL METHOD BLD: ABNORMAL
EOSINOPHIL # BLD AUTO: 0.2 10E9/L (ref 0–0.7)
EOSINOPHIL NFR BLD AUTO: 2.3 %
ERYTHROCYTE [DISTWIDTH] IN BLOOD BY AUTOMATED COUNT: 16.3 % (ref 10–15)
ERYTHROCYTE [SEDIMENTATION RATE] IN BLOOD BY WESTERGREN METHOD: 35 MM/H (ref 0–20)
GFR SERPL CREATININE-BSD FRML MDRD: >90 ML/MIN/{1.73_M2}
GLUCOSE SERPL-MCNC: 106 MG/DL (ref 70–99)
HCT VFR BLD AUTO: 33.6 % (ref 35–47)
HGB BLD-MCNC: 10.4 G/DL (ref 11.7–15.7)
IMM GRANULOCYTES # BLD: 0 10E9/L (ref 0–0.4)
IMM GRANULOCYTES NFR BLD: 0.3 %
LYMPHOCYTES # BLD AUTO: 1.6 10E9/L (ref 0.8–5.3)
LYMPHOCYTES NFR BLD AUTO: 24.4 %
MCH RBC QN AUTO: 22.7 PG (ref 26.5–33)
MCHC RBC AUTO-ENTMCNC: 31 G/DL (ref 31.5–36.5)
MCV RBC AUTO: 73 FL (ref 78–100)
MONOCYTES # BLD AUTO: 0.5 10E9/L (ref 0–1.3)
MONOCYTES NFR BLD AUTO: 7.9 %
NEUTROPHILS # BLD AUTO: 4.2 10E9/L (ref 1.6–8.3)
NEUTROPHILS NFR BLD AUTO: 64.3 %
NRBC # BLD AUTO: 0 10*3/UL
NRBC BLD AUTO-RTO: 0 /100
PLATELET # BLD AUTO: 366 10E9/L (ref 150–450)
POTASSIUM SERPL-SCNC: 3.8 MMOL/L (ref 3.4–5.3)
RBC # BLD AUTO: 4.59 10E12/L (ref 3.8–5.2)
SODIUM SERPL-SCNC: 135 MMOL/L (ref 133–144)
WBC # BLD AUTO: 6.6 10E9/L (ref 4–11)

## 2019-05-22 PROCEDURE — 87040 BLOOD CULTURE FOR BACTERIA: CPT | Performed by: EMERGENCY MEDICINE

## 2019-05-22 PROCEDURE — 12000000 ZZH R&B MED SURG/OB

## 2019-05-22 PROCEDURE — 25000132 ZZH RX MED GY IP 250 OP 250 PS 637: Performed by: INTERNAL MEDICINE

## 2019-05-22 PROCEDURE — 73610 X-RAY EXAM OF ANKLE: CPT | Mod: RT

## 2019-05-22 PROCEDURE — 71045 X-RAY EXAM CHEST 1 VIEW: CPT

## 2019-05-22 PROCEDURE — 99207 ZZC CDG-MDM COMPONENT: MEETS MODERATE - UP CODED: CPT | Performed by: INTERNAL MEDICINE

## 2019-05-22 PROCEDURE — 85025 COMPLETE CBC W/AUTO DIFF WBC: CPT | Performed by: EMERGENCY MEDICINE

## 2019-05-22 PROCEDURE — 99223 1ST HOSP IP/OBS HIGH 75: CPT | Mod: AI | Performed by: INTERNAL MEDICINE

## 2019-05-22 PROCEDURE — 86140 C-REACTIVE PROTEIN: CPT | Performed by: EMERGENCY MEDICINE

## 2019-05-22 PROCEDURE — 99285 EMERGENCY DEPT VISIT HI MDM: CPT

## 2019-05-22 PROCEDURE — 80048 BASIC METABOLIC PNL TOTAL CA: CPT | Performed by: EMERGENCY MEDICINE

## 2019-05-22 PROCEDURE — 73630 X-RAY EXAM OF FOOT: CPT | Mod: RT

## 2019-05-22 PROCEDURE — 85652 RBC SED RATE AUTOMATED: CPT | Performed by: EMERGENCY MEDICINE

## 2019-05-22 RX ORDER — PHENTERMINE HYDROCHLORIDE 30 MG/1
30 CAPSULE ORAL EVERY MORNING
Status: DISCONTINUED | OUTPATIENT
Start: 2019-05-23 | End: 2019-05-22 | Stop reason: CLARIF

## 2019-05-22 RX ORDER — HYDROXYZINE PAMOATE 25 MG/1
25 CAPSULE ORAL AT BEDTIME
Status: DISCONTINUED | OUTPATIENT
Start: 2019-05-22 | End: 2019-05-24 | Stop reason: HOSPADM

## 2019-05-22 RX ORDER — NALOXONE HYDROCHLORIDE 0.4 MG/ML
.1-.4 INJECTION, SOLUTION INTRAMUSCULAR; INTRAVENOUS; SUBCUTANEOUS
Status: DISCONTINUED | OUTPATIENT
Start: 2019-05-22 | End: 2019-05-24 | Stop reason: HOSPADM

## 2019-05-22 RX ORDER — BUSPIRONE HYDROCHLORIDE 15 MG/1
15 TABLET ORAL 2 TIMES DAILY
Status: DISCONTINUED | OUTPATIENT
Start: 2019-05-22 | End: 2019-05-24 | Stop reason: HOSPADM

## 2019-05-22 RX ORDER — PRAVASTATIN SODIUM 20 MG
20 TABLET ORAL EVERY EVENING
Status: DISCONTINUED | OUTPATIENT
Start: 2019-05-22 | End: 2019-05-24 | Stop reason: HOSPADM

## 2019-05-22 RX ORDER — TOPIRAMATE 50 MG/1
50 TABLET, FILM COATED ORAL AT BEDTIME
Status: DISCONTINUED | OUTPATIENT
Start: 2019-05-22 | End: 2019-05-24 | Stop reason: HOSPADM

## 2019-05-22 RX ORDER — POTASSIUM CHLORIDE 1500 MG/1
80 TABLET, EXTENDED RELEASE ORAL DAILY
COMMUNITY

## 2019-05-22 RX ORDER — BUPROPION HYDROCHLORIDE 150 MG/1
150 TABLET ORAL DAILY
Status: DISCONTINUED | OUTPATIENT
Start: 2019-05-23 | End: 2019-05-24 | Stop reason: HOSPADM

## 2019-05-22 RX ORDER — ACETAMINOPHEN 325 MG/1
650 TABLET ORAL EVERY 4 HOURS PRN
Status: DISCONTINUED | OUTPATIENT
Start: 2019-05-22 | End: 2019-05-24 | Stop reason: HOSPADM

## 2019-05-22 RX ORDER — GABAPENTIN 800 MG/1
800 TABLET ORAL 3 TIMES DAILY
Status: DISCONTINUED | OUTPATIENT
Start: 2019-05-22 | End: 2019-05-24 | Stop reason: HOSPADM

## 2019-05-22 RX ORDER — ONDANSETRON 4 MG/1
4 TABLET, ORALLY DISINTEGRATING ORAL EVERY 6 HOURS PRN
Status: DISCONTINUED | OUTPATIENT
Start: 2019-05-22 | End: 2019-05-24 | Stop reason: HOSPADM

## 2019-05-22 RX ORDER — OXYCODONE AND ACETAMINOPHEN 5; 325 MG/1; MG/1
1-2 TABLET ORAL EVERY 4 HOURS PRN
Status: DISCONTINUED | OUTPATIENT
Start: 2019-05-22 | End: 2019-05-23

## 2019-05-22 RX ORDER — FUROSEMIDE 40 MG
160 TABLET ORAL DAILY
Status: DISCONTINUED | OUTPATIENT
Start: 2019-05-23 | End: 2019-05-24 | Stop reason: HOSPADM

## 2019-05-22 RX ORDER — HYDROMORPHONE HYDROCHLORIDE 1 MG/ML
0.2 INJECTION, SOLUTION INTRAMUSCULAR; INTRAVENOUS; SUBCUTANEOUS
Status: COMPLETED | OUTPATIENT
Start: 2019-05-22 | End: 2019-05-23

## 2019-05-22 RX ORDER — ONDANSETRON 2 MG/ML
4 INJECTION INTRAMUSCULAR; INTRAVENOUS EVERY 6 HOURS PRN
Status: DISCONTINUED | OUTPATIENT
Start: 2019-05-22 | End: 2019-05-24 | Stop reason: HOSPADM

## 2019-05-22 RX ORDER — AMOXICILLIN 250 MG
1 CAPSULE ORAL 2 TIMES DAILY PRN
Status: DISCONTINUED | OUTPATIENT
Start: 2019-05-22 | End: 2019-05-24 | Stop reason: HOSPADM

## 2019-05-22 RX ORDER — POLYETHYLENE GLYCOL 3350 17 G/17G
17 POWDER, FOR SOLUTION ORAL DAILY PRN
Status: DISCONTINUED | OUTPATIENT
Start: 2019-05-22 | End: 2019-05-24 | Stop reason: HOSPADM

## 2019-05-22 RX ORDER — BUPROPION HYDROCHLORIDE 150 MG/1
150 TABLET ORAL DAILY
COMMUNITY
End: 2019-11-04

## 2019-05-22 RX ORDER — BUSPIRONE HYDROCHLORIDE 15 MG/1
15 TABLET ORAL 2 TIMES DAILY
COMMUNITY

## 2019-05-22 RX ORDER — FUROSEMIDE 40 MG
160 TABLET ORAL DAILY
COMMUNITY

## 2019-05-22 RX ORDER — POTASSIUM CHLORIDE 1500 MG/1
40 TABLET, EXTENDED RELEASE ORAL 2 TIMES DAILY
Status: DISCONTINUED | OUTPATIENT
Start: 2019-05-23 | End: 2019-05-24 | Stop reason: HOSPADM

## 2019-05-22 RX ORDER — TOPIRAMATE 25 MG/1
50 TABLET, FILM COATED ORAL AT BEDTIME
COMMUNITY

## 2019-05-22 RX ORDER — CETIRIZINE HYDROCHLORIDE 10 MG/1
10 TABLET ORAL DAILY
Status: DISCONTINUED | OUTPATIENT
Start: 2019-05-23 | End: 2019-05-24 | Stop reason: HOSPADM

## 2019-05-22 RX ORDER — SERTRALINE HYDROCHLORIDE 100 MG/1
100 TABLET, FILM COATED ORAL DAILY
Status: DISCONTINUED | OUTPATIENT
Start: 2019-05-23 | End: 2019-05-24 | Stop reason: HOSPADM

## 2019-05-22 RX ADMIN — GABAPENTIN 800 MG: 800 TABLET, FILM COATED ORAL at 22:00

## 2019-05-22 RX ADMIN — BUSPIRONE HYDROCHLORIDE 15 MG: 5 TABLET ORAL at 20:53

## 2019-05-22 RX ADMIN — PRAVASTATIN SODIUM 20 MG: 20 TABLET ORAL at 19:47

## 2019-05-22 RX ADMIN — TOPIRAMATE 50 MG: 50 TABLET ORAL at 22:01

## 2019-05-22 RX ADMIN — OXYCODONE HYDROCHLORIDE AND ACETAMINOPHEN 1 TABLET: 5; 325 TABLET ORAL at 20:54

## 2019-05-22 RX ADMIN — HYDROXYZINE PAMOATE 25 MG: 25 CAPSULE ORAL at 22:01

## 2019-05-22 RX ADMIN — OMEPRAZOLE 20 MG: 20 CAPSULE, DELAYED RELEASE ORAL at 19:47

## 2019-05-22 ASSESSMENT — MIFFLIN-ST. JEOR
SCORE: 1719.98
SCORE: 1543.07

## 2019-05-22 ASSESSMENT — ACTIVITIES OF DAILY LIVING (ADL)
ADLS_ACUITY_SCORE: 12
TRANSFERRING: 0-->INDEPENDENT
SWALLOWING: 0-->SWALLOWS FOODS/LIQUIDS WITHOUT DIFFICULTY
RETIRED_COMMUNICATION: 0-->UNDERSTANDS/COMMUNICATES WITHOUT DIFFICULTY
FALL_HISTORY_WITHIN_LAST_SIX_MONTHS: NO
AMBULATION: 0-->INDEPENDENT
RETIRED_EATING: 0-->INDEPENDENT
TOILETING: 0-->INDEPENDENT
COGNITION: 0 - NO COGNITION ISSUES REPORTED
DRESS: 0-->INDEPENDENT
BATHING: 0-->INDEPENDENT

## 2019-05-22 ASSESSMENT — ENCOUNTER SYMPTOMS
ARTHRALGIAS: 1
FEVER: 1
JOINT SWELLING: 1
COLOR CHANGE: 1
NAUSEA: 1
WOUND: 1
VOMITING: 0

## 2019-05-22 NOTE — ED NOTES
Bed: ED24  Expected date:   Expected time:   Means of arrival:   Comments:  EVS clean, in room cleaning

## 2019-05-22 NOTE — ED PROVIDER NOTES
History     Chief Complaint:  Foot Pain    HPI   Shalonda Howard is a 49 year old female with a history of lupus, GERD, dysthymia, PUNEET, obesity, myalgias with myositis, cellulitis, bilateral chronic lower extremity edema, and opioid dependence, who presents with right foot swelling and redness, along with right ankle pain for the past 1.5 weeks. To note, the patient was admitted to the hospital on 4/21/19 for a left ankle infection. There, she had a left ankle arthroscopic I&D and retained hardware from previous subtalar fusion removal. She also notes that her most recent right foot surgery was in August 2018, when she had a bunionectomy.     Here, the patient reports visiting Dr. Bocanegra, Infectious Disease, this morning for her right foot symptoms, who prompted her to visit the ED. The patient reports that she is currently taking vancomycin every 12 hours. She endorses that her swelling and pain have continued to worsen and she describes her sensation as though there is fluid in her right foot. The patient also has persistent, non healing surgical wounds on both of her feet, which she notes has been producing a peach colored drainage. She endorses nausea and a fever of 100F 2 days ago. She denies vomiting or a history of blood clots.     Laboratory results from 5/20/19:  Sedimentation rate: 35  CRP Inflammation: 13.9    The patient's wounds grew Staphylococcus epidermidis on 4/22/19.             Allergies:  Sulfa drugs  Demerol  Erythromycin  Metformin  Codeine  Penicillins     Medications:    Aspirin  Wellbutrin  Buspar  Keflex  Zyrtec  Vistaril  Prilosec  Zofran  K-Dur  Pravastatin  Senokot  Zoloft  Topamax    Past Medical History:    Allergic rhinitis  Anxiety  Cellulitis and leg abscess  Osteoporosis  Disuse osteoporosis  Dysthymic disorder  Edema   Esophogeal reflux  Kidney stones  Myalgia   Obesity  Opioid type dependence  Polycystic ovaries  PONV  Lupus  Tendinitis  Idiopathic neuropathy  "  Fibromyalgia    Past Surgical History:    Appendectomy  Arthrodesis foot  Ankle arthroscopy  Bunionectomy  D&C  Toenail excision  Hernia repair (x2)  I&D  Repair hammer toe   Tonsillectomy    Family History:    Cancer  Diabetes  HTN  Heart disease    Social History:  Smoking status: former  Alcohol use: yes  Drug use: no  PCP: Linda Bernstein  Marital Status:        Review of Systems   Constitutional: Positive for fever.   Gastrointestinal: Positive for nausea. Negative for vomiting.   Musculoskeletal: Positive for arthralgias and joint swelling.   Skin: Positive for color change and wound.   All other systems reviewed and are negative.    Physical Exam     Patient Vitals for the past 24 hrs:   BP Temp Temp src Pulse Resp SpO2 Height Weight   05/22/19 1640 169/84 98.7  F (37.1  C) Oral 96 20 100 % 1.651 m (5' 5\") 90.7 kg (200 lb)       Physical Exam  General: The patient appears comfortable  Head:  The scalp, face, and head appear normal  Eyes:  The pupils are equal and round    Conjunctivae and sclerae are normal  ENT:    Moist mucous membranes  Neck:  Normal range of motion  CV:  Regular rate and underlying rhythm     Normal S1 and S2    DP/PT pulses 2+ bilaterally  Resp:  Lungs are clear    Non-labored breathing    No rales    No wheezing   GI:  Abdomen is soft, there is no rigidity    No distension    No rebound tenderness     No abdominal tenderness  MS:  See pics above. Right foot/ankle: Mild erythema on medial aspect of right foot, chronic wound on heel with no drainage or foul odor, tender on medial foot, able to move her ankle joint w/o apparent discomfort, movement of joint limited. Left foot/ankle: chronic wound on heel with no drainage or foul odor, no tenderness on foot  Skin:  See MS exam above  Neuro: Speech is normal and fluent    Awake and alert    Face symmetric    Moving all extremities equally    Emergency Department Course   Imaging:  Radiographic findings were communicated with the " patient who voiced understanding of the findings.  Foot XR, G/E 3 views, right  Postoperative changes of bunion repair right great toe.  Degenerative changes in the mid and hindfoot with a long screw across  the talocalcaneal joint and plate and screw fixation across the  talonavicular joint. Moderate degenerative changes in the mid and  hindfoot. No acute fractures. There appears to be an ulcer deformity  along the inferior aspect of the heel below the calcaneus seen on the  lateral view.As read by Radiology.    Ankle XR, G/E 3 Views  Mild soft tissue swelling about the ankle. No acute  fracture or dislocation. Postoperative changes in the hindfoot with a  long screw across the talocalcaneal joint and plate and screw fusion  across the talonavicular joint.  Degenerative changes in the hindfoot.  Plantar calcaneal spur.  As read by Radiology.    Laboratory:  CRP Inflammation: 10.9 (h)  CBC: HGB 10.4 (L), o/w WNL (WBC 6.6, )  BMP: Glucose 106 (H), o/w WNL (Creatinine: 0.68)  Erythrocyte sedimentation rate: 35 (H)  Blood cultures: In process     Emergency Department Course:  1703: Nursing notes and vitals reviewed. I performed an exam of the patient as documented above.     IV inserted. Medicine administered as documented above. Blood drawn. This was sent to the lab for further testing, results above.    The patient was sent for a foot and ankle while in the emergency department, findings above.     1811: I rechecked the patient and discussed the results of her workup thus far.     1816: I consulted with Dr. Canada, Infectious Disease, regarding the patient's history and presentation here in the ED.     1815:  I consulted with Dr. Lopez of the hospitalist services. They are in agreement to accept the patient for admission.    Findings and plan explained to the Patient who consents to admission. Discussed the patient with Dr. Lopez, who will admit the patient to a medical bed for further monitoring,  evaluation, and treatment.     Impression & Plan    Medical Decision Making:  Shalonda Howard is a 49 year old female who presented to the ED with foot pain. Sent in from ID clinic to get admitted. Complicated surgical history and infection history. Now with findings concerning for infection of right foot cellulitis with possible osteomyelitis versus septic arthritis. Already on antibiotics vancomycin. Xray with no obvious osteomyelitis or fluid. Labs similar to prior. Spoke to ID who recommended ortho consult in am, MRI, continue vancomycin for now. Discussed with hospitalist for admission.    Diagnosis:    ICD-10-CM    1. Cellulitis of right lower extremity L03.115        Disposition:  Admitted to Adelia Holland am serving as a scribe at 5:03 PM on 5/22/2019 to document services personally performed by Mariya Cali MD based on my observations and the provider's statements to me.       Adelia Gillis  5/22/2019    EMERGENCY DEPARTMENT       Mariya Cali MD  05/23/19 0158

## 2019-05-22 NOTE — PROGRESS NOTES
RECEIVING UNIT ED HANDOFF REVIEW    ED Nurse Handoff Report was reviewed by: Evette Arriaza on May 22, 2019 at 6:59 PM

## 2019-05-22 NOTE — ED NOTES
"[facility]  ED Nurse Handoff Report    ED Chief complaint: Foot Pain      ED Diagnosis:   Final diagnoses:   Cellulitis of right lower extremity       Code Status: Full Code    Allergies:   Allergies   Allergen Reactions     Sulfa Drugs Shortness Of Breath and Rash     Demerol [Meperidine] Nausea and Vomiting     Erythromycin Nausea and Vomiting     Metformin Nausea and Vomiting     Codeine Rash     Penicillins Rash       Activity level - Baseline/Home:  Independent    Activity Level - Current:   Independent     Needed?: No    Isolation: No  Infection: Not Applicable  Bariatric?: No    Vital Signs:   Vitals:    05/22/19 1640   BP: 169/84   Pulse: 96   Resp: 20   Temp: 98.7  F (37.1  C)   TempSrc: Oral   SpO2: 100%   Weight: 90.7 kg (200 lb)   Height: 1.651 m (5' 5\")       Cardiac Rhythm: ,        Pain level: 0-10 Pain Scale: 6    Is this patient confused?: No   Does this patient have a guardian?  No         If yes, is there guardianship documents in the Epic \"Code/ACP\" activity?  N/A         Guardian Notified?  N/A  [place] - Suicide Severity Rating Scale Completed?  Yes  If yes, what color did the patient score?  White    Patient Report: Initial Complaint: R foot pain/swelling/infection  Focused Assessment: Pt presents after having extensive wounds and complications on bilateral feet after having bunyans removed and some hardware placed in ankles. Pt has been hospitalized 1x for the past few months related to complications. Pt sees Dr. Bocanegra and has wound care performed vigourously and she has a PICC in which she gets vanco q12 hours. Pt states for the past week has been having increased pain/swelling and some redness to the R foot. Seen in clinic today and sent for further eval per Dr. Bocanegra. Pt's blood work showing elevaterd sed and CRP. Blood cultures sent. xrays not showing osteo.  Tests Performed: Blood work, xray  Abnormal Results: elevated inflammatory markers  Treatments " provided: No meds given in ED    Family Comments:  at bedside    OBS brochure/video discussed/provided to patient/family: N/A              Name of person given brochure if not patient: n/a              Relationship to patient: n/a    ED Medications: Medications - No data to display    Drips infusing?:  No    For the majority of the shift this patient was Green.   Interventions performed were meds repo.    Severe Sepsis OR Septic Shock Diagnosis Present: No    To be done/followed up on inpatient unit:  inpt orders    ED NURSE PHONE NUMBER: *[phone]

## 2019-05-22 NOTE — ED TRIAGE NOTES
Right foot pain, swelling and redness started 1 1/2 weeks ago. Saw Infectious Disease this morning and told to come to ED.

## 2019-05-22 NOTE — PROGRESS NOTES
This is a recent snapshot of the patient's [facility] medical record.  For current drug dose and complete information and questions, call [phone] or In [facility] pool, fv home infusion ([id number])  CSN Number:  [id number]

## 2019-05-22 NOTE — H&P
[facility]    History and Physical  Hospitalist       Date of Admission:  5/22/2019    Assessment & Plan   Shalonda Howard is a 49 year old female with possible Lupus who presents with increased redness and swelling of right foot -- possible cellulitis, admitted from ID clinic for further evaluation and treatment:    Summary:    Principal Problem:    Cellulitis of right foot -- still has hardware in place from Bunion surgery Aug 2018   -- Continue IV Vanco (pharmacy to dose)   -- ID consult (they have been following)   -- Ortho consult -- ?any need to remove hardware from right foot?   -- will get MR of right foot    Active Problems:    Left foot Postop Wound infection -- was here 4/21 to4/25/19, hardware removed   -- continue Vanco as ordered for now   -- await ID recommendations.       Possible Systemic lupus erythematosus (H)   -- not on any treatment for this (diagnosis is not definite)      Foot ulcer on Heel bilaterally    -- wound care nurse for evaluation       Plan: as above    DVT Prophylaxis: Pneumatic Compression Devices  Code Status: Full Code    Disposition: Expected discharge ?2-3 days    Armando Mccarty MD  Pager: [phone]  Cell Phone:  [phone]     Primary Care Physician   Linda Bernstein    Chief Complaint   Redness, swelling right foot    History is obtained from Patient    History of Present Illness   49 year old female with possible Lupus (not on any treatment for this -- currently not seeing a rheumatologist) who presents with increased redness and swelling of right foot -- possible cellulitis, admitted from ID clinic for further evaluation and treatment.  Patient reports right foot with increased pain and swelling and redness for 1.5 weeks, and currently rates pain as 6-7 out of 10.  5/20 had ESR of 35 and CRP of 13.9.  Today ESR still 35 and CRP decreased to 10.9, and WBC 6.6 and hgb 10.4.       -- She has a history of bilateral bunion repair surgery done  in August 2018. The right side healed but the left side the incision has not been healing , I and D and 6 weeks of IV antibiotics and many more weeks of oral antibiotics since the end of August 2018. Then repeat admission in November, more I and D and another course of IV antibiotics. then another admission in January, again, wound non healing, s/p another I and D and 2 antibiotics for 6 weeks.   -- She also has some hardware in the right foot from bunion surgery (?left removed?)   -- She also has chronic heel ulcers bilateral.   -- PCN allergy, tolerates zosyn/cephalosporins, sulfa allergy.   -- Admitted in March continued wound non healing, concern for underlying osteo, s/p first ray amputation, hardware removal and ulcer debridement 3/20.   -- Admitted 4/21 to 4/25  with concern of septic ankle. Gram stain from the ankle positive for GPC, further identified as staph hominis sensitive to Oxacillin, on IV Vanco 1600 mg bid currently.        Past Medical History    I have reviewed this patient's medical history and updated it with pertinent information if needed.   Past Medical History:   Diagnosis Date     Allergic rhinitis, cause unspecified      Anxiety state, unspecified      Cellulitis and abscess of leg, except foot      Closed fracture of unspecified part of tibia      Disuse osteoporosis      Dysthymic disorder      Edema      Encounter for long-term (current) use of other medications      Esophageal reflux      Kidney stones      Myalgia and myositis, unspecified      Obesity, unspecified      Open wound of foot except toe(s) alone, complicated      Opioid type dependence, unspecified      Other acne      Other congenital valgus deformity of feet      Other ventral hernia without mention of obstruction or gangrene      Polycystic ovaries      PONV (postoperative nausea and vomiting)      Systemic lupus erythematosus (H)      Tibialis tendinitis      Unspecified hereditary and idiopathic peripheral  neuropathy        Past Surgical History   I have reviewed this patient's surgical history and updated it with pertinent information if needed.  Past Surgical History:   Procedure Laterality Date     APPENDECTOMY       APPLY WOUND VAC Left 1/24/2019    Procedure: WOUND VAC APPLICATION;  Surgeon: Miguel Mosher MD;  Location:  OR     ARTHRODESIS FOOT Left 2/4/2015    Procedure: ARTHRODESIS FOOT;  Surgeon: Andre Haramn MD;  Location:  [facility]     ARTHROSCOPY ANKLE Left 4/22/2019    Procedure: ARTHROSCOPIC IRRIGATION AND DEBRIDEMENT LEFT ANKLE. HARDWARE REMOVAL;  Surgeon: Andre Harman MD;  Location:  OR     BUNIONECTOMY RT/LT  08/29/2018     DILATION AND CURETTAGE       EXCISE TOENAIL(S) Left 2/4/2015    Procedure: EXCISE TOENAIL(S);  Surgeon: Andre Harman MD;  Location: [facility]     HERNIA REPAIR      umbilical     HERNIA REPAIR      ventral open x 2     IRRIGATION AND DEBRIDEMENT FOOT, COMBINED Left 9/26/2018    Procedure: COMBINED IRRIGATION AND DEBRIDEMENT FOOT;  IRRIGATION AND DEBRIDEMENT LEFT FOOT;  Surgeon: Andre Harman MD;  Location:  [facility]     IRRIGATION AND DEBRIDEMENT FOOT, COMBINED Left 11/26/2018    Procedure: COMBINED IRRIGATION AND DEBRIDEMENT LEFT FOOT;  Surgeon: Andre Harman MD;  Location: [facility]     IRRIGATION AND DEBRIDEMENT FOOT, COMBINED Left 1/24/2019    Procedure: LEFT HALLUX WOUND IRRIGATION AND DEBRIDEMENT WITH BONE BIOPSY;  Surgeon: Miguel Mosher MD;  Location:  OR     IRRIGATION AND DEBRIDEMENT FOOT, COMBINED Left 3/20/2019    Procedure: LEFT FOOT IRRIGATION AND  DEBRIDEMENT, FIRST RAY RESECTION AND HARDWARE REMOVAL FROM LEFT FOOT;  Surgeon: Andre Harman MD;  Location:  OR     REMOVE HARDWARE LOWER EXTREMITY Left 3/20/2019    Procedure: REMOVE HARDWARE LOWER EXTREMITY;  Surgeon: Andre Harman MD;  Location:  OR     REPAIR HAMMER TOE Left 11/26/2018    Procedure: REPAIR HAMMER TOE;  Surgeon: Andre Harman MD;   Location: [facility]     TONSILLECTOMY         Prior to Admission Medications   Prior to Admission Medications   Prescriptions Last Dose Informant Patient Reported? Taking?   Ondansetron HCl (ZOFRAN PO) as needed Self Yes Yes   Sig: Take 8 mg by mouth as needed for nausea or vomiting   aspirin (ASA) 325 MG EC tablet 5/22/2019 at Unknown time Self No Yes   Sig: Take 1 tablet (325 mg) by mouth daily   buPROPion (WELLBUTRIN XL) 150 MG 24 hr tablet 5/22/2019 at Unknown time Self Yes Yes   Sig: Take 150 mg by mouth daily   busPIRone (BUSPAR) 15 MG tablet 5/22/2019 at am Self Yes Yes   Sig: Take 15 mg by mouth 2 times daily   cetirizine (ZYRTEC) 10 MG tablet 5/22/2019 at am Self Yes Yes   Sig: Take 10 mg by mouth daily   cholecalciferol (VITAMIN D3) 5000 units TABS tablet 5/22/2019 at am Self Yes Yes   Sig: Take 5,000 Units by mouth daily   clindamycin (CLEOCIN T) 1 % solution as needed Self Yes Yes   Sig: Apply topically nightly as needed (Acne outbreak)    furosemide (LASIX) 40 MG tablet 5/22/2019 at am Self Yes Yes   Sig: Take 160 mg by mouth daily   gabapentin (NEURONTIN) 800 MG tablet 5/22/2019 at Unknown time Self Yes Yes   Sig: Take 800 mg by mouth 3 times daily Am, supper, hs   hydrOXYzine (VISTARIL) 25 MG capsule 5/21/2019 at pm Self Yes Yes   Sig: Take 25 mg by mouth At Bedtime   omeprazole (PRILOSEC) 20 MG DR capsule 5/22/2019 at am Self Yes Yes   Sig: Take 20 mg by mouth 2 times daily (before meals)   oxyCODONE-acetaminophen (PERCOCET) 5-325 MG tablet 5/22/2019 at Unknown time Self No Yes   Sig: Take 1-2 tablets by mouth every 4 hours as needed for pain   phentermine 30 MG capsule 5/22/2019 at am Self Yes Yes   Sig: Take 30 mg by mouth every morning   polyethylene glycol (MIRALAX/GLYCOLAX) packet as needed Self Yes Yes   Sig: Take 17 g by mouth as needed for constipation   potassium chloride ER (K-TAB) 20 MEQ CR tablet 5/22/2019 at Unknown time Self Yes Yes   Sig: Take 80 mEq by mouth daily   pravastatin  (PRAVACHOL) 20 MG tablet 5/21/2019 at pm Self Yes Yes   Sig: Take 20 mg by mouth every evening   senna-docusate (SENOKOT-S/PERICOLACE) 8.6-50 MG tablet as needed Self Yes Yes   Sig: Take 1 tablet by mouth 2 times daily as needed for constipation   sertraline (ZOLOFT) 100 MG tablet 5/22/2019 at am Self Yes Yes   Sig: Take 100 mg by mouth daily   topiramate (TOPAMAX) 25 MG tablet 5/21/2019 at pm Self Yes Yes   Sig: Take 50 mg by mouth At Bedtime   vancomycin (VANCOCIN) 100 mg/mL injection 5/22/2019 at 1100 Self No Yes   Sig: Inject 1 g (1,000 mg) into the vein every 12 hours CBC with differential, creatinine, vancomycin trough, ESR twice weekly while on this medication to be faxed to Dr. Canada office at [phone].   Patient taking differently: Inject 1,600 mg into the vein every 12 hours 1100,2300    CBC with differential, creatinine, vancomycin trough, ESR twice weekly while on this medication to be faxed to Dr. Canada office at [phone].      Facility-Administered Medications: None     Allergies   Allergies   Allergen Reactions     Sulfa Drugs Shortness Of Breath and Rash     Demerol [Meperidine] Nausea and Vomiting     Erythromycin Nausea and Vomiting     Metformin Nausea and Vomiting     Codeine Rash     Penicillins Rash       Social History   I have reviewed this patient's social history and updated it with pertinent information if needed. Shalonda Howard  reports that she quit smoking about 16 years ago. Her smoking use included cigarettes. She has a 6.00 pack-year smoking history. She has never used smokeless tobacco. She reports that she drinks alcohol. She reports that she does not use drugs.    Family History   I have reviewed this patient's family history and updated it with pertinent information if needed.   Family History   Problem Relation Age of Onset     Pulmonary Hypertension Mother      Esophageal Cancer Father      Heart Disease Maternal Grandfather      Esophageal Cancer Paternal Grandfather         Review of Systems   The 10 point Review of Systems is negative other than noted in the HPI or here.     # Pain Assessment:  Current Pain Score 5/22/2019   Patient currently in pain? yes   Pain score (0-10) 7   Pain location -   Pain descriptors -   - Shalonda is experiencing pain due to bilateral foot surgeries and infection. Pain management was discussed and the plan was created in a collaborative fashion.  Shalonda's response to the current recommendations: engaged   see orders.       Physical Exam   Temp: 99.5  F (37.5  C) Temp src: Oral BP: 141/80 Pulse: 96 Heart Rate: 102 Resp: 18 SpO2: 97 % O2 Device: None (Room air)    Vital Signs with Ranges  Temp:  [98.7  F (37.1  C)-99.5  F (37.5  C)] 99.5  F (37.5  C)  Pulse:  [96] 96  Heart Rate:  [] 102  Resp:  [18-20] 18  BP: (132-169)/(74-84) 141/80  SpO2:  [97 %-100 %] 97 %  239 lbs 0 oz    Constitutional: Awake, alert, cooperative, no apparent distress.  Eyes: Conjunctiva and pupils examined and normal.  HEENT: Moist mucous membranes, normal dentition.  Respiratory: Clear to auscultation bilaterally, no crackles or wheezing.  Cardiovascular: Regular rate and rhythm, normal S1 and S2, and no murmur noted,   No carotid bruits.  No ankle edema.   GI: Soft, non-distended, non-tender, normal bowel sounds.  Lymph/Hematologic: No anterior cervical, supraclavicular or axillary adenopathy.  Skin: No rashes, no cyanosis.  Both heels with 3 cm ulcer, with ulcer base appearing clean (no drainage.   Musculoskeletal:  Left foot with left 1st toe amputated, slight redness of incision site which she says is unchanged and continued pain over left lateral foot, and right foot with slight tender and puffy ankle with redness over medial ankle/inner foot.   Neurologic: Cranial nerves 2-12 intact, no focal weakness or numbness  Psychiatric: Alert, Ox3, no obvious anxiety or depression.    Data   Data reviewed today:  I personally reviewed no EKG's today.  Recent Labs   Lab  05/22/19  1727 05/20/19  1145   WBC 6.6 5.5   HGB 10.4* 10.6*   MCV 73* 74*    419     --    POTASSIUM 3.8  --    CHLORIDE 106  --    CO2 22  --    BUN 8 11   CR 0.68 0.68   ANIONGAP 7  --    PAULINE 8.8  --    *  --        Imaging:  Recent Results (from the past 24 hour(s))   Ankle XR, G/E 3 views, right    Narrative    ANKLE RIGHT THREE OR MORE VIEWS   5/22/2019 5:45 PM     INDICATION: Right foot pain/ankle pain.    COMPARISON: None.      Impression    IMPRESSION: Mild soft tissue swelling about the ankle. No acute  fracture or dislocation. Postoperative changes in the hindfoot with a  long screw across the talocalcaneal joint and plate and screw fusion  across the talonavicular joint.  Degenerative changes in the hindfoot.  Plantar calcaneal spur.    MCKENZIE CROWLEY MD   Foot  XR, G/E 3 views, right    Narrative    FOOT RIGHT THREE OR MORE VIEWS   5/22/2019 5:46 PM     INDICATION: Right foot pain and erythema, concern for osteomyelitis.    COMPARISON: None.      Impression    IMPRESSION: Postoperative changes of bunion repair right great toe.  Degenerative changes in the mid and hindfoot with a long screw across  the talocalcaneal joint and plate and screw fixation across the  talonavicular joint. Moderate degenerative changes in the mid and  hindfoot. No acute fractures. There appears to be an ulcer deformity  along the inferior aspect of the heel below the calcaneus seen on the  lateral view.    MCKENZIE CROWLEY MD   XR Chest Port 1 View    Narrative    CHEST ONE VIEW PORTABLE   5/22/2019 8:28 PM     HISTORY: PICC placement verification.    COMPARISON: 1/28/2019      Impression    IMPRESSION: PICC line from the right arm with the tip in the distal  superior vena cava. This could be advanced about 3 cm for optimal  positioning.    SANA HINSON MD

## 2019-05-22 NOTE — PHARMACY-ADMISSION MEDICATION HISTORY
Admission medication history interview status for the 5/22/2019  admission is complete. See EPIC admission navigator for prior to admission medications     Medication history source reliability:Good    Actions taken by pharmacist (provider contacted, etc):None     Additional medication history information not noted on PTA med list :None    Medication reconciliation/reorder completed by provider prior to medication history? No    Time spent in this activity: 20 minutes    Prior to Admission medications    Medication Sig Last Dose Taking? Auth Provider   aspirin (ASA) 325 MG EC tablet Take 1 tablet (325 mg) by mouth daily 5/22/2019 at Unknown time Yes Jett Malik, CECILIA   buPROPion (WELLBUTRIN XL) 150 MG 24 hr tablet Take 150 mg by mouth daily 5/22/2019 at Unknown time Yes Unknown, Entered By History   busPIRone (BUSPAR) 15 MG tablet Take 15 mg by mouth 2 times daily 5/22/2019 at am Yes Unknown, Entered By History   cetirizine (ZYRTEC) 10 MG tablet Take 10 mg by mouth daily 5/22/2019 at am Yes Unknown, Entered By History   cholecalciferol (VITAMIN D3) 5000 units TABS tablet Take 5,000 Units by mouth daily 5/22/2019 at am Yes Unknown, Entered By History   clindamycin (CLEOCIN T) 1 % solution Apply topically nightly as needed (Acne outbreak)  as needed Yes Unknown, Entered By History   furosemide (LASIX) 40 MG tablet Take 160 mg by mouth daily 5/22/2019 at am Yes Unknown, Entered By History   gabapentin (NEURONTIN) 800 MG tablet Take 800 mg by mouth 3 times daily Am, supper, hs 5/22/2019 at Unknown time Yes Unknown, Entered By History   hydrOXYzine (VISTARIL) 25 MG capsule Take 25 mg by mouth At Bedtime 5/21/2019 at pm Yes Unknown, Entered By History   omeprazole (PRILOSEC) 20 MG DR capsule Take 20 mg by mouth 2 times daily (before meals) 5/22/2019 at am Yes Unknown, Entered By History   Ondansetron HCl (ZOFRAN PO) Take 8 mg by mouth as needed for nausea or vomiting as needed Yes Reported, Patient    oxyCODONE-acetaminophen (PERCOCET) 5-325 MG tablet Take 1-2 tablets by mouth every 4 hours as needed for pain 5/22/2019 at Unknown time Yes Jett Malik, CECILIA   phentermine 30 MG capsule Take 30 mg by mouth every morning 5/22/2019 at am Yes Reported, Patient   polyethylene glycol (MIRALAX/GLYCOLAX) packet Take 17 g by mouth as needed for constipation as needed Yes Reported, Patient   potassium chloride ER (K-TAB) 20 MEQ CR tablet Take 80 mEq by mouth daily 5/22/2019 at Unknown time Yes Unknown, Entered By History   pravastatin (PRAVACHOL) 20 MG tablet Take 20 mg by mouth every evening 5/21/2019 at pm Yes Unknown, Entered By History   senna-docusate (SENOKOT-S/PERICOLACE) 8.6-50 MG tablet Take 1 tablet by mouth 2 times daily as needed for constipation as needed Yes Unknown, Entered By History   sertraline (ZOLOFT) 100 MG tablet Take 100 mg by mouth daily 5/22/2019 at am Yes Unknown, Entered By History   topiramate (TOPAMAX) 25 MG tablet Take 50 mg by mouth At Bedtime 5/21/2019 at pm Yes Unknown, Entered By History   vancomycin (VANCOCIN) 100 mg/mL injection Inject 1 g (1,000 mg) into the vein every 12 hours CBC with differential, creatinine, vancomycin trough, ESR twice weekly while on this medication to be faxed to Dr. Canada office at [phone].  Patient taking differently: Inject 1,600 mg into the vein every 12 hours 1100,2300    CBC with differential, creatinine, vancomycin trough, ESR twice weekly while on this medication to be faxed to Dr. Canada office at [phone]. 5/22/2019 at 1100 Yes Ignacia Bocanegra MD

## 2019-05-23 ENCOUNTER — APPOINTMENT (OUTPATIENT)
Dept: GENERAL RADIOLOGY | Facility: CLINIC | Age: 49
DRG: 638 | End: 2019-05-23
Attending: PODIATRIST
Payer: COMMERCIAL

## 2019-05-23 ENCOUNTER — APPOINTMENT (OUTPATIENT)
Dept: MRI IMAGING | Facility: CLINIC | Age: 49
DRG: 638 | End: 2019-05-23
Attending: INTERNAL MEDICINE
Payer: COMMERCIAL

## 2019-05-23 LAB
CREAT SERPL-MCNC: 0.81 MG/DL (ref 0.52–1.04)
GFR SERPL CREATININE-BSD FRML MDRD: 87 ML/MIN/{1.73_M2}
VANCOMYCIN SERPL-MCNC: 18.8 MG/L

## 2019-05-23 PROCEDURE — 36415 COLL VENOUS BLD VENIPUNCTURE: CPT | Performed by: INTERNAL MEDICINE

## 2019-05-23 PROCEDURE — 25800030 ZZH RX IP 258 OP 636: Performed by: INTERNAL MEDICINE

## 2019-05-23 PROCEDURE — 73723 MRI JOINT LWR EXTR W/O&W/DYE: CPT | Mod: RT

## 2019-05-23 PROCEDURE — 25000128 H RX IP 250 OP 636: Performed by: INTERNAL MEDICINE

## 2019-05-23 PROCEDURE — 25000128 H RX IP 250 OP 636: Performed by: HOSPITALIST

## 2019-05-23 PROCEDURE — 99232 SBSQ HOSP IP/OBS MODERATE 35: CPT | Performed by: INTERNAL MEDICINE

## 2019-05-23 PROCEDURE — 12000000 ZZH R&B MED SURG/OB

## 2019-05-23 PROCEDURE — 73650 X-RAY EXAM OF HEEL: CPT | Mod: LT

## 2019-05-23 PROCEDURE — G0463 HOSPITAL OUTPT CLINIC VISIT: HCPCS

## 2019-05-23 PROCEDURE — 99207 ZZC CDG-MDM COMPONENT: MEETS LOW - DOWN CODED: CPT | Performed by: INTERNAL MEDICINE

## 2019-05-23 PROCEDURE — A9585 GADOBUTROL INJECTION: HCPCS | Performed by: INTERNAL MEDICINE

## 2019-05-23 PROCEDURE — 25500064 ZZH RX 255 OP 636: Performed by: INTERNAL MEDICINE

## 2019-05-23 PROCEDURE — 99254 IP/OBS CNSLTJ NEW/EST MOD 60: CPT | Performed by: PODIATRIST

## 2019-05-23 PROCEDURE — 40000239 ZZH STATISTIC VAT ROUNDS

## 2019-05-23 PROCEDURE — 40000257 ZZH STATISTIC CONSULT NO CHARGE VASC ACCESS

## 2019-05-23 PROCEDURE — 82565 ASSAY OF CREATININE: CPT | Performed by: INTERNAL MEDICINE

## 2019-05-23 PROCEDURE — 25000132 ZZH RX MED GY IP 250 OP 250 PS 637: Performed by: HOSPITALIST

## 2019-05-23 PROCEDURE — 36593 DECLOT VASCULAR DEVICE: CPT

## 2019-05-23 PROCEDURE — 80202 ASSAY OF VANCOMYCIN: CPT | Performed by: INTERNAL MEDICINE

## 2019-05-23 PROCEDURE — 25000132 ZZH RX MED GY IP 250 OP 250 PS 637: Performed by: INTERNAL MEDICINE

## 2019-05-23 RX ORDER — OXYCODONE HYDROCHLORIDE 5 MG/1
5-10 TABLET ORAL EVERY 4 HOURS PRN
Status: DISCONTINUED | OUTPATIENT
Start: 2019-05-23 | End: 2019-05-24 | Stop reason: HOSPADM

## 2019-05-23 RX ORDER — HYDROMORPHONE HYDROCHLORIDE 1 MG/ML
.3-.5 INJECTION, SOLUTION INTRAMUSCULAR; INTRAVENOUS; SUBCUTANEOUS
Status: DISCONTINUED | OUTPATIENT
Start: 2019-05-23 | End: 2019-05-24 | Stop reason: HOSPADM

## 2019-05-23 RX ORDER — GADOBUTROL 604.72 MG/ML
11 INJECTION INTRAVENOUS ONCE
Status: COMPLETED | OUTPATIENT
Start: 2019-05-23 | End: 2019-05-23

## 2019-05-23 RX ADMIN — HYDROMORPHONE HYDROCHLORIDE 0.5 MG: 1 INJECTION, SOLUTION INTRAMUSCULAR; INTRAVENOUS; SUBCUTANEOUS at 21:40

## 2019-05-23 RX ADMIN — HYDROMORPHONE HYDROCHLORIDE 0.5 MG: 1 INJECTION, SOLUTION INTRAMUSCULAR; INTRAVENOUS; SUBCUTANEOUS at 17:02

## 2019-05-23 RX ADMIN — HYDROMORPHONE HYDROCHLORIDE 0.5 MG: 1 INJECTION, SOLUTION INTRAMUSCULAR; INTRAVENOUS; SUBCUTANEOUS at 18:40

## 2019-05-23 RX ADMIN — HYDROMORPHONE HYDROCHLORIDE 0.2 MG: 1 INJECTION, SOLUTION INTRAMUSCULAR; INTRAVENOUS; SUBCUTANEOUS at 08:25

## 2019-05-23 RX ADMIN — GABAPENTIN 800 MG: 800 TABLET, FILM COATED ORAL at 15:56

## 2019-05-23 RX ADMIN — SENNOSIDES AND DOCUSATE SODIUM 1 TABLET: 8.6; 5 TABLET ORAL at 08:36

## 2019-05-23 RX ADMIN — OMEPRAZOLE 20 MG: 20 CAPSULE, DELAYED RELEASE ORAL at 15:56

## 2019-05-23 RX ADMIN — GABAPENTIN 800 MG: 800 TABLET, FILM COATED ORAL at 21:35

## 2019-05-23 RX ADMIN — BUPROPION HYDROCHLORIDE 150 MG: 150 TABLET, FILM COATED, EXTENDED RELEASE ORAL at 08:29

## 2019-05-23 RX ADMIN — BUSPIRONE HYDROCHLORIDE 15 MG: 5 TABLET ORAL at 21:34

## 2019-05-23 RX ADMIN — BUSPIRONE HYDROCHLORIDE 15 MG: 5 TABLET ORAL at 08:29

## 2019-05-23 RX ADMIN — HYDROMORPHONE HYDROCHLORIDE 0.2 MG: 1 INJECTION, SOLUTION INTRAMUSCULAR; INTRAVENOUS; SUBCUTANEOUS at 04:16

## 2019-05-23 RX ADMIN — OMEPRAZOLE 20 MG: 20 CAPSULE, DELAYED RELEASE ORAL at 06:34

## 2019-05-23 RX ADMIN — SERTRALINE HYDROCHLORIDE 100 MG: 100 TABLET ORAL at 08:29

## 2019-05-23 RX ADMIN — ASPIRIN 325 MG: 325 TABLET, DELAYED RELEASE ORAL at 08:29

## 2019-05-23 RX ADMIN — FUROSEMIDE 160 MG: 40 TABLET ORAL at 08:29

## 2019-05-23 RX ADMIN — SENNOSIDES AND DOCUSATE SODIUM 1 TABLET: 8.6; 5 TABLET ORAL at 21:39

## 2019-05-23 RX ADMIN — OXYCODONE HYDROCHLORIDE 10 MG: 5 TABLET ORAL at 14:11

## 2019-05-23 RX ADMIN — OXYCODONE HYDROCHLORIDE AND ACETAMINOPHEN 2 TABLET: 5; 325 TABLET ORAL at 02:09

## 2019-05-23 RX ADMIN — GABAPENTIN 800 MG: 800 TABLET, FILM COATED ORAL at 08:29

## 2019-05-23 RX ADMIN — POTASSIUM CHLORIDE 40 MEQ: 1500 TABLET, EXTENDED RELEASE ORAL at 21:36

## 2019-05-23 RX ADMIN — ALTEPLASE 2 MG: 2.2 INJECTION, POWDER, LYOPHILIZED, FOR SOLUTION INTRAVENOUS at 12:37

## 2019-05-23 RX ADMIN — CETIRIZINE HYDROCHLORIDE 10 MG: 10 TABLET, FILM COATED ORAL at 08:29

## 2019-05-23 RX ADMIN — TOPIRAMATE 50 MG: 50 TABLET ORAL at 21:36

## 2019-05-23 RX ADMIN — HYDROMORPHONE HYDROCHLORIDE 0.2 MG: 1 INJECTION, SOLUTION INTRAMUSCULAR; INTRAVENOUS; SUBCUTANEOUS at 00:03

## 2019-05-23 RX ADMIN — POTASSIUM CHLORIDE 40 MEQ: 1500 TABLET, EXTENDED RELEASE ORAL at 08:29

## 2019-05-23 RX ADMIN — VANCOMYCIN HYDROCHLORIDE 1600 MG: 5 INJECTION, POWDER, LYOPHILIZED, FOR SOLUTION INTRAVENOUS at 14:11

## 2019-05-23 RX ADMIN — HYDROXYZINE PAMOATE 25 MG: 25 CAPSULE ORAL at 21:35

## 2019-05-23 RX ADMIN — OXYCODONE HYDROCHLORIDE 10 MG: 5 TABLET ORAL at 05:11

## 2019-05-23 RX ADMIN — GADOBUTROL 11 ML: 604.72 INJECTION INTRAVENOUS at 00:32

## 2019-05-23 RX ADMIN — VANCOMYCIN HYDROCHLORIDE 1600 MG: 5 INJECTION, POWDER, LYOPHILIZED, FOR SOLUTION INTRAVENOUS at 00:08

## 2019-05-23 RX ADMIN — PRAVASTATIN SODIUM 20 MG: 20 TABLET ORAL at 21:36

## 2019-05-23 RX ADMIN — OXYCODONE HYDROCHLORIDE 10 MG: 5 TABLET ORAL at 10:26

## 2019-05-23 RX ADMIN — ACETAMINOPHEN 650 MG: 325 TABLET, FILM COATED ORAL at 03:16

## 2019-05-23 ASSESSMENT — ACTIVITIES OF DAILY LIVING (ADL)
ADLS_ACUITY_SCORE: 11

## 2019-05-23 ASSESSMENT — MIFFLIN-ST. JEOR: SCORE: 1735.88

## 2019-05-23 NOTE — PROVIDER NOTIFICATION
Paged on call Hospitalist    [address]    Pain 8/10. pt. crying in bed.    Wondering if a medication could be added for break through pain.    Thanks,    Tameka  [phone]

## 2019-05-23 NOTE — PHARMACY-VANCOMYCIN DOSING SERVICE
Pharmacy Vancomycin Initial Note  Date of Service May 22, 2019  Patient's  1970  49 year old, female    Indication: Bone and Joint Infection    Current estimated CrCl = Estimated Creatinine Clearance: 125.3 mL/min (based on SCr of 0.68 mg/dL).    Creatinine for last 3 days  2019: 11:45 AM Creatinine 0.68 mg/dL  2019:  5:27 PM Creatinine 0.68 mg/dL    Recent Vancomycin Level(s) for last 3 days  2019: 11:45 AM Vancomycin Level 17.4 mg/L      Vancomycin IV Administrations (past 72 hours)      No vancomycin orders with administrations in past 72 hours.                Nephrotoxins and other renal medications (From now, onward)    Start     Dose/Rate Route Frequency Ordered Stop    19 0900  furosemide (LASIX) tablet 160 mg      160 mg Oral DAILY 19 1927      19 2300  vancomycin (VANCOCIN) 1,600 mg in sodium chloride 0.9 % 500 mL intermittent infusion      1,600 mg  over 2 Hours Intravenous EVERY 12 HOURS 19 2216            Contrast Orders - past 72 hours (72h ago, onward)    None                Plan:  1.  Continue vancomycin  1600 mg IV q12h.   2.  Goal Trough Level: 15-20 mg/L   3.  Pharmacy will check trough levels as appropriate in 1-3 Days.    4. Serum creatinine levels will be ordered daily for the first week of therapy and at least twice weekly for subsequent weeks.    5. Broughton method utilized to dose vancomycin therapy: Method 1    Akil Aliheyder

## 2019-05-23 NOTE — PROGRESS NOTES
X cover [id number]    Dilaudid PRN order for breakthrough pain      [id number]  Percocet changed to oxycodone per patient request

## 2019-05-23 NOTE — PLAN OF CARE
A&Ox4. VSS on RA. Reports susanne foot pain 8/10- PRN IV Dilaudid given x2- decreased pain to 5-6/10 per pt. Independent in room. Feet wrapped with gauze and kerlix. Regular diet. IV Vanco Q24hr. Midline in place and patent. Discharge pending. Nursing will continue to monitor.

## 2019-05-23 NOTE — PROVIDER NOTIFICATION
On call MD paged:     [address]    pt. reports minimal pain relief. PRN Percocet Tylenol and dilaudid given.     Thanks,    Varsha    [phone]    MD ordered Oxycodone and discontinued Percocet.

## 2019-05-23 NOTE — PROGRESS NOTES
Shalonda Howard  2019  Hospital day 1 R foot pain/cellulitis    Admitted through ED at request of Dr. Bocanegra after concern for infection in R foot @ recheck visit.  Temperatures:  Current - Temp: 98.6  F (37  C); Max - Temp  Av.9  F (37.2  C)  Min: 98.6  F (37  C)  Max: 99.5  F (37.5  C)  Pulse range: Pulse  Av  Min: 96  Max: 96  Blood pressure range: Systolic (24hrs), Av , Min:132 , Max:169   ; Diastolic (24hrs), Av, Min:74, Max:88    CMS: intact to base B LE  Labs:per medicine    PLAN:Eval MRI.  Possible HW removal based on scan results. Transfer to  if possible.  Additional problems to be followed by medicine physician

## 2019-05-23 NOTE — PROVIDER NOTIFICATION
Paged on call MD.    Reason: Verify if pt.'s PICC is able to be used.    MD response: page SANA HINSON the MD who read the PICC placement results results, MD on till 11p.    Spoke with MD HINSON,Confirmed PICC is okay to use.

## 2019-05-23 NOTE — PLAN OF CARE
DATE & TIME: 5/22-23 11p-7a  ORIENTATION: A&Ox4  BEHAVIOR & AGGRESSION TOOL COLOR: green  ABNL VS/O2: VSS on RA  MOBILITY: Independent  PAIN MANAGMENT: PRN perocet, Tylenol and dilaudid for break through pain. Little relief with medications. MD notified, Oxycodone was ordered and Perocet was discontinued.   DIET: Regular  BOWEL/BLADDER: Continent  DRAIN/DEVICES: PICC RUE  SKIN: Bilateral pressure ulcers on heels  TESTS/PROCEDURES: MRI completed PICC placement checked, Okay to use.  D/C DAY/GOALS/PLACE: ortho consult, Continue antibiotics  OTHER IMPORTANT INFO:  Pt. Wears boots on both legs when ambulating. Message left for IV team about pt. PICC.

## 2019-05-23 NOTE — PROGRESS NOTES
[facility]    Hospitalist Progress Note    Date of Service (when I saw the patient): 05/23/2019    Assessment & Plan   Shalonda Howard is a 49 year old female who was admitted on 5/22/2019.  Assessment & Plan     Shalonda Howard is a 49 year old female with possible Lupus who presents with increased redness and swelling of right foot -- possible cellulitis, admitted from ID clinic for further evaluation and treatment:     Summary:    Principal Problem:    Cellulitis of right foot with chronic wound infection  -- still has hardware in place from Bunion surgery Aug 2018              -- Continue IV Vanco (pharmacy to dose)              -- ID consulted (they have been following) out pt               -- Ortho consulted and are following Re the need for hardware removal               -- MRI of the right foot showed cellulitis of the wound and surrounding wound tissue. Negative for osteomyelitis        Active Problems:    Left foot Postop Wound infection -- was here 4/21 to4/25/19, hardware removed              -- continue Vanco as ordered for now              -- await ID recommendations.        Possible Systemic lupus erythematosus (H)              -- not on any treatment for this (diagnosis is not definite)       Foot ulcer on Heel bilaterally               -- wound care nurse for evaluation        Plan: as above     DVT Prophylaxis: Pneumatic Compression Devices  Code Status: Full Code     Disposition: Expected discharge ?2-3 days       Darlene Marinelli MD  [phone] (P)      Interval History   Right foot cellulitis improving on IV vancomycin. ID and Ortho consults placed and are following     -Data reviewed today: I reviewed all new labs and imaging results over the last 24 hours. I personally reviewed the MRI results showing right foot wound surrounding cellulitis with no osteomyelitis     Physical Exam   Temp: 98.2  F (36.8  C) Temp src: Oral BP: 114/67 Pulse: 81 Heart Rate: 87 Resp: 20 SpO2: 99 % O2  Device: None (Room air)    Vitals:    05/22/19 1640 05/22/19 1938 05/23/19 0622   Weight: 90.7 kg (200 lb) 108.4 kg (239 lb) 110 kg (242 lb 8.1 oz)     Vital Signs with Ranges  Temp:  [98.2  F (36.8  C)-99.5  F (37.5  C)] 98.2  F (36.8  C)  Pulse:  [81-96] 81  Heart Rate:  [] 87  Resp:  [18-20] 20  BP: (114-169)/(67-88) 114/67  SpO2:  [96 %-100 %] 99 %  I/O last 3 completed shifts:  In: 420 [P.O.:420]  Out: 700 [Urine:700]    Constitutional: Awake, alert, cooperative, no apparent distress  Respiratory: Clear to auscultation bilaterally, no crackles or wheezing  Cardiovascular: Regular rate and rhythm, normal S1 and S2, and no murmur noted  GI: Normal bowel sounds, soft, non-distended, non-tender  Skin/Integumen: No rashes, no cyanosis, no edema. Bilateral lower extremity chronic wounds and right foot cellulitis is improving   Other:     Medications       alteplase  2 mg Intravenous Q2H     aspirin  325 mg Oral Daily     buPROPion  150 mg Oral Daily     busPIRone  15 mg Oral BID     cetirizine  10 mg Oral Daily     furosemide  160 mg Oral Daily     gabapentin  800 mg Oral TID     hydrOXYzine  25 mg Oral At Bedtime     omeprazole  20 mg Oral BID AC     potassium chloride ER  40 mEq Oral BID     pravastatin  20 mg Oral QPM     sertraline  100 mg Oral Daily     topiramate  50 mg Oral At Bedtime     vancomycin (VANCOCIN) IV  1,600 mg Intravenous Q12H       Data   Recent Labs   Lab 05/23/19  0855 05/22/19  1727 05/20/19  1145   WBC  --  6.6 5.5   HGB  --  10.4* 10.6*   MCV  --  73* 74*   PLT  --  366 419   NA  --  135  --    POTASSIUM  --  3.8  --    CHLORIDE  --  106  --    CO2  --  22  --    BUN  --  8 11   CR 0.81 0.68 0.68   ANIONGAP  --  7  --    PAULINE  --  8.8  --    GLC  --  106*  --        Recent Results (from the past 24 hour(s))   Ankle XR, G/E 3 views, right    Narrative    ANKLE RIGHT THREE OR MORE VIEWS   5/22/2019 5:45 PM     INDICATION: Right foot pain/ankle pain.    COMPARISON: None.      Impression     IMPRESSION: Mild soft tissue swelling about the ankle. No acute  fracture or dislocation. Postoperative changes in the hindfoot with a  long screw across the talocalcaneal joint and plate and screw fusion  across the talonavicular joint.  Degenerative changes in the hindfoot.  Plantar calcaneal spur.    MCKENZIE CROWLEY MD   Foot  XR, G/E 3 views, right    Narrative    FOOT RIGHT THREE OR MORE VIEWS   5/22/2019 5:46 PM     INDICATION: Right foot pain and erythema, concern for osteomyelitis.    COMPARISON: None.      Impression    IMPRESSION: Postoperative changes of bunion repair right great toe.  Degenerative changes in the mid and hindfoot with a long screw across  the talocalcaneal joint and plate and screw fixation across the  talonavicular joint. Moderate degenerative changes in the mid and  hindfoot. No acute fractures. There appears to be an ulcer deformity  along the inferior aspect of the heel below the calcaneus seen on the  lateral view.    MCKENZIE CROWLEY MD   XR Chest Port 1 View    Narrative    CHEST ONE VIEW PORTABLE   5/22/2019 8:28 PM     HISTORY: PICC placement verification.    COMPARISON: 1/28/2019      Impression    IMPRESSION: PICC line from the right arm with the tip in the distal  superior vena cava. This could be advanced about 3 cm for optimal  positioning.    SANA HINSON MD   MR Ankle Right w/o & w Contrast    Narrative    MRI RIGHT ANKLE WITHOUT AND WITH INTRAVENOUS CONTRAST   5/23/2019 1:58  AM    HISTORY: Pain, swelling, and erythema. Diabetes. The concern is for  osteomyelitis. The patient had surgery on 8/29/2018.    COMPARISON: Radiographs on 5/22/2019.    TECHNIQUE: Multiplanar MR imaging was performed to the right ankle  before and after the uneventful intravenous administration of 11 mL  Gadavist contrast. A marker was placed over the site of clinical  concern along the plantar aspect of the hindfoot and heel.    FINDINGS:  Osseous and Cartilaginous Structures: There are  surgical changes of a  cancellous screw spanning the posterior subtalar joint with additional  hardware spanning the talonavicular joint. This results in moderate  adjacent metal artifact. There is a prominent plantar calcaneal spur  with no associated marrow edema. There are mild degenerative changes  of the articulation of the calcaneus and cuboid. There may be mild  degenerative changes elsewhere in the midfoot, but this is obscured by  the surgical changes. No other abnormal marrow signal intensity is  identified. No talar dome osteochondral lesion.    Posterior Tibial and Flexor Tendons:  No tear or significant  tendinosis of the posterior tibial tendon, flexor digitorum longus  tendon, or flexor hallucis longus tendon.     Peroneal Tendons:  No tear or significant tendinosis of the peroneal  brevis tendon or peroneal longus tendon.     Achilles Tendon:  There is prominent thickening of the Achilles tendon  with no tear or abnormal signal seen.     Extensor Tendons:  No tear or significant tendinosis of the anterior  tibial tendon, extensor hallucis longus tendon, or extensor digitorum  longus tendon.     Lateral Ligaments:  The anterior talofibular ligament is obscured by  surgical metal artifact and therefore cannot be evaluated. The  calcaneofibular, posterior talofibular, and anterior tibiofibular  ligaments are unremarkable.     Medial Deltoid Ligamentous Complex:  Unremarkable.     Plantar Fascia:  There is moderate thickening of the fascia near its  calcaneal origin. No actual tear or abnormal signal is seen.     Additional Findings: Moderate-sized tibiotalar joint effusion. No  retrocalcaneal bursitis. No mass within the tarsal tunnel. The sinus  tarsi is unremarkable. At the indicated site of clinical concern,  there is a large apparent open wound plantar to the calcaneus. There  is prominent associated soft tissue edema. However, a distinct focal  abscess or mass is not seen. No other soft tissue  abnormality is  noted.      Impression    IMPRESSION:    1. Large open wound plantar to the calcaneus. There is surrounding  cellulitis with no evidence of an abscess or osteomyelitis.  2. Surgical changes of the hindfoot which obscures adjacent  structures.  3. Chronic-appearing Achilles tendinosis and plantar fasciitis.     BURKE DRIVER MD

## 2019-05-23 NOTE — CONSULTS
[facility]/POD[facility]ATRY CONSULTATION  May 23, 2019      ASSESSMENT: 48 y/o female with b/l chronic heel ulcerations, s/p b/l bunion repair with wound healing complications, infection, and subsequent L partial 1st ray amputation.  Admitted from ID clinic for R foot cellulitis.  Hx of neuropathy, possible lupus.     PLAN:  Reviewed patient's chart in epic.  Discussed condition and treatment options including pros and cons.    R ankle MR neg for osteomyelitis or abscess.    L side does not appear acutely infected, but will order heel XRs to eval for deeper infection.  Dr. Dias will f/u on these tomorrow.    Wound care per WOC RN.    I don't seen a need for urgent ulcer debridement.  Wounds appear stable.    Offloading advised.  Pt is at risk for further amputation.    Will defer further care plans, operative or otherwise, to Ortho at this time, given this is their operative patient.    Juan Antonio Mccarty DPM, FACFAS  Pager: [phone]      PATIENT HISTORY:  Shalonda Howard is a 49 year old female who was admitted for R foot cellulitis from ID clinic.  Long hx of b/l foot surgery and infections.  Pt had b/l bunion repair in Aug 2018.  Pt with multiple admissions for infection, nonhealing L foot wound, subsequent L first ray partial amputation in March of this year.  Pt also admitted about a month ago with concern for L septic ankle with I&D.  Pt reports recent days of increasing R foot redness, swelling, pain.  Possible lupus.  Pt's primary surgeon is Dr. Harman at [facility].    I was requested to see this patient for this issue by Dr Ignacia Bocanegra.    Review of Systems:  Patient denies current f/c.  Rest of 10 pt ROS neg except for HPI.     PAST MEDICAL HISTORY:   Past Medical History:   Diagnosis Date     Allergic rhinitis, cause unspecified      Anxiety state, unspecified      Cellulitis and abscess of leg, except foot      Closed fracture of unspecified part of tibia      Disuse osteoporosis       Dysthymic disorder      Edema      Encounter for long-term (current) use of other medications      Esophageal reflux      Kidney stones      Myalgia and myositis, unspecified      Obesity, unspecified      Open wound of foot except toe(s) alone, complicated      Opioid type dependence, unspecified      Other acne      Other congenital valgus deformity of feet      Other ventral hernia without mention of obstruction or gangrene      Polycystic ovaries      PONV (postoperative nausea and vomiting)      Systemic lupus erythematosus (H)      Tibialis tendinitis      Unspecified hereditary and idiopathic peripheral neuropathy         PAST SURGICAL HISTORY:   Past Surgical History:   Procedure Laterality Date     APPENDECTOMY       APPLY WOUND VAC Left 1/24/2019    Procedure: WOUND VAC APPLICATION;  Surgeon: Miguel Mosher MD;  Location:  OR     ARTHRODESIS FOOT Left 2/4/2015    Procedure: ARTHRODESIS FOOT;  Surgeon: Andre Harman MD;  Location: [facility]     ARTHROSCOPY ANKLE Left 4/22/2019    Procedure: ARTHROSCOPIC IRRIGATION AND DEBRIDEMENT LEFT ANKLE. HARDWARE REMOVAL;  Surgeon: Andre Harman MD;  Location:  OR     BUNIONECTOMY RT/LT  08/29/2018     DILATION AND CURETTAGE       EXCISE TOENAIL(S) Left 2/4/2015    Procedure: EXCISE TOENAIL(S);  Surgeon: Andre Harman MD;  Location: [facility]     HERNIA REPAIR      umbilical     HERNIA REPAIR      ventral open x 2     IRRIGATION AND DEBRIDEMENT FOOT, COMBINED Left 9/26/2018    Procedure: COMBINED IRRIGATION AND DEBRIDEMENT FOOT;  IRRIGATION AND DEBRIDEMENT LEFT FOOT;  Surgeon: Andre Harman MD;  Location: [facility]     IRRIGATION AND DEBRIDEMENT FOOT, COMBINED Left 11/26/2018    Procedure: COMBINED IRRIGATION AND DEBRIDEMENT LEFT FOOT;  Surgeon: Andre Harman MD;  Location: [facility]     IRRIGATION AND DEBRIDEMENT FOOT, COMBINED Left 1/24/2019    Procedure: LEFT HALLUX WOUND IRRIGATION AND DEBRIDEMENT WITH BONE BIOPSY;  Surgeon: Nomi  Miguel Hendricks MD;  Location:  OR     IRRIGATION AND DEBRIDEMENT FOOT, COMBINED Left 3/20/2019    Procedure: LEFT FOOT IRRIGATION AND  DEBRIDEMENT, FIRST RAY RESECTION AND HARDWARE REMOVAL FROM LEFT FOOT;  Surgeon: Andre Harman MD;  Location:  OR     REMOVE HARDWARE LOWER EXTREMITY Left 3/20/2019    Procedure: REMOVE HARDWARE LOWER EXTREMITY;  Surgeon: Andre Harman MD;  Location:  OR     REPAIR HAMMER TOE Left 11/26/2018    Procedure: REPAIR HAMMER TOE;  Surgeon: Andre Harman MD;  Location:  SD     TONSILLECTOMY          MEDICATIONS:   Current Facility-Administered Medications:      acetaminophen (TYLENOL) tablet 650 mg, 650 mg, Oral, Q4H PRN, Armando Lopez MD, 650 mg at 05/23/19 0316     aspirin (ASA) EC tablet 325 mg, 325 mg, Oral, Daily, Armando Lopez MD, 325 mg at 05/23/19 0829     buPROPion (WELLBUTRIN XL) 24 hr tablet 150 mg, 150 mg, Oral, Daily, Armando Lopez MD, 150 mg at 05/23/19 0829     busPIRone (BUSPAR) tablet 15 mg, 15 mg, Oral, BID, Armando Lopez MD, 15 mg at 05/23/19 0829     cetirizine (zyrTEC) tablet 10 mg, 10 mg, Oral, Daily, Armando Lopez MD, 10 mg at 05/23/19 0829     furosemide (LASIX) tablet 160 mg, 160 mg, Oral, Daily, Armando Lopez MD, 160 mg at 05/23/19 0829     gabapentin (NEURONTIN) tablet 800 mg, 800 mg, Oral, TID, Armando Lopez MD, 800 mg at 05/23/19 1556     HYDROmorphone (PF) (DILAUDID) injection 0.3-0.5 mg, 0.3-0.5 mg, Intravenous, Q1H PRN, Darlene Marinelli MD, 0.5 mg at 05/23/19 1840     hydrOXYzine (VISTARIL) capsule 25 mg, 25 mg, Oral, At Bedtime, Armando Lopez MD, 25 mg at 05/22/19 2201     melatonin tablet 1 mg, 1 mg, Oral, At Bedtime PRN, Armando Lopez MD     naloxone (NARCAN) injection 0.1-0.4 mg, 0.1-0.4 mg, Intravenous, Q2 Min PRN, Armando Lopez MD     omeprazole (priLOSEC) CR capsule 20 mg, 20 mg, Oral, BID AC, Armando Lopez,  MD, 20 mg at 05/23/19 1556     ondansetron (ZOFRAN-ODT) ODT tab 4 mg, 4 mg, Oral, Q6H PRN **OR** ondansetron (ZOFRAN) injection 4 mg, 4 mg, Intravenous, Q6H PRN, Armando Lopez MD     oxyCODONE (ROXICODONE) tablet 5-10 mg, 5-10 mg, Oral, Q4H PRN, Gage Manjarrez MD, 10 mg at 05/23/19 1411     polyethylene glycol (MIRALAX/GLYCOLAX) Packet 17 g, 17 g, Oral, Daily PRN, Armando Lopez MD     potassium chloride ER (K-DUR/KLOR-CON M) CR tablet 40 mEq, 40 mEq, Oral, BID, Armando Lopez MD, 40 mEq at 05/23/19 0829     pravastatin (PRAVACHOL) tablet 20 mg, 20 mg, Oral, QPM, Armando Lopez MD, 20 mg at 05/22/19 1947     senna-docusate (SENOKOT-S/PERICOLACE) 8.6-50 MG per tablet 1 tablet, 1 tablet, Oral, BID PRN, Armando Lopez MD, 1 tablet at 05/23/19 0836     sertraline (ZOLOFT) tablet 100 mg, 100 mg, Oral, Daily, Armando Lopez MD, 100 mg at 05/23/19 0829     topiramate (TOPAMAX) tablet 50 mg, 50 mg, Oral, At Bedtime, Armando Lopez MD, 50 mg at 05/22/19 2201     vancomycin (VANCOCIN) 1,600 mg in sodium chloride 0.9 % 500 mL intermittent infusion, 1,600 mg, Intravenous, Q12H, Armando Lopez MD, Last Rate: 0 mL/hr at 05/23/19 0025, 1,600 mg at 05/23/19 1411     ALLERGIES:    Allergies   Allergen Reactions     Sulfa Drugs Shortness Of Breath and Rash     Demerol [Meperidine] Nausea and Vomiting     Erythromycin Nausea and Vomiting     Metformin Nausea and Vomiting     Codeine Rash     Penicillins Rash        SOCIAL HISTORY:   Social History     Socioeconomic History     Marital status:      Spouse name: Not on file     Number of children: 1     Years of education: Not on file     Highest education level: Not on file   Occupational History     Occupation: unemployed   Social Needs     Financial resource strain: Not on file     Food insecurity:     Worry: Not on file     Inability: Not on file     Transportation needs:     Medical: Not  "on file     Non-medical: Not on file   Tobacco Use     Smoking status: Former Smoker     Packs/day: 0.50     Years: 12.00     Pack years: 6.00     Types: Cigarettes     Last attempt to quit: 10/1/2002     Years since quittin.6     Smokeless tobacco: Never Used   Substance and Sexual Activity     Alcohol use: Yes     Comment: < 1 drink a week      Drug use: No     Sexual activity: Not on file   Lifestyle     Physical activity:     Days per week: Not on file     Minutes per session: Not on file     Stress: Not on file   Relationships     Social connections:     Talks on phone: Not on file     Gets together: Not on file     Attends Druze service: Not on file     Active member of club or organization: Not on file     Attends meetings of clubs or organizations: Not on file     Relationship status: Not on file     Intimate partner violence:     Fear of current or ex partner: Not on file     Emotionally abused: Not on file     Physically abused: Not on file     Forced sexual activity: Not on file   Other Topics Concern     Parent/sibling w/ CABG, MI or angioplasty before 65F 55M? Not Asked   Social History Narrative    , 1 child, unemployed.  (last updated 2019)         FAMILY HISTORY:   Family History   Problem Relation Age of Onset     Pulmonary Hypertension Mother      Esophageal Cancer Father      Heart Disease Maternal Grandfather      Esophageal Cancer Paternal Grandfather         EXAM:Vitals: /78 (BP Location: Left arm)   Pulse 81   Temp 99.1  F (37.3  C) (Oral)   Resp 18   Ht 1.651 m (5' 5\")   Wt 110 kg (242 lb 8.1 oz)   LMP 05/15/2019   SpO2 91%   BMI 40.36 kg/m    BMI= Body mass index is 40.36 kg/m .    General appearance: Patient is alert and fully cooperative with history & exam.  No sign of distress is noted during the visit.     Psychiatric: Affect is pleasant & appropriate.  Patient appears motivated to improve health.     Respiratory: Breathing is regular & unlabored while " sitting.     HEENT: Hearing is intact to spoken word.  Speech is clear.  No gross evidence of visual impairment that would impact ambulation.     Dermatologic: B/l plantar heels with ulcerations with exposed fat layer.  Right is 4 x 3.5cm, left is 3.4 x 3.5 cm.  No deep probing b/l.  Mild surrounding erythema on R.  Dry stable appearing eschar to tip of R 3rd toe.  Evidence of injury to 3rd toenail with mild redness at base of nail.  Nail is well adhered.  No other significant sign of infection.  L 2nd toe with small dry appearing eschar.  No significant erythema on L.       Vascular: DP & PT pulses are intact & regular bilaterally.  CFT and skin temperature are normal to both lower extremities.     Neurologic: Lower extremity sensation is diminished to light touch b/l.     Musculoskeletal: Patient is ambulatory without assistive device or brace.  No gross ankle deformity noted.  No foot or ankle joint effusion is noted.       Imaging reviewed with pt:    XR R ankle:    ANKLE RIGHT THREE OR MORE VIEWS   5/22/2019 5:45 PM      INDICATION: Right foot pain/ankle pain.     COMPARISON: None.                                                                      IMPRESSION: Mild soft tissue swelling about the ankle. No acute  fracture or dislocation. Postoperative changes in the hindfoot with a  long screw across the talocalcaneal joint and plate and screw fusion  across the talonavicular joint.  Degenerative changes in the hindfoot.  Plantar calcaneal spur.     MCKENZIE CROWLEY MD        XR R foot:    FOOT RIGHT THREE OR MORE VIEWS   5/22/2019 5:46 PM      INDICATION: Right foot pain and erythema, concern for osteomyelitis.     COMPARISON: None.                                                                      IMPRESSION: Postoperative changes of bunion repair right great toe.  Degenerative changes in the mid and hindfoot with a long screw across  the talocalcaneal joint and plate and screw fixation across  the  talonavicular joint. Moderate degenerative changes in the mid and  hindfoot. No acute fractures. There appears to be an ulcer deformity  along the inferior aspect of the heel below the calcaneus seen on the  lateral view.     MCKENZIE CROWLEY MD          MR R ankle:    IMPRESSION:    1. Large open wound plantar to the calcaneus. There is surrounding  cellulitis with no evidence of an abscess or osteomyelitis.  2. Surgical changes of the hindfoot which obscures adjacent  structures.  3. Chronic-appearing Achilles tendinosis and plantar fasciitis.      BURKE DRIVER MD          Lab Results   Component Value Date    WBC 6.6 05/22/2019     Lab Results   Component Value Date    RBC 4.59 05/22/2019     Lab Results   Component Value Date    HGB 10.4 05/22/2019     Lab Results   Component Value Date    HCT 33.6 05/22/2019     No components found for: MCT  Lab Results   Component Value Date    MCV 73 05/22/2019     Lab Results   Component Value Date    MCH 22.7 05/22/2019     Lab Results   Component Value Date    MCHC 31.0 05/22/2019     Lab Results   Component Value Date    RDW 16.3 05/22/2019     Lab Results   Component Value Date     05/22/2019

## 2019-05-23 NOTE — PHARMACY-VANCOMYCIN DOSING SERVICE
Pharmacy Vancomycin Note  Date of Service May 23, 2019  Patient's  1970   49 year old, female    Indication: Bone and Joint Infection  Goal Trough Level: 15-20 mg/L  Day of Therapy: unknown  Current Vancomycin regimen:  1600 mg IV q12h    Current estimated CrCl = Estimated Creatinine Clearance: 106 mL/min (based on SCr of 0.81 mg/dL).    Creatinine for last 3 days  2019:  5:27 PM Creatinine 0.68 mg/dL  2019:  8:55 AM Creatinine 0.81 mg/dL    Recent Vancomycin Levels (past 3 days)  2019:  1:06 PM Vancomycin Level 18.8 mg/L    Vancomycin IV Administrations (past 72 hours)                   vancomycin (VANCOCIN) 1,600 mg in sodium chloride 0.9 % 500 mL intermittent infusion ()  Restarted 19 0211     1,600 mg New Bag  0008                Nephrotoxins and other renal medications (From now, onward)    Start     Dose/Rate Route Frequency Ordered Stop    19 0900  furosemide (LASIX) tablet 160 mg      160 mg Oral DAILY 19 1927      19 2300  vancomycin (VANCOCIN) 1,600 mg in sodium chloride 0.9 % 500 mL intermittent infusion      1,600 mg  over 2 Hours Intravenous EVERY 12 HOURS 19 2216               Contrast Orders - past 72 hours (72h ago, onward)    Start     Dose/Rate Route Frequency Ordered Stop    19 0030  gadobutrol (GADAVIST) injection 11 mL      11 mL Intravenous ONCE 19 0019 19 0032          Interpretation of levels and current regimen:  Trough level is  Therapeutic    Has serum creatinine changed > 50% in last 72 hours: No    Urine output:  unable to determine    Renal Function: Worsening    Plan:  1.  Continue Current Dose  2.  Pharmacy will check trough levels as appropriate in 3-5 Days.    3. Serum creatinine levels will be ordered daily for the first week of therapy and at least twice weekly for subsequent weeks.      David Small        .

## 2019-05-23 NOTE — PROGRESS NOTES
[facility]  Patient is currently open to home care services with [facility]. The patient is currently receiving Skilled Nursing services. Critical access hospital  and team have been notified of patient admission. Critical access hospital liaison will continue to follow patient during stay. If appropriate provide orders to resume home care at time of discharge.

## 2019-05-23 NOTE — PHARMACY
"The following home medications were NOT continued on inpatient admission per \"Discontinuation of nonessential home medications during hospitalization\" policy: Phentermine 30 mg daily    If a therapeutic holiday is deemed inappropriate per the prescriber, please notify the pharmacist regarding the medication order.    The pharmacist is available to answer any questions and/or concerns the patient may have regarding discontinuation of non-essential medications.    Please ensure that these medications are restarted as needed upon discharge via the medication reconciliation discharge process and included on the discharge medication reconciliation report.    Thank you,  Akil Aliheyder    "

## 2019-05-23 NOTE — PROGRESS NOTES
[facility] Nurse Inpatient Wound Assessment     Initial Assessment of wound(s) on pt's:   Bilateral feet        Data:   Patient History:      per MD note(s): 49 year old female with possible Lupus who presents with increased redness and swelling of right foot -- possible cellulitis, admitted from ID clinic for further evaluation and treatment:     Summary:    Principal Problem:    Cellulitis of right foot -- still has hardware in place from Bunion surgery Aug 2018              -- Continue IV Vanco (pharmacy to dose)              -- ID consult (they have been following)              -- Ortho consult -- ?any need to remove hardware from right foot?              -- will get MR of right foot     Active Problems:    Left foot Postop Wound infection -- was here 4/21 to4/25/19, hardware removed              -- continue Vanco as ordered for now              -- await ID recommendations.         Eduardo Risk Assessment  Sensory Perception: 4-->no impairment    Moisture: 4-->rarely moist   Activity: 3-->walks occasionally     Mobility: 4-->no limitation   Nutrition: 3-->adequate   Eduardo Score: 21    Positioning: Pillows,     Mattress:  Standard , Atmos Air mattress    Moisture Management:  continent    Current Diet / Nutrition:       Orders Placed This Encounter        Combination Diet Regular Diet Adult        Labs:   Recent Labs   Lab Test 05/22/19  1727  03/19/19  1941  10/15/18  2125   ALBUMIN  --   --  3.2*   < >  --    HGB 10.4*   < > 10.8*   < >  --    WBC 6.6   < > 9.1   < >  --    A1C  --   --   --   --  5.6   CRP 10.9*   < > 37.4*   < >  --     < > = values in this interval not displayed.       Wound Assessment (location):   Bilateral feet  Wound History:  Pt with numerous ortho surgeries to bilateral feet.  Per pt she developed wounds on bilateral plantar heels from ortho off loading boots.  When I walked into the room for my assessment pt was sitting on the edge of the bed, both feet on the  floor.  One dressing was fully pushed off to the side, kerlix bunched around foot and ankle with wound on the floor, the other heel was covered and on the floor.  When she swung her legs independently into the bed for me to assess there was a slightly moist shadow on the floor where the one wound had rested and when I went to remove the other dressing it was a mix of moist to dry and stuck to the wound bed.  1. Bilateral heels- similar wound bed appearance, with both wounds feel slightly soft, not normal feeling tissue.  No obvious slough in the wound beds. Moist, pale pink to red, very slight tan appearance to small area on the right foot.   Each wound has a slightly deeper depression noted in the center of the wound.  No pain with probing and pressing with my finger or the soft and wooden head of the q tip.  Moderate serous to serosanguinous  drainage.  No odor.  Ring of red periwound erythema to both wounds.  The wound on the right heel has several areas of nonblanchable maroon erythema on the periwound tissue.  Right heel= 4.0cm x 3.5cm x up to 1.2cm of the moist wound bed with total area, including the erythema is up to 5cm x 4cm   L heel is 3.4cm x 3.5cm x up to 1cm  2. Tip of right 3rd toe- hard dry crusting on the center of the toe, under nail and on either side of this there is old, bloody drainage with some moist drainage noted, no pain.  3. Tip of left second toe- again dry crusting with some slightly moist bloody drainage noted, this seems to becoming from a fissure along the lateral tip.  No pain  05-23-19 05-23-19  Right second toe                              05-23-19 left 2nd toe                Intervention:     Patient's chart evaluated.      Wound(s) wounds assessed with the RN as noted above    Orders  Written    Supplies  reviewed    Discussed plan of care with Patient, Nurse and Jon chiu for WOCs to order dresssings             All patient / family questions answered:   YES          Assessment:     Pt clearly not taking her wound care seriously, her open wounds were on the floor when I entered the room.  In a previous admission she had also had her feet on the floor.  Her old socks from home are stained with old, dried drainage and draped across her hard, off loading boots.           Plantar bilateral feet with poor skin hygiene.  The tissue on the feet is very dry, the wound beds on the heels look like quiet wounds probably with a biofilm or at least large bio burden present.  She has old bloody drainage around the toe wounds.  Will write orders for good skin care and wound care BID to try to revitalize the wounds.  However, as I discussed with pt she needs to keep her bare feet and wounds off the floor and wear clean socks everyday.  I also discussed with her keeping the boots clean        Plan:     Nursing to notify the Provider(s) and re-consult the [facility] Nurse if wound(s) deteriorate(s) or if the wound care plan needs reevaluation.    Plan of care for wounds on bilateral feet: BID and prn  1. Once a day fully clean feet with gentle soap and water, getting between toes and rinsing, dry  2. Spray with Rachell Cleanse and Protect from toes to knees, not between toes  3. Lotion from toes to knees, not between toes  BID- wound care (bilateral heels and tips of toes w wounds)  1. Clean the wounds with VASHE Solution (#[id number])- wet gauze, scrub and press to wound bed x 10 minutes, remove, rinse.  2. Moisten 4-  2x2 gauze with VASHE and press, flat to wound bed  3. Cover with ABD and secure with Kim  Time and date dressing change  Keep wounds elevated at all times.  Ask pt to keep bare feet off the floor. Leave a pad on the floor for her to put her feet on, if necessary     WO Nurse will return: weekly and prn

## 2019-05-23 NOTE — CONSULTS
[facility]    Infectious Disease Consultation     Date of Admission:  5/22/2019  Date of Consult (When I saw the patient): 05/23/19    Assessment & Plan   Shalonda Howard is a 49 year old female who was admitted on 5/22/2019.     Impression:  1. This is a 49 y.o familiar to me from multiple prior admissions.   2. She has a history of bilateral bunion repair surgery done in August 2018. The right side healed intially but the left side the incision has not been healing , I and D and 6 weeks of IV antibiotics and many more weeks of oral antibiotics since the end of August 2018. Then repeat admission in November, more I and D and another course of IV antibiotics. then another admission in January, again, wound non healing, s/p another I and D and 2 antibiotics for 6 weeks. Readmitted in March with continued wound non healing, concern for underlying osteo, s/p first ray amputation, hardware removal and ulcer debridement 3/20.  3. Last admission in April for left septic ankle joint with staph hominis and staph epi in the cultures, is still on vanco for this close to 4 weeks. This time the last remaining piece of hardware in the left foot was also taken out.   4. She also has chronic heel ulcers bilateral.   5. PCN allergy, tolerates zosyn/cephalosporins, sulfa allergy.   6. Yesterday she was seen in my clinic because when recently seen in ortho clinic for left foot suture removal she complained of continued pain in the left foot, while on IV vanco.   Also the right foot which has not caused pain but has had chronic ulcer in th heel is also starting to hurt and is swollen and red. She is very frustrated. She is in a lot of pain.     Recommendations:   Discussed case with ortho multiple times, given no gross involvement in the MRI of the multiple hardware involved no surgical intervention has been planned.  Podiatry consulted.   Continue on IV vancomycin. Will follow.       Ignacia Bocanegra MD    Reason for  Consult   Reason for consult: I was asked by Dr. Lopez to evaluate this patient for right foot infection.    Primary Care Physician   Linda Bernstein    Chief Complaint   Right foot swelling, redness and drainage from the planter chronic ulcer    History is obtained from the patient and medical records    History of Present Illness   Shalonda Howard is a 49 year old female who presents with    Past Medical History   I have reviewed this patient's medical history and updated it with pertinent information if needed.   Past Medical History:   Diagnosis Date     Allergic rhinitis, cause unspecified      Anxiety state, unspecified      Cellulitis and abscess of leg, except foot      Closed fracture of unspecified part of tibia      Disuse osteoporosis      Dysthymic disorder      Edema      Encounter for long-term (current) use of other medications      Esophageal reflux      Kidney stones      Myalgia and myositis, unspecified      Obesity, unspecified      Open wound of foot except toe(s) alone, complicated      Opioid type dependence, unspecified      Other acne      Other congenital valgus deformity of feet      Other ventral hernia without mention of obstruction or gangrene      Polycystic ovaries      PONV (postoperative nausea and vomiting)      Systemic lupus erythematosus (H)      Tibialis tendinitis      Unspecified hereditary and idiopathic peripheral neuropathy        Past Surgical History   I have reviewed this patient's surgical history and updated it with pertinent information if needed.  Past Surgical History:   Procedure Laterality Date     APPENDECTOMY       APPLY WOUND VAC Left 1/24/2019    Procedure: WOUND VAC APPLICATION;  Surgeon: Miguel Mosher MD;  Location:  OR     ARTHRODESIS FOOT Left 2/4/2015    Procedure: ARTHRODESIS FOOT;  Surgeon: Andre Harman MD;  Location:  [facility]     ARTHROSCOPY ANKLE Left 4/22/2019    Procedure: ARTHROSCOPIC IRRIGATION AND DEBRIDEMENT LEFT ANKLE.  HARDWARE REMOVAL;  Surgeon: Andre Harman MD;  Location:  OR     BUNIONECTOMY RT/LT  08/29/2018     DILATION AND CURETTAGE       EXCISE TOENAIL(S) Left 2/4/2015    Procedure: EXCISE TOENAIL(S);  Surgeon: Andre Harman MD;  Location: [facility]     HERNIA REPAIR      umbilical     HERNIA REPAIR      ventral open x 2     IRRIGATION AND DEBRIDEMENT FOOT, COMBINED Left 9/26/2018    Procedure: COMBINED IRRIGATION AND DEBRIDEMENT FOOT;  IRRIGATION AND DEBRIDEMENT LEFT FOOT;  Surgeon: Andre Harman MD;  Location: [facility]     IRRIGATION AND DEBRIDEMENT FOOT, COMBINED Left 11/26/2018    Procedure: COMBINED IRRIGATION AND DEBRIDEMENT LEFT FOOT;  Surgeon: Andre Harman MD;  Location: [facility]     IRRIGATION AND DEBRIDEMENT FOOT, COMBINED Left 1/24/2019    Procedure: LEFT HALLUX WOUND IRRIGATION AND DEBRIDEMENT WITH BONE BIOPSY;  Surgeon: Miguel Mosher MD;  Location:  OR     IRRIGATION AND DEBRIDEMENT FOOT, COMBINED Left 3/20/2019    Procedure: LEFT FOOT IRRIGATION AND  DEBRIDEMENT, FIRST RAY RESECTION AND HARDWARE REMOVAL FROM LEFT FOOT;  Surgeon: Andre Harman MD;  Location:  OR     REMOVE HARDWARE LOWER EXTREMITY Left 3/20/2019    Procedure: REMOVE HARDWARE LOWER EXTREMITY;  Surgeon: Andre Harman MD;  Location:  OR     REPAIR HAMMER TOE Left 11/26/2018    Procedure: REPAIR HAMMER TOE;  Surgeon: Andre Harman MD;  Location: [facility]     TONSILLECTOMY         Prior to Admission Medications   Prior to Admission Medications   Prescriptions Last Dose Informant Patient Reported? Taking?   Ondansetron HCl (ZOFRAN PO) as needed Self Yes Yes   Sig: Take 8 mg by mouth as needed for nausea or vomiting   aspirin (ASA) 325 MG EC tablet 5/22/2019 at Unknown time Self No Yes   Sig: Take 1 tablet (325 mg) by mouth daily   buPROPion (WELLBUTRIN XL) 150 MG 24 hr tablet 5/22/2019 at Unknown time Self Yes Yes   Sig: Take 150 mg by mouth daily   busPIRone (BUSPAR) 15 MG tablet 5/22/2019 at  am Self Yes Yes   Sig: Take 15 mg by mouth 2 times daily   cetirizine (ZYRTEC) 10 MG tablet 5/22/2019 at am Self Yes Yes   Sig: Take 10 mg by mouth daily   cholecalciferol (VITAMIN D3) 5000 units TABS tablet 5/22/2019 at am Self Yes Yes   Sig: Take 5,000 Units by mouth daily   clindamycin (CLEOCIN T) 1 % solution as needed Self Yes Yes   Sig: Apply topically nightly as needed (Acne outbreak)    furosemide (LASIX) 40 MG tablet 5/22/2019 at am Self Yes Yes   Sig: Take 160 mg by mouth daily   gabapentin (NEURONTIN) 800 MG tablet 5/22/2019 at Unknown time Self Yes Yes   Sig: Take 800 mg by mouth 3 times daily Am, supper, hs   hydrOXYzine (VISTARIL) 25 MG capsule 5/21/2019 at pm Self Yes Yes   Sig: Take 25 mg by mouth At Bedtime   omeprazole (PRILOSEC) 20 MG DR capsule 5/22/2019 at am Self Yes Yes   Sig: Take 20 mg by mouth 2 times daily (before meals)   oxyCODONE-acetaminophen (PERCOCET) 5-325 MG tablet 5/22/2019 at Unknown time Self No Yes   Sig: Take 1-2 tablets by mouth every 4 hours as needed for pain   phentermine 30 MG capsule 5/22/2019 at am Self Yes Yes   Sig: Take 30 mg by mouth every morning   polyethylene glycol (MIRALAX/GLYCOLAX) packet as needed Self Yes Yes   Sig: Take 17 g by mouth as needed for constipation   potassium chloride ER (K-TAB) 20 MEQ CR tablet 5/22/2019 at Unknown time Self Yes Yes   Sig: Take 80 mEq by mouth daily   pravastatin (PRAVACHOL) 20 MG tablet 5/21/2019 at pm Self Yes Yes   Sig: Take 20 mg by mouth every evening   senna-docusate (SENOKOT-S/PERICOLACE) 8.6-50 MG tablet as needed Self Yes Yes   Sig: Take 1 tablet by mouth 2 times daily as needed for constipation   sertraline (ZOLOFT) 100 MG tablet 5/22/2019 at am Self Yes Yes   Sig: Take 100 mg by mouth daily   topiramate (TOPAMAX) 25 MG tablet 5/21/2019 at pm Self Yes Yes   Sig: Take 50 mg by mouth At Bedtime   vancomycin (VANCOCIN) 100 mg/mL injection 5/22/2019 at 1100 Self No Yes   Sig: Inject 1 g (1,000 mg) into the vein every 12  hours CBC with differential, creatinine, vancomycin trough, ESR twice weekly while on this medication to be faxed to Dr. Canada office at [phone].   Patient taking differently: Inject 1,600 mg into the vein every 12 hours 1100,2300    CBC with differential, creatinine, vancomycin trough, ESR twice weekly while on this medication to be faxed to Dr. Canada office at [phone].      Facility-Administered Medications: None     Allergies   Allergies   Allergen Reactions     Sulfa Drugs Shortness Of Breath and Rash     Demerol [Meperidine] Nausea and Vomiting     Erythromycin Nausea and Vomiting     Metformin Nausea and Vomiting     Codeine Rash     Penicillins Rash       Immunization History   Immunization History   Administered Date(s) Administered     Influenza Vaccine IM 3yrs+ 4 Valent IIV4 10/16/2018       Social History   I have reviewed this patient's social history and updated it with pertinent information if needed. Shalonda Howard  reports that she quit smoking about 16 years ago. Her smoking use included cigarettes. She has a 6.00 pack-year smoking history. She has never used smokeless tobacco. She reports that she drinks alcohol. She reports that she does not use drugs.    Family History   I have reviewed this patient's family history and updated it with pertinent information if needed.   Family History   Problem Relation Age of Onset     Pulmonary Hypertension Mother      Esophageal Cancer Father      Heart Disease Maternal Grandfather      Esophageal Cancer Paternal Grandfather        Review of Systems   The 10 point Review of Systems is negative other than noted in the HPI or here.     Physical Exam   Temp: 98.2  F (36.8  C) Temp src: Oral BP: 114/67 Pulse: 81 Heart Rate: 87 Resp: 20 SpO2: 99 % O2 Device: None (Room air)    Vital Signs with Ranges  Temp:  [98.2  F (36.8  C)-99.5  F (37.5  C)] 98.2  F (36.8  C)  Pulse:  [81-96] 81  Heart Rate:  [] 87  Resp:  [18-20] 20  BP: (114-169)/(67-88)  114/67  SpO2:  [96 %-100 %] 99 %  242 lbs 8.1 oz  Body mass index is 40.36 kg/m .    GENERAL APPEARANCE:  alert and no distress  EYES: Eyes grossly normal to inspection, PERRL and conjunctivae and sclerae normal  HENT: ear canals and TM's normal and nose and mouth without ulcers or lesions  NECK: no adenopathy, no asymmetry, masses, or scars and thyroid normal to palpation  RESP: lungs clear to auscultation - no rales, rhonchi or wheezes  CV: regular rates and rhythm, normal S1 S2, no S3 or S4 and no murmur, click or rub  LYMPHATICS: normal ant/post cervical and supraclavicular nodes  ABDOMEN: soft, nontender, without hepatosplenomegaly or masses and bowel sounds normal  MS: the right heel is swollen and still draining   SKIN: no suspicious lesions or rashes      Data   Lab Results   Component Value Date    WBC 6.6 05/22/2019    HGB 10.4 (L) 05/22/2019    HCT 33.6 (L) 05/22/2019     05/22/2019     05/22/2019    POTASSIUM 3.8 05/22/2019    CHLORIDE 106 05/22/2019    CO2 22 05/22/2019    BUN 8 05/22/2019    CR 0.81 05/23/2019     (H) 05/22/2019    SED 35 (H) 05/22/2019    NTBNPI 66 10/17/2018    AST 13 03/19/2019    ALT 18 03/19/2019    ALKPHOS 121 03/19/2019    BILITOTAL 0.2 03/19/2019     Recent Labs   Lab 05/22/19  1848 05/22/19  1728   CULT No growth after 8 hours No growth after 8 hours     Recent Labs   Lab Test 05/22/19  1848 05/22/19  1728 04/22/19  1446 04/21/19  1836 04/21/19  1827 04/18/19  1335 03/20/19  1722 01/24/19  1122 01/24/19  1120   CULT No growth after 8 hours No growth after 8 hours No anaerobes isolated  On day 2, isolated in broth only:  Staphylococcus epidermidis  *  Critical Value/Significant Value, preliminary result only, called to and read back by  Tyra Carlos RN at 0944 on 4.24.19 kln.   No growth No growth No anaerobes isolated  Cultured on the 1st day of incubation:  Staphylococcus hominis  These bacteria are part of normal skin kenyatta, but on occasion, may  be true pathogens.    Clinical correlation must be applied to interpreting this microbiology result.  *  Critical Value/Significant Value, preliminary result only, called to and read back by  Dr. Pritchett @ [facility], @2044 4/19/19.    Light growth  Finegoldia magna (Peptostreptococcus ana)  Susceptibility testing not routinely done  *  Moderate growth  Streptococcus dysgalactiae serogroup C/G  *  Moderate growth  Staphylococcus aureus  *  Light growth  Enterobacter cloacae complex  *  Moderate growth  Streptococcus agalactiae sero group B  * On day 5, isolated in broth only:  Propionibacterium (Cutibacterium) acnes  Susceptibility testing of Propionibacterium species is not done from this source. Our   antibiogram indicates that Propionibacterum species is susceptible to penicillin and   cefotaxime and most are susceptible to clindamycin.  Liat Mcdonald M.D., Medical Director  *  No growth No anaerobes isolated  Light growth  Staphylococcus aureus  *  On day 1, isolated in broth only:  Enterobacter cloacae complex  *  Light growth  Streptococcus agalactiae sero group B  *  Single colony  Corynebacterium species  Identification obtained by MALDI-TOF mass spectrometry research use only database. Test   characteristics determined and verified by the [facility] Diagnostic [facility]   ([facility]) [place], [place].  Susceptibility testing not routinely done  These bacteria are part of normal skin kenyatta, but on occasion, may be true pathogens.    Clinical correlation must be applied to interpreting this microbiology result.  *

## 2019-05-23 NOTE — PLAN OF CARE
Pt alert and oriented. Up in room independently. Poor pain control,using dilaudid for breakthrough pain and cold packs prn. Seen by WOC nurse,plan in place for wd care. IV nurse to put alteplase in PICC line.

## 2019-05-24 VITALS
BODY MASS INDEX: 40.4 KG/M2 | OXYGEN SATURATION: 95 % | WEIGHT: 242.51 LBS | HEIGHT: 65 IN | RESPIRATION RATE: 18 BRPM | DIASTOLIC BLOOD PRESSURE: 72 MMHG | HEART RATE: 95 BPM | SYSTOLIC BLOOD PRESSURE: 120 MMHG | TEMPERATURE: 98.6 F

## 2019-05-24 LAB
CREAT SERPL-MCNC: 0.77 MG/DL (ref 0.52–1.04)
GFR SERPL CREATININE-BSD FRML MDRD: >90 ML/MIN/{1.73_M2}

## 2019-05-24 PROCEDURE — 82565 ASSAY OF CREATININE: CPT | Performed by: INTERNAL MEDICINE

## 2019-05-24 PROCEDURE — 36415 COLL VENOUS BLD VENIPUNCTURE: CPT | Performed by: INTERNAL MEDICINE

## 2019-05-24 PROCEDURE — 25800030 ZZH RX IP 258 OP 636: Performed by: INTERNAL MEDICINE

## 2019-05-24 PROCEDURE — 25000132 ZZH RX MED GY IP 250 OP 250 PS 637: Performed by: INTERNAL MEDICINE

## 2019-05-24 PROCEDURE — 99239 HOSP IP/OBS DSCHRG MGMT >30: CPT | Performed by: INTERNAL MEDICINE

## 2019-05-24 PROCEDURE — 25000128 H RX IP 250 OP 636: Performed by: INTERNAL MEDICINE

## 2019-05-24 PROCEDURE — 25000132 ZZH RX MED GY IP 250 OP 250 PS 637: Performed by: HOSPITALIST

## 2019-05-24 PROCEDURE — 40000239 ZZH STATISTIC VAT ROUNDS

## 2019-05-24 RX ORDER — OXYCODONE AND ACETAMINOPHEN 5; 325 MG/1; MG/1
1-2 TABLET ORAL EVERY 4 HOURS PRN
Qty: 40 TABLET | Refills: 0 | Status: SHIPPED | OUTPATIENT
Start: 2019-05-24 | End: 2019-11-04

## 2019-05-24 RX ADMIN — OXYCODONE HYDROCHLORIDE 5 MG: 5 TABLET ORAL at 00:11

## 2019-05-24 RX ADMIN — OXYCODONE HYDROCHLORIDE 10 MG: 5 TABLET ORAL at 09:01

## 2019-05-24 RX ADMIN — OXYCODONE HYDROCHLORIDE 10 MG: 5 TABLET ORAL at 04:14

## 2019-05-24 RX ADMIN — BUSPIRONE HYDROCHLORIDE 15 MG: 5 TABLET ORAL at 09:01

## 2019-05-24 RX ADMIN — OXYCODONE HYDROCHLORIDE 10 MG: 5 TABLET ORAL at 17:23

## 2019-05-24 RX ADMIN — OMEPRAZOLE 20 MG: 20 CAPSULE, DELAYED RELEASE ORAL at 06:15

## 2019-05-24 RX ADMIN — FUROSEMIDE 160 MG: 40 TABLET ORAL at 09:01

## 2019-05-24 RX ADMIN — VANCOMYCIN HYDROCHLORIDE 1600 MG: 5 INJECTION, POWDER, LYOPHILIZED, FOR SOLUTION INTRAVENOUS at 13:58

## 2019-05-24 RX ADMIN — BUPROPION HYDROCHLORIDE 150 MG: 150 TABLET, FILM COATED, EXTENDED RELEASE ORAL at 09:01

## 2019-05-24 RX ADMIN — SERTRALINE HYDROCHLORIDE 100 MG: 100 TABLET ORAL at 09:01

## 2019-05-24 RX ADMIN — ACETAMINOPHEN 650 MG: 325 TABLET, FILM COATED ORAL at 02:03

## 2019-05-24 RX ADMIN — ACETAMINOPHEN 650 MG: 325 TABLET, FILM COATED ORAL at 11:49

## 2019-05-24 RX ADMIN — GABAPENTIN 800 MG: 800 TABLET, FILM COATED ORAL at 16:18

## 2019-05-24 RX ADMIN — GABAPENTIN 800 MG: 800 TABLET, FILM COATED ORAL at 09:02

## 2019-05-24 RX ADMIN — CETIRIZINE HYDROCHLORIDE 10 MG: 10 TABLET, FILM COATED ORAL at 09:02

## 2019-05-24 RX ADMIN — OXYCODONE HYDROCHLORIDE 10 MG: 5 TABLET ORAL at 13:25

## 2019-05-24 RX ADMIN — VANCOMYCIN HYDROCHLORIDE 1600 MG: 5 INJECTION, POWDER, LYOPHILIZED, FOR SOLUTION INTRAVENOUS at 01:55

## 2019-05-24 RX ADMIN — POTASSIUM CHLORIDE 40 MEQ: 1500 TABLET, EXTENDED RELEASE ORAL at 09:01

## 2019-05-24 RX ADMIN — OMEPRAZOLE 20 MG: 20 CAPSULE, DELAYED RELEASE ORAL at 16:18

## 2019-05-24 RX ADMIN — ASPIRIN 325 MG: 325 TABLET, DELAYED RELEASE ORAL at 09:02

## 2019-05-24 RX ADMIN — ACETAMINOPHEN 650 MG: 325 TABLET, FILM COATED ORAL at 06:14

## 2019-05-24 ASSESSMENT — ACTIVITIES OF DAILY LIVING (ADL)
ADLS_ACUITY_SCORE: 11

## 2019-05-24 NOTE — PLAN OF CARE
A/Ox4. VSS. CMS intact ex trace edema BLE and +1 bilateral pedal pulses. Bilateral dressing CDI, pt wears orthotic boots with ambulation. Ambulating IND. PICC line patent, dressing CDI. Voiding in bathroom, adequate UOP, constipated, PRN bowel med given. IV Dilaudid given for pain with relief, encouraged PO pain meds. Abd hernia. Xray of RLE pending. Tolerating regular diet. WOC following. Discharge pending. Continue to monitor.

## 2019-05-24 NOTE — PROGRESS NOTES
[facility]    Infectious Disease Progress Note    Date of Service (when I saw the patient): 05/24/2019     Assessment & Plan   Shalonda Howard is a 49 year old female who was admitted on 5/22/2019.     Impression:  1. This is a 49 y.o familiar to me from multiple prior admissions.   2. She has a history of bilateral bunion repair surgery done in August 2018. The right side healed intially but the left side the incision has not been healing , I and D and 6 weeks of IV antibiotics and many more weeks of oral antibiotics since the end of August 2018. Then repeat admission in November, more I and D and another course of IV antibiotics. then another admission in January, again, wound non healing, s/p another I and D and 2 antibiotics for 6 weeks. Readmitted in March with continued wound non healing, concern for underlying osteo, s/p first ray amputation, hardware removal and ulcer debridement 3/20.  3. Last admission in April for left septic ankle joint with staph hominis and staph epi in the cultures, is still on vanco for this close to 4 weeks. This time the last remaining piece of hardware in the left foot was also taken out.   4. She also has chronic heel ulcers bilateral.   5. PCN allergy, tolerates zosyn/cephalosporins, sulfa allergy.   6. Yesterday she was seen in my clinic because when recently seen in ortho clinic for left foot suture removal she complained of continued pain in the left foot, while on IV vanco.   Also the right foot which has not caused pain but has had chronic ulcer in th heel is also starting to hurt and is swollen and red. She is very frustrated. She is in a lot of pain.      Recommendations:   The plan now is for removal of hardware on the right foot next week.   Ok to discharge on vancomycin, orders already in place from last hospitalization 2 more weeks.              Ignacia Bocanegra MD    Interval History   Afebrile   No new complaints     Physical Exam   Temp: 98.6  F (37  C)  Temp src: Oral BP: 117/69 Pulse: 90 Heart Rate: 93 Resp: 16 SpO2: 97 % O2 Device: None (Room air)    Vitals:    05/22/19 1640 05/22/19 1938 05/23/19 0622   Weight: 90.7 kg (200 lb) 108.4 kg (239 lb) 110 kg (242 lb 8.1 oz)     Vital Signs with Ranges  Temp:  [98.2  F (36.8  C)-99.1  F (37.3  C)] 98.6  F (37  C)  Pulse:  [90] 90  Heart Rate:  [77-99] 93  Resp:  [14-20] 16  BP: (112-133)/(66-82) 117/69  SpO2:  [91 %-97 %] 97 %    Constitutional: Awake, alert, cooperative, no apparent distress  Lungs: Clear to auscultation bilaterally, no crackles or wheezing  Cardiovascular: Regular rate and rhythm, normal S1 and S2, and no murmur noted  Abdomen: Normal bowel sounds, soft, non-distended, non-tender  Skin: No rashes, no cyanosis, no edema  Other:    Medications       aspirin  325 mg Oral Daily     buPROPion  150 mg Oral Daily     busPIRone  15 mg Oral BID     cetirizine  10 mg Oral Daily     furosemide  160 mg Oral Daily     gabapentin  800 mg Oral TID     hydrOXYzine  25 mg Oral At Bedtime     omeprazole  20 mg Oral BID AC     potassium chloride ER  40 mEq Oral BID     pravastatin  20 mg Oral QPM     sertraline  100 mg Oral Daily     topiramate  50 mg Oral At Bedtime     vancomycin (VANCOCIN) IV  1,600 mg Intravenous Q12H       Data   All microbiology laboratory data reviewed.  Recent Labs   Lab Test 05/22/19  1727 05/20/19  1145 05/13/19  1125   WBC 6.6 5.5 7.5   HGB 10.4* 10.6* 10.9*   HCT 33.6* 35.4 36.5   MCV 73* 74* 76*    419 408     Recent Labs   Lab Test 05/24/19  0644 05/23/19  0855 05/22/19  1727   CR 0.77 0.81 0.68     Recent Labs   Lab Test 05/22/19  1727   SED 35*     Recent Labs   Lab Test 05/22/19  1848 05/22/19  1728 04/22/19  1446 04/21/19  1836 04/21/19  1827 04/18/19  1335 03/20/19  1722 01/24/19  1122 01/24/19  1120   CULT No growth after 2 days No growth after 2 days No anaerobes isolated  On day 2, isolated in broth only:  Staphylococcus epidermidis  *  Critical Value/Significant Value,  preliminary result only, called to and read back by  Tyra Carlos RN at 0944 on 4.24.19 [facility].   No growth No growth No anaerobes isolated  Cultured on the 1st day of incubation:  Staphylococcus hominis  These bacteria are part of normal skin kenyatta, but on occasion, may be true pathogens.    Clinical correlation must be applied to interpreting this microbiology result.  *  Critical Value/Significant Value, preliminary result only, called to and read back by  Dr. Pritchett @ [facility], @2044 4/19/19.    Light growth  Finegoldia magna (Peptostreptococcus ana)  Susceptibility testing not routinely done  *  Moderate growth  Streptococcus dysgalactiae serogroup C/G  *  Moderate growth  Staphylococcus aureus  *  Light growth  Enterobacter cloacae complex  *  Moderate growth  Streptococcus agalactiae sero group B  * On day 5, isolated in broth only:  Propionibacterium (Cutibacterium) acnes  Susceptibility testing of Propionibacterium species is not done from this source. Our   antibiogram indicates that Propionibacterum species is susceptible to penicillin and   cefotaxime and most are susceptible to clindamycin.  Liat Mcdonald M.D., Medical Director  *  No growth No anaerobes isolated  Light growth  Staphylococcus aureus  *  On day 1, isolated in broth only:  Enterobacter cloacae complex  *  Light growth  Streptococcus agalactiae sero group B  *  Single colony  Corynebacterium species  Identification obtained by MALDI-TOF mass spectrometry research use only database. Test   characteristics determined and verified by the Infectious Diseases Diagnostic Laboratory   ([facility]) [place], [place].  Susceptibility testing not routinely done  These bacteria are part of normal skin kenyatta, but on occasion, may be true pathogens.    Clinical correlation must be applied to interpreting this microbiology result.  *

## 2019-05-24 NOTE — PLAN OF CARE
A&O.  VSS, RA.  CMS intact, ex baseline neuropathy.  Dressing changed, CDI.  Pain managed with oxy.  Up ind.  PICC intact.  Will discharge pt around 17:30.  Will send oxy prescription paper with pt to fill at her pharmacy.      **discharge instruction went over with pt, verbalized understanding.   discharge on WC wit transport aide to door 6.

## 2019-05-24 NOTE — DISCHARGE SUMMARY
[facility]  Discharge Summary        Shalonda Howard MRN# [id number]   YOB: 1970 Age: 49 year old     Date of Admission:  5/22/2019  Date of Discharge:  5/24/2019  Admitting Physician:  Armando Lopez MD  Discharge Physician: Darlene Marinelli MD  Discharging Service: Hospitalist     Primary Provider: Linda Bernstein  Primary Care Physician Phone Number: [phone]         Discharge Diagnoses/Problem Oriented Hospital Course (Providers):    Shalonda Howard was admitted on 5/22/2019 by Armando Lopez MD and I would refer you to their history and physical.  The following problems were addressed during her hospitalization:  Assessment & Plan     Shalonda Howard is a 49 year old female who was admitted on 5/22/2019.  Assessment & Plan     Shalonda Howard is a 49 year old female with possible Lupus who presents with increased redness and swelling of right foot -- possible cellulitis, admitted from ID clinic for further evaluation and treatment:     Summary:    Principal Problem:  Cellulitis related to DM foot ulcer     Cellulitis of right foot with chronic wound infection  -- still has hardware in place from Bunion surgery Aug 2018              -- Continue IV Vanco (pharmacy to dose)              -- ID consulted (they have been following) out pt               -- Ortho consulted and are following Re the need for hardware removal               -- MRI of the right foot showed cellulitis of the wound and surrounding wound tissue. Negative for osteomyelitis  Plan is to remove hardware from righ ankle next week as out patient   Continue IV vancomycin for 2weeks per ID         Active Problems:    Left foot Postop Wound infection -- was here 4/21 to4/25/19, hardware removed              -- continue Vanco as ordered for now                  Possible Systemic lupus erythematosus (H)              -- not on any treatment for this (diagnosis is not definite)       Foot ulcer on Heel bilaterally                -- wound care nurse consulted         Plan: as above     DVT Prophylaxis: Pneumatic Compression Devices  Code Status: Full Code     Disposition: Expected discharge  Home today         Darlene Marinelli MD  [phone] (P)                    Code Status:      Full Code        Brief Hospital Stay Summary Sent Home With Patient in AVS:        Reason for your hospital stay      Right foot wound infection with cellulitis                 Important Results:      See below         Pending Results:        Unresulted Labs Ordered in the Past 30 Days of this Admission     Date and Time Order Name Status Description    5/22/2019 1812 Blood culture Preliminary     5/22/2019 1812 Blood culture Preliminary             Discharge Instructions and Follow-Up:      Follow-up Appointments     Follow-up and recommended labs and tests       F/u with POdiatry in 1week               Discharge Disposition:      Discharged to home        Discharge Medications:        Current Discharge Medication List      CONTINUE these medications which have NOT CHANGED    Details   aspirin (ASA) 325 MG EC tablet Take 1 tablet (325 mg) by mouth daily  Qty: 30 tablet, Refills: 1    Associated Diagnoses: Left foot infection      buPROPion (WELLBUTRIN XL) 150 MG 24 hr tablet Take 150 mg by mouth daily      busPIRone (BUSPAR) 15 MG tablet Take 15 mg by mouth 2 times daily      cetirizine (ZYRTEC) 10 MG tablet Take 10 mg by mouth daily      cholecalciferol (VITAMIN D3) 5000 units TABS tablet Take 5,000 Units by mouth daily      clindamycin (CLEOCIN T) 1 % solution Apply topically nightly as needed (Acne outbreak)       furosemide (LASIX) 40 MG tablet Take 160 mg by mouth daily      gabapentin (NEURONTIN) 800 MG tablet Take 800 mg by mouth 3 times daily Am, supper, hs      hydrOXYzine (VISTARIL) 25 MG capsule Take 25 mg by mouth At Bedtime      omeprazole (PRILOSEC) 20 MG DR capsule Take 20 mg by mouth 2 times daily (before meals)      Ondansetron HCl  "(ZOFRAN PO) Take 8 mg by mouth as needed for nausea or vomiting      oxyCODONE-acetaminophen (PERCOCET) 5-325 MG tablet Take 1-2 tablets by mouth every 4 hours as needed for pain  Qty: 40 tablet, Refills: 0    Associated Diagnoses: Infection      phentermine 30 MG capsule Take 30 mg by mouth every morning      polyethylene glycol (MIRALAX/GLYCOLAX) packet Take 17 g by mouth as needed for constipation      potassium chloride ER (K-TAB) 20 MEQ CR tablet Take 80 mEq by mouth daily      pravastatin (PRAVACHOL) 20 MG tablet Take 20 mg by mouth every evening      senna-docusate (SENOKOT-S/PERICOLACE) 8.6-50 MG tablet Take 1 tablet by mouth 2 times daily as needed for constipation      sertraline (ZOLOFT) 100 MG tablet Take 100 mg by mouth daily      topiramate (TOPAMAX) 25 MG tablet Take 50 mg by mouth At Bedtime      vancomycin (VANCOCIN) 100 mg/mL injection Inject 1 g (1,000 mg) into the vein every 12 hours CBC with differential, creatinine, vancomycin trough, ESR twice weekly while on this medication to be faxed to Dr. Canada office at [phone].  Qty: 600 mL, Refills: 0    Associated Diagnoses: Left foot infection               Allergies:         Allergies   Allergen Reactions     Sulfa Drugs Shortness Of Breath and Rash     Demerol [Meperidine] Nausea and Vomiting     Erythromycin Nausea and Vomiting     Metformin Nausea and Vomiting     Codeine Rash     Penicillins Rash           Consultations This Hospital Stay:      Consultation during this admission received from orthopedics and podiatry         Condition and Physical on Discharge:      Discharge condition: Stable   Vitals: Blood pressure 117/69, pulse 90, temperature 98.6  F (37  C), temperature source Oral, resp. rate 16, height 1.651 m (5' 5\"), weight 110 kg (242 lb 8.1 oz), last menstrual period 05/15/2019, SpO2 97 %, not currently breastfeeding.     Constitutional: Alert and awake    Lungs: CTA   Cardiovascular: RRR   Abdomen: Soft, NT, ND, BS+   Skin: " Bilateral foot wounds dressed. R foot cellulitis improving    Other:          Discharge Time:      Greater than 30 minutes.        Image Results From This Hospital Stay (For Non-EPIC Providers):        Results for orders placed or performed during the hospital encounter of 05/22/19   Foot  XR, G/E 3 views, right    Narrative    FOOT RIGHT THREE OR MORE VIEWS   5/22/2019 5:46 PM     INDICATION: Right foot pain and erythema, concern for osteomyelitis.    COMPARISON: None.      Impression    IMPRESSION: Postoperative changes of bunion repair right great toe.  Degenerative changes in the mid and hindfoot with a long screw across  the talocalcaneal joint and plate and screw fixation across the  talonavicular joint. Moderate degenerative changes in the mid and  hindfoot. No acute fractures. There appears to be an ulcer deformity  along the inferior aspect of the heel below the calcaneus seen on the  lateral view.    MCKENZIE CROWLEY MD   Ankle XR, G/E 3 views, right    Narrative    ANKLE RIGHT THREE OR MORE VIEWS   5/22/2019 5:45 PM     INDICATION: Right foot pain/ankle pain.    COMPARISON: None.      Impression    IMPRESSION: Mild soft tissue swelling about the ankle. No acute  fracture or dislocation. Postoperative changes in the hindfoot with a  long screw across the talocalcaneal joint and plate and screw fusion  across the talonavicular joint.  Degenerative changes in the hindfoot.  Plantar calcaneal spur.    MCKENZIE CROWLEY MD   XR Chest Port 1 View    Narrative    CHEST ONE VIEW PORTABLE   5/22/2019 8:28 PM     HISTORY: PICC placement verification.    COMPARISON: 1/28/2019      Impression    IMPRESSION: PICC line from the right arm with the tip in the distal  superior vena cava. This could be advanced about 3 cm for optimal  positioning.    SANA HINSON MD   MR Ankle Right w/o & w Contrast    Narrative    MRI RIGHT ANKLE WITHOUT AND WITH INTRAVENOUS CONTRAST   5/23/2019 1:58  AM    HISTORY: Pain, swelling, and  erythema. Diabetes. The concern is for  osteomyelitis. The patient had surgery on 8/29/2018.    COMPARISON: Radiographs on 5/22/2019.    TECHNIQUE: Multiplanar MR imaging was performed to the right ankle  before and after the uneventful intravenous administration of 11 mL  Gadavist contrast. A marker was placed over the site of clinical  concern along the plantar aspect of the hindfoot and heel.    FINDINGS:  Osseous and Cartilaginous Structures: There are surgical changes of a  cancellous screw spanning the posterior subtalar joint with additional  hardware spanning the talonavicular joint. This results in moderate  adjacent metal artifact. There is a prominent plantar calcaneal spur  with no associated marrow edema. There are mild degenerative changes  of the articulation of the calcaneus and cuboid. There may be mild  degenerative changes elsewhere in the midfoot, but this is obscured by  the surgical changes. No other abnormal marrow signal intensity is  identified. No talar dome osteochondral lesion.    Posterior Tibial and Flexor Tendons:  No tear or significant  tendinosis of the posterior tibial tendon, flexor digitorum longus  tendon, or flexor hallucis longus tendon.     Peroneal Tendons:  No tear or significant tendinosis of the peroneal  brevis tendon or peroneal longus tendon.     Achilles Tendon:  There is prominent thickening of the Achilles tendon  with no tear or abnormal signal seen.     Extensor Tendons:  No tear or significant tendinosis of the anterior  tibial tendon, extensor hallucis longus tendon, or extensor digitorum  longus tendon.     Lateral Ligaments:  The anterior talofibular ligament is obscured by  surgical metal artifact and therefore cannot be evaluated. The  calcaneofibular, posterior talofibular, and anterior tibiofibular  ligaments are unremarkable.     Medial Deltoid Ligamentous Complex:  Unremarkable.     Plantar Fascia:  There is moderate thickening of the fascia near  its  calcaneal origin. No actual tear or abnormal signal is seen.     Additional Findings: Moderate-sized tibiotalar joint effusion. No  retrocalcaneal bursitis. No mass within the tarsal tunnel. The sinus  tarsi is unremarkable. At the indicated site of clinical concern,  there is a large apparent open wound plantar to the calcaneus. There  is prominent associated soft tissue edema. However, a distinct focal  abscess or mass is not seen. No other soft tissue abnormality is  noted.      Impression    IMPRESSION:    1. Large open wound plantar to the calcaneus. There is surrounding  cellulitis with no evidence of an abscess or osteomyelitis.  2. Surgical changes of the hindfoot which obscures adjacent  structures.  3. Chronic-appearing Achilles tendinosis and plantar fasciitis.     BURKE DRIVER MD   XR Calcaneus Left G/E 2 Views    Narrative    LEFT CALCANEUS TWO VIEWS   5/23/2019 8:45 PM    HISTORY: Plantar heel ulcer.    COMPARISON: None.      Impression    IMPRESSION: There is a large soft tissue defect plantar to the  posterior calcaneus consistent with a large ulcer. There is a large  plantar calcaneal spur. No fracture or osseous lesion is seen. There  appear to be degenerative changes in the visualized joint spaces,  although evaluation of the joints is limited due to position of the  ankle. No definite cortical erosive change is seen with no additional  evidence of osteomyelitis.     BURKE DRIVER MD             Most Recent Lab Results In EPIC (For Non-EPIC Providers):    Most Recent 3 CBC's:  Recent Labs   Lab Test 05/22/19  1727 05/20/19  1145 05/13/19  1125   WBC 6.6 5.5 7.5   HGB 10.4* 10.6* 10.9*   MCV 73* 74* 76*    419 408      Most Recent 3 BMP's:  Recent Labs   Lab Test 05/24/19  0644 05/23/19  0855 05/22/19  1727 05/20/19  1145 05/13/19  1125  04/27/19  0955 04/24/19  0636   NA  --   --  135  --   --   --  Incorrect test ordered 143   POTASSIUM  --   --  3.8  --   --   --   Incorrect test ordered 3.7   CHLORIDE  --   --  106  --   --   --  Incorrect test ordered 112*   CO2  --   --  22  --   --   --  Incorrect test ordered 22   BUN  --   --  8 11 10   < > 10  Incorrect test ordered 12   CR 0.77 0.81 0.68 0.68 0.69   < > Incorrect test ordered 0.75   ANIONGAP  --   --  7  --   --   --  Incorrect test ordered 9   PAULINE  --   --  8.8  --   --   --  Incorrect test ordered 8.2*   GLC  --   --  106*  --   --   --  Incorrect test ordered 157*    < > = values in this interval not displayed.     Most Recent 3 Troponin's:No lab results found.    Invalid input(s): TROP, TROPONINIES  Most Recent 3 INR's:No lab results found.  Most Recent 2 LFT's:  Recent Labs   Lab Test 03/19/19  1941 03/03/19  1130  11/26/18  0630   AST 13 12   < > 12   ALT 18  --   --  16   ALKPHOS 121  --   --  117   BILITOTAL 0.2  --   --  <0.1*    < > = values in this interval not displayed.     Most Recent Cholesterol Panel:No lab results found.  Most Recent 6 Bacteria Isolates From Any Culture (See EPIC Reports for Culture Details):  Recent Labs   Lab Test 05/22/19  1848 05/22/19  1728 04/22/19  1446 04/21/19  1836 04/21/19  1827 04/18/19  1335   CULT No growth after 2 days No growth after 2 days No anaerobes isolated  On day 2, isolated in broth only:  Staphylococcus epidermidis  *  Critical Value/Significant Value, preliminary result only, called to and read back by  Tyra Carlos RN at 0944 on 4.24.19 kln.   No growth No growth No anaerobes isolated  Cultured on the 1st day of incubation:  Staphylococcus hominis  These bacteria are part of normal skin kenyatta, but on occasion, may be true pathogens.    Clinical correlation must be applied to interpreting this microbiology result.  *  Critical Value/Significant Value, preliminary result only, called to and read back by  Dr. Pritchett @ [facility], @2044 4/19/19.        Most Recent TSH, T4 and HgbA1c:   Recent Labs   Lab Test 10/16/18  0632 10/15/18  2125    TSH 4.36*  --    T4 0.85  --    A1C  --  5.6

## 2019-05-24 NOTE — PROGRESS NOTES
Nursing note  On the dressing it says RD 5/14, offered to do dressing changed. Pt refused told the writer it was changed on Monday 5/20 by home RN. That the date was wrong.writing on the dressing was wrong.

## 2019-05-24 NOTE — PLAN OF CARE
A/O x4. AVSS on RA. C/o pain in bilateral heels/ankle, prn oxycodone x1 and tylenol given x1. Dressing BLE CDI, WOCn nursing following. Inguinal hernia, ab contour irregular. +1 edema BLE. Up independently in room. PICC line on R arm, WDL, blood return noted.  X-ray pending results. Discharge pending clinical course, will continue to monitor.

## 2019-05-25 ENCOUNTER — HOME INFUSION (PRE-WILLOW HOME INFUSION) (OUTPATIENT)
Dept: PHARMACY | Facility: CLINIC | Age: 49
End: 2019-05-25

## 2019-05-27 ENCOUNTER — MEDICAL CORRESPONDENCE (OUTPATIENT)
Dept: HEALTH INFORMATION MANAGEMENT | Facility: CLINIC | Age: 49
End: 2019-05-27

## 2019-05-27 LAB
BASOPHILS # BLD AUTO: 0.1 10E9/L (ref 0–0.2)
BASOPHILS NFR BLD AUTO: 1.4 %
BUN SERPL-MCNC: 12 MG/DL (ref 7–30)
CREAT SERPL-MCNC: 0.67 MG/DL (ref 0.52–1.04)
CRP SERPL-MCNC: 14.3 MG/L (ref 0–8)
DIFFERENTIAL METHOD BLD: ABNORMAL
EOSINOPHIL # BLD AUTO: 0.2 10E9/L (ref 0–0.7)
EOSINOPHIL NFR BLD AUTO: 3.4 %
ERYTHROCYTE [DISTWIDTH] IN BLOOD BY AUTOMATED COUNT: 16.2 % (ref 10–15)
ERYTHROCYTE [SEDIMENTATION RATE] IN BLOOD BY WESTERGREN METHOD: 28 MM/H (ref 0–20)
GFR SERPL CREATININE-BSD FRML MDRD: >90 ML/MIN/{1.73_M2}
HCT VFR BLD AUTO: 35.2 % (ref 35–47)
HGB BLD-MCNC: 10.5 G/DL (ref 11.7–15.7)
IMM GRANULOCYTES # BLD: 0 10E9/L (ref 0–0.4)
IMM GRANULOCYTES NFR BLD: 0.2 %
LYMPHOCYTES # BLD AUTO: 1.4 10E9/L (ref 0.8–5.3)
LYMPHOCYTES NFR BLD AUTO: 24.2 %
MCH RBC QN AUTO: 22.6 PG (ref 26.5–33)
MCHC RBC AUTO-ENTMCNC: 29.8 G/DL (ref 31.5–36.5)
MCV RBC AUTO: 76 FL (ref 78–100)
MONOCYTES # BLD AUTO: 0.5 10E9/L (ref 0–1.3)
MONOCYTES NFR BLD AUTO: 8.6 %
NEUTROPHILS # BLD AUTO: 3.7 10E9/L (ref 1.6–8.3)
NEUTROPHILS NFR BLD AUTO: 62.2 %
NRBC # BLD AUTO: 0 10*3/UL
NRBC BLD AUTO-RTO: 0 /100
PLATELET # BLD AUTO: 368 10E9/L (ref 150–450)
RBC # BLD AUTO: 4.65 10E12/L (ref 3.8–5.2)
VANCOMYCIN SERPL-MCNC: 15.5 MG/L
WBC # BLD AUTO: 5.9 10E9/L (ref 4–11)

## 2019-05-27 PROCEDURE — 82565 ASSAY OF CREATININE: CPT | Performed by: INTERNAL MEDICINE

## 2019-05-27 PROCEDURE — 84520 ASSAY OF UREA NITROGEN: CPT | Performed by: INTERNAL MEDICINE

## 2019-05-27 PROCEDURE — 85025 COMPLETE CBC W/AUTO DIFF WBC: CPT | Performed by: INTERNAL MEDICINE

## 2019-05-27 PROCEDURE — 86140 C-REACTIVE PROTEIN: CPT | Performed by: INTERNAL MEDICINE

## 2019-05-27 PROCEDURE — 85652 RBC SED RATE AUTOMATED: CPT | Performed by: INTERNAL MEDICINE

## 2019-05-27 PROCEDURE — 80202 ASSAY OF VANCOMYCIN: CPT | Performed by: INTERNAL MEDICINE

## 2019-05-28 NOTE — PROGRESS NOTES
This is a recent snapshot of the patient's [facility] medical record.  For current drug dose and complete information and questions, call [phone] or In Basket pool, [facility]v home infusion ([id number])  CSN Number:  [id number]

## 2019-05-29 ENCOUNTER — HOME INFUSION (PRE-WILLOW HOME INFUSION) (OUTPATIENT)
Dept: PHARMACY | Facility: CLINIC | Age: 49
End: 2019-05-29

## 2019-05-30 NOTE — PROGRESS NOTES
This is a recent snapshot of the patient's [facility] medical record.  For current drug dose and complete information and questions, call [phone] or In Basket pool, fv home infusion ([id number])  CSN Number:  [id number]

## 2019-06-03 ENCOUNTER — MEDICAL CORRESPONDENCE (OUTPATIENT)
Dept: HEALTH INFORMATION MANAGEMENT | Facility: CLINIC | Age: 49
End: 2019-06-03

## 2019-06-03 LAB
BASOPHILS # BLD AUTO: 0 10E9/L (ref 0–0.2)
BASOPHILS NFR BLD AUTO: 0.8 %
BUN SERPL-MCNC: 10 MG/DL (ref 7–30)
CREAT SERPL-MCNC: 0.68 MG/DL (ref 0.52–1.04)
CRP SERPL-MCNC: 4.6 MG/L (ref 0–8)
DIFFERENTIAL METHOD BLD: ABNORMAL
EOSINOPHIL # BLD AUTO: 0.3 10E9/L (ref 0–0.7)
EOSINOPHIL NFR BLD AUTO: 5.5 %
ERYTHROCYTE [DISTWIDTH] IN BLOOD BY AUTOMATED COUNT: 16 % (ref 10–15)
ERYTHROCYTE [SEDIMENTATION RATE] IN BLOOD BY WESTERGREN METHOD: 30 MM/H (ref 0–20)
GFR SERPL CREATININE-BSD FRML MDRD: >90 ML/MIN/{1.73_M2}
HCT VFR BLD AUTO: 33.5 % (ref 35–47)
HGB BLD-MCNC: 10.1 G/DL (ref 11.7–15.7)
IMM GRANULOCYTES # BLD: 0 10E9/L (ref 0–0.4)
IMM GRANULOCYTES NFR BLD: 0.4 %
LYMPHOCYTES # BLD AUTO: 1.6 10E9/L (ref 0.8–5.3)
LYMPHOCYTES NFR BLD AUTO: 32.4 %
MCH RBC QN AUTO: 22.1 PG (ref 26.5–33)
MCHC RBC AUTO-ENTMCNC: 30.1 G/DL (ref 31.5–36.5)
MCV RBC AUTO: 74 FL (ref 78–100)
MONOCYTES # BLD AUTO: 0.4 10E9/L (ref 0–1.3)
MONOCYTES NFR BLD AUTO: 8.6 %
NEUTROPHILS # BLD AUTO: 2.5 10E9/L (ref 1.6–8.3)
NEUTROPHILS NFR BLD AUTO: 52.3 %
NRBC # BLD AUTO: 0 10*3/UL
NRBC BLD AUTO-RTO: 0 /100
PLATELET # BLD AUTO: 355 10E9/L (ref 150–450)
RBC # BLD AUTO: 4.56 10E12/L (ref 3.8–5.2)
VANCOMYCIN SERPL-MCNC: 15.8 MG/L
WBC # BLD AUTO: 4.9 10E9/L (ref 4–11)

## 2019-06-03 PROCEDURE — 85652 RBC SED RATE AUTOMATED: CPT | Performed by: INTERNAL MEDICINE

## 2019-06-03 PROCEDURE — 80202 ASSAY OF VANCOMYCIN: CPT | Performed by: INTERNAL MEDICINE

## 2019-06-03 PROCEDURE — 85025 COMPLETE CBC W/AUTO DIFF WBC: CPT | Performed by: INTERNAL MEDICINE

## 2019-06-03 PROCEDURE — 82565 ASSAY OF CREATININE: CPT | Performed by: INTERNAL MEDICINE

## 2019-06-03 PROCEDURE — 86140 C-REACTIVE PROTEIN: CPT | Performed by: INTERNAL MEDICINE

## 2019-06-03 PROCEDURE — 84520 ASSAY OF UREA NITROGEN: CPT | Performed by: INTERNAL MEDICINE

## 2019-06-04 ENCOUNTER — HOME INFUSION (PRE-WILLOW HOME INFUSION) (OUTPATIENT)
Dept: PHARMACY | Facility: CLINIC | Age: 49
End: 2019-06-04

## 2019-06-06 NOTE — PROGRESS NOTES
This is a recent snapshot of the patient's [facility] medical record.  For current drug dose and complete information and questions, call [phone] or In Basket pool, fv home infusion ([id number])  CSN Number:  [id number]

## 2019-06-10 ENCOUNTER — MEDICAL CORRESPONDENCE (OUTPATIENT)
Dept: HEALTH INFORMATION MANAGEMENT | Facility: CLINIC | Age: 49
End: 2019-06-10

## 2019-06-10 ENCOUNTER — HOME INFUSION (PRE-WILLOW HOME INFUSION) (OUTPATIENT)
Dept: PHARMACY | Facility: CLINIC | Age: 49
End: 2019-06-10

## 2019-06-10 LAB — ERYTHROCYTE [SEDIMENTATION RATE] IN BLOOD BY WESTERGREN METHOD: 25 MM/H (ref 0–20)

## 2019-06-10 PROCEDURE — 85652 RBC SED RATE AUTOMATED: CPT | Performed by: INTERNAL MEDICINE

## 2019-06-11 NOTE — PROGRESS NOTES
This is a recent snapshot of the patient's [facility] medical record.  For current drug dose and complete information and questions, call [phone] or In Basket pool, fv home infusion ([id number])  CSN Number:  [id number]

## 2019-07-29 ENCOUNTER — HOSPITAL ENCOUNTER (OUTPATIENT)
Dept: WOUND CARE | Facility: CLINIC | Age: 49
Discharge: HOME | End: 2019-07-29
Attending: SURGERY | Admitting: SURGERY
Payer: COMMERCIAL

## 2019-07-29 VITALS
BODY MASS INDEX: 41.02 KG/M2 | SYSTOLIC BLOOD PRESSURE: 143 MMHG | HEART RATE: 101 BPM | DIASTOLIC BLOOD PRESSURE: 86 MMHG | RESPIRATION RATE: 18 BRPM | TEMPERATURE: 98.6 F | WEIGHT: 246.5 LBS

## 2019-07-29 DIAGNOSIS — L89.613 PRESSURE INJURY OF RIGHT HEEL, STAGE 3 (H): ICD-10-CM

## 2019-07-29 DIAGNOSIS — L89.623 PRESSURE ULCER OF LEFT HEEL, STAGE 3 (H): ICD-10-CM

## 2019-07-29 PROBLEM — L97.509 FOOT ULCER (H): Status: RESOLVED | Noted: 2019-05-22 | Resolved: 2019-07-29

## 2019-07-29 PROCEDURE — G0463 HOSPITAL OUTPT CLINIC VISIT: HCPCS | Mod: 25

## 2019-07-29 PROCEDURE — 11042 DBRDMT SUBQ TIS 1ST 20SQCM/<: CPT | Performed by: SURGERY

## 2019-07-29 PROCEDURE — 93923 UPR/LXTR ART STDY 3+ LVLS: CPT

## 2019-07-29 PROCEDURE — 11042 DBRDMT SUBQ TIS 1ST 20SQCM/<: CPT

## 2019-07-29 PROCEDURE — A6235 HYDROCOLLD DRG >16<=48 W/O B: HCPCS

## 2019-07-29 PROCEDURE — 99203 OFFICE O/P NEW LOW 30 MIN: CPT | Mod: 25 | Performed by: SURGERY

## 2019-07-29 RX ORDER — SENNOSIDES 8.6 MG/1
2 TABLET, FILM COATED ORAL 2 TIMES DAILY
Refills: 0 | Status: ON HOLD | COMMUNITY
Start: 2019-05-29 | End: 2019-12-02

## 2019-07-29 RX ORDER — IBUPROFEN 800 MG/1
800 TABLET, FILM COATED ORAL 2 TIMES DAILY PRN
Refills: 2 | COMMUNITY
Start: 2019-06-25

## 2019-07-29 RX ORDER — OXYCODONE HYDROCHLORIDE 5 MG/1
TABLET ORAL
Refills: 0 | COMMUNITY
Start: 2019-05-29 | End: 2019-09-09

## 2019-07-29 NOTE — PROGRESS NOTES
[facility] Wound Healing [place] Progress Note    Subject: Shalonda Howard comes to our wound clinic today for initial evaluation.  This 49-year-old patient who has idiopathic peripheral neuropathy has had multiple orthopedic procedures performed for bunions and other issues.  During the recovery of these surgeries she developed bilateral plantar heel pressure ulcerations beginning approximately November.  These have increased in size but remained relatively stable with the wound care but have not decreased in size or healed.    She did have an issue with superficial ulcers many years ago that was treated with offloading.  This is a time she did have the peripheral neuropathy.  This is not related to diabetes.  There was some concern that this may been related to lupus and this was never established either.    Patient has been followed at the [facility] wound care clinic.  They have been treating this with a silver overlying dressing.  She has been fit order to get offloading bilateral boots to see if this will help healing.  Presently she is wearing cast boots with felt and foam cut out to decrease the pressure.    She went to establish care at our clinic and comes to see us today with her daughter.  With her young age and otherwise active lifestyle she is very frustrated by the ongoing situation.    PMH:   Past Medical History:   Diagnosis Date     Allergic rhinitis, cause unspecified      Anxiety state, unspecified      Cellulitis and abscess of leg, except foot      Closed fracture of unspecified part of tibia      Disuse osteoporosis      Dysthymic disorder      Edema      Encounter for long-term (current) use of other medications      Esophageal reflux      Foot ulcer on Heel bilaterally  5/22/2019     Kidney stones      Myalgia and myositis, unspecified      Obesity, unspecified      Open wound of foot except toe(s) alone, complicated      Opioid type dependence, unspecified      Other acne      Other  congenital valgus deformity of feet      Other ventral hernia without mention of obstruction or gangrene      Polycystic ovaries      PONV (postoperative nausea and vomiting)      Systemic lupus erythematosus (H)      Tibialis tendinitis      Unspecified hereditary and idiopathic peripheral neuropathy      Patient Active Problem List   Diagnosis     Infection     Left foot Postop Wound infection     Post op infection     Systemic lupus erythematosus (H)     Osteomyelitis Left 1st metatarsal -- S/P Amputation     Cellulitis of right foot     Pressure injury of right heel, stage 3 (H)     Pressure ulcer of left heel, stage 3 (H)     Social Hx:   Social History     Socioeconomic History     Marital status:      Spouse name: Not on file     Number of children: 1     Years of education: Not on file     Highest education level: Not on file   Occupational History     Occupation: unemployed   Social Needs     Financial resource strain: Not on file     Food insecurity:     Worry: Not on file     Inability: Not on file     Transportation needs:     Medical: Not on file     Non-medical: Not on file   Tobacco Use     Smoking status: Former Smoker     Packs/day: 0.50     Years: 12.00     Pack years: 6.00     Types: Cigarettes     Last attempt to quit: 10/1/2002     Years since quittin.8     Smokeless tobacco: Never Used   Substance and Sexual Activity     Alcohol use: Yes     Comment: < 1 drink a week      Drug use: No     Sexual activity: Not on file   Lifestyle     Physical activity:     Days per week: Not on file     Minutes per session: Not on file     Stress: Not on file   Relationships     Social connections:     Talks on phone: Not on file     Gets together: Not on file     Attends Yarsanism service: Not on file     Active member of club or organization: Not on file     Attends meetings of clubs or organizations: Not on file     Relationship status: Not on file     Intimate partner violence:     Fear of  current or ex partner: Not on file     Emotionally abused: Not on file     Physically abused: Not on file     Forced sexual activity: Not on file   Other Topics Concern     Parent/sibling w/ CABG, MI or angioplasty before 65F 55M? Not Asked   Social History Narrative    , 1 child, unemployed.  (last updated 5/22/2019)        Surgical Hx:   Past Surgical History:   Procedure Laterality Date     APPENDECTOMY       APPLY WOUND VAC Left 1/24/2019    Procedure: WOUND VAC APPLICATION;  Surgeon: Miguel Mosher MD;  Location:  OR     ARTHRODESIS FOOT Left 2/4/2015    Procedure: ARTHRODESIS FOOT;  Surgeon: Andre Harman MD;  Location: [facility]     ARTHROSCOPY ANKLE Left 4/22/2019    Procedure: ARTHROSCOPIC IRRIGATION AND DEBRIDEMENT LEFT ANKLE. HARDWARE REMOVAL;  Surgeon: Andre Harman MD;  Location:  OR     BUNIONECTOMY RT/LT  08/29/2018     DILATION AND CURETTAGE       EXCISE TOENAIL(S) Left 2/4/2015    Procedure: EXCISE TOENAIL(S);  Surgeon: Andre Harman MD;  Location: [facility]     HERNIA REPAIR      umbilical     HERNIA REPAIR      ventral open x 2     IRRIGATION AND DEBRIDEMENT FOOT, COMBINED Left 9/26/2018    Procedure: COMBINED IRRIGATION AND DEBRIDEMENT FOOT;  IRRIGATION AND DEBRIDEMENT LEFT FOOT;  Surgeon: Andre Harman MD;  Location: [facility]     IRRIGATION AND DEBRIDEMENT FOOT, COMBINED Left 11/26/2018    Procedure: COMBINED IRRIGATION AND DEBRIDEMENT LEFT FOOT;  Surgeon: Andre Harman MD;  Location: [facility]     IRRIGATION AND DEBRIDEMENT FOOT, COMBINED Left 1/24/2019    Procedure: LEFT HALLUX WOUND IRRIGATION AND DEBRIDEMENT WITH BONE BIOPSY;  Surgeon: Miguel Mosher MD;  Location:  OR     IRRIGATION AND DEBRIDEMENT FOOT, COMBINED Left 3/20/2019    Procedure: LEFT FOOT IRRIGATION AND  DEBRIDEMENT, FIRST RAY RESECTION AND HARDWARE REMOVAL FROM LEFT FOOT;  Surgeon: Andre Harman MD;  Location:  OR     REMOVE HARDWARE LOWER EXTREMITY Left 3/20/2019     Procedure: REMOVE HARDWARE LOWER EXTREMITY;  Surgeon: Andre Harman MD;  Location:  OR     REPAIR HAMMER TOE Left 11/26/2018    Procedure: REPAIR HAMMER TOE;  Surgeon: Andre Harman MD;  Location:  [facility]     TONSILLECTOMY         Allergies:    Allergies   Allergen Reactions     Sulfa Drugs Shortness Of Breath and Rash     Demerol [Meperidine] Nausea and Vomiting     Erythromycin Nausea and Vomiting     Metformin Nausea and Vomiting     Codeine Rash     Penicillins Rash       Medications:   Current Outpatient Medications   Medication     buPROPion (WELLBUTRIN XL) 150 MG 24 hr tablet     busPIRone (BUSPAR) 15 MG tablet     cetirizine (ZYRTEC) 10 MG tablet     cholecalciferol (VITAMIN D3) 5000 units TABS tablet     clindamycin (CLEOCIN T) 1 % solution     furosemide (LASIX) 40 MG tablet     gabapentin (NEURONTIN) 800 MG tablet     hydrOXYzine (VISTARIL) 25 MG capsule     ibuprofen (ADVIL/MOTRIN) 800 MG tablet     omeprazole (PRILOSEC) 20 MG DR capsule     Ondansetron HCl (ZOFRAN PO)     oxyCODONE (ROXICODONE) 5 MG tablet     oxyCODONE-acetaminophen (PERCOCET) 5-325 MG tablet     phentermine 30 MG capsule     polyethylene glycol (MIRALAX/GLYCOLAX) packet     potassium chloride ER (K-TAB) 20 MEQ CR tablet     pravastatin (PRAVACHOL) 20 MG tablet     senna-docusate (SENOKOT-S/PERICOLACE) 8.6-50 MG tablet     SENNA-LAX 8.6 MG tablet     sertraline (ZOLOFT) 100 MG tablet     topiramate (TOPAMAX) 25 MG tablet     No current facility-administered medications for this encounter.        Labs:   Recent Labs   Lab Test 06/03/19  1130  03/19/19  1941  10/15/18  2125   ALBUMIN  --   --  3.2*   < >  --    HGB 10.1*   < > 10.8*   < >  --    WBC 4.9   < > 9.1   < >  --    A1C  --   --   --   --  5.6   CRP 4.6   < > 37.4*   < >  --     < > = values in this interval not displayed.     Nutrition requirements were discussed with patient today.    Patient was admitted to [facility] from 5/22 to 5/24/2019 and we  reviewed the discharge summary.  This is a time when she had problems with a right ankle hardware placed previously that was eventually treated with antibiotics and the hardware was removed.  She in the past had left bunion surgery.  This became infected.  I&D and hardware was removed and she eventually underwent a first ray amputation on 3/20/2019 with subsequent healing.      Patient does have very adequate blood supply for healing.  4/23/2019 JJ was 1.23 on the right and 1.2 on the left.  She did have an MRI of the left heel and x-rays on 5/23/2019 with no evidence of osteomyelitis.        Appetite and nutrition have not been an issue.  She is meticulous with her wound care keeping things clean with no evidence of recent infection and the heel ulcers.    Objective:  /86   Pulse 101   Temp 98.6  F (37  C) (Temporal)   Resp 18   Wt 111.8 kg (246 lb 8 oz)   BMI 41.02 kg/m    Wound (used by OP [facility] only) 07/29/19 0813 Left heel pressure injury (Active)   Length (cm) 4 7/29/2019  8:00 AM   Width (cm) 4.1 7/29/2019  8:00 AM   Depth (cm) 1 7/29/2019  8:00 AM   Wound (cm^2) 16.4 cm^2 7/29/2019  8:00 AM   Wound Volume (cm^3) 16.4 cm^3 7/29/2019  8:00 AM   Dressing Appearance moist drainage 7/29/2019  8:00 AM   Drainage Characteristics/Odor serosanguineous 7/29/2019  8:00 AM   Drainage Amount moderate 7/29/2019  8:00 AM   Thickness/Stage Stage 3 7/29/2019  8:29 AM   Base red/granulating 7/29/2019  8:29 AM   Periwound swelling;intact 7/29/2019  8:29 AM   Periwound Temperature warm 7/29/2019  8:29 AM   Care, Wound debrided 7/29/2019  8:29 AM       Wound (used by OP [facility] only) 07/29/19 0814 Right heel pressure injury (Active)   Length (cm) 3.8 7/29/2019  8:00 AM   Width (cm) 3.4 7/29/2019  8:00 AM   Depth (cm) 0.9 7/29/2019  8:00 AM   Wound (cm^2) 12.92 cm^2 7/29/2019  8:00 AM   Wound Volume (cm^3) 11.63 cm^3 7/29/2019  8:00 AM   Dressing Appearance moist drainage 7/29/2019  8:00 AM   Drainage Characteristics/Odor  serosanguineous 7/29/2019  8:00 AM   Drainage Amount moderate 7/29/2019  8:00 AM   Thickness/Stage Stage 3 7/29/2019  8:30 AM   Base red/granulating 7/29/2019  8:30 AM   Periwound intact;swelling 7/29/2019  8:30 AM   Periwound Temperature warm 7/29/2019  8:30 AM   Care, Wound debrided 7/29/2019  8:30 AM        General:  Patient is alert and orientated, no acute distress.   Chest= clear  Cardiovascular= regular rate  HEENT= normal  Extremities=   Now healed left ray amputation site.  Healed right ankle surgical site.  No distal edema.  Decreased sensation bilaterally consistent with her neuropathy though she has some slight sensation on the medial aspect of both of her feet.  The right heel ulcer measures 3.8 x 3.4 x 0.9 cm in the left 4.0 x 4.1 x 1.0 cm.  Small amount of overlying mild callus circumferentially on both ulcers.  No obvious infection.  Granulation tissue appears to be healthy.  These are still superficial ulcers with no evidence of underlying bone or tissue involvement.  No tunneling is noted.      Vascular: +3 palpable dorsalis pedis pulses bilaterally.    Procedure:   Patient was determined to be capable of making their own medical decisions and informed consent was obtained, topical anesthetic of 4% lidocaine was applied, debridement was performed.  I excised the overlying skin edges along with the base of the wound crosshatching removing the minimal amount of fibrin good healthy tissue being noted.  Nonhealing plantar heel ulcerations was used to debride ulcer down to and including subcutaneous tissue. Bleeding controlled with light pressure. Patient tolerated procedure well.    Impression: Patient with adequate blood supply but underlying neuropathy.  No obvious evidence of osteomyelitis of the heel with prior studies done several months ago.  This is a more difficult area to heal due to the location.  We do  strongly encouraged the offloading boots that have been ordered as the primary care.   Following this is what can be done to allow the wound to either to heal by secondary intent but obviously this is been extremely slow though there has not been appropriate offloading.  Split-thickness skin grafting usually does not work well in this location due to pressures.  Some other options that can be discussed would be rotational flaps since she does have very adequate blood supply and I did mention this to her and her daughter.    At the present time will continue her same dressing changes until she gets her offloading devices and see her back to discuss this further option for building parkings.  Barriers to healing include: Proper Offloading, Smoking, nutrition. Patient education provided on each.     Plan:  We will dress the wounds with silver dressing pads due to drainage.      Patient will return to the clinic in 2 weeks time.     We spent over 40 minutes with the patient with over 50% in counseling young patient obviously wants to be able to return to normal walking and symptoms possible.    Sherman Freitas MD on 7/29/2019 at 10:32 AM

## 2019-07-29 NOTE — DISCHARGE INSTRUCTIONS
[facility]  [address] [facility] [address], [place] [address]  Appointment Phone [phone] Nurse Advisors [phone]    Shalonda Howard      1970    Wound Dressing Change to both heel ulcers  Cleanse wound and surrounding skin with: soap and water  Cover wound with AG Mesh, cover with ABD pads and roll gauze  Change dressing daily    Recommend continued offloading as much as you can, including the offloading boots that you are getting tomorrow.    A diet high in protein is important for wound healing, we recommend getting 90 grams of protein per day. Taking protein shakes or bars are a good way to get extra protein in your diet.     Good sources of protein:  Pork 26g per 3 oz  Whey protein powder - 24g per scoop (on average)  Greek yogurt - 23g per 8oz   Chicken or Turkey - 23g per 3oz  Fish - 20-25g per 3oz  Beef - 18-23g per 3oz  Navy beans - 20g per cup  Cottage cheese - 14g per 1/2 cup   Lentils - 13g per 1/4 cup  Beef jerky 13g per 1oz  2% milk - 8g per cup  Peanut butter - 8g per 2 tablespoons  Eggs - 6g per egg  Mixed nuts - 6g per 2oz     Sherman Freitas M.D. July 29, 2019    Call us at [phone] if you have any questions about your wounds, have redness or swelling around your wound, have a fever of 101 or greater or if you have any other problems or concerns. We answer the phone Monday through Friday 8 am to 4 pm, please leave a message as we check the voicemail frequently throughout the day.     Follow up with Dr. Freitas in 2-3 weeks after you get your offloading

## 2019-07-29 NOTE — PROGRESS NOTES
Patient arrived for wound care visit. Certified Wound Care Nurse time spent evaluating patient record, completed a full evaluation and documented wound(s) & nida-wound skin; provided recommendation based on treatment plan. Applied dressing, reviewed discharge instructions, patient education, and discussed plan of care with appropriate medical team staff members and patient and/or family members.

## 2019-08-12 ENCOUNTER — HOSPITAL ENCOUNTER (OUTPATIENT)
Dept: WOUND CARE | Facility: CLINIC | Age: 49
Discharge: HOME | End: 2019-08-12
Attending: SURGERY | Admitting: SURGERY
Payer: COMMERCIAL

## 2019-08-12 VITALS
HEART RATE: 98 BPM | DIASTOLIC BLOOD PRESSURE: 73 MMHG | RESPIRATION RATE: 18 BRPM | TEMPERATURE: 98.1 F | SYSTOLIC BLOOD PRESSURE: 150 MMHG

## 2019-08-12 DIAGNOSIS — S91.105A OPEN WOUND OF THIRD TOE OF LEFT FOOT, INITIAL ENCOUNTER: ICD-10-CM

## 2019-08-12 DIAGNOSIS — L89.623 PRESSURE ULCER OF LEFT HEEL, STAGE 3 (H): ICD-10-CM

## 2019-08-12 DIAGNOSIS — L89.613 PRESSURE INJURY OF RIGHT HEEL, STAGE 3 (H): ICD-10-CM

## 2019-08-12 PROCEDURE — 11042 DBRDMT SUBQ TIS 1ST 20SQCM/<: CPT

## 2019-08-12 PROCEDURE — A6197 ALGINATE DRSG >16 <=48 SQ IN: HCPCS

## 2019-08-12 PROCEDURE — 99213 OFFICE O/P EST LOW 20 MIN: CPT | Mod: 25 | Performed by: SURGERY

## 2019-08-12 PROCEDURE — 11042 DBRDMT SUBQ TIS 1ST 20SQCM/<: CPT | Performed by: SURGERY

## 2019-08-12 PROCEDURE — A6196 ALGINATE DRESSING <=16 SQ IN: HCPCS

## 2019-08-12 RX ORDER — LIDOCAINE/PRILOCAINE 2.5 %-2.5%
5 CREAM (GRAM) TOPICAL PRN
COMMUNITY
Start: 2019-07-30

## 2019-08-12 RX ORDER — BUPRENORPHINE HYDROCHLORIDE 300 UG/1
FILM, SOLUBLE BUCCAL
Refills: 0 | COMMUNITY
Start: 2019-08-08 | End: 2019-09-09

## 2019-08-12 NOTE — PROGRESS NOTES
[facility] Wound Healing [place] Progress Note    Subject: Shalonda Howard daughter return for follow-up.  She has significant peripheral neuropathy and bilateral plantar heel ulcerations.  She is also loss of her toes due to previous problems.  She bumped her left third toe and developed a new ulceration more on the tip and plantar aspect.  She had an older ulcer on the dorsal aspect that had been healing.  She is been packing this.    She has obtained the offloading bilateral boots and started wearing these.  This is been approximately a week and a half.  She developed a small blister on the left medial heel adjacent to the ulcer that is been stable.  She has had no signs of infection.    Patient Active Problem List   Diagnosis     Infection     Left foot Postop Wound infection     Post op infection     Systemic lupus erythematosus (H)     Osteomyelitis Left 1st metatarsal -- S/P Amputation     Cellulitis of right foot     Pressure injury of right heel, stage 3 (H)     Pressure ulcer of left heel, stage 3 (H)     Past Medical History:   Diagnosis Date     Allergic rhinitis, cause unspecified      Anxiety state, unspecified      Cellulitis and abscess of leg, except foot      Closed fracture of unspecified part of tibia      Disuse osteoporosis      Dysthymic disorder      Edema      Encounter for long-term (current) use of other medications      Esophageal reflux      Foot ulcer on Heel bilaterally  5/22/2019     Kidney stones      Myalgia and myositis, unspecified      Obesity, unspecified      Open wound of foot except toe(s) alone, complicated      Opioid type dependence, unspecified      Other acne      Other congenital valgus deformity of feet      Other ventral hernia without mention of obstruction or gangrene      Polycystic ovaries      PONV (postoperative nausea and vomiting)      Systemic lupus erythematosus (H)      Tibialis tendinitis      Unspecified hereditary and idiopathic peripheral neuropathy       Exam:  BP (!) 150/73   Pulse 98   Temp 98.1  F (36.7  C) (Temporal)   Resp 18   Wound (used by OP [facility] only) 07/29/19 0813 Left heel pressure injury (Active)   Length (cm) 4 8/12/2019  8:00 AM   Width (cm) 4.5 8/12/2019  8:00 AM   Depth (cm) 0.5 8/12/2019  8:00 AM   Wound (cm^2) 18 cm^2 8/12/2019  8:00 AM   Wound Volume (cm^3) 9 cm^3 8/12/2019  8:00 AM   Wound healing % -9.76 8/12/2019  8:00 AM   Dressing Appearance moist drainage 8/12/2019  8:00 AM   Drainage Characteristics/Odor serosanguineous 8/12/2019  8:00 AM   Drainage Amount large 8/12/2019  8:00 AM       Wound (used by OP [facility] only) 07/29/19 0814 Right heel pressure injury (Active)   Length (cm) 3.9 8/12/2019  8:00 AM   Width (cm) 4.1 8/12/2019  8:00 AM   Depth (cm) 0.5 8/12/2019  8:00 AM   Wound (cm^2) 15.99 cm^2 8/12/2019  8:00 AM   Wound Volume (cm^3) 8 cm^3 8/12/2019  8:00 AM   Wound healing % -23.76 8/12/2019  8:00 AM   Dressing Appearance moist drainage 8/12/2019  8:00 AM   Drainage Characteristics/Odor serosanguineous 8/12/2019  8:00 AM   Drainage Amount large 8/12/2019  8:00 AM       Wound (used by OP [facility] only) 08/12/19 0820 Left dorsal 3 toe trauma (Active)   Length (cm) 0.2 8/12/2019  8:00 AM   Width (cm) 1 8/12/2019  8:00 AM   Depth (cm) 0.3 8/12/2019  8:00 AM   Wound (cm^2) 0.2 cm^2 8/12/2019  8:00 AM   Wound Volume (cm^3) 0.06 cm^3 8/12/2019  8:00 AM   Dressing Appearance moist drainage 8/12/2019  8:00 AM   Drainage Characteristics/Odor serosanguineous 8/12/2019  8:00 AM   Drainage Amount moderate 8/12/2019  8:00 AM       Wound (used by OP [facility] only) 08/12/19 0820 Left distal 3 toe trauma (Active)   Length (cm) 1 8/12/2019  8:00 AM   Width (cm) 0.6 8/12/2019  8:00 AM   Depth (cm) 1 8/12/2019  8:00 AM   Wound (cm^2) 0.6 cm^2 8/12/2019  8:00 AM   Wound Volume (cm^3) 0.6 cm^3 8/12/2019  8:00 AM   Dressing Appearance moist drainage 8/12/2019  8:00 AM   Drainage Characteristics/Odor serosanguineous 8/12/2019  8:00 AM   Drainage Amount moderate  8/12/2019  8:00 AM     Alert and appropriate.  Here with her daughter.  Both heel ulcers as described above are quite clean.  Very mild amount of slough and fibrin.  Femoral granulation tissue.  No undermining.  No infection.  Mild macerated tissue on the right but very little on the left implying that the offloading is working well.    Plantar tip of the left third toe does have an ulceration measures 1 x 0.6 x 1 cm.  Fragment of bone is noted in the base.  No obvious infection.  Dorsal ulcer is 0.2 x 1 x 0.3 cm.    Right heel ulcer measures 4 x 4.5 x 0.5 cm in the left 3.9 x 4.1 x 0.5 cm.    Procedure:   Patient was determined to be capable of making their own medical decisions and informed consent was obtained. Topical anesthetic of 4% lidocaine was applied, debridement was performed using a #15 blade down to and including subcutaneous tissue.  All slough and fibrin removed from the heel ulcers and debrided the skin edges slightly down to bleeding tissue.  Blister left intact.     Also debrided the left third toe.  Fragment of bone at the base was cut with a rondure noted to be quite firm.  Good bleeding noted.  No obvious infection.  Bleeding controlled with light pressure. Patient tolerated procedure well.    VASHE lied all sites followed by silver cell at all areas.    Impression: #1.  Bilateral heel ulcers.  Offloading is being used.  Unsure whether this would be adequate to allow for healing by secondary intent.  Problems with split-thickness skin grafting as we have discussed on her first visit.  We will bring the patient up at our CQI conference this week to see after discussion with our plastic surgeons whether there is any type of rotational flaps that could be performed and I discussed this with the patient and her daughter.  Continue with present dressing this time.  She does have resources to look at her offloading boots and adjust them as needed.                         #2.  Left third toe ulcer with  involved bone.  No obvious osteomyelitis but obviously high wrist since the bone is exposed and discussed with patient.  Some mild erythema.  Do not feel that oral antibiotics are indicated at this time and his local wound care.  We will follow this.  She is aware that amputation may be necessary if this does not resolve.    Plan: We will dress the wounds with silver cell and offloading.  Patient will return to the clinic in 2 weeks time    Sherman Freitas MD on 8/12/2019 at 8:37 AM

## 2019-08-12 NOTE — DISCHARGE INSTRUCTIONS
[facility] [place]  [address] [facility] [address], [place] [address]  Appointment Phone [phone] Nurse Advisors [phone]    Shalonda Howard      1970    [facility] Phone:[phone] Fax: [phone]    Wound Dressing Change to bilateral heel wounds, left 3rd toe and base of right 3rd toe  Cleanse wounds and surrounding skin with: wound cleanser  Cover wounds with Silvercel cut to fit the size of the wounds. Pack into the base of left 3rd toe where bone is exposed   Cover wounds with gauze or ABD pads, secure with roll gauze  Change dressing daily.     Sherman Freitas M.D.. August 12, 2019    Call us at [phone] if you have any questions about your wounds, have redness or swelling around your wound, have a fever of 101 or greater or if you have any other problems or concerns. We answer the phone Monday through Friday 8 am to 4 pm, please leave a message as we check the voicemail frequently throughout the day.     Follow up with Dr. Freitas next available

## 2019-08-16 ENCOUNTER — TELEPHONE (OUTPATIENT)
Dept: OTHER | Facility: CLINIC | Age: 49
End: 2019-08-16

## 2019-08-16 NOTE — TELEPHONE ENCOUNTER
Reason for Call: Shalonda COOPER RN from [facility] called regarding s/s of infection, heat, decreasing BP, maceration and redness going up her leg.  Recommended that she go to the emergency room for treatment of infection s/s.      Pt Provider: Sherman Freitas M.D.     Phone Number can be reached at: [phone]     Can we leave a detailed message on this number? YES      Fay Leger RN on 8/16/2019 at 1:20 PM      Routed to  Wound Healing Nurse Pool

## 2019-08-19 NOTE — TELEPHONE ENCOUNTER
Shalonda called and reported Patient did not go to the ER over the weekend.She will keep us up updated on Patients status.She will keep her appointment as scheduled.

## 2019-08-29 ENCOUNTER — TELEPHONE (OUTPATIENT)
Dept: WOUND CARE | Facility: CLINIC | Age: 49
End: 2019-08-29

## 2019-08-29 DIAGNOSIS — L89.623 PRESSURE ULCER OF LEFT HEEL, STAGE 3 (H): ICD-10-CM

## 2019-08-29 DIAGNOSIS — L89.613 PRESSURE INJURY OF RIGHT HEEL, STAGE 3 (H): Primary | ICD-10-CM

## 2019-08-29 NOTE — TELEPHONE ENCOUNTER
Patient wanted to know what the results were from the meeting that was had about her plan of care. Recommendation was for referral to [place] [facility] for Dr. Ronnie Cheek to see if there is any surgical procedure he could do for her. Referral was placed. Patient was notified.

## 2019-09-09 ENCOUNTER — HOSPITAL ENCOUNTER (OUTPATIENT)
Dept: WOUND CARE | Facility: CLINIC | Age: 49
Discharge: HOME | End: 2019-09-09
Attending: SURGERY | Admitting: SURGERY
Payer: COMMERCIAL

## 2019-09-09 VITALS — SYSTOLIC BLOOD PRESSURE: 151 MMHG | DIASTOLIC BLOOD PRESSURE: 86 MMHG | TEMPERATURE: 98 F | HEART RATE: 96 BPM

## 2019-09-09 DIAGNOSIS — L89.623 PRESSURE ULCER OF LEFT HEEL, STAGE 3 (H): ICD-10-CM

## 2019-09-09 DIAGNOSIS — S91.105A OPEN WOUND OF THIRD TOE OF LEFT FOOT, INITIAL ENCOUNTER: ICD-10-CM

## 2019-09-09 DIAGNOSIS — L89.613 PRESSURE INJURY OF RIGHT HEEL, STAGE 3 (H): ICD-10-CM

## 2019-09-09 PROCEDURE — 11042 DBRDMT SUBQ TIS 1ST 20SQCM/<: CPT

## 2019-09-09 PROCEDURE — A6197 ALGINATE DRSG >16 <=48 SQ IN: HCPCS

## 2019-09-09 PROCEDURE — 11042 DBRDMT SUBQ TIS 1ST 20SQCM/<: CPT | Performed by: SURGERY

## 2019-09-09 NOTE — PROGRESS NOTES
[facility]ound [facility]aling [place] Progress Note    Subject: Shalonda Howard returns for follow-up of her bilateral heel ulcerations and toe ulcer.  These are quite chronic.  Patient has been keeping these clean and performing daily dressing changes.  She has bilateral offloading Prafo boots trying to offload the areas.  However, with her being ambulatory this only slightly decreases the pressure over the site.    We did bring her up at our CQI meeting 2 weeks ago.  We discussed this with her reconstructive plastic surgeon.  It was felt that with her young age that she would be a candidate for some type of rotational flap.  We referred to to see Dr. Cheek of plastic surgery at [facility] to discuss this further.  This would take an extreme time commitment on the patient since postoperatively she is to be absolutely nonweightbearing for up to several months to heal and she is aware of this.  However, without this more aggressive approach is very unlikely that these ulcers will heel.    She is been using silver cell and the heel ulcers.  We did debride the left third toe and removed a bone fragment with no obvious infection on 8/12/2019.  She noticed a small bone fragment coming out of the third toe several days after this and no problems since.  Patient has had no history of osteomyelitis of her heels.    Patient Active Problem List   Diagnosis     Infection     Left foot Postop Wound infection     Post op infection     Systemic lupus erythematosus (H)     Osteomyelitis Left 1st metatarsal -- S/P Amputation     Cellulitis of right foot     Pressure injury of right heel, stage 3 (H)     Pressure ulcer of left heel, stage 3 (H)     Open wound of third toe of left foot     Past Medical History:   Diagnosis Date     Allergic rhinitis, cause unspecified      Anxiety state, unspecified      Cellulitis and abscess of leg, except foot      Closed fracture of unspecified part of tibia      Disuse osteoporosis      Dysthymic disorder       Edema      Encounter for long-term (current) use of other medications      Esophageal reflux      Foot ulcer on Heel bilaterally  5/22/2019     Kidney stones      Myalgia and myositis, unspecified      Obesity, unspecified      Open wound of foot except toe(s) alone, complicated      Opioid type dependence, unspecified      Other acne      Other congenital valgus deformity of feet      Other ventral hernia without mention of obstruction or gangrene      Polycystic ovaries      PONV (postoperative nausea and vomiting)      Systemic lupus erythematosus (H)      Tibialis tendinitis      Unspecified hereditary and idiopathic peripheral neuropathy      Exam:  BP (!) 151/86   Pulse 96   Temp 98  F (36.7  C) (Tympanic)   Wound (used by OP [facility] only) 07/29/19 0813 Left heel pressure injury (Active)   Length (cm) 4.5 9/9/2019  9:13 AM   Width (cm) 4.5 9/9/2019  9:13 AM   Depth (cm) 0.8 9/9/2019  9:13 AM   Wound (cm^2) 20.25 cm^2 9/9/2019  9:13 AM   Wound Volume (cm^3) 16.2 cm^3 9/9/2019  9:13 AM   Wound healing % -23.48 9/9/2019  9:13 AM   Dressing Appearance moist drainage 9/9/2019  9:13 AM   Drainage Characteristics/Odor serosanguineous 9/9/2019  9:13 AM   Drainage Amount large 9/9/2019  9:13 AM       Wound (used by OP [facility] only) 07/29/19 0814 Right heel pressure injury (Active)   Length (cm) 0.4 9/9/2019  9:13 AM   Width (cm) 0.8 9/9/2019  9:13 AM   Depth (cm) 0.5 9/9/2019  9:13 AM   Wound (cm^2) 0.32 cm^2 9/9/2019  9:13 AM   Wound Volume (cm^3) 0.16 cm^3 9/9/2019  9:13 AM   Wound healing % 97.52 9/9/2019  9:13 AM   Dressing Appearance moist drainage 9/9/2019  9:13 AM   Drainage Characteristics/Odor serosanguineous 9/9/2019  9:13 AM   Drainage Amount large 9/9/2019  9:13 AM       Wound (used by OP [facility] only) 08/12/19 0820 Left dorsal 3 toe trauma (Active)   Length (cm) 0.4 9/9/2019  9:13 AM   Width (cm) 0.8 9/9/2019  9:13 AM   Depth (cm) 0.5 9/9/2019  9:13 AM   Wound (cm^2) 0.32 cm^2 9/9/2019  9:13 AM   Wound Volume  (cm^3) 0.16 cm^3 9/9/2019  9:13 AM   Wound healing % -60 9/9/2019  9:13 AM   Dressing Appearance moist drainage 9/9/2019  9:13 AM   Drainage Characteristics/Odor serosanguineous 9/9/2019  9:13 AM   Drainage Amount moderate 9/9/2019  9:13 AM     Alert and appropriate.  Did wear her offloading boots clinic.  Very comfortable.  Right heel ulcer measures 5 x 4.8 x 1.2 cm.  1.2 cm in the center where there is some more whitish tissue.  The rest is pink with good granulation tissue.  No undermining of the edges.  No significant callus.    Left heel ulcer measures 4.5 x 4.5 x 0.8 cm.  No deeper areas.  Again no undermining.  Mild slough.    Left third toe measures 0.4 x 0.8 x 0.5 cm.  No erythema.    Procedure:   Patient was determined to be capable of making their own medical decisions and informed consent was obtained. Topical anesthetic of 4% lidocaine was applied, debridement was performed using a #15 blade down to and including subcutaneous tissue.  All 3 sites were debrided.  In particular we excised the deeper area on the right heel.  This goes towards the calcaneus at the base which is quite firm.  Slough and fibrin removed and the skin edges slightly debrided on all 3 areas.  Good bleeding noted.  Bleeding controlled with light pressure. Patient tolerated procedure well.    Impression: #1.  Bilateral heel ulcers.  She is keeping the wounds healed.  No obvious osteomyelitis but I am concerned about the depth of the center of the right heel.  Continue with silver cell and offloading.  She has not yet made an appointment with Dr. Cheek of plastic surgery and we encouraged that she do so.  They may decide to do an MRI before any surgical intervention to rule out osteomyelitis of the heel and I discussed this.                       #2.  Left third toe ulcer.  Obvious distal osteomyelitis but bone appears to be quite firm at this time.  I do not feel antibiotics are indicated presently.  This can also be evaluated by   Adia.  Continue with same dressing changes.    Plan: We will dress the wounds with silver cell and ongoing offloading.  Patient will return to the clinic as needed since hopefully her care and treatment plan by Dr. Cheek can be established.    Sherman Freitas MD on 9/9/2019 at 9:24 AM

## 2019-09-09 NOTE — DISCHARGE INSTRUCTIONS
[facility]OUND [facility] [place]  [address] [facility] [address], [facility] [address]  Appointment Phone [phone] Nurse Advisors [phone]    Shalonda Howard 1970    [facility] Phone:[phone] Fax: [phone]    Wound Dressing Change to bilateral heel wounds, left 3rd toe   Cleanse wounds and surrounding skin with: wound cleanser  Cover wounds with Silvercel cut to fit the size of the wounds.  Cover wounds with gauze or ABD pads, secure with roll gauze  Change dressing daily    Sherman Freitas M.D. September 9, 2019    Call us at [phone] if you have any questions about your wounds, have redness or  swelling around your wound, have a fever of 101 or greater or if you have any other  problems or concerns. We answer the phone Monday through Friday 8 am to 4 pm,  please leave a message as we check the voicemail frequently throughout the day.    Call the [facility] Plastic Surgery Department to make an appointment with Dr. Jose Cheek to inquire about a flap surgery to heal your heel wounds at [phone]

## 2019-10-28 ENCOUNTER — TELEPHONE (OUTPATIENT)
Dept: WOUND CARE | Facility: CLINIC | Age: 49
End: 2019-10-28

## 2019-10-28 NOTE — TELEPHONE ENCOUNTER
Patient was referred to Dr. Cheek for bilateral heel flpa surgery. Dr. Cheek does not perform this, recommended Dr. Lazcano.  Dr. Lazcano does not do this either, recommended patient call Dr. Arrieta at [phone]. Called, left message with Shalonda to call us for this information.

## 2019-11-04 ENCOUNTER — HOSPITAL ENCOUNTER (OUTPATIENT)
Dept: WOUND CARE | Facility: CLINIC | Age: 49
Discharge: HOME | End: 2019-11-04
Attending: SURGERY | Admitting: SURGERY
Payer: COMMERCIAL

## 2019-11-04 VITALS
TEMPERATURE: 100 F | HEART RATE: 105 BPM | RESPIRATION RATE: 18 BRPM | DIASTOLIC BLOOD PRESSURE: 79 MMHG | SYSTOLIC BLOOD PRESSURE: 147 MMHG

## 2019-11-04 DIAGNOSIS — L89.623 PRESSURE ULCER OF LEFT HEEL, STAGE 3 (H): ICD-10-CM

## 2019-11-04 DIAGNOSIS — S91.105A OPEN WOUND OF THIRD TOE OF LEFT FOOT, INITIAL ENCOUNTER: ICD-10-CM

## 2019-11-04 DIAGNOSIS — L89.613 PRESSURE INJURY OF RIGHT HEEL, STAGE 3 (H): ICD-10-CM

## 2019-11-04 PROCEDURE — 87075 CULTR BACTERIA EXCEPT BLOOD: CPT | Performed by: SURGERY

## 2019-11-04 PROCEDURE — 87186 SC STD MICRODIL/AGAR DIL: CPT | Performed by: SURGERY

## 2019-11-04 PROCEDURE — 97602 WOUND(S) CARE NON-SELECTIVE: CPT

## 2019-11-04 PROCEDURE — A6209 FOAM DRSG <=16 SQ IN W/O BDR: HCPCS

## 2019-11-04 PROCEDURE — 87077 CULTURE AEROBIC IDENTIFY: CPT | Performed by: SURGERY

## 2019-11-04 PROCEDURE — 87076 CULTURE ANAEROBE IDENT EACH: CPT | Performed by: SURGERY

## 2019-11-04 PROCEDURE — 87185 SC STD ENZYME DETCJ PER NZM: CPT | Performed by: SURGERY

## 2019-11-04 PROCEDURE — 87070 CULTURE OTHR SPECIMN AEROBIC: CPT | Mod: XS | Performed by: SURGERY

## 2019-11-04 PROCEDURE — 99214 OFFICE O/P EST MOD 30 MIN: CPT | Performed by: SURGERY

## 2019-11-04 RX ORDER — SERTRALINE HYDROCHLORIDE 100 MG/1
150 TABLET, FILM COATED ORAL DAILY
COMMUNITY
Start: 2019-09-30

## 2019-11-04 RX ORDER — OXYCODONE AND ACETAMINOPHEN 10; 325 MG/1; MG/1
1 TABLET ORAL 3 TIMES DAILY
Status: ON HOLD | COMMUNITY
Start: 2019-07-25 | End: 2019-12-06

## 2019-11-04 RX ORDER — NALOXONE HYDROCHLORIDE 4 MG/.1ML
SPRAY NASAL
Refills: 1 | COMMUNITY
Start: 2019-09-06

## 2019-11-04 RX ORDER — FLUCONAZOLE 100 MG/1
TABLET ORAL
Refills: 0 | COMMUNITY
Start: 2019-10-29 | End: 2019-11-12

## 2019-11-04 RX ORDER — DIPHENHYDRAMINE HYDROCHLORIDE AND LIDOCAINE HYDROCHLORIDE AND ALUMINUM HYDROXIDE AND MAGNESIUM HYDRO
5 KIT 2 TIMES DAILY PRN
COMMUNITY
Start: 2019-09-30 | End: 2020-02-04

## 2019-11-04 RX ORDER — GABAPENTIN 300 MG/1
CAPSULE ORAL
Refills: 3 | COMMUNITY
Start: 2019-10-29

## 2019-11-04 RX ORDER — BUPROPION HYDROCHLORIDE 300 MG/1
300 TABLET ORAL DAILY
Refills: 3 | COMMUNITY
Start: 2019-11-02

## 2019-11-04 NOTE — PROGRESS NOTES
[facility] [place] Progress Note    Subject: Shalonda Howard, medical record reviewed, extensive previous evaluation and treatments with Dr. Freitas.  She initially underwent a ventral hernia repair in 2011, laparoscopic, subsequently developed edema of the lower extremities as she best recalls, edema of the legs was treated initially with Unna boots.  She subsequently developed several ulcerations, was placed in offloading boots per her best recollection after diagnosis of a right foot fracture with subsequent developed into full-thickness ulcerations and subsequent chronic ulcerations.  She has had previous surgical procedure with Dr. Harman with subsequent full hardware removal.  She has utilized total contact casting multiple years prior.  No recent use of cellular rise or D cellular rise to a advanced tissue products.  It was suggested that she see plastic surgery for consideration of flap placement.  She currently is utilizing bilateral tibial offloading Crow walkers and performs her own dressing changes daily.  She denies history of deep venous thrombosis, cirrhosis, hepatic, cardiac, renal dysfunction.  No history of hypothyroidism.  Is a nondiabetic, does not utilize any form of nicotine, tobacco, vaping.  No history of excessive alcohol use.  She does have idiopathic neuropathy.  The neuropathy was confirmed today with a [place] Eden 5.0 7 monofilament testing.  She has no history of lupus, rheumatoid arthritis, other inflammatory chronic conditions, no inflammatory bowel disease, ulcerative colitis or Crohn's disease.  No history of pulmonary emboli.  She is not on antiproliferative medications.  Medication list was reviewed.  X-rays and MRIs of the feet this spring demonstrate no evidence of osteomyelitis however recent x-ray performed on October 31 demonstrates concerns regarding osteomyelitis of the right foot as noted below, she will be following up with Dr. Harman tomorrow to further  discuss.      PHYSICIAN ALERT        Radiologist:  CHINEDU MEIER M.D.    Clinical History: Chronic right foot pain with foot ulcers.    Technique: XR FOOT AP LATERAL OBLIQUE RIGHT    Comparison: 07/09/2019 calcaneus radiographs.    Findings: First metatarsal osteotomy, screw fixation and   bunionectomy. This has healed into excellent position and alignment   and there is no evidence of complication. There is also an osteotomy   in the proximal metadiaphysis of the proximal phalanx of the great   toe. A portion of the osteotomy line is still visible, but there is   bridging callus and some bridging bone. 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 Hardware removal from   the calcaneus again noted. Mild osteoarthrosis in the ankle joint   again noted. The plantar calcaneal enthesophyte appears smaller and   less visible when compared to the prior study.    IMPRESSION:  1. Interval removal or resorption of a portion of the plantar   calcaneal enthesophyte. This would be evidence of osteomyelitis in   the appropriate clinical setting.  2. Healing osteotomy in the proximal phalanx of the great toe.  3. 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  4. Degenerative changes in the ankle and hindfoot.    Patient Active Problem List   Diagnosis     Infection     Left foot Postop Wound infection     Post op infection     Systemic lupus erythematosus (H)     Osteomyelitis Left 1st metatarsal -- S/P Amputation     Cellulitis of right foot     Pressure injury of right heel, stage 3 (H)     Pressure ulcer of left heel, stage 3 (H)     Open wound of third toe of left foot     Past Medical History:   Diagnosis Date     Allergic rhinitis, cause unspecified      Anxiety state, unspecified      Cellulitis and abscess of leg, except foot      Closed fracture of unspecified part of tibia      Disuse osteoporosis      Dysthymic disorder       Edema      Encounter for long-term (current) use of other medications      Esophageal reflux      Foot ulcer on Heel bilaterally  5/22/2019     Kidney stones      Myalgia and myositis, unspecified      Obesity, unspecified      Open wound of foot except toe(s) alone, complicated      Opioid type dependence, unspecified      Other acne      Other congenital valgus deformity of feet      Other ventral hernia without mention of obstruction or gangrene      Polycystic ovaries      PONV (postoperative nausea and vomiting)      Systemic lupus erythematosus (H)      Tibialis tendinitis      Unspecified hereditary and idiopathic peripheral neuropathy      Exam:  BP (!) 147/79   Pulse 105   Temp 100  F (37.8  C) (Oral)   Resp 18   Wound (used by OP I only) 07/29/19 0813 Left heel pressure injury (Active)   Length (cm) 5.3 11/4/2019  2:00 PM   Width (cm) 5 11/4/2019  2:00 PM   Depth (cm) 2.4 11/4/2019  2:00 PM   Wound (cm^2) 26.5 cm^2 11/4/2019  2:00 PM   Wound Volume (cm^3) 63.6 cm^3 11/4/2019  2:00 PM   Wound healing % -61.59 11/4/2019  2:00 PM   Dressing Appearance copious drainage 11/4/2019  2:00 PM   Drainage Characteristics/Odor serosanguineous 11/4/2019  2:00 PM   Drainage Amount copious 11/4/2019  2:00 PM       Wound (used by OP I only) 07/29/19 0814 Right heel pressure injury (Active)   Length (cm) 5.5 11/4/2019  2:00 PM   Width (cm) 5 11/4/2019  2:00 PM   Depth (cm) 2.5 11/4/2019  2:00 PM   Wound (cm^2) 27.5 cm^2 11/4/2019  2:00 PM   Wound Volume (cm^3) 68.75 cm^3 11/4/2019  2:00 PM   Wound healing % -112.85 11/4/2019  2:00 PM   Dressing Appearance copious drainage 11/4/2019  2:00 PM   Drainage Characteristics/Odor serosanguineous 11/4/2019  2:00 PM   Drainage Amount copious 11/4/2019  2:00 PM       Wound (used by OP I only) 11/04/19 1413 Right 3 toe (Active)   Length (cm) 0.9 11/4/2019  2:00 PM   Width (cm) 1.5 11/4/2019  2:00 PM   Depth (cm) 0 11/4/2019  2:00 PM   Wound (cm^2) 1.35 cm^2 11/4/2019  2:00 PM    Wound Volume (cm^3) 0 cm^3 11/4/2019  2:00 PM   Dressing Appearance moist drainage 11/4/2019  2:00 PM   Drainage Characteristics/Odor serosanguineous 11/4/2019  2:00 PM   Drainage Amount moderate 11/4/2019  2:00 PM       Wound (used by OP [facility] only) 11/04/19 1415 Left 2 toe (Active)   Length (cm) 1.2 11/4/2019  2:00 PM   Width (cm) 1 11/4/2019  2:00 PM   Depth (cm) 0 11/4/2019  2:00 PM   Wound (cm^2) 1.2 cm^2 11/4/2019  2:00 PM   Wound Volume (cm^3) 0 cm^3 11/4/2019  2:00 PM   Dressing Appearance moist drainage 11/4/2019  2:00 PM   Drainage Characteristics/Odor serosanguineous 11/4/2019  2:00 PM   Drainage Amount moderate 11/4/2019  2:00 PM     General appearance is a 49-year-old female, nondistressed, conversant, alert and oriented x3.  She has a significant ventral hernia present.  Palpable right and left dorsalis pedis pulses.  Review of ankle brachial indices identifies no evidence based on physiologic studies of arterial disease area living cultures obtained of the right and left heel ulcerations.  Status post left great toe amputation.  No palpable hardware or bone with in the depths of the right and left heel wounds.  5.07 grain [place] Eden monofilament test demonstrates absent sensation on the plantar surface of the right and left feet and along the instep as compared to the backs of the right and left hands and proximal tibial margins.  No purulence or foul odor.  Bowel ulcerations right and left toes.      Impression: Neuropathy right and left feet without protective sensation, chronic heel ulcerations, status post left great toe amputation, multiple toe ulcerations with progressive clawing of multiple toes of the right and left feet.    Plan: We will dress the wounds with Plurogel to the wounds of the toes covered with Adaptic, Hydrofera Blue to the right and left heel wounds, irrigate all wounds with Prontosan on a daily basis.  Ideally surgical management could consist of Misonix ultrasonic  debridement of the right and left heel wounds to remove all planktonic biofilm with subsequent placement of 5 ply myriad ovine foregut.  We then intraoperatively total contact cast right side incessantly alternate total contact casting size depending on healing rate.  Would also consider utilization of cadaver TheraSkin.  Patient will return to the clinic in 1 weeks time and look forward to talking to Dr. Bernard tomorrow regarding his thoughts.    Juan Antonio Travis MD on 11/4/2019 at 2:29 PM

## 2019-11-05 NOTE — DISCHARGE INSTRUCTIONS
[facility]  [address] [facility] [address], [facility] [address]  Appointment Phone [phone] Nurse Advisors [phone]    Shalonda Howard      1970    [facility] Phone:[phone] Fax: [phone]    Please add in 1 packet of Isaiah Supplement TWICE a day until your next appointment   Please add in Vitamin D3 Vitamin 10,000 international units per day.  Sheri Bloom Cardiovascular Support Blood Health Vascular 1 tablet twice daily     Call [phone] or order from [employer]    Wound Dressing Change:Bilateral Heel Ulcer  Cleanse wound and surrounding skin with: saline or prontosan  Cover wound with hydrofera blue ready   Change dressing daily  Compression: Apply Edemawear from base of toes to knee. To be worn at all times.   Wound Dressing Change:Toe Ulcers  Cleanse wound and surrounding skin with: prontosan  Apply Plurogel to wound base or on dressing  Cover wound with adaptic and Edemawear  Change dressing daily    [email or web address] or call [phone] to place an order for 50 gram tube  Use PLUROHEALS (all caps) at checkout in the discount code section       JUNAID Travis M.D.. November 4, 2019    Call us at [phone] if you have any questions about your wounds, have redness or swelling around your wound, have a fever of 101 or greater or if you have any other problems or concerns. We answer the phone Monday through Friday 8 am to 4 pm, please leave a message as we check the voicemail frequently throughout the day.     Marybeth as scheduled  Follow up with Provider - 3 weeks

## 2019-11-05 NOTE — ADDENDUM NOTE
Encounter addended by: Leia Branch RN on: 11/5/2019 7:54 AM   Actions taken: Charge Capture section accepted, Order list changed, Diagnosis association updated, Edited Discharge Instructions, Plan of Care note modified

## 2019-11-06 ENCOUNTER — DOCUMENTATION ONLY (OUTPATIENT)
Facility: CLINIC | Age: 49
End: 2019-11-06

## 2019-11-07 LAB
BACTERIA SPEC CULT: ABNORMAL
Lab: ABNORMAL
SPECIMEN SOURCE: ABNORMAL

## 2019-11-11 LAB
BACTERIA SPEC CULT: ABNORMAL
BACTERIA SPEC CULT: NORMAL
Lab: ABNORMAL
Lab: NORMAL
SPECIMEN SOURCE: ABNORMAL
SPECIMEN SOURCE: NORMAL

## 2019-11-12 ENCOUNTER — HOSPITAL ENCOUNTER (OUTPATIENT)
Dept: WOUND CARE | Facility: CLINIC | Age: 49
Discharge: HOME | End: 2019-11-12
Attending: SURGERY | Admitting: SURGERY
Payer: COMMERCIAL

## 2019-11-12 VITALS
HEART RATE: 93 BPM | RESPIRATION RATE: 18 BRPM | DIASTOLIC BLOOD PRESSURE: 74 MMHG | SYSTOLIC BLOOD PRESSURE: 124 MMHG | TEMPERATURE: 99.5 F

## 2019-11-12 DIAGNOSIS — L89.613 PRESSURE INJURY OF RIGHT HEEL, STAGE 3 (H): ICD-10-CM

## 2019-11-12 DIAGNOSIS — L89.623 PRESSURE ULCER OF LEFT HEEL, STAGE 3 (H): ICD-10-CM

## 2019-11-12 DIAGNOSIS — S91.105A OPEN WOUND OF THIRD TOE OF LEFT FOOT, INITIAL ENCOUNTER: ICD-10-CM

## 2019-11-12 LAB
BACTERIA SPEC CULT: ABNORMAL
Lab: ABNORMAL
SPECIMEN SOURCE: ABNORMAL

## 2019-11-12 PROCEDURE — 11042 DBRDMT SUBQ TIS 1ST 20SQCM/<: CPT | Performed by: SURGERY

## 2019-11-12 PROCEDURE — 17250 CHEM CAUT OF GRANLTJ TISSUE: CPT

## 2019-11-12 PROCEDURE — 97602 WOUND(S) CARE NON-SELECTIVE: CPT

## 2019-11-12 PROCEDURE — 99213 OFFICE O/P EST LOW 20 MIN: CPT | Mod: 25 | Performed by: SURGERY

## 2019-11-12 PROCEDURE — 11042 DBRDMT SUBQ TIS 1ST 20SQCM/<: CPT

## 2019-11-12 PROCEDURE — A6209 FOAM DRSG <=16 SQ IN W/O BDR: HCPCS

## 2019-11-12 NOTE — ADDENDUM NOTE
Encounter addended by: Juan Antonio Travis MD on: 11/12/2019 4:19 PM   Actions taken: Clinical Note Signed

## 2019-11-12 NOTE — DISCHARGE INSTRUCTIONS
[facility]  [address] [facility] [address], [facility] [address]  Appointment Phone [phone] Nurse Advisors [phone]    Shalonda Howard      1970  Please add in 1 packet of Isaiah Supplement TWICE a day until your next appointment  Please add in Vitamin D3 Vitamin 10,000 international units per day.  Sheri Rosenthal Hesperidin Cardiovascular Support Blood Health Vascular 1 tablet twice daily  Call [phone] or order from [employer]  Wound Dressing Change:Bilateral Heel Ulcer  Cleanse wound and surrounding skin with: saline or prontosan  Cover wound with hydrofera blue ready  Change dressing daily  Compression: Apply Edemawear from base of toes to knee. To be worn at all times.  Wound Dressing Change:Toe Ulcers  Cleanse wound and surrounding skin with: prontosan  Apply Plurogel to wound base or on dressing  Cover wound with adaptic and Edemawear  Change dressing daily       JUNAID Travis M.D.. November 12, 2019    Call us at [phone] if you have any questions about your wounds, have redness or swelling around your wound, have a fever of 101 or greater or if you have any other problems or concerns. We answer the phone Monday through Friday 8 am to 4 pm, please leave a message as we check the voicemail frequently throughout the day.     Follow up with Provider - Plan for OR

## 2019-11-12 NOTE — PROGRESS NOTES
[facility]ound Healing [place] Progress Note    Subject: Shalonda Howard returns to clinic to discuss management of chronic bilateral heel ulcerations.  Onset of chronic edema after inguinal hernia repair multiple years ago with subsequent management of edema with Unna boots.  Diagnosed with right foot fracture, ultimately placed in a Cam walking boot, developed ulcerations, subsequently surgical procedure for management of identified fracture right foot, hardware subsequently been removed.  She has seen Dr. Harman who said there is no evidence of osteomyelitis in the right heel.  She is not a diabetic.  He does have chronic neuropathy of her feet.  Idiopathic diagnosis at this time.  I have asked that she consider seeing a functional medicine physician in addition to her primary care [facility].  She does have chronic anemia and is undergoing a IV iron transfusion.  We will check a MTHFR study as she may be a non-metabolizer for folic acid which could impair B12 levels.  She does not utilize tobacco.  11th grade daughters present for today's evaluation.    Patient Active Problem List   Diagnosis     Infection     Left foot Postop Wound infection     Post op infection     Systemic lupus erythematosus (H)     Osteomyelitis Left 1st metatarsal -- S/P Amputation     Cellulitis of right foot     Pressure injury of right heel, stage 3 (H)     Pressure ulcer of left heel, stage 3 (H)     Open wound of third toe of left foot     Past Medical History:   Diagnosis Date     Allergic rhinitis, cause unspecified      Anxiety state, unspecified      Cellulitis and abscess of leg, except foot      Closed fracture of unspecified part of tibia      Disuse osteoporosis      Dysthymic disorder      Edema      Encounter for long-term (current) use of other medications      Esophageal reflux      Foot ulcer on Heel bilaterally  5/22/2019     Kidney stones      Myalgia and myositis, unspecified      Obesity,  unspecified      Open wound of foot except toe(s) alone, complicated      Opioid type dependence, unspecified      Other acne      Other congenital valgus deformity of feet      Other ventral hernia without mention of obstruction or gangrene      Polycystic ovaries      PONV (postoperative nausea and vomiting)      Systemic lupus erythematosus (H)      Tibialis tendinitis      Unspecified hereditary and idiopathic peripheral neuropathy      Exam:  /74   Pulse 93   Temp 99.5  F (37.5  C) (Temporal)   Resp 18   Wound (used by OP [facility] only) 07/29/19 0813 Left heel pressure injury (Active)   Length (cm) 5 11/12/2019  7:00 AM   Width (cm) 5 11/12/2019  7:00 AM   Depth (cm) 1.6 11/12/2019  7:00 AM   Wound (cm^2) 25 cm^2 11/12/2019  7:00 AM   Wound Volume (cm^3) 40 cm^3 11/12/2019  7:00 AM   Wound healing % -52.44 11/12/2019  7:00 AM   Dressing Appearance copious drainage 11/12/2019  7:00 AM   Drainage Characteristics/Odor serosanguineous 11/12/2019  7:00 AM   Drainage Amount copious 11/12/2019  7:00 AM   Thickness/Stage Stage 3 11/12/2019  7:00 AM   Base red/granulating 11/12/2019  7:00 AM   Periwound intact 11/12/2019  7:00 AM   Periwound Temperature warm 11/12/2019  7:00 AM   Periwound Skin Turgor soft 11/12/2019  7:00 AM   Edges open 11/12/2019  7:00 AM   Care, Wound non-select wound debridement performed 11/12/2019  7:00 AM       Wound (used by OP [facility] only) 07/29/19 0814 Right heel pressure injury (Active)   Length (cm) 5 11/12/2019  7:00 AM   Width (cm) 5 11/12/2019  7:00 AM   Depth (cm) 3 11/12/2019  7:00 AM   Wound (cm^2) 25 cm^2 11/12/2019  7:00 AM   Wound Volume (cm^3) 75 cm^3 11/12/2019  7:00 AM   Wound healing % -93.5 11/12/2019  7:00 AM   Dressing Appearance copious drainage 11/12/2019  7:00 AM   Drainage Characteristics/Odor serosanguineous 11/12/2019  7:00 AM   Drainage Amount copious 11/12/2019  7:00 AM   Thickness/Stage Stage 4 11/12/2019  7:00 AM   Base red/granulating 11/12/2019  7:00 AM    Periwound intact 11/12/2019  7:00 AM   Periwound Temperature warm 11/12/2019  7:00 AM   Periwound Skin Turgor soft 11/12/2019  7:00 AM   Edges open 11/12/2019  7:00 AM   Care, Wound chemical cautery applied 11/12/2019  7:00 AM       Wound (used by OP [facility] only) 11/04/19 1413 Right 3 toe pressure injury (Active)   Length (cm) 0.4 11/12/2019  7:00 AM   Width (cm) 0.8 11/12/2019  7:00 AM   Depth (cm) 1.1 11/12/2019  7:00 AM   Wound (cm^2) 0.32 cm^2 11/12/2019  7:00 AM   Wound Volume (cm^3) 0.35 cm^3 11/12/2019  7:00 AM   Wound healing % 76.3 11/12/2019  7:00 AM   Dressing Appearance moist drainage 11/12/2019  7:00 AM   Drainage Characteristics/Odor serosanguineous 11/12/2019  7:00 AM   Drainage Amount moderate 11/12/2019  7:00 AM   Thickness/Stage full thickness 11/12/2019  7:00 AM   Base red/granulating 11/12/2019  7:00 AM   Periwound intact 11/12/2019  7:00 AM   Periwound Temperature warm 11/12/2019  7:00 AM   Periwound Skin Turgor soft 11/12/2019  7:00 AM   Edges open 11/12/2019  7:00 AM   Care, Wound non-select wound debridement performed 11/12/2019  7:00 AM       Wound (used by OP [facility] only) 11/04/19 1415 Left 2 toe pressure injury (Active)   Length (cm) 1.1 11/12/2019  7:00 AM   Width (cm) 1.3 11/12/2019  7:00 AM   Depth (cm) 0 11/12/2019  7:00 AM   Wound (cm^2) 1.43 cm^2 11/12/2019  7:00 AM   Wound Volume (cm^3) 0 cm^3 11/12/2019  7:00 AM   Wound healing % -19.17 11/12/2019  7:00 AM   Dressing Appearance moist drainage 11/12/2019  7:00 AM   Drainage Characteristics/Odor serosanguineous 11/12/2019  7:00 AM   Drainage Amount moderate 11/12/2019  7:00 AM   Thickness/Stage full thickness 11/12/2019  7:00 AM   Base red/granulating 11/12/2019  7:00 AM   Periwound intact 11/12/2019  7:00 AM   Periwound Temperature warm 11/12/2019  7:00 AM   Periwound Skin Turgor soft 11/12/2019  7:00 AM   Edges open 11/12/2019  7:00 AM   Care, Wound non-select wound debridement performed 11/12/2019  7:00 AM     General appearance is  nondistressed, conversant, alert and oriented x3.  49-year-old female.  Palpable right and left dorsalis pedis pulse.  Brachial sees within normal limits April 2019.  Right foot wound has tunnel of approximately 10 mm depth, no bone palpated.  No purulence or cellulitis.  Endoform placed in wound.  Left wound has no tunneling present.  Excessive granulation tissue bilaterally.  Periwound callus present.  Multiple ulcerations on right left toes, no evidence of osteomyelitis, no bone or tendon exposure.  Status post left great toe amputation.  He does have clawing of the toes of both right and left legs consistent with her known idiopathic neuropathy.  No acute Charcot changes.    Procedure:   Patient was determined to be capable of making their own medical decisions and informed consent was obtained. Topical anesthetic of 4% lidocaine was applied, debridement was performed using a #15 blade down to and including subcutaneous tissue and biofilm bleeding controlled with light pressure. Patient tolerated procedure well.    Impression: Chronic right and left heel ulcerations, nondiabetic, does not utilize tobacco, unclear origin    Plan: We will dress the wounds with endoform the right heel wound, Hydrofera Blue to right and left, irrigate with Prontosan on a daily basis.  Obtain MTHFR to determine if she is heterozygous or homozygous, if so, will initiate Rheumate.  Goals risk benefits of surgical management of right and left heels to remove all biofilm, application of myriad to right heel ulceration with intraoperative total contact casting, anticipate surgical date next week.  After consultation with her primary care physician.  I asked that she consider a functional medicine consultation also.    After visit, spoke with her orthotist, Clarence Witt, cell #[phone], main office [phone].  We will coordinate with him transition to a full tibial offloading device with the right lower extremity when the  right heel is healed after total contact casting to begin total contact casting on the left leg.  He feels a turnaround for the device with the right leg would be approximately 48 hours.      Juan Antonio Travis MD on 11/12/2019 at 10:45 AM

## 2019-11-12 NOTE — PROGRESS NOTES
Patient Education    Procedure: Right Heel Ulcer  Diagnosis: Pressure Ulcer of heel  Anticoagulation Instruction: na  Pre-Operative Physical Exam: You need to have a pre-op physical exam within 30 days of your procedure. Your procedure may be cancelled if you do not have a current History and Physical. Call your PCP's office to schedule.  Allergies:  Updated in Epic  Bowel Prep: na  Post Procedure Education: [facility] patient post-procedure fact sheet reviewed with patient.    Learner(s):patient  Method: Listening  Barriers to Learning:No Barrier  Outcome: Patient did verbalize understanding of above education.

## 2019-11-13 ENCOUNTER — TELEPHONE (OUTPATIENT)
Dept: WOUND CARE | Facility: CLINIC | Age: 49
End: 2019-11-13

## 2019-11-13 DIAGNOSIS — S91.309A OPEN WOUND OF HEEL: Primary | ICD-10-CM

## 2019-11-13 DIAGNOSIS — S91.109A OPEN TOE WOUND: ICD-10-CM

## 2019-11-13 NOTE — ADDENDUM NOTE
Encounter addended by: Leia Branch RN on: 11/13/2019 7:27 AM   Actions taken: Plan of Care note modified

## 2019-11-13 NOTE — TELEPHONE ENCOUNTER
[facility] Wound    Who is the name of the provider?:  Angy      What is the location you see this provider at?: [place]    Reason for call:  Question re lab/wound cultures.  Has not heard from surgery scheduler.    Can we leave a detailed message on this number?  YES

## 2019-11-14 ENCOUNTER — ANESTHESIA EVENT (OUTPATIENT)
Dept: SURGERY | Facility: CLINIC | Age: 49
End: 2019-11-14
Payer: COMMERCIAL

## 2019-11-14 ENCOUNTER — HOSPITAL ENCOUNTER (OUTPATIENT)
Facility: CLINIC | Age: 49
Discharge: HOME | End: 2019-11-14
Attending: SURGERY | Admitting: SURGERY
Payer: COMMERCIAL

## 2019-11-14 ENCOUNTER — APPOINTMENT (OUTPATIENT)
Dept: SURGERY | Facility: PHYSICIAN GROUP | Age: 49
End: 2019-11-14
Payer: COMMERCIAL

## 2019-11-14 ENCOUNTER — ANESTHESIA (OUTPATIENT)
Dept: SURGERY | Facility: CLINIC | Age: 49
End: 2019-11-14
Payer: COMMERCIAL

## 2019-11-14 VITALS
WEIGHT: 243.9 LBS | BODY MASS INDEX: 40.64 KG/M2 | TEMPERATURE: 99.9 F | HEIGHT: 65 IN | DIASTOLIC BLOOD PRESSURE: 63 MMHG | RESPIRATION RATE: 16 BRPM | HEART RATE: 96 BPM | SYSTOLIC BLOOD PRESSURE: 121 MMHG | OXYGEN SATURATION: 96 %

## 2019-11-14 DIAGNOSIS — L89.613 PRESSURE INJURY OF RIGHT HEEL, STAGE 3 (H): Primary | ICD-10-CM

## 2019-11-14 DIAGNOSIS — S91.109A OPEN TOE WOUND: ICD-10-CM

## 2019-11-14 DIAGNOSIS — L89.623 PRESSURE ULCER OF LEFT HEEL, STAGE 3 (H): ICD-10-CM

## 2019-11-14 DIAGNOSIS — S91.309A OPEN WOUND OF HEEL: ICD-10-CM

## 2019-11-14 LAB
HCG UR QL: NEGATIVE
POTASSIUM SERPL-SCNC: 3.9 MMOL/L (ref 3.4–5.3)

## 2019-11-14 PROCEDURE — 88307 TISSUE EXAM BY PATHOLOGIST: CPT | Performed by: SURGERY

## 2019-11-14 PROCEDURE — 25000128 H RX IP 250 OP 636: Performed by: SURGERY

## 2019-11-14 PROCEDURE — 88311 DECALCIFY TISSUE: CPT | Mod: 26 | Performed by: SURGERY

## 2019-11-14 PROCEDURE — 87077 CULTURE AEROBIC IDENTIFY: CPT | Performed by: SURGERY

## 2019-11-14 PROCEDURE — 71000012 ZZH RECOVERY PHASE 1 LEVEL 1 FIRST HR: Performed by: SURGERY

## 2019-11-14 PROCEDURE — 36000050 ZZH SURGERY LEVEL 2 1ST 30 MIN: Performed by: SURGERY

## 2019-11-14 PROCEDURE — 40000170 ZZH STATISTIC PRE-PROCEDURE ASSESSMENT II: Performed by: SURGERY

## 2019-11-14 PROCEDURE — 25000125 ZZHC RX 250: Performed by: SURGERY

## 2019-11-14 PROCEDURE — 37000008 ZZH ANESTHESIA TECHNICAL FEE, 1ST 30 MIN: Performed by: SURGERY

## 2019-11-14 PROCEDURE — 37000009 ZZH ANESTHESIA TECHNICAL FEE, EACH ADDTL 15 MIN: Performed by: SURGERY

## 2019-11-14 PROCEDURE — 27211024 ZZHC OR SUPPLY OTHER OPNP: Performed by: SURGERY

## 2019-11-14 PROCEDURE — 36000052 ZZH SURGERY LEVEL 2 EA 15 ADDTL MIN: Performed by: SURGERY

## 2019-11-14 PROCEDURE — 87186 SC STD MICRODIL/AGAR DIL: CPT | Performed by: SURGERY

## 2019-11-14 PROCEDURE — 25000125 ZZHC RX 250: Performed by: NURSE ANESTHETIST, CERTIFIED REGISTERED

## 2019-11-14 PROCEDURE — 71000013 ZZH RECOVERY PHASE 1 LEVEL 1 EA ADDTL HR: Performed by: SURGERY

## 2019-11-14 PROCEDURE — 87070 CULTURE OTHR SPECIMN AEROBIC: CPT | Performed by: SURGERY

## 2019-11-14 PROCEDURE — 15004 WOUND PREP F/N/HF/G: CPT | Mod: 51 | Performed by: SURGERY

## 2019-11-14 PROCEDURE — 88311 DECALCIFY TISSUE: CPT | Performed by: SURGERY

## 2019-11-14 PROCEDURE — 15276 SKIN SUB GRAFT F/N/HF/G ADDL: CPT | Performed by: SURGERY

## 2019-11-14 PROCEDURE — 15275 SKIN SUB GRAFT FACE/NK/HF/G: CPT | Performed by: SURGERY

## 2019-11-14 PROCEDURE — 88307 TISSUE EXAM BY PATHOLOGIST: CPT | Mod: 26 | Performed by: SURGERY

## 2019-11-14 PROCEDURE — 87102 FUNGUS ISOLATION CULTURE: CPT | Performed by: SURGERY

## 2019-11-14 PROCEDURE — 84132 ASSAY OF SERUM POTASSIUM: CPT | Performed by: ANESTHESIOLOGY

## 2019-11-14 PROCEDURE — 71000027 ZZH RECOVERY PHASE 2 EACH 15 MINS: Performed by: SURGERY

## 2019-11-14 PROCEDURE — 25000128 H RX IP 250 OP 636: Performed by: ANESTHESIOLOGY

## 2019-11-14 PROCEDURE — 25000128 H RX IP 250 OP 636: Performed by: NURSE ANESTHETIST, CERTIFIED REGISTERED

## 2019-11-14 PROCEDURE — 25000132 ZZH RX MED GY IP 250 OP 250 PS 637: Performed by: ANESTHESIOLOGY

## 2019-11-14 PROCEDURE — 27210794 ZZH OR GENERAL SUPPLY STERILE: Performed by: SURGERY

## 2019-11-14 PROCEDURE — 25800030 ZZH RX IP 258 OP 636: Performed by: NURSE ANESTHETIST, CERTIFIED REGISTERED

## 2019-11-14 PROCEDURE — 81025 URINE PREGNANCY TEST: CPT | Performed by: ANESTHESIOLOGY

## 2019-11-14 PROCEDURE — 87075 CULTR BACTERIA EXCEPT BLOOD: CPT | Performed by: SURGERY

## 2019-11-14 PROCEDURE — 36415 COLL VENOUS BLD VENIPUNCTURE: CPT | Performed by: ANESTHESIOLOGY

## 2019-11-14 RX ORDER — CEFAZOLIN SODIUM 2 G/100ML
2 INJECTION, SOLUTION INTRAVENOUS
Status: COMPLETED | OUTPATIENT
Start: 2019-11-14 | End: 2019-11-14

## 2019-11-14 RX ORDER — LIDOCAINE 40 MG/G
CREAM TOPICAL
Status: DISCONTINUED | OUTPATIENT
Start: 2019-11-14 | End: 2019-11-14 | Stop reason: HOSPADM

## 2019-11-14 RX ORDER — ONDANSETRON 2 MG/ML
4 INJECTION INTRAMUSCULAR; INTRAVENOUS EVERY 30 MIN PRN
Status: DISCONTINUED | OUTPATIENT
Start: 2019-11-14 | End: 2019-11-14 | Stop reason: HOSPADM

## 2019-11-14 RX ORDER — ONDANSETRON 2 MG/ML
INJECTION INTRAMUSCULAR; INTRAVENOUS PRN
Status: DISCONTINUED | OUTPATIENT
Start: 2019-11-14 | End: 2019-11-14

## 2019-11-14 RX ORDER — NALOXONE HYDROCHLORIDE 0.4 MG/ML
.1-.4 INJECTION, SOLUTION INTRAMUSCULAR; INTRAVENOUS; SUBCUTANEOUS
Status: DISCONTINUED | OUTPATIENT
Start: 2019-11-14 | End: 2019-11-14 | Stop reason: HOSPADM

## 2019-11-14 RX ORDER — PROPOFOL 10 MG/ML
INJECTION, EMULSION INTRAVENOUS CONTINUOUS PRN
Status: DISCONTINUED | OUTPATIENT
Start: 2019-11-14 | End: 2019-11-14

## 2019-11-14 RX ORDER — FENTANYL CITRATE 50 UG/ML
25-50 INJECTION, SOLUTION INTRAMUSCULAR; INTRAVENOUS
Status: DISCONTINUED | OUTPATIENT
Start: 2019-11-14 | End: 2019-11-14 | Stop reason: HOSPADM

## 2019-11-14 RX ORDER — SODIUM CHLORIDE, SODIUM LACTATE, POTASSIUM CHLORIDE, CALCIUM CHLORIDE 600; 310; 30; 20 MG/100ML; MG/100ML; MG/100ML; MG/100ML
INJECTION, SOLUTION INTRAVENOUS CONTINUOUS
Status: DISCONTINUED | OUTPATIENT
Start: 2019-11-14 | End: 2019-11-14 | Stop reason: HOSPADM

## 2019-11-14 RX ORDER — SODIUM CHLORIDE, SODIUM LACTATE, POTASSIUM CHLORIDE, CALCIUM CHLORIDE 600; 310; 30; 20 MG/100ML; MG/100ML; MG/100ML; MG/100ML
INJECTION, SOLUTION INTRAVENOUS CONTINUOUS PRN
Status: DISCONTINUED | OUTPATIENT
Start: 2019-11-14 | End: 2019-11-14

## 2019-11-14 RX ORDER — LEVOFLOXACIN 250 MG/1
250 TABLET, FILM COATED ORAL DAILY
Status: DISCONTINUED | OUTPATIENT
Start: 2019-11-14 | End: 2019-11-14 | Stop reason: HOSPADM

## 2019-11-14 RX ORDER — LIDOCAINE HYDROCHLORIDE 20 MG/ML
INJECTION, SOLUTION INFILTRATION; PERINEURAL PRN
Status: DISCONTINUED | OUTPATIENT
Start: 2019-11-14 | End: 2019-11-14

## 2019-11-14 RX ORDER — FLUCONAZOLE 100 MG/1
100 TABLET ORAL DAILY
Status: ON HOLD | COMMUNITY
End: 2019-11-14

## 2019-11-14 RX ORDER — ONDANSETRON 4 MG/1
4 TABLET, ORALLY DISINTEGRATING ORAL EVERY 30 MIN PRN
Status: DISCONTINUED | OUTPATIENT
Start: 2019-11-14 | End: 2019-11-14 | Stop reason: HOSPADM

## 2019-11-14 RX ORDER — OXYCODONE AND ACETAMINOPHEN 10; 325 MG/1; MG/1
1 TABLET ORAL ONCE
Status: COMPLETED | OUTPATIENT
Start: 2019-11-14 | End: 2019-11-14

## 2019-11-14 RX ORDER — CEFAZOLIN SODIUM 1 G/3ML
1 INJECTION, POWDER, FOR SOLUTION INTRAMUSCULAR; INTRAVENOUS SEE ADMIN INSTRUCTIONS
Status: DISCONTINUED | OUTPATIENT
Start: 2019-11-14 | End: 2019-11-14 | Stop reason: HOSPADM

## 2019-11-14 RX ORDER — HYDROMORPHONE HYDROCHLORIDE 1 MG/ML
.3-.5 INJECTION, SOLUTION INTRAMUSCULAR; INTRAVENOUS; SUBCUTANEOUS EVERY 5 MIN PRN
Status: DISCONTINUED | OUTPATIENT
Start: 2019-11-14 | End: 2019-11-14 | Stop reason: HOSPADM

## 2019-11-14 RX ORDER — FENTANYL CITRATE 50 UG/ML
25-100 INJECTION, SOLUTION INTRAMUSCULAR; INTRAVENOUS
Status: DISCONTINUED | OUTPATIENT
Start: 2019-11-14 | End: 2019-11-14 | Stop reason: HOSPADM

## 2019-11-14 RX ADMIN — PROPOFOL 125 MCG/KG/MIN: 10 INJECTION, EMULSION INTRAVENOUS at 15:57

## 2019-11-14 RX ADMIN — HYDROMORPHONE HYDROCHLORIDE 0.5 MG: 1 INJECTION, SOLUTION INTRAMUSCULAR; INTRAVENOUS; SUBCUTANEOUS at 18:03

## 2019-11-14 RX ADMIN — SODIUM CHLORIDE, SODIUM LACTATE, POTASSIUM CHLORIDE, CALCIUM CHLORIDE: 600; 310; 30; 20 INJECTION, SOLUTION INTRAVENOUS at 16:00

## 2019-11-14 RX ADMIN — DEXMEDETOMIDINE HYDROCHLORIDE 20 MCG: 100 INJECTION, SOLUTION INTRAVENOUS at 15:59

## 2019-11-14 RX ADMIN — OXYCODONE AND ACETAMINOPHEN 1 TABLET: 10; 325 TABLET ORAL at 18:21

## 2019-11-14 RX ADMIN — MIDAZOLAM 2 MG: 1 INJECTION INTRAMUSCULAR; INTRAVENOUS at 15:55

## 2019-11-14 RX ADMIN — SODIUM CHLORIDE, SODIUM LACTATE, POTASSIUM CHLORIDE, CALCIUM CHLORIDE: 600; 310; 30; 20 INJECTION, SOLUTION INTRAVENOUS at 17:23

## 2019-11-14 RX ADMIN — PHENYLEPHRINE HYDROCHLORIDE 100 MCG: 10 INJECTION INTRAVENOUS at 16:26

## 2019-11-14 RX ADMIN — ONDANSETRON 4 MG: 2 INJECTION INTRAMUSCULAR; INTRAVENOUS at 15:59

## 2019-11-14 RX ADMIN — PHENYLEPHRINE HYDROCHLORIDE 100 MCG: 10 INJECTION INTRAVENOUS at 16:19

## 2019-11-14 RX ADMIN — PHENYLEPHRINE HYDROCHLORIDE 100 MCG: 10 INJECTION INTRAVENOUS at 16:40

## 2019-11-14 RX ADMIN — PHENYLEPHRINE HYDROCHLORIDE 100 MCG: 10 INJECTION INTRAVENOUS at 16:07

## 2019-11-14 RX ADMIN — CEFAZOLIN SODIUM 2 G: 2 INJECTION, SOLUTION INTRAVENOUS at 16:00

## 2019-11-14 RX ADMIN — PHENYLEPHRINE HYDROCHLORIDE 100 MCG: 10 INJECTION INTRAVENOUS at 16:31

## 2019-11-14 RX ADMIN — PHENYLEPHRINE HYDROCHLORIDE 100 MCG: 10 INJECTION INTRAVENOUS at 16:43

## 2019-11-14 RX ADMIN — PHENYLEPHRINE HYDROCHLORIDE 100 MCG: 10 INJECTION INTRAVENOUS at 16:13

## 2019-11-14 RX ADMIN — LIDOCAINE HYDROCHLORIDE 60 MG: 20 INJECTION, SOLUTION INFILTRATION; PERINEURAL at 15:57

## 2019-11-14 RX ADMIN — FENTANYL CITRATE 50 MCG: 0.05 INJECTION, SOLUTION INTRAMUSCULAR; INTRAVENOUS at 17:52

## 2019-11-14 ASSESSMENT — MIFFLIN-ST. JEOR: SCORE: 1742.2

## 2019-11-14 NOTE — OP NOTE
Procedure Date: November 14, 2019      SURGEON: Juan Antonio Travis MD     ASSISTANT:  None.     PREOPERATIVE DIAGNOSIS:   1.    Chronic right and left heel wounds, neuropathy, nondiabetic     POSTOPERATIVE DIAGNOSIS:   1.    Same     PROCEDURES PERFORMED; debridement right heel wound, 5.5 x 6.0 x 3.0 cm, application of myriad graft, bone culture right heel, debridement left heel wound, 5 x 5.5 x 1.5 cm, application of myriad graft, application of total contact cast to left lower extremity    ANESTHESIA:  MAC with local     ESTIMATED BLOOD LOSS:  20 cc     FINDINGS:  Bone fragment identified within right heel wound     SPECIMENS:  Right heel wound sent for fungal, aerobic, anaerobic     COMPLICATIONS: None     DRAINS:  None     IMPLANTS:  None    INDICATIONS:  Chronic right and left heel wounds     DESCRIPTION OF PROCEDURE: Informed consent was obtained.  Patient was appropriately marked.  Patient was then taken to the operating room and placed in the supine position.  All pressure points were well-padded. The patient's right and left legs were then prepped and draped in a sterile fashion.  A timeout was performed.  Misonix debridement of the right and left heel wounds was performed, maximal dimensions of the left heel wound 5.0 x 5.5 cm with 1.5 cm of depth, right heel wound 5.5 x 6.0 cm with 3 cm of depth.  All biofilm and necrotic tissue removed.  There was a tunnel present to the calcaneus on the right, a fragment of bone was identified within the depths of the wound, this was sent to the laboratory for fungal, aerobic and anaerobic cultures.  The wounds were copiously irrigated with Prontosan irrigation.  A 5 ply.  Graft was then fixated to the left heel wound with interrupted 4-0 Monocryl sutures.  Thrombin was irrigated through the graft.  The right foot wound was then thoroughly irrigated with Prontosan.  A rolled segment of 5 ply.  Graft was placed in the tunnel then covered with myriad graft though not  fixated to allow further evaluation of the tunnel in clinic next week and in case further bone debridement is necessary depending on results of bone culture.  A multilevel wicking dressing was placed over the right heel and contained with Spandage.  A multilevel wicking dressing was placed over the left heel on Adaptic and covered with bandage.  A total contact cast was then placed in the left lower extremity in standard fashion.  Rooke boot placed on right foot.    The patient tolerated the procedure and anesthesia well and was brought back to the recovery room, vital signs stable and vascular status intact to recovery room    DISPOSITION:  Home  WB status: Minimal weightbearing right and left legs  Dressing: None  Antibiotics: Augmentin for 14 days  Juan Antonio Travis MD  Pager [phone]

## 2019-11-14 NOTE — ANESTHESIA CARE TRANSFER NOTE
Patient: Shalonda Howard    Procedure(s):  MISONIX DEBRIDEMENT RIGHT AND LEFT HEELS.  APPLY MYRIAD RIGHT HEEL, APPLY TOTAL CONTACT CAST    Diagnosis: Open wound of heel [S91.309A]  Open toe wound [S91.109A]  Diagnosis Additional Information: No value filed.    Anesthesia Type:   MAC     Note:  Anesthesia Care Transfer Notewriter    Vitals: (Last set prior to Anesthesia Care Transfer)    CRNA VITALS  11/14/2019 1703 - 11/14/2019 1739      11/14/2019             Pulse:  86    SpO2:  100 %    Resp Rate (set):  10                Electronically Signed By: CRISTIN Wood CRNA  November 14, 2019  5:39 PM

## 2019-11-14 NOTE — DISCHARGE INSTRUCTIONS
Same Day Surgery Discharge Instructions for  Sedation and General Anesthesia       It's not unusual to feel dizzy, light-headed or faint for up to 24 hours after surgery or while taking pain medication.  If you have these symptoms: sit for a few minutes before standing and have someone assist you when you get up to walk or use the bathroom.      You should rest and relax for the next 24 hours. We recommend you make arrangements to have an adult stay with you for at least 24 hours after your discharge.  Avoid hazardous and strenuous activity.      DO NOT DRIVE any vehicle or operate mechanical equipment for 24 hours following the end of your surgery.  Even though you may feel normal, your reactions may be affected by the medication you have received.      Do not drink alcoholic beverages for 24 hours following surgery.       Slowly progress to your regular diet as you feel able. It's not unusual to feel nauseated and/or vomit after receiving anesthesia.  If you develop these symptoms, drink clear liquids (apple juice, ginger ale, broth, 7-up, etc. ) until you feel better.  If your nausea and vomiting persists for 24 hours, please notify your surgeon.        All narcotic pain medications, along with inactivity and anesthesia, can cause constipation. Drinking plenty of liquids and increasing fiber intake will help.      For any questions of a medical nature, call your surgeon.      Do not make important decisions for 24 hours.        If you feel your pain is not well managed with the pain medications prescribed by your surgeon, please contact your surgeon's office to let them know so they can address your concerns.       Reasons to contact your Surgeon    1. Signs of possible infection: Check your incision daily for redness, swelling, warmth, red streaks or foul drainage.   2. Elevated temperature.  3. Pain not controlled with pain medication and/or rest.   4. Uncontrolled nausea or vomiting.  5. Any questions or  concerns.                   **If you have questions or concerns about your procedure  call Dr Juan Antonio Travis at [phone] **

## 2019-11-14 NOTE — OR NURSING
Pt's hemoglobin 7.0, pt states Dr. Travis is aware, taking iron.  Pt informed to Dr. Serrano, no further orders for recheck hemoglobin.  Pt has reddened area on R. Ankle, note left for Dr. Travis.  Pt has drainage from bilateral heels, Dr Travis aware.

## 2019-11-14 NOTE — ANESTHESIA PREPROCEDURE EVALUATION
Anesthesia Pre-Procedure Evaluation    Patient: Shalonda Howard   MRN: [id number] : 1970          Preoperative Diagnosis:  WOUND LEFT FOOT    Procedure(s):  ARTHROSCOPIC IRRIGATION AND DEBRIDEMENT LEFT ANKLE    Past Medical History:   Diagnosis Date     Allergic rhinitis, cause unspecified      Anxiety state, unspecified      Cellulitis and abscess of leg, except foot      Closed fracture of unspecified part of tibia      Disuse osteoporosis      Dysthymic disorder      Edema      Encounter for long-term (current) use of other medications      Esophageal reflux      Foot ulcer on Heel bilaterally  2019     Kidney stones      Myalgia and myositis, unspecified      Obesity, unspecified      Open wound of foot except toe(s) alone, complicated      Opioid type dependence, unspecified      Other acne      Other congenital valgus deformity of feet      Other ventral hernia without mention of obstruction or gangrene      Polycystic ovaries      PONV (postoperative nausea and vomiting)      Systemic lupus erythematosus (H)      Tibialis tendinitis      Unspecified hereditary and idiopathic peripheral neuropathy      Past Surgical History:   Procedure Laterality Date     APPENDECTOMY       APPLY WOUND VAC Left 2019    Procedure: WOUND VAC APPLICATION;  Surgeon: Miguel Mosher MD;  Location:  OR     ARTHRODESIS FOOT Left 2015    Procedure: ARTHRODESIS FOOT;  Surgeon: Andre Harman MD;  Location: [facility]     ARTHROSCOPY ANKLE Left 2019    Procedure: ARTHROSCOPIC IRRIGATION AND DEBRIDEMENT LEFT ANKLE. HARDWARE REMOVAL;  Surgeon: Andre Harman MD;  Location:  OR     BUNIONECTOMY RT/LT  2018     DILATION AND CURETTAGE       EXCISE TOENAIL(S) Left 2015    Procedure: EXCISE TOENAIL(S);  Surgeon: Andre Harman MD;  Location:  [facility]     HERNIA REPAIR      umbilical x 2     HERNIA REPAIR      ventral open x 2     IRRIGATION AND DEBRIDEMENT FOOT, COMBINED Left 2018     Procedure: COMBINED IRRIGATION AND DEBRIDEMENT FOOT;  IRRIGATION AND DEBRIDEMENT LEFT FOOT;  Surgeon: Andre Harman MD;  Location: [facility]     IRRIGATION AND DEBRIDEMENT FOOT, COMBINED Left 11/26/2018    Procedure: COMBINED IRRIGATION AND DEBRIDEMENT LEFT FOOT;  Surgeon: Andre Harman MD;  Location: [facility]     IRRIGATION AND DEBRIDEMENT FOOT, COMBINED Left 1/24/2019    Procedure: LEFT HALLUX WOUND IRRIGATION AND DEBRIDEMENT WITH BONE BIOPSY;  Surgeon: Miguel Mosher MD;  Location:  OR     IRRIGATION AND DEBRIDEMENT FOOT, COMBINED Left 3/20/2019    Procedure: LEFT FOOT IRRIGATION AND  DEBRIDEMENT, FIRST RAY RESECTION AND HARDWARE REMOVAL FROM LEFT FOOT;  Surgeon: Andre Harman MD;  Location:  OR     REMOVE HARDWARE LOWER EXTREMITY Left 3/20/2019    Procedure: REMOVE HARDWARE LOWER EXTREMITY;  Surgeon: Andre Harman MD;  Location:  OR     REPAIR HAMMER TOE Left 11/26/2018    Procedure: REPAIR HAMMER TOE;  Surgeon: Andre Harman MD;  Location: [facility]     TONSILLECTOMY         Anesthesia Evaluation     . Pt has had prior anesthetic.     History of anesthetic complications   - PONV        ROS/MED HX    ENT/Pulmonary:      (-) sleep apnea   Neurologic:     (+)neuropathy     Cardiovascular:         METS/Exercise Tolerance:     Hematologic:     (+) Anemia (7.0 last check.  Had iron infusion 2 days ago), -      Musculoskeletal:         GI/Hepatic:     (+) GERD Asymptomatic on medication,       Renal/Genitourinary:     (+) chronic renal disease,       Endo:     (+) Obesity, .      Psychiatric:         Infectious Disease:         Malignancy:         Other: Comment: Osteomyelitis    Lupus                           Physical Exam  Normal systems: cardiovascular and pulmonary    Airway   Mallampati: II  TM distance: >3 FB  Neck ROM: full    Dental   (+) upper dentures    Cardiovascular   Rhythm and rate: regular and normal      Pulmonary    breath sounds clear to  "auscultation            Lab Results   Component Value Date    WBC 4.9 06/03/2019    HGB 10.1 (L) 06/03/2019    HCT 33.5 (L) 06/03/2019     06/03/2019    CRP 4.6 06/03/2019    SED 25 (H) 06/10/2019     05/22/2019    POTASSIUM 3.8 05/22/2019    CHLORIDE 106 05/22/2019    CO2 22 05/22/2019    BUN 10 06/03/2019    CR 0.68 06/03/2019     (H) 05/22/2019    PAULINE 8.8 05/22/2019    MAG 2.2 10/16/2018    ALBUMIN 3.2 (L) 03/19/2019    PROTTOTAL 7.8 03/19/2019    ALT 18 03/19/2019    AST 13 03/19/2019    ALKPHOS 121 03/19/2019    BILITOTAL 0.2 03/19/2019    TSH 4.36 (H) 10/16/2018    T4 0.85 10/16/2018    HCG Negative 11/14/2019       Preop Vitals  BP Readings from Last 3 Encounters:   11/14/19 (!) 156/77   11/12/19 124/74   11/04/19 (!) 147/79    Pulse Readings from Last 3 Encounters:   11/12/19 93   11/04/19 105   09/09/19 96      Resp Readings from Last 3 Encounters:   11/14/19 16   11/12/19 18   11/04/19 18    SpO2 Readings from Last 3 Encounters:   11/14/19 100%   05/24/19 95%   04/25/19 95%      Temp Readings from Last 1 Encounters:   11/14/19 37.2  C (98.9  F) (Oral)    Ht Readings from Last 1 Encounters:   11/14/19 1.651 m (5' 5\")      Wt Readings from Last 1 Encounters:   11/14/19 110.6 kg (243 lb 14.4 oz)    Estimated body mass index is 40.59 kg/m  as calculated from the following:    Height as of an earlier encounter on 11/14/19: 1.651 m (5' 5\").    Weight as of an earlier encounter on 11/14/19: 110.6 kg (243 lb 14.4 oz).       Anesthesia Plan      History & Physical Review  History and physical reviewed and following examination; no interval change.    ASA Status:  3 .    NPO Status:  > 8 hours    Plan for MAC Reason for MAC:  Deep or markedly invasive procedure (G8)  PONV prophylaxis:  Ondansetron (or other 5HT-3)  Propofol sedation.  Avoid opioid if able    Ramp up patient so airway ideal for sedation given obesity      Postoperative Care  Postoperative pain management:  IV analgesics.  "     Consents  Anesthetic plan, risks, benefits and alternatives discussed with:  Patient..                   Arden Serrano MD

## 2019-11-14 NOTE — ANESTHESIA POSTPROCEDURE EVALUATION
Patient: Shalonda Howard    Procedure(s):  MISONIX DEBRIDEMENT RIGHT AND LEFT HEELS.  APPLY MYRIAD RIGHT HEEL, APPLY TOTAL CONTACT CAST    Diagnosis:Open wound of heel [S91.309A]  Open toe wound [S91.109A]  Diagnosis Additional Information: No value filed.    Anesthesia Type:  MAC    Note:  Anesthesia Post Evaluation    Patient location during evaluation: PACU  Patient participation: Able to fully participate in evaluation  Level of consciousness: awake  Pain management: adequate  Airway patency: patent  Cardiovascular status: acceptable  Respiratory status: acceptable  Hydration status: acceptable  PONV: controlled     Anesthetic complications: None          Last vitals:  Vitals:    11/14/19 1348 11/14/19 1736   BP: (!) 156/77 116/67   Pulse:  86   Resp: 16 14   Temp: 37.2  C (98.9  F) 37.4  C (99.3  F)   SpO2: 100% 100%         Electronically Signed By: Arden Serrano MD  November 14, 2019  5:49 PM

## 2019-11-18 ENCOUNTER — HOSPITAL ENCOUNTER (OUTPATIENT)
Dept: WOUND CARE | Facility: CLINIC | Age: 49
Discharge: HOME | End: 2019-11-18
Attending: SURGERY | Admitting: SURGERY
Payer: COMMERCIAL

## 2019-11-18 VITALS — DIASTOLIC BLOOD PRESSURE: 87 MMHG | TEMPERATURE: 99 F | RESPIRATION RATE: 16 BRPM | SYSTOLIC BLOOD PRESSURE: 161 MMHG

## 2019-11-18 DIAGNOSIS — S91.301D OPEN WOUND OF RIGHT HEEL, SUBSEQUENT ENCOUNTER: Primary | ICD-10-CM

## 2019-11-18 DIAGNOSIS — L89.613 PRESSURE INJURY OF RIGHT HEEL, STAGE 3 (H): ICD-10-CM

## 2019-11-18 DIAGNOSIS — L89.623 PRESSURE ULCER OF LEFT HEEL, STAGE 3 (H): ICD-10-CM

## 2019-11-18 DIAGNOSIS — S91.105A OPEN WOUND OF THIRD TOE OF LEFT FOOT, INITIAL ENCOUNTER: ICD-10-CM

## 2019-11-18 PROCEDURE — 97602 WOUND(S) CARE NON-SELECTIVE: CPT

## 2019-11-18 PROCEDURE — A6021 COLLAGEN DRESSING <=16 SQ IN: HCPCS

## 2019-11-18 PROCEDURE — 99213 OFFICE O/P EST LOW 20 MIN: CPT | Performed by: SURGERY

## 2019-11-18 RX ORDER — LEVOFLOXACIN 500 MG/1
500 TABLET, FILM COATED ORAL DAILY
COMMUNITY
End: 2019-11-18

## 2019-11-18 RX ORDER — LEVOFLOXACIN 500 MG/1
500 TABLET, FILM COATED ORAL DAILY
Qty: 7 TABLET | Refills: 0 | Status: SHIPPED | OUTPATIENT
Start: 2019-11-18 | End: 2019-11-27

## 2019-11-18 NOTE — ADDENDUM NOTE
Encounter addended by: Leia Branch RN on: 11/18/2019 2:42 PM   Actions taken: Charge Capture section accepted

## 2019-11-18 NOTE — DISCHARGE INSTRUCTIONS
[facility]  [address] [facility] [address] [place] [address]  Appointment Phone [phone]     Shalonda Howard      1970    Wound Dressing Change:Left Foot Ulcer  Cleanse wound with saline  Skin Care: Apply moisturizing cream to skin surrounding the wound (but not in the wound):skin prep  Cover wound with Endoform cut to size slightly larger than wound,  Apply Spandage from base of toe to knee,   Cover wound with quick absorbant and than   Apply TCC  Change dressing twice weekly in clinic   Wound Dressing Change:Right Foot  Cleanse wound with Prontosan Wound Irrigation Solution   Cover wound with hydrofera blue ready transfer,  Apply Edemawear followed by gauze and roll gauze  Change dressing daily at home with home care    Use knee roller for Right Foot -use at all times     JUNAID Travis M.D.. November 18, 2019    Call us at [phone] if you have any questions about your wounds, have redness or swelling around your wound, have a fever of 101 or greater or if you have any other problems or concerns. We answer the phone Monday through Friday 8 am to 4 pm, please leave a message as we check the voicemail frequently throughout the day.     Follow up with Provider - twice weekly      Please call [facility] [phone] ([address] [place]) to schedule your appointment if you have not heard from them in two business days.

## 2019-11-18 NOTE — PROGRESS NOTES
[facility] [place] Progress Note    Subject: Shalonda Howard returns status post surgical management, Misonix debridement and myriad application to left and right heels, bone fragment identified within right heel is going to forms of enterococcus.  I will contact infectious disease, Dr. Bocanegra who has evaluate the patient in the past, MRI will be obtained to evaluate for right calcaneal osteomyelitis.  She has seen Dr. Harman preoperatively, evidence of osteomyelitis at that physical examination.  It is possible that the bone fragment identified within the operative field was a isolated bone fragment after removal of hardware that he had previously performed.  I placed her on Levaquin for 1 week postoperatively given identification of bone fragment    Patient Active Problem List   Diagnosis     Infection     Left foot Postop Wound infection     Post op infection     Systemic lupus erythematosus (H)     Osteomyelitis Left 1st metatarsal -- S/P Amputation     Cellulitis of right foot     Pressure injury of right heel, stage 3 (H)     Pressure ulcer of left heel, stage 3 (H)     Open wound of third toe of left foot     Open wound of heel     Open toe wound     Past Medical History:   Diagnosis Date     Allergic rhinitis, cause unspecified      Anxiety state, unspecified      Cellulitis and abscess of leg, except foot      Disuse osteoporosis      Dysthymic disorder      Edema      Encounter for long-term (current) use of other medications      Esophageal reflux      Foot ulcer on Heel bilaterally  5/22/2019     Kidney stones      Myalgia and myositis, unspecified      Obesity, unspecified      Open wound of foot except toe(s) alone, complicated      Opioid type dependence, unspecified      Other acne      Other congenital valgus deformity of feet      Other ventral hernia without mention of obstruction or gangrene      Polycystic ovaries      PONV (postoperative nausea and vomiting)      Systemic lupus  erythematosus (H)     unsure     Tibialis tendinitis      Unspecified hereditary and idiopathic peripheral neuropathy      Exam:  BP (!) 161/87 (BP Location: Right arm)   Temp 99  F (37.2  C) (Oral)   Resp 16   LMP 11/02/2019   Wound (used by OP [facility] only) 07/29/19 0813 Left heel pressure injury (Active)   Length (cm) 5.2 11/18/2019 10:51 AM   Width (cm) 5.6 11/18/2019 10:51 AM   Depth (cm) 2.2 11/18/2019 10:51 AM   Wound (cm^2) 29.12 cm^2 11/18/2019 10:51 AM   Wound Volume (cm^3) 64.06 cm^3 11/18/2019 10:51 AM   Wound healing % -77.56 11/18/2019 10:51 AM   Dressing Appearance moist drainage 11/18/2019 10:51 AM   Drainage Characteristics/Odor serosanguineous 11/18/2019 10:51 AM   Drainage Amount large 11/18/2019 10:51 AM       Wound (used by OP [facility] only) 07/29/19 0814 Right heel pressure injury (Active)   Length (cm) 5.4 11/18/2019 10:51 AM   Width (cm) 5.7 11/18/2019 10:51 AM   Depth (cm) 3.1 11/18/2019 10:51 AM   Wound (cm^2) 30.78 cm^2 11/18/2019 10:51 AM   Wound Volume (cm^3) 95.42 cm^3 11/18/2019 10:51 AM   Wound healing % -138.24 11/18/2019 10:51 AM   Dressing Appearance moist drainage 11/18/2019 10:51 AM   Drainage Characteristics/Odor serosanguineous 11/18/2019 10:51 AM   Drainage Amount large 11/18/2019 10:51 AM       [facility] ([facility] by [facility]) 11/04/19 1413 Right 3 toe pressure injury (Active)   Length (cm) 0.7 11/18/2019 10:51 AM   Width (cm) 0.8 11/18/2019 10:51 AM   Depth (cm) 0.1 11/18/2019 10:51 AM   Wound (cm^2) 0.56 cm^2 11/18/2019 10:51 AM   Wound Volume (cm^3) 0.06 cm^3 11/18/2019 10:51 AM   Wound healing % 58.52 11/18/2019 10:51 AM   Dressing Appearance moist drainage 11/18/2019 10:51 AM   Drainage Characteristics/Odor serosanguineous 11/18/2019 10:51 AM   Drainage Amount moderate 11/18/2019 10:51 AM       Wound (used by OP [facility] only) 11/04/19 1415 Left 2 toe pressure injury (Active)   Length (cm) 1.1 11/18/2019 10:51 AM   Width (cm) 1 11/18/2019 10:51 AM   Depth (cm) 0.1 11/18/2019 10:51 AM    Wound (cm^2) 1.1 cm^2 11/18/2019 10:51 AM   Wound Volume (cm^3) 0.11 cm^3 11/18/2019 10:51 AM   Wound healing % 8.33 11/18/2019 10:51 AM   Dressing Appearance moist drainage 11/18/2019 10:51 AM   Drainage Characteristics/Odor serosanguineous 11/18/2019 10:51 AM   Drainage Amount moderate 11/18/2019 10:51 AM     General appearance is nondistressed, conversant, alert and oriented x3.  Myriad plug placed within the right heel is adherent with no purulence, no foul odor, no periwound cellulitis.  Granulation tissue is excellent.  The myriad plug I placed with in the area of tunneling is adherent without purulence or drainage.  This was the area where the free bone fragment had been removed.  Evaluation of the left lower extremity reveals the myriad has disintegrated consistent with elevated MMP's within the wound, no foul odor, periwound maceration.  Granulation tissue is excellent.  No undermining or bone exposure on the left heel.  Will replace total contact casting over Spandage for maximal management of subdermal lymphedema.  Have also emphasized the need to elevate heels above the level of the hips to decrease drainage.        Impression: Free fragment bone right heel during debridement growing 2 forms of Enterobacter, osteomyelitis right heel versus free-floating segment status post removal of hardware by Dr. Harman, chronic right and left heel wounds, nondiabetic, bilateral extremity neuropathy of unclear origin    Plan: We will dress the wounds with right side Prontosan irrigation and Hydrofera Blue on a daily basis, left side will be highly absorbent dressing with Spandage with total contact casting.  Functional medicine evaluation conjunction with her primary care ongoing management.  Patient will return to the clinic as per week for total contact casting left lower extremity, requires twice a week given excessive drainage, consultation infectious disease right foot, evaluate for osteomyelitis, MRI right  foot ordered.  Continue Wade flavonoid supplementation, vitamin D supplementation, Isaiah supplementation.  Discussed with Dr. Bocanegra who concurs with plan and current use of Levaquin.  Juan Antonio Travis MD on 11/18/2019 at 11:01 AM

## 2019-11-19 ENCOUNTER — TELEPHONE (OUTPATIENT)
Dept: WOUND CARE | Facility: CLINIC | Age: 49
End: 2019-11-19

## 2019-11-19 DIAGNOSIS — L89.623 PRESSURE ULCER OF LEFT HEEL, STAGE 3 (H): Primary | ICD-10-CM

## 2019-11-19 DIAGNOSIS — L89.613 PRESSURE INJURY OF RIGHT HEEL, STAGE 3 (H): ICD-10-CM

## 2019-11-19 LAB
BACTERIA SPEC CULT: ABNORMAL
COPATH REPORT: NORMAL
Lab: ABNORMAL
SPECIMEN SOURCE: ABNORMAL

## 2019-11-19 NOTE — ADDENDUM NOTE
Encounter addended by: Leia Branch RN on: 11/19/2019 10:05 AM   Actions taken: Charge Capture section accepted

## 2019-11-21 ENCOUNTER — ANCILLARY PROCEDURE (OUTPATIENT)
Dept: MRI IMAGING | Facility: CLINIC | Age: 49
End: 2019-11-21
Attending: SURGERY
Payer: COMMERCIAL

## 2019-11-21 ENCOUNTER — HOSPITAL ENCOUNTER (OUTPATIENT)
Dept: WOUND CARE | Facility: CLINIC | Age: 49
Discharge: HOME | End: 2019-11-21
Attending: PHYSICIAN ASSISTANT | Admitting: PHYSICIAN ASSISTANT
Payer: COMMERCIAL

## 2019-11-21 VITALS
RESPIRATION RATE: 16 BRPM | TEMPERATURE: 99.4 F | DIASTOLIC BLOOD PRESSURE: 95 MMHG | SYSTOLIC BLOOD PRESSURE: 148 MMHG | HEART RATE: 106 BPM

## 2019-11-21 DIAGNOSIS — L89.623 PRESSURE ULCER OF LEFT HEEL, STAGE 3 (H): ICD-10-CM

## 2019-11-21 DIAGNOSIS — L89.613 PRESSURE INJURY OF RIGHT HEEL, STAGE 3 (H): ICD-10-CM

## 2019-11-21 DIAGNOSIS — S91.105A OPEN WOUND OF THIRD TOE OF LEFT FOOT, INITIAL ENCOUNTER: ICD-10-CM

## 2019-11-21 LAB
GRAM STN SPEC: ABNORMAL
GRAM STN SPEC: ABNORMAL
Lab: ABNORMAL
SPECIMEN SOURCE: ABNORMAL

## 2019-11-21 PROCEDURE — 97597 DBRDMT OPN WND 1ST 20 CM/<: CPT

## 2019-11-21 PROCEDURE — 87205 SMEAR GRAM STAIN: CPT | Performed by: PHYSICIAN ASSISTANT

## 2019-11-21 PROCEDURE — A9585 GADOBUTROL INJECTION: HCPCS

## 2019-11-21 PROCEDURE — 87186 SC STD MICRODIL/AGAR DIL: CPT | Performed by: PHYSICIAN ASSISTANT

## 2019-11-21 PROCEDURE — 73723 MRI JOINT LWR EXTR W/O&W/DYE: CPT | Mod: TC

## 2019-11-21 PROCEDURE — 87077 CULTURE AEROBIC IDENTIFY: CPT | Performed by: PHYSICIAN ASSISTANT

## 2019-11-21 PROCEDURE — A6021 COLLAGEN DRESSING <=16 SQ IN: HCPCS

## 2019-11-21 PROCEDURE — 97602 WOUND(S) CARE NON-SELECTIVE: CPT

## 2019-11-21 PROCEDURE — 97597 DBRDMT OPN WND 1ST 20 CM/<: CPT | Performed by: PHYSICIAN ASSISTANT

## 2019-11-21 PROCEDURE — 87070 CULTURE OTHR SPECIMN AEROBIC: CPT | Performed by: PHYSICIAN ASSISTANT

## 2019-11-21 RX ORDER — CEPHALEXIN 500 MG/1
500 CAPSULE ORAL 3 TIMES DAILY
Qty: 10 CAPSULE | Refills: 0 | Status: SHIPPED | OUTPATIENT
Start: 2019-11-21 | End: 2019-11-25

## 2019-11-21 RX ORDER — GADOBUTROL 604.72 MG/ML
15 INJECTION INTRAVENOUS ONCE
Status: COMPLETED | OUTPATIENT
Start: 2019-11-21 | End: 2019-11-21

## 2019-11-21 RX ADMIN — GADOBUTROL 11 ML: 604.72 INJECTION INTRAVENOUS at 20:17

## 2019-11-21 NOTE — PROGRESS NOTES
[facility]    HISTORY OF PRESENT ILLNESS:   Shalonda Howard is a 49 year old female who presents today for reapplication of TCC to her left foot. Has been followed closely by Dr. Travis. Bilateral neuropathic heel ulcerations which were recently debrided in the OR with placement of Myriad 11/14/19. Right third toe more red and swollen today. No issues with cast. Has MRI scheduled for this evening.    REVIEW OF SYSTEMS:  CONSTITUTIONAL: Denies fevers or acute illness    PAST MEDICAL HISTORY:  has a past medical history of Allergic rhinitis, cause unspecified, Anxiety state, unspecified, Cellulitis and abscess of leg, except foot, Disuse osteoporosis, Dysthymic disorder, Edema, Encounter for long-term (current) use of other medications, Esophageal reflux, Foot ulcer on Heel bilaterally  (5/22/2019), Kidney stones, Myalgia and myositis, unspecified, Obesity, unspecified, Open wound of foot except toe(s) alone, complicated, Opioid type dependence, unspecified, Other acne, Other congenital valgus deformity of feet, Other ventral hernia without mention of obstruction or gangrene, Polycystic ovaries, PONV (postoperative nausea and vomiting), Systemic lupus erythematosus (H), Tibialis tendinitis, and Unspecified hereditary and idiopathic peripheral neuropathy.    SOCIAL HISTORY:  TOBACCO STATUS:  reports that she quit smoking about 17 years ago. Her smoking use included cigarettes. She has a 6.00 pack-year smoking history. She has never used smokeless tobacco.    MEDICATIONS:   Current Outpatient Medications   Medication     Buprenorphine HCl (BELBUCA) 750 MCG FILM buccal film     buPROPion (WELLBUTRIN XL) 300 MG 24 hr tablet     busPIRone (BUSPAR) 15 MG tablet     cephALEXin (KEFLEX) 500 MG capsule     cetirizine (ZYRTEC) 10 MG tablet     cholecalciferol (VITAMIN D3) 5000 units TABS tablet     clindamycin (CLEOCIN T) 1 % solution     furosemide (LASIX) 40 MG tablet     gabapentin (NEURONTIN) 300 MG  capsule     gabapentin (NEURONTIN) 800 MG tablet     hydrOXYzine (VISTARIL) 25 MG capsule     ibuprofen (ADVIL/MOTRIN) 800 MG tablet     levofloxacin (LEVAQUIN) 500 MG tablet     lidocaine-prilocaine (EMLA) 2.5-2.5 % external cream     magic mouthwash suspension, diphenhydrAMINE, lidocaine, aluminum-magnesium & simethicone, (FIRST-MOUTHWASH BLM) compounding kit     NARCAN 4 MG/0.1ML nasal spray     omeprazole (PRILOSEC) 20 MG DR capsule     Ondansetron HCl (ZOFRAN PO)     order for DME     order for DME     oxyCODONE-acetaminophen (PERCOCET)  MG per tablet     phentermine 30 MG capsule     polyethylene glycol (MIRALAX/GLYCOLAX) packet     potassium chloride ER (K-TAB) 20 MEQ CR tablet     pravastatin (PRAVACHOL) 20 MG tablet     SENNA-LAX 8.6 MG tablet     sertraline (ZOLOFT) 100 MG tablet     topiramate (TOPAMAX) 25 MG tablet     No current facility-administered medications for this encounter.      VITALS: BP (!) 148/95 (BP Location: Right arm)   Pulse 106   Temp 99.4  F (37.4  C) (Temporal)   Resp 16   LMP 11/02/2019       PHYSICAL EXAM:  GENERAL: Patient is alert and oriented and in no acute distress  INTEGUMENTARY:   Wound (used by OP [facility] only) 07/29/19 0813 Left heel pressure injury (Active)   Length (cm) 5 11/21/2019  1:00 PM   Width (cm) 5.5 11/21/2019  1:00 PM   Depth (cm) 1.5 11/21/2019  1:00 PM   Wound (cm^2) 27.5 cm^2 11/21/2019  1:00 PM   Wound Volume (cm^3) 41.25 cm^3 11/21/2019  1:00 PM   Wound healing % -67.68 11/21/2019  1:00 PM   Dressing Appearance moist drainage 11/21/2019  2:27 PM   Drainage Characteristics/Odor serosanguineous 11/21/2019  2:27 PM   Drainage Amount large 11/21/2019  2:27 PM   Thickness/Stage Stage 4 11/21/2019  2:27 PM   Base red/granulating 11/21/2019  2:27 PM   Periwound intact;edematous;macerated 11/21/2019  2:27 PM   Periwound Temperature warm 11/21/2019  2:27 PM   Care, Wound debrided 11/21/2019  2:29 PM       Wound (used by OP [facility] only) 07/29/19 0814 Right heel  pressure injury (Active)   Length (cm) 5.5 11/21/2019  1:00 PM   Width (cm) 5.6 11/21/2019  1:00 PM   Depth (cm) 2.5 11/21/2019  1:00 PM   Wound (cm^2) 30.8 cm^2 11/21/2019  1:00 PM   Wound Volume (cm^3) 77 cm^3 11/21/2019  1:00 PM   Wound healing % -138.39 11/21/2019  1:00 PM   Dressing Appearance moist drainage 11/21/2019  1:00 PM   Drainage Characteristics/Odor serosanguineous 11/21/2019  1:00 PM   Drainage Amount moderate 11/21/2019  1:00 PM   Thickness/Stage Stage 4 11/21/2019  2:29 PM   Base red/granulating 11/21/2019  2:29 PM   Periwound intact;macerated 11/21/2019  2:29 PM   Periwound Temperature warm 11/21/2019  2:29 PM   Care, Wound debrided 11/21/2019  2:29 PM       Wound (used by OP [facility] only) 11/04/19 1413 Right 3 toe pressure injury (Active)   Length (cm) 0.5 11/21/2019  1:00 PM   Width (cm) 0.8 11/21/2019  1:00 PM   Depth (cm) 0.1 11/21/2019  1:00 PM   Wound (cm^2) 0.4 cm^2 11/21/2019  1:00 PM   Wound Volume (cm^3) 0.04 cm^3 11/21/2019  1:00 PM   Wound healing % 70.37 11/21/2019  1:00 PM   Dressing Appearance moist drainage 11/21/2019  1:00 PM   Drainage Characteristics/Odor serosanguineous 11/21/2019  1:00 PM   Drainage Amount moderate 11/21/2019  1:00 PM   Thickness/Stage full thickness 11/21/2019  2:30 PM   Base red/granulating 11/21/2019  2:30 PM   Periwound intact 11/21/2019  2:30 PM   Periwound Temperature warm 11/21/2019  2:30 PM   Care, Wound non-select wound debridement performed 11/21/2019  2:30 PM       Wound (used by OP [facility] only) 11/04/19 1415 Left 2 toe pressure injury (Active)   Length (cm) 1 11/21/2019  1:00 PM   Width (cm) 1 11/21/2019  1:00 PM   Depth (cm) 0 11/21/2019  1:00 PM   Wound (cm^2) 1 cm^2 11/21/2019  1:00 PM   Wound Volume (cm^3) 0 cm^3 11/21/2019  1:00 PM   Wound healing % 16.67 11/21/2019  1:00 PM   Dressing Appearance moist drainage 11/21/2019  1:00 PM   Drainage Characteristics/Odor serosanguineous 11/21/2019  1:00 PM   Drainage Amount moderate 11/21/2019  1:00 PM    Thickness/Stage full thickness 11/21/2019  2:30 PM   Base red/granulating 11/21/2019  2:30 PM   Periwound edematous;intact 11/21/2019  2:30 PM   Periwound Temperature warm 11/21/2019  2:30 PM   Care, Wound non-select wound debridement performed 11/21/2019  2:30 PM                 PROCEDURE: 4% topical lidocaine was applied to the wound by the nursing staff. Patient was determined to be capable of making their own medical decisions and informed consent was obtained. Using a 15 blade a selective debridement of non-viable tissue was performed of <20 cm. Hemostasis was achieved with pressure. The patient tolerated the procedure well.      ASSESSMENT:   1. Bilateral stage 4 pressure ulcers of heels  2. Stage 3 pressure ulcer of right third and left second toe  3. Cellulitis of right third toe    PLAN/DISCUSSION:   1. See AVS for detailed dressing regimen  2. Cavazos culture obtained for right third toe, concern for osteo will assess with MRI tonight  3. Keflex 500mg TID x 10d empirically for cellulitis of toe  4. TCC reapplied to left heel     FOLLOW-UP: Monday with Dr. Angy STUART PA-C

## 2019-11-21 NOTE — DISCHARGE INSTRUCTIONS
[facility]  [address], [place] [address]  Appointment Phone [phone]    Shalonda Howard 1970    Wound Dressing Change Left heel and left 2nd toe  Cleanse wounds with wound cleanser  Skin Care: Apply moisturizing cream to skin surrounding the wound (but not in the  wound):skin prep  Cover wounds with Endoform cut to size slightly larger than wounds, apply EdemaWear from base of toe to knee,  Cover wound with quick absorbant and than  Apply TCC to left leg  Change dressing twice weekly in clinic    Wound Dressing Change to right 3rd toe  Cleanse wound with wound cleanser  Cover wound with Iodosorb, cover with gauze  Change dressing daily    Wound Dressing Change to right heel  Cleanse wound with wound cleanser  Cover wound with Hydrofera Blue Transfer, EdemaWear, ABD pad, roll gauze and tape  Change dressing daily    Arcelia Nova PA-C, November 21, 2019     Call us at [phone] if you have any questions about your wounds, have redness or  swelling around your wound, have a fever of 101 or greater or if you have any other  problems or concerns. We answer the phone Monday through Friday 8 am to 4 pm,  please leave a message as we check the voicemail frequently throughout the day.    Follow up with Dr. Travis as scheduled

## 2019-11-22 LAB
BACTERIA SPEC CULT: ABNORMAL
BACTERIA SPEC CULT: ABNORMAL
Lab: ABNORMAL
SPECIMEN SOURCE: ABNORMAL

## 2019-11-25 ENCOUNTER — HOSPITAL ENCOUNTER (OUTPATIENT)
Dept: WOUND CARE | Facility: CLINIC | Age: 49
Discharge: HOME | End: 2019-11-25
Attending: SURGERY | Admitting: SURGERY
Payer: COMMERCIAL

## 2019-11-25 VITALS
SYSTOLIC BLOOD PRESSURE: 157 MMHG | HEART RATE: 104 BPM | DIASTOLIC BLOOD PRESSURE: 89 MMHG | TEMPERATURE: 99.8 F | RESPIRATION RATE: 16 BRPM

## 2019-11-25 DIAGNOSIS — L89.613 PRESSURE INJURY OF RIGHT HEEL, STAGE 3 (H): ICD-10-CM

## 2019-11-25 DIAGNOSIS — S91.105D OPEN WOUND OF THIRD TOE OF LEFT FOOT, SUBSEQUENT ENCOUNTER: Primary | ICD-10-CM

## 2019-11-25 DIAGNOSIS — S91.109D OPEN WOUND OF TOE, SUBSEQUENT ENCOUNTER: ICD-10-CM

## 2019-11-25 DIAGNOSIS — S91.105A OPEN WOUND OF THIRD TOE OF LEFT FOOT, INITIAL ENCOUNTER: ICD-10-CM

## 2019-11-25 DIAGNOSIS — S91.301D OPEN WOUND OF RIGHT HEEL, SUBSEQUENT ENCOUNTER: ICD-10-CM

## 2019-11-25 DIAGNOSIS — L89.623 PRESSURE ULCER OF LEFT HEEL, STAGE 3 (H): ICD-10-CM

## 2019-11-25 LAB
BACTERIA SPEC CULT: ABNORMAL
BACTERIA SPEC CULT: ABNORMAL
Lab: ABNORMAL
SPECIMEN SOURCE: ABNORMAL

## 2019-11-25 PROCEDURE — 97602 WOUND(S) CARE NON-SELECTIVE: CPT

## 2019-11-25 PROCEDURE — A6209 FOAM DRSG <=16 SQ IN W/O BDR: HCPCS

## 2019-11-25 PROCEDURE — 99213 OFFICE O/P EST LOW 20 MIN: CPT | Performed by: SURGERY

## 2019-11-25 PROCEDURE — 11042 DBRDMT SUBQ TIS 1ST 20SQCM/<: CPT

## 2019-11-25 NOTE — PROGRESS NOTES
[facility] [place] Progress Note    Subject: Shalonda Howard returns after Misonix ultrasonic debridement and myriad application November 14, 2019.  Persistent heavy drainage from the right and left heels consistent with known lymphedema and lymphatic dysfunction associated with bilateral lower extremity neuropathy.  Currently performing total contact casting twice per week left lower extremity.  A bone fragment had been identified within a area of tunneling on the right foot during the surgical procedure, this was sent to culture, Enterobacter and enterococcus and Acinetobacter identified, she is currently on Levaquin.  I had spoken with infectious disease, she would see the patient if it was felt that this was osteomyelitis, I have left a message with Dr. Harman today to speak with him regarding his review of the MRI which demonstrates postoperative changes versus osteomyelitis.    Patient Active Problem List   Diagnosis     Infection     Left foot Postop Wound infection     Post op infection     Systemic lupus erythematosus (H)     Osteomyelitis Left 1st metatarsal -- S/P Amputation     Cellulitis of right foot     Pressure injury of right heel, stage 3 (H)     Pressure ulcer of left heel, stage 3 (H)     Open wound of third toe of left foot     Open wound of heel     Open toe wound     Past Medical History:   Diagnosis Date     Allergic rhinitis, cause unspecified      Anxiety state, unspecified      Cellulitis and abscess of leg, except foot      Disuse osteoporosis      Dysthymic disorder      Edema      Encounter for long-term (current) use of other medications      Esophageal reflux      Foot ulcer on Heel bilaterally  5/22/2019     Kidney stones      Myalgia and myositis, unspecified      Obesity, unspecified      Open wound of foot except toe(s) alone, complicated      Opioid type dependence, unspecified      Other acne      Other congenital valgus deformity of feet      Other ventral hernia  without mention of obstruction or gangrene      Polycystic ovaries      PONV (postoperative nausea and vomiting)      Systemic lupus erythematosus (H)     unsure     Tibialis tendinitis      Unspecified hereditary and idiopathic peripheral neuropathy      Exam:  BP (!) 157/89 (BP Location: Right arm)   Pulse 104   Temp 99.8  F (37.7  C) (Temporal)   Resp 16   LMP 11/02/2019   Wound (used by OP [facility] only) 07/29/19 0813 Left heel pressure injury (Active)   Length (cm) 4.7 11/25/2019 10:00 AM   Width (cm) 5.3 11/25/2019 10:00 AM   Depth (cm) 1.2 11/25/2019 10:00 AM   Wound (cm^2) 24.91 cm^2 11/25/2019 10:00 AM   Wound Volume (cm^3) 29.89 cm^3 11/25/2019 10:00 AM   Wound healing % -51.89 11/25/2019 10:00 AM   Dressing Appearance moist drainage 11/25/2019 10:00 AM   Drainage Characteristics/Odor serosanguineous 11/25/2019 10:00 AM   Drainage Amount large 11/25/2019 10:00 AM   Thickness/Stage Stage 4 11/25/2019 10:00 AM   Base red/granulating 11/25/2019 10:00 AM   Red (%), Wound Tissue Color 100 11/25/2019 10:00 AM   Periwound intact 11/25/2019 10:00 AM   Periwound Temperature warm 11/25/2019 10:00 AM   Periwound Skin Turgor firm 11/25/2019 10:00 AM   Edges callused 11/25/2019 10:00 AM   Care, Wound debrided 11/25/2019 10:00 AM       Wound (used by OP [facility] only) 07/29/19 0814 Right heel pressure injury (Active)   Length (cm) 5.2 11/25/2019 10:00 AM   Width (cm) 5.8 11/25/2019 10:00 AM   Depth (cm) 2.3 11/25/2019 10:00 AM   Wound (cm^2) 30.16 cm^2 11/25/2019 10:00 AM   Wound Volume (cm^3) 69.37 cm^3 11/25/2019 10:00 AM   Wound healing % -133.44 11/25/2019 10:00 AM   Dressing Appearance moist drainage 11/25/2019 10:00 AM   Drainage Characteristics/Odor serosanguineous 11/25/2019 10:00 AM   Drainage Amount moderate 11/25/2019 10:00 AM   Thickness/Stage Stage 4 11/25/2019 10:00 AM   Base red/granulating 11/25/2019 10:00 AM   Red (%), Wound Tissue Color 100 11/25/2019 10:00 AM   Periwound intact 11/25/2019 10:00 AM    Periwound Temperature warm 11/25/2019 10:00 AM   Periwound Skin Turgor soft 11/25/2019 10:00 AM   Edges open 11/25/2019 10:00 AM   Care, Wound non-select wound debridement performed 11/25/2019 10:00 AM       Wound (used by OP [facility] only) 11/04/19 1413 Right 3 toe pressure injury (Active)   Length (cm) 0.5 11/25/2019 10:00 AM   Width (cm) 0.7 11/25/2019 10:00 AM   Depth (cm) 0.2 11/25/2019 10:00 AM   Wound (cm^2) 0.35 cm^2 11/25/2019 10:00 AM   Wound Volume (cm^3) 0.07 cm^3 11/25/2019 10:00 AM   Wound healing % 74.07 11/25/2019 10:00 AM   Dressing Appearance moist drainage 11/25/2019 10:00 AM   Drainage Characteristics/Odor serosanguineous 11/25/2019 10:00 AM   Drainage Amount moderate 11/25/2019 10:00 AM   Thickness/Stage full thickness 11/25/2019 10:00 AM   Base red/granulating 11/25/2019 10:00 AM   Red (%), Wound Tissue Color 100 11/25/2019 10:00 AM   Periwound intact 11/25/2019 10:00 AM   Periwound Temperature warm 11/25/2019 10:00 AM   Periwound Skin Turgor soft 11/25/2019 10:00 AM   Edges open 11/25/2019 10:00 AM   Care, Wound non-select wound debridement performed 11/25/2019 10:00 AM       Wound (used by OP [facility] only) 11/04/19 1415 Left 2 toe pressure injury (Active)   Length (cm) 0.7 11/25/2019 10:00 AM   Width (cm) 0.8 11/25/2019 10:00 AM   Depth (cm) 0.2 11/25/2019 10:00 AM   Wound (cm^2) 0.56 cm^2 11/25/2019 10:00 AM   Wound Volume (cm^3) 0.11 cm^3 11/25/2019 10:00 AM   Wound healing % 53.33 11/25/2019 10:00 AM   Dressing Appearance moist drainage 11/25/2019 10:00 AM   Drainage Characteristics/Odor serosanguineous 11/25/2019 10:00 AM   Drainage Amount moderate 11/25/2019 10:00 AM   Thickness/Stage full thickness 11/25/2019 10:00 AM   Base red/granulating 11/25/2019 10:00 AM   Red (%), Wound Tissue Color 100 11/25/2019 10:00 AM   Periwound intact 11/25/2019 10:00 AM   Periwound Temperature warm 11/25/2019 10:00 AM   Periwound Skin Turgor soft 11/25/2019 10:00 AM   Edges callused 11/25/2019 10:00 AM   Care,  Wound non-select wound debridement performed 11/25/2019 10:00 AM     49-year-old female, bilateral lower extremity neuropathy, bilateral heel ulcerations, no further bone fragments identified within the right foot wound.  Both were irrigated with prophase.  Myriad has been absorbed within the left foot wound.  Spandage and total contact cast were reapplied to left lower externally.      Impression: Neuropathy, nondiabetic, non-tobacco use, bilateral heel wounds, status post hardware removal right foot, right bone fragment with bacterial growth    Plan: We will dress the wounds with total contact cast and left lower extremity, she will continue Prontosan irrigation and current dressings on the right lower examinee.  Will discuss with Dr. Harman's interpretation of the right foot MRI..  Patient will return to the clinic twice weekly for left total contact cast change  We will also obtain a CLT consult for management of lower extremely lymphedema and education on manual lymphatic drainage to the groins to try to improve overall lymphatic functionality of the legs decreased lower extremity edema in addition to utilization of EdemaWear and Spandage.    Juan Antonio Travis MD on 11/25/2019 at 12:42 PM

## 2019-11-25 NOTE — DISCHARGE INSTRUCTIONS
[facility]  [address], [place] [address]  Appointment Phone [phone]     Shalonda Anita      1970    Wound Dressing Change Left heel and left 2nd toe  Cleanse wounds with wound cleanser  Skin Care: Apply moisturizing cream to skin surrounding the wound (but not in the  wound):skin prep  Cover wounds with Endoform cut to size slightly larger than wounds, apply EdemaWear  from base of toe to knee,  Cover wound with Zorflex, Spandex and KerraMax   Apply TCC to left leg  Change dressing twice weekly in clinic    Wound Dressing Change to right 3rd toe  Cleanse wound with wound cleanser  Cover wound with Iodosorb, cover with gauze  Change dressing daily    Wound Dressing Change to right heel  Cleanse wound with wound cleanser  Cover wound with Hydrofera Blue Transfer, EdemaWear, ABD pad, roll gauze and tape  Change dressing daily     JUNAID Travis M.D.. November 25, 2019    Call us at [phone] if you have any questions about your wounds, have redness or swelling around your wound, have a fever of 101 or greater or if you have any other problems or concerns. We answer the phone Monday through Friday 8 am to 4 pm, please leave a message as we check the voicemail frequently throughout the day.     Follow up with Provider - as scheduled

## 2019-11-25 NOTE — ADDENDUM NOTE
Encounter addended by: Judith Gillespie RN on: 11/25/2019 1:47 PM   Actions taken: Visit diagnoses modified, Order list changed, Diagnosis association updated

## 2019-11-27 ENCOUNTER — HOSPITAL ENCOUNTER (OUTPATIENT)
Dept: WOUND CARE | Facility: CLINIC | Age: 49
Discharge: HOME | End: 2019-11-27
Attending: PHYSICIAN ASSISTANT | Admitting: PHYSICIAN ASSISTANT
Payer: COMMERCIAL

## 2019-11-27 VITALS
DIASTOLIC BLOOD PRESSURE: 85 MMHG | TEMPERATURE: 98.6 F | HEART RATE: 98 BPM | RESPIRATION RATE: 20 BRPM | SYSTOLIC BLOOD PRESSURE: 160 MMHG

## 2019-11-27 DIAGNOSIS — L08.9 LEFT FOOT INFECTION: Primary | ICD-10-CM

## 2019-11-27 DIAGNOSIS — L89.623 PRESSURE ULCER OF LEFT HEEL, STAGE 3 (H): ICD-10-CM

## 2019-11-27 DIAGNOSIS — S91.105D OPEN WOUND OF THIRD TOE OF LEFT FOOT, SUBSEQUENT ENCOUNTER: ICD-10-CM

## 2019-11-27 DIAGNOSIS — L89.613 PRESSURE INJURY OF RIGHT HEEL, STAGE 3 (H): ICD-10-CM

## 2019-11-27 DIAGNOSIS — S91.301D OPEN WOUND OF RIGHT HEEL, SUBSEQUENT ENCOUNTER: ICD-10-CM

## 2019-11-27 LAB
CRP SERPL-MCNC: 38.3 MG/L (ref 0–8)
ERYTHROCYTE [SEDIMENTATION RATE] IN BLOOD BY WESTERGREN METHOD: 73 MM/H (ref 0–20)
PREALB SERPL IA-MCNC: 16 MG/DL (ref 15–45)

## 2019-11-27 PROCEDURE — 29445 APPL RIGID TOT CNTC LEG CAST: CPT | Mod: 50 | Performed by: PHYSICIAN ASSISTANT

## 2019-11-27 PROCEDURE — 99214 OFFICE O/P EST MOD 30 MIN: CPT | Mod: 25 | Performed by: PHYSICIAN ASSISTANT

## 2019-11-27 PROCEDURE — 85652 RBC SED RATE AUTOMATED: CPT | Performed by: PHYSICIAN ASSISTANT

## 2019-11-27 PROCEDURE — 86140 C-REACTIVE PROTEIN: CPT | Performed by: PHYSICIAN ASSISTANT

## 2019-11-27 PROCEDURE — 97602 WOUND(S) CARE NON-SELECTIVE: CPT

## 2019-11-27 PROCEDURE — 29445 APPL RIGID TOT CNTC LEG CAST: CPT

## 2019-11-27 PROCEDURE — 84134 ASSAY OF PREALBUMIN: CPT | Performed by: PHYSICIAN ASSISTANT

## 2019-11-27 RX ORDER — LEVOFLOXACIN 500 MG/1
500 TABLET, FILM COATED ORAL DAILY
Qty: 7 TABLET | Refills: 0 | Status: ON HOLD | OUTPATIENT
Start: 2019-11-27 | End: 2019-12-05

## 2019-11-27 RX ORDER — DOXYCYCLINE 100 MG/1
100 CAPSULE ORAL 2 TIMES DAILY
Qty: 14 CAPSULE | Refills: 0 | Status: SHIPPED | OUTPATIENT
Start: 2019-11-27 | End: 2019-11-27

## 2019-11-27 RX ORDER — DOXYCYCLINE 100 MG/1
100 CAPSULE ORAL 2 TIMES DAILY
Qty: 14 CAPSULE | Refills: 0 | Status: ON HOLD | OUTPATIENT
Start: 2019-11-27 | End: 2019-12-05

## 2019-11-27 RX ORDER — LEVOFLOXACIN 250 MG/1
250 TABLET, FILM COATED ORAL DAILY
Status: ON HOLD | COMMUNITY
End: 2019-12-02

## 2019-11-27 NOTE — PROGRESS NOTES
[facility]    HISTORY OF PRESENT ILLNESS:   Shalonda Howard is a 49 year old female who presents today for reapplication of TCC to her left foot. Has been followed closely by Dr. Travis. Bilateral neuropathic heel ulcerations which were recently debrided in the OR with placement of Myriad 11/14/19. Right third toe more red and swollen today. No issues with cast. Has MRI scheduled for this evening.    INTERVAL HISTORY:    Complaining of more pain bilaterally but especially her right foot    Concerned about unclear MRI results, Dr. Harman hopeful that bone changes are post-surgical vs infectious    Cellulitis of right toe appears resolved    REVIEW OF SYSTEMS:  CONSTITUTIONAL: Denies fevers or acute illness    PAST MEDICAL HISTORY:  has a past medical history of Allergic rhinitis, cause unspecified, Anxiety state, unspecified, Cellulitis and abscess of leg, except foot, Disuse osteoporosis, Dysthymic disorder, Edema, Encounter for long-term (current) use of other medications, Esophageal reflux, Foot ulcer on Heel bilaterally  (5/22/2019), Kidney stones, Myalgia and myositis, unspecified, Obesity, unspecified, Open wound of foot except toe(s) alone, complicated, Opioid type dependence, unspecified, Other acne, Other congenital valgus deformity of feet, Other ventral hernia without mention of obstruction or gangrene, Polycystic ovaries, PONV (postoperative nausea and vomiting), Systemic lupus erythematosus (H), Tibialis tendinitis, and Unspecified hereditary and idiopathic peripheral neuropathy.    SOCIAL HISTORY:  TOBACCO STATUS:  reports that she quit smoking about 17 years ago. Her smoking use included cigarettes. She has a 6.00 pack-year smoking history. She has never used smokeless tobacco.    MEDICATIONS:   Current Outpatient Medications   Medication     Buprenorphine HCl (BELBUCA) 750 MCG FILM buccal film     buPROPion (WELLBUTRIN XL) 300 MG 24 hr tablet     busPIRone (BUSPAR) 15 MG tablet      cetirizine (ZYRTEC) 10 MG tablet     cholecalciferol (VITAMIN D3) 5000 units TABS tablet     clindamycin (CLEOCIN T) 1 % solution     doxycycline hyclate (VIBRAMYCIN) 100 MG capsule     furosemide (LASIX) 40 MG tablet     gabapentin (NEURONTIN) 300 MG capsule     gabapentin (NEURONTIN) 800 MG tablet     hydrOXYzine (VISTARIL) 25 MG capsule     ibuprofen (ADVIL/MOTRIN) 800 MG tablet     levofloxacin (LEVAQUIN) 250 MG tablet     levofloxacin (LEVAQUIN) 500 MG tablet     lidocaine-prilocaine (EMLA) 2.5-2.5 % external cream     magic mouthwash suspension, diphenhydrAMINE, lidocaine, aluminum-magnesium & simethicone, (FIRST-MOUTHWASH BLM) compounding kit     NARCAN 4 MG/0.1ML nasal spray     omeprazole (PRILOSEC) 20 MG DR capsule     Ondansetron HCl (ZOFRAN PO)     order for DME     order for DME     oxyCODONE-acetaminophen (PERCOCET)  MG per tablet     phentermine 30 MG capsule     polyethylene glycol (MIRALAX/GLYCOLAX) packet     potassium chloride ER (K-TAB) 20 MEQ CR tablet     pravastatin (PRAVACHOL) 20 MG tablet     SENNA-LAX 8.6 MG tablet     sertraline (ZOLOFT) 100 MG tablet     topiramate (TOPAMAX) 25 MG tablet     No current facility-administered medications for this encounter.      VITALS: BP (!) 160/85 (BP Location: Left arm)   Pulse 98   Temp 98.6  F (37  C) (Temporal)   Resp 20   LMP 11/02/2019     PHYSICAL EXAM:  GENERAL: Patient is alert and oriented and in no acute distress  INTEGUMENTARY:   Wound (used by OP [facility] only) 07/29/19 0813 Left heel pressure injury (Active)   Length (cm) 5 11/27/2019  2:00 PM   Width (cm) 5.3 11/27/2019  2:00 PM   Depth (cm) 1.2 11/27/2019  2:00 PM   Wound (cm^2) 26.5 cm^2 11/27/2019  2:00 PM   Wound Volume (cm^3) 31.8 cm^3 11/27/2019  2:00 PM   Wound healing % -61.59 11/27/2019  2:00 PM   Dressing Appearance moist drainage 11/27/2019  2:00 PM   Drainage Characteristics/Odor serosanguineous 11/27/2019  2:00 PM   Drainage Amount moderate 11/27/2019  3:40 PM    Thickness/Stage Stage 4 11/27/2019  3:40 PM   Base red/granulating 11/27/2019  3:40 PM   Periwound intact 11/27/2019  3:40 PM   Periwound Temperature warm 11/27/2019  3:40 PM   Care, Wound non-select wound debridement performed 11/27/2019  3:40 PM       Wound (used by OP [facility] only) 07/29/19 0814 Right heel pressure injury (Active)   Length (cm) 5.4 11/27/2019  2:00 PM   Width (cm) 5.9 11/27/2019  2:00 PM   Depth (cm) 1.6 11/27/2019  2:00 PM   Wound (cm^2) 31.86 cm^2 11/27/2019  2:00 PM   Wound Volume (cm^3) 50.98 cm^3 11/27/2019  2:00 PM   Wound healing % -146.59 11/27/2019  2:00 PM   Dressing Appearance moist drainage 11/27/2019  2:00 PM   Drainage Characteristics/Odor serosanguineous 11/27/2019  2:00 PM   Drainage Amount large 11/27/2019  2:00 PM   Thickness/Stage Stage 4 11/27/2019  3:41 PM   Base red/granulating 11/27/2019  3:41 PM   Periwound intact 11/27/2019  3:41 PM   Periwound Temperature warm 11/27/2019  3:41 PM   Periwound Skin Turgor soft 11/27/2019  3:41 PM   Care, Wound non-select wound debridement performed 11/27/2019  3:41 PM       Wound (used by OP [facility] only) 11/04/19 1413 Right 3 toe pressure injury (Active)   Length (cm) 0.5 11/27/2019  2:00 PM   Width (cm) 0.7 11/27/2019  2:00 PM   Depth (cm) 0.2 11/27/2019  2:00 PM   Wound (cm^2) 0.35 cm^2 11/27/2019  2:00 PM   Wound Volume (cm^3) 0.07 cm^3 11/27/2019  2:00 PM   Wound healing % 74.07 11/27/2019  2:00 PM   Dressing Appearance moist drainage 11/27/2019  2:00 PM   Drainage Characteristics/Odor serosanguineous 11/27/2019  2:00 PM   Drainage Amount moderate 11/27/2019  2:00 PM   Thickness/Stage Stage 3 11/27/2019  3:41 PM   Base red/granulating 11/27/2019  3:41 PM   Periwound intact 11/27/2019  3:41 PM   Periwound Temperature warm 11/27/2019  3:41 PM   Care, Wound non-select wound debridement performed 11/27/2019  3:41 PM       Wound (used by OP [facility] only) 11/04/19 1415 Left 2 toe pressure injury (Active)   Length (cm) 0.3 11/27/2019  2:00 PM   Width  (cm) 0.4 11/27/2019  2:00 PM   Depth (cm) 0.1 11/27/2019  2:00 PM   Wound (cm^2) 0.12 cm^2 11/27/2019  2:00 PM   Wound Volume (cm^3) 0.01 cm^3 11/27/2019  2:00 PM   Wound healing % 90 11/27/2019  2:00 PM   Dressing Appearance moist drainage 11/27/2019  2:00 PM   Drainage Characteristics/Odor serosanguineous 11/27/2019  2:00 PM   Drainage Amount moderate 11/27/2019  2:00 PM   Thickness/Stage Stage 3 11/27/2019  3:41 PM   Base red/granulating 11/27/2019  3:41 PM   Periwound intact 11/27/2019  3:41 PM   Periwound Temperature warm 11/27/2019  3:41 PM   Care, Wound non-select wound debridement performed 11/27/2019  3:41 PM           ASSESSMENT:   1. Bilateral stage 4 pressure ulcers of heels  2. Stage 3 pressure ulcer of right third and left second toe  3. Cellulitis of right third toe    PLAN/DISCUSSION:   1. See AVS for detailed dressing regimen  2. Extend antibiotics another week of Levaquin and add on doxy for broader coverage, add in probiotics  3. TCC applied to bilateral feet, safety precautions give, not to drive, utilize walker/cane/device to assist with ambulation but overall minimal ambulation  4. ESR, CRP drawn to today to aid in question of osteomyelitis    FOLLOW-UP: Monday with Dr. Angy STUART PA-C

## 2019-11-27 NOTE — DISCHARGE INSTRUCTIONS
[facility]  [address], [facility] [address]  Appointment Phone [phone]     Shalonda Howard      1970    Wound Dressing Change to right heel, right 3rd toe,left heel and left 2nd toe  Cleanse wounds and surrounding skin with: wound cleanser  Cover wounds with Zorfflex, EdemaWear, Qwick then TCC bilateral heels       Arcelia Nova PA-C. November 27, 2019    Call us at [phone] if you have any questions about your wounds, have redness or swelling around your wound, have a fever of 101 or greater or if you have any other problems or concerns. We answer the phone Monday through Friday 8 am to 4 pm, please leave a message as we check the voicemail frequently throughout the day.     Follow up with Dr. Travis as scheduled

## 2019-12-02 ENCOUNTER — HOSPITAL ENCOUNTER (OUTPATIENT)
Dept: WOUND CARE | Facility: CLINIC | Age: 49
End: 2019-12-02
Attending: SURGERY
Payer: COMMERCIAL

## 2019-12-02 ENCOUNTER — HOSPITAL ENCOUNTER (INPATIENT)
Facility: CLINIC | Age: 49
LOS: 4 days | Discharge: HOME HEALTH | End: 2019-12-06
Attending: INTERNAL MEDICINE | Admitting: HOSPITALIST
Payer: COMMERCIAL

## 2019-12-02 ENCOUNTER — APPOINTMENT (OUTPATIENT)
Dept: MRI IMAGING | Facility: CLINIC | Age: 49
End: 2019-12-02
Attending: PHYSICIAN ASSISTANT
Payer: COMMERCIAL

## 2019-12-02 VITALS
RESPIRATION RATE: 20 BRPM | TEMPERATURE: 100.8 F | SYSTOLIC BLOOD PRESSURE: 153 MMHG | DIASTOLIC BLOOD PRESSURE: 96 MMHG | HEART RATE: 102 BPM

## 2019-12-02 DIAGNOSIS — M86.9 OSTEOMYELITIS OF MULTIPLE SITES, UNSPECIFIED TYPE (H): Primary | ICD-10-CM

## 2019-12-02 DIAGNOSIS — S91.105D OPEN WOUND OF THIRD TOE OF LEFT FOOT, SUBSEQUENT ENCOUNTER: ICD-10-CM

## 2019-12-02 DIAGNOSIS — L89.613 PRESSURE INJURY OF RIGHT HEEL, STAGE 3 (H): ICD-10-CM

## 2019-12-02 DIAGNOSIS — L89.623 PRESSURE ULCER OF LEFT HEEL, STAGE 3 (H): ICD-10-CM

## 2019-12-02 DIAGNOSIS — M86.19 OTHER ACUTE OSTEOMYELITIS, MULTIPLE SITES (H): ICD-10-CM

## 2019-12-02 LAB
ANION GAP SERPL CALCULATED.3IONS-SCNC: 6 MMOL/L (ref 3–14)
BASOPHILS # BLD AUTO: 0 10E9/L (ref 0–0.2)
BASOPHILS NFR BLD AUTO: 0.2 %
BUN SERPL-MCNC: 13 MG/DL (ref 7–30)
CALCIUM SERPL-MCNC: 8.7 MG/DL (ref 8.5–10.1)
CHLORIDE SERPL-SCNC: 107 MMOL/L (ref 94–109)
CO2 SERPL-SCNC: 22 MMOL/L (ref 20–32)
CREAT SERPL-MCNC: 0.75 MG/DL (ref 0.52–1.04)
DIFFERENTIAL METHOD BLD: ABNORMAL
EOSINOPHIL # BLD AUTO: 0.1 10E9/L (ref 0–0.7)
EOSINOPHIL NFR BLD AUTO: 1.1 %
ERYTHROCYTE [DISTWIDTH] IN BLOOD BY AUTOMATED COUNT: 27.2 % (ref 10–15)
GFR SERPL CREATININE-BSD FRML MDRD: >90 ML/MIN/{1.73_M2}
GLUCOSE SERPL-MCNC: 127 MG/DL (ref 70–99)
HCT VFR BLD AUTO: 35.3 % (ref 35–47)
HGB BLD-MCNC: 10.1 G/DL (ref 11.7–15.7)
IMM GRANULOCYTES # BLD: 0 10E9/L (ref 0–0.4)
IMM GRANULOCYTES NFR BLD: 0.2 %
LACTATE BLD-SCNC: 0.9 MMOL/L (ref 0.7–2)
LACTATE BLD-SCNC: 2.2 MMOL/L (ref 0.7–2)
LYMPHOCYTES # BLD AUTO: 2.2 10E9/L (ref 0.8–5.3)
LYMPHOCYTES NFR BLD AUTO: 22.3 %
MCH RBC QN AUTO: 19.5 PG (ref 26.5–33)
MCHC RBC AUTO-ENTMCNC: 28.6 G/DL (ref 31.5–36.5)
MCV RBC AUTO: 68 FL (ref 78–100)
MONOCYTES # BLD AUTO: 0.6 10E9/L (ref 0–1.3)
MONOCYTES NFR BLD AUTO: 5.6 %
NEUTROPHILS # BLD AUTO: 7.1 10E9/L (ref 1.6–8.3)
NEUTROPHILS NFR BLD AUTO: 70.6 %
NRBC # BLD AUTO: 0 10*3/UL
NRBC BLD AUTO-RTO: 0 /100
PLATELET # BLD AUTO: 357 10E9/L (ref 150–450)
POTASSIUM SERPL-SCNC: 4.4 MMOL/L (ref 3.4–5.3)
RBC # BLD AUTO: 5.19 10E12/L (ref 3.8–5.2)
SODIUM SERPL-SCNC: 135 MMOL/L (ref 133–144)
WBC # BLD AUTO: 10 10E9/L (ref 4–11)

## 2019-12-02 PROCEDURE — 99214 OFFICE O/P EST MOD 30 MIN: CPT | Performed by: SURGERY

## 2019-12-02 PROCEDURE — 0S9F3ZZ DRAINAGE OF RIGHT ANKLE JOINT, PERCUTANEOUS APPROACH: ICD-10-PCS | Performed by: PHYSICIAN ASSISTANT

## 2019-12-02 PROCEDURE — 87040 BLOOD CULTURE FOR BACTERIA: CPT | Performed by: PHYSICIAN ASSISTANT

## 2019-12-02 PROCEDURE — 99223 1ST HOSP IP/OBS HIGH 75: CPT | Mod: AI | Performed by: PHYSICIAN ASSISTANT

## 2019-12-02 PROCEDURE — 85025 COMPLETE CBC W/AUTO DIFF WBC: CPT | Performed by: PHYSICIAN ASSISTANT

## 2019-12-02 PROCEDURE — 25000132 ZZH RX MED GY IP 250 OP 250 PS 637: Performed by: PHYSICIAN ASSISTANT

## 2019-12-02 PROCEDURE — 80048 BASIC METABOLIC PNL TOTAL CA: CPT | Performed by: PHYSICIAN ASSISTANT

## 2019-12-02 PROCEDURE — 73723 MRI JOINT LWR EXTR W/O&W/DYE: CPT | Mod: RT

## 2019-12-02 PROCEDURE — 83605 ASSAY OF LACTIC ACID: CPT | Performed by: PHYSICIAN ASSISTANT

## 2019-12-02 PROCEDURE — 25800030 ZZH RX IP 258 OP 636: Performed by: PHYSICIAN ASSISTANT

## 2019-12-02 PROCEDURE — 36415 COLL VENOUS BLD VENIPUNCTURE: CPT | Performed by: PHYSICIAN ASSISTANT

## 2019-12-02 PROCEDURE — 25000128 H RX IP 250 OP 636: Performed by: PHYSICIAN ASSISTANT

## 2019-12-02 PROCEDURE — 97602 WOUND(S) CARE NON-SELECTIVE: CPT

## 2019-12-02 PROCEDURE — 12000000 ZZH R&B MED SURG/OB

## 2019-12-02 RX ORDER — NALOXONE HYDROCHLORIDE 0.4 MG/ML
.1-.4 INJECTION, SOLUTION INTRAMUSCULAR; INTRAVENOUS; SUBCUTANEOUS
Status: DISCONTINUED | OUTPATIENT
Start: 2019-12-02 | End: 2019-12-06 | Stop reason: HOSPADM

## 2019-12-02 RX ORDER — SODIUM CHLORIDE, SODIUM LACTATE, POTASSIUM CHLORIDE, CALCIUM CHLORIDE 600; 310; 30; 20 MG/100ML; MG/100ML; MG/100ML; MG/100ML
INJECTION, SOLUTION INTRAVENOUS CONTINUOUS
Status: ACTIVE | OUTPATIENT
Start: 2019-12-02 | End: 2019-12-03

## 2019-12-02 RX ORDER — ONDANSETRON 8 MG/1
8 TABLET, FILM COATED ORAL EVERY 8 HOURS PRN
COMMUNITY

## 2019-12-02 RX ORDER — POLYETHYLENE GLYCOL 3350 17 G/17G
17 POWDER, FOR SOLUTION ORAL DAILY PRN
Status: DISCONTINUED | OUTPATIENT
Start: 2019-12-02 | End: 2019-12-06 | Stop reason: HOSPADM

## 2019-12-02 RX ORDER — HYDROMORPHONE HYDROCHLORIDE 1 MG/ML
.3-.5 INJECTION, SOLUTION INTRAMUSCULAR; INTRAVENOUS; SUBCUTANEOUS
Status: DISCONTINUED | OUTPATIENT
Start: 2019-12-02 | End: 2019-12-06 | Stop reason: HOSPADM

## 2019-12-02 RX ORDER — LIDOCAINE 40 MG/G
CREAM TOPICAL
Status: DISCONTINUED | OUTPATIENT
Start: 2019-12-02 | End: 2019-12-05

## 2019-12-02 RX ORDER — DOCUSATE SODIUM 100 MG/1
300 CAPSULE, LIQUID FILLED ORAL 2 TIMES DAILY
COMMUNITY

## 2019-12-02 RX ORDER — PIPERACILLIN SODIUM, TAZOBACTAM SODIUM 3; .375 G/15ML; G/15ML
3.38 INJECTION, POWDER, LYOPHILIZED, FOR SOLUTION INTRAVENOUS EVERY 6 HOURS
Status: DISCONTINUED | OUTPATIENT
Start: 2019-12-02 | End: 2019-12-06 | Stop reason: HOSPADM

## 2019-12-02 RX ORDER — HYDROXYZINE PAMOATE 25 MG/1
25 CAPSULE ORAL
Status: DISCONTINUED | OUTPATIENT
Start: 2019-12-02 | End: 2019-12-06 | Stop reason: HOSPADM

## 2019-12-02 RX ORDER — BISACODYL 10 MG
10 SUPPOSITORY, RECTAL RECTAL DAILY PRN
Status: DISCONTINUED | OUTPATIENT
Start: 2019-12-02 | End: 2019-12-06 | Stop reason: HOSPADM

## 2019-12-02 RX ORDER — DOCUSATE SODIUM 100 MG/1
300 CAPSULE, LIQUID FILLED ORAL 2 TIMES DAILY
Status: DISCONTINUED | OUTPATIENT
Start: 2019-12-02 | End: 2019-12-06 | Stop reason: HOSPADM

## 2019-12-02 RX ORDER — PRAVASTATIN SODIUM 20 MG
20 TABLET ORAL EVERY EVENING
Status: DISCONTINUED | OUTPATIENT
Start: 2019-12-02 | End: 2019-12-06 | Stop reason: HOSPADM

## 2019-12-02 RX ORDER — SODIUM CHLORIDE, SODIUM LACTATE, POTASSIUM CHLORIDE, CALCIUM CHLORIDE 600; 310; 30; 20 MG/100ML; MG/100ML; MG/100ML; MG/100ML
INJECTION, SOLUTION INTRAVENOUS CONTINUOUS
Status: DISCONTINUED | OUTPATIENT
Start: 2019-12-02 | End: 2019-12-02

## 2019-12-02 RX ORDER — ACETAMINOPHEN 325 MG/1
650 TABLET ORAL EVERY 4 HOURS PRN
Status: DISCONTINUED | OUTPATIENT
Start: 2019-12-02 | End: 2019-12-06 | Stop reason: HOSPADM

## 2019-12-02 RX ORDER — OXYCODONE HYDROCHLORIDE 5 MG/1
5-10 TABLET ORAL EVERY 4 HOURS PRN
Status: DISCONTINUED | OUTPATIENT
Start: 2019-12-02 | End: 2019-12-06 | Stop reason: HOSPADM

## 2019-12-02 RX ORDER — ONDANSETRON 2 MG/ML
4 INJECTION INTRAMUSCULAR; INTRAVENOUS EVERY 6 HOURS PRN
Status: DISCONTINUED | OUTPATIENT
Start: 2019-12-02 | End: 2019-12-06 | Stop reason: HOSPADM

## 2019-12-02 RX ORDER — ONDANSETRON 4 MG/1
4 TABLET, ORALLY DISINTEGRATING ORAL EVERY 6 HOURS PRN
Status: DISCONTINUED | OUTPATIENT
Start: 2019-12-02 | End: 2019-12-06 | Stop reason: HOSPADM

## 2019-12-02 RX ORDER — BUSPIRONE HYDROCHLORIDE 5 MG/1
15 TABLET ORAL 2 TIMES DAILY
Status: DISCONTINUED | OUTPATIENT
Start: 2019-12-02 | End: 2019-12-06 | Stop reason: HOSPADM

## 2019-12-02 RX ORDER — CETIRIZINE HYDROCHLORIDE 10 MG/1
10 TABLET ORAL DAILY
Status: DISCONTINUED | OUTPATIENT
Start: 2019-12-03 | End: 2019-12-06 | Stop reason: HOSPADM

## 2019-12-02 RX ORDER — TOPIRAMATE 50 MG/1
50 TABLET, FILM COATED ORAL AT BEDTIME
Status: DISCONTINUED | OUTPATIENT
Start: 2019-12-02 | End: 2019-12-06 | Stop reason: HOSPADM

## 2019-12-02 RX ORDER — BUPROPION HYDROCHLORIDE 300 MG/1
300 TABLET ORAL DAILY
Status: DISCONTINUED | OUTPATIENT
Start: 2019-12-03 | End: 2019-12-06 | Stop reason: HOSPADM

## 2019-12-02 RX ORDER — GABAPENTIN 800 MG/1
800 TABLET ORAL 3 TIMES DAILY
Status: DISCONTINUED | OUTPATIENT
Start: 2019-12-02 | End: 2019-12-06 | Stop reason: HOSPADM

## 2019-12-02 RX ADMIN — SODIUM CHLORIDE, POTASSIUM CHLORIDE, SODIUM LACTATE AND CALCIUM CHLORIDE 1000 ML: 600; 310; 30; 20 INJECTION, SOLUTION INTRAVENOUS at 19:53

## 2019-12-02 RX ADMIN — PRAVASTATIN SODIUM 20 MG: 20 TABLET ORAL at 21:53

## 2019-12-02 RX ADMIN — ONDANSETRON 4 MG: 2 INJECTION INTRAMUSCULAR; INTRAVENOUS at 23:14

## 2019-12-02 RX ADMIN — VANCOMYCIN HYDROCHLORIDE 1750 MG: 10 INJECTION, POWDER, LYOPHILIZED, FOR SOLUTION INTRAVENOUS at 22:03

## 2019-12-02 RX ADMIN — GABAPENTIN 800 MG: 800 TABLET, FILM COATED ORAL at 21:52

## 2019-12-02 RX ADMIN — SODIUM CHLORIDE, POTASSIUM CHLORIDE, SODIUM LACTATE AND CALCIUM CHLORIDE: 600; 310; 30; 20 INJECTION, SOLUTION INTRAVENOUS at 18:08

## 2019-12-02 RX ADMIN — OXYCODONE HYDROCHLORIDE 10 MG: 5 TABLET ORAL at 17:43

## 2019-12-02 RX ADMIN — TOPIRAMATE 50 MG: 50 TABLET, FILM COATED ORAL at 22:55

## 2019-12-02 RX ADMIN — PIPERACILLIN AND TAZOBACTAM 3.38 G: 3; .375 INJECTION, POWDER, LYOPHILIZED, FOR SOLUTION INTRAVENOUS at 18:08

## 2019-12-02 RX ADMIN — BUSPIRONE HYDROCHLORIDE 15 MG: 5 TABLET ORAL at 21:52

## 2019-12-02 RX ADMIN — SODIUM CHLORIDE, POTASSIUM CHLORIDE, SODIUM LACTATE AND CALCIUM CHLORIDE 1000 ML: 600; 310; 30; 20 INJECTION, SOLUTION INTRAVENOUS at 17:00

## 2019-12-02 RX ADMIN — DOCUSATE SODIUM 300 MG: 100 CAPSULE, LIQUID FILLED ORAL at 21:53

## 2019-12-02 RX ADMIN — OXYCODONE HYDROCHLORIDE 10 MG: 5 TABLET ORAL at 21:52

## 2019-12-02 ASSESSMENT — ACTIVITIES OF DAILY LIVING (ADL)
RETIRED_COMMUNICATION: 0-->UNDERSTANDS/COMMUNICATES WITHOUT DIFFICULTY
AMBULATION: 0-->INDEPENDENT
BATHING: 0-->INDEPENDENT
TRANSFERRING: 0-->INDEPENDENT
COGNITION: 0 - NO COGNITION ISSUES REPORTED
WHICH_OF_THE_ABOVE_FUNCTIONAL_RISKS_HAD_A_RECENT_ONSET_OR_CHANGE?: AMBULATION;TRANSFERRING;TOILETING;BATHING;DRESSING
ADLS_ACUITY_SCORE: 11
RETIRED_EATING: 0-->INDEPENDENT
SWALLOWING: 0-->SWALLOWS FOODS/LIQUIDS WITHOUT DIFFICULTY
TOILETING: 0-->INDEPENDENT
DRESS: 0-->INDEPENDENT
FALL_HISTORY_WITHIN_LAST_SIX_MONTHS: NO

## 2019-12-02 ASSESSMENT — MIFFLIN-ST. JEOR: SCORE: 1679.15

## 2019-12-02 NOTE — PHARMACY-VANCOMYCIN DOSING SERVICE
Pharmacy Vancomycin Initial Note  Date of Service 2019  Patient's  1970  49 year old, female    Indication: Skin and Soft Tissue Infection    Current estimated CrCl = Estimated Creatinine Clearance: 111.1 mL/min (based on SCr of 0.75 mg/dL).    Creatinine for last 3 days  2019:  4:52 PM Creatinine 0.75 mg/dL    Recent Vancomycin Level(s) for last 3 days  No results found for requested labs within last 72 hours.      Vancomycin IV Administrations (past 72 hours)      No vancomycin orders with administrations in past 72 hours.                Nephrotoxins and other renal medications (From now, onward)    Start     Dose/Rate Route Frequency Ordered Stop    19 1800  vancomycin (VANCOCIN) 1,750 mg in sodium chloride 0.9 % 500 mL intermittent infusion      1,750 mg  over 2 Hours Intravenous EVERY 12 HOURS 19 1626      19 1615  piperacillin-tazobactam (ZOSYN) 3.375 g vial to attach to  mL bag     Note to Pharmacy:  Pharmacy can adjust dose based on renal function.    3.375 g  over 30 Minutes Intravenous EVERY 6 HOURS 19 1610            Contrast Orders - past 72 hours (72h ago, onward)    None                Plan:  1.  Start vancomycin  1750 mg IV q12h.   2.  Goal Trough Level: 15-20 mg/L   3.  Pharmacy will check trough levels as appropriate in 1-3 Days.    4. Serum creatinine levels will be ordered daily for the first week of therapy and at least twice weekly for subsequent weeks.    5. Webster method utilized to dose vancomycin therapy: based on vancomycin dosing history and levels    Dago Torres [facility]

## 2019-12-02 NOTE — PROVIDER NOTIFICATION
Prescriber Notification Note    The pharmacist has communicated with this patient's provider regarding a concern or therapy recommendation.    Notified Person: Mary AREVALO  Date/Time of Notification: 12/2/19 5135  Interaction: phone  Concern/Recommendation:discussed pcn allergy and zosyn     Comments/Additional Details:Mary says Dr Bocanegra's notes indicate she has tolerated zosyn as recently as January 2019. OK to continue.

## 2019-12-02 NOTE — DISCHARGE INSTRUCTIONS
[facility]  [address] [address]  Appointment Phone [phone]     Shalonda Anita      1970    Wound Dressing Change to right heel, right 3rd toe,left heel and left 2nd toe  Cleanse wounds and surrounding skin with: wound cleanser  Cover wounds with vashe damp gauze followed by ABD pads   Change dressings TID.     JUNAID Travis M.D.. December 2, 2019    Call us at [phone] if you have any questions about your wounds, have redness or swelling around your wound, have a fever of 101 or greater or if you have any other problems or concerns. We answer the phone Monday through Friday 8 am to 4 pm, please leave a message as we check the voicemail frequently throughout the day.     Follow up with Provider - after hospital   For Hyperbaric Oxygen Treatment:    1.  [facility]  - [phone]  (fax: [phone]) (Single Chamber)  [address] [facility] [address]    2.  [facility] - [phone] (fax: [phone]) (Group Dive)  [address] [address]

## 2019-12-02 NOTE — PHARMACY-ADMISSION MEDICATION HISTORY
Pharmacy Medication History  Admission medication history interview status for the 12/2/2019  admission is complete. See EPIC admission navigator for prior to admission medications     Medication history sources: patient and chart  Medication history source reliability: Moderate  Adherence assessment: Good    Significant changes made to the medication list:  No longer on buprenorphine or senna. Increased dose of omeprazole. When she takes KCL she takes 4x20 meq at once rather than splitting throughout the day.       Additional medication history information:   Has not felt well so has not taken furosemide or KCL since last Tuesday.    Medication reconciliation completed by provider prior to medication history? No    Time spent in this activity: 30 minutes      Prior to Admission medications    Medication Sig Last Dose Taking? Auth Provider   buPROPion (WELLBUTRIN XL) 300 MG 24 hr tablet Take 300 mg by mouth daily 12/2/2019 at am Yes Reported, Patient   busPIRone (BUSPAR) 15 MG tablet Take 15 mg by mouth 2 times daily 12/2/2019 at am Yes Unknown, Entered By History   cetirizine (ZYRTEC) 10 MG tablet Take 10 mg by mouth daily 12/2/2019 at am Yes Unknown, Entered By History   cholecalciferol (VITAMIN D3) 5000 units TABS tablet Take 10,000 Units by mouth daily  12/2/2019 at am Yes Unknown, Entered By History   clindamycin (CLEOCIN T) 1 % solution Apply topically nightly as needed (Acne outbreak)   Yes Unknown, Entered By History   docusate sodium (COLACE) 100 MG capsule Take 300 mg by mouth 2 times daily 12/2/2019 at am Yes Unknown, Entered By History   doxycycline hyclate (VIBRAMYCIN) 100 MG capsule Take 1 capsule (100 mg) by mouth 2 times daily 12/2/2019 at am Yes Arcelia Nova PA-C   furosemide (LASIX) 40 MG tablet Take 160 mg by mouth daily 11/26/2019 Yes Unknown, Entered By History   gabapentin (NEURONTIN) 300 MG capsule Take 1 Capsule by mouth at bedtime as needed. In addition to the 800mg tablets 3x a  day  Yes Reported, Patient   gabapentin (NEURONTIN) 800 MG tablet Take 800 mg by mouth 3 times daily Am, supper, hs 12/2/2019 at am Yes Unknown, Entered By History   hydrOXYzine (VISTARIL) 25 MG capsule Take 25 mg by mouth At Bedtime  Yes Unknown, Entered By History   ibuprofen (ADVIL/MOTRIN) 800 MG tablet TAKE ONE TABLET BY MOUTH TWICE DAILY WITH FOOD AS NEEDED FOR PAIN 12/2/2019 at am Yes Reported, Patient   levofloxacin (LEVAQUIN) 500 MG tablet Take 1 tablet (500 mg) by mouth daily 12/2/2019 at am Yes Arcelia Nova PA-C   lidocaine-prilocaine (EMLA) 2.5-2.5 % external cream Apply 5 g topically as needed   Yes Reported, Patient   magic mouthwash suspension, diphenhydrAMINE, lidocaine, aluminum-magnesium & simethicone, (FIRST-MOUTHWASH BLM) compounding kit Take 5 mLs by mouth 2 times daily as needed   Yes Reported, Patient   NARCAN 4 MG/0.1ML nasal spray Spray 0.1 milliliter by intranasal route in 1 nostril may repeat dose every 2-3 minutes as needed alternating nostrils with each dose  Yes Reported, Patient   omeprazole (PRILOSEC) 20 MG DR capsule Take 40 mg by mouth 2 times daily (before meals)  12/2/2019 at am Yes Unknown, Entered By History   ondansetron (ZOFRAN) 8 MG tablet Take 8 mg by mouth every 8 hours as needed for nausea  Yes Unknown, Entered By History   oxyCODONE-acetaminophen (PERCOCET)  MG per tablet Take 1 tablet by mouth 3 times daily  12/2/2019 at am Yes Reported, Patient   phentermine 30 MG capsule Take 30 mg by mouth every morning 12/2/2019 at am Yes Reported, Patient   polyethylene glycol (MIRALAX/GLYCOLAX) packet Take 17 g by mouth daily as needed for constipation   Yes Reported, Patient   potassium chloride ER (K-TAB) 20 MEQ CR tablet Take 80 mEq by mouth daily  11/26/2019 at pm Yes Unknown, Entered By History   pravastatin (PRAVACHOL) 20 MG tablet Take 20 mg by mouth every evening  Yes Unknown, Entered By History   sertraline (ZOLOFT) 100 MG tablet Take 150 mg by mouth daily  " 12/2/2019 at a, Yes Reported, Patient   topiramate (TOPAMAX) 25 MG tablet Take 50 mg by mouth At Bedtime  Yes Unknown, Entered By History   order for DME [facility] Phone [phone] Fax [phone]    Primary Dressing Hydroferra Blue Ready 4X5   Qty 20  Secondary Dressing Gauze 4X4 Qty 1 loaf  Secondary Dressing Roll gauze 4\"  Qty 30  Secondary Dressing Micropore tape 2\"  Qty 2  Length of Need: 1 month  Frequency of dressing change: daily   Juan Antonio Travis MD   order for DME [facility] Phone [phone] Fax [phone]  Prontosan Wound Irrigation Solution qty 2 bottles  Edemawear size small/Navy qty 4 sleeves   Juan Antonio Travis MD       "

## 2019-12-02 NOTE — PROGRESS NOTES
[facility] [place] Progress Note    Subject: Shalonda Howard has been running a low-grade temperature 102 for the past several days, feeling malaise, lethargy, fatigue.  Inflammatory markers are elevated, CRP and sedimentation rate elevated.  Reviewed with Dr. Harman, no options for surgical management of the right heel and less aware near total calcanectomy.  I discussed with the patient hyperbaric oxygen therapy in conjunction with infectious disease evaluation, I discussed with Dr. Bocanegra last week.  Given her significant claustrophobia, she would probably require [facility] hyperbaric oxygen is a have an open room dive chamber rather than mono chamber.  Given her progressive symptoms, will admit to [facility] service today, discussed with hospitalist, intent of infectious disease consult, orthopedic consult Dr. Harman's recommendation with either Dr. Jun Poon or Dr Fadi Mosher.  If surgical management is not recommended, then could consider transfer to [facility] for hyperbaric oxygen consultation    Patient Active Problem List   Diagnosis     Infection     Left foot Postop Wound infection     Post op infection     Systemic lupus erythematosus (H)     Osteomyelitis Left 1st metatarsal -- S/P Amputation     Cellulitis of right foot     Pressure injury of right heel, stage 3 (H)     Pressure ulcer of left heel, stage 3 (H)     Open wound of third toe of left foot     Open wound of heel     Open toe wound     Past Medical History:   Diagnosis Date     Allergic rhinitis, cause unspecified      Anxiety state, unspecified      Cellulitis and abscess of leg, except foot      Disuse osteoporosis      Dysthymic disorder      Edema      Encounter for long-term (current) use of other medications      Esophageal reflux      Foot ulcer on Heel bilaterally  5/22/2019     Kidney stones      Myalgia and myositis, unspecified      Obesity,  unspecified      Open wound of foot except toe(s) alone, complicated      Opioid type dependence, unspecified      Other acne      Other congenital valgus deformity of feet      Other ventral hernia without mention of obstruction or gangrene      Polycystic ovaries      PONV (postoperative nausea and vomiting)      Systemic lupus erythematosus (H)     unsure     Tibialis tendinitis      Unspecified hereditary and idiopathic peripheral neuropathy      Exam:  BP (!) 153/96 (BP Location: Left arm)   Pulse 102   Temp 100.8  F (38.2  C) (Oral)   Resp 20   LMP 11/02/2019   Wound (used by OP [facility] only) 07/29/19 0813 Left heel pressure injury (Active)   Length (cm) 5.1 12/2/2019 10:00 AM   Width (cm) 4.9 12/2/2019 10:00 AM   Depth (cm) 1.3 12/2/2019 10:00 AM   Wound (cm^2) 24.99 cm^2 12/2/2019 10:00 AM   Wound Volume (cm^3) 32.49 cm^3 12/2/2019 10:00 AM   Wound healing % -52.38 12/2/2019 10:00 AM   Dressing Appearance copious drainage 12/2/2019 10:00 AM   Drainage Characteristics/Odor serosanguineous 12/2/2019 10:00 AM   Drainage Amount copious 12/2/2019 10:00 AM   Thickness/Stage Stage 4 12/2/2019 10:00 AM   Base red/granulating 12/2/2019 10:00 AM   Periwound intact 12/2/2019 10:00 AM   Periwound Temperature warm 12/2/2019 10:00 AM   Periwound Skin Turgor soft 12/2/2019 10:00 AM   Edges open 12/2/2019 10:00 AM   Care, Wound non-select wound debridement performed 12/2/2019 10:00 AM       Wound (used by OP [facility]I only) 07/29/19 0814 Right heel pressure injury (Active)   Length (cm) 5.4 12/2/2019 10:00 AM   Width (cm) 5.7 12/2/2019 10:00 AM   Depth (cm) 1.4 12/2/2019 10:00 AM   Wound (cm^2) 30.78 cm^2 12/2/2019 10:00 AM   Wound Volume (cm^3) 43.09 cm^3 12/2/2019 10:00 AM   Wound healing % -138.24 12/2/2019 10:00 AM   Dressing Appearance copious drainage 12/2/2019 10:00 AM   Drainage Characteristics/Odor serosanguineous 12/2/2019 10:00 AM   Drainage Amount copious 12/2/2019 10:00 AM   Thickness/Stage Stage 4 12/2/2019 10:00 AM    Base red/granulating 12/2/2019 10:00 AM   Periwound intact 12/2/2019 10:00 AM   Periwound Temperature warm 12/2/2019 10:00 AM   Periwound Skin Turgor soft 12/2/2019 10:00 AM   Edges open 12/2/2019 10:00 AM   Care, Wound non-select wound debridement performed 12/2/2019 10:00 AM     General appearance is nontoxic though mildly lethargic and fatigued.  Pulmonary clear to auscultation cardiac regular rate and rhythm plus S1-S2 without murmur abdomen with hernia, soft, no cellulitis present.  No periwound cellulitis of the right heel or left heel ulceration.  There is undermining to the bone on the right heel.  Wound tissue right and left is granular without foul odor or purulence though she did have significant drainage from the right and left wounds with use of EdemaWear and total contact casting.  She denies dysuria or productive cough.  No other clear obvious source for infection.      Impression: Chronic right and left heel wounds, idiopathic neuropathy, nondiabetic, nonalcoholic    Plan: We will dress the wounds with Vashe moistened gauze, admit to hospital service, infectious disease consult, orthopedic consult to determine if there is an indication for surgical debridement of right heel at this time versus transfer to [facility] for hyperbaric oxygen consult and initiation.  Discussed with [place] hospitalist..      Juan Antonio Travis MD on 12/2/2019 at 12:00 PM

## 2019-12-03 ENCOUNTER — APPOINTMENT (OUTPATIENT)
Dept: GENERAL RADIOLOGY | Facility: CLINIC | Age: 49
End: 2019-12-03
Attending: PHYSICIAN ASSISTANT
Payer: COMMERCIAL

## 2019-12-03 LAB
ALBUMIN SERPL-MCNC: 2.8 G/DL (ref 3.4–5)
ALP SERPL-CCNC: 102 U/L (ref 40–150)
ALT SERPL W P-5'-P-CCNC: 14 U/L (ref 0–50)
ANION GAP SERPL CALCULATED.3IONS-SCNC: 7 MMOL/L (ref 3–14)
APPEARANCE FLD: NORMAL
AST SERPL W P-5'-P-CCNC: 24 U/L (ref 0–45)
BILIRUB SERPL-MCNC: 0.4 MG/DL (ref 0.2–1.3)
BUN SERPL-MCNC: 11 MG/DL (ref 7–30)
CALCIUM SERPL-MCNC: 8.2 MG/DL (ref 8.5–10.1)
CHLORIDE SERPL-SCNC: 107 MMOL/L (ref 94–109)
CK SERPL-CCNC: 17 U/L (ref 30–225)
CO2 SERPL-SCNC: 23 MMOL/L (ref 20–32)
COLOR FLD: NORMAL
CREAT SERPL-MCNC: 0.73 MG/DL (ref 0.52–1.04)
CRP SERPL-MCNC: 25.2 MG/L (ref 0–8)
ERYTHROCYTE [DISTWIDTH] IN BLOOD BY AUTOMATED COUNT: 27.5 % (ref 10–15)
ERYTHROCYTE [SEDIMENTATION RATE] IN BLOOD BY WESTERGREN METHOD: 48 MM/H (ref 0–20)
GFR SERPL CREATININE-BSD FRML MDRD: >90 ML/MIN/{1.73_M2}
GLUCOSE SERPL-MCNC: 100 MG/DL (ref 70–99)
GRAM STN SPEC: NORMAL
HCT VFR BLD AUTO: 32.9 % (ref 35–47)
HGB BLD-MCNC: 9.3 G/DL (ref 11.7–15.7)
MCH RBC QN AUTO: 19.4 PG (ref 26.5–33)
MCHC RBC AUTO-ENTMCNC: 28.3 G/DL (ref 31.5–36.5)
MCV RBC AUTO: 69 FL (ref 78–100)
NT-PROBNP SERPL-MCNC: 109 PG/ML (ref 0–450)
PLATELET # BLD AUTO: 295 10E9/L (ref 150–450)
POTASSIUM SERPL-SCNC: 3.8 MMOL/L (ref 3.4–5.3)
PROT SERPL-MCNC: 8.4 G/DL (ref 6.8–8.8)
RBC # BLD AUTO: 4.8 10E12/L (ref 3.8–5.2)
SODIUM SERPL-SCNC: 137 MMOL/L (ref 133–144)
SPECIMEN SOURCE FLD: NORMAL
SPECIMEN SOURCE: NORMAL
TSH SERPL DL<=0.005 MIU/L-ACNC: 1.62 MU/L (ref 0.4–4)
WBC # BLD AUTO: 8.8 10E9/L (ref 4–11)
WBC # FLD AUTO: NORMAL /UL

## 2019-12-03 PROCEDURE — 86140 C-REACTIVE PROTEIN: CPT | Performed by: PHYSICIAN ASSISTANT

## 2019-12-03 PROCEDURE — 12000000 ZZH R&B MED SURG/OB

## 2019-12-03 PROCEDURE — 25800030 ZZH RX IP 258 OP 636: Performed by: PHYSICIAN ASSISTANT

## 2019-12-03 PROCEDURE — 25500064 ZZH RX 255 OP 636: Performed by: HOSPITALIST

## 2019-12-03 PROCEDURE — 36415 COLL VENOUS BLD VENIPUNCTURE: CPT | Performed by: PHYSICIAN ASSISTANT

## 2019-12-03 PROCEDURE — G0463 HOSPITAL OUTPT CLINIC VISIT: HCPCS

## 2019-12-03 PROCEDURE — 83880 ASSAY OF NATRIURETIC PEPTIDE: CPT | Performed by: PHYSICIAN ASSISTANT

## 2019-12-03 PROCEDURE — 87075 CULTR BACTERIA EXCEPT BLOOD: CPT | Performed by: PHYSICIAN ASSISTANT

## 2019-12-03 PROCEDURE — 87205 SMEAR GRAM STAIN: CPT | Performed by: PHYSICIAN ASSISTANT

## 2019-12-03 PROCEDURE — A9585 GADOBUTROL INJECTION: HCPCS | Performed by: HOSPITALIST

## 2019-12-03 PROCEDURE — 25000125 ZZHC RX 250: Performed by: PHYSICIAN ASSISTANT

## 2019-12-03 PROCEDURE — 84443 ASSAY THYROID STIM HORMONE: CPT | Performed by: PHYSICIAN ASSISTANT

## 2019-12-03 PROCEDURE — 80053 COMPREHEN METABOLIC PANEL: CPT | Performed by: PHYSICIAN ASSISTANT

## 2019-12-03 PROCEDURE — 25000132 ZZH RX MED GY IP 250 OP 250 PS 637: Performed by: PHYSICIAN ASSISTANT

## 2019-12-03 PROCEDURE — 99233 SBSQ HOSP IP/OBS HIGH 50: CPT | Performed by: INTERNAL MEDICINE

## 2019-12-03 PROCEDURE — 85027 COMPLETE CBC AUTOMATED: CPT | Performed by: PHYSICIAN ASSISTANT

## 2019-12-03 PROCEDURE — 20605 DRAIN/INJ JOINT/BURSA W/O US: CPT | Mod: RT

## 2019-12-03 PROCEDURE — 82550 ASSAY OF CK (CPK): CPT | Performed by: PHYSICIAN ASSISTANT

## 2019-12-03 PROCEDURE — 25000128 H RX IP 250 OP 636: Performed by: PHYSICIAN ASSISTANT

## 2019-12-03 PROCEDURE — 85652 RBC SED RATE AUTOMATED: CPT | Performed by: PHYSICIAN ASSISTANT

## 2019-12-03 PROCEDURE — 87070 CULTURE OTHR SPECIMN AEROBIC: CPT | Performed by: PHYSICIAN ASSISTANT

## 2019-12-03 RX ORDER — ETHYL CHLORIDE 100 %
1 AEROSOL, SPRAY (ML) TOPICAL ONCE
Status: COMPLETED | OUTPATIENT
Start: 2019-12-03 | End: 2019-12-03

## 2019-12-03 RX ORDER — LIDOCAINE HYDROCHLORIDE 10 MG/ML
30 INJECTION, SOLUTION EPIDURAL; INFILTRATION; INTRACAUDAL; PERINEURAL ONCE
Status: COMPLETED | OUTPATIENT
Start: 2019-12-03 | End: 2019-12-03

## 2019-12-03 RX ORDER — IOPAMIDOL 408 MG/ML
10 INJECTION, SOLUTION INTRATHECAL ONCE
Status: DISCONTINUED | OUTPATIENT
Start: 2019-12-03 | End: 2019-12-06 | Stop reason: HOSPADM

## 2019-12-03 RX ORDER — GADOBUTROL 604.72 MG/ML
10 INJECTION INTRAVENOUS ONCE
Status: COMPLETED | OUTPATIENT
Start: 2019-12-03 | End: 2019-12-03

## 2019-12-03 RX ADMIN — BUSPIRONE HYDROCHLORIDE 15 MG: 5 TABLET ORAL at 08:30

## 2019-12-03 RX ADMIN — LIDOCAINE HYDROCHLORIDE 2 ML: 10 INJECTION, SOLUTION EPIDURAL; INFILTRATION; INTRACAUDAL; PERINEURAL at 09:36

## 2019-12-03 RX ADMIN — PIPERACILLIN AND TAZOBACTAM 3.38 G: 3; .375 INJECTION, POWDER, LYOPHILIZED, FOR SOLUTION INTRAVENOUS at 16:29

## 2019-12-03 RX ADMIN — BUSPIRONE HYDROCHLORIDE 15 MG: 5 TABLET ORAL at 20:20

## 2019-12-03 RX ADMIN — OXYCODONE HYDROCHLORIDE 10 MG: 5 TABLET ORAL at 06:45

## 2019-12-03 RX ADMIN — GABAPENTIN 800 MG: 800 TABLET, FILM COATED ORAL at 15:55

## 2019-12-03 RX ADMIN — GABAPENTIN 800 MG: 800 TABLET, FILM COATED ORAL at 21:29

## 2019-12-03 RX ADMIN — SERTRALINE HYDROCHLORIDE 150 MG: 100 TABLET ORAL at 08:29

## 2019-12-03 RX ADMIN — PIPERACILLIN AND TAZOBACTAM 3.38 G: 3; .375 INJECTION, POWDER, LYOPHILIZED, FOR SOLUTION INTRAVENOUS at 02:25

## 2019-12-03 RX ADMIN — OXYCODONE HYDROCHLORIDE 10 MG: 5 TABLET ORAL at 11:33

## 2019-12-03 RX ADMIN — HYDROMORPHONE HYDROCHLORIDE 0.3 MG: 1 INJECTION, SOLUTION INTRAMUSCULAR; INTRAVENOUS; SUBCUTANEOUS at 22:28

## 2019-12-03 RX ADMIN — SODIUM CHLORIDE, POTASSIUM CHLORIDE, SODIUM LACTATE AND CALCIUM CHLORIDE: 600; 310; 30; 20 INJECTION, SOLUTION INTRAVENOUS at 02:26

## 2019-12-03 RX ADMIN — OMEPRAZOLE 40 MG: 20 CAPSULE, DELAYED RELEASE ORAL at 06:45

## 2019-12-03 RX ADMIN — CETIRIZINE HYDROCHLORIDE 10 MG: 10 TABLET, FILM COATED ORAL at 08:31

## 2019-12-03 RX ADMIN — PRAVASTATIN SODIUM 20 MG: 20 TABLET ORAL at 20:20

## 2019-12-03 RX ADMIN — Medication 1 APPLICATION.: at 09:35

## 2019-12-03 RX ADMIN — HYDROMORPHONE HYDROCHLORIDE 0.5 MG: 1 INJECTION, SOLUTION INTRAMUSCULAR; INTRAVENOUS; SUBCUTANEOUS at 18:36

## 2019-12-03 RX ADMIN — OMEPRAZOLE 40 MG: 20 CAPSULE, DELAYED RELEASE ORAL at 15:53

## 2019-12-03 RX ADMIN — VANCOMYCIN HYDROCHLORIDE 1750 MG: 10 INJECTION, POWDER, LYOPHILIZED, FOR SOLUTION INTRAVENOUS at 13:38

## 2019-12-03 RX ADMIN — OXYCODONE HYDROCHLORIDE 10 MG: 5 TABLET ORAL at 01:55

## 2019-12-03 RX ADMIN — TOPIRAMATE 50 MG: 50 TABLET, FILM COATED ORAL at 21:29

## 2019-12-03 RX ADMIN — PIPERACILLIN AND TAZOBACTAM 3.38 G: 3; .375 INJECTION, POWDER, LYOPHILIZED, FOR SOLUTION INTRAVENOUS at 21:29

## 2019-12-03 RX ADMIN — GABAPENTIN 800 MG: 800 TABLET, FILM COATED ORAL at 08:28

## 2019-12-03 RX ADMIN — GADOBUTROL 10 ML: 604.72 INJECTION INTRAVENOUS at 00:48

## 2019-12-03 RX ADMIN — OXYCODONE HYDROCHLORIDE 10 MG: 5 TABLET ORAL at 15:51

## 2019-12-03 RX ADMIN — BUPROPION HYDROCHLORIDE 300 MG: 300 TABLET, EXTENDED RELEASE ORAL at 08:30

## 2019-12-03 RX ADMIN — OXYCODONE HYDROCHLORIDE 10 MG: 5 TABLET ORAL at 20:26

## 2019-12-03 RX ADMIN — PIPERACILLIN AND TAZOBACTAM 3.38 G: 3; .375 INJECTION, POWDER, LYOPHILIZED, FOR SOLUTION INTRAVENOUS at 08:28

## 2019-12-03 ASSESSMENT — ACTIVITIES OF DAILY LIVING (ADL)
ADLS_ACUITY_SCORE: 11

## 2019-12-03 NOTE — H&P
[facility]    History and Physical  Hospitalist       Date of Admission:  12/2/2019    Assessment & Plan   Shalonda Howard is a 49 year old female with a past medical history significant for chronic LE edema, neuropathy, GERD, possible lupus, ventral hernia, chronic anemia, MDD with PUNEET, chronic bilateral heel ulcerations, hx bunion repair with subsequent non-healing wound s/p left first ray partial amputation, hx septic L ankle s/p I&D and hardware removal who is a direct admission to [facility] from wound clinic due to concern for right foot infection.     Sepsis secondary to possible osteomyelitis right foot (calcaneous, possibly third toe) +/- septic arthritis R ankle   Bilateral stage 4 pressure ulcers of heels  Right third toe ulceration with recent cellulitis  Patient has been following with wound clinic closely for management of bilateral heel ulcers. She is s/p I&D 11/14/19 with cultures growing multiple organisms including erterobacter cloacae, enterococcus faecalis, acinetobacter baumannii. Also with recent R 3rd toe cellulitis with cultures 11/21/19 growing coagulase negative staph and alcaligenes faecalis (resistant to levo and cipro). She reports significant change in chronic symptoms starting 4 days PTA including fevers to 102, increased fatigue, increased and change in quality of pain to right foot and new found smell to drainage of right foot. Labs notable for increased CRP 38.3, ESR 73. WBC normal on admission. Patient febrile in wound clinic to 100.8 and tachycardic on arrival to 110. Lactate elevated at 2.2. Patient has been followed by Dr. Harman for prior Orthopedic procedures. Note she has been on po levaquin per wound clinic for nearly 3 weeks as well as doxycycline (added 11/27/19) leading up to admission. Per wound provider note hyperbaric oxygen therapy at [facility] has been considered but without immediate plan to move forward with this.  --Orthopedics consulted, appreciate  assistance. Seen by Dr. Mejia this evening who recommends repeating MRI ankle as well as radiology tap of joint tomorrow to evaluate fluid. Both have been ordered  --ID consulted, appreciate assistance. Discussed with Dr. Bocanegra, will start zosyn and vanco this evening. Note PCN allergy- per ID notes has tolerated this in the past (including admission in 1/2019).   --consider Podiatry consult, will discuss +/- need tomorrow with Ortho  --blood cultures  --vanco and zosyn for now  -- will give 2L fluid bolus now followed by LR @150/hr, recheck lactate in 2 hours  --MRI ankle this evening  --XR joint aspiration with cultures &gram stain per Orthopedic recommendation tomorrow  --NPO at midnight until plan is clear   --WOC for wound cares  --oxycodone, IV dilaudid for pain prn    Possible SLE. Reports she was told by one Rheumatologist in the past that she has lupus and by another that she does not. She is not currently on any type of medication.     Peripheral neuropathy. Continue PTA gabapentin.     MDD and PUNEET  Continue prior to admission wellbutrin, sertraline, buspar, vistaril    Chronic anemia, thought secondary to iron deficiency. Stool guaiac negative per chart review. Had EGD showing candida and chronic gastritis 10/2019. Receiving iron infusions in outpatient setting. Hgb 10 on admission (was as low as 7 on 11/14/19).   --resume iron infusions post discharge  --follow hgb during admission     GERD with chronic gastritis.   --continue PTA PPI  --avoid NSAIDS    Obesity.  --can resume phentermine pending plan    Dyspnea. Reports some mild SOB with activity over the past few days while she has been feeling ill. No cough. Lungs are clear and saturations WNL. No calf tenderness, erythema. Has been treated for anemia as above. No chest pain.   --monitor for now, CXR if worsening symptoms     DVT Prophylaxis: Pneumatic Compression Devices  Code Status: Full Code    Disposition: Expected discharge in 2+ days pending  surgical plan    This patient was seen and examined with Dr. Russo who agrees with the above plan    Mary العلي PA-C    Primary Care Physician   Linda Bernstein    Chief Complaint   Fever, concern for wound infection     History is obtained from the patient    History of Present Illness   Shalonda Howard is a very pleasant 49 year old female with a complex history who presents from wound clinic due to concerns for sepsis, possible wound infection. The patient has a history of bilateral bunion surgery done August 2018. Left foot incision unfortunately did not heal well prompting multiple admissions, I&Ds, course of IV antibiotics and ultimately hardware removal and first ray amputation 3/2019. In April 2019 she was admitted with concern for septic left ankle at which time cultures grew staph hominis- patient treated with IV vancomycin at that time. She was admitted 5/2019 with cellulitis of the right foot at which time hardware was removed from the foot. In the meantime she has been followed in wound clinic for chronic bilateral heel ulcers. She underwent debridement of bilateral heel wounds 11/14/19 at which time bone fragment was identified. Cultures from the procedure showed enterococcus faecalis, enterobacter cloacae, and strep dysgalactiae. She was started on levaquin due to concern for potential osteomyelitis. MRI 11/21 showed calcaneal marrow edema possibly consistent with osteomyelitis, thickening of flexor digitorum longus tendon,  edema about the ankle, as well as fluid enhancement of the tibiotalar joint, peroneal tendon sheath, and retrocalcaneal bursa. Patient was also seen in clinic 11/27 for swelling and erythema of her right third toe wound at which time doxycycline was added for additional coverage. She reports Thursday 11/28 she started to develop fatigue, fevers to 102, foul odor to baseline drainage of right foot, and increased pain to right foot. She reports at that time pain spread  from specific wound site throughout her heel and into the ankle. She reports significant pain with movement of right ankle which is atypical from her baseline. She has been compliant with levaquin and doxycycline. Denies any chest pain. She does not mild dyspnea with activity that started last week since she has been feeling poorly. Denies cough, calf pain or swelling. She has not been taking her lasix since she started feeling poorly and reports LE edema has been controlled with compression sleeves. She has been using percocet, ibuprofen, and tylenol at home for pain.     Past Medical History    Past Medical History:   Diagnosis Date     Allergic rhinitis, cause unspecified      Anxiety state, unspecified      Cellulitis and abscess of leg, except foot      Disuse osteoporosis      Dysthymic disorder      Edema      Encounter for long-term (current) use of other medications      Esophageal reflux      Foot ulcer on Heel bilaterally  5/22/2019     Kidney stones      Myalgia and myositis, unspecified      Obesity, unspecified      Open wound of foot except toe(s) alone, complicated      Opioid type dependence, unspecified      Other acne      Other congenital valgus deformity of feet      Other ventral hernia without mention of obstruction or gangrene      Polycystic ovaries      PONV (postoperative nausea and vomiting)      Systemic lupus erythematosus (H)     unsure     Tibialis tendinitis      Unspecified hereditary and idiopathic peripheral neuropathy        Past Surgical History   Past Surgical History:   Procedure Laterality Date     APPENDECTOMY       APPLY WOUND VAC Left 1/24/2019    Procedure: WOUND VAC APPLICATION;  Surgeon: Miguel Mosher MD;  Location:  OR     ARTHRODESIS FOOT Left 2/4/2015    Procedure: ARTHRODESIS FOOT;  Surgeon: Andre Harman MD;  Location:  [facility]     ARTHROSCOPY ANKLE Left 4/22/2019    Procedure: ARTHROSCOPIC IRRIGATION AND DEBRIDEMENT LEFT ANKLE. HARDWARE REMOVAL;   Surgeon: Andre Harman MD;  Location:  OR     BUNIONECTOMY RT/LT  08/29/2018     DILATION AND CURETTAGE       EXCISE TOENAIL(S) Left 2/4/2015    Procedure: EXCISE TOENAIL(S);  Surgeon: Andre Harman MD;  Location: [facility]     HERNIA REPAIR      umbilical x 2     HERNIA REPAIR      ventral open x 3     IRRIGATION AND DEBRIDEMENT FOOT, COMBINED Left 9/26/2018    Procedure: COMBINED IRRIGATION AND DEBRIDEMENT FOOT;  IRRIGATION AND DEBRIDEMENT LEFT FOOT;  Surgeon: Andre Harman MD;  Location: [facility]     IRRIGATION AND DEBRIDEMENT FOOT, COMBINED Left 11/26/2018    Procedure: COMBINED IRRIGATION AND DEBRIDEMENT LEFT FOOT;  Surgeon: Andre Harman MD;  Location: [facility]     IRRIGATION AND DEBRIDEMENT FOOT, COMBINED Left 1/24/2019    Procedure: LEFT HALLUX WOUND IRRIGATION AND DEBRIDEMENT WITH BONE BIOPSY;  Surgeon: Miguel Mosher MD;  Location:  OR     IRRIGATION AND DEBRIDEMENT FOOT, COMBINED Left 3/20/2019    Procedure: LEFT FOOT IRRIGATION AND  DEBRIDEMENT, FIRST RAY RESECTION AND HARDWARE REMOVAL FROM LEFT FOOT;  Surgeon: Andre Harman MD;  Location:  OR     IRRIGATION AND DEBRIDEMENT LOWER EXTREMITY, COMBINED Bilateral 11/14/2019    Procedure: MISONIX DEBRIDEMENT RIGHT AND LEFT HEELS.  APPLY MYRIAD RIGHT HEEL, APPLY TOTAL CONTACT CAST;  Surgeon: Juan Antonio Travis MD;  Location:  OR     REMOVE HARDWARE LOWER EXTREMITY Left 3/20/2019    Procedure: REMOVE HARDWARE LOWER EXTREMITY;  Surgeon: Andre Harman MD;  Location:  OR     REPAIR HAMMER TOE Left 11/26/2018    Procedure: REPAIR HAMMER TOE;  Surgeon: Andre Harman MD;  Location: [facility]     TONSILLECTOMY         Prior to Admission Medications   Prior to Admission Medications   Prescriptions Last Dose Informant Patient Reported? Taking?   NARCAN 4 MG/0.1ML nasal spray  Self Yes Yes   Sig: Spray 0.1 milliliter by intranasal route in 1 nostril may repeat dose every 2-3 minutes as needed alternating nostrils with  each dose   buPROPion (WELLBUTRIN XL) 300 MG 24 hr tablet 12/2/2019 at am Self Yes Yes   Sig: Take 300 mg by mouth daily   busPIRone (BUSPAR) 15 MG tablet 12/2/2019 at am Self Yes Yes   Sig: Take 15 mg by mouth 2 times daily   cetirizine (ZYRTEC) 10 MG tablet 12/2/2019 at am Self Yes Yes   Sig: Take 10 mg by mouth daily   cholecalciferol (VITAMIN D3) 5000 units TABS tablet 12/2/2019 at am Self Yes Yes   Sig: Take 10,000 Units by mouth daily    clindamycin (CLEOCIN T) 1 % solution  Self Yes Yes   Sig: Apply topically nightly as needed (Acne outbreak)    docusate sodium (COLACE) 100 MG capsule 12/2/2019 at am Self Yes Yes   Sig: Take 300 mg by mouth 2 times daily   doxycycline hyclate (VIBRAMYCIN) 100 MG capsule 12/2/2019 at am Self No Yes   Sig: Take 1 capsule (100 mg) by mouth 2 times daily   furosemide (LASIX) 40 MG tablet 11/26/2019 Self Yes Yes   Sig: Take 160 mg by mouth daily   gabapentin (NEURONTIN) 300 MG capsule  Self Yes Yes   Sig: Take 1 Capsule by mouth at bedtime as needed. In addition to the 800mg tablets 3x a day   gabapentin (NEURONTIN) 800 MG tablet 12/2/2019 at am Self Yes Yes   Sig: Take 800 mg by mouth 3 times daily Am, supper, hs   hydrOXYzine (VISTARIL) 25 MG capsule  Self Yes Yes   Sig: Take 25 mg by mouth At Bedtime   ibuprofen (ADVIL/MOTRIN) 800 MG tablet 12/2/2019 at am Self Yes Yes   Sig: TAKE ONE TABLET BY MOUTH TWICE DAILY WITH FOOD AS NEEDED FOR PAIN   levofloxacin (LEVAQUIN) 500 MG tablet 12/2/2019 at am Self No Yes   Sig: Take 1 tablet (500 mg) by mouth daily   lidocaine-prilocaine (EMLA) 2.5-2.5 % external cream  Self Yes Yes   Sig: Apply 5 g topically as needed    magic mouthwash suspension, diphenhydrAMINE, lidocaine, aluminum-magnesium & simethicone, (FIRST-MOUTHWASH BLM) compounding kit  Self Yes Yes   Sig: Take 5 mLs by mouth 2 times daily as needed    omeprazole (PRILOSEC) 20 MG DR capsule 12/2/2019 at am Self Yes Yes   Sig: Take 40 mg by mouth 2 times daily (before meals)   "  ondansetron (ZOFRAN) 8 MG tablet  Self Yes Yes   Sig: Take 8 mg by mouth every 8 hours as needed for nausea   order for DME  Self No No   Sig: [employer] Phone [phone] Fax [phone]  Prontosan Wound Irrigation Solution qty 2 bottles  [place]ear size small/[employer] qty 4 sleeves   order for DME  Self No No   Sig: [employer] [facility] Phone [phone] Fax [phone]    Primary Dressing Hydroferra Blue Ready 4X5   Qty 20  Secondary Dressing Gauze 4X4 Qty 1 loaf  Secondary Dressing Roll gauze 4\"  Qty 30  Secondary Dressing Micropore tape 2\"  Qty 2  Length of Need: 1 month  Frequency of dressing change: daily   oxyCODONE-acetaminophen (PERCOCET)  MG per tablet 2019 at am Self Yes Yes   Sig: Take 1 tablet by mouth 3 times daily    phentermine 30 MG capsule 2019 at am Self Yes Yes   Sig: Take 30 mg by mouth every morning   polyethylene glycol (MIRALAX/GLYCOLAX) packet  Self Yes Yes   Sig: Take 17 g by mouth daily as needed for constipation    potassium chloride ER (K-TAB) 20 MEQ CR tablet 2019 at pm Self Yes Yes   Sig: Take 80 mEq by mouth daily    pravastatin (PRAVACHOL) 20 MG tablet  Self Yes Yes   Sig: Take 20 mg by mouth every evening   sertraline (ZOLOFT) 100 MG tablet 2019 at a, Self Yes Yes   Sig: Take 150 mg by mouth daily    topiramate (TOPAMAX) 25 MG tablet  Self Yes Yes   Sig: Take 50 mg by mouth At Bedtime      Facility-Administered Medications: None     Allergies   Allergies   Allergen Reactions     Sulfa Drugs Shortness Of Breath and Rash     Demerol [Meperidine] Nausea and Vomiting     Erythromycin Nausea and Vomiting     Metformin Nausea and Vomiting     Codeine Rash     Penicillins Rash       Social History   Former smoker, quit in . Reports rare alcohol use. Not currently working.     Family History   Mother with history of pulmonary hypertension and cardiomegaly. Father  of esophageal cancer.     Review of Systems   The 10 point Review of Systems is " negative other than noted in the HPI or here.     Physical Exam   Temp: 99.4  F (37.4  C) Temp src: Oral BP: (!) 146/85 Pulse: 110   Resp: 20 SpO2: 96 %      Vital Signs with Ranges  Temp:  [99.4  F (37.4  C)-100.8  F (38.2  C)] 99.4  F (37.4  C)  Pulse:  [102-110] 110  Resp:  [20] 20  BP: (146-153)/(85-96) 146/85  SpO2:  [96 %] 96 %  230 lbs 0 oz    Temp: 99.4  F (37.4  C) Temp src: Oral BP: (!) 146/85 Pulse: 110   Resp: 16 SpO2: 96 %      Vitals:    12/02/19 1547   Weight: 104.3 kg (230 lb)     Vital Signs with Ranges  Temp:  [99.4  F (37.4  C)-100.8  F (38.2  C)] 99.4  F (37.4  C)  Pulse:  [102-110] 110  Resp:  [16-20] 16  BP: (146-153)/(85-96) 146/85  SpO2:  [96 %] 96 %  No intake/output data recorded.    Constitutional: Alert and oriented, sitting up in bed. Appears fatigued though non-toxic overall. Appropriately conversant.   ENT: normal cephalic, moist mucous membranes  Respiratory: Lungs clear to auscultation bilaterally, no increased work of breathing or wheezing  Cardiovascular: Regular rhythm with mild tachycardia, no murmur, no significant LE edema  GI: positive bowel sounds, abdomen soft, non-tender  Skin/Integumen: bilateral stage IV heel ulcers without surrounding erythema. No purulent drainage or odor noted. Ulcer present to tip of right third toe without surrounding erythema or drainage.   MSK:  Moves all four extremities. ROM right ankle limited by significant pain with minimal joint movement. ROM left ankle intact without significant pain. S/p partial ray amputation on left. Right ankle without significant erythema or swelling.   Neuro:  Cranial nerves 2-12 grossly intact. No focal deficits.        Data   Data reviewed today:  I personally reviewed no images or EKG's today.  Recent Labs   Lab 12/02/19  1652   WBC 10.0   HGB 10.1*   MCV 68*         POTASSIUM 4.4   CHLORIDE 107   CO2 22   BUN 13   CR 0.75   ANIONGAP 6   PAULINE 8.7   *       No results found for this or any previous  visit (from the past 24 hour(s)).

## 2019-12-03 NOTE — CONSULTS
[facility]    Infectious Disease Consultation     Date of Admission:  12/2/2019  Date of Consult (When I saw the patient): 12/03/19    Assessment & Plan   Shalonda Howard is a 49 year old female who was admitted on 12/2/2019.     Impression:  1. 49 y.o familiar to me from multiple prior admissions.   2. She has a history of bilateral bunion repair surgery done in August 2018. The right side healed intially but the left side the incision did not heel which led to I and D and 6 weeks of IV antibiotics and many more weeks of oral antibiotics since the end of August 2018. Then repeat admission in November 2018, more I and D and another course of IV antibiotics, then followed by another admission in January 2019 for wound non healing, s/p another I and D and 2 antibiotics for 6 weeks. Readmitted in March 2019 with continued wound non healing, concern for underlying osteo, s/p first ray amputation, hardware removal and ulcer debridement 3/20.  3. Admission in  April for left septic ankle joint with staph hominis and staph epi in the cultures, This time the last remaining piece of hardware in the left foot was also taken out. Then again admitted in May for more I and D.   4. She also has chronic heel ulcers bilateral.   5. PCN allergy, tolerates zosyn/cephalosporins, sulfa allergy.   6. Now admitted with continued heel ulcers bilateral but tight sided pain in the ankle, worse wound then the left. Concern for osteo in the heel on the right, and fluid in the ankle joint. She still has hardware in this foot and ankle.   7. She was started on broad spectrum antibiotics with Vancomycin and zosyn.     Recommendations:   S/p aspiration on the right ankle joint, cultures pending.   Continue on broad spectrum antibiotics.   If calcaneous osteo will need surgery.   Will follow up with ortho on plan.   Please get MRI on the left foot, as has a non healing heel ulcer of more than 6 month duration.               Ignacia  MD Daljit    Reason for Consult   Reason for consult: I was asked by Dr. Russo  to evaluate this patient for heel osteo.    Primary Care Physician   Linda Bernstein    Chief Complaint   Heel osteo     History is obtained from the patient and medical records    History of Present Illness   Shalonda Howard is a 49 year old female  with a past medical history significant for chronic LE edema, neuropathy, GERD, possible lupus, ventral hernia, chronic anemia, MDD with PUNEET, chronic bilateral heel ulcerations, hx bunion repair with subsequent non-healing wound s/p left first ray partial amputation, hx septic L ankle s/p I&D and hardware removal who is a direct admission to Counts include 234 beds at the [facility] from wound clinic due to concern for right foot infection.     Past Medical History   I have reviewed this patient's medical history and updated it with pertinent information if needed.   Past Medical History:   Diagnosis Date     Allergic rhinitis, cause unspecified      Anxiety state, unspecified      Cellulitis and abscess of leg, except foot      Disuse osteoporosis      Dysthymic disorder      Edema      Encounter for long-term (current) use of other medications      Esophageal reflux      Foot ulcer on Heel bilaterally  5/22/2019     Kidney stones      Myalgia and myositis, unspecified      Obesity, unspecified      Open wound of foot except toe(s) alone, complicated      Opioid type dependence, unspecified      Other acne      Other congenital valgus deformity of feet      Other ventral hernia without mention of obstruction or gangrene      Polycystic ovaries      PONV (postoperative nausea and vomiting)      Systemic lupus erythematosus (H)     unsure     Tibialis tendinitis      Unspecified hereditary and idiopathic peripheral neuropathy        Past Surgical History   I have reviewed this patient's surgical history and updated it with pertinent information if needed.  Past Surgical History:   Procedure Laterality Date     APPENDECTOMY        APPLY WOUND VAC Left 1/24/2019    Procedure: WOUND VAC APPLICATION;  Surgeon: Miguel Mosher MD;  Location:  OR     ARTHRODESIS FOOT Left 2/4/2015    Procedure: ARTHRODESIS FOOT;  Surgeon: Andre Harman MD;  Location:  [facility]     ARTHROSCOPY ANKLE Left 4/22/2019    Procedure: ARTHROSCOPIC IRRIGATION AND DEBRIDEMENT LEFT ANKLE. HARDWARE REMOVAL;  Surgeon: Andre Harman MD;  Location:  OR     BUNIONECTOMY RT/LT  08/29/2018     DILATION AND CURETTAGE       EXCISE TOENAIL(S) Left 2/4/2015    Procedure: EXCISE TOENAIL(S);  Surgeon: Andre Harman MD;  Location: [facility]     HERNIA REPAIR      umbilical x 2     HERNIA REPAIR      ventral open x 3     IRRIGATION AND DEBRIDEMENT FOOT, COMBINED Left 9/26/2018    Procedure: COMBINED IRRIGATION AND DEBRIDEMENT FOOT;  IRRIGATION AND DEBRIDEMENT LEFT FOOT;  Surgeon: Andre Harman MD;  Location: [facility]     IRRIGATION AND DEBRIDEMENT FOOT, COMBINED Left 11/26/2018    Procedure: COMBINED IRRIGATION AND DEBRIDEMENT LEFT FOOT;  Surgeon: Andre Harman MD;  Location: [facility]     IRRIGATION AND DEBRIDEMENT FOOT, COMBINED Left 1/24/2019    Procedure: LEFT HALLUX WOUND IRRIGATION AND DEBRIDEMENT WITH BONE BIOPSY;  Surgeon: Miguel Mosher MD;  Location:  OR     IRRIGATION AND DEBRIDEMENT FOOT, COMBINED Left 3/20/2019    Procedure: LEFT FOOT IRRIGATION AND  DEBRIDEMENT, FIRST RAY RESECTION AND HARDWARE REMOVAL FROM LEFT FOOT;  Surgeon: Andre Harman MD;  Location:  OR     IRRIGATION AND DEBRIDEMENT LOWER EXTREMITY, COMBINED Bilateral 11/14/2019    Procedure: MISONIX DEBRIDEMENT RIGHT AND LEFT HEELS.  APPLY MYRIAD RIGHT HEEL, APPLY TOTAL CONTACT CAST;  Surgeon: Juan Antonio Travis MD;  Location:  OR     REMOVE HARDWARE LOWER EXTREMITY Left 3/20/2019    Procedure: REMOVE HARDWARE LOWER EXTREMITY;  Surgeon: Andre Harman MD;  Location:  OR     REPAIR HAMMER TOE Left 11/26/2018    Procedure: REPAIR HAMMER TOE;  Surgeon:  Andre Harman MD;  Location: [facility]     TONSILLECTOMY         Prior to Admission Medications   Prior to Admission Medications   Prescriptions Last Dose Informant Patient Reported? Taking?   NARCAN 4 MG/0.1ML nasal spray  Self Yes Yes   Sig: Spray 0.1 milliliter by intranasal route in 1 nostril may repeat dose every 2-3 minutes as needed alternating nostrils with each dose   buPROPion (WELLBUTRIN XL) 300 MG 24 hr tablet 12/2/2019 at am Self Yes Yes   Sig: Take 300 mg by mouth daily   busPIRone (BUSPAR) 15 MG tablet 12/2/2019 at am Self Yes Yes   Sig: Take 15 mg by mouth 2 times daily   cetirizine (ZYRTEC) 10 MG tablet 12/2/2019 at am Self Yes Yes   Sig: Take 10 mg by mouth daily   cholecalciferol (VITAMIN D3) 5000 units TABS tablet 12/2/2019 at am Self Yes Yes   Sig: Take 10,000 Units by mouth daily    clindamycin (CLEOCIN T) 1 % solution  Self Yes Yes   Sig: Apply topically nightly as needed (Acne outbreak)    docusate sodium (COLACE) 100 MG capsule 12/2/2019 at am Self Yes Yes   Sig: Take 300 mg by mouth 2 times daily   doxycycline hyclate (VIBRAMYCIN) 100 MG capsule 12/2/2019 at am Self No Yes   Sig: Take 1 capsule (100 mg) by mouth 2 times daily   furosemide (LASIX) 40 MG tablet 11/26/2019 Self Yes Yes   Sig: Take 160 mg by mouth daily   gabapentin (NEURONTIN) 300 MG capsule  Self Yes Yes   Sig: Take 1 Capsule by mouth at bedtime as needed. In addition to the 800mg tablets 3x a day   gabapentin (NEURONTIN) 800 MG tablet 12/2/2019 at am Self Yes Yes   Sig: Take 800 mg by mouth 3 times daily Am, supper, hs   hydrOXYzine (VISTARIL) 25 MG capsule Past Week at Unknown time Self Yes Yes   Sig: Take 25 mg by mouth At Bedtime   ibuprofen (ADVIL/MOTRIN) 800 MG tablet 12/2/2019 at am Self Yes Yes   Sig: TAKE ONE TABLET BY MOUTH TWICE DAILY WITH FOOD AS NEEDED FOR PAIN   levofloxacin (LEVAQUIN) 500 MG tablet 12/2/2019 at am Self No Yes   Sig: Take 1 tablet (500 mg) by mouth daily   lidocaine-prilocaine (EMLA) 2.5-2.5  "% external cream  Self Yes Yes   Sig: Apply 5 g topically as needed    magic mouthwash suspension, diphenhydrAMINE, lidocaine, aluminum-magnesium & simethicone, (FIRST-MOUTHWASH BLM) compounding kit  Self Yes Yes   Sig: Take 5 mLs by mouth 2 times daily as needed    omeprazole (PRILOSEC) 20 MG DR capsule 12/2/2019 at am Self Yes Yes   Sig: Take 40 mg by mouth 2 times daily (before meals)    ondansetron (ZOFRAN) 8 MG tablet  Self Yes Yes   Sig: Take 8 mg by mouth every 8 hours as needed for nausea   order for DME  Self No No   Sig: [employer] [facility] Phone [phone] Fax [phone]  Prontosan Wound Irrigation Solution qty 2 bottles  [place]ear size small/[employer]y qty 4 sleeves   order for DME  Self No No   Sig: [employer] [facility] Phone [phone] Fax [phone]    Primary Dressing Hydroferra Blue Ready 4X5   Qty 20  Secondary Dressing Gauze 4X4 Qty 1 loaf  Secondary Dressing Roll gauze 4\"  Qty 30  Secondary Dressing Micropore tape 2\"  Qty 2  Length of Need: 1 month  Frequency of dressing change: daily   oxyCODONE-acetaminophen (PERCOCET)  MG per tablet 12/2/2019 at am Self Yes Yes   Sig: Take 1 tablet by mouth 3 times daily    phentermine 30 MG capsule 12/2/2019 at am Self Yes Yes   Sig: Take 30 mg by mouth every morning   polyethylene glycol (MIRALAX/GLYCOLAX) packet  Self Yes Yes   Sig: Take 17 g by mouth daily as needed for constipation    potassium chloride ER (K-TAB) 20 MEQ CR tablet 11/26/2019 at pm Self Yes Yes   Sig: Take 80 mEq by mouth daily    pravastatin (PRAVACHOL) 20 MG tablet Past Week at Unknown time Self Yes Yes   Sig: Take 20 mg by mouth every evening   sertraline (ZOLOFT) 100 MG tablet 12/2/2019 at a, Self Yes Yes   Sig: Take 150 mg by mouth daily    topiramate (TOPAMAX) 25 MG tablet Past Week at Unknown time Self Yes Yes   Sig: Take 50 mg by mouth At Bedtime      Facility-Administered Medications: None     Allergies   Allergies   Allergen Reactions     Sulfa Drugs Shortness Of Breath " and Rash     Demerol [Meperidine] Nausea and Vomiting     Erythromycin Nausea and Vomiting     Metformin Nausea and Vomiting     Codeine Rash     Penicillins Rash       Immunization History   Immunization History   Administered Date(s) Administered     Influenza Vaccine IM > 6 months Valent IIV4 10/16/2018       Social History   I have reviewed this patient's social history and updated it with pertinent information if needed. Shalonda Howard  reports that she quit smoking about 17 years ago. Her smoking use included cigarettes. She has a 6.00 pack-year smoking history. She has never used smokeless tobacco. She reports current alcohol use. She reports that she does not use drugs.    Family History   I have reviewed this patient's family history and updated it with pertinent information if needed.   Family History   Problem Relation Age of Onset     Pulmonary Hypertension Mother      Esophageal Cancer Father      Heart Disease Maternal Grandfather      Esophageal Cancer Paternal Grandfather        Review of Systems   The 10 point Review of Systems is negative other than noted in the HPI or here.     Physical Exam   Temp: 99.1  F (37.3  C) Temp src: Oral BP: 129/66 Pulse: 93   Resp: 16 SpO2: 97 % O2 Device: None (Room air)    Vital Signs with Ranges  Temp:  [98.7  F (37.1  C)-100.8  F (38.2  C)] 99.1  F (37.3  C)  Pulse:  [] 93  Resp:  [16-20] 16  BP: (111-153)/(63-96) 129/66  SpO2:  [96 %-99 %] 97 %  230 lbs 0 oz  Body mass index is 38.27 kg/m .    GENERAL APPEARANCE:  alert and no distress  EYES: Eyes grossly normal to inspection, PERRL and conjunctivae and sclerae normal  HENT: ear canals and TM's normal and nose and mouth without ulcers or lesions  NECK: no adenopathy, no asymmetry, masses, or scars and thyroid normal to palpation  RESP: lungs clear to auscultation - no rales, rhonchi or wheezes  CV: regular rates and rhythm, normal S1 S2, no S3 or S4 and no murmur, click or rub  LYMPHATICS: normal ant/post  cervical and supraclavicular nodes  ABDOMEN: soft, nontender, without hepatosplenomegaly or masses and bowel sounds normal  MS: bilateral heel ulcers as seen in the pictures in the chart.   SKIN: no suspicious lesions or rashes      Data   Lab Results   Component Value Date    WBC 8.8 12/03/2019    HGB 9.3 (L) 12/03/2019    HCT 32.9 (L) 12/03/2019     12/03/2019     12/03/2019    POTASSIUM 3.8 12/03/2019    CHLORIDE 107 12/03/2019    CO2 23 12/03/2019    BUN 11 12/03/2019    CR 0.73 12/03/2019     (H) 12/03/2019    SED 48 (H) 12/03/2019    NTBNPI 109 12/03/2019    AST 24 12/03/2019    ALT 14 12/03/2019    ALKPHOS 102 12/03/2019    BILITOTAL 0.4 12/03/2019     Recent Labs   Lab 12/02/19  1657 12/02/19  1651   CULT No growth after 7 hours No growth after 7 hours     Recent Labs   Lab Test 12/02/19  1657 12/02/19  1651 11/21/19  1354 11/14/19  1638 11/04/19  1445 05/22/19  1848 05/22/19  1728 04/22/19  1446 04/21/19  1836   CULT No growth after 7 hours No growth after 7 hours Light growth  Coagulase negative Staphylococcus  Susceptibility testing not routinely done  *  Light growth  Alcaligenes faecalis  * Culture negative after 2 weeks  Light growth  Streptococcus dysgalactiae serogroup C/G  not isolated or reported on routine culture  *  No anaerobes isolated  Heavy growth  Enterobacter cloacae complex  *  Moderate growth  Enterococcus faecalis  *  Light growth  Acinetobacter baumannii complex  * Moderate growth  Granulicatella adiacens  not isolated or reported on routine culture  *  Moderate growth  Prevotella species  Beta lactamase positive  Previously reported as:  No anaerobes isolated  *  CORRECTED ON:  11.11.2019 AT 1040. KVO    No anaerobes isolated  Heavy growth  Streptococcus dysgalactiae serogroup C/G  *  Heavy growth  Staphylococcus aureus  *  Heavy growth  Strain 2  Staphylococcus aureus  *  Heavy growth  Strain 3  Staphylococcus aureus  *  Light growth  Enterobacter  cloacae complex  *  Heavy growth  Streptococcus dysgalactiae serogroup C/G  *  Heavy growth  Staphylococcus aureus  *  Moderate growth  Strain 2  Staphylococcus aureus  * No growth No growth No anaerobes isolated  On day 2, isolated in broth only:  Staphylococcus epidermidis  *  Critical Value/Significant Value, preliminary result only, called to and read back by  Tyra Carlos RN at 0944 on 4.24.19 kln.   No growth       Amount of time performed on this consult: 45 minutes. This includes face to face assessment and care coordination with the primary team.

## 2019-12-03 NOTE — CONSULTS
Orthopedics:    Patient was seen and examined by Dr Mejia last evening and ordered a repeat ankle MRI and aspiration.    Bilateral heel ulcers present since October 2018.  She has been treating with Dr Harman and Dr Travis.      MRI Right ankle   IMPRESSION:  1. Prominent soft tissue defect along the plantar aspect of the  hindfoot. There is surrounding cellulitis with no evidence of abscess.  2. Surgical changes of the hindfoot as described above. Extensive  marrow edema and enhancement of the posterior two thirds of the  calcaneus is highly suggestive of osteomyelitis.  3. Small to moderate-sized tibiotalar joint effusion. A septic joint  cannot be excluded by imaging. However, there is no extensive adjacent  marrow edema to suggest adjacent osteomyelitis.  4. Again seen is disruption of the origin of the plantar fascia.     Plan:  Continue to apply dressings bilateral feet  WBAT with walker  Will follow ankle aspirate labs  MRI results sent to Jon with Dr Harman for review and determine further treatment planning.

## 2019-12-03 NOTE — PROGRESS NOTES
[facility]  Patient is currently open to home care services with [facility]. The patient is currently receiving Skilled Nursing services. [facility]  and team have been notified of patient admission. [facility] liaison will continue to follow patient during stay. If appropriate provide orders to resume home care at time of discharge.

## 2019-12-03 NOTE — PLAN OF CARE
Pt A/O x4. CMS intact besides baseline numbness of BLEs. Dressings have small serousangeous drainage. VSS on RA. Up A1 ww to BSC; non-weight bearing to RLE. Voiding adequately. LR bolus given. Taking 10 mg oxy for moderate pain. Pt has been NPO since 0000 for R joint aspiration today @ 0900.

## 2019-12-03 NOTE — PLAN OF CARE
Pt arrived at 1545. Pt A/Ox4. VSS. Reports pain 8/10 using oxycodone with relief. Regular diet tolerated. CMS intact except baseline bilateral numbness in feet. Dressing changed. Joint aspiration to be done at 0900 on 12/3/19.

## 2019-12-03 NOTE — PLAN OF CARE
Patient a&o x4, VSS on RA, pivots to BSC - NWB on RLE, bilateral heel wounds covered - dressing changed today, controlling pain with oxycodone, getting vanco and zosyn antibiotics, regular diet, voiding adequately. Bone aspiration done this AM. Continue to monitor.

## 2019-12-03 NOTE — PROGRESS NOTES
[facility]    Hospitalist Progress Note    Date of Service (when I saw the patient): 12/03/2019    Assessment & Plan   Shalonda Howard is a 49 year old female with complicated wound history including prior bilateral bunion repair (Aug 2018) with numerous complications including left ankle septic arthritis requiring prior hardware removal, repeated I&Ds, first ray amputation of left foot, and many extended courses of IV antibiotics. She has continued to have non-healing ulcers over both feet, and was directed admitted from wound clinic on 12/2/2019 due to concern for deep infection involving the right foot.     Sepsis due to right foot cellulitis with possible right ankle septic arthritis and suspected osteomyelitis   Stage 4 pressure ulcers over bilateral heels  Chronic right third toe ulcer\  * Follows with Dr. Harman for complicated wound history dating back to Aug 2018 when she had her initial bilateral bunion repair.   * Presented from wound clinic with fevers to 102, tachycardia, and increased drainage and pain from right foot despite 2+ week course of PO levofloxacin and doxycycline. On admission, she was empirically initiated on IV vancomycin and pip-tazo.   * MRI right ankle (12/2) showed right foot cellulitis without abscess, but with small to moderate tibiotalar joint effusion as well as adjacent marrow edema suggestive of osteomyelitis.  - s/p right ankle aspiration by IR today, 12/3  - Left ankle MRI ordered for evaluation of non-healing heel ulcer  - Continues on IV vancomycin and pip-tazo  - WOCN consult  - Ortho and ID following, appreciate input    Chronic iron deficiency anemia  FOBT in Sept 2019 negative. Receives periodic outpatient iron infusions. Recent baseline Hgb low 10 range    GERD  Chronic and stable on omeprazole    Dyslipidemia  Chronic and stable on pravastatin    Peripheral neuropathy with chronic pain syndrome  * PTA on gabapentin 800 mg TID and 300 mg qhs prn,  topiramate 50 mg qhs, Percocet #1 TID  - Continues on PTA gabapentin and topiramate  - Oxycodone and Dilaudid available PRN    Major recurrent depressive disorder with anxiety  * PTA on bupropion 300 mg daily, buspirone 15 mg BID, sertraline 150 mg daily, hydroxyzine 25 mg qhs  - Continues on PTA meds    Morbid obesity  BMI 38    Possible SLE  Reports that she was told by one rheumatologist in the past that she has lupus, and by another that she does not. Regardless, she is not on immunosuppressive therapy    FEN: Regular diet  DVT Prophylaxis: Pneumatic Compression Devices  Code Status: Full Code    Disposition: Expected discharge pending further infectious work-up and definitive plan for antibiotics, 3+ days    Sofi Mason    Interval History   Admitted last night. Reports ongoing 8/10 bilateral foot pain, but offers no other concerns. Understands that she may have osteomyelitis which would require more extensive surgery. Ortho still to see.   - Left ankle MRI  - WOCN consult  - Continue IV vanc and zosyn    -Data reviewed today: I reviewed all new labs and imaging results over the last 24 hours. I personally reviewed no images or EKG's today.    Physical Exam   Temp: 98.8  F (37.1  C) Temp src: Oral BP: 118/73 Pulse: 83   Resp: 16 SpO2: 98 % O2 Device: None (Room air)    Vitals:    12/02/19 1547   Weight: 104.3 kg (230 lb)     Vital Signs with Ranges  Temp:  [98.7  F (37.1  C)-99.4  F (37.4  C)] 98.8  F (37.1  C)  Pulse:  [] 83  Resp:  [16-20] 16  BP: (111-146)/(63-85) 118/73  SpO2:  [96 %-99 %] 98 %  No intake/output data recorded.    Constitutional: Appears comfortable, NAD  HEENT: Sclera white, MMM  Respiratory: Breathing non-labored. Lungs CTAB - no wheezes, crackles, or rhonchi  Cardiovascular: Heart RRR, no m/r/g. No pedal edema  GI: +BS, abd soft/NT  Skin/Integument: Right foot dressing in place. Non-healing ulcers over bilateral toes and heels  Musculoskeletal: Normal muscle bulk and  tone  Neuro: Alert and appropriate, CARRERA  Psych: Calm and cooperative    Medications       buPROPion  300 mg Oral Daily     busPIRone  15 mg Oral BID     cetirizine  10 mg Oral Daily     docusate sodium  300 mg Oral BID     gabapentin  800 mg Oral TID     iopamidol  10 mL INTRA-ARTICULAR Once     omeprazole  40 mg Oral BID AC     piperacillin-tazobactam  3.375 g Intravenous Q6H     pravastatin  20 mg Oral QPM     sertraline  150 mg Oral Daily     sodium chloride (PF)  10 mL INTRA-ARTICULAR Once     sodium chloride (PF)  3 mL Intracatheter Q8H     topiramate  50 mg Oral At Bedtime     vancomycin (VANCOCIN) IV  1,750 mg Intravenous Q12H     Data   Recent Labs   Lab 12/03/19  0702 12/02/19  1652   WBC 8.8 10.0   HGB 9.3* 10.1*   MCV 69* 68*    357    135   POTASSIUM 3.8 4.4   CHLORIDE 107 107   CO2 23 22   BUN 11 13   CR 0.73 0.75   ANIONGAP 7 6   PAULINE 8.2* 8.7   * 127*   ALBUMIN 2.8*  --    PROTTOTAL 8.4  --    BILITOTAL 0.4  --    ALKPHOS 102  --    ALT 14  --    AST 24  --        Recent Results (from the past 24 hour(s))   MR Ankle Right w/o & w Contrast    Narrative    MRI RIGHT ANKLE WITHOUT AND WITH INTRAVENOUS CONTRAST   12/3/2019 1:08  AM    HISTORY: Fevers. The concern is for a septic joint.    COMPARISON: An MRI on 11/21/2019.    TECHNIQUE: Multiplanar MR imaging was performed through the right  ankle before and after the uneventful intravenous administration of 10  mL Gadavist.    FINDINGS: Again seen is a large open wound along the plantar aspect of  the heel. This includes a small sinus tract extending to the  underlying calcaneus. There is extensive edema and enhancement of the  surrounding soft tissues. However, no distinct abscess is seen. There  are surgical changes of the posterior calcaneus, including a long  screw tract which extends through the posterior subtalar joint into  the talus. There continues to be extensive marrow edema and  enhancement of the posterior two thirds of the  calcaneus with  disruption of the cortical surface along its posterior plantar aspect  adjacent to the sinus tract. Again seen is a small to moderate-sized  tibiotalar joint effusion. There are degenerative changes elsewhere in  the ankle and visualized hindfoot and midfoot. No definite fluid is  seen in the subtalar joint. Again seen is chronic-appearing thickening  of the Achilles tendon. Again seen is disruption of the origin of the  plantar fascia. There has been decrease in fluid surrounding several  of the ankle tendons. No other significant change or abnormality is  noted.      Impression    IMPRESSION:  1. Prominent soft tissue defect along the plantar aspect of the  hindfoot. There is surrounding cellulitis with no evidence of abscess.  2. Surgical changes of the hindfoot as described above. Extensive  marrow edema and enhancement of the posterior two thirds of the  calcaneus is highly suggestive of osteomyelitis.  3. Small to moderate-sized tibiotalar joint effusion. A septic joint  cannot be excluded by imaging. However, there is no extensive adjacent  marrow edema to suggest adjacent osteomyelitis.  4. Again seen is disruption of the origin of the plantar fascia.     BURKE DRIVER MD

## 2019-12-03 NOTE — PROGRESS NOTES
RADIOLOGY PROCEDURE NOTE  Patient name: Shalonda Howard  MRN: [id number]  : 1970    Pre-procedure diagnosis: Right ankle effusion  Post-procedure diagnosis: Same    Procedure Date/Time: December 3, 2019  9:51 AM  Procedure: Right ankle aspiration  Estimated blood loss: None  Specimen(s) collected with description: 3 ml thin synovial fluid  The patient tolerated the procedure well with no immediate complications.  Significant findings:none    See imaging dictation for procedural details.    Provider name: MARIA ESTHER Medellin  Assistant(s):None

## 2019-12-03 NOTE — PROGRESS NOTES
Focus: Bilateral heels  S: history, progress notes and orders reviewed. Pt was admitted @ recommendation of Dr. Angy HAMPTON to ongoing assessment.      Pt was assessed by Dr. Travis on 12-2-19. Dr Palm progress note reviewed for ongoing management and dressing       Recommendations.       Awaiting Ortho assessment for possible OR.       Orders placed for ongoing wound care based on Dr. Travis's recommendations.       WOC will check chart daily for POC.     Lena AGUILAR WOCN

## 2019-12-04 ENCOUNTER — HOME INFUSION (PRE-WILLOW HOME INFUSION) (OUTPATIENT)
Dept: PHARMACY | Facility: CLINIC | Age: 49
End: 2019-12-04

## 2019-12-04 ENCOUNTER — APPOINTMENT (OUTPATIENT)
Dept: MRI IMAGING | Facility: CLINIC | Age: 49
End: 2019-12-04
Attending: INTERNAL MEDICINE
Payer: COMMERCIAL

## 2019-12-04 LAB
CREAT SERPL-MCNC: 0.72 MG/DL (ref 0.52–1.04)
GFR SERPL CREATININE-BSD FRML MDRD: >90 ML/MIN/{1.73_M2}
VANCOMYCIN SERPL-MCNC: 19.2 MG/L

## 2019-12-04 PROCEDURE — 25000132 ZZH RX MED GY IP 250 OP 250 PS 637: Performed by: PHYSICIAN ASSISTANT

## 2019-12-04 PROCEDURE — 12000000 ZZH R&B MED SURG/OB

## 2019-12-04 PROCEDURE — A9585 GADOBUTROL INJECTION: HCPCS | Performed by: HOSPITALIST

## 2019-12-04 PROCEDURE — 36415 COLL VENOUS BLD VENIPUNCTURE: CPT | Performed by: HOSPITALIST

## 2019-12-04 PROCEDURE — 80202 ASSAY OF VANCOMYCIN: CPT | Performed by: HOSPITALIST

## 2019-12-04 PROCEDURE — 36415 COLL VENOUS BLD VENIPUNCTURE: CPT | Performed by: PHYSICIAN ASSISTANT

## 2019-12-04 PROCEDURE — 82565 ASSAY OF CREATININE: CPT | Performed by: PHYSICIAN ASSISTANT

## 2019-12-04 PROCEDURE — 25000128 H RX IP 250 OP 636: Performed by: PHYSICIAN ASSISTANT

## 2019-12-04 PROCEDURE — 73723 MRI JOINT LWR EXTR W/O&W/DYE: CPT | Mod: LT

## 2019-12-04 PROCEDURE — 25800030 ZZH RX IP 258 OP 636: Performed by: PHYSICIAN ASSISTANT

## 2019-12-04 PROCEDURE — 99232 SBSQ HOSP IP/OBS MODERATE 35: CPT | Performed by: INTERNAL MEDICINE

## 2019-12-04 PROCEDURE — 25500064 ZZH RX 255 OP 636: Performed by: HOSPITALIST

## 2019-12-04 RX ORDER — GADOBUTROL 604.72 MG/ML
11 INJECTION INTRAVENOUS ONCE
Status: COMPLETED | OUTPATIENT
Start: 2019-12-04 | End: 2019-12-04

## 2019-12-04 RX ADMIN — HYDROMORPHONE HYDROCHLORIDE 0.5 MG: 1 INJECTION, SOLUTION INTRAMUSCULAR; INTRAVENOUS; SUBCUTANEOUS at 20:00

## 2019-12-04 RX ADMIN — GABAPENTIN 800 MG: 800 TABLET, FILM COATED ORAL at 22:14

## 2019-12-04 RX ADMIN — OXYCODONE HYDROCHLORIDE 10 MG: 5 TABLET ORAL at 19:04

## 2019-12-04 RX ADMIN — GABAPENTIN 800 MG: 800 TABLET, FILM COATED ORAL at 16:33

## 2019-12-04 RX ADMIN — SERTRALINE HYDROCHLORIDE 150 MG: 100 TABLET ORAL at 09:32

## 2019-12-04 RX ADMIN — TOPIRAMATE 50 MG: 50 TABLET, FILM COATED ORAL at 22:14

## 2019-12-04 RX ADMIN — OXYCODONE HYDROCHLORIDE 10 MG: 5 TABLET ORAL at 06:03

## 2019-12-04 RX ADMIN — OXYCODONE HYDROCHLORIDE 10 MG: 5 TABLET ORAL at 13:33

## 2019-12-04 RX ADMIN — HYDROMORPHONE HYDROCHLORIDE 0.3 MG: 1 INJECTION, SOLUTION INTRAMUSCULAR; INTRAVENOUS; SUBCUTANEOUS at 03:47

## 2019-12-04 RX ADMIN — PIPERACILLIN AND TAZOBACTAM 3.38 G: 3; .375 INJECTION, POWDER, LYOPHILIZED, FOR SOLUTION INTRAVENOUS at 16:33

## 2019-12-04 RX ADMIN — BUPROPION HYDROCHLORIDE 300 MG: 300 TABLET, EXTENDED RELEASE ORAL at 09:33

## 2019-12-04 RX ADMIN — VANCOMYCIN HYDROCHLORIDE 1750 MG: 10 INJECTION, POWDER, LYOPHILIZED, FOR SOLUTION INTRAVENOUS at 13:34

## 2019-12-04 RX ADMIN — BUSPIRONE HYDROCHLORIDE 15 MG: 5 TABLET ORAL at 22:14

## 2019-12-04 RX ADMIN — PRAVASTATIN SODIUM 20 MG: 20 TABLET ORAL at 19:53

## 2019-12-04 RX ADMIN — HYDROMORPHONE HYDROCHLORIDE 0.5 MG: 1 INJECTION, SOLUTION INTRAMUSCULAR; INTRAVENOUS; SUBCUTANEOUS at 09:38

## 2019-12-04 RX ADMIN — OMEPRAZOLE 40 MG: 20 CAPSULE, DELAYED RELEASE ORAL at 16:33

## 2019-12-04 RX ADMIN — BUSPIRONE HYDROCHLORIDE 15 MG: 5 TABLET ORAL at 09:32

## 2019-12-04 RX ADMIN — VANCOMYCIN HYDROCHLORIDE 1750 MG: 10 INJECTION, POWDER, LYOPHILIZED, FOR SOLUTION INTRAVENOUS at 01:22

## 2019-12-04 RX ADMIN — GABAPENTIN 800 MG: 800 TABLET, FILM COATED ORAL at 09:33

## 2019-12-04 RX ADMIN — OMEPRAZOLE 40 MG: 20 CAPSULE, DELAYED RELEASE ORAL at 06:46

## 2019-12-04 RX ADMIN — DOCUSATE SODIUM 300 MG: 100 CAPSULE, LIQUID FILLED ORAL at 09:31

## 2019-12-04 RX ADMIN — PIPERACILLIN AND TAZOBACTAM 3.38 G: 3; .375 INJECTION, POWDER, LYOPHILIZED, FOR SOLUTION INTRAVENOUS at 09:37

## 2019-12-04 RX ADMIN — OXYCODONE HYDROCHLORIDE 10 MG: 5 TABLET ORAL at 01:22

## 2019-12-04 RX ADMIN — PIPERACILLIN AND TAZOBACTAM 3.38 G: 3; .375 INJECTION, POWDER, LYOPHILIZED, FOR SOLUTION INTRAVENOUS at 03:47

## 2019-12-04 RX ADMIN — HYDROMORPHONE HYDROCHLORIDE 0.5 MG: 1 INJECTION, SOLUTION INTRAMUSCULAR; INTRAVENOUS; SUBCUTANEOUS at 12:09

## 2019-12-04 RX ADMIN — DOCUSATE SODIUM 300 MG: 100 CAPSULE, LIQUID FILLED ORAL at 22:14

## 2019-12-04 RX ADMIN — CETIRIZINE HYDROCHLORIDE 10 MG: 10 TABLET, FILM COATED ORAL at 09:33

## 2019-12-04 RX ADMIN — GADOBUTROL 11 ML: 604.72 INJECTION INTRAVENOUS at 08:43

## 2019-12-04 RX ADMIN — PIPERACILLIN AND TAZOBACTAM 3.38 G: 3; .375 INJECTION, POWDER, LYOPHILIZED, FOR SOLUTION INTRAVENOUS at 22:13

## 2019-12-04 ASSESSMENT — ACTIVITIES OF DAILY LIVING (ADL)
ADLS_ACUITY_SCORE: 11

## 2019-12-04 NOTE — PROGRESS NOTES
Therapy: IV Abx  Insurance: Blue Plus     Patient has coverage at 100%. Deductible and Max out of Pocket do not apply.    Please contact Intake with any questions, [phone] or In Basket pool, [facility] ([phone]).  [facility] SH55-In reference to referral made on 12/04/2019 to check on IV Abx coverage.

## 2019-12-04 NOTE — PLAN OF CARE
"VSS on RA. Dressings CDI. Pt continues to rate pain 7-8/10 while taking oxy Q4 and IV Dilaudid periodically. Baseline numbness BLEs \"comes and goes,\" per pt. Last CMS check, she reported good sensation. Awaiting MRI today.     "

## 2019-12-04 NOTE — CONSULTS
Anticipate patient may need IV Antibiotics at discharge. Referral sent to [facility] to check benefits. Please see below:      Anita Liz-Patient will have coverage at 100% through her Blue Plus advantage plan. Please let us know if you have any further questions.  Thank you kindly for the referral.    Adilene Barton       Rotation Flap Text: The defect edges were debeveled with a #15 scalpel blade.  Given the location of the defect, shape of the defect and the proximity to free margins a rotation flap was deemed most appropriate.  Using a sterile surgical marker, an appropriate rotation flap was drawn incorporating the defect and placing the expected incisions within the relaxed skin tension lines where possible.    The area thus outlined was incised deep to adipose tissue with a #15 scalpel blade.  The skin margins were undermined to an appropriate distance in all directions utilizing iris scissors.

## 2019-12-04 NOTE — PROGRESS NOTES
[facility]    Infectious Disease Progress Note    Date of Service (when I saw the patient): 12/04/2019     Assessment & Plan   Shalonda Howard is a 49 year old female who was admitted on 12/2/2019.     Impression:  1. 49 y.o familiar to me from multiple prior admissions.   2. She has a history of bilateral bunion repair surgery done in August 2018. The right side healed intially but the left side the incision did not heel which led to I and D and 6 weeks of IV antibiotics and many more weeks of oral antibiotics since the end of August 2018. Then repeat admission in November 2018, more I and D and another course of IV antibiotics, then followed by another admission in January 2019 for wound non healing, s/p another I and D and 2 antibiotics for 6 weeks. Readmitted in March 2019 with continued wound non healing, concern for underlying osteo, s/p first ray amputation, hardware removal and ulcer debridement 3/20.  3. Admission in  April for left septic ankle joint with staph hominis and staph epi in the cultures, This time the last remaining piece of hardware in the left foot was also taken out. Then again admitted in May for more I and D.   4. She also has chronic heel ulcers bilateral.   5. PCN allergy, tolerates zosyn/cephalosporins, sulfa allergy.   6. Now admitted with continued heel ulcers bilateral but tight sided pain in the ankle, worse wound then the left. Concern for osteo in the heel on the right, and fluid in the ankle joint. She still has hardware in this foot and ankle.   7. She was started on broad spectrum antibiotics with Vancomycin and zosyn.      Recommendations:   S/p aspiration on the right ankle joint, cultures pending.   Continue on broad spectrum antibiotics.   If calcaneous osteo will need surgery.   Left foot MRI also being read as osteo.   Will follow up with ortho on plan.            Ignacia Bocanegra MD    Interval History   No fever  MRI as above   Cultures are pending      Physical Exam   Temp: 98  F (36.7  C) Temp src: Oral BP: 113/70 Pulse: 83   Resp: 14 SpO2: 98 % O2 Device: None (Room air)    Vitals:    12/02/19 1547   Weight: 104.3 kg (230 lb)     Vital Signs with Ranges  Temp:  [98  F (36.7  C)-98.6  F (37  C)] 98  F (36.7  C)  Pulse:  [74-90] 83  Resp:  [14-16] 14  BP: (108-124)/(59-73) 113/70  SpO2:  [97 %-100 %] 98 %    Constitutional: Awake, alert, cooperative, no apparent distress  Lungs: Clear to auscultation bilaterally, no crackles or wheezing  Cardiovascular: Regular rate and rhythm, normal S1 and S2, and no murmur noted  Abdomen: Normal bowel sounds, soft, non-distended, non-tender  Skin: No rashes, no cyanosis, no edema  Other:    Medications       buPROPion  300 mg Oral Daily     busPIRone  15 mg Oral BID     cetirizine  10 mg Oral Daily     docusate sodium  300 mg Oral BID     gabapentin  800 mg Oral TID     iopamidol  10 mL INTRA-ARTICULAR Once     omeprazole  40 mg Oral BID AC     piperacillin-tazobactam  3.375 g Intravenous Q6H     pravastatin  20 mg Oral QPM     sertraline  150 mg Oral Daily     sodium chloride (PF)  10 mL INTRA-ARTICULAR Once     sodium chloride (PF)  3 mL Intracatheter Q8H     topiramate  50 mg Oral At Bedtime     vancomycin (VANCOCIN) IV  1,750 mg Intravenous Q12H       Data   All microbiology laboratory data reviewed.  Recent Labs   Lab Test 12/03/19  0702 12/02/19  1652 06/03/19  1130   WBC 8.8 10.0 4.9   HGB 9.3* 10.1* 10.1*   HCT 32.9* 35.3 33.5*   MCV 69* 68* 74*    357 355     Recent Labs   Lab Test 12/04/19  0643 12/03/19  0702 12/02/19  1652   CR 0.72 0.73 0.75     Recent Labs   Lab Test 12/03/19  0702   SED 48*     Recent Labs   Lab Test 12/03/19  0940 12/02/19  1657 12/02/19  1651 11/21/19  1354 11/14/19  1638 11/04/19  1445 05/22/19  1848 05/22/19  1728 04/22/19  1446   CULT Culture negative monitoring continues  Culture negative monitoring continues No growth after 2 days No growth after 2 days Light growth  Coagulase  negative Staphylococcus  Susceptibility testing not routinely done  *  Light growth  Alcaligenes faecalis  * Culture negative after 2 weeks  Light growth  Streptococcus dysgalactiae serogroup C/G  not isolated or reported on routine culture  *  No anaerobes isolated  Heavy growth  Enterobacter cloacae complex  *  Moderate growth  Enterococcus faecalis  *  Light growth  Acinetobacter baumannii complex  * Moderate growth  Granulicatella adiacens  not isolated or reported on routine culture  *  Moderate growth  Prevotella species  Beta lactamase positive  Previously reported as:  No anaerobes isolated  *  CORRECTED ON:  11.11.2019 AT 1040. KVO    No anaerobes isolated  Heavy growth  Streptococcus dysgalactiae serogroup C/G  *  Heavy growth  Staphylococcus aureus  *  Heavy growth  Strain 2  Staphylococcus aureus  *  Heavy growth  Strain 3  Staphylococcus aureus  *  Light growth  Enterobacter cloacae complex  *  Heavy growth  Streptococcus dysgalactiae serogroup C/G  *  Heavy growth  Staphylococcus aureus  *  Moderate growth  Strain 2  Staphylococcus aureus  * No growth No growth No anaerobes isolated  On day 2, isolated in broth only:  Staphylococcus epidermidis  *  Critical Value/Significant Value, preliminary result only, called to and read back by  Tyra Carlos RN at 0944 on 4.24.19 [facility].         Attestation:  Total time on the floor involved in the patient's care: 35 minutes. Total time spent in counseling/care coordination: >50%

## 2019-12-04 NOTE — PROGRESS NOTES
[facility]    Hospitalist Progress Note    Date of Service (when I saw the patient): 12/04/2019    Assessment & Plan   Shalonda Howard is a 49 year old female with complicated wound history including prior bilateral bunion repair (Aug 2018) with numerous complications including left ankle septic arthritis requiring prior hardware removal, repeated I&Ds, first ray amputation of left foot, and many extended courses of IV antibiotics. She has continued to have non-healing ulcers over both feet, and was directed admitted from wound clinic on 12/2/2019 due to concern for deep infection involving the right foot.     Sepsis due to right foot cellulitis, suspected bilateral heel osteomyelitis, and possible right ankle septic arthritis  Stage 4 pressure ulcers over bilateral heels  Chronic right third toe ulcer\  * Follows with Dr. Harman for complicated wound history dating back to Aug 2018 when she had her initial bilateral bunion repair.   * Presented from wound clinic with fevers to 102, tachycardia, and increased drainage and pain from right foot despite 2+ week course of PO levofloxacin and doxycycline. On admission, she was empirically initiated on IV vancomycin and pip-tazo.   * MRI right ankle (12/2) showed right foot cellulitis without abscess, but with small to moderate tibiotalar joint effusion as well as adjacent marrow edema suggestive of osteomyelitis.  * Due to non-heeling ulcers noted on left heel, left ankle MRI was ordered (12/4) which showed evidence of osteomyelitis  - s/p right ankle aspiration by IR on 12/3. Cultures pending  - Continues on IV vancomycin and pip-tazo  - Awaiting Ortho input for management of possible osteo  - ID following, appreciate input    Chronic iron deficiency anemia  FOBT in Sept 2019 negative. Receives periodic outpatient iron infusions. Recent baseline Hgb low 10 range    GERD  Chronic and stable on omeprazole    Dyslipidemia  Chronic and stable on  pravastatin    Peripheral neuropathy with chronic pain syndrome  * PTA on gabapentin 800 mg TID and 300 mg qhs prn, topiramate 50 mg qhs, Percocet #1 TID  - Continues on PTA gabapentin and topiramate  - Oxycodone and Dilaudid available PRN    Major recurrent depressive disorder with anxiety  * PTA on bupropion 300 mg daily, buspirone 15 mg BID, sertraline 150 mg daily, hydroxyzine 25 mg qhs  - Continues on PTA meds    Morbid obesity  BMI 38    Possible SLE  Reports that she was told by one rheumatologist in the past that she has lupus, and by another that she does not. Regardless, she is not on immunosuppressive therapy    FEN: Regular diet  DVT Prophylaxis: Pneumatic Compression Devices  Code Status: Full Code    Disposition: Expected discharge pending Ortho input and definitive plan for antibiotics, will be here through the weekend    Sofi Mason    Interval History   No events overnight. Pain stable.   - Left ankle MRI  - Continue vanc and zosyn    -Data reviewed today: I reviewed all new labs and imaging results over the last 24 hours. I personally reviewed the left ankle MRI showing probable left heel osteomyelitis    Physical Exam   Temp: 98  F (36.7  C) Temp src: Oral BP: 113/70 Pulse: 83   Resp: 14 SpO2: 98 % O2 Device: None (Room air)    Vitals:    12/02/19 1547   Weight: 104.3 kg (230 lb)     Vital Signs with Ranges  Temp:  [98  F (36.7  C)-98.6  F (37  C)] 98  F (36.7  C)  Pulse:  [74-90] 83  Resp:  [14-16] 14  BP: (108-124)/(59-73) 113/70  SpO2:  [97 %-100 %] 98 %  I/O last 3 completed shifts:  In: 540 [P.O.:540]  Out: -     Constitutional: Appears comfortable, NAD  Respiratory: Breathing non-labored. Lungs CTAB - no wheezes, crackles, or rhonchi  Cardiovascular: Heart RRR, no m/r/g. No pedal edema  GI: +BS, abd soft/NT  Skin/Integument: Right foot dressing in place. Non-healing ulcers over bilateral toes and heels  Musculoskeletal: Normal muscle bulk and tone  Neuro: Alert and appropriate,  DEBI  Psych: Calm and cooperative    Medications       buPROPion  300 mg Oral Daily     busPIRone  15 mg Oral BID     cetirizine  10 mg Oral Daily     docusate sodium  300 mg Oral BID     gabapentin  800 mg Oral TID     iopamidol  10 mL INTRA-ARTICULAR Once     omeprazole  40 mg Oral BID AC     piperacillin-tazobactam  3.375 g Intravenous Q6H     pravastatin  20 mg Oral QPM     sertraline  150 mg Oral Daily     sodium chloride (PF)  10 mL INTRA-ARTICULAR Once     sodium chloride (PF)  3 mL Intracatheter Q8H     topiramate  50 mg Oral At Bedtime     vancomycin (VANCOCIN) IV  1,750 mg Intravenous Q12H     Data   Recent Labs   Lab 12/04/19  0643 12/03/19  0702 12/02/19  1652   WBC  --  8.8 10.0   HGB  --  9.3* 10.1*   MCV  --  69* 68*   PLT  --  295 357   NA  --  137 135   POTASSIUM  --  3.8 4.4   CHLORIDE  --  107 107   CO2  --  23 22   BUN  --  11 13   CR 0.72 0.73 0.75   ANIONGAP  --  7 6   PAULINE  --  8.2* 8.7   GLC  --  100* 127*   ALBUMIN  --  2.8*  --    PROTTOTAL  --  8.4  --    BILITOTAL  --  0.4  --    ALKPHOS  --  102  --    ALT  --  14  --    AST  --  24  --        Recent Results (from the past 24 hour(s))   MR Ankle Left w/o & w Contrast    Narrative    MR LEFT ANKLE WITHOUT AND WITH CONTRAST 12/4/2019 9:19 AM     HISTORY: Osteomyelitis suspected, ankle, initial exam.    CONTRAST DOSE:  11 mL Gadavist.    COMPARISON: None available. Recent MR images are of the right ankle.    FINDINGS: Heel pad penetrating wound is present extending to the  medial/plantar margin of the calcaneus. This extends to a large  plantar calcaneal spur. Adjacent medial fibers of the plantar  aponeurosis appear to be thickened and increased in signal intensity,  possibly torn. Lateral fibers of the plantar aponeurosis appear  intact. Adjacent to the wound, there is marked marrow edema and  enhancement within the posterior process of the calcaneus. Defect from  previously removed subtalar arthrodesis cancellous screw extends from  the  calcaneus to the talus. Signal within the screw tract is of  intermediate signal intensity and indeterminate. No adjacent marrow  edema is present within the talus. Also noted is a screw tract with  high signal intensity or fluid extending from the talar head into the  navicular without adjacent marrow edema. Nonspecific small tibiotalar  joint effusion is noted. The Achilles tendon is thickened but of low  signal intensity without evidence of acute tear. Trace fluid is noted  within the peroneal and distal posterior tibial tendon sheaths.  Peroneus brevis longitudinal splitting is noted inferior to the  lateral malleolus. Soft tissue edema is noted about the soft tissue  wound as well as within the hindfoot/ankle. However, no rim-enhancing  soft tissue fluid collection to suggest soft tissue abscess is  evident.      Impression    IMPRESSION:  1. Left heel pad penetrating wound extending to the calcaneus.  Adjacent soft tissue edema and enhancement are noted. MR cannot  distinguish reactive edema from cellulitis. However, no rim-enhancing  soft tissue fluid collection or abscess is evident.  2. Left calcaneus posterior process marrow edema which, given the  adjacent wound, is highly suggestive of osteomyelitis.  3. Subtalar arthrodesis and screw tract extending from the posterior  process of the calcaneus to the talus. No apparent enhancement within  the screw tract or adjacent marrow edema within the talus.  4. Talonavicular arthrodesis screw tract with internal signal  intensity, of indeterminate significance. No adjacent marrow edema is  noted.  5. Trace fluid within the peroneal and distal posterior tibial tendon  sheaths suggesting nonspecific tenosynovitis of indeterminate  significance. Peroneus brevis tendon longitudinal splitting is noted  inferior to the lateral malleolus.  6. Tibiotalar joint nonspecific effusion.

## 2019-12-04 NOTE — PROGRESS NOTES
Gram Stain: No organisms seen  Cell count:  Clotted  CRP: 25.2    Continue to apply dressings bilateral feet per WOC  WBAT with walker  Will follow ankle aspirate labs  MRI results sent to Dr Mosher to review and determine further treatment planning

## 2019-12-04 NOTE — PROGRESS NOTES
[facility]    Referral received for [facility] to provide IV antibiotic therapy. Shalonda has been on service with [facility] in the past and requests to continue with [facility] nursing.  I met with patient at bedside today,  Introduced myself and my role to assist with transition to home on IV antibiotics.    Let patient know I am available M-F and would continue to follow and be available for any questions.       Thank you for the home infusion referral.    JEFF Geller, [facility]  [phone]

## 2019-12-04 NOTE — PROGRESS NOTES
[facility]    Received referral for possible IV abx at discharge for osteomyelitis.  Benefit check completed, 100% coverage, does not need to be homebound for nursing.       Patient previously on service with [facility] in May 2019 for IV abx (vanco Q12 via elastomerc) with [facility] nursing.  Completed therapy, did need cathflo 1x for PICC .      Liaison will follow along for IV needs at discharge: Elizabeth Cason, [phone]     Yaneth See. RN BSN PHN CRNI  [phone] Office  [phone] Fax  [phone] Mobile

## 2019-12-04 NOTE — PHARMACY-VANCOMYCIN DOSING SERVICE
Pharmacy Vancomycin Note  Date of Service 2019  Patient's  1970   49 year old, female    Indication: Osteomyelitis  Goal Trough Level: 15-20 mg/L  Day of Therapy: 3  Current Vancomycin regimen:  1750 mg IV q12h    Current estimated CrCl = Estimated Creatinine Clearance: 115.7 mL/min (based on SCr of 0.72 mg/dL).    Creatinine for last 3 days  2019:  4:52 PM Creatinine 0.75 mg/dL  12/3/2019:  7:02 AM Creatinine 0.73 mg/dL  2019:  6:43 AM Creatinine 0.72 mg/dL    Recent Vancomycin Levels (past 3 days)  2019: 12:37 PM Vancomycin Level 19.2 mg/L    Vancomycin IV Administrations (past 72 hours)                   vancomycin (VANCOCIN) 1,750 mg in sodium chloride 0.9 % 500 mL intermittent infusion (mg) 1,750 mg New Bag 19 1334     1,750 mg New Bag  0122     1,750 mg New Bag 19 1338     1,750 mg New Bag 19 2203                Nephrotoxins and other renal medications (From now, onward)    Start     Dose/Rate Route Frequency Ordered Stop    19 1800  vancomycin (VANCOCIN) 1,750 mg in sodium chloride 0.9 % 500 mL intermittent infusion      1,750 mg  over 2 Hours Intravenous EVERY 12 HOURS 19 1626      19 1615  piperacillin-tazobactam (ZOSYN) 3.375 g vial to attach to  mL bag     Note to Pharmacy:  Pharmacy can adjust dose based on renal function.    3.375 g  over 30 Minutes Intravenous EVERY 6 HOURS 19 1610               Contrast Orders - past 72 hours (72h ago, onward)    Start     Dose/Rate Route Frequency Ordered Stop    19 0845  gadobutrol (GADAVIST) injection 11 mL      11 mL Intravenous ONCE 19 0841 19 0843    19 0915  iopamidol (ISOVUE-M 200) solution 10 mL      10 mL INTRA-ARTICULAR ONCE 19 0906      19 0045  gadobutrol (GADAVIST) injection 10 mL      10 mL Intravenous ONCE 19 0035 19 0048          Interpretation of levels and current regimen:  Trough level is  Therapeutic    Has serum  creatinine changed > 50% in last 72 hours: No    Urine output:  unable to determine    Renal Function: Stable    Plan:  1.  Continue Current Dose  2.  Pharmacy will check trough levels as appropriate in prior to 4th dose (2 days) or sooner if decrease in renal function.    3. Serum creatinine levels will be ordered daily for the first week of therapy and at least twice weekly for subsequent weeks.      Susie Vallejo RPH        .

## 2019-12-04 NOTE — PLAN OF CARE
A&O x4, VSS on RA, wound cleansed and dressing changed bilateral lower heels, heels elevated on pillows. up with assist of 1, Voiding adequately, Pain controlled w/ oxycodone, and dilaudid, Continue to monitor.

## 2019-12-05 LAB
ANION GAP SERPL CALCULATED.3IONS-SCNC: 2 MMOL/L (ref 3–14)
BUN SERPL-MCNC: 9 MG/DL (ref 7–30)
CALCIUM SERPL-MCNC: 7.9 MG/DL (ref 8.5–10.1)
CHLORIDE SERPL-SCNC: 111 MMOL/L (ref 94–109)
CO2 SERPL-SCNC: 25 MMOL/L (ref 20–32)
CREAT SERPL-MCNC: 0.71 MG/DL (ref 0.52–1.04)
ERYTHROCYTE [DISTWIDTH] IN BLOOD BY AUTOMATED COUNT: 27.7 % (ref 10–15)
GFR SERPL CREATININE-BSD FRML MDRD: >90 ML/MIN/{1.73_M2}
GLUCOSE SERPL-MCNC: 120 MG/DL (ref 70–99)
HCT VFR BLD AUTO: 31.3 % (ref 35–47)
HGB BLD-MCNC: 8.9 G/DL (ref 11.7–15.7)
MCH RBC QN AUTO: 19.9 PG (ref 26.5–33)
MCHC RBC AUTO-ENTMCNC: 28.4 G/DL (ref 31.5–36.5)
MCV RBC AUTO: 70 FL (ref 78–100)
PLATELET # BLD AUTO: 271 10E9/L (ref 150–450)
POTASSIUM SERPL-SCNC: 3.4 MMOL/L (ref 3.4–5.3)
RBC # BLD AUTO: 4.48 10E12/L (ref 3.8–5.2)
SODIUM SERPL-SCNC: 138 MMOL/L (ref 133–144)
WBC # BLD AUTO: 4.4 10E9/L (ref 4–11)

## 2019-12-05 PROCEDURE — 85027 COMPLETE CBC AUTOMATED: CPT | Performed by: INTERNAL MEDICINE

## 2019-12-05 PROCEDURE — 25000132 ZZH RX MED GY IP 250 OP 250 PS 637: Performed by: PHYSICIAN ASSISTANT

## 2019-12-05 PROCEDURE — 99233 SBSQ HOSP IP/OBS HIGH 50: CPT | Performed by: INTERNAL MEDICINE

## 2019-12-05 PROCEDURE — 40000257 ZZH STATISTIC CONSULT NO CHARGE VASC ACCESS

## 2019-12-05 PROCEDURE — 25000128 H RX IP 250 OP 636: Performed by: PHYSICIAN ASSISTANT

## 2019-12-05 PROCEDURE — 27210219 ZZH KIT SHRLOCK 5FR DBL LUM PWR P

## 2019-12-05 PROCEDURE — 25800030 ZZH RX IP 258 OP 636: Performed by: PHYSICIAN ASSISTANT

## 2019-12-05 PROCEDURE — 80048 BASIC METABOLIC PNL TOTAL CA: CPT | Performed by: INTERNAL MEDICINE

## 2019-12-05 PROCEDURE — 25000125 ZZHC RX 250: Performed by: INTERNAL MEDICINE

## 2019-12-05 PROCEDURE — 12000000 ZZH R&B MED SURG/OB

## 2019-12-05 PROCEDURE — 36569 INSJ PICC 5 YR+ W/O IMAGING: CPT

## 2019-12-05 PROCEDURE — 36415 COLL VENOUS BLD VENIPUNCTURE: CPT | Performed by: INTERNAL MEDICINE

## 2019-12-05 RX ORDER — LIDOCAINE 40 MG/G
CREAM TOPICAL
Status: DISCONTINUED | OUTPATIENT
Start: 2019-12-05 | End: 2019-12-06 | Stop reason: HOSPADM

## 2019-12-05 RX ORDER — LIDOCAINE 40 MG/G
CREAM TOPICAL
Status: DISCONTINUED | OUTPATIENT
Start: 2019-12-05 | End: 2019-12-05

## 2019-12-05 RX ORDER — HEPARIN SODIUM,PORCINE 10 UNIT/ML
2-5 VIAL (ML) INTRAVENOUS
Status: DISCONTINUED | OUTPATIENT
Start: 2019-12-05 | End: 2019-12-06 | Stop reason: HOSPADM

## 2019-12-05 RX ADMIN — PIPERACILLIN AND TAZOBACTAM 3.38 G: 3; .375 INJECTION, POWDER, LYOPHILIZED, FOR SOLUTION INTRAVENOUS at 04:13

## 2019-12-05 RX ADMIN — CETIRIZINE HYDROCHLORIDE 10 MG: 10 TABLET, FILM COATED ORAL at 08:41

## 2019-12-05 RX ADMIN — HYDROMORPHONE HYDROCHLORIDE 0.5 MG: 1 INJECTION, SOLUTION INTRAMUSCULAR; INTRAVENOUS; SUBCUTANEOUS at 16:56

## 2019-12-05 RX ADMIN — OXYCODONE HYDROCHLORIDE 10 MG: 5 TABLET ORAL at 08:40

## 2019-12-05 RX ADMIN — PIPERACILLIN AND TAZOBACTAM 3.38 G: 3; .375 INJECTION, POWDER, LYOPHILIZED, FOR SOLUTION INTRAVENOUS at 10:03

## 2019-12-05 RX ADMIN — VANCOMYCIN HYDROCHLORIDE 1750 MG: 10 INJECTION, POWDER, LYOPHILIZED, FOR SOLUTION INTRAVENOUS at 01:28

## 2019-12-05 RX ADMIN — PRAVASTATIN SODIUM 20 MG: 20 TABLET ORAL at 19:53

## 2019-12-05 RX ADMIN — HYDROMORPHONE HYDROCHLORIDE 0.5 MG: 1 INJECTION, SOLUTION INTRAMUSCULAR; INTRAVENOUS; SUBCUTANEOUS at 01:36

## 2019-12-05 RX ADMIN — OMEPRAZOLE 40 MG: 20 CAPSULE, DELAYED RELEASE ORAL at 06:32

## 2019-12-05 RX ADMIN — GABAPENTIN 800 MG: 800 TABLET, FILM COATED ORAL at 08:41

## 2019-12-05 RX ADMIN — PIPERACILLIN AND TAZOBACTAM 3.38 G: 3; .375 INJECTION, POWDER, LYOPHILIZED, FOR SOLUTION INTRAVENOUS at 22:52

## 2019-12-05 RX ADMIN — BUSPIRONE HYDROCHLORIDE 15 MG: 5 TABLET ORAL at 08:40

## 2019-12-05 RX ADMIN — BUPROPION HYDROCHLORIDE 300 MG: 300 TABLET, EXTENDED RELEASE ORAL at 08:40

## 2019-12-05 RX ADMIN — OXYCODONE HYDROCHLORIDE 10 MG: 5 TABLET ORAL at 13:30

## 2019-12-05 RX ADMIN — DOCUSATE SODIUM 300 MG: 100 CAPSULE, LIQUID FILLED ORAL at 08:40

## 2019-12-05 RX ADMIN — PIPERACILLIN AND TAZOBACTAM 3.38 G: 3; .375 INJECTION, POWDER, LYOPHILIZED, FOR SOLUTION INTRAVENOUS at 16:29

## 2019-12-05 RX ADMIN — GABAPENTIN 800 MG: 800 TABLET, FILM COATED ORAL at 22:52

## 2019-12-05 RX ADMIN — GABAPENTIN 800 MG: 800 TABLET, FILM COATED ORAL at 16:28

## 2019-12-05 RX ADMIN — HYDROMORPHONE HYDROCHLORIDE 0.5 MG: 1 INJECTION, SOLUTION INTRAMUSCULAR; INTRAVENOUS; SUBCUTANEOUS at 19:53

## 2019-12-05 RX ADMIN — OXYCODONE HYDROCHLORIDE 10 MG: 5 TABLET ORAL at 04:57

## 2019-12-05 RX ADMIN — DOCUSATE SODIUM 300 MG: 100 CAPSULE, LIQUID FILLED ORAL at 22:52

## 2019-12-05 RX ADMIN — SERTRALINE HYDROCHLORIDE 150 MG: 100 TABLET ORAL at 08:41

## 2019-12-05 RX ADMIN — OXYCODONE HYDROCHLORIDE 5 MG: 5 TABLET ORAL at 22:50

## 2019-12-05 RX ADMIN — OMEPRAZOLE 40 MG: 20 CAPSULE, DELAYED RELEASE ORAL at 16:28

## 2019-12-05 RX ADMIN — LIDOCAINE HYDROCHLORIDE ANHYDROUS 1 ML: 10 INJECTION, SOLUTION INFILTRATION at 13:56

## 2019-12-05 RX ADMIN — TOPIRAMATE 50 MG: 50 TABLET, FILM COATED ORAL at 22:51

## 2019-12-05 RX ADMIN — BUSPIRONE HYDROCHLORIDE 15 MG: 5 TABLET ORAL at 22:51

## 2019-12-05 ASSESSMENT — ACTIVITIES OF DAILY LIVING (ADL)
ADLS_ACUITY_SCORE: 11

## 2019-12-05 NOTE — DISCHARGE SUMMARY
[facility]    Discharge Summary  Hospitalist    Date of Admission:  12/2/2019  Date of Discharge:  12/6/2019  Discharging Provider: Sofi Mason  Date of Service (when I saw the patient): 12/06/19    Discharge Diagnoses     1. Sepsis due to right foot cellulitis and bilateral heel osteomyelitis  2. Stage 4 pressure ulcers over bilateral heels  3. Chronic right third toe ulcer  4. Chronic iron deficiency anemia  5. GERD  6. Dyslipidemia  7. Peripheral neuropathy   8. Chronic pain syndrome  9. Major recurrent depressive disorder with anxiety  10. Morbid obesity  11. Possible SLE    History of Present Illness   Please see H&P by Mary العلي PA-C on 12/2/2019    Hospital Course   Shalonda Howard is a 49 year old female with complicated wound history including prior bilateral bunion repair (Aug 2018) with numerous complications including left ankle septic arthritis requiring prior hardware removal, repeated I&Ds, first ray amputation of left foot, and many extended courses of IV antibiotics. She has continued to have non-healing ulcers over both feet predominantly involving her heels, and was directed admitted from wound clinic on 12/2/2019 due to concern for deep infection involving the right foot. She is discharging home with IV antibiotics per home infusion services and ongoing attempt at conservative management . The following problems were addressed during her hospitalization:    Sepsis due to right foot cellulitis, suspected bilateral heel osteomyelitis, and initial concern for right ankle septic arthritis  Stage 4 pressure ulcers over bilateral heels  Chronic right third toe ulcer  * Follows mainly with Dr. Harman who completed her initial bilateral bunion repair in Aug 2018 and managed her subsequent wound complications. She also follows closely with Dr. Travis at [facility]  * Presented from wound clinic with fevers to 102, tachycardia, and increased drainage and pain from right foot despite 2+  week course of PO levofloxacin and doxycycline. On admission, she was empirically initiated on IV vancomycin and pip-tazo. She underwent right ankle aspiration by IR (12/3), and cultures are negative at the time of discharge.  * MRI right ankle (12/2) showed right foot cellulitis without abscess, but with small to moderate tibiotalar joint effusion as well as adjacent marrow edema suggestive of osteomyelitis. Due to non-heeling ulcers noted on left heel, left ankle MRI was ordered (12/4) which also showed evidence of osteomyelitis  * Ortho was consulted on admission, but Dr. Harman is out of town. Dr. Mosher reviewed case with Dr. Travis at [facility], and plan is to continue attempt at conservative management (IV abx, wound care, repeated I&D in the future, and outpatient hyperbaric O2, which she has not previously undergone) knowing that she may fail, but they want to avoid BKS vs near-total calcanectomies which would cause severe leg dysfunction  - 12/2 blood cultures negative to date  - She will be discharged with IV pip-tazo to complete a 6 week course   - She will follow up at [facility] next week for outpatient hyperbaric O2 treatments  - She will follow up with Dr. Travis in wound clinic next week  - She will follow up with Dr. Harman at earliest available     Chronic iron deficiency anemia  FOBT in Sept 2019 negative. Receives periodic outpatient iron infusions. Recent baseline Hgb low 10 range     GERD  Chronic and stable on omeprazole     Dyslipidemia  Chronic and stable on pravastatin     Peripheral neuropathy with chronic pain syndrome  * PTA on gabapentin 800 mg TID and 300 mg qhs prn, topiramate 50 mg qhs, Percocet #1 TID  - Continues on PTA pain regimen at discharge     Major recurrent depressive disorder with anxiety  * PTA on bupropion 300 mg daily, buspirone 15 mg BID, sertraline 150 mg daily, hydroxyzine 25 mg qhs  - Continues on PTA meds     Morbid obesity  BMI 38     Possible SLE  Reports that she was told  by one rheumatologist in the past that she has lupus, and by another that she does not. Regardless, she is not on immunosuppressive therapy    Sofi Mason MD    Significant Results and Procedures   As noted above    Pending Results   These results will be followed up by me, Dr. Bocanegra  Unresulted Labs Ordered in the Past 30 Days of this Admission     Date and Time Order Name Status Description    12/2/2019 1802 Anaerobic bacterial culture Preliminary     12/2/2019 1802 Fluid Culture Aerobic Bacterial Preliminary     12/2/2019 1605 Blood culture Preliminary     12/2/2019 1605 Blood culture Preliminary     11/14/2019 1638 Fungus Culture, non-blood Preliminary           Code Status   Full Code       Primary Care Physician   Linda Bernstein    Physical Exam   Temp: 98.8  F (37.1  C) Temp src: Oral BP: 131/80 Pulse: 73   Resp: 16 SpO2: 98 % O2 Device: None (Room air)    Vitals:    12/02/19 1547   Weight: 104.3 kg (230 lb)     Vital Signs with Ranges  Temp:  [98.4  F (36.9  C)-99.7  F (37.6  C)] 98.8  F (37.1  C)  Pulse:  [73-89] 73  Resp:  [14-16] 16  BP: (108-143)/(63-84) 131/80  SpO2:  [96 %-99 %] 98 %  I/O last 3 completed shifts:  In: 1540 [P.O.:1540]  Out: -     Constitutional: Appears comfortable, NAD  Respiratory: Breathing non-labored. Lungs CTAB - no wheezes/crackles/rhonchi  Cardiovascular: Heart RRR, no m/r/g. No pedal edema.   GI: +BS. Abd soft/NT  Skin/Integument: Bilateral foot dressings in place. Non-healing ulcers over bilateral toes and heels. Status post multiople left toe amputations   Neurologic: Alert and appropriate. CARRERA  Psychiatric: Calm and cooperative    Discharge Disposition   Discharged to home  Condition at discharge: Stable    Consultations This Hospital Stay   1. INFECTIOUS DISEASES IP CONSULT  2. ORTHOPEDICS IP CONSULT  3. WOUND OSTOMY CONTINENCE NURSE  IP CONSULT  4. VASCULAR ACCESS ADULT IP CONSULT  5. CARE TRANSITION RN/SW IP CONSULT    Time Spent on this Encounter   Sofi KEITA  Terri Mason MD, personally saw the patient today and spent 40 minutes discharging this patient.    Discharge Orders      Home infusion referral      Home care nursing referral      Reason for your hospital stay    Complex infection of both heels - you are being discharged with IV antibiotics with a plan to continue complex wound care and follow up for hyperbaric oxygen treatments     Follow-up and recommended labs and tests     Follow up with primary care provider, Linda Bernstein, within 7 days for hospital follow- up.  The following labs/tests are recommended: basic metabolic panel.    Follow up with Dr. Travis as scheduled    Follow up with Dr. Harman for first available appointment     Activity    Your activity upon discharge: activity as tolerated     MD face to face encounter    Documentation of Face to Face and Certification for Home Health Services    I certify that patient: Shalonda Howard is under my care and that I, or a nurse practitioner or physician's assistant working with me, had a face-to-face encounter that meets the physician face-to-face encounter requirements with this patient on: 12/5/2019.    This encounter with the patient was in whole, or in part, for the following medical condition, which is the primary reason for home health care: bilateral calcaneous osteomyelitis.    I certify that, based on my findings, the following services are medically necessary home health services: Nursing and Home Infusion.    My clinical findings support the need for the above services because: Nurse is needed: For complex aftercare of surgical procedures because the patient needs instruction and cannot perform care on their own due to: need for IV antibiotics.    Further, I certify that my clinical findings support that this patient is homebound (i.e. absences from home require considerable and taxing effort and are for medical reasons or Tao[employer] services or infrequently or of short duration when for other  reasons) because: Requires assistance of another person or specialized equipment to access medical services because patient: Has prohibitive pain during ambulation., Is unable to operate assistive equipment on their own. and Range of motion limitations prevents ability to exit home safely...    Based on the above findings. I certify that this patient is confined to the home and needs intermittent skilled nursing care, physical therapy and/or speech therapy.  The patient is under my care, and I have initiated the establishment of the plan of care.  This patient will be followed by a physician who will periodically review the plan of care.  Physician/Provider to provide follow up care: Linda Bernstein    Attending hospital physician (the Medicare certified [facility] provider): Sofi Mason MD  Physician Signature: See electronic signature associated with these discharge orders.  Date: 12/5/2019     Diet    Follow this diet upon discharge: Regular diet     Discharge Medications   Current Discharge Medication List      START taking these medications    Details   piperacillin-tazobactam (ZOSYN) 4-0.5 GM/100ML SOLN IVPB Inject 100 mLs (4.5 g) into the vein every 8 hours CBC with differential, creatinine, SGOT weekly while on this medication to be faxed to Dr. Canada office.  Qty: 33039 mL, Refills: 0    Associated Diagnoses: Osteomyelitis of multiple sites, unspecified type (H)         CONTINUE these medications which have CHANGED    Details   oxyCODONE-acetaminophen (PERCOCET)  MG per tablet Take 1 tablet by mouth 3 times daily as needed for severe pain  Qty: 21 tablet, Refills: 0    Associated Diagnoses: Other acute osteomyelitis, multiple sites (H)         CONTINUE these medications which have NOT CHANGED    Details   buPROPion (WELLBUTRIN XL) 300 MG 24 hr tablet Take 300 mg by mouth daily  Refills: 3      busPIRone (BUSPAR) 15 MG tablet Take 15 mg by mouth 2 times daily      cetirizine (ZYRTEC) 10 MG tablet  Take 10 mg by mouth daily      cholecalciferol (VITAMIN D3) 5000 units TABS tablet Take 10,000 Units by mouth daily       clindamycin (CLEOCIN T) 1 % solution Apply topically nightly as needed (Acne outbreak)       docusate sodium (COLACE) 100 MG capsule Take 300 mg by mouth 2 times daily      furosemide (LASIX) 40 MG tablet Take 160 mg by mouth daily      gabapentin (NEURONTIN) 300 MG capsule Take 1 Capsule by mouth at bedtime as needed. In addition to the 800mg tablets 3x a day  Refills: 3      gabapentin (NEURONTIN) 800 MG tablet Take 800 mg by mouth 3 times daily Am, supper, hs      hydrOXYzine (VISTARIL) 25 MG capsule Take 25 mg by mouth At Bedtime      ibuprofen (ADVIL/MOTRIN) 800 MG tablet TAKE ONE TABLET BY MOUTH TWICE DAILY WITH FOOD AS NEEDED FOR PAIN  Refills: 2      lidocaine-prilocaine (EMLA) 2.5-2.5 % external cream Apply 5 g topically as needed       magic mouthwash suspension, diphenhydrAMINE, lidocaine, aluminum-magnesium & simethicone, (FIRST-MOUTHWASH BLM) compounding kit Take 5 mLs by mouth 2 times daily as needed       NARCAN 4 MG/0.1ML nasal spray Spray 0.1 milliliter by intranasal route in 1 nostril may repeat dose every 2-3 minutes as needed alternating nostrils with each dose  Refills: 1      omeprazole (PRILOSEC) 20 MG DR capsule Take 40 mg by mouth 2 times daily (before meals)       ondansetron (ZOFRAN) 8 MG tablet Take 8 mg by mouth every 8 hours as needed for nausea      phentermine 30 MG capsule Take 30 mg by mouth every morning      polyethylene glycol (MIRALAX/GLYCOLAX) packet Take 17 g by mouth daily as needed for constipation       potassium chloride ER (K-TAB) 20 MEQ CR tablet Take 80 mEq by mouth daily       pravastatin (PRAVACHOL) 20 MG tablet Take 20 mg by mouth every evening      sertraline (ZOLOFT) 100 MG tablet Take 150 mg by mouth daily       topiramate (TOPAMAX) 25 MG tablet Take 50 mg by mouth At Bedtime      !! order for [facility] Phone [phone] Fax  "[phone]    Primary Dressing Hydroferra Blue Ready 4X5   Qty 20  Secondary Dressing Gauze 4X4 Qty 1 loaf  Secondary Dressing Roll gauze 4\"  Qty 30  Secondary Dressing Micropore tape 2\"  Qty 2  Length of Need: 1 month  Frequency of dressing change: daily  Qty: 30 days, Refills: 0    Associated Diagnoses: Pressure ulcer of left heel, stage 3 (H); Pressure injury of right heel, stage 3 (H)      !! order for [facility] Phone [phone] Fax [phone]  Prontosan Wound Irrigation Solution qty 2 bottles  [place]ear size small/[employer] qty 4 sleeves  Qty: 30 days, Refills: 0    Associated Diagnoses: Open wound of third toe of left foot, initial encounter; Pressure ulcer of left heel, stage 3 (H); Pressure injury of right heel, stage 3 (H)       !! - Potential duplicate medications found. Please discuss with provider.      STOP taking these medications       doxycycline hyclate (VIBRAMYCIN) 100 MG capsule Comments:   Reason for Stopping:         levofloxacin (LEVAQUIN) 500 MG tablet Comments:   Reason for Stopping:             Allergies   Allergies   Allergen Reactions     Sulfa Drugs Shortness Of Breath and Rash     Demerol [Meperidine] Nausea and Vomiting     Erythromycin Nausea and Vomiting     Metformin Nausea and Vomiting     Codeine Rash     Penicillins Rash     Data   Most Recent 3 CBC's:  Recent Labs   Lab Test 12/05/19  0618 12/03/19  0702 12/02/19  1652   WBC 4.4 8.8 10.0   HGB 8.9* 9.3* 10.1*   MCV 70* 69* 68*    295 357      Most Recent 3 BMP's:  Recent Labs   Lab Test 12/05/19  0618 12/04/19  0643 12/03/19  0702 12/02/19  1652     --  137 135   POTASSIUM 3.4  --  3.8 4.4   CHLORIDE 111*  --  107 107   CO2 25  --  23 22   BUN 9  --  11 13   CR 0.71 0.72 0.73 0.75   ANIONGAP 2*  --  7 6   PAULINE 7.9*  --  8.2* 8.7   *  --  100* 127*     Most Recent 2 LFT's:  Recent Labs   Lab Test 12/03/19  0702 03/19/19  1941   AST 24 13   ALT 14 18   ALKPHOS 102 121   BILITOTAL 0.4 0.2     Most " Recent INR's and Anticoagulation Dosing History:  Anticoagulation Dose History     There is no flowsheet data to display.        Most Recent 3 Troponin's:No lab results found.  Most Recent Cholesterol Panel:No lab results found.  Most Recent 6 Bacteria Isolates From Any Culture (See EPIC Reports for Culture Details):  Recent Labs   Lab Test 12/03/19  0940 12/02/19  1657 12/02/19  1651 11/21/19  1354 11/14/19  1638 11/04/19  1445   CULT Culture negative monitoring continues  Culture negative monitoring continues No growth after 3 days No growth after 3 days Light growth  Coagulase negative Staphylococcus  Susceptibility testing not routinely done  *  Light growth  Alcaligenes faecalis  * Culture negative after 2 weeks  Light growth  Streptococcus dysgalactiae serogroup C/G  not isolated or reported on routine culture  *  No anaerobes isolated  Heavy growth  Enterobacter cloacae complex  *  Moderate growth  Enterococcus faecalis  *  Light growth  Acinetobacter baumannii complex  * Moderate growth  Granulicatella adiacens  not isolated or reported on routine culture  *  Moderate growth  Prevotella species  Beta lactamase positive  Previously reported as:  No anaerobes isolated  *  CORRECTED ON:  11.11.2019 AT 1040. KVO    No anaerobes isolated  Heavy growth  Streptococcus dysgalactiae serogroup C/G  *  Heavy growth  Staphylococcus aureus  *  Heavy growth  Strain 2  Staphylococcus aureus  *  Heavy growth  Strain 3  Staphylococcus aureus  *  Light growth  Enterobacter cloacae complex  *  Heavy growth  Streptococcus dysgalactiae serogroup C/G  *  Heavy growth  Staphylococcus aureus  *  Moderate growth  Strain 2  Staphylococcus aureus  *     Most Recent TSH, T4 and A1c Labs:  Recent Labs   Lab Test 12/03/19  0702 10/16/18  0632  10/15/18  2125   TSH 1.62 4.36*   < >  --    T4  --  0.85  --   --    A1C  --   --   --  5.6    < > = values in this interval not displayed.     Results for orders placed or  performed during the hospital encounter of 12/02/19   XR Joint Aspiration Intermed Right    Narrative    EXAM: XR JOINT ASPIRATION INTERMED RIGHT  12/3/2019 9:56 AM       History:  Concern for septic joint of right ankle with fluid noted on  MRI.     PROCEDURE: The risks (including bleeding, infection, and allergy to  contrast and medications) and benefits of the procedure were explained  to the patient and consent was obtained.  Using sterile technique and  fluoroscopic guidance, a #20 gauge needle was placed into the right  tibiotalar joint using an anterior approach.  About 4 mL of synovial  fluid was aspirated.  No initial complication. Fluid sent to lab for  analysis.      Fluoroscopy time: 0.1 minutes.  Number of Images: No fluoroscopic images saved.  Medications used: 2 mL 1% lidocaine       Impression    IMPRESSION:  Technically successful right ankle aspiration.    KATIE GUERRA PA-C   MR Ankle Right w/o & w Contrast    Narrative    MRI RIGHT ANKLE WITHOUT AND WITH INTRAVENOUS CONTRAST   12/3/2019 1:08  AM    HISTORY: Fevers. The concern is for a septic joint.    COMPARISON: An MRI on 11/21/2019.    TECHNIQUE: Multiplanar MR imaging was performed through the right  ankle before and after the uneventful intravenous administration of 10  mL Gadavist.    FINDINGS: Again seen is a large open wound along the plantar aspect of  the heel. This includes a small sinus tract extending to the  underlying calcaneus. There is extensive edema and enhancement of the  surrounding soft tissues. However, no distinct abscess is seen. There  are surgical changes of the posterior calcaneus, including a long  screw tract which extends through the posterior subtalar joint into  the talus. There continues to be extensive marrow edema and  enhancement of the posterior two thirds of the calcaneus with  disruption of the cortical surface along its posterior plantar aspect  adjacent to the sinus tract. Again seen is a small to  moderate-sized  tibiotalar joint effusion. There are degenerative changes elsewhere in  the ankle and visualized hindfoot and midfoot. No definite fluid is  seen in the subtalar joint. Again seen is chronic-appearing thickening  of the Achilles tendon. Again seen is disruption of the origin of the  plantar fascia. There has been decrease in fluid surrounding several  of the ankle tendons. No other significant change or abnormality is  noted.      Impression    IMPRESSION:  1. Prominent soft tissue defect along the plantar aspect of the  hindfoot. There is surrounding cellulitis with no evidence of abscess.  2. Surgical changes of the hindfoot as described above. Extensive  marrow edema and enhancement of the posterior two thirds of the  calcaneus is highly suggestive of osteomyelitis.  3. Small to moderate-sized tibiotalar joint effusion. A septic joint  cannot be excluded by imaging. However, there is no extensive adjacent  marrow edema to suggest adjacent osteomyelitis.  4. Again seen is disruption of the origin of the plantar fascia.     BURKE DRIVER MD   MR Ankle Left w/o & w Contrast    Narrative    MR LEFT ANKLE WITHOUT AND WITH CONTRAST 12/4/2019 9:19 AM     HISTORY: Osteomyelitis suspected, ankle, initial exam.    CONTRAST DOSE:  11 mL Gadavist.    COMPARISON: None available. Recent MR images are of the right ankle.    FINDINGS: Heel pad penetrating wound is present extending to the  medial/plantar margin of the calcaneus. This extends to a large  plantar calcaneal spur. Adjacent medial fibers of the plantar  aponeurosis appear to be thickened and increased in signal intensity,  possibly torn. Lateral fibers of the plantar aponeurosis appear  intact. Adjacent to the wound, there is marked marrow edema and  enhancement within the posterior process of the calcaneus. Defect from  previously removed subtalar arthrodesis cancellous screw extends from  the calcaneus to the talus. Signal within the screw  tract is of  intermediate signal intensity and indeterminate. No adjacent marrow  edema is present within the talus. Also noted is a screw tract with  high signal intensity or fluid extending from the talar head into the  navicular without adjacent marrow edema. Nonspecific small tibiotalar  joint effusion is noted. The Achilles tendon is thickened but of low  signal intensity without evidence of acute tear. Trace fluid is noted  within the peroneal and distal posterior tibial tendon sheaths.  Peroneus brevis longitudinal splitting is noted inferior to the  lateral malleolus. Soft tissue edema is noted about the soft tissue  wound as well as within the hindfoot/ankle. However, no rim-enhancing  soft tissue fluid collection to suggest soft tissue abscess is  evident.      Impression    IMPRESSION:  1. Left heel pad penetrating wound extending to the calcaneus.  Adjacent soft tissue edema and enhancement are noted. MR cannot  distinguish reactive edema from cellulitis. However, no rim-enhancing  soft tissue fluid collection or abscess is evident.  2. Left calcaneus posterior process marrow edema which, given the  adjacent wound, is highly suggestive of osteomyelitis.  3. Subtalar arthrodesis and screw tract extending from the posterior  process of the calcaneus to the talus. No apparent enhancement within  the screw tract or adjacent marrow edema within the talus.  4. Talonavicular arthrodesis screw tract with internal signal  intensity, of indeterminate significance. No adjacent marrow edema is  noted.  5. Trace fluid within the peroneal and distal posterior tibial tendon  sheaths suggesting nonspecific tenosynovitis of indeterminate  significance. Peroneus brevis tendon longitudinal splitting is noted  inferior to the lateral malleolus.  6. Tibiotalar joint nonspecific effusion.    SHIRLEY BETTS MD

## 2019-12-05 NOTE — CONSULTS
Renetta is a pleasant 49-year-old female with past medical history significant for idiopathic neuropathy, chronic bilateral heel ulcers, and multiple previous foot surgeries including a left ankle irrigation and debridement with hardware removal performed earlier this spring and a left first ray resection for treatment of osteomyelitis which developed after a wound infection from a bunion surgery.  I have known Renetta after assisting my partner Dr. Harman with one of the patient's left hallux irrigation and debridement procedures.  I was asked to evaluate Ms. Howard's imaging studies as Dr. Harman is currently out of town and unable to perform any urgent operative intervention if required.  The patient has developed worsening changes involving both of her heel ulcers with penetration of the ulcers down to bone.  Imaging studies obtained over the past few days have demonstrated concern for calcaneal osteomyelitis.  The patient has been undergoing wound care with Dr. Travis and I have spoken personally with Dr. Travis about Ms. Howard's continued care.  The patient was admitted to the hospital earlier this week as she had been developing fevers and signs of worsening infection.  The patient has been maintained on broad-spectrum IV antibiotics over the past few days.  The patient denies any current fevers or chills.  Blood cultures have been negative.  Cultures obtained from the right heel ulcer in the middle of November demonstrated a polymicrobial infection.  Right ankle aspiration cultures have been no growth to date.    The bilateral feet remain bandaged on examination today.  There is no significant erythema throughout the toes and the patient is able to wiggle her toes without significant difficulty.    The patient's previous culture results were personally reviewed, demonstrating the above findings.    MRI scans of the bilateral ankles and hindfeet obtained over the past 2 days were personally reviewed.  MRI of the  right ankle and hindfoot obtained on 12/3/2019 demonstrates extensive bone marrow edema and erosive changes around the right calcaneus, certainly concerning for osteomyelitis in light of the current ulcer down to bone.  There is no soft tissue abscess or fluid collection.  There is no concern for involvement of the Achilles or the ankle or subtalar joints.  MRI scan of the left ankle and hindfoot obtained on 12/4/2019 demonstrates more limited bone marrow edema around the left calcaneus directly underlying the plantar ulcer, also concerning for osteomyelitis.  Once again, there is no soft tissue abscess or fluid collection.  There is no concern for involvement of the Achilles or the ankle or subtalar joints.    Based on the patient's present ulcers and MRI findings, diagnosis would remain consistent with calcaneal osteomyelitis with associated neuropathic ulcers bilaterally.  Presently, given the extent of involvement involving the right ankle and hindfoot, surgical treatment options are poor and would include a near-total calcanectomy (which would leave a very dysfunctional right leg though the patient could bear weight on the foot) versus a below-knee amputation.  I discussed at length with Dr. Travis conservative treatment options for Ms. Howard's ulcers and presumed osteomyelitis.  Dr. Travis felt it would be worth pursuing continued antibiotic management, wound care, continued debridements and irrigation of the wound with an antimicrobial solution, and hyperbaric oxygen treatment.  I do not have any concerns about pursuing this treatment in light of the poor surgical options available.  Certainly if the patient would develop any signs of sepsis, worsening infection, or failure of the above treatment, we can proceed with a calcanectomy or below-knee amputation.  There is no urgent need to proceed to the operating room at this point as the patient's clinical status has improved.  I would be in favor of transfer for  the patient to the [facility] for hyperbaric oxygen treatment as Dr. Travis has recommended.  The patient may follow-up with Dr. Harman as an outpatient over the next few weeks.  In Dr. Harman's absence over the next few days, please feel free to contact my clinic with any questions or concerns.  Any additional questions after the weekend can be directed to Dr. Harman's clinic.

## 2019-12-05 NOTE — PROGRESS NOTES
A/Ox4. Ax1 with gait belt and scooter. VSS on RA. CMS intact. Dressing C/D/I. Pain controlled with Oxycodone and Dilaudid. Tolerating Reg Diet. Progressing per POC. Will Cont to monitor.

## 2019-12-05 NOTE — PLAN OF CARE
Pt is A & O x $. Lungs sound clear, bowel sounds active , cms intact except for edema in BLE ankle and feet. Up with SBA and walker. WBAT. Pt's knee scooter in room. Received oxycodone and IV dilaudid for pain. On Vanco and zosyn. SL. Will continue to monitor.

## 2019-12-05 NOTE — PROGRESS NOTES
Hospital record reviewed, on IV antibiotics, PICC line not placed yet.  Dressing change, right and left heels, granulation tissue, tract on right heel with palpable bone, no palpable bone left heel.  Edema controlled.  Right and left dorsalis pedis pulses intact.  MRI left heel reviewed, suggestive of left calcaneal osteomyelitis.  Hardware is present within the left ankle.  Aspiration was performed.  Right ankle aspiration negative to date.  I spoke with the physician at [facility] hyperbaric oxygen chamber, patient is to call phone #[phone] to make appointment for next week, they have [facility] and will look at medical records through care everywhere.  She will also follow-up with me next week in wound clinic at [facility] and reevaluate for total contact casting and potential for bilateral total contact casting which he tolerated previously.

## 2019-12-05 NOTE — PROGRESS NOTES
[facility]    Hospitalist Progress Note    Date of Service (when I saw the patient): 12/05/2019    Assessment & Plan   Shalonda Howard is a 49 year old female with complicated wound history including prior bilateral bunion repair (Aug 2018) with numerous complications including left ankle septic arthritis requiring prior hardware removal, repeated I&Ds, first ray amputation of left foot, and many extended courses of IV antibiotics. She has continued to have non-healing ulcers over both feet, and was directed admitted from wound clinic on 12/2/2019 due to concern for deep infection involving the right foot.     Sepsis due to right foot cellulitis, suspected bilateral heel osteomyelitis, and initial concern for right ankle septic arthritis  Stage 4 pressure ulcers over bilateral heels  Chronic right third toe ulcer\  * Follows with Dr. Harman for complicated wound history dating back to Aug 2018 when she had her initial bilateral bunion repair.   * Presented from wound clinic with fevers to 102, tachycardia, and increased drainage and pain from right foot despite 2+ week course of PO levofloxacin and doxycycline. On admission, she was empirically initiated on IV vancomycin and pip-tazo.   * MRI right ankle (12/2) showed right foot cellulitis without abscess, but with small to moderate tibiotalar joint effusion as well as adjacent marrow edema suggestive of osteomyelitis.  * Due to non-heeling ulcers noted on left heel, left ankle MRI was ordered (12/4) which showed evidence of osteomyelitis  * Ortho discussed case with Dr. Travis at [facility], and plan is to continue attempt at conservative management (abx, repeated I&D, wound care, and hyperbaric O2) knowing that she may fail, but they want to avoid near-total calcanectomy vs BKA  - Continues on IV vancomycin and pip-tazo  - s/p right ankle aspiration by IR on 12/3. Cultures NGTD  - 12/2 blood cultures negative to date  - Will await ID recommendations for  outpatient IV antibiotics, and tentatively coordinate discharge for tomorrow. Dr. Travis at [facility] is arranging outpatient hyperbaric O2 tx.    Chronic iron deficiency anemia  FOBT in Sept 2019 negative. Receives periodic outpatient iron infusions. Recent baseline Hgb low 10 range    GERD  Chronic and stable on omeprazole    Dyslipidemia  Chronic and stable on pravastatin    Peripheral neuropathy with chronic pain syndrome  * PTA on gabapentin 800 mg TID and 300 mg qhs prn, topiramate 50 mg qhs, Percocet #1 TID  - Continues on PTA gabapentin and topiramate  - Oxycodone and Dilaudid available PRN    Major recurrent depressive disorder with anxiety  * PTA on bupropion 300 mg daily, buspirone 15 mg BID, sertraline 150 mg daily, hydroxyzine 25 mg qhs  - Continues on PTA meds    Morbid obesity  BMI 38    Possible SLE  Reports that she was told by one rheumatologist in the past that she has lupus, and by another that she does not. Regardless, she is not on immunosuppressive therapy    FEN: Regular diet  DVT Prophylaxis: Pneumatic Compression Devices  Code Status: Full Code    Disposition: Expected discharge with outpatient IV antibiotics, likely tomorrow    Sofi Mason    Interval History   No events overnight. Pain stable. Case reviewed with Dr. Mosher - no urgent need to operate, wants to attempt conservative management with extended course of IV antibiotics and hyperbaric O2 which [facility] MD while coordinate.  - Place PICC  - Continue Vanc and Zosyn    Time Spent on this Encounter   I spent 50 minutes on the unit/floor managing the care of Shalonda Howard. Over 50% of my time was spent on the following:   - Counseling the patient and/or family regarding: diagnostic results, prognosis, risks and benefits of treatment options and recommended follow-up  - Coordination of care with the: consultant(s), care coordinator/ and nurse    Sofi Mason MD    -Data reviewed today: I reviewed all new labs and  imaging results over the last 24 hours. I personally reviewed no new images or EKGs today    Physical Exam   Temp: 98.4  F (36.9  C) Temp src: Oral BP: 135/79 Pulse: 74   Resp: 16 SpO2: 96 % O2 Device: None (Room air)    Vitals:    12/02/19 1547   Weight: 104.3 kg (230 lb)     Vital Signs with Ranges  Temp:  [98.4  F (36.9  C)-99.7  F (37.6  C)] 98.4  F (36.9  C)  Pulse:  [74-89] 74  Resp:  [14-16] 16  BP: (108-143)/(63-84) 135/79  SpO2:  [96 %-99 %] 96 %  I/O last 3 completed shifts:  In: 600 [P.O.:600]  Out: -     Constitutional: Appears comfortable, NAD  Respiratory: Breathing non-labored. Lungs CTAB - no wheezes, crackles, or rhonchi  Cardiovascular: Heart RRR, no m/r/g. No pedal edema  GI: +BS, abd soft/NT  Skin/Integument: Right foot dressing in place. Non-healing ulcers over bilateral toes and heels  Musculoskeletal: Normal muscle bulk and tone  Neuro: Alert and appropriate, CARRERA  Psych: Calm and cooperative    Medications       buPROPion  300 mg Oral Daily     busPIRone  15 mg Oral BID     cetirizine  10 mg Oral Daily     docusate sodium  300 mg Oral BID     gabapentin  800 mg Oral TID     iopamidol  10 mL INTRA-ARTICULAR Once     omeprazole  40 mg Oral BID AC     piperacillin-tazobactam  3.375 g Intravenous Q6H     pravastatin  20 mg Oral QPM     sertraline  150 mg Oral Daily     sodium chloride (PF)  10 mL INTRA-ARTICULAR Once     sodium chloride (PF)  3 mL Intracatheter Q8H     topiramate  50 mg Oral At Bedtime     Data   Recent Labs   Lab 12/05/19  0618 12/04/19  0643 12/03/19  0702 12/02/19  1652   WBC 4.4  --  8.8 10.0   HGB 8.9*  --  9.3* 10.1*   MCV 70*  --  69* 68*     --  295 357     --  137 135   POTASSIUM 3.4  --  3.8 4.4   CHLORIDE 111*  --  107 107   CO2 25  --  23 22   BUN 9  --  11 13   CR 0.71 0.72 0.73 0.75   ANIONGAP 2*  --  7 6   PAULINE 7.9*  --  8.2* 8.7   *  --  100* 127*   ALBUMIN  --   --  2.8*  --    PROTTOTAL  --   --  8.4  --    BILITOTAL  --   --  0.4  --     ALKPHOS  --   --  102  --    ALT  --   --  14  --    AST  --   --  24  --        No results found for this or any previous visit (from the past 24 hour(s)).

## 2019-12-05 NOTE — PROGRESS NOTES
[facility]    Infectious Disease Progress Note    Date of Service (when I saw the patient): 12/05/2019     Assessment & Plan   Shalonda Howard is a 49 year old female who was admitted on 12/2/2019.     Impression:  1. 49 y.o familiar to me from multiple prior admissions.   2. She has a history of bilateral bunion repair surgery done in August 2018. The right side healed intially but the left side the incision did not heel which led to I and D and 6 weeks of IV antibiotics and many more weeks of oral antibiotics since the end of August 2018. Then repeat admission in November 2018, more I and D and another course of IV antibiotics, then followed by another admission in January 2019 for wound non healing, s/p another I and D and 2 antibiotics for 6 weeks. Readmitted in March 2019 with continued wound non healing, concern for underlying osteo, s/p first ray amputation, hardware removal and ulcer debridement 3/20.  3. Admission in  April for left septic ankle joint with staph hominis and staph epi in the cultures, This time the last remaining piece of hardware in the left foot was also taken out. Then again admitted in May for more I and D.   4. She also has chronic heel ulcers bilateral.   5. PCN allergy, tolerates zosyn/cephalosporins, sulfa allergy.   6. Now admitted with continued heel ulcers bilateral but tight sided pain in the ankle, worse wound then the left. Concern for osteo in the heel on the right, and fluid in the ankle joint. She still has hardware in this foot and ankle.   7. She was started on broad spectrum antibiotics with Vancomycin and zosyn.      Recommendations:   S/p aspiration on the right ankle joint, cultures pending. So far no growth.   Can stop vancomycin.   Previous cultures from the right heel bone positive for : enterococcus, acinetobacter, enterobacter ( 11/19)  And streptococcus, in a previous cultures, left heel bone not cultures recently but the ulcer cultures had MSSA  and Strep in it, all covered by zosyn.     Noted long and detailed discussion with Ortho and vascular surgery, options moving onwards with MRI positive for osteo in b/l heel   [place] [facility] [place] and regular follow up at vascular. Patient leaning towards HBO...  I will have her go on IV zosyn that will cover all the known recent organisms.     Picc today, orders for home IV antibiotics in the chart.          Ignacia Bocanegra MD    Interval History   No fever  MRI as above   Cultures are pending     Physical Exam   Temp: 98.4  F (36.9  C) Temp src: Oral BP: 135/79 Pulse: 74   Resp: 16 SpO2: 96 % O2 Device: None (Room air)    Vitals:    12/02/19 1547   Weight: 104.3 kg (230 lb)     Vital Signs with Ranges  Temp:  [98  F (36.7  C)-99.7  F (37.6  C)] 98.4  F (36.9  C)  Pulse:  [74-89] 74  Resp:  [14-16] 16  BP: (108-143)/(63-84) 135/79  SpO2:  [96 %-99 %] 96 %    Constitutional: Awake, alert, cooperative, no apparent distress  Lungs: Clear to auscultation bilaterally, no crackles or wheezing  Cardiovascular: Regular rate and rhythm, normal S1 and S2, and no murmur noted  Abdomen: Normal bowel sounds, soft, non-distended, non-tender  Skin: No rashes, no cyanosis, no edema  Other:    Medications       buPROPion  300 mg Oral Daily     busPIRone  15 mg Oral BID     cetirizine  10 mg Oral Daily     docusate sodium  300 mg Oral BID     gabapentin  800 mg Oral TID     iopamidol  10 mL INTRA-ARTICULAR Once     omeprazole  40 mg Oral BID AC     piperacillin-tazobactam  3.375 g Intravenous Q6H     pravastatin  20 mg Oral QPM     sertraline  150 mg Oral Daily     sodium chloride (PF)  10 mL INTRA-ARTICULAR Once     sodium chloride (PF)  3 mL Intracatheter Q8H     topiramate  50 mg Oral At Bedtime       Data   All microbiology laboratory data reviewed.  Recent Labs   Lab Test 12/05/19  0618 12/03/19  0702 12/02/19  1652   WBC 4.4 8.8 10.0   HGB 8.9* 9.3* 10.1*   HCT 31.3* 32.9* 35.3   MCV 70* 69* 68*     295 357     Recent Labs   Lab Test 12/05/19  0618 12/04/19  0643 12/03/19  0702   CR 0.71 0.72 0.73     Recent Labs   Lab Test 12/03/19  0702   SED 48*     Recent Labs   Lab Test 12/03/19  0940 12/02/19  1657 12/02/19  1651 11/21/19  1354 11/14/19  1638 11/04/19  1445 05/22/19  1848 05/22/19  1728 04/22/19  1446   CULT Culture negative monitoring continues  Culture negative monitoring continues No growth after 3 days No growth after 3 days Light growth  Coagulase negative Staphylococcus  Susceptibility testing not routinely done  *  Light growth  Alcaligenes faecalis  * Culture negative after 2 weeks  Light growth  Streptococcus dysgalactiae serogroup C/G  not isolated or reported on routine culture  *  No anaerobes isolated  Heavy growth  Enterobacter cloacae complex  *  Moderate growth  Enterococcus faecalis  *  Light growth  Acinetobacter baumannii complex  * Moderate growth  Granulicatella adiacens  not isolated or reported on routine culture  *  Moderate growth  Prevotella species  Beta lactamase positive  Previously reported as:  No anaerobes isolated  *  CORRECTED ON:  11.11.2019 AT 1040. KVO    No anaerobes isolated  Heavy growth  Streptococcus dysgalactiae serogroup C/G  *  Heavy growth  Staphylococcus aureus  *  Heavy growth  Strain 2  Staphylococcus aureus  *  Heavy growth  Strain 3  Staphylococcus aureus  *  Light growth  Enterobacter cloacae complex  *  Heavy growth  Streptococcus dysgalactiae serogroup C/G  *  Heavy growth  Staphylococcus aureus  *  Moderate growth  Strain 2  Staphylococcus aureus  * No growth No growth No anaerobes isolated  On day 2, isolated in broth only:  Staphylococcus epidermidis  *  Critical Value/Significant Value, preliminary result only, called to and read back by  Tyra Carlos RN at 0944 on 4.24.19 [facility].         Attestation:  Total time on the floor involved in the patient's care: 35 minutes. Total time spent in counseling/care coordination: >50%

## 2019-12-06 ENCOUNTER — TELEPHONE (OUTPATIENT)
Dept: WOUND CARE | Facility: CLINIC | Age: 49
End: 2019-12-06

## 2019-12-06 ENCOUNTER — HOME INFUSION (PRE-WILLOW HOME INFUSION) (OUTPATIENT)
Dept: PHARMACY | Facility: CLINIC | Age: 49
End: 2019-12-06

## 2019-12-06 VITALS
WEIGHT: 230 LBS | SYSTOLIC BLOOD PRESSURE: 131 MMHG | RESPIRATION RATE: 16 BRPM | HEIGHT: 65 IN | TEMPERATURE: 98.3 F | DIASTOLIC BLOOD PRESSURE: 76 MMHG | BODY MASS INDEX: 38.32 KG/M2 | OXYGEN SATURATION: 97 % | HEART RATE: 68 BPM

## 2019-12-06 DIAGNOSIS — M86.9 OSTEOMYELITIS (H): Primary | ICD-10-CM

## 2019-12-06 PROCEDURE — 40000901 ZZH STATISTIC WOC PT EDUCATION, 0-15 MIN

## 2019-12-06 PROCEDURE — 40000239 ZZH STATISTIC VAT ROUNDS

## 2019-12-06 PROCEDURE — G0463 HOSPITAL OUTPT CLINIC VISIT: HCPCS

## 2019-12-06 PROCEDURE — 25000128 H RX IP 250 OP 636: Performed by: PHYSICIAN ASSISTANT

## 2019-12-06 PROCEDURE — 99239 HOSP IP/OBS DSCHRG MGMT >30: CPT | Performed by: INTERNAL MEDICINE

## 2019-12-06 PROCEDURE — 40000257 ZZH STATISTIC CONSULT NO CHARGE VASC ACCESS

## 2019-12-06 PROCEDURE — 25000132 ZZH RX MED GY IP 250 OP 250 PS 637: Performed by: PHYSICIAN ASSISTANT

## 2019-12-06 RX ORDER — OXYCODONE AND ACETAMINOPHEN 10; 325 MG/1; MG/1
1 TABLET ORAL 3 TIMES DAILY PRN
Qty: 21 TABLET | Refills: 0 | Status: ON HOLD | OUTPATIENT
Start: 2019-12-06 | End: 2020-04-01

## 2019-12-06 RX ADMIN — BUSPIRONE HYDROCHLORIDE 15 MG: 5 TABLET ORAL at 08:37

## 2019-12-06 RX ADMIN — CETIRIZINE HYDROCHLORIDE 10 MG: 10 TABLET, FILM COATED ORAL at 08:37

## 2019-12-06 RX ADMIN — PIPERACILLIN AND TAZOBACTAM 3.38 G: 3; .375 INJECTION, POWDER, LYOPHILIZED, FOR SOLUTION INTRAVENOUS at 04:46

## 2019-12-06 RX ADMIN — PIPERACILLIN AND TAZOBACTAM 3.38 G: 3; .375 INJECTION, POWDER, LYOPHILIZED, FOR SOLUTION INTRAVENOUS at 10:17

## 2019-12-06 RX ADMIN — BUPROPION HYDROCHLORIDE 300 MG: 300 TABLET, EXTENDED RELEASE ORAL at 08:37

## 2019-12-06 RX ADMIN — OXYCODONE HYDROCHLORIDE 10 MG: 5 TABLET ORAL at 08:37

## 2019-12-06 RX ADMIN — HYDROMORPHONE HYDROCHLORIDE 0.5 MG: 1 INJECTION, SOLUTION INTRAMUSCULAR; INTRAVENOUS; SUBCUTANEOUS at 04:46

## 2019-12-06 RX ADMIN — DOCUSATE SODIUM 300 MG: 100 CAPSULE, LIQUID FILLED ORAL at 08:37

## 2019-12-06 RX ADMIN — GABAPENTIN 800 MG: 800 TABLET, FILM COATED ORAL at 08:37

## 2019-12-06 RX ADMIN — SERTRALINE HYDROCHLORIDE 150 MG: 100 TABLET ORAL at 08:37

## 2019-12-06 RX ADMIN — OMEPRAZOLE 40 MG: 20 CAPSULE, DELAYED RELEASE ORAL at 08:37

## 2019-12-06 ASSESSMENT — ACTIVITIES OF DAILY LIVING (ADL)
ADLS_ACUITY_SCORE: 11

## 2019-12-06 NOTE — PROGRESS NOTES
[facility] Nurse Inpatient Wound Assessment   Reason for consultation:  Chronic bilateral heels full thickness to bone  neuropathic ulcers with calcaneous osteo     Assessment  Bilateral heels: full thickness to bone, granulation with moderate sero-sang drainage. Pt denies pain. Pt very knowledgeable re: wound care and POC. Requested by Dr. Travis to instruct pt and provide Epic instructions for wound care utilizing Prontosan    Treatment Plan  Bilateral heel wounds: change daily  1. Clean wounds with Prontosan wound Irrigation solution  (sent home with patient)   2. Dampen gauze with Prontosan and pack into wound bed (cut off excess  3. Cover with a foam dressing ( Optifoam; allevyn; Mepilex)  4. Apply edemawear  5. Cover ABD or 4 x 4 depending on drainage  6. Secure Kim    F/U [facility] as scheduled      Orders reviewed and placed in Epic  Follow-up: pt to follow up with Dr. Travis as scheduled  Nursing to notify the Provider(s) and re-consult the [facility] Nurse if wound(s) deteriorates or new skin concern.    Patient History  According to provider note(s):    Shalonda Howard is a 49 year old female with complicated wound history including prior bilateral bunion repair (Aug 2018) with numerous complications including left ankle septic arthritis requiring prior hardware removal, repeated I&Ds, first ray amputation of left foot, and many extended courses of IV antibiotics. She has continued to have non-healing ulcers over both feet, and was directed admitted from wound clinic on 12/2/2019 due to concern for deep infection involving the right foot.      Sepsis due to right foot cellulitis, suspected bilateral heel osteomyelitis, and initial concern for right ankle septic arthritis  Stage 4 pressure ulcers over bilateral heels  Chronic right third toe ulcer\  * Follows with Dr. Harman for complicated wound history dating back to Aug 2018 when she had her initial bilateral bunion repair.   * Presented from  wound clinic with fevers to 102, tachycardia, and increased drainage and pain from right foot despite 2+ week course of PO levofloxacin and doxycycline. On admission, she was empirically initiated on IV vancomycin and pip-tazo.   * MRI right ankle (12/2) showed right foot cellulitis without abscess, but with small to moderate tibiotalar joint effusion as well as adjacent marrow edema suggestive of osteomyelitis.  * Due to non-heeling ulcers noted on left heel, left ankle MRI was ordered (12/4) which showed evidence of osteomyelitis  * Ortho discussed case with Dr. Travis at [facility], and plan is to continue attempt at conservative management (abx, repeated I&D, wound care, and hyperbaric O2) knowing that she may fail, but they want to avoid near-total calcanectomy vs BKA  - Continues on IV vancomycin and pip-tazo  - s/p right ankle aspiration by IR on 12/3. Cultures NGTD  - 12/2 blood cultures negative to date  Objective Data  Active Diet Order:  Orders Placed This Encounter      Regular Diet Adult      Diet    Output:   I/O last 3 completed shifts:  In: 1440 [P.O.:1440]  Out: -     Risk Assessment:   Sensory Perception: 3-->slightly limited  Moisture: 4-->rarely moist  Activity: 3-->walks occasionally  Mobility: 3-->slightly limited  Nutrition: 3-->adequate  Friction and Shear: 3-->no apparent problem  Eduardo Score: 19                          Labs:   Recent Labs   Lab 12/05/19  0618 12/03/19  0702   ALBUMIN  --  2.8*   HGB 8.9* 9.3*   WBC 4.4 8.8   CRP  --  25.2*       Physical Exam  Wound History:   Based on the patient's present ulcers and MRI findings, diagnosis would remain consistent with calcaneal osteomyelitis with associated neuropathic ulcers bilaterally.  Presently, given the extent of involvement involving the right ankle and hindfoot, surgical treatment options are poor and would include a near-total calcanectomy (which would leave a very dysfunctional right leg though the patient could bear weight on the  foot) versus a below-knee amputation.  I discussed at length with Dr. Travis conservative treatment options for Ms. Howard's ulcers and presumed osteomyelitis.  Dr. Travis felt it would be worth pursuing continued antibiotic management, wound care, continued debridements and irrigation of the wound with an antimicrobial solution, and hyperbaric oxygen treatment.  I do not have any concerns about pursuing this treatment in light of the poor surgical options available.  Certainly if the patient would develop any signs of sepsis, worsening infection, or failure of the above treatment, we can proceed with a calcanectomy or below-knee amputation.  There is no urgent need to proceed to the operating room at this point as the patient's clinical status has improved.  I would be in favor of transfer for the patient to the [place] for hyperbaric oxygen treatment as Dr. Travis has recommended.  The patient may follow-up with Dr. Harman as an outpatient over the next few weeks.  In Dr. Harman's absence over the next few days, please feel free to contact my clinic with any questions or concerns.  Any additional questions after the weekend can be directed to Dr. Harman's clinic.      [facility] photo 12-2-19 Rt heel      [facility] photo Lt roberta 12-2-19      Per Dr Travis Progress note:  Dressing change, right and left heels, granulation tissue, tract on right heel with palpable bone, no palpable bone left heel.  Edema controlled.  Right and left dorsalis pedis pulses intact.  MRI left heel reviewed, suggestive of left calcaneal osteomyelitis.  Hardware is present within the left ankle.  Aspiration was performed.  Right ankle aspiration negative to date.    Interventions  Current support surface: atmos air  Current off-loading measures: pillows with heel suspension  Visual inspection of wound(s) completed  Wound Care: dressing changed  Supplies: Prontosan Irrigation and dressing supplies for home. Pt has edemawear @ home  Education provided:  wound care protocol with [place]  Discussed plan of care with pt and nursing    Anitra Pringle RN [facility]

## 2019-12-06 NOTE — PROGRESS NOTES
[facility]    Confirmed patient will discharge today on Zosyn Q 8 hrs via home pump.  Writer reviewed administration with patient and pt indicates she is comfortable administering on her own.  Reviewed infection prevention, scrub hub, storage, proper flushing technique and hand hygiene Extension tubing added to PICC, line flushed without resistance and had brisk blood return.  Currently has small gauze pressure dressing over PICC insertion due to bleeding after PICC insertion; PICC team to change this prior to discharge today.     Delivery of med and supplies will be to patient's home by 5 PM today.  Patient will administer two doses today at home.   Pending sale to [facility] RN will see patient tomorrow for ILIA. Pending sale to [facility] liaison Jihan is aware.    Patient in agreement with the above plan. All questions answered.    JEFF Geller, SARAH  [facility]  [phone]

## 2019-12-06 NOTE — PROGRESS NOTES
A/Ox4. Activity-Independent w/scooter. VSS on RA. CMS intact. Dressing C/D/I. Pain controlled with IV Dilaudid and Oxycodone. Tolerating Reg Diet. Progressing per POC. Will Cont to monitor.

## 2019-12-06 NOTE — PLAN OF CARE
Pt A&Ox4, VSS, CMS intact at baseline, up with SBA and knee scooter to BR, voiding well, tolerating regular diet, PICC line placed to RUE, dressings C,D,I, changed by MD today, oxycodone/IV dilaudid for pain management.

## 2019-12-06 NOTE — TELEPHONE ENCOUNTER
LVM on [facility] and let them know we can fax the information to them on Monday. We just need the fax number.

## 2019-12-06 NOTE — TELEPHONE ENCOUNTER
Shalonda wants to be seen for hyperbaric therapt. They need a referral and other documentation    Yolanda @ [facility]  [phone]

## 2019-12-06 NOTE — PLAN OF CARE
Pt A&Ox4, VSS, CMS intact with baseline neuropathy, dressings changed by WOC RN, pt educated on home wound care daily, up independently with knee scooter, tolerating regular diet, voiding well, oxycodone for pain management, PICC line patent. Pt discharged to home with home health services arranged. Pt given info to call and arrange consult for hyperbaric oxygen treatment at [facility]. Pt discharged to home with spouse. Pt verbalizes understanding of discharge instructions/medications/follow up plan.

## 2019-12-06 NOTE — DISCHARGE INSTRUCTIONS
Bilateral heel wounds: change daily  1. Clean wounds with Prontosan wound Irrigation solution  (sent home with patient)   2. Dampen gauze with Prontosan and pack into wound bed (cut off excess  3. Cover with a foam dressing ( Optifoam; allevyn; Mepilex)  4. Apply edemawear  5. Cover ABD or 4 x 4 depending on drainage  6. Secure Kim    F/U [facility] as scheduled

## 2019-12-07 ENCOUNTER — HOME INFUSION (PRE-WILLOW HOME INFUSION) (OUTPATIENT)
Dept: PHARMACY | Facility: CLINIC | Age: 49
End: 2019-12-07

## 2019-12-08 LAB
BACTERIA SPEC CULT: NO GROWTH
Lab: NORMAL
Lab: NORMAL
SPECIMEN SOURCE: NORMAL

## 2019-12-09 ENCOUNTER — HOSPITAL ENCOUNTER (OUTPATIENT)
Dept: WOUND CARE | Facility: CLINIC | Age: 49
Discharge: HOME | End: 2019-12-09
Attending: SURGERY | Admitting: SURGERY
Payer: COMMERCIAL

## 2019-12-09 VITALS
TEMPERATURE: 98.4 F | HEART RATE: 101 BPM | DIASTOLIC BLOOD PRESSURE: 71 MMHG | SYSTOLIC BLOOD PRESSURE: 145 MMHG | RESPIRATION RATE: 17 BRPM

## 2019-12-09 DIAGNOSIS — L89.613 PRESSURE INJURY OF RIGHT HEEL, STAGE 3 (H): ICD-10-CM

## 2019-12-09 DIAGNOSIS — S91.105D OPEN WOUND OF THIRD TOE OF LEFT FOOT, SUBSEQUENT ENCOUNTER: ICD-10-CM

## 2019-12-09 DIAGNOSIS — L89.623 PRESSURE ULCER OF LEFT HEEL, STAGE 3 (H): ICD-10-CM

## 2019-12-09 PROCEDURE — 99213 OFFICE O/P EST LOW 20 MIN: CPT | Mod: 25 | Performed by: SURGERY

## 2019-12-09 PROCEDURE — 17250 CHEM CAUT OF GRANLTJ TISSUE: CPT

## 2019-12-09 PROCEDURE — 17250 CHEM CAUT OF GRANLTJ TISSUE: CPT | Performed by: SURGERY

## 2019-12-09 RX ORDER — BUPRENORPHINE HYDROCHLORIDE 900 UG/1
FILM, SOLUBLE BUCCAL
Refills: 0 | COMMUNITY
Start: 2019-12-06

## 2019-12-09 NOTE — DISCHARGE INSTRUCTIONS
[facility]  [address], [place] [facility] [address]  Appointment Phone [phone]     Shalonda Anita      1970    Wound Dressing Change Left heel and Right Heel  Cleanse wounds with wound cleanser  Skin Care: Apply moisturizing cream to skin surrounding the wound (but not in the wound):skin prep  Cover wounds Zorflex Apply EdemaWear from base of toe to knee,  Cover wound with quick absorbant   Apply TCC to Bilaterally   Change dressing twice weekly in clinic     JUNAID Travis M.D.. December 9, 2019    Call us at [phone] if you have any questions about your wounds, have redness or swelling around your wound, have a fever of 101 or greater or if you have any other problems or concerns. We answer the phone Monday through Friday 8 am to 4 pm, please leave a message as we check the voicemail frequently throughout the day.     Follow up with Provider - as scheduled

## 2019-12-09 NOTE — PROGRESS NOTES
This is a recent snapshot of the patient's [facility] medical record.  For current drug dose and complete information and questions, call [phone] or In Basket pool, [facility] ([id number])  CSN Number:  [id number]

## 2019-12-09 NOTE — PROGRESS NOTES
This is a recent snapshot of the patient's [facility] medical record.  For current drug dose and complete information and questions, call [phone] or In Basket pool, [facility] home infusion ([id number])  CSN Number:  [id number]

## 2019-12-09 NOTE — PROGRESS NOTES
[facility] [place] Progress Note    Subject: Shalonda Howard chronic idiopathic bilateral lower extremity neuropathy with bilateral heel ulcerations, chronic, nondiabetic, no history of tobacco utilization.  Recent hospitalization, PICC line placed, on Zosyn every 8 hours.  She will be seen at [facility] hyperbaric treatment facility this week for consultation, evidence of osteomyelitis right calcaneus and left calcaneus by MRIs.  I discussed with a consultant with [facility] hyperbaric treatment facility last week.    Patient Active Problem List   Diagnosis     Infection     Left foot Postop Wound infection     Post op infection     Systemic lupus erythematosus (H)     Osteomyelitis Left 1st metatarsal -- S/P Amputation     Cellulitis of right foot     Pressure injury of right heel, stage 3 (H)     Pressure ulcer of left heel, stage 3 (H)     Open wound of third toe of left foot     Open wound of heel     Open toe wound     Osteomyelitis (H)     Past Medical History:   Diagnosis Date     Allergic rhinitis, cause unspecified      Anxiety state, unspecified      Cellulitis and abscess of leg, except foot      Disuse osteoporosis      Dysthymic disorder      Edema      Encounter for long-term (current) use of other medications      Esophageal reflux      Foot ulcer on Heel bilaterally  5/22/2019     Kidney stones      Myalgia and myositis, unspecified      Obesity, unspecified      Open wound of foot except toe(s) alone, complicated      Opioid type dependence, unspecified      Other acne      Other congenital valgus deformity of feet      Other ventral hernia without mention of obstruction or gangrene      Polycystic ovaries      PONV (postoperative nausea and vomiting)      Systemic lupus erythematosus (H)     unsure     Tibialis tendinitis      Unspecified hereditary and idiopathic peripheral neuropathy      Exam:  BP (!) 145/71 (BP Location: Right arm)   Pulse 101   Temp 98.4  F (36.9  C) (Temporal)   Resp  17   Wound (used by OP I only) 07/29/19 0813 Left heel pressure injury (Active)   Length (cm) 4.5 12/9/2019 11:00 AM   Width (cm) 5 12/9/2019 11:00 AM   Depth (cm) 2.3 12/9/2019 11:00 AM   Wound (cm^2) 22.5 cm^2 12/9/2019 11:00 AM   Wound Volume (cm^3) 51.75 cm^3 12/9/2019 11:00 AM   Wound healing % -37.2 12/9/2019 11:00 AM   Dressing Appearance moist drainage 12/9/2019 11:00 AM   Drainage Characteristics/Odor serosanguineous 12/9/2019 11:00 AM   Drainage Amount moderate 12/9/2019 11:00 AM   Thickness/Stage full thickness 12/9/2019 11:00 AM   Base red/granulating 12/9/2019 11:00 AM   Periwound intact 12/9/2019 11:00 AM   Periwound Temperature warm 12/9/2019 11:00 AM   Periwound Skin Turgor soft 12/9/2019 11:00 AM   Edges open 12/9/2019 11:00 AM   Care, Wound chemical cautery applied 12/9/2019 11:00 AM       Wound (used by OP [facility] only) 07/29/19 0814 Right heel pressure injury (Active)   Length (cm) 5 12/9/2019 11:00 AM   Width (cm) 5.1 12/9/2019 11:00 AM   Depth (cm) 2.4 12/9/2019 11:00 AM   Wound (cm^2) 25.5 cm^2 12/9/2019 11:00 AM   Wound Volume (cm^3) 61.2 cm^3 12/9/2019 11:00 AM   Wound healing % -97.37 12/9/2019 11:00 AM   Dressing Appearance moist drainage 12/9/2019 11:00 AM   Drainage Characteristics/Odor serosanguineous 12/9/2019 11:00 AM   Drainage Amount moderate 12/9/2019 11:00 AM   Thickness/Stage full thickness 12/9/2019 11:00 AM   Base red/granulating 12/9/2019 11:00 AM   Periwound intact 12/9/2019 11:00 AM   Periwound Temperature warm 12/9/2019 11:00 AM   Periwound Skin Turgor soft 12/9/2019 11:00 AM   Edges open 12/9/2019 11:00 AM   Care, Wound chemical cautery applied 12/9/2019 11:00 AM     49-year-old female, chronic right and left lower extremity lymphedema, primary, tarda, chronic right and left heel wounds, granulation tissue quality has degraded in the past 2 weeks.  Wound measurements and photodocumentation.  Chemical cautery utilized to treat hyper granulation tissue right and left heel  wounds.        Impression: Chronic right and left heel wounds, chronic idiopathic neuropathy, nondiabetic, no history of tobacco utilization    Plan: We will dress the wounds with activated carbon cloth, Zorflex.  Total contact cast placed, will change twice weekly.  Pending consultation with [facility] hyperbaric oxygen facility this week and ideally anticipated initiation of hyperbaric oxygen treatments for chronic right and left heel osteomyelitis.  Hardware has been removed from the right heel.  PICC line is in, on Zosyn therapy.  Flavonoid supplementation, vitamin D, Isaiah..  Patient will return to the clinic in 1 weeks time    [facility] Antonio Travis MD on 12/9/2019 at 4:30 PM

## 2019-12-10 ENCOUNTER — MEDICAL CORRESPONDENCE (OUTPATIENT)
Dept: HEALTH INFORMATION MANAGEMENT | Facility: CLINIC | Age: 49
End: 2019-12-10

## 2019-12-10 LAB
AST SERPL W P-5'-P-CCNC: 38 U/L (ref 0–45)
BASOPHILS # BLD AUTO: 0.1 10E9/L (ref 0–0.2)
BASOPHILS NFR BLD AUTO: 1 %
BUN SERPL-MCNC: 12 MG/DL (ref 7–30)
CREAT SERPL-MCNC: 0.53 MG/DL (ref 0.52–1.04)
CRP SERPL-MCNC: 71.8 MG/L (ref 0–8)
DIFFERENTIAL METHOD BLD: ABNORMAL
EOSINOPHIL # BLD AUTO: 0.3 10E9/L (ref 0–0.7)
EOSINOPHIL NFR BLD AUTO: 5.2 %
ERYTHROCYTE [DISTWIDTH] IN BLOOD BY AUTOMATED COUNT: 26.8 % (ref 10–15)
ERYTHROCYTE [SEDIMENTATION RATE] IN BLOOD BY WESTERGREN METHOD: 68 MM/H (ref 0–20)
GFR SERPL CREATININE-BSD FRML MDRD: >90 ML/MIN/{1.73_M2}
HCT VFR BLD AUTO: 30.2 % (ref 35–47)
HGB BLD-MCNC: 8.3 G/DL (ref 11.7–15.7)
IMM GRANULOCYTES # BLD: 0 10E9/L (ref 0–0.4)
IMM GRANULOCYTES NFR BLD: 0.2 %
LYMPHOCYTES # BLD AUTO: 1.7 10E9/L (ref 0.8–5.3)
LYMPHOCYTES NFR BLD AUTO: 34.9 %
MCH RBC QN AUTO: 19.5 PG (ref 26.5–33)
MCHC RBC AUTO-ENTMCNC: 27.5 G/DL (ref 31.5–36.5)
MCV RBC AUTO: 71 FL (ref 78–100)
MONOCYTES # BLD AUTO: 0.5 10E9/L (ref 0–1.3)
MONOCYTES NFR BLD AUTO: 10.3 %
NEUTROPHILS # BLD AUTO: 2.3 10E9/L (ref 1.6–8.3)
NEUTROPHILS NFR BLD AUTO: 48.4 %
NRBC # BLD AUTO: 0 10*3/UL
NRBC BLD AUTO-RTO: 0 /100
PLATELET # BLD AUTO: 274 10E9/L (ref 150–450)
RBC # BLD AUTO: 4.26 10E12/L (ref 3.8–5.2)
WBC # BLD AUTO: 4.8 10E9/L (ref 4–11)

## 2019-12-10 PROCEDURE — 82565 ASSAY OF CREATININE: CPT | Performed by: INTERNAL MEDICINE

## 2019-12-10 PROCEDURE — 84520 ASSAY OF UREA NITROGEN: CPT | Performed by: INTERNAL MEDICINE

## 2019-12-10 PROCEDURE — 86140 C-REACTIVE PROTEIN: CPT | Performed by: INTERNAL MEDICINE

## 2019-12-10 PROCEDURE — 85025 COMPLETE CBC W/AUTO DIFF WBC: CPT | Performed by: INTERNAL MEDICINE

## 2019-12-10 PROCEDURE — 85652 RBC SED RATE AUTOMATED: CPT | Performed by: INTERNAL MEDICINE

## 2019-12-10 PROCEDURE — 84450 TRANSFERASE (AST) (SGOT): CPT | Performed by: INTERNAL MEDICINE

## 2019-12-11 ENCOUNTER — HOME INFUSION (PRE-WILLOW HOME INFUSION) (OUTPATIENT)
Dept: PHARMACY | Facility: CLINIC | Age: 49
End: 2019-12-11

## 2019-12-12 ENCOUNTER — HOSPITAL ENCOUNTER (OUTPATIENT)
Dept: WOUND CARE | Facility: CLINIC | Age: 49
End: 2019-12-12
Attending: SURGERY
Payer: COMMERCIAL

## 2019-12-12 VITALS
HEART RATE: 84 BPM | TEMPERATURE: 98.4 F | DIASTOLIC BLOOD PRESSURE: 88 MMHG | SYSTOLIC BLOOD PRESSURE: 157 MMHG | RESPIRATION RATE: 18 BRPM

## 2019-12-12 DIAGNOSIS — L89.623 PRESSURE ULCER OF LEFT HEEL, STAGE 3 (H): ICD-10-CM

## 2019-12-12 DIAGNOSIS — L89.613 PRESSURE INJURY OF RIGHT HEEL, STAGE 3 (H): ICD-10-CM

## 2019-12-12 DIAGNOSIS — S91.301D OPEN WOUND OF RIGHT HEEL, SUBSEQUENT ENCOUNTER: Primary | ICD-10-CM

## 2019-12-12 PROCEDURE — 99213 OFFICE O/P EST LOW 20 MIN: CPT | Performed by: SURGERY

## 2019-12-12 RX ORDER — OXYCODONE HYDROCHLORIDE 5 MG/1
TABLET ORAL
Refills: 0 | COMMUNITY
Start: 2019-12-10 | End: 2020-02-04

## 2019-12-12 NOTE — PROGRESS NOTES
This is a recent snapshot of the patient's [facility] medical record.  For current drug dose and complete information and questions, call [phone] or In Basket pool, [facility] home infusion ([id number])  CSN Number:  [id number]

## 2019-12-12 NOTE — PROGRESS NOTES
[facility] [place] Progress Note    Subject: Shalonda Howard persistent pain right heel greater than left heel, increased with weightbearing and total contact cast.  Has hyperbaric oxygen evaluation at [facility] tomorrow.  PICC line with ongoing IV antibiotics.  Chills or sweats.    Patient Active Problem List   Diagnosis     Infection     Left foot Postop Wound infection     Post op infection     Systemic lupus erythematosus (H)     Osteomyelitis Left 1st metatarsal -- S/P Amputation     Cellulitis of right foot     Pressure injury of right heel, stage 3 (H)     Pressure ulcer of left heel, stage 3 (H)     Open wound of third toe of left foot     Open wound of heel     Open toe wound     Osteomyelitis (H)     Past Medical History:   Diagnosis Date     Allergic rhinitis, cause unspecified      Anxiety state, unspecified      Cellulitis and abscess of leg, except foot      Disuse osteoporosis      Dysthymic disorder      Edema      Encounter for long-term (current) use of other medications      Esophageal reflux      Foot ulcer on Heel bilaterally  5/22/2019     Kidney stones      Myalgia and myositis, unspecified      Obesity, unspecified      Open wound of foot except toe(s) alone, complicated      Opioid type dependence, unspecified      Other acne      Other congenital valgus deformity of feet      Other ventral hernia without mention of obstruction or gangrene      Polycystic ovaries      PONV (postoperative nausea and vomiting)      Systemic lupus erythematosus (H)     unsure     Tibialis tendinitis      Unspecified hereditary and idiopathic peripheral neuropathy      Exam:  BP (!) 157/88 (BP Location: Left arm)   Pulse 84   Temp 98.4  F (36.9  C) (Temporal)   Resp 18   Wound (used by OP [facility] only) 07/29/19 0813 Left heel pressure injury (Active)   Length (cm) 5 12/12/2019  9:00 AM   Width (cm) 5 12/12/2019  9:00 AM   Depth (cm) 1.2 12/12/2019  9:00 AM   Wound (cm^2) 25 cm^2  12/12/2019  9:00 AM   Wound Volume (cm^3) 30 cm^3 12/12/2019  9:00 AM   Wound healing % -52.44 12/12/2019  9:00 AM   Dressing Appearance moist drainage 12/12/2019  9:00 AM   Drainage Characteristics/Odor serosanguineous 12/12/2019  9:00 AM   Drainage Amount moderate 12/12/2019  9:00 AM       Wound (used by OP [facility] only) 07/29/19 0814 Right heel pressure injury (Active)   Length (cm) 5 12/12/2019  9:00 AM   Width (cm) 5.5 12/12/2019  9:00 AM   Depth (cm) 1.4 12/12/2019  9:00 AM   Wound (cm^2) 27.5 cm^2 12/12/2019  9:00 AM   Wound Volume (cm^3) 38.5 cm^3 12/12/2019  9:00 AM   Wound healing % -112.85 12/12/2019  9:00 AM   Dressing Appearance moist drainage 12/12/2019  9:00 AM   Drainage Characteristics/Odor serosanguineous 12/12/2019  9:00 AM   Drainage Amount moderate 12/12/2019  9:00 AM     49-year-old female, right and left heel ulcerations, debridement performed of small amount of necrotic tissue and biofilm, bone palpable through wound on the right heel, not palpable through wound on left heel.  Carbon cloth and endoform placed over wounds, total contact cast applied to the right leg and left lower extremity.    Procedure:   Patient was determined to be capable of making their own medical decisions and informed consent was obtained. Topical anesthetic of 4% lidocaine was applied, debridement was performed using a #15 blade down to and including subcutaneous tissue biofilm necrotic tissue bleeding controlled with light pressure. Patient tolerated procedure well.    Impression: Chronic right and left heel wounds, bilateral total contact casting, pending evaluation at [facility] for hyperbaric oxygen    Plan: We will dress the wounds with the contact casting, activated carbon cloth.  Recommended wheelchair utilization rather than weightbearing bite having total contact casting.  Discussed intermittent mimicking fasting diet composed of Mediterranean style diet and food styles for 1 week, 1 cycle  monthly for 3 months to simulate Prolon therapy cost of which she could not afford currently as her  is unemployed, they have [employer] cost and associated medical cost.  Patient will return to the clinic in 1 weeks time    [facility] Antonio Travis MD on 12/12/2019 at 10:47 AM

## 2019-12-12 NOTE — DISCHARGE INSTRUCTIONS
[facility]  [address], [place] [address]  Appointment Phone [phone]     Shalonda Anita      1970    Wound Dressing Change Left heel and Right Heel  Cleanse wounds with wound cleanser  Skin Care: Apply moisturizing cream to skin surrounding the wound (but not in the  wound):skin prep  Cover wounds Zorflex Apply EdemaWear from base of toe to knee,  Cover wound with quick absorbant  Apply TCC to Bilaterally  Change dressing twice weekly in clinic       JUNAID Travis M.D.. December 12, 2019    Call us at [phone] if you have any questions about your wounds, have redness or swelling around your wound, have a fever of 101 or greater or if you have any other problems or concerns. We answer the phone Monday through Friday 8 am to 4 pm, please leave a message as we check the voicemail frequently throughout the day.     Follow up with Provider - as scheduled

## 2019-12-13 ENCOUNTER — TELEPHONE (OUTPATIENT)
Dept: WOUND CARE | Facility: CLINIC | Age: 49
End: 2019-12-13

## 2019-12-13 LAB
FUNGUS SPEC CULT: NORMAL
Lab: NORMAL
SPECIMEN SOURCE: NORMAL

## 2019-12-13 NOTE — TELEPHONE ENCOUNTER
Shalonda went to her [facility] referral for the hyperbaric chamber. They need to know what the cast material is made out of due to the sensitive nature of the hyperbaric chamber. She has an appointment to start in the chamber on Tuesday so they need to know before she starts.     Cecile  [phone]  Hyperbaric Chamber - [facility]

## 2019-12-16 ENCOUNTER — HOSPITAL ENCOUNTER (OUTPATIENT)
Dept: WOUND CARE | Facility: CLINIC | Age: 49
Discharge: HOME | End: 2019-12-16
Attending: SURGERY | Admitting: SURGERY
Payer: COMMERCIAL

## 2019-12-16 ENCOUNTER — HOME INFUSION (PRE-WILLOW HOME INFUSION) (OUTPATIENT)
Dept: PHARMACY | Facility: CLINIC | Age: 49
End: 2019-12-16

## 2019-12-16 VITALS
TEMPERATURE: 97.9 F | DIASTOLIC BLOOD PRESSURE: 90 MMHG | HEART RATE: 85 BPM | SYSTOLIC BLOOD PRESSURE: 158 MMHG | RESPIRATION RATE: 18 BRPM

## 2019-12-16 DIAGNOSIS — L89.623 PRESSURE ULCER OF LEFT HEEL, STAGE 3 (H): ICD-10-CM

## 2019-12-16 DIAGNOSIS — L89.613 PRESSURE INJURY OF RIGHT HEEL, STAGE 3 (H): ICD-10-CM

## 2019-12-16 PROCEDURE — 97602 WOUND(S) CARE NON-SELECTIVE: CPT

## 2019-12-16 PROCEDURE — 99213 OFFICE O/P EST LOW 20 MIN: CPT | Performed by: SURGERY

## 2019-12-16 RX ORDER — PROPRANOLOL HYDROCHLORIDE 20 MG/1
20 TABLET ORAL DAILY PRN
Qty: 30 TABLET | Refills: 0 | Status: ON HOLD | OUTPATIENT
Start: 2019-12-16 | End: 2020-03-30

## 2019-12-16 NOTE — PROGRESS NOTES
[facility] [place] Progress Note    Subject: Shalonda Howard has been seen at [facility] hyperbaric oxygen chamber, they plan initial 20 visits.  Her mother unfortunately was admitted to [facility] this weekend, pulmonary hypertension, now was on ECMO.  She will be visiting her mother daily.  Due to total contact casting changes twice a week to control drainage, they cannot dive her for 24 hours after cast placement due to the cast drying and vapors admitted which cannot be in the hyperbaric chamber at that time.  Therefore currently she will be able to dive 3 times per week till drainage improves.  She does have lymphedema tarda primary, left and right legs, has significant drainage and lymphorrhea from both open ulcerations on the plantar surface of her heels, this will contribute to an elevated recidivism rate after the wounds are healed and therefore we will measure for a tactile Flexitouch plus lymphedema pump, will coordinate to demonstrate in clinic between cast changes, and she will need to utilize lifelong to minimize her recidivism rate.  She has idiopathic right and left lower extremity neuropathy.  She is a nondiabetic.  She does not utilize tobacco.  She would require an  proprietary advanced lymphedema pump, Flexitouch plus to maximize receptive decompression at the level of the groins due to her abdominal wall and hip edema.  A simple  would not accomplish receptive decompression and therefore not maximize treatment of her lower extremities.    Patient Active Problem List   Diagnosis     Infection     Left foot Postop Wound infection     Post op infection     Systemic lupus erythematosus (H)     Osteomyelitis Left 1st metatarsal -- S/P Amputation     Cellulitis of right foot     Pressure injury of right heel, stage 3 (H)     Pressure ulcer of left heel, stage 3 (H)     Open wound of third toe of left foot     Open wound of heel     Open toe wound     Osteomyelitis (H)     Past  Medical History:   Diagnosis Date     Allergic rhinitis, cause unspecified      Anxiety state, unspecified      Cellulitis and abscess of leg, except foot      Disuse osteoporosis      Dysthymic disorder      Edema      Encounter for long-term (current) use of other medications      Esophageal reflux      Foot ulcer on Heel bilaterally  5/22/2019     Kidney stones      Myalgia and myositis, unspecified      Obesity, unspecified      Open wound of foot except toe(s) alone, complicated      Opioid type dependence, unspecified      Other acne      Other congenital valgus deformity of feet      Other ventral hernia without mention of obstruction or gangrene      Polycystic ovaries      PONV (postoperative nausea and vomiting)      Systemic lupus erythematosus (H)     unsure     Tibialis tendinitis      Unspecified hereditary and idiopathic peripheral neuropathy      Exam:  BP (!) 158/90 (BP Location: Left arm)   Pulse 85   Temp 97.9  F (36.6  C) (Temporal)   Resp 18   Wound (used by OP [facility] only) 07/29/19 0813 Left heel pressure injury (Active)   Length (cm) 5.3 12/16/2019  9:00 AM   Width (cm) 5.2 12/16/2019  9:00 AM   Depth (cm) 1 12/16/2019  9:00 AM   Wound (cm^2) 27.56 cm^2 12/16/2019  9:00 AM   Wound Volume (cm^3) 27.56 cm^3 12/16/2019  9:00 AM   Wound healing % -68.05 12/16/2019  9:00 AM   Tunneling [Depth (cm)/Location] 12 Oclock/Depth 1.0 12/16/2019  9:00 AM   Dressing Appearance copious drainage 12/16/2019  9:00 AM   Drainage Characteristics/Odor serosanguineous 12/16/2019  9:00 AM   Drainage Amount copious 12/16/2019  9:00 AM       Wound (used by OP [facility] only) 07/29/19 0814 Right heel pressure injury (Active)   Length (cm) 5.2 12/16/2019  9:00 AM   Width (cm) 5.2 12/16/2019  9:00 AM   Depth (cm) 1.5 12/16/2019  9:00 AM   Wound (cm^2) 27.04 cm^2 12/16/2019  9:00 AM   Wound Volume (cm^3) 40.56 cm^3 12/16/2019  9:00 AM   Wound healing % -109.29 12/16/2019  9:00 AM   Dressing Appearance copious drainage  12/16/2019  9:00 AM   Drainage Characteristics/Odor serosanguineous 12/16/2019  9:00 AM   Drainage Amount copious 12/16/2019  9:00 AM       Wound (used by OP [facility] only) 12/16/19 0937 Right medial ankle trauma (Active)   Length (cm) 1.9 12/16/2019  9:00 AM   Width (cm) 1 12/16/2019  9:00 AM   Depth (cm) 0 12/16/2019  9:00 AM   Wound (cm^2) 1.9 cm^2 12/16/2019  9:00 AM   Wound Volume (cm^3) 0 cm^3 12/16/2019  9:00 AM   Dressing Appearance moist drainage 12/16/2019  9:00 AM   Drainage Characteristics/Odor serosanguineous 12/16/2019  9:00 AM   Drainage Amount moderate 12/16/2019  9:00 AM     49-year-old female, nondistressed.  Previous cast examined, significant persistent drainage.  Will need to continue twice weekly total contact cast dressing changes.  See photo documentation and wound measurements.  Small amount of tunneling at the right heel, do not detect any tunneling at the left heel.  Granulation tissue itself does appear overall improved.  Palpable right and left dorsalis pedis pulse.  Significant edema left lower extremity as compared to right lower extremity though is present in both.  The edema is controlled with placement of him aware under her total contact casts.  She has a abdominal hernia, stable.    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]  [] Yes [] No   Chronic Venous Stasis ulcers [I87.2] (non-healing despite 6 months of ongoing treatment)  [] Yes [] No     SYMPTOMS:  PATIENT EXHIBITS THE FOLLOWING SYMPTOMS DESPITE CONSERVATIVE THERAPY (check all that apply):  [x] Hyperkeratosis  [x] Hyperplasia   [x] Hyperpigmentation   [x] Skin breakdown with lymphorrhea (skin weeping)   [] Papillomatosis   [] Recurrent cellulitis   [] Fibrosis   [] Elephantiasis  [x] Progressive edema  [x] Truncal/abdominal swelling  [] Chest/axillary swelling  []  Genital swelling  [x] Unable to control swelling  [x] Impaired ROM   [x] Impaired mobility   [x] Pain   CONSERVATIVE TREATMENT:  PATIENT HAS TRIED CONSERVATIVE TREATMENTS (COMPRESSION/EXERCISE/ELEVATION):       [x] Yes [] No    Compression garments: [] <4 weeks   [] 1-5 months   [x] 6-12 months  [] >1 year   Bandaging: [] <4 weeks   [x] 1-5 months   [] 6-12 months  [] >1 year   Elevation:   [] <4 weeks   [] 1-5 months   [] 6-12 months  [x] >1 year   Exercise:     [] <4 weeks   [] 1-5 months   [] 6-12 months  [x] >1 year     TREATMENT PLAN:  Edema wear subdermal lymphedema management, currently in total contact casting for management of neuropathic induced right and left heel ulcerations    MEASUREMENTS:  Lower extremity measurements (measure the largest circumferential point of the listed area):   []  Right Leg  Inseam:_____cm Measurements  Date: _______  []  Left Leg  Inseam:_____cm Measurements  Date: _______   Thigh _____53____cm  Thigh ____59_____cm   Knee  _________cm  Knee _________cm   Calf _____39____cm  Calf ____41_____cm   Ankle _____24____cm  Ankle ____24_____cm   Foot _________cm  Foot _________cm   Hips _____150____cm  Hips _________cm     Upper extremity measurements (measure the largest circumferential point of the listed area):  []  Right Arm  Inseam:_____cm Measurements  Date: _______  []  Left Arm  Inseam:_____cm Measurements  Date: _______    Waist  _________cm  Waist  _________cm    Chest _________cm  Chest _________cm    Biceps _________cm  Biceps _________cm    Forearm _________cm  Forearm _________cm    Wrist _________cm  Wrist _________cm      Head and neck measurements  [] Head and Neck Measurements  Date: _______   Crown of Head Circumference _________cm   Measurement Around Back of Head From Eye to Eye _________cm       COMMENTS: Lymphedema primary tarda, right and left heel ulcerations, idiopathic neuropathy.  Requires  proprietary Flexitouch plus lymphedema pump for management of  receptive decompression given abdominal and hip edema.  An  simple pump would not result in adequate receptive decompression and therefore would be inappropriate for utilization to maximize lower extremity lymphedema.        Impression: Idiopathic neuropathy, bilateral heel ulcerations, lymphedema primary tarda right and left lower extremities, left greater than right, will need lifelong  advanced proprietary Tactile FlexiTouch plus lymphedema pump management to decrease recidivism rate of right and left heel ulcerations.  Plan initiation of hyperbaric oxygen treatments, minimum 20 dives.  Claustrophobia    Plan: We will dress the wounds with Carito flex, absorptive dressing, edema wear, total contact casting, changes twice a week.  Prescription for propanolol 20 mg to be used 1 hour before each dive to assist with placement of oxygen mask and minimizing anxiety associated to having a oxygen mask on.  She currently will undergo total contact casting twice a week, hyperbaric oxygen treatments 3 times a week due to the necessity of a 24-hour cast drying.  After each cast change..  She continues flavonoid supplementation, vitamin D supplementation.  She is primarily in a wheelchair and is placing almost no weight on her total contact cast.      Juan Antonio Travis MD on 12/16/2019 at 10:08 AM

## 2019-12-16 NOTE — DISCHARGE INSTRUCTIONS
[facility]  [address], [place] [address]  Appointment Phone [phone]     Shalonda Anita      1970    Wound Dressing Change Left heel and Right Heel and medial ankle  Cleanse wounds with wound cleanser  Skin Care: Apply moisturizing cream to skin surrounding the wound (but not in the wound):skin prep  Cover wounds Zorflex Apply EdemaWear from base of toe to knee,  Cover wound with quick absorbant  Apply TCC to Bilaterally  Change dressing twice weekly in clinic       JUNAID Travis M.D.. December 16, 2019    Call us at [phone] if you have any questions about your wounds, have redness or swelling around your wound, have a fever of 101 or greater or if you have any other problems or concerns. We answer the phone Monday through Friday 8 am to 4 pm, please leave a message as we check the voicemail frequently throughout the day.     Follow up with Provider - as scheduled

## 2019-12-16 NOTE — TELEPHONE ENCOUNTER
Will continue twice weekly dressings at this time due to drainage. Angela at [facility] was notified.

## 2019-12-17 ENCOUNTER — HOME INFUSION (PRE-WILLOW HOME INFUSION) (OUTPATIENT)
Dept: PHARMACY | Facility: CLINIC | Age: 49
End: 2019-12-17

## 2019-12-17 LAB
BACTERIA SPEC CULT: NORMAL
Lab: NORMAL
SPECIMEN SOURCE: NORMAL

## 2019-12-17 NOTE — ADDENDUM NOTE
Encounter addended by: Leia Branch RN on: 12/17/2019 9:23 AM   Actions taken: Diagnosis association updated, Order list changed

## 2019-12-17 NOTE — PROGRESS NOTES
This is a recent snapshot of the patient's [facility] medical record.  For current drug dose and complete information and questions, call [phone] or In Basket pool, fv home infusion ([id number])  CSN Number:  [id number]

## 2019-12-18 NOTE — PROGRESS NOTES
This is a recent snapshot of the patient's [facility] medical record.  For current drug dose and complete information and questions, call [phone] or In [facility] pool, fv home infusion ([id number])  CSN Number:  [id number]

## 2019-12-18 NOTE — PROGRESS NOTES
This is a recent snapshot of the patient's [facility] medical record.  For current drug dose and complete information and questions, call [phone] or In Basket pool, fv home infusion ([id number])  CSN Number:  [id number]    
Add High Risk Medication Management Associated Diagnosis?: No
Detail Level: Zone

## 2019-12-19 ENCOUNTER — HOSPITAL ENCOUNTER (OUTPATIENT)
Dept: WOUND CARE | Facility: CLINIC | Age: 49
Discharge: HOME | End: 2019-12-19
Attending: SURGERY | Admitting: SURGERY
Payer: COMMERCIAL

## 2019-12-19 ENCOUNTER — HOME INFUSION (PRE-WILLOW HOME INFUSION) (OUTPATIENT)
Dept: PHARMACY | Facility: CLINIC | Age: 49
End: 2019-12-19

## 2019-12-19 VITALS
DIASTOLIC BLOOD PRESSURE: 81 MMHG | TEMPERATURE: 98.2 F | RESPIRATION RATE: 18 BRPM | HEART RATE: 97 BPM | SYSTOLIC BLOOD PRESSURE: 154 MMHG

## 2019-12-19 DIAGNOSIS — L89.623 PRESSURE ULCER OF LEFT HEEL, STAGE 3 (H): ICD-10-CM

## 2019-12-19 DIAGNOSIS — L89.613 PRESSURE INJURY OF RIGHT HEEL, STAGE 3 (H): ICD-10-CM

## 2019-12-19 PROCEDURE — 29445 APPL RIGID TOT CNTC LEG CAST: CPT | Mod: XU

## 2019-12-19 PROCEDURE — 97602 WOUND(S) CARE NON-SELECTIVE: CPT

## 2019-12-19 PROCEDURE — 29445 APPL RIGID TOT CNTC LEG CAST: CPT | Mod: 50 | Performed by: SURGERY

## 2019-12-19 NOTE — PROGRESS NOTES
[facility] [place] Progress Note    Subject: Shalonda Howard, her mother remains on ECMO at [facility] status post cardiac event.  The patient therefore is not been able to initiate hyperbaric oxygen treatments at [facility].  She states her mother will probably be off of ECMO within the next 5 days and then she can contemplate initiating hyperbaric oxygen.    Patient Active Problem List   Diagnosis     Infection     Left foot Postop Wound infection     Post op infection     Systemic lupus erythematosus (H)     Osteomyelitis Left 1st metatarsal -- S/P Amputation     Cellulitis of right foot     Pressure injury of right heel, stage 3 (H)     Pressure ulcer of left heel, stage 3 (H)     Open wound of third toe of left foot     Open wound of heel     Open toe wound     Osteomyelitis (H)     Past Medical History:   Diagnosis Date     Allergic rhinitis, cause unspecified      Anxiety state, unspecified      Cellulitis and abscess of leg, except foot      Disuse osteoporosis      Dysthymic disorder      Edema      Encounter for long-term (current) use of other medications      Esophageal reflux      Foot ulcer on Heel bilaterally  5/22/2019     Kidney stones      Myalgia and myositis, unspecified      Obesity, unspecified      Open wound of foot except toe(s) alone, complicated      Opioid type dependence, unspecified      Other acne      Other congenital valgus deformity of feet      Other ventral hernia without mention of obstruction or gangrene      Polycystic ovaries      PONV (postoperative nausea and vomiting)      Systemic lupus erythematosus (H)     unsure     Tibialis tendinitis      Unspecified hereditary and idiopathic peripheral neuropathy      Exam:  BP (!) 154/81 (BP Location: Left arm)   Pulse 97   Temp 98.2  F (36.8  C) (Temporal)   Resp 18   Wound (used by OP [facility] only) 07/29/19 0813 Left heel pressure injury (Active)   Length (cm) 5 12/19/2019  9:00 AM   Width (cm) 4.3  12/19/2019  9:00 AM   Depth (cm) 1.1 12/19/2019  9:00 AM   Wound (cm^2) 21.5 cm^2 12/19/2019  9:00 AM   Wound Volume (cm^3) 23.65 cm^3 12/19/2019  9:00 AM   Wound healing % -31.1 12/19/2019  9:00 AM   Dressing Appearance moist drainage 12/19/2019  9:00 AM   Drainage Characteristics/Odor serosanguineous 12/19/2019  9:00 AM   Drainage Amount large 12/19/2019  9:00 AM   Thickness/Stage Stage 4 12/19/2019  9:00 AM   Base red/granulating 12/19/2019  9:00 AM   Periwound intact 12/19/2019  9:00 AM   Periwound Temperature warm 12/19/2019  9:00 AM   Periwound Skin Turgor soft 12/19/2019  9:00 AM   Edges open 12/19/2019  9:00 AM   Care, Wound non-select wound debridement performed 12/19/2019  9:00 AM       Wound (used by OP [facility] only) 07/29/19 0814 Right heel pressure injury (Active)   Length (cm) 5.4 12/19/2019  9:00 AM   Width (cm) 5.3 12/19/2019  9:00 AM   Depth (cm) 1.7 12/19/2019  9:00 AM   Wound (cm^2) 28.62 cm^2 12/19/2019  9:00 AM   Wound Volume (cm^3) 48.65 cm^3 12/19/2019  9:00 AM   Wound healing % -121.52 12/19/2019  9:00 AM   Dressing Appearance moist drainage 12/19/2019  9:00 AM   Drainage Characteristics/Odor serosanguineous 12/19/2019  9:00 AM   Drainage Amount large 12/19/2019  9:00 AM   Thickness/Stage Stage 4 12/19/2019  9:00 AM   Base red/granulating 12/19/2019  9:00 AM   Periwound intact 12/19/2019  9:00 AM   Periwound Temperature warm 12/19/2019  9:00 AM   Periwound Skin Turgor soft 12/19/2019  9:00 AM   Edges open 12/19/2019  9:00 AM   Care, Wound non-select wound debridement performed 12/19/2019  9:00 AM       Wound (used by OP [facility] only) 12/16/19 0937 Right medial ankle trauma (Active)   Length (cm) 1.5 12/19/2019  9:00 AM   Width (cm) 1.3 12/19/2019  9:00 AM   Depth (cm) 0.1 12/19/2019  9:00 AM   Wound (cm^2) 1.95 cm^2 12/19/2019  9:00 AM   Wound Volume (cm^3) 0.2 cm^3 12/19/2019  9:00 AM   Wound healing % -2.63 12/19/2019  9:00 AM   Dressing Appearance moist drainage 12/19/2019  9:00 AM   Drainage  Characteristics/Odor serosanguineous 12/19/2019  9:00 AM   Drainage Amount moderate 12/19/2019  9:00 AM   Thickness/Stage Stage 3 12/19/2019  9:00 AM   Base red/granulating 12/19/2019  9:00 AM   Periwound intact 12/19/2019  9:00 AM   Periwound Temperature warm 12/19/2019  9:00 AM   Periwound Skin Turgor soft 12/19/2019  9:00 AM   Edges open 12/19/2019  9:00 AM   Care, Wound non-select wound debridement performed 12/19/2019  9:00 AM     49-year-old female, right and left heel ulcerations, see documentation for measurements and photodocumentation, no foul odor    Bilateral total contact cast placed, Spandage for lymphatic dysfunction management.    Impression: Bilateral heel ulcers, chronic idiopathic neuropathy, lymphatic dysfunction    Plan: We will dress the wounds with activated carbon cloth, Spandage, total contact cast right and left lower tremors.  Will obtain consultation with certified lymphedema therapist to initiate education regarding patient self manual lymphatic drainage for neck, axillary, femoral to maximize treatment of lymphatic dysfunction of the right left lower extremities.  Total contact cast dressing changes twice a week given significant drainage.  Juan Antonio Travis MD on 12/19/2019 at 2:29 PM

## 2019-12-19 NOTE — ADDENDUM NOTE
Encounter addended by: Juan Antonio Travis MD on: 12/19/2019 2:31 PM   Actions taken: Charge Capture section accepted

## 2019-12-19 NOTE — DISCHARGE INSTRUCTIONS
[facility]  [address], [place] [address]  Appointment Phone [phone]     Shalonda Anita      1970    Wound Dressing Change Left heel and Right Heel   Cleanse wounds with wound cleanser  Skin Care: Apply moisturizing cream to skin surrounding the wound (but not in thewound): critic aid  Cover wounds Plurogel, Zorflex Apply EdemaWear from base of toe to knee,  Cover wound with quick absorbant  Apply TCC to Bilaterally  Change dressing twice weekly in clinic  Wound Dressing Change:Right Medial Ankle  Cleanse wound with vashe  Cover wound with plurogel and Mepilex Border  Change dressing twice weekly         JUNAID Travis M.D.. December 19, 2019    Call us at [phone] if you have any questions about your wounds, have redness or swelling around your wound, have a fever of 101 or greater or if you have any other problems or concerns. We answer the phone Monday through Friday 8 am to 4 pm, please leave a message as we check the voicemail frequently throughout the day.     Follow up as scheduled

## 2019-12-20 NOTE — PROGRESS NOTES
This is a recent snapshot of the patient's [facility] medical record.  For current drug dose and complete information and questions, call [phone] or In [facility] pool, fv home infusion ([id number])  CSN Number:  [id number]

## 2019-12-23 ENCOUNTER — HOSPITAL ENCOUNTER (OUTPATIENT)
Dept: WOUND CARE | Facility: CLINIC | Age: 49
Discharge: HOME | End: 2019-12-23
Attending: SURGERY | Admitting: SURGERY
Payer: COMMERCIAL

## 2019-12-23 VITALS
TEMPERATURE: 98.6 F | SYSTOLIC BLOOD PRESSURE: 134 MMHG | RESPIRATION RATE: 19 BRPM | HEART RATE: 96 BPM | DIASTOLIC BLOOD PRESSURE: 83 MMHG

## 2019-12-23 DIAGNOSIS — L89.613 PRESSURE INJURY OF RIGHT HEEL, STAGE 3 (H): ICD-10-CM

## 2019-12-23 DIAGNOSIS — L89.623 PRESSURE ULCER OF LEFT HEEL, STAGE 3 (H): ICD-10-CM

## 2019-12-23 PROCEDURE — 99213 OFFICE O/P EST LOW 20 MIN: CPT | Performed by: SURGERY

## 2019-12-23 PROCEDURE — A6209 FOAM DRSG <=16 SQ IN W/O BDR: HCPCS

## 2019-12-23 PROCEDURE — 97602 WOUND(S) CARE NON-SELECTIVE: CPT

## 2019-12-23 NOTE — PROGRESS NOTES
[facility] [place] Progress Note    Subject: Shalonda Howard multiple current stressors currently including mother in the hospital on ECMO at [facility], being considered for potential pulmonary transplant, she and her  are 16-year-old daughter are moving from their home by December 31, 2019, and right and left heel ulcerations, chronic, osteomyelitis, PICC line, pending initiation of hyperbaric oxygen at [facility].  Given these multiple stressors, we will discontinue bilateral total contact casting so that she can spend 5 days/week in the hyperbaric oxygen chambers, total contact casting cannot allow performance of hyperbaric oxygen within 24 hours of placement total contact cast.  We will obtain offloading shoes, offloading for the heels, use walker, and will obtain an appointment for Crow boot creation.  She will return to the office in 48 hours for demonstration for lymphedema pump,  proprietary Tactile FlexiTouch plus given significant associated bilateral extremity lymphedema, idiopathic neuropathy.      Patient Active Problem List   Diagnosis     Infection     Left foot Postop Wound infection     Post op infection     Systemic lupus erythematosus (H)     Osteomyelitis Left 1st metatarsal -- S/P Amputation     Cellulitis of right foot     Pressure injury of right heel, stage 3 (H)     Pressure ulcer of left heel, stage 3 (H)     Open wound of third toe of left foot     Open wound of heel     Open toe wound     Osteomyelitis (H)     Past Medical History:   Diagnosis Date     Allergic rhinitis, cause unspecified      Anxiety state, unspecified      Cellulitis and abscess of leg, except foot      Disuse osteoporosis      Dysthymic disorder      Edema      Encounter for long-term (current) use of other medications      Esophageal reflux      Foot ulcer on Heel bilaterally  5/22/2019     Kidney stones      Myalgia and myositis, unspecified      Obesity, unspecified       Open wound of foot except toe(s) alone, complicated      Opioid type dependence, unspecified      Other acne      Other congenital valgus deformity of feet      Other ventral hernia without mention of obstruction or gangrene      Polycystic ovaries      PONV (postoperative nausea and vomiting)      Systemic lupus erythematosus (H)     unsure     Tibialis tendinitis      Unspecified hereditary and idiopathic peripheral neuropathy      Exam:  /83 (BP Location: Right arm)   Pulse 96   Temp 98.6  F (37  C) (Temporal)   Resp 19   Wound (used by OP [facility] only) 07/29/19 0813 Left heel pressure injury (Active)   Length (cm) 5.4 12/23/2019  9:00 AM   Width (cm) 5 12/23/2019  9:00 AM   Depth (cm) 1.5 12/23/2019  9:00 AM   Wound (cm^2) 27 cm^2 12/23/2019  9:00 AM   Wound Volume (cm^3) 40.5 cm^3 12/23/2019  9:00 AM   Wound healing % -64.63 12/23/2019  9:00 AM   Dressing Appearance moist drainage 12/23/2019  9:00 AM   Drainage Characteristics/Odor serosanguineous 12/23/2019  9:00 AM   Drainage Amount large 12/23/2019  9:00 AM   Thickness/Stage Stage 4 12/23/2019  9:00 AM   Base red/granulating 12/23/2019  9:00 AM   Periwound intact 12/23/2019  9:00 AM   Periwound Temperature warm 12/23/2019  9:00 AM   Periwound Skin Turgor soft 12/23/2019  9:00 AM   Edges open 12/23/2019  9:00 AM   Care, Wound non-select wound debridement performed 12/23/2019  9:00 AM       Wound (used by OP [facility] only) 07/29/19 0814 Right heel pressure injury (Active)   Length (cm) 5.7 12/23/2019  9:00 AM   Width (cm) 5 12/23/2019  9:00 AM   Depth (cm) 2.2 12/23/2019  9:00 AM   Wound (cm^2) 28.5 cm^2 12/23/2019  9:00 AM   Wound Volume (cm^3) 62.7 cm^3 12/23/2019  9:00 AM   Wound healing % -120.59 12/23/2019  9:00 AM   Dressing Appearance moist drainage 12/23/2019  9:00 AM   Drainage Characteristics/Odor serosanguineous 12/23/2019  9:00 AM   Drainage Amount large 12/23/2019  9:00 AM   Thickness/Stage Stage 4 12/23/2019  9:00 AM   Base red/granulating  12/23/2019  9:00 AM   Periwound intact 12/23/2019  9:00 AM   Periwound Temperature warm 12/23/2019  9:00 AM   Periwound Skin Turgor soft 12/23/2019  9:00 AM   Edges open 12/23/2019  9:00 AM   Care, Wound non-select wound debridement performed 12/23/2019  9:00 AM       Wound (used by OP [facility] only) 12/16/19 0937 Right medial ankle trauma (Active)   Length (cm) 0.9 12/23/2019  9:00 AM   Width (cm) 1 12/23/2019  9:00 AM   Depth (cm) 0 12/23/2019  9:00 AM   Wound (cm^2) 0.9 cm^2 12/23/2019  9:00 AM   Wound Volume (cm^3) 0 cm^3 12/23/2019  9:00 AM   Wound healing % 52.63 12/23/2019  9:00 AM   Dressing Appearance moist drainage 12/23/2019  9:00 AM   Drainage Characteristics/Odor serosanguineous 12/23/2019  9:00 AM   Drainage Amount moderate 12/23/2019  9:00 AM   Thickness/Stage Stage 3 12/23/2019  9:00 AM   Base red/granulating 12/23/2019  9:00 AM   Periwound intact 12/23/2019  9:00 AM   Periwound Temperature warm 12/23/2019  9:00 AM   Periwound Skin Turgor soft 12/23/2019  9:00 AM   Edges open 12/23/2019  9:00 AM   Care, Wound non-select wound debridement performed 12/23/2019  9:00 AM     49-year-old female, right and left heel ulcerations, photodocumentation and measurements, probes to bone on the right, no bone palpable on the left.  Hyper granulation tissue, biofilm present.        Impression: Right and left heel wounds, chronic, idiopathic neuropathy, right and left lower extremity lymphedema tarda primary    Plan: We will dress the wounds with Plurogel on Hydrofera Blue, wash wounds with Prontosan on a daily basis, stop total contact casting so that she can proceed with hyperbaric oxygen treatments 5 days a week, discussed meditation and overall stress management given significant current life stressors which will have a negative impact on her ability to heal her wounds.  The shoes will have no heel, she will need to use a walker, will have her fit for right and left Crow boots, in clinic demonstration on December 26  of  advanced proprietary lymphedema pump.      Juan Antonio Travis MD on 12/23/2019 at 9:56 AM

## 2019-12-23 NOTE — DISCHARGE INSTRUCTIONS
[facility]  [address] [address]  Appointment Phone [phone]     Shalonda Anita      1970    Wound Dressing Change Left heel and Right Heel  Cleanse wounds with wound cleanser prontosan  Apply Plurogel, than Hydrofera Blue cut to fit the size of the wound  Apply Edemawear and quick absorbant dressings if needed  Change Dressing Daily  Wound Dressing Change:Right Medial Ankle  Cleanse wound with vashe  Cover wound with plurogel and bandaid  Change dressing daily  You have been referred to [facility] for Globoped Shoes. Please contact them at [phone] to set up an apt to get your orthotic. They are located in our building [address].       JUNAID Travis M.D.. December 23, 2019    Call us at [phone] if you have any questions about your wounds, have redness or swelling around your wound, have a fever of 101 or greater or if you have any other problems or concerns. We answer the phone Monday through Friday 8 am to 4 pm, please leave a message as we check the voicemail frequently throughout the day.     Follow up with Provider - 2-3 weeks

## 2019-12-24 ENCOUNTER — MEDICAL CORRESPONDENCE (OUTPATIENT)
Dept: HEALTH INFORMATION MANAGEMENT | Facility: CLINIC | Age: 49
End: 2019-12-24

## 2019-12-24 LAB
AST SERPL W P-5'-P-CCNC: 41 U/L (ref 0–45)
BASOPHILS # BLD AUTO: 0 10E9/L (ref 0–0.2)
BASOPHILS NFR BLD AUTO: 0.5 %
BUN SERPL-MCNC: 11 MG/DL (ref 7–30)
CREAT SERPL-MCNC: 0.66 MG/DL (ref 0.52–1.04)
CRP SERPL-MCNC: 39.8 MG/L (ref 0–8)
DIFFERENTIAL METHOD BLD: ABNORMAL
EOSINOPHIL # BLD AUTO: 0.3 10E9/L (ref 0–0.7)
EOSINOPHIL NFR BLD AUTO: 4.2 %
ERYTHROCYTE [DISTWIDTH] IN BLOOD BY AUTOMATED COUNT: 24.1 % (ref 10–15)
ERYTHROCYTE [SEDIMENTATION RATE] IN BLOOD BY WESTERGREN METHOD: 66 MM/H (ref 0–20)
GFR SERPL CREATININE-BSD FRML MDRD: >90 ML/MIN/{1.73_M2}
HCT VFR BLD AUTO: 35.8 % (ref 35–47)
HGB BLD-MCNC: 9.9 G/DL (ref 11.7–15.7)
IMM GRANULOCYTES # BLD: 0 10E9/L (ref 0–0.4)
IMM GRANULOCYTES NFR BLD: 0.3 %
LYMPHOCYTES # BLD AUTO: 2.1 10E9/L (ref 0.8–5.3)
LYMPHOCYTES NFR BLD AUTO: 35.3 %
MCH RBC QN AUTO: 20 PG (ref 26.5–33)
MCHC RBC AUTO-ENTMCNC: 27.7 G/DL (ref 31.5–36.5)
MCV RBC AUTO: 72 FL (ref 78–100)
MONOCYTES # BLD AUTO: 0.5 10E9/L (ref 0–1.3)
MONOCYTES NFR BLD AUTO: 8.7 %
NEUTROPHILS # BLD AUTO: 3.1 10E9/L (ref 1.6–8.3)
NEUTROPHILS NFR BLD AUTO: 51 %
NRBC # BLD AUTO: 0 10*3/UL
NRBC BLD AUTO-RTO: 0 /100
PLATELET # BLD AUTO: 362 10E9/L (ref 150–450)
RBC # BLD AUTO: 4.95 10E12/L (ref 3.8–5.2)
WBC # BLD AUTO: 6 10E9/L (ref 4–11)

## 2019-12-24 PROCEDURE — 84450 TRANSFERASE (AST) (SGOT): CPT | Performed by: INTERNAL MEDICINE

## 2019-12-24 PROCEDURE — 85025 COMPLETE CBC W/AUTO DIFF WBC: CPT | Performed by: INTERNAL MEDICINE

## 2019-12-24 PROCEDURE — 84520 ASSAY OF UREA NITROGEN: CPT | Performed by: INTERNAL MEDICINE

## 2019-12-24 PROCEDURE — 86140 C-REACTIVE PROTEIN: CPT | Performed by: INTERNAL MEDICINE

## 2019-12-24 PROCEDURE — 85652 RBC SED RATE AUTOMATED: CPT | Performed by: INTERNAL MEDICINE

## 2019-12-24 PROCEDURE — 82565 ASSAY OF CREATININE: CPT | Performed by: INTERNAL MEDICINE

## 2019-12-26 ENCOUNTER — HOME INFUSION (PRE-WILLOW HOME INFUSION) (OUTPATIENT)
Dept: PHARMACY | Facility: CLINIC | Age: 49
End: 2019-12-26

## 2019-12-26 ENCOUNTER — HOSPITAL ENCOUNTER (OUTPATIENT)
Dept: WOUND CARE | Facility: CLINIC | Age: 49
Discharge: HOME | End: 2019-12-26
Attending: SURGERY | Admitting: SURGERY
Payer: COMMERCIAL

## 2019-12-26 VITALS
TEMPERATURE: 98.3 F | RESPIRATION RATE: 16 BRPM | HEART RATE: 101 BPM | SYSTOLIC BLOOD PRESSURE: 149 MMHG | DIASTOLIC BLOOD PRESSURE: 81 MMHG

## 2019-12-26 DIAGNOSIS — L89.613 PRESSURE INJURY OF RIGHT HEEL, STAGE 3 (H): ICD-10-CM

## 2019-12-26 DIAGNOSIS — L89.623 PRESSURE ULCER OF LEFT HEEL, STAGE 3 (H): ICD-10-CM

## 2019-12-26 PROCEDURE — 99213 OFFICE O/P EST LOW 20 MIN: CPT | Performed by: SURGERY

## 2019-12-26 PROCEDURE — A6210 FOAM DRG >16<=48 SQ IN W/O B: HCPCS

## 2019-12-26 PROCEDURE — 97602 WOUND(S) CARE NON-SELECTIVE: CPT

## 2019-12-26 NOTE — ADDENDUM NOTE
Encounter addended by: Judith Gillespie RN on: 12/26/2019 5:23 PM   Actions taken: Flowsheet accepted, Visit Navigator Flowsheet section accepted, Charge Capture section accepted

## 2019-12-26 NOTE — DISCHARGE INSTRUCTIONS
[facility]  [address] [address]  Appointment Phone [phone]     Shalonda Howard      1970    Wound Dressing Change Left heel and Right Heel  Cleanse wounds with wound cleanser prontosan  Apply Plurogel, then Hydrofera Blue cut to fit the size of the wound  Apply Edemawear and quick absorbant dressings if needed  Change Dressing Daily    Wound Dressing Change:Right Medial Ankle  Cleanse wound with vashe  Cover wound with plurogel and bandaid  Change dressing daily    Footwear: wear whichever of the boots feel best for you     JUNAID Travis M.D.. December 26, 2019    Call us at [phone] if you have any questions about your wounds, have redness or swelling around your wound, have a fever of 101 or greater or if you have any other problems or concerns. We answer the phone Monday through Friday 8 am to 4 pm, please leave a message as we check the voicemail frequently throughout the day.     Follow up with Provider - 2 weeks

## 2019-12-26 NOTE — PROGRESS NOTES
[facility] [place] Progress Note    Subject: Shalonda Howard patient present for in clinic trial of  advanced lymphedema pump.  Right and left heel ulcerations with bilateral calcaneal osteomyelitis, she is to initiate hyperbaric oxygen through [facility].  Stop total contact casting last week to allow her to undergo hyperbaric oxygen treatments 5 days a week, casting delays any hyperbaric chamber treatments by 24 hours therefore effectively limiting her to 3 days/week.  Additional current life stressors are her mother being critically ill and she and her  and daughter are in the midst of a move.  Idiopathic bilateral lower extremity neuropathy, nondiabetic.    Patient Active Problem List   Diagnosis     Infection     Left foot Postop Wound infection     Post op infection     Systemic lupus erythematosus (H)     Osteomyelitis Left 1st metatarsal -- S/P Amputation     Cellulitis of right foot     Pressure injury of right heel, stage 3 (H)     Pressure ulcer of left heel, stage 3 (H)     Open wound of third toe of left foot     Open wound of heel     Open toe wound     Osteomyelitis (H)     Past Medical History:   Diagnosis Date     Allergic rhinitis, cause unspecified      Anxiety state, unspecified      Cellulitis and abscess of leg, except foot      Disuse osteoporosis      Dysthymic disorder      Edema      Encounter for long-term (current) use of other medications      Esophageal reflux      Foot ulcer on Heel bilaterally  5/22/2019     Kidney stones      Myalgia and myositis, unspecified      Obesity, unspecified      Open wound of foot except toe(s) alone, complicated      Opioid type dependence, unspecified      Other acne      Other congenital valgus deformity of feet      Other ventral hernia without mention of obstruction or gangrene      Polycystic ovaries      PONV (postoperative nausea and vomiting)      Systemic lupus erythematosus (H)     unsure      Tibialis tendinitis      Unspecified hereditary and idiopathic peripheral neuropathy      Exam:  BP (!) 149/81 (BP Location: Left arm)   Pulse 101   Temp 98.3  F (36.8  C) (Temporal)   Resp 16   Wound (used by OP [facility] only) 07/29/19 0813 Left heel pressure injury (Active)   Length (cm) 5.4 12/26/2019  1:11 PM   Width (cm) 5.7 12/26/2019  1:11 PM   Depth (cm) 0.9 12/26/2019  1:11 PM   Wound (cm^2) 30.78 cm^2 12/26/2019  1:11 PM   Wound Volume (cm^3) 27.7 cm^3 12/26/2019  1:11 PM   Wound healing % -87.68 12/26/2019  1:11 PM   Dressing Appearance moist drainage 12/26/2019  1:11 PM   Drainage Characteristics/Odor serosanguineous 12/26/2019  1:11 PM   Drainage Amount moderate 12/26/2019  1:11 PM       Wound (used by OP [facility] only) 07/29/19 0814 Right heel pressure injury (Active)   Length (cm) 5.5 12/26/2019  1:11 PM   Width (cm) 5.1 12/26/2019  1:11 PM   Depth (cm) 1.8 12/26/2019  1:11 PM   Wound (cm^2) 28.05 cm^2 12/26/2019  1:11 PM   Wound Volume (cm^3) 50.49 cm^3 12/26/2019  1:11 PM   Wound healing % -117.11 12/26/2019  1:11 PM   Dressing Appearance moist drainage 12/26/2019  1:11 PM   Drainage Characteristics/Odor serosanguineous 12/26/2019  1:11 PM   Drainage Amount moderate 12/26/2019  1:11 PM       Wound (used by OP [facility] only) 12/16/19 0937 Right medial ankle trauma (Active)   Length (cm) 0.7 12/26/2019  1:11 PM   Width (cm) 0.6 12/26/2019  1:11 PM   Depth (cm) 0 12/26/2019  1:11 PM   Wound (cm^2) 0.42 cm^2 12/26/2019  1:11 PM   Wound Volume (cm^3) 0 cm^3 12/26/2019  1:11 PM   Wound healing % 77.89 12/26/2019  1:11 PM   Dressing Appearance moist drainage 12/26/2019  1:11 PM   Drainage Characteristics/Odor serosanguineous 12/26/2019  1:11 PM   Drainage Amount small 12/26/2019  1:11 PM     49-year-old female, chronic right and left heel wounds, see photodocumentation and measurements.  She has undergone in clinic trial of Flexitouch plus lymphedema pump for management of lymphedema tarda primary right and left lower  extremities, she has significant lymphorrhea associated with the right and left heel ulcerations        Impression: Right and left heel ulcerations nondiabetic, neuropathy, osteomyelitis    Plan: We will dress the wounds with Plurogel, Hydrofera Blue, EdemaWear, change on a daily basis, rinse wounds with Prontosan or Vashe.  Begin hyperbaric oxygen treatments, ideally 5 days/week, holding on total contact casting as total contact casting requires a minimum of 24 hours to cure before hyperbaric can be performed.  Her mother is severely ill at [facility], on ECMO and the patient is in the process of moving with her  and daughter to a new home, multiple life stressors.  Patient will return to the clinic in 2 weeks time    Juan Antonio Travis MD on 12/26/2019 at 1:47 PM

## 2019-12-30 NOTE — PROGRESS NOTES
This is a recent snapshot of the patient's [facility] medical record.  For current drug dose and complete information and questions, call [phone] or In Basket pool, fv home infusion ([id number])  CSN Number:  [id number]

## 2019-12-31 ENCOUNTER — HOME INFUSION (PRE-WILLOW HOME INFUSION) (OUTPATIENT)
Dept: PHARMACY | Facility: CLINIC | Age: 49
End: 2019-12-31

## 2020-01-02 NOTE — PROGRESS NOTES
This is a recent snapshot of the patient's [facility] medical record.  For current drug dose and complete information and questions, call [phone] or In [facility] pool, fv home infusion ([id number])  CSN Number:  [id number]

## 2020-01-03 ENCOUNTER — HOME INFUSION (PRE-WILLOW HOME INFUSION) (OUTPATIENT)
Dept: PHARMACY | Facility: CLINIC | Age: 50
End: 2020-01-03

## 2020-01-07 ENCOUNTER — MEDICAL CORRESPONDENCE (OUTPATIENT)
Dept: HEALTH INFORMATION MANAGEMENT | Facility: CLINIC | Age: 50
End: 2020-01-07

## 2020-01-07 ENCOUNTER — HOSPITAL ENCOUNTER (OUTPATIENT)
Facility: CLINIC | Age: 50
Setting detail: SPECIMEN
Discharge: HOME | End: 2020-01-07
Admitting: INTERNAL MEDICINE
Payer: COMMERCIAL

## 2020-01-07 LAB
AST SERPL W P-5'-P-CCNC: 31 U/L (ref 0–45)
BASOPHILS # BLD AUTO: 0 10E9/L (ref 0–0.2)
BASOPHILS NFR BLD AUTO: 0.3 %
CREAT SERPL-MCNC: 0.59 MG/DL (ref 0.52–1.04)
CRP SERPL-MCNC: 32.3 MG/L (ref 0–8)
DIFFERENTIAL METHOD BLD: ABNORMAL
EOSINOPHIL # BLD AUTO: 0.2 10E9/L (ref 0–0.7)
EOSINOPHIL NFR BLD AUTO: 2.6 %
ERYTHROCYTE [DISTWIDTH] IN BLOOD BY AUTOMATED COUNT: 22.2 % (ref 10–15)
ERYTHROCYTE [SEDIMENTATION RATE] IN BLOOD BY WESTERGREN METHOD: 67 MM/H (ref 0–20)
GFR SERPL CREATININE-BSD FRML MDRD: >90 ML/MIN/{1.73_M2}
HCT VFR BLD AUTO: 33.3 % (ref 35–47)
HGB BLD-MCNC: 9.7 G/DL (ref 11.7–15.7)
IMM GRANULOCYTES # BLD: 0 10E9/L (ref 0–0.4)
IMM GRANULOCYTES NFR BLD: 0.2 %
LYMPHOCYTES # BLD AUTO: 1.8 10E9/L (ref 0.8–5.3)
LYMPHOCYTES NFR BLD AUTO: 26.7 %
MCH RBC QN AUTO: 20.3 PG (ref 26.5–33)
MCHC RBC AUTO-ENTMCNC: 29.1 G/DL (ref 31.5–36.5)
MCV RBC AUTO: 70 FL (ref 78–100)
MONOCYTES # BLD AUTO: 0.5 10E9/L (ref 0–1.3)
MONOCYTES NFR BLD AUTO: 7.7 %
NEUTROPHILS # BLD AUTO: 4.1 10E9/L (ref 1.6–8.3)
NEUTROPHILS NFR BLD AUTO: 62.5 %
NRBC # BLD AUTO: 0 10*3/UL
NRBC BLD AUTO-RTO: 0 /100
PLATELET # BLD AUTO: 308 10E9/L (ref 150–450)
RBC # BLD AUTO: 4.77 10E12/L (ref 3.8–5.2)
WBC # BLD AUTO: 6.6 10E9/L (ref 4–11)

## 2020-01-07 PROCEDURE — 84450 TRANSFERASE (AST) (SGOT): CPT | Performed by: INTERNAL MEDICINE

## 2020-01-07 PROCEDURE — 86140 C-REACTIVE PROTEIN: CPT | Performed by: INTERNAL MEDICINE

## 2020-01-07 PROCEDURE — 82565 ASSAY OF CREATININE: CPT | Performed by: INTERNAL MEDICINE

## 2020-01-07 PROCEDURE — 85025 COMPLETE CBC W/AUTO DIFF WBC: CPT | Performed by: INTERNAL MEDICINE

## 2020-01-07 PROCEDURE — 85652 RBC SED RATE AUTOMATED: CPT | Performed by: INTERNAL MEDICINE

## 2020-01-07 NOTE — PROGRESS NOTES
This is a recent snapshot of the patient's [facility] medical record.  For current drug dose and complete information and questions, call [phone] or In Basket pool, fv home infusion ([id number])  CSN Number:  [id number]

## 2020-01-08 ENCOUNTER — HOME INFUSION (PRE-WILLOW HOME INFUSION) (OUTPATIENT)
Dept: PHARMACY | Facility: CLINIC | Age: 50
End: 2020-01-08

## 2020-01-09 NOTE — PROGRESS NOTES
This is a recent snapshot of the patient's [facility] medical record.  For current drug dose and complete information and questions, call [phone] or In Basket pool, fv home infusion ([id number])  CSN Number:  [id number]

## 2020-01-13 ENCOUNTER — MEDICAL CORRESPONDENCE (OUTPATIENT)
Dept: HEALTH INFORMATION MANAGEMENT | Facility: CLINIC | Age: 50
End: 2020-01-13

## 2020-01-13 ENCOUNTER — HOSPITAL ENCOUNTER (OUTPATIENT)
Dept: WOUND CARE | Facility: CLINIC | Age: 50
Discharge: HOME | End: 2020-01-13
Attending: SURGERY | Admitting: SURGERY
Payer: COMMERCIAL

## 2020-01-13 ENCOUNTER — HOSPITAL ENCOUNTER (OUTPATIENT)
Facility: CLINIC | Age: 50
Setting detail: SPECIMEN
Discharge: HOME | End: 2020-01-13
Admitting: INTERNAL MEDICINE
Payer: COMMERCIAL

## 2020-01-13 VITALS — HEART RATE: 88 BPM | SYSTOLIC BLOOD PRESSURE: 147 MMHG | DIASTOLIC BLOOD PRESSURE: 78 MMHG | TEMPERATURE: 97.6 F

## 2020-01-13 DIAGNOSIS — L89.623 PRESSURE ULCER OF LEFT HEEL, STAGE 3 (H): ICD-10-CM

## 2020-01-13 DIAGNOSIS — L89.613 PRESSURE INJURY OF RIGHT HEEL, STAGE 3 (H): ICD-10-CM

## 2020-01-13 LAB
AST SERPL W P-5'-P-CCNC: 30 U/L (ref 0–45)
BUN SERPL-MCNC: 10 MG/DL (ref 7–30)
CREAT SERPL-MCNC: 0.54 MG/DL (ref 0.52–1.04)
CRP SERPL-MCNC: 50.7 MG/L (ref 0–8)
ERYTHROCYTE [DISTWIDTH] IN BLOOD BY AUTOMATED COUNT: 22.2 % (ref 10–15)
ERYTHROCYTE [SEDIMENTATION RATE] IN BLOOD BY WESTERGREN METHOD: 78 MM/H (ref 0–20)
GFR SERPL CREATININE-BSD FRML MDRD: >90 ML/MIN/{1.73_M2}
HCT VFR BLD AUTO: 30.8 % (ref 35–47)
HGB BLD-MCNC: 8.8 G/DL (ref 11.7–15.7)
MCH RBC QN AUTO: 20 PG (ref 26.5–33)
MCHC RBC AUTO-ENTMCNC: 28.6 G/DL (ref 31.5–36.5)
MCV RBC AUTO: 70 FL (ref 78–100)
PLATELET # BLD AUTO: 350 10E9/L (ref 150–450)
RBC # BLD AUTO: 4.39 10E12/L (ref 3.8–5.2)
WBC # BLD AUTO: 4.1 10E9/L (ref 4–11)

## 2020-01-13 PROCEDURE — 99213 OFFICE O/P EST LOW 20 MIN: CPT | Performed by: SURGERY

## 2020-01-13 PROCEDURE — 97602 WOUND(S) CARE NON-SELECTIVE: CPT

## 2020-01-13 PROCEDURE — 84520 ASSAY OF UREA NITROGEN: CPT | Performed by: INTERNAL MEDICINE

## 2020-01-13 PROCEDURE — A6209 FOAM DRSG <=16 SQ IN W/O BDR: HCPCS

## 2020-01-13 PROCEDURE — 86140 C-REACTIVE PROTEIN: CPT | Performed by: INTERNAL MEDICINE

## 2020-01-13 PROCEDURE — 82565 ASSAY OF CREATININE: CPT | Performed by: INTERNAL MEDICINE

## 2020-01-13 PROCEDURE — 85652 RBC SED RATE AUTOMATED: CPT | Performed by: INTERNAL MEDICINE

## 2020-01-13 PROCEDURE — 85027 COMPLETE CBC AUTOMATED: CPT | Performed by: INTERNAL MEDICINE

## 2020-01-13 PROCEDURE — 84450 TRANSFERASE (AST) (SGOT): CPT | Performed by: INTERNAL MEDICINE

## 2020-01-13 NOTE — DISCHARGE INSTRUCTIONS
[facility]  [address] [address]    Call us at [phone] if you have any questions about your wounds, have redness or swelling around your wound, have a fever of 101 or greater or if you have any other problems or concerns. We answer the phone Monday through Friday 8 am to 4 pm, please leave a message as we check the voicemail frequently throughout the day.     Shalonda Howard      1970    Supplements:  1. Add in 1 packet of Isaiah Supplement into your favorite beverage TWICE a day. You may purchase at [facility] outpatient pharmacy located in [facility].  2. Vitamin D3 5,000 international unit(s) once a day  3. Wade Diosmin Hesperidin Cardiovascular Support Blood Health Vascular 1 tablet twice daily     Wound Dressing Change Left heel and Right Heel  Cleanse wounds with wound cleanser prontosan  Apply Plurogel, then Hydrofera Blue cut to fit the size of the wound  Apply Edemawear and quick absorbant dressings if needed  Change Dressing Daily    Footwear: wear whichever of the boots feel best for you     JUNAID Travis M.D.. January 13, 2020    Follow up with Provider - 3 weeks

## 2020-01-13 NOTE — PROGRESS NOTES
[facility] [place] Progress Note    Subject: Shalonda Howard 2 to 3 days of discomfort right medial distal calf, increasing warmth, consistent with cellulitis.  Denies fevers chills or sweats.  Does not appear toxic.  Chronic ulcerations right and left heel with known osteomyelitis.  Her mother who had been quite ill in the intensive care unit passed away last week,  is this week.  She anticipates resuming hyperbaric oxygen treatments the following week.  I discussed with a resident at [facility] regarding total contact casting which would limit the patient to maximum number of dives of 3/week given the 24-hour window needed for curing of the cast during which time she cannot be in the hyperbaric oxygen chamber.    Patient Active Problem List   Diagnosis     Infection     Left foot Postop Wound infection     Post op infection     Systemic lupus erythematosus (H)     Osteomyelitis Left 1st metatarsal -- S/P Amputation     Cellulitis of right foot     Pressure injury of right heel, stage 3 (H)     Pressure ulcer of left heel, stage 3 (H)     Open wound of third toe of left foot     Open wound of heel     Open toe wound     Osteomyelitis (H)     Past Medical History:   Diagnosis Date     Allergic rhinitis, cause unspecified      Anxiety state, unspecified      Cellulitis and abscess of leg, except foot      Disuse osteoporosis      Dysthymic disorder      Edema      Encounter for long-term (current) use of other medications      Esophageal reflux      Foot ulcer on Heel bilaterally  2019     Kidney stones      Myalgia and myositis, unspecified      Obesity, unspecified      Open wound of foot except toe(s) alone, complicated      Opioid type dependence, unspecified      Other acne      Other congenital valgus deformity of feet      Other ventral hernia without mention of obstruction or gangrene      Polycystic ovaries      PONV (postoperative nausea and vomiting)       Systemic lupus erythematosus (H)     unsure     Tibialis tendinitis      Unspecified hereditary and idiopathic peripheral neuropathy      Exam:  BP (!) 147/78   Pulse 88   Temp 97.6  F (36.4  C) (Temporal)   Wound (used by OP [facility] only) 07/29/19 0813 Left heel pressure injury (Active)   Length (cm) 5.5 1/13/2020  1:11 PM   Width (cm) 5 1/13/2020  1:11 PM   Depth (cm) 3.5 1/13/2020  1:11 PM   Wound (cm^2) 27.5 cm^2 1/13/2020  1:11 PM   Wound Volume (cm^3) 96.25 cm^3 1/13/2020  1:11 PM   Wound healing % -67.68 1/13/2020  1:11 PM   Dressing Appearance moist drainage 1/13/2020  1:11 PM   Drainage Characteristics/Odor serosanguineous 1/13/2020  1:11 PM   Drainage Amount moderate 1/13/2020  1:11 PM   Thickness/Stage Stage 4 1/13/2020  1:11 PM   Base red/granulating 1/13/2020  1:11 PM   Periwound intact 1/13/2020  1:11 PM   Periwound Temperature warm 1/13/2020  1:11 PM   Periwound Skin Turgor soft 1/13/2020  1:11 PM   Edges open 1/13/2020  1:11 PM   Care, Wound non-select wound debridement performed 1/13/2020  1:11 PM       Wound (used by OP [facility] only) 07/29/19 0814 Right heel pressure injury (Active)   Length (cm) 5.5 1/13/2020  1:12 PM   Width (cm) 6 1/13/2020  1:12 PM   Depth (cm) 2.3 1/13/2020  1:12 PM   Wound (cm^2) 33 cm^2 1/13/2020  1:12 PM   Wound Volume (cm^3) 75.9 cm^3 1/13/2020  1:12 PM   Wound healing % -155.42 1/13/2020  1:12 PM   Dressing Appearance moist drainage 1/13/2020  1:12 PM   Drainage Characteristics/Odor serosanguineous 1/13/2020  1:12 PM   Drainage Amount moderate 1/13/2020  1:12 PM   Thickness/Stage Stage 4 1/13/2020  1:12 PM   Base red/granulating 1/13/2020  1:12 PM   Periwound intact 1/13/2020  1:12 PM   Periwound Temperature warm 1/13/2020  1:12 PM   Periwound Skin Turgor soft 1/13/2020  1:12 PM   Edges open 1/13/2020  1:12 PM   Care, Wound non-select wound debridement performed 1/13/2020  1:12 PM     50-year-old female, palpable pedal pulses, cellulitis right medial distal calf, chronic right  and left heel wounds, no bone exposed, no purulence, no foul odor.    Impression: Chronic right and left heel ulcerations, nondiabetic, no tobacco use, idiopathic neuropathy, bilateral calcaneal osteomyelitis, currently enrolled at [facility] hyperbaric oxygen program    Plan: We will dress the wounds with Prontosan or Vashe soaking of the wounds for 10 minutes daily, application of Plurogel and Hydrofera Blue, Spandage or edema wear, micronutrients and phytonutrients.  Start course of Menten 875 mg twice daily for 14 days given cellulitis.  Patient will return to the clinic in 2-3 weeks time    Juan Antonio Travis MD on 1/13/2020 at 1:44 PM

## 2020-01-13 NOTE — ADDENDUM NOTE
Encounter addended by: Leia Branch RN on: 1/13/2020 3:21 PM   Actions taken: Charge Capture section accepted

## 2020-01-20 ENCOUNTER — HOSPITAL ENCOUNTER (OUTPATIENT)
Facility: CLINIC | Age: 50
Setting detail: SPECIMEN
Discharge: HOME | End: 2020-01-20
Admitting: INTERNAL MEDICINE
Payer: COMMERCIAL

## 2020-01-20 ENCOUNTER — MEDICAL CORRESPONDENCE (OUTPATIENT)
Dept: HEALTH INFORMATION MANAGEMENT | Facility: CLINIC | Age: 50
End: 2020-01-20

## 2020-01-20 LAB
ANISOCYTOSIS BLD QL SMEAR: ABNORMAL
AST SERPL W P-5'-P-CCNC: 48 U/L (ref 0–45)
BASOPHILS # BLD AUTO: 0 10E9/L (ref 0–0.2)
BASOPHILS NFR BLD AUTO: 0.2 %
BUN SERPL-MCNC: 9 MG/DL (ref 7–30)
CREAT SERPL-MCNC: 0.57 MG/DL (ref 0.52–1.04)
CRP SERPL-MCNC: 48.1 MG/L (ref 0–8)
DACRYOCYTES BLD QL SMEAR: SLIGHT
DIFFERENTIAL METHOD BLD: ABNORMAL
EOSINOPHIL # BLD AUTO: 0.2 10E9/L (ref 0–0.7)
EOSINOPHIL NFR BLD AUTO: 3.3 %
ERYTHROCYTE [DISTWIDTH] IN BLOOD BY AUTOMATED COUNT: 21.6 % (ref 10–15)
ERYTHROCYTE [SEDIMENTATION RATE] IN BLOOD BY WESTERGREN METHOD: 45 MM/H (ref 0–20)
GFR SERPL CREATININE-BSD FRML MDRD: >90 ML/MIN/{1.73_M2}
HCT VFR BLD AUTO: 33 % (ref 35–47)
HGB BLD-MCNC: 9.6 G/DL (ref 11.7–15.7)
IMM GRANULOCYTES # BLD: 0.1 10E9/L (ref 0–0.4)
IMM GRANULOCYTES NFR BLD: 1.6 %
LYMPHOCYTES # BLD AUTO: 2 10E9/L (ref 0.8–5.3)
LYMPHOCYTES NFR BLD AUTO: 34.2 %
MACROCYTES BLD QL SMEAR: PRESENT
MCH RBC QN AUTO: 20.5 PG (ref 26.5–33)
MCHC RBC AUTO-ENTMCNC: 29.1 G/DL (ref 31.5–36.5)
MCV RBC AUTO: 71 FL (ref 78–100)
MICROCYTES BLD QL SMEAR: PRESENT
MONOCYTES # BLD AUTO: 0.3 10E9/L (ref 0–1.3)
MONOCYTES NFR BLD AUTO: 5.2 %
NEUTROPHILS # BLD AUTO: 3.2 10E9/L (ref 1.6–8.3)
NEUTROPHILS NFR BLD AUTO: 55.5 %
NRBC # BLD AUTO: 0 10*3/UL
NRBC BLD AUTO-RTO: 0 /100
PLATELET # BLD AUTO: 170 10E9/L (ref 150–450)
PLATELET # BLD EST: ABNORMAL 10*3/UL
POIKILOCYTOSIS BLD QL SMEAR: SLIGHT
RBC # BLD AUTO: 4.68 10E12/L (ref 3.8–5.2)
WBC # BLD AUTO: 5.8 10E9/L (ref 4–11)

## 2020-01-20 PROCEDURE — 85025 COMPLETE CBC W/AUTO DIFF WBC: CPT | Performed by: INTERNAL MEDICINE

## 2020-01-20 PROCEDURE — 84520 ASSAY OF UREA NITROGEN: CPT | Performed by: INTERNAL MEDICINE

## 2020-01-20 PROCEDURE — 86140 C-REACTIVE PROTEIN: CPT | Performed by: INTERNAL MEDICINE

## 2020-01-20 PROCEDURE — 82565 ASSAY OF CREATININE: CPT | Performed by: INTERNAL MEDICINE

## 2020-01-20 PROCEDURE — 85652 RBC SED RATE AUTOMATED: CPT | Performed by: INTERNAL MEDICINE

## 2020-01-20 PROCEDURE — 84450 TRANSFERASE (AST) (SGOT): CPT | Performed by: INTERNAL MEDICINE

## 2020-01-22 ENCOUNTER — TELEPHONE (OUTPATIENT)
Dept: WOUND CARE | Facility: CLINIC | Age: 50
End: 2020-01-22

## 2020-01-22 NOTE — TELEPHONE ENCOUNTER
Called them back and gave them her phone number to make sure they had the right number. They have been leaving messages. No other options were chosen. Gave them the spouses phone number to get a hold of her.

## 2020-01-22 NOTE — TELEPHONE ENCOUNTER
Bonnie from [facility] called and they received a referral for patient, but has been unable to reach patient.  They would like to know has the plans changes, or do you have an update on patient .

## 2020-01-23 ENCOUNTER — HOME INFUSION (PRE-WILLOW HOME INFUSION) (OUTPATIENT)
Dept: PHARMACY | Facility: CLINIC | Age: 50
End: 2020-01-23

## 2020-01-24 ENCOUNTER — TELEPHONE (OUTPATIENT)
Dept: WOUND CARE | Facility: CLINIC | Age: 50
End: 2020-01-24

## 2020-01-24 DIAGNOSIS — L89.623 PRESSURE ULCER OF LEFT HEEL, STAGE 3 (H): Primary | ICD-10-CM

## 2020-01-24 DIAGNOSIS — L89.613 PRESSURE INJURY OF RIGHT HEEL, STAGE 3 (H): ICD-10-CM

## 2020-01-24 RX ORDER — DOXYCYCLINE 100 MG/1
100 CAPSULE ORAL 2 TIMES DAILY
Qty: 60 CAPSULE | Refills: 0 | Status: ON HOLD | OUTPATIENT
Start: 2020-01-24 | End: 2020-03-30

## 2020-01-24 NOTE — TELEPHONE ENCOUNTER
"Received call back from Dr. Travis- he wants to ensure [facility], ID and antibiotics.  Called Patient back and she reports\"[facility] gave her spot away\" She had not been since 1/6 missed 2 weeks of treatment- advised patient to re-establish care with [facility]. She plans to attend next week.  Infectious Disease appointment on 1/29 at 10:45 with Dr. Bocanegra. Antibiotics as ordered.    "

## 2020-01-24 NOTE — PROGRESS NOTES
This is a recent snapshot of the patient's [facility] medical record.  For current drug dose and complete information and questions, call [phone] or In Basket pool, fv home infusion ([id number])  CSN Number:  [id number]

## 2020-01-24 NOTE — TELEPHONE ENCOUNTER
Received call from Patient, she started having hive reactions to Augementin on Tuesday about one week after starting the antibiotics. She has been in to see her PCP and they gave her steroids to call the hives. She is wondering if she should be on different antibiotics. She reports she is still having symptoms of cellulitis. Dr. Travis paged to discuss. Awaiting return call

## 2020-01-27 ENCOUNTER — HOME INFUSION (PRE-WILLOW HOME INFUSION) (OUTPATIENT)
Dept: PHARMACY | Facility: CLINIC | Age: 50
End: 2020-01-27

## 2020-01-27 ENCOUNTER — MEDICAL CORRESPONDENCE (OUTPATIENT)
Dept: HEALTH INFORMATION MANAGEMENT | Facility: CLINIC | Age: 50
End: 2020-01-27

## 2020-01-27 LAB
AST SERPL W P-5'-P-CCNC: 77 U/L (ref 0–45)
BASOPHILS # BLD AUTO: 0 10E9/L (ref 0–0.2)
BASOPHILS NFR BLD AUTO: 0.6 %
BUN SERPL-MCNC: 11 MG/DL (ref 7–30)
CREAT SERPL-MCNC: 0.68 MG/DL (ref 0.52–1.04)
CRP SERPL-MCNC: 11.2 MG/L (ref 0–8)
DIFFERENTIAL METHOD BLD: ABNORMAL
EOSINOPHIL # BLD AUTO: 0.1 10E9/L (ref 0–0.7)
EOSINOPHIL NFR BLD AUTO: 2.6 %
ERYTHROCYTE [DISTWIDTH] IN BLOOD BY AUTOMATED COUNT: 20.5 % (ref 10–15)
ERYTHROCYTE [SEDIMENTATION RATE] IN BLOOD BY WESTERGREN METHOD: 52 MM/H (ref 0–20)
GFR SERPL CREATININE-BSD FRML MDRD: >90 ML/MIN/{1.73_M2}
HCT VFR BLD AUTO: 33 % (ref 35–47)
HGB BLD-MCNC: 9.5 G/DL (ref 11.7–15.7)
IMM GRANULOCYTES # BLD: 0.1 10E9/L (ref 0–0.4)
IMM GRANULOCYTES NFR BLD: 1.1 %
LYMPHOCYTES # BLD AUTO: 1.8 10E9/L (ref 0.8–5.3)
LYMPHOCYTES NFR BLD AUTO: 38.4 %
MCH RBC QN AUTO: 20.1 PG (ref 26.5–33)
MCHC RBC AUTO-ENTMCNC: 28.8 G/DL (ref 31.5–36.5)
MCV RBC AUTO: 70 FL (ref 78–100)
MONOCYTES # BLD AUTO: 0.4 10E9/L (ref 0–1.3)
MONOCYTES NFR BLD AUTO: 7.9 %
NEUTROPHILS # BLD AUTO: 2.3 10E9/L (ref 1.6–8.3)
NEUTROPHILS NFR BLD AUTO: 49.4 %
NRBC # BLD AUTO: 0 10*3/UL
NRBC BLD AUTO-RTO: 1 /100
PLATELET # BLD AUTO: 271 10E9/L (ref 150–450)
RBC # BLD AUTO: 4.72 10E12/L (ref 3.8–5.2)
WBC # BLD AUTO: 4.7 10E9/L (ref 4–11)

## 2020-01-27 PROCEDURE — 86140 C-REACTIVE PROTEIN: CPT | Performed by: INTERNAL MEDICINE

## 2020-01-27 PROCEDURE — 84450 TRANSFERASE (AST) (SGOT): CPT | Performed by: INTERNAL MEDICINE

## 2020-01-27 PROCEDURE — 85025 COMPLETE CBC W/AUTO DIFF WBC: CPT | Performed by: INTERNAL MEDICINE

## 2020-01-27 PROCEDURE — 82565 ASSAY OF CREATININE: CPT | Performed by: INTERNAL MEDICINE

## 2020-01-27 PROCEDURE — 84520 ASSAY OF UREA NITROGEN: CPT | Performed by: INTERNAL MEDICINE

## 2020-01-27 PROCEDURE — 85652 RBC SED RATE AUTOMATED: CPT | Performed by: INTERNAL MEDICINE

## 2020-01-28 NOTE — PROGRESS NOTES
This is a recent snapshot of the patient's [facility] medical record.  For current drug dose and complete information and questions, call [phone] or In Basket pool, fv home infusion ([id number])  CSN Number:  [id number]

## 2020-01-29 ENCOUNTER — TRANSFERRED RECORDS (OUTPATIENT)
Dept: HEALTH INFORMATION MANAGEMENT | Facility: CLINIC | Age: 50
End: 2020-01-29

## 2020-01-30 ENCOUNTER — HOME INFUSION (PRE-WILLOW HOME INFUSION) (OUTPATIENT)
Dept: PHARMACY | Facility: CLINIC | Age: 50
End: 2020-01-30

## 2020-01-31 ENCOUNTER — HOME INFUSION (PRE-WILLOW HOME INFUSION) (OUTPATIENT)
Dept: PHARMACY | Facility: CLINIC | Age: 50
End: 2020-01-31

## 2020-01-31 ENCOUNTER — TELEPHONE (OUTPATIENT)
Dept: WOUND CARE | Facility: CLINIC | Age: 50
End: 2020-01-31

## 2020-01-31 ENCOUNTER — HOSPITAL ENCOUNTER (EMERGENCY)
Facility: CLINIC | Age: 50
Discharge: LEFT BEFORE DISCHARGE | End: 2020-01-31
Payer: COMMERCIAL

## 2020-01-31 VITALS
SYSTOLIC BLOOD PRESSURE: 165 MMHG | RESPIRATION RATE: 16 BRPM | DIASTOLIC BLOOD PRESSURE: 82 MMHG | TEMPERATURE: 97.8 F | HEART RATE: 90 BPM | OXYGEN SATURATION: 97 %

## 2020-01-31 NOTE — ED TRIAGE NOTES
PICC line not infusing or flushing for homecare today.  Pt receiving home zosyn for osteomyelitis.  PICC in place since December.

## 2020-01-31 NOTE — PROGRESS NOTES
This is a recent snapshot of the patient's [facility] medical record.  For current drug dose and complete information and questions, call [phone] or In Basket pool, fv home infusion ([id number])  CSN Number:  [id number]

## 2020-01-31 NOTE — TELEPHONE ENCOUNTER
[facility] has been trying to reach patient and has been unsuccessfully regarding her referral.

## 2020-02-03 ENCOUNTER — HOME INFUSION (PRE-WILLOW HOME INFUSION) (OUTPATIENT)
Dept: PHARMACY | Facility: CLINIC | Age: 50
End: 2020-02-03

## 2020-02-03 ENCOUNTER — HOSPITAL ENCOUNTER (OUTPATIENT)
Facility: CLINIC | Age: 50
Discharge: HOME | End: 2020-02-03
Attending: INTERNAL MEDICINE | Admitting: INTERNAL MEDICINE
Payer: COMMERCIAL

## 2020-02-03 PROCEDURE — 25000125 ZZHC RX 250: Performed by: INTERNAL MEDICINE

## 2020-02-03 PROCEDURE — 27211438 ZZ H KIT SHRLOCK 4FR POWER PICC SINGLE LUMEN

## 2020-02-03 PROCEDURE — 36569 INSJ PICC 5 YR+ W/O IMAGING: CPT

## 2020-02-03 RX ADMIN — LIDOCAINE HYDROCHLORIDE 0.5 ML: 10 INJECTION, SOLUTION EPIDURAL; INFILTRATION; INTRACAUDAL; PERINEURAL at 14:54

## 2020-02-03 NOTE — PROGRESS NOTES
This is a recent snapshot of the patient's [facility] medical record.  For current drug dose and complete information and questions, call [phone] or In Basket pool, fv home infusion ([id number])  CSN Number:  [id number]

## 2020-02-04 ENCOUNTER — HOSPITAL ENCOUNTER (OUTPATIENT)
Dept: WOUND CARE | Facility: CLINIC | Age: 50
Discharge: HOME | End: 2020-02-04
Attending: SURGERY | Admitting: SURGERY
Payer: COMMERCIAL

## 2020-02-04 VITALS — SYSTOLIC BLOOD PRESSURE: 143 MMHG | DIASTOLIC BLOOD PRESSURE: 82 MMHG | TEMPERATURE: 98.2 F | RESPIRATION RATE: 18 BRPM

## 2020-02-04 DIAGNOSIS — L89.613 PRESSURE INJURY OF RIGHT HEEL, STAGE 3 (H): ICD-10-CM

## 2020-02-04 DIAGNOSIS — L89.623 PRESSURE ULCER OF LEFT HEEL, STAGE 3 (H): ICD-10-CM

## 2020-02-04 PROCEDURE — 97602 WOUND(S) CARE NON-SELECTIVE: CPT

## 2020-02-04 PROCEDURE — 99213 OFFICE O/P EST LOW 20 MIN: CPT | Performed by: SURGERY

## 2020-02-04 RX ORDER — HYDROXYZINE HYDROCHLORIDE 25 MG/1
25-50 TABLET, FILM COATED ORAL EVERY 6 HOURS PRN
COMMUNITY
Start: 2019-05-21

## 2020-02-04 NOTE — PROGRESS NOTES
This is a recent snapshot of the patient's [facility] medical record.  For current drug dose and complete information and questions, call [phone] or In Basket pool, fv home infusion ([id number])  CSN Number:  [id number]

## 2020-02-04 NOTE — PROGRESS NOTES
[facility] [place] Progress Note    Subject: Shalonda Howard, mother passed away several weeks ago, mother-in-law passed away 2 weeks ago, has moved recently, significant life stressors.  Completed approximately 10 sessions of hyperbaric oxygen through [facility] for bilateral calcaneal osteomyelitis, PICC line was changed from right arm to left arm recently, seen infectious disease consult through [facility].  Anticipated total of 30 dives and reassessment.  Discussed option that after completion of dives, any MRI imaging of the calcaneal osteomyelitis would lag.  Could consider TheraSkin application on one leg with total contact casting, serial total contact cast until healed, would use a knee , subsequent begin TheraSkin on contralateral leg with sequential total contact casting.  We are not total contact casting currently while she is in hyperbaric oxygen as she cannot have a dive for 24 hours after each cast change due to curing.  She denies fevers chills or sweats.    Patient Active Problem List   Diagnosis     Infection     Left foot Postop Wound infection     Post op infection     Systemic lupus erythematosus (H)     Osteomyelitis Left 1st metatarsal -- S/P Amputation     Cellulitis of right foot     Pressure injury of right heel, stage 3 (H)     Pressure ulcer of left heel, stage 3 (H)     Open wound of third toe of left foot     Open wound of heel     Open toe wound     Osteomyelitis (H)     Past Medical History:   Diagnosis Date     Allergic rhinitis, cause unspecified      Anxiety state, unspecified      Cellulitis and abscess of leg, except foot      Disuse osteoporosis      Dysthymic disorder      Edema      Encounter for long-term (current) use of other medications      Esophageal reflux      Foot ulcer on Heel bilaterally  5/22/2019     Kidney stones      Myalgia and myositis, unspecified      Obesity, unspecified      Open wound of foot except toe(s) alone,  complicated      Opioid type dependence, unspecified      Other acne      Other congenital valgus deformity of feet      Other ventral hernia without mention of obstruction or gangrene      Polycystic ovaries      PONV (postoperative nausea and vomiting)      Systemic lupus erythematosus (H)     unsure     Tibialis tendinitis      Unspecified hereditary and idiopathic peripheral neuropathy      Exam:  BP (!) 143/82 (BP Location: Left arm)   Temp 98.2  F (36.8  C) (Temporal)   Resp 18   Wound (used by [facility] only) 07/29/19 0813 Left heel pressure injury (Active)   Length (cm) 5 2/4/2020  1:00 PM   Width (cm) 5 2/4/2020  1:00 PM   Depth (cm) 2.1 2/4/2020  1:00 PM   Wound (cm^2) 25 cm^2 2/4/2020  1:00 PM   Wound Volume (cm^3) 52.5 cm^3 2/4/2020  1:00 PM   Wound healing % -52.44 2/4/2020  1:00 PM   Undermining [Depth (cm)/Location] 11-7 Oclock/Depth 0.7 2/4/2020  1:00 PM   Dressing Appearance moist drainage 2/4/2020  1:00 PM   Drainage Characteristics/Odor serosanguineous 2/4/2020  1:00 PM   Drainage Amount moderate 2/4/2020  1:00 PM   Thickness/Stage Stage 4 2/4/2020  1:00 PM   Base red/granulating 2/4/2020  1:00 PM   Periwound intact 2/4/2020  1:00 PM   Periwound Temperature warm 2/4/2020  1:00 PM   Periwound Skin Turgor soft 2/4/2020  1:00 PM   Edges open 2/4/2020  1:00 PM   Care, Wound non-select wound debridement performed 2/4/2020  1:00 PM       Wound (used by OP [facility] only) 07/29/19 0814 Right heel pressure injury (Active)   Length (cm) 5.5 2/4/2020  1:00 PM   Width (cm) 6 2/4/2020  1:00 PM   Depth (cm) 2.5 2/4/2020  1:00 PM   Wound (cm^2) 33 cm^2 2/4/2020  1:00 PM   Wound Volume (cm^3) 82.5 cm^3 2/4/2020  1:00 PM   Wound healing % -155.42 2/4/2020  1:00 PM   Undermining [Depth (cm)/Location] 12-12 Oclock/Depth 0.5 2/4/2020  1:00 PM   Dressing Appearance moist drainage 2/4/2020  1:00 PM   Drainage Characteristics/Odor serosanguineous 2/4/2020  1:00 PM   Drainage Amount moderate 2/4/2020  1:00 PM   Thickness/Stage  Stage 4 2/4/2020  1:00 PM   Base red/granulating 2/4/2020  1:00 PM   Periwound intact 2/4/2020  1:00 PM   Periwound Temperature warm 2/4/2020  1:00 PM   Periwound Skin Turgor soft 2/4/2020  1:00 PM   Edges open 2/4/2020  1:00 PM   Care, Wound non-select wound debridement performed 2/4/2020  1:00 PM     50-year-old female, nondistressed, conversant, alert and oriented 3.  Right and left heel ulcerations as noted in photodocumentation and wound measurements, wound beds are granulation tissue, Charcot changes right and left feet, right greater than left.  No foul odor or drainage.      Impression: Chronic right and left heel wounds with right and left calcaneal osteomyelitis, PICC line left arm, ongoing IV antibiotics, hyperbaric oxygen through [facility], currently approximately 10 dives of been completed, significant life stressors    Plan: We will dress the wounds with Plurogel on Hydrofera Blue ready, wash wounds with Vashe, edema wear, does have a follow-up appointment with [facility] this week..  Patient will return to the clinic in 2-3 weeks time    Juan Antonio Travis MD on 2/4/2020 at 1:45 PM

## 2020-02-04 NOTE — DISCHARGE INSTRUCTIONS
[facility]  [address] [address]    Call us at [phone] if you have any questions about your wounds, have redness or swelling around your wound, have a fever of 101 or greater or if you have any other problems or concerns. We answer the phone Monday through Friday 8 am to 4 pm, please leave a message as we check the voicemail frequently throughout the day.     Shalonda Howard      1970    Supplements:  1. Add in 1 packet of Isaiah Supplement into your favorite beverage TWICE a day.  You may purchase at [facility] outpatient pharmacy  located in [facility].  2. Vitamin D3 5,000 international unit(s) once a day  3. Wade Diosmin Hesperidin Cardiovascular Support Blood Health Vascular 1 tablet twice daily  Wound Dressing Change Left heel and Right Heel  Cleanse wounds with wound cleanser prontosan  Apply Plurogel, then Hydrofera Blue Ready Transfer cut to fit the size of the wound  Apply Edemawear and quick absorbant dressings if needed  Change Dressing Daily  Footwear: Meet with [facility].      JUNAID Travis M.D.. February 4, 2020  Appointments for you to make:  Wound Healing Clinic  Follow up with Provider - 4 weeks  [phone]

## 2020-02-10 ENCOUNTER — MEDICAL CORRESPONDENCE (OUTPATIENT)
Dept: HEALTH INFORMATION MANAGEMENT | Facility: CLINIC | Age: 50
End: 2020-02-10

## 2020-02-10 LAB
AST SERPL W P-5'-P-CCNC: 25 U/L (ref 0–45)
BASOPHILS # BLD AUTO: 0 10E9/L (ref 0–0.2)
BASOPHILS NFR BLD AUTO: 0.6 %
BUN SERPL-MCNC: 12 MG/DL (ref 7–30)
CREAT SERPL-MCNC: 0.64 MG/DL (ref 0.52–1.04)
CRP SERPL-MCNC: 18.3 MG/L (ref 0–8)
DIFFERENTIAL METHOD BLD: ABNORMAL
EOSINOPHIL # BLD AUTO: 0.1 10E9/L (ref 0–0.7)
EOSINOPHIL NFR BLD AUTO: 2.4 %
ERYTHROCYTE [DISTWIDTH] IN BLOOD BY AUTOMATED COUNT: 19.2 % (ref 10–15)
ERYTHROCYTE [SEDIMENTATION RATE] IN BLOOD BY WESTERGREN METHOD: 66 MM/H (ref 0–20)
GFR SERPL CREATININE-BSD FRML MDRD: >90 ML/MIN/{1.73_M2}
HCT VFR BLD AUTO: 29.4 % (ref 35–47)
HGB BLD-MCNC: 8.5 G/DL (ref 11.7–15.7)
IMM GRANULOCYTES # BLD: 0 10E9/L (ref 0–0.4)
IMM GRANULOCYTES NFR BLD: 0.4 %
LYMPHOCYTES # BLD AUTO: 1.2 10E9/L (ref 0.8–5.3)
LYMPHOCYTES NFR BLD AUTO: 25.4 %
MCH RBC QN AUTO: 20 PG (ref 26.5–33)
MCHC RBC AUTO-ENTMCNC: 28.9 G/DL (ref 31.5–36.5)
MCV RBC AUTO: 69 FL (ref 78–100)
MONOCYTES # BLD AUTO: 0.4 10E9/L (ref 0–1.3)
MONOCYTES NFR BLD AUTO: 9.2 %
NEUTROPHILS # BLD AUTO: 2.9 10E9/L (ref 1.6–8.3)
NEUTROPHILS NFR BLD AUTO: 62 %
NRBC # BLD AUTO: 0 10*3/UL
NRBC BLD AUTO-RTO: 0 /100
PLATELET # BLD AUTO: 262 10E9/L (ref 150–450)
RBC # BLD AUTO: 4.25 10E12/L (ref 3.8–5.2)
WBC # BLD AUTO: 4.7 10E9/L (ref 4–11)

## 2020-02-10 PROCEDURE — 86140 C-REACTIVE PROTEIN: CPT | Performed by: INTERNAL MEDICINE

## 2020-02-10 PROCEDURE — 82565 ASSAY OF CREATININE: CPT | Performed by: INTERNAL MEDICINE

## 2020-02-10 PROCEDURE — 85652 RBC SED RATE AUTOMATED: CPT | Performed by: INTERNAL MEDICINE

## 2020-02-10 PROCEDURE — 84520 ASSAY OF UREA NITROGEN: CPT | Performed by: INTERNAL MEDICINE

## 2020-02-10 PROCEDURE — 84450 TRANSFERASE (AST) (SGOT): CPT | Performed by: INTERNAL MEDICINE

## 2020-02-10 PROCEDURE — 85025 COMPLETE CBC W/AUTO DIFF WBC: CPT | Performed by: INTERNAL MEDICINE

## 2020-02-11 ENCOUNTER — MEDICAL CORRESPONDENCE (OUTPATIENT)
Dept: HEALTH INFORMATION MANAGEMENT | Facility: CLINIC | Age: 50
End: 2020-02-11

## 2020-02-11 ENCOUNTER — HOME INFUSION (PRE-WILLOW HOME INFUSION) (OUTPATIENT)
Dept: PHARMACY | Facility: CLINIC | Age: 50
End: 2020-02-11

## 2020-02-12 NOTE — PROGRESS NOTES
This is a recent snapshot of the patient's [facility] medical record.  For current drug dose and complete information and questions, call [phone] or In Basket pool, fv home infusion ([id number])  CSN Number:  [id number]

## 2020-02-17 ENCOUNTER — MEDICAL CORRESPONDENCE (OUTPATIENT)
Dept: HEALTH INFORMATION MANAGEMENT | Facility: CLINIC | Age: 50
End: 2020-02-17

## 2020-02-17 LAB
AST SERPL W P-5'-P-CCNC: 29 U/L (ref 0–45)
BUN SERPL-MCNC: 16 MG/DL (ref 7–30)
CREAT SERPL-MCNC: 0.63 MG/DL (ref 0.52–1.04)
CRP SERPL-MCNC: 12 MG/L (ref 0–8)
ERYTHROCYTE [DISTWIDTH] IN BLOOD BY AUTOMATED COUNT: 18.7 % (ref 10–15)
ERYTHROCYTE [SEDIMENTATION RATE] IN BLOOD BY WESTERGREN METHOD: 49 MM/H (ref 0–20)
GFR SERPL CREATININE-BSD FRML MDRD: >90 ML/MIN/{1.73_M2}
HCT VFR BLD AUTO: 32.3 % (ref 35–47)
HGB BLD-MCNC: 9.5 G/DL (ref 11.7–15.7)
MCH RBC QN AUTO: 20 PG (ref 26.5–33)
MCHC RBC AUTO-ENTMCNC: 29.4 G/DL (ref 31.5–36.5)
MCV RBC AUTO: 68 FL (ref 78–100)
PLATELET # BLD AUTO: 330 10E9/L (ref 150–450)
RBC # BLD AUTO: 4.76 10E12/L (ref 3.8–5.2)
WBC # BLD AUTO: 6.3 10E9/L (ref 4–11)

## 2020-02-17 PROCEDURE — 85027 COMPLETE CBC AUTOMATED: CPT | Performed by: INTERNAL MEDICINE

## 2020-02-17 PROCEDURE — 85652 RBC SED RATE AUTOMATED: CPT | Performed by: INTERNAL MEDICINE

## 2020-02-17 PROCEDURE — 82565 ASSAY OF CREATININE: CPT | Performed by: INTERNAL MEDICINE

## 2020-02-17 PROCEDURE — 84520 ASSAY OF UREA NITROGEN: CPT | Performed by: INTERNAL MEDICINE

## 2020-02-17 PROCEDURE — 86140 C-REACTIVE PROTEIN: CPT | Performed by: INTERNAL MEDICINE

## 2020-02-17 PROCEDURE — 84450 TRANSFERASE (AST) (SGOT): CPT | Performed by: INTERNAL MEDICINE

## 2020-02-18 ENCOUNTER — MEDICAL CORRESPONDENCE (OUTPATIENT)
Dept: HEALTH INFORMATION MANAGEMENT | Facility: CLINIC | Age: 50
End: 2020-02-18

## 2020-02-20 ENCOUNTER — HOME INFUSION (PRE-WILLOW HOME INFUSION) (OUTPATIENT)
Dept: PHARMACY | Facility: CLINIC | Age: 50
End: 2020-02-20

## 2020-02-21 NOTE — PROGRESS NOTES
This is a recent snapshot of the patient's [facility] medical record.  For current drug dose and complete information and questions, call [phone] or In Basket pool, fv home infusion ([id number])  CSN Number:  [id number]

## 2020-02-24 ENCOUNTER — MEDICAL CORRESPONDENCE (OUTPATIENT)
Dept: HEALTH INFORMATION MANAGEMENT | Facility: CLINIC | Age: 50
End: 2020-02-24

## 2020-02-24 LAB
AST SERPL W P-5'-P-CCNC: 66 U/L (ref 0–45)
BASOPHILS # BLD AUTO: 0 10E9/L (ref 0–0.2)
BASOPHILS NFR BLD AUTO: 0.3 %
BUN SERPL-MCNC: 15 MG/DL (ref 7–30)
CREAT SERPL-MCNC: 0.63 MG/DL (ref 0.52–1.04)
CRP SERPL-MCNC: 27 MG/L (ref 0–8)
DIFFERENTIAL METHOD BLD: ABNORMAL
EOSINOPHIL # BLD AUTO: 0.1 10E9/L (ref 0–0.7)
EOSINOPHIL NFR BLD AUTO: 1.7 %
ERYTHROCYTE [DISTWIDTH] IN BLOOD BY AUTOMATED COUNT: 18.8 % (ref 10–15)
ERYTHROCYTE [SEDIMENTATION RATE] IN BLOOD BY WESTERGREN METHOD: 51 MM/H (ref 0–20)
GFR SERPL CREATININE-BSD FRML MDRD: >90 ML/MIN/{1.73_M2}
HCT VFR BLD AUTO: 30 % (ref 35–47)
HGB BLD-MCNC: 8.7 G/DL (ref 11.7–15.7)
IMM GRANULOCYTES # BLD: 0 10E9/L (ref 0–0.4)
IMM GRANULOCYTES NFR BLD: 0.6 %
LYMPHOCYTES # BLD AUTO: 0.9 10E9/L (ref 0.8–5.3)
LYMPHOCYTES NFR BLD AUTO: 24.4 %
MCH RBC QN AUTO: 19.4 PG (ref 26.5–33)
MCHC RBC AUTO-ENTMCNC: 29 G/DL (ref 31.5–36.5)
MCV RBC AUTO: 67 FL (ref 78–100)
MONOCYTES # BLD AUTO: 0.1 10E9/L (ref 0–1.3)
MONOCYTES NFR BLD AUTO: 3.9 %
NEUTROPHILS # BLD AUTO: 2.5 10E9/L (ref 1.6–8.3)
NEUTROPHILS NFR BLD AUTO: 69.1 %
NRBC # BLD AUTO: 0 10*3/UL
NRBC BLD AUTO-RTO: 1 /100
PLATELET # BLD AUTO: 213 10E9/L (ref 150–450)
RBC # BLD AUTO: 4.48 10E12/L (ref 3.8–5.2)
WBC # BLD AUTO: 3.6 10E9/L (ref 4–11)

## 2020-02-24 PROCEDURE — 82565 ASSAY OF CREATININE: CPT | Performed by: INTERNAL MEDICINE

## 2020-02-24 PROCEDURE — 84450 TRANSFERASE (AST) (SGOT): CPT | Performed by: INTERNAL MEDICINE

## 2020-02-24 PROCEDURE — 86140 C-REACTIVE PROTEIN: CPT | Performed by: INTERNAL MEDICINE

## 2020-02-24 PROCEDURE — 85652 RBC SED RATE AUTOMATED: CPT | Performed by: INTERNAL MEDICINE

## 2020-02-24 PROCEDURE — 84520 ASSAY OF UREA NITROGEN: CPT | Performed by: INTERNAL MEDICINE

## 2020-02-24 PROCEDURE — 85025 COMPLETE CBC W/AUTO DIFF WBC: CPT | Performed by: INTERNAL MEDICINE

## 2020-02-26 ENCOUNTER — TELEPHONE (OUTPATIENT)
Dept: WOUND CARE | Facility: CLINIC | Age: 50
End: 2020-02-26

## 2020-02-26 NOTE — TELEPHONE ENCOUNTER
Returned call to [place] with Dr. Bocanegra's office who is reporting that their office has made multiple attempts to schedule the patient in their office per Dr. Travis's request and patient is not returning their calls. Dr. Bocanegra will renew patient's antibiotics for 2 more weeks due to [facility] continuing HBO therapy for 10-20 more dives. Our office is following up with patient on 3-4-2020 and remind her to make an appointment with Dr. Bocanegra per Dr. Travis's request and [facility] will continue to reach patient.

## 2020-02-26 NOTE — TELEPHONE ENCOUNTER
[facility]    Who is the name of the provider?:  Angy      What is the location you see this provider at?: [place]    Reason for call:  Please call.    Can we leave a detailed message on this number?  YES

## 2020-02-27 ENCOUNTER — HOME INFUSION (PRE-WILLOW HOME INFUSION) (OUTPATIENT)
Dept: PHARMACY | Facility: CLINIC | Age: 50
End: 2020-02-27

## 2020-02-28 NOTE — PROGRESS NOTES
This is a recent snapshot of the patient's [facility] medical record.  For current drug dose and complete information and questions, call [phone] or In Basket pool, fv home infusion ([id number])  CSN Number:  [id number]

## 2020-03-02 ENCOUNTER — MEDICAL CORRESPONDENCE (OUTPATIENT)
Dept: HEALTH INFORMATION MANAGEMENT | Facility: CLINIC | Age: 50
End: 2020-03-02

## 2020-03-02 LAB
AST SERPL W P-5'-P-CCNC: 56 U/L (ref 0–45)
BASOPHILS # BLD AUTO: 0 10E9/L (ref 0–0.2)
BASOPHILS NFR BLD AUTO: 0.3 %
BUN SERPL-MCNC: 10 MG/DL (ref 7–30)
CREAT SERPL-MCNC: 0.6 MG/DL (ref 0.52–1.04)
CRP SERPL-MCNC: 12 MG/L (ref 0–8)
DIFFERENTIAL METHOD BLD: ABNORMAL
EOSINOPHIL # BLD AUTO: 0.1 10E9/L (ref 0–0.7)
EOSINOPHIL NFR BLD AUTO: 2.9 %
ERYTHROCYTE [DISTWIDTH] IN BLOOD BY AUTOMATED COUNT: 18.9 % (ref 10–15)
ERYTHROCYTE [SEDIMENTATION RATE] IN BLOOD BY WESTERGREN METHOD: 38 MM/H (ref 0–20)
GFR SERPL CREATININE-BSD FRML MDRD: >90 ML/MIN/{1.73_M2}
HCT VFR BLD AUTO: 32 % (ref 35–47)
HGB BLD-MCNC: 9.3 G/DL (ref 11.7–15.7)
IMM GRANULOCYTES # BLD: 0 10E9/L (ref 0–0.4)
IMM GRANULOCYTES NFR BLD: 0.3 %
LYMPHOCYTES # BLD AUTO: 0.8 10E9/L (ref 0.8–5.3)
LYMPHOCYTES NFR BLD AUTO: 26.5 %
MCH RBC QN AUTO: 19.4 PG (ref 26.5–33)
MCHC RBC AUTO-ENTMCNC: 29.1 G/DL (ref 31.5–36.5)
MCV RBC AUTO: 67 FL (ref 78–100)
MONOCYTES # BLD AUTO: 0.2 10E9/L (ref 0–1.3)
MONOCYTES NFR BLD AUTO: 5.2 %
NEUTROPHILS # BLD AUTO: 2 10E9/L (ref 1.6–8.3)
NEUTROPHILS NFR BLD AUTO: 64.8 %
NRBC # BLD AUTO: 0 10*3/UL
NRBC BLD AUTO-RTO: 0 /100
PLATELET # BLD AUTO: 226 10E9/L (ref 150–450)
RBC # BLD AUTO: 4.79 10E12/L (ref 3.8–5.2)
WBC # BLD AUTO: 3.1 10E9/L (ref 4–11)

## 2020-03-02 PROCEDURE — 82565 ASSAY OF CREATININE: CPT | Performed by: INTERNAL MEDICINE

## 2020-03-02 PROCEDURE — 86140 C-REACTIVE PROTEIN: CPT | Performed by: INTERNAL MEDICINE

## 2020-03-02 PROCEDURE — 84520 ASSAY OF UREA NITROGEN: CPT | Performed by: INTERNAL MEDICINE

## 2020-03-02 PROCEDURE — 84450 TRANSFERASE (AST) (SGOT): CPT | Performed by: INTERNAL MEDICINE

## 2020-03-02 PROCEDURE — 85652 RBC SED RATE AUTOMATED: CPT | Performed by: INTERNAL MEDICINE

## 2020-03-02 PROCEDURE — 85025 COMPLETE CBC W/AUTO DIFF WBC: CPT | Performed by: INTERNAL MEDICINE

## 2020-03-05 ENCOUNTER — HOME INFUSION (PRE-WILLOW HOME INFUSION) (OUTPATIENT)
Dept: PHARMACY | Facility: CLINIC | Age: 50
End: 2020-03-05

## 2020-03-06 NOTE — PROGRESS NOTES
This is a recent snapshot of the patient's [facility] medical record.  For current drug dose and complete information and questions, call [phone] or In Basket pool, fv home infusion ([id number])  CSN Number:  [id number]

## 2020-03-09 ENCOUNTER — MEDICAL CORRESPONDENCE (OUTPATIENT)
Dept: HEALTH INFORMATION MANAGEMENT | Facility: CLINIC | Age: 50
End: 2020-03-09

## 2020-03-09 LAB
AST SERPL W P-5'-P-CCNC: 51 U/L (ref 0–45)
BASOPHILS # BLD AUTO: 0 10E9/L (ref 0–0.2)
BASOPHILS NFR BLD AUTO: 0.2 %
BUN SERPL-MCNC: 7 MG/DL (ref 7–30)
CREAT SERPL-MCNC: 0.59 MG/DL (ref 0.52–1.04)
CRP SERPL-MCNC: 19 MG/L (ref 0–8)
DIFFERENTIAL METHOD BLD: ABNORMAL
EOSINOPHIL # BLD AUTO: 0.1 10E9/L (ref 0–0.7)
EOSINOPHIL NFR BLD AUTO: 2.7 %
ERYTHROCYTE [DISTWIDTH] IN BLOOD BY AUTOMATED COUNT: 19 % (ref 10–15)
ERYTHROCYTE [SEDIMENTATION RATE] IN BLOOD BY WESTERGREN METHOD: 53 MM/H (ref 0–20)
GFR SERPL CREATININE-BSD FRML MDRD: >90 ML/MIN/{1.73_M2}
HCT VFR BLD AUTO: 30.4 % (ref 35–47)
HGB BLD-MCNC: 8.7 G/DL (ref 11.7–15.7)
IMM GRANULOCYTES # BLD: 0 10E9/L (ref 0–0.4)
IMM GRANULOCYTES NFR BLD: 0.5 %
LYMPHOCYTES # BLD AUTO: 1.3 10E9/L (ref 0.8–5.3)
LYMPHOCYTES NFR BLD AUTO: 32.3 %
MCH RBC QN AUTO: 19.3 PG (ref 26.5–33)
MCHC RBC AUTO-ENTMCNC: 28.6 G/DL (ref 31.5–36.5)
MCV RBC AUTO: 68 FL (ref 78–100)
MONOCYTES # BLD AUTO: 0.3 10E9/L (ref 0–1.3)
MONOCYTES NFR BLD AUTO: 7.8 %
NEUTROPHILS # BLD AUTO: 2.3 10E9/L (ref 1.6–8.3)
NEUTROPHILS NFR BLD AUTO: 56.5 %
NRBC # BLD AUTO: 0 10*3/UL
NRBC BLD AUTO-RTO: 0 /100
PLATELET # BLD AUTO: 272 10E9/L (ref 150–450)
RBC # BLD AUTO: 4.5 10E12/L (ref 3.8–5.2)
WBC # BLD AUTO: 4.1 10E9/L (ref 4–11)

## 2020-03-09 PROCEDURE — 85652 RBC SED RATE AUTOMATED: CPT | Performed by: INTERNAL MEDICINE

## 2020-03-09 PROCEDURE — 82565 ASSAY OF CREATININE: CPT | Performed by: INTERNAL MEDICINE

## 2020-03-09 PROCEDURE — 85025 COMPLETE CBC W/AUTO DIFF WBC: CPT | Performed by: INTERNAL MEDICINE

## 2020-03-09 PROCEDURE — 84450 TRANSFERASE (AST) (SGOT): CPT | Performed by: INTERNAL MEDICINE

## 2020-03-09 PROCEDURE — 84520 ASSAY OF UREA NITROGEN: CPT | Performed by: INTERNAL MEDICINE

## 2020-03-09 PROCEDURE — 86140 C-REACTIVE PROTEIN: CPT | Performed by: INTERNAL MEDICINE

## 2020-03-10 ENCOUNTER — HEALTH MAINTENANCE LETTER (OUTPATIENT)
Age: 50
End: 2020-03-10

## 2020-03-10 ENCOUNTER — HOSPITAL ENCOUNTER (OUTPATIENT)
Dept: WOUND CARE | Facility: CLINIC | Age: 50
Discharge: HOME | End: 2020-03-10
Attending: SURGERY | Admitting: SURGERY
Payer: COMMERCIAL

## 2020-03-10 ENCOUNTER — HOSPITAL ENCOUNTER (OUTPATIENT)
Facility: CLINIC | Age: 50
End: 2020-03-10
Attending: SURGERY | Admitting: SURGERY
Payer: COMMERCIAL

## 2020-03-10 VITALS — RESPIRATION RATE: 19 BRPM | TEMPERATURE: 98.4 F | HEART RATE: 91 BPM

## 2020-03-10 DIAGNOSIS — L89.623 PRESSURE ULCER OF LEFT HEEL, STAGE 3 (H): ICD-10-CM

## 2020-03-10 DIAGNOSIS — L89.613 PRESSURE INJURY OF RIGHT HEEL, STAGE 3 (H): ICD-10-CM

## 2020-03-10 LAB
GRAM STN SPEC: NORMAL
GRAM STN SPEC: NORMAL
Lab: NORMAL
SPECIMEN SOURCE: NORMAL

## 2020-03-10 PROCEDURE — 97602 WOUND(S) CARE NON-SELECTIVE: CPT

## 2020-03-10 PROCEDURE — 99213 OFFICE O/P EST LOW 20 MIN: CPT | Performed by: SURGERY

## 2020-03-10 PROCEDURE — A6209 FOAM DRSG <=16 SQ IN W/O BDR: HCPCS

## 2020-03-10 PROCEDURE — 87077 CULTURE AEROBIC IDENTIFY: CPT | Performed by: SURGERY

## 2020-03-10 PROCEDURE — 87070 CULTURE OTHR SPECIMN AEROBIC: CPT | Performed by: SURGERY

## 2020-03-10 PROCEDURE — 87205 SMEAR GRAM STAIN: CPT | Performed by: SURGERY

## 2020-03-10 RX ORDER — CEFAZOLIN SODIUM 2 G/100ML
2 INJECTION, SOLUTION INTRAVENOUS
Status: CANCELLED | OUTPATIENT
Start: 2020-03-10

## 2020-03-10 RX ORDER — CEFAZOLIN SODIUM 1 G/3ML
1 INJECTION, POWDER, FOR SOLUTION INTRAMUSCULAR; INTRAVENOUS SEE ADMIN INSTRUCTIONS
Status: CANCELLED | OUTPATIENT
Start: 2020-03-10

## 2020-03-10 NOTE — DISCHARGE INSTRUCTIONS
[facility]  [address] [address]    Call us at [phone] if you have any questions about your wounds, have redness or swelling around your wound, have a fever of 101 or greater or if you have any other problems or concerns. We answer the phone Monday through Friday 8 am to 4 pm, please leave a message as we check the voicemail frequently throughout the day.     Shalonda Howard      1970    Supplements:  1. Add in 1 packet of Isaiah Supplement into your favorite beverage TWICE a day. You may purchase at [facility] outpatient pharmacy located in [facility].  2. Vitamin D3 5,000 international unit(s) once a day  3. Wade Diosmin Hesperidin Cardiovascular Support Blood Health Vascular 1 tablet twice daily    Wound Dressing Change Left heel and Right Heel  Cleanse wounds with wound cleanser prontosan  Apply Plurogel, then Hydrofera Blue Ready Transfer cut to fit the size of the wound  Apply Edemawear and quick absorbant dressings if needed  Change Dressing Daily         JUNAID Travis M.D.. March 10, 2020    Follow up with Provider - surgery next week

## 2020-03-10 NOTE — PROGRESS NOTES
[facility] [place] Progress Note    Subject: Shalonda Howard bilateral heel ulcers with history of right and left calcaneal osteomyelitis, has had 31 hyperbaric oxygen treatments at [facility], 31st 1 was completed today, has a PICC line left arm, will be on antibiotics through the end of this month.  No complications related to hyperbaric oxygen.  She has had revision of her offloading boots, does have a knee .  Discussed options for management, will proceed to the operating room for ultrasonic debridement with Prontosan and application of myriad ovine forgot as initial stage, consider TheraSkin in future.  Culture was obtained today.  Please see below for preoperative history physical exam for surgical procedure on Monday, March 16.    Patient Active Problem List   Diagnosis     Infection     Left foot Postop Wound infection     Post op infection     Systemic lupus erythematosus (H)     Osteomyelitis Left 1st metatarsal -- S/P Amputation     Cellulitis of right foot     Pressure injury of right heel, stage 3 (H)     Pressure ulcer of left heel, stage 3 (H)     Open wound of third toe of left foot     Open wound of heel     Open toe wound     Osteomyelitis (H)     Past Medical History:   Diagnosis Date     Allergic rhinitis, cause unspecified      Anxiety state, unspecified      Cellulitis and abscess of leg, except foot      Disuse osteoporosis      Dysthymic disorder      Edema      Encounter for long-term (current) use of other medications      Esophageal reflux      Foot ulcer on Heel bilaterally  5/22/2019     Kidney stones      Myalgia and myositis, unspecified      Obesity, unspecified      Open wound of foot except toe(s) alone, complicated      Opioid type dependence, unspecified      Other acne      Other congenital valgus deformity of feet      Other ventral hernia without mention of obstruction or gangrene      Polycystic ovaries      PONV (postoperative nausea and vomiting)       Systemic lupus erythematosus (H)     unsure     Tibialis tendinitis      Unspecified hereditary and idiopathic peripheral neuropathy      Exam:  Pulse 91   Temp 98.4  F (36.9  C) (Oral)   Resp 19   Wound (used by [facility] only) 07/29/19 0813 Left heel pressure injury (Active)   Length (cm) 5 03/10/20 1200   Width (cm) 5 03/10/20 1200   Depth (cm) 2.5 03/10/20 1200   Wound (cm^2) 25 cm^2 03/10/20 1200   Wound Volume (cm^3) 62.5 cm^3 03/10/20 1200   Wound healing % -52.44 03/10/20 1200   Undermining [Depth (cm)/Location] 11-7/0.5 03/10/20 1200   Dressing Appearance moist drainage 03/10/20 1200   Drainage Characteristics/Odor serosanguineous 03/10/20 1200   Drainage Amount moderate 03/10/20 1200       Wound (used by [facility] only) 07/29/19 0814 Right heel pressure injury (Active)   Length (cm) 6 03/10/20 1200   Width (cm) 6 03/10/20 1200   Depth (cm) 2.5 03/10/20 1200   Wound (cm^2) 36 cm^2 03/10/20 1200   Wound Volume (cm^3) 90 cm^3 03/10/20 1200   Wound healing % -178.64 03/10/20 1200   Undermining [Depth (cm)/Location] 5-2/0.5 03/10/20 1200   Dressing Appearance moist drainage 03/10/20 1200   Drainage Characteristics/Odor serosanguineous 03/10/20 1200   Drainage Amount moderate 03/10/20 1200       Wound (used by [facility] only) 03/10/20 1232 Left 5 toe (Active)   Length (cm) 0.4 03/10/20 1200   Width (cm) 0.6 03/10/20 1200   Depth (cm) 0.1 03/10/20 1200   Wound (cm^2) 0.24 cm^2 03/10/20 1200   Wound Volume (cm^3) 0.02 cm^3 03/10/20 1200   Dressing Appearance moist drainage 03/10/20 1200   Drainage Characteristics/Odor serosanguineous 03/10/20 1200   Drainage Amount moderate 03/10/20 1200     50-year-old female, nondistressed, conversant, alert and oriented 3.  HEENT normocephalic/atraumatic sclera nonicteric pulmonary clear to auscultation cardiac regular rate and rhythm, positive S1-S2 without murmur abdomen obese, soft, nontender, large hernia.  Examination of the heels reveals granulation tissue, no grossly exposed  bone or tendon.  Photodocumentation and measurements.  Culture was obtained from the left heel.        Impression: Bilateral heel ulcerations, history of bilateral calcaneal osteomyelitis, history of 31 hyperbaric oxygen treatments completed today,  Left arm, IV antibiotics through the end of March 2020    Plan: We will dress the wounds with Hydrofera Blue.  Goals risk benefits of Misonix ultrasonic debridement with Prontosan, application of myriad right and left heel wounds and reassess at 1 month to determine options for subsequent management which may be repeat operative management with reapplication of myriad versus application of TheraSkin.  Clearly given bilateral heel ulcers, offloading is difficult, have used total contact casting in the past.  Plan would be to use knee  and new Crow boot to achieve best offloading.  Return to clinic 2 to 3 days after surgical procedure, March 18 or 19 for initial eval..      Juan Antonio Travis MD on 3/10/2020 at 1:18 PM

## 2020-03-10 NOTE — ADDENDUM NOTE
Encounter addended by: Judith Gillespie RN on: 3/10/2020 2:23 PM   Actions taken: Flowsheet accepted, Charge Capture section accepted

## 2020-03-11 ENCOUNTER — TELEPHONE (OUTPATIENT)
Dept: WOUND CARE | Facility: CLINIC | Age: 50
End: 2020-03-11

## 2020-03-11 NOTE — TELEPHONE ENCOUNTER
Called patient to confirm date change of surgery. Also wanting to make sure we know the follow up plan. Questioning if she is still open to home care.

## 2020-03-12 ENCOUNTER — TELEPHONE (OUTPATIENT)
Dept: WOUND CARE | Facility: CLINIC | Age: 50
End: 2020-03-12

## 2020-03-12 LAB
BACTERIA SPEC CULT: ABNORMAL
Lab: ABNORMAL
SPECIMEN SOURCE: ABNORMAL

## 2020-03-12 NOTE — TELEPHONE ENCOUNTER
[facility]    Who is the name of the provider?:  Vivi      What is the location you see this provider at?: [place]     Reason for call:  Returned missed call.    Can we leave a detailed message on this number?  YES

## 2020-03-16 ENCOUNTER — MEDICAL CORRESPONDENCE (OUTPATIENT)
Dept: HEALTH INFORMATION MANAGEMENT | Facility: CLINIC | Age: 50
End: 2020-03-16

## 2020-03-16 ENCOUNTER — HOME INFUSION (PRE-WILLOW HOME INFUSION) (OUTPATIENT)
Dept: PHARMACY | Facility: CLINIC | Age: 50
End: 2020-03-16

## 2020-03-16 ENCOUNTER — TELEPHONE (OUTPATIENT)
Dept: WOUND CARE | Facility: CLINIC | Age: 50
End: 2020-03-16

## 2020-03-16 LAB
AST SERPL W P-5'-P-CCNC: 30 U/L (ref 0–45)
BASOPHILS # BLD AUTO: 0 10E9/L (ref 0–0.2)
BASOPHILS NFR BLD AUTO: 0.2 %
BUN SERPL-MCNC: 10 MG/DL (ref 7–30)
CREAT SERPL-MCNC: 0.57 MG/DL (ref 0.52–1.04)
CRP SERPL-MCNC: 42 MG/L (ref 0–8)
DIFFERENTIAL METHOD BLD: ABNORMAL
EOSINOPHIL # BLD AUTO: 0.1 10E9/L (ref 0–0.7)
EOSINOPHIL NFR BLD AUTO: 2.6 %
ERYTHROCYTE [DISTWIDTH] IN BLOOD BY AUTOMATED COUNT: 19.3 % (ref 10–15)
ERYTHROCYTE [SEDIMENTATION RATE] IN BLOOD BY WESTERGREN METHOD: 78 MM/H (ref 0–20)
GFR SERPL CREATININE-BSD FRML MDRD: >90 ML/MIN/{1.73_M2}
HCT VFR BLD AUTO: 30.6 % (ref 35–47)
HGB BLD-MCNC: 8.8 G/DL (ref 11.7–15.7)
IMM GRANULOCYTES # BLD: 0 10E9/L (ref 0–0.4)
IMM GRANULOCYTES NFR BLD: 0.2 %
LYMPHOCYTES # BLD AUTO: 1.2 10E9/L (ref 0.8–5.3)
LYMPHOCYTES NFR BLD AUTO: 25.7 %
MCH RBC QN AUTO: 19.4 PG (ref 26.5–33)
MCHC RBC AUTO-ENTMCNC: 28.8 G/DL (ref 31.5–36.5)
MCV RBC AUTO: 67 FL (ref 78–100)
MONOCYTES # BLD AUTO: 0.4 10E9/L (ref 0–1.3)
MONOCYTES NFR BLD AUTO: 8.6 %
NEUTROPHILS # BLD AUTO: 2.9 10E9/L (ref 1.6–8.3)
NEUTROPHILS NFR BLD AUTO: 62.7 %
NRBC # BLD AUTO: 0 10*3/UL
NRBC BLD AUTO-RTO: 0 /100
PLATELET # BLD AUTO: 312 10E9/L (ref 150–450)
RBC # BLD AUTO: 4.54 10E12/L (ref 3.8–5.2)
WBC # BLD AUTO: 4.7 10E9/L (ref 4–11)

## 2020-03-16 PROCEDURE — 86140 C-REACTIVE PROTEIN: CPT | Performed by: INTERNAL MEDICINE

## 2020-03-16 PROCEDURE — 82565 ASSAY OF CREATININE: CPT | Performed by: INTERNAL MEDICINE

## 2020-03-16 PROCEDURE — 84520 ASSAY OF UREA NITROGEN: CPT | Performed by: INTERNAL MEDICINE

## 2020-03-16 PROCEDURE — 85652 RBC SED RATE AUTOMATED: CPT | Performed by: INTERNAL MEDICINE

## 2020-03-16 PROCEDURE — 84450 TRANSFERASE (AST) (SGOT): CPT | Performed by: INTERNAL MEDICINE

## 2020-03-16 PROCEDURE — 85025 COMPLETE CBC W/AUTO DIFF WBC: CPT | Performed by: INTERNAL MEDICINE

## 2020-03-16 NOTE — TELEPHONE ENCOUNTER
Patient stated Dr. Travis texted her to cancel her surgery. She was told to call back and it rescheduled.

## 2020-03-17 ENCOUNTER — MEDICAL CORRESPONDENCE (OUTPATIENT)
Dept: HEALTH INFORMATION MANAGEMENT | Facility: CLINIC | Age: 50
End: 2020-03-17

## 2020-03-17 NOTE — PROGRESS NOTES
This is a recent snapshot of the patient's [facility] medical record.  For current drug dose and complete information and questions, call [phone] or In Basket pool, [facility]v home infusion ([id number])  CSN Number:  [id number]

## 2020-03-18 ENCOUNTER — HOME INFUSION (PRE-WILLOW HOME INFUSION) (OUTPATIENT)
Dept: PHARMACY | Facility: CLINIC | Age: 50
End: 2020-03-18

## 2020-03-19 NOTE — PROGRESS NOTES
This is a recent snapshot of the patient's [facility] medical record.  For current drug dose and complete information and questions, call [phone] or In Basket pool, [facility]v home infusion ([id number])  CSN Number:  [id number]

## 2020-03-24 ENCOUNTER — TELEPHONE (OUTPATIENT)
Dept: WOUND CARE | Facility: CLINIC | Age: 50
End: 2020-03-24

## 2020-03-24 NOTE — TELEPHONE ENCOUNTER
[facility] Phone:[phone] Fax: [phone]    Discontinue previous dressings.   Wound Dressing Change: Bilateral Heels  Cleanse wound with vinegar solution  Cover wound with vinegar solution damp hydrofera blue, apply edemawear and secure with roll gauze  Change dressing daily.     Signing physician Dr. Kisha Travis MD      How to prepare the solution:    1. Mix 2 tablespoons of white household vinegar with 2 cups of water  2. Store in the refrigerator and use for up to 5 days    How to use the solution:    1. Soak gauze with vinegar solution and apply to wound for 5 mintues  2. Remove wet gauze  3. Perform daily

## 2020-03-26 ENCOUNTER — HOME INFUSION (PRE-WILLOW HOME INFUSION) (OUTPATIENT)
Dept: PHARMACY | Facility: CLINIC | Age: 50
End: 2020-03-26

## 2020-03-27 ENCOUNTER — TELEPHONE (OUTPATIENT)
Dept: WOUND CARE | Facility: CLINIC | Age: 50
End: 2020-03-27

## 2020-03-27 NOTE — TELEPHONE ENCOUNTER
Attempted to reach nurse. Left message there is not a recipe for VASHE but a recipe for vinegar solutions. Patient should purchase from the DME suppliers like [employer].

## 2020-03-30 ENCOUNTER — APPOINTMENT (OUTPATIENT)
Dept: MRI IMAGING | Facility: CLINIC | Age: 50
End: 2020-03-30
Attending: PHYSICIAN ASSISTANT
Payer: COMMERCIAL

## 2020-03-30 ENCOUNTER — TELEPHONE (OUTPATIENT)
Dept: WOUND CARE | Facility: CLINIC | Age: 50
End: 2020-03-30

## 2020-03-30 ENCOUNTER — HOSPITAL ENCOUNTER (OUTPATIENT)
Facility: CLINIC | Age: 50
End: 2020-03-30
Attending: HOSPITALIST | Admitting: HOSPITALIST
Payer: COMMERCIAL

## 2020-03-30 ENCOUNTER — HOSPITAL ENCOUNTER (INPATIENT)
Facility: CLINIC | Age: 50
LOS: 2 days | Discharge: HOME HEALTH | End: 2020-04-01
Attending: HOSPITALIST | Admitting: HOSPITALIST
Payer: COMMERCIAL

## 2020-03-30 DIAGNOSIS — M86.19 OTHER ACUTE OSTEOMYELITIS, MULTIPLE SITES (H): ICD-10-CM

## 2020-03-30 DIAGNOSIS — L03.115 CELLULITIS OF RIGHT HEEL: Primary | ICD-10-CM

## 2020-03-30 LAB
ANION GAP SERPL CALCULATED.3IONS-SCNC: 6 MMOL/L (ref 3–14)
BASOPHILS # BLD AUTO: 0 10E9/L (ref 0–0.2)
BASOPHILS NFR BLD AUTO: 0.3 %
BUN SERPL-MCNC: 17 MG/DL (ref 7–30)
CALCIUM SERPL-MCNC: 8.5 MG/DL (ref 8.5–10.1)
CHLORIDE SERPL-SCNC: 104 MMOL/L (ref 94–109)
CO2 SERPL-SCNC: 24 MMOL/L (ref 20–32)
CREAT SERPL-MCNC: 0.78 MG/DL (ref 0.52–1.04)
DIFFERENTIAL METHOD BLD: ABNORMAL
EOSINOPHIL # BLD AUTO: 0.3 10E9/L (ref 0–0.7)
EOSINOPHIL NFR BLD AUTO: 2.2 %
ERYTHROCYTE [DISTWIDTH] IN BLOOD BY AUTOMATED COUNT: 19.4 % (ref 10–15)
GFR SERPL CREATININE-BSD FRML MDRD: 90 ML/MIN/{1.73_M2}
GLUCOSE SERPL-MCNC: 126 MG/DL (ref 70–99)
HCT VFR BLD AUTO: 29 % (ref 35–47)
HGB BLD-MCNC: 8.4 G/DL (ref 11.7–15.7)
IMM GRANULOCYTES # BLD: 0.1 10E9/L (ref 0–0.4)
IMM GRANULOCYTES NFR BLD: 0.5 %
LYMPHOCYTES # BLD AUTO: 1.9 10E9/L (ref 0.8–5.3)
LYMPHOCYTES NFR BLD AUTO: 16.5 %
MCH RBC QN AUTO: 19.3 PG (ref 26.5–33)
MCHC RBC AUTO-ENTMCNC: 29 G/DL (ref 31.5–36.5)
MCV RBC AUTO: 67 FL (ref 78–100)
MONOCYTES # BLD AUTO: 1 10E9/L (ref 0–1.3)
MONOCYTES NFR BLD AUTO: 8.1 %
NEUTROPHILS # BLD AUTO: 8.5 10E9/L (ref 1.6–8.3)
NEUTROPHILS NFR BLD AUTO: 72.4 %
NRBC # BLD AUTO: 0 10*3/UL
NRBC BLD AUTO-RTO: 0 /100
PLATELET # BLD AUTO: 417 10E9/L (ref 150–450)
POTASSIUM SERPL-SCNC: 3.4 MMOL/L (ref 3.4–5.3)
RBC # BLD AUTO: 4.35 10E12/L (ref 3.8–5.2)
SODIUM SERPL-SCNC: 134 MMOL/L (ref 133–144)
WBC # BLD AUTO: 11.7 10E9/L (ref 4–11)

## 2020-03-30 PROCEDURE — 25000132 ZZH RX MED GY IP 250 OP 250 PS 637: Performed by: PHYSICIAN ASSISTANT

## 2020-03-30 PROCEDURE — 25800030 ZZH RX IP 258 OP 636: Performed by: PHYSICIAN ASSISTANT

## 2020-03-30 PROCEDURE — 73720 MRI LWR EXTREMITY W/O&W/DYE: CPT | Mod: RT

## 2020-03-30 PROCEDURE — 80048 BASIC METABOLIC PNL TOTAL CA: CPT | Performed by: PHYSICIAN ASSISTANT

## 2020-03-30 PROCEDURE — 25500064 ZZH RX 255 OP 636: Performed by: HOSPITALIST

## 2020-03-30 PROCEDURE — 12000000 ZZH R&B MED SURG/OB

## 2020-03-30 PROCEDURE — 85025 COMPLETE CBC W/AUTO DIFF WBC: CPT | Performed by: PHYSICIAN ASSISTANT

## 2020-03-30 PROCEDURE — 25000128 H RX IP 250 OP 636: Performed by: PHYSICIAN ASSISTANT

## 2020-03-30 PROCEDURE — 87040 BLOOD CULTURE FOR BACTERIA: CPT | Performed by: PHYSICIAN ASSISTANT

## 2020-03-30 PROCEDURE — 25000128 H RX IP 250 OP 636: Performed by: HOSPITALIST

## 2020-03-30 PROCEDURE — 99223 1ST HOSP IP/OBS HIGH 75: CPT | Mod: AI | Performed by: PHYSICIAN ASSISTANT

## 2020-03-30 PROCEDURE — A9585 GADOBUTROL INJECTION: HCPCS | Performed by: HOSPITALIST

## 2020-03-30 PROCEDURE — 25800030 ZZH RX IP 258 OP 636: Performed by: HOSPITALIST

## 2020-03-30 PROCEDURE — 36415 COLL VENOUS BLD VENIPUNCTURE: CPT | Performed by: PHYSICIAN ASSISTANT

## 2020-03-30 RX ORDER — PROCHLORPERAZINE MALEATE 10 MG
10 TABLET ORAL EVERY 6 HOURS PRN
Status: DISCONTINUED | OUTPATIENT
Start: 2020-03-30 | End: 2020-04-01 | Stop reason: HOSPADM

## 2020-03-30 RX ORDER — ONDANSETRON 2 MG/ML
4 INJECTION INTRAMUSCULAR; INTRAVENOUS EVERY 6 HOURS PRN
Status: DISCONTINUED | OUTPATIENT
Start: 2020-03-30 | End: 2020-04-01 | Stop reason: HOSPADM

## 2020-03-30 RX ORDER — ACETAMINOPHEN 650 MG/1
650 SUPPOSITORY RECTAL EVERY 4 HOURS PRN
Status: DISCONTINUED | OUTPATIENT
Start: 2020-03-30 | End: 2020-04-01 | Stop reason: HOSPADM

## 2020-03-30 RX ORDER — PIPERACILLIN SODIUM, TAZOBACTAM SODIUM 3; .375 G/15ML; G/15ML
3.38 INJECTION, POWDER, LYOPHILIZED, FOR SOLUTION INTRAVENOUS EVERY 6 HOURS
Status: DISCONTINUED | OUTPATIENT
Start: 2020-03-30 | End: 2020-04-01

## 2020-03-30 RX ORDER — ACETAMINOPHEN 325 MG/1
650 TABLET ORAL EVERY 4 HOURS PRN
Status: DISCONTINUED | OUTPATIENT
Start: 2020-03-30 | End: 2020-04-01 | Stop reason: HOSPADM

## 2020-03-30 RX ORDER — LIDOCAINE 40 MG/G
CREAM TOPICAL
Status: DISCONTINUED | OUTPATIENT
Start: 2020-03-30 | End: 2020-04-01 | Stop reason: HOSPADM

## 2020-03-30 RX ORDER — PROCHLORPERAZINE 25 MG
25 SUPPOSITORY, RECTAL RECTAL EVERY 12 HOURS PRN
Status: DISCONTINUED | OUTPATIENT
Start: 2020-03-30 | End: 2020-04-01 | Stop reason: HOSPADM

## 2020-03-30 RX ORDER — POLYETHYLENE GLYCOL 3350 17 G/17G
17 POWDER, FOR SOLUTION ORAL DAILY PRN
Status: DISCONTINUED | OUTPATIENT
Start: 2020-03-30 | End: 2020-04-01 | Stop reason: HOSPADM

## 2020-03-30 RX ORDER — GADOBUTROL 604.72 MG/ML
10 INJECTION INTRAVENOUS ONCE
Status: COMPLETED | OUTPATIENT
Start: 2020-03-30 | End: 2020-03-30

## 2020-03-30 RX ORDER — HYDROMORPHONE HYDROCHLORIDE 1 MG/ML
.3-.5 INJECTION, SOLUTION INTRAMUSCULAR; INTRAVENOUS; SUBCUTANEOUS
Status: DISCONTINUED | OUTPATIENT
Start: 2020-03-30 | End: 2020-03-31

## 2020-03-30 RX ORDER — SODIUM CHLORIDE 9 MG/ML
INJECTION, SOLUTION INTRAVENOUS CONTINUOUS
Status: DISCONTINUED | OUTPATIENT
Start: 2020-03-30 | End: 2020-04-01 | Stop reason: HOSPADM

## 2020-03-30 RX ORDER — OXYCODONE HYDROCHLORIDE 5 MG/1
5-10 TABLET ORAL
Status: DISCONTINUED | OUTPATIENT
Start: 2020-03-30 | End: 2020-04-01 | Stop reason: HOSPADM

## 2020-03-30 RX ORDER — AMOXICILLIN 250 MG
1 CAPSULE ORAL 2 TIMES DAILY PRN
Status: DISCONTINUED | OUTPATIENT
Start: 2020-03-30 | End: 2020-04-01 | Stop reason: HOSPADM

## 2020-03-30 RX ORDER — NALOXONE HYDROCHLORIDE 0.4 MG/ML
.1-.4 INJECTION, SOLUTION INTRAMUSCULAR; INTRAVENOUS; SUBCUTANEOUS
Status: DISCONTINUED | OUTPATIENT
Start: 2020-03-30 | End: 2020-04-01 | Stop reason: HOSPADM

## 2020-03-30 RX ORDER — AMOXICILLIN 250 MG
2 CAPSULE ORAL 2 TIMES DAILY PRN
Status: DISCONTINUED | OUTPATIENT
Start: 2020-03-30 | End: 2020-04-01 | Stop reason: HOSPADM

## 2020-03-30 RX ORDER — IBUPROFEN 200 MG
200 TABLET ORAL EVERY 4 HOURS PRN
COMMUNITY
End: 2020-04-10

## 2020-03-30 RX ORDER — ONDANSETRON 4 MG/1
4 TABLET, ORALLY DISINTEGRATING ORAL EVERY 6 HOURS PRN
Status: DISCONTINUED | OUTPATIENT
Start: 2020-03-30 | End: 2020-04-01 | Stop reason: HOSPADM

## 2020-03-30 RX ADMIN — HYDROMORPHONE HYDROCHLORIDE 0.5 MG: 1 INJECTION, SOLUTION INTRAMUSCULAR; INTRAVENOUS; SUBCUTANEOUS at 22:07

## 2020-03-30 RX ADMIN — OXYCODONE HYDROCHLORIDE 10 MG: 5 TABLET ORAL at 23:30

## 2020-03-30 RX ADMIN — PIPERACILLIN AND TAZOBACTAM 3.38 G: 3; .375 INJECTION, POWDER, FOR SOLUTION INTRAVENOUS at 20:11

## 2020-03-30 RX ADMIN — GADOBUTROL 10 ML: 604.72 INJECTION INTRAVENOUS at 21:14

## 2020-03-30 RX ADMIN — OXYCODONE HYDROCHLORIDE 10 MG: 5 TABLET ORAL at 20:33

## 2020-03-30 RX ADMIN — VANCOMYCIN HYDROCHLORIDE 2000 MG: 5 INJECTION, POWDER, LYOPHILIZED, FOR SOLUTION INTRAVENOUS at 22:08

## 2020-03-30 RX ADMIN — SODIUM CHLORIDE, PRESERVATIVE FREE: 5 INJECTION INTRAVENOUS at 20:12

## 2020-03-30 RX ADMIN — HYDROMORPHONE HYDROCHLORIDE 0.5 MG: 1 INJECTION, SOLUTION INTRAMUSCULAR; INTRAVENOUS; SUBCUTANEOUS at 19:33

## 2020-03-30 ASSESSMENT — ACTIVITIES OF DAILY LIVING (ADL): ADLS_ACUITY_SCORE: 11

## 2020-03-30 NOTE — TELEPHONE ENCOUNTER
Patient discussed care with Dr. Travis. At this time he feels patient should be admitted to hospital for MRI and treatment. Called report to nurse on station [id number].

## 2020-03-30 NOTE — TELEPHONE ENCOUNTER
[facility] nurse called and asked about the wound care. Last evening started with back of right calf. Edema and redness increased pain on right heel. Vitals are normal. Pulse 87. bp normal. Heel and the left mid calf is warm and red and painful. She is sending a picture to our iphone [phone] with identifiers.

## 2020-03-30 NOTE — TELEPHONE ENCOUNTER
Conversation with patient, progressive pain and discomfort involving the right calf associated with a chronic wound of the right lower extremity.  Corrected reviewed.  Previous hyperbaric oxygen for approximately 30 treatments, previous completion of long-term IV antibiotics with a PICC line, Zosyn.  She is not currently on antibiotics, she is performing her own dressing changes, limited interaction with wound clinic due to the pandemic.  Discussed with patient today, photodocumentation reviewed, erythema and discomfort right lower extremity, difficulty weightbearing.  Known history of calcaneal steel myelitis.  Given patient's current symptoms, progression, photodocumentation, have recommended hospitalization for MRI imaging and IV antibiotics, this is a limb risk based issue.  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  Does not utilize tobacco.  Nondiabetic.  Reviewed with hospitalist, she will be admitted to the hospital service for management, I will follow-up on March 31, 2020.  Plan direct admit to fifth floor.  Discussed with patient.

## 2020-03-31 ENCOUNTER — HOME INFUSION (PRE-WILLOW HOME INFUSION) (OUTPATIENT)
Dept: PHARMACY | Facility: CLINIC | Age: 50
End: 2020-03-31

## 2020-03-31 LAB
ANION GAP SERPL CALCULATED.3IONS-SCNC: 7 MMOL/L (ref 3–14)
BASOPHILS # BLD AUTO: 0 10E9/L (ref 0–0.2)
BASOPHILS NFR BLD AUTO: 0.2 %
BUN SERPL-MCNC: 13 MG/DL (ref 7–30)
CALCIUM SERPL-MCNC: 8.4 MG/DL (ref 8.5–10.1)
CHLORIDE SERPL-SCNC: 104 MMOL/L (ref 94–109)
CO2 SERPL-SCNC: 22 MMOL/L (ref 20–32)
CREAT SERPL-MCNC: 0.74 MG/DL (ref 0.52–1.04)
DIFFERENTIAL METHOD BLD: ABNORMAL
EOSINOPHIL # BLD AUTO: 0.2 10E9/L (ref 0–0.7)
EOSINOPHIL NFR BLD AUTO: 1.2 %
ERYTHROCYTE [DISTWIDTH] IN BLOOD BY AUTOMATED COUNT: 19.3 % (ref 10–15)
GFR SERPL CREATININE-BSD FRML MDRD: >90 ML/MIN/{1.73_M2}
GLUCOSE SERPL-MCNC: 140 MG/DL (ref 70–99)
HCT VFR BLD AUTO: 29.8 % (ref 35–47)
HGB BLD-MCNC: 8.6 G/DL (ref 11.7–15.7)
IMM GRANULOCYTES # BLD: 0 10E9/L (ref 0–0.4)
IMM GRANULOCYTES NFR BLD: 0.2 %
LACTATE BLD-SCNC: 0.9 MMOL/L (ref 0.7–2)
LYMPHOCYTES # BLD AUTO: 0.9 10E9/L (ref 0.8–5.3)
LYMPHOCYTES NFR BLD AUTO: 7.1 %
MCH RBC QN AUTO: 19.4 PG (ref 26.5–33)
MCHC RBC AUTO-ENTMCNC: 28.9 G/DL (ref 31.5–36.5)
MCV RBC AUTO: 67 FL (ref 78–100)
MONOCYTES # BLD AUTO: 0.7 10E9/L (ref 0–1.3)
MONOCYTES NFR BLD AUTO: 5 %
NEUTROPHILS # BLD AUTO: 11.3 10E9/L (ref 1.6–8.3)
NEUTROPHILS NFR BLD AUTO: 86.3 %
NRBC # BLD AUTO: 0 10*3/UL
NRBC BLD AUTO-RTO: 0 /100
PLATELET # BLD AUTO: 436 10E9/L (ref 150–450)
POTASSIUM SERPL-SCNC: 3.7 MMOL/L (ref 3.4–5.3)
RBC # BLD AUTO: 4.43 10E12/L (ref 3.8–5.2)
SODIUM SERPL-SCNC: 133 MMOL/L (ref 133–144)
WBC # BLD AUTO: 13 10E9/L (ref 4–11)

## 2020-03-31 PROCEDURE — 25000128 H RX IP 250 OP 636: Performed by: HOSPITALIST

## 2020-03-31 PROCEDURE — 36415 COLL VENOUS BLD VENIPUNCTURE: CPT | Performed by: INTERNAL MEDICINE

## 2020-03-31 PROCEDURE — 25800030 ZZH RX IP 258 OP 636: Performed by: HOSPITALIST

## 2020-03-31 PROCEDURE — 83605 ASSAY OF LACTIC ACID: CPT | Performed by: INTERNAL MEDICINE

## 2020-03-31 PROCEDURE — 80048 BASIC METABOLIC PNL TOTAL CA: CPT | Performed by: PHYSICIAN ASSISTANT

## 2020-03-31 PROCEDURE — 25000132 ZZH RX MED GY IP 250 OP 250 PS 637: Performed by: INTERNAL MEDICINE

## 2020-03-31 PROCEDURE — 25000132 ZZH RX MED GY IP 250 OP 250 PS 637: Performed by: PHYSICIAN ASSISTANT

## 2020-03-31 PROCEDURE — 87040 BLOOD CULTURE FOR BACTERIA: CPT | Performed by: INTERNAL MEDICINE

## 2020-03-31 PROCEDURE — 40000141 ZZH STATISTIC PERIPHERAL IV START W/O US GUIDANCE

## 2020-03-31 PROCEDURE — 25000128 H RX IP 250 OP 636: Performed by: INTERNAL MEDICINE

## 2020-03-31 PROCEDURE — 99232 SBSQ HOSP IP/OBS MODERATE 35: CPT | Performed by: INTERNAL MEDICINE

## 2020-03-31 PROCEDURE — 25000132 ZZH RX MED GY IP 250 OP 250 PS 637: Performed by: SURGERY

## 2020-03-31 PROCEDURE — 12000000 ZZH R&B MED SURG/OB

## 2020-03-31 PROCEDURE — 85025 COMPLETE CBC W/AUTO DIFF WBC: CPT | Performed by: PHYSICIAN ASSISTANT

## 2020-03-31 PROCEDURE — 25000128 H RX IP 250 OP 636: Performed by: PHYSICIAN ASSISTANT

## 2020-03-31 PROCEDURE — 36415 COLL VENOUS BLD VENIPUNCTURE: CPT | Performed by: PHYSICIAN ASSISTANT

## 2020-03-31 RX ORDER — CETIRIZINE HYDROCHLORIDE 5 MG/1
5 TABLET ORAL DAILY
Status: DISCONTINUED | OUTPATIENT
Start: 2020-03-31 | End: 2020-03-31

## 2020-03-31 RX ORDER — MICONAZOLE NITRATE 20 MG/G
CREAM TOPICAL
Status: DISCONTINUED | OUTPATIENT
Start: 2020-03-31 | End: 2020-04-01 | Stop reason: HOSPADM

## 2020-03-31 RX ORDER — BUSPIRONE HYDROCHLORIDE 15 MG/1
15 TABLET ORAL 2 TIMES DAILY
Status: DISCONTINUED | OUTPATIENT
Start: 2020-03-31 | End: 2020-04-01 | Stop reason: HOSPADM

## 2020-03-31 RX ORDER — GABAPENTIN 800 MG/1
800 TABLET ORAL 3 TIMES DAILY
Status: DISCONTINUED | OUTPATIENT
Start: 2020-03-31 | End: 2020-03-31

## 2020-03-31 RX ORDER — CETIRIZINE HYDROCHLORIDE 10 MG/1
10 TABLET ORAL DAILY
Status: DISCONTINUED | OUTPATIENT
Start: 2020-03-31 | End: 2020-04-01 | Stop reason: HOSPADM

## 2020-03-31 RX ORDER — HYDROMORPHONE HYDROCHLORIDE 1 MG/ML
.5-1 INJECTION, SOLUTION INTRAMUSCULAR; INTRAVENOUS; SUBCUTANEOUS
Status: DISCONTINUED | OUTPATIENT
Start: 2020-03-31 | End: 2020-04-01 | Stop reason: HOSPADM

## 2020-03-31 RX ORDER — GABAPENTIN 100 MG/1
300 CAPSULE ORAL AT BEDTIME
Status: DISCONTINUED | OUTPATIENT
Start: 2020-03-31 | End: 2020-04-01 | Stop reason: HOSPADM

## 2020-03-31 RX ORDER — TIZANIDINE 2 MG/1
4 TABLET ORAL AT BEDTIME
Status: DISCONTINUED | OUTPATIENT
Start: 2020-03-31 | End: 2020-04-01 | Stop reason: HOSPADM

## 2020-03-31 RX ORDER — POLYETHYLENE GLYCOL 3350 17 G/17G
17 POWDER, FOR SOLUTION ORAL DAILY PRN
Status: DISCONTINUED | OUTPATIENT
Start: 2020-03-31 | End: 2020-03-31

## 2020-03-31 RX ORDER — BUPROPION HYDROCHLORIDE 300 MG/1
300 TABLET ORAL DAILY
Status: DISCONTINUED | OUTPATIENT
Start: 2020-03-31 | End: 2020-04-01 | Stop reason: HOSPADM

## 2020-03-31 RX ORDER — GABAPENTIN 800 MG/1
800 TABLET ORAL
Status: DISCONTINUED | OUTPATIENT
Start: 2020-03-31 | End: 2020-04-01 | Stop reason: HOSPADM

## 2020-03-31 RX ORDER — PRAVASTATIN SODIUM 20 MG
20 TABLET ORAL EVERY EVENING
Status: DISCONTINUED | OUTPATIENT
Start: 2020-03-31 | End: 2020-04-01 | Stop reason: HOSPADM

## 2020-03-31 RX ORDER — FLUCONAZOLE 150 MG/1
150 TABLET ORAL DAILY
Status: DISCONTINUED | OUTPATIENT
Start: 2020-03-31 | End: 2020-03-31

## 2020-03-31 RX ORDER — TOPIRAMATE 50 MG/1
50 TABLET, FILM COATED ORAL AT BEDTIME
Status: DISCONTINUED | OUTPATIENT
Start: 2020-03-31 | End: 2020-04-01 | Stop reason: HOSPADM

## 2020-03-31 RX ORDER — FLUCONAZOLE 150 MG/1
150 TABLET ORAL ONCE
Status: COMPLETED | OUTPATIENT
Start: 2020-03-31 | End: 2020-03-31

## 2020-03-31 RX ADMIN — OXYCODONE HYDROCHLORIDE 5 MG: 5 TABLET ORAL at 06:56

## 2020-03-31 RX ADMIN — FLUCONAZOLE 150 MG: 150 TABLET ORAL at 18:12

## 2020-03-31 RX ADMIN — OXYCODONE HYDROCHLORIDE 5 MG: 5 TABLET ORAL at 13:11

## 2020-03-31 RX ADMIN — HYDROMORPHONE HYDROCHLORIDE 1 MG: 1 INJECTION, SOLUTION INTRAMUSCULAR; INTRAVENOUS; SUBCUTANEOUS at 05:58

## 2020-03-31 RX ADMIN — ONDANSETRON 4 MG: 2 INJECTION INTRAMUSCULAR; INTRAVENOUS at 01:03

## 2020-03-31 RX ADMIN — PIPERACILLIN AND TAZOBACTAM 3.38 G: 3; .375 INJECTION, POWDER, FOR SOLUTION INTRAVENOUS at 18:27

## 2020-03-31 RX ADMIN — SERTRALINE HYDROCHLORIDE 150 MG: 100 TABLET ORAL at 18:12

## 2020-03-31 RX ADMIN — GABAPENTIN 800 MG: 800 TABLET, FILM COATED ORAL at 18:13

## 2020-03-31 RX ADMIN — BUSPIRONE HYDROCHLORIDE 15 MG: 15 TABLET ORAL at 21:38

## 2020-03-31 RX ADMIN — OXYCODONE HYDROCHLORIDE 5 MG: 5 TABLET ORAL at 06:47

## 2020-03-31 RX ADMIN — HYDROMORPHONE HYDROCHLORIDE 1 MG: 1 INJECTION, SOLUTION INTRAMUSCULAR; INTRAVENOUS; SUBCUTANEOUS at 21:38

## 2020-03-31 RX ADMIN — OMEPRAZOLE 40 MG: 20 CAPSULE, DELAYED RELEASE ORAL at 18:12

## 2020-03-31 RX ADMIN — PIPERACILLIN AND TAZOBACTAM 3.38 G: 3; .375 INJECTION, POWDER, FOR SOLUTION INTRAVENOUS at 01:13

## 2020-03-31 RX ADMIN — PIPERACILLIN AND TAZOBACTAM 3.38 G: 3; .375 INJECTION, POWDER, FOR SOLUTION INTRAVENOUS at 12:58

## 2020-03-31 RX ADMIN — ACETAMINOPHEN 650 MG: 325 TABLET, FILM COATED ORAL at 05:57

## 2020-03-31 RX ADMIN — VANCOMYCIN HYDROCHLORIDE 2000 MG: 5 INJECTION, POWDER, LYOPHILIZED, FOR SOLUTION INTRAVENOUS at 09:29

## 2020-03-31 RX ADMIN — OXYCODONE HYDROCHLORIDE 5 MG: 5 TABLET ORAL at 13:05

## 2020-03-31 RX ADMIN — HYDROMORPHONE HYDROCHLORIDE 0.5 MG: 1 INJECTION, SOLUTION INTRAMUSCULAR; INTRAVENOUS; SUBCUTANEOUS at 01:14

## 2020-03-31 RX ADMIN — BUPROPION HYDROCHLORIDE 300 MG: 300 TABLET, EXTENDED RELEASE ORAL at 18:13

## 2020-03-31 RX ADMIN — HYDROMORPHONE HYDROCHLORIDE 1 MG: 1 INJECTION, SOLUTION INTRAMUSCULAR; INTRAVENOUS; SUBCUTANEOUS at 12:00

## 2020-03-31 RX ADMIN — HYDROMORPHONE HYDROCHLORIDE 1 MG: 1 INJECTION, SOLUTION INTRAMUSCULAR; INTRAVENOUS; SUBCUTANEOUS at 15:24

## 2020-03-31 RX ADMIN — HYDROMORPHONE HYDROCHLORIDE 1 MG: 1 INJECTION, SOLUTION INTRAMUSCULAR; INTRAVENOUS; SUBCUTANEOUS at 18:27

## 2020-03-31 RX ADMIN — ACETAMINOPHEN 650 MG: 325 TABLET, FILM COATED ORAL at 00:50

## 2020-03-31 RX ADMIN — PRAVASTATIN SODIUM 20 MG: 20 TABLET ORAL at 21:38

## 2020-03-31 RX ADMIN — TOPIRAMATE 50 MG: 50 TABLET, FILM COATED ORAL at 21:38

## 2020-03-31 RX ADMIN — OXYCODONE HYDROCHLORIDE 10 MG: 5 TABLET ORAL at 16:41

## 2020-03-31 RX ADMIN — OXYCODONE HYDROCHLORIDE 10 MG: 5 TABLET ORAL at 02:16

## 2020-03-31 RX ADMIN — HYDROMORPHONE HYDROCHLORIDE 1 MG: 1 INJECTION, SOLUTION INTRAMUSCULAR; INTRAVENOUS; SUBCUTANEOUS at 03:14

## 2020-03-31 RX ADMIN — GABAPENTIN 300 MG: 100 CAPSULE ORAL at 21:38

## 2020-03-31 RX ADMIN — PIPERACILLIN AND TAZOBACTAM 3.38 G: 3; .375 INJECTION, POWDER, FOR SOLUTION INTRAVENOUS at 06:47

## 2020-03-31 RX ADMIN — HYDROMORPHONE HYDROCHLORIDE 1 MG: 1 INJECTION, SOLUTION INTRAMUSCULAR; INTRAVENOUS; SUBCUTANEOUS at 09:05

## 2020-03-31 RX ADMIN — VANCOMYCIN HYDROCHLORIDE 2000 MG: 5 INJECTION, POWDER, LYOPHILIZED, FOR SOLUTION INTRAVENOUS at 19:32

## 2020-03-31 ASSESSMENT — ACTIVITIES OF DAILY LIVING (ADL)
ADLS_ACUITY_SCORE: 12
ADLS_ACUITY_SCORE: 11
ADLS_ACUITY_SCORE: 12
ADLS_ACUITY_SCORE: 11
ADLS_ACUITY_SCORE: 11
ADLS_ACUITY_SCORE: 12

## 2020-03-31 NOTE — PROGRESS NOTES
Seen at bedside, significant warmth and erythema and edema consistent with cellulitis right medial and posterior calf.  Proximal calf negative pain, no Homans sign.  Brooks recently changed by OFELIA ARANDA.  Cannot tolerate edema wear on the right lower extremity, can tolerate on the left lower extremity.  IV antibiotics initiated.  She is awaiting initiation of home medication dosing, she specifically discussed the Neurontin dosing, I have put a note into pharmacy to dose Neurontin at 800 mg in the morning, 800 mg early afternoon, 800 mg after dinner and then 3 100 mg tablets at bedtime.  She also cannot eat hospital food, she will need facilitation obtaining delivery from local restaurants, she can order, given time of pandemic, need to ensure that the food can come to her room.  Nutritional status critical for resolution of edema.  Anticipate that her pain will be decreased within the neck 72 hours.  MRIs reviewed, no indication for surgical management at this time and she declines discussion regarding the potential ultimate need for amputation at this moment.  He is completed hyperbaric and IV antibiotic management, additional options to be discussed.  Follow-up tomorrow.

## 2020-03-31 NOTE — PROGRESS NOTES
Therapy: IV abx.  Insurance: Blue Plus  Pt is covered 100%. Ded and oop do not apply.    In reference to hospital admission on 03/30/2020 and benefit check request on 03/31/2020.    Please contact Intake with any questions, [phone] or In Basket pool, [facility] ([id number]).

## 2020-03-31 NOTE — PROGRESS NOTES
[facility]    Hospitalist Progress Note    Interval History   - Reports right lower leg/foot seems to be slightly better this morning than yesterday. Still having some pain in the right leg.    Assessment & Plan   Summary: Shalonda Howard is a 49 yo F with PMH of anxiety, depression, anemia, GERD, dyslipidemia, and complicated wound history including prior bilateral bunion repair (Aug 2018) with numerous complications including left ankle septic arthritis requiring prior hardware removal, repeated I&Ds, first ray amputation of left foot, and many extended courses of IV antibiotics. 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 She was discharged home on IV zosyn and has followed closely with vascular wound clinic. She is now s/p 8 weeks of IV antibiotics and completion of 31 treatments of hyperbaric oxygent therapy. She was admitted on 3/30/2020 directly from vascular wound clinic for concern for R heel osteomyelitis.    Right heel, lower leg cellulitis with suspected osteomyelitis  As detailed above, complicated wound history. Most recently completed course of home IV zosyn therapy and 31 hyperbaric oxygen treatments via [facility]. Last dose of Abx was approximately 3/19 per patient. 3d PTA began to notice increased pain to right calf with erythema, swelling extending from heel towards knee. She was seen by home care for wound check today with concern of cellulitis.    Noted fever to 102F in AM of 3/31/2020, as well as soft blood pressures. MRI 3/30/2020 suggestive of R heel osteomyelitis.  - Blood cultures pending  - IV vancomycin, zosyn  - Vascular surgery consulted  - Pain control with PRN PO oxycodone, IV dilaudid  - NPO p MN pending surgical evaluation, plan  - WOC consult  - Continue NS @ 100ml/hr    Chronic iron deficiency  anemia  Recent baseline Hgb 10. Receives periodic outpatient iron infusions.    GERD: PTA omeprazole    Dyslipidemia: PTA pravastatin    Chronic pain: PTA buprenorphine BID    Depression  Anxiety  - Continue PTA buproprion, buspirone, sertraline, topamax, gabapentin    Morbid Obesity: BMI 38.    DVT Prophylaxis: Pneumatic Compression Devices  Code Status: Prior  PT/OT: not yet  Diet: Regular Diet Adult  Snacks/Supplements Adult: Other; Isaiah 1 gail BID; Between Meals      Disposition: Expected discharge pending vascular surgery management    Thang Major MD  Text Page  (7am to 6pm)  -Data reviewed today: I reviewed all new labs and imaging results over the last 24 hours.    Physical Exam   Temp: 97.8  F (36.6  C) Temp src: Oral BP: 95/49 Pulse: 80   Resp: 16 SpO2: 93 % O2 Device: None (Room air)    Vitals:    03/30/20 1950 03/31/20 0640   Weight: 105.1 kg (231 lb 9.6 oz) 104.9 kg (231 lb 4.8 oz)     Vital Signs with Ranges  Temp:  [97.8  F (36.6  C)-103  F (39.4  C)] 97.8  F (36.6  C)  Pulse:  [] 80  Resp:  [16-20] 16  BP: ()/(49-70) 95/49  SpO2:  [93 %-97 %] 93 %  I/O last 3 completed shifts:  In: 1382 [I.V.:1382]  Out: -   O2 requirements: none    Constitutional: Female in NAD  HEENT: Eyes nonicteric, oral mucosa moist  Cardiovascular: RRR, normal S1/2, soft systolic murmur  Respiratory: CTAB, no wheezing or crackles  Vascular: R heel bandaged, noted erythema and warmth running proximally to the heel, demarcated. Left foot with hallux amputation and several bandages  GI: Normoactive bowel sounds, nontender, nondistended  Skin/Integumen: No rashes  Neuro/Psych: Appropriate affect and mood. A&Ox3, moves all extremities    Medications     sodium chloride 100 mL/hr at 03/30/20 2012       piperacillin-tazobactam  3.375 g Intravenous Q6H     sodium chloride (PF)  3 mL Intracatheter Q8H     vancomycin (VANCOCIN) IV  2,000 mg Intravenous Q12H       Data   Recent Labs   Lab 03/31/20  0608 03/30/20  1952    WBC 13.0* 11.7*   HGB 8.6* 8.4*   MCV 67* 67*    417    134   POTASSIUM 3.7 3.4   CHLORIDE 104 104   CO2 22 24   BUN 13 17   CR 0.74 0.78   ANIONGAP 7 6   PAULINE 8.4* 8.5   * 126*       Imaging:   Recent Results (from the past 24 hour(s))   MR Foot Right w/o & w Contrast    Narrative    FINAL REPORT     OVER READ BY DR. NICOLE KHANNA  ON 03/31/2020    MRI OF THE RIGHT FOOT 03/30/2020 IS REVIEWED    REPORT:    I agree with the original report.    TENDONS: Moderate severity Achilles tendon tendinopathy without evidence for significant intrasubstance tearing. There is likely minimal partial-thickness superficial tearing along the extreme medial aspect of the attachment but no retraction. There is   moderate severity posterior tibial tendon tendinopathy without tearing. The flexor tendons are otherwise negative. There is tendinopathy within both the peroneus brevis and longus tendons, more marked within the longus. No full-thickness tearing or   retraction. The extensor tendons are negative for tendinopathy, tenosynovitis, or tearing.    LIGAMENTS: The anterior and posterior syndesmotic ligaments are intact. There is edema and laxity within the fibers of the anterior and posterior talofibular ligaments consistent with sprain but this may be a chronic finding. No disruption. The deltoid   ligamentous complex is intact. The calcaneofibular ligament is intact.    BONES AND SOFT TISSUES: There is a soft tissue ulceration along the plantar surface of the foot. There is abnormal T1 and T2 signal within the posteroinferior aspect of the calcaneus superimposed on prior postoperative change. This likely represents   osteomyelitis. Postoperative changes of hindfoot arthrodesis across the posterior facet of the subtalar joint. This appears solid. Complete disruption of the plantar fascia. Extensive edema or cellulitis surrounding the soft tissue ulceration. There is a   small amount of fluid within the soft  tissues adjacent to the anterior margin of the ulceration but this likely spontaneously drains to the skin surface. There is a small tibiotalar joint effusion. This is unlikely to represent a septic arthropathy,   however. Extensive edema or cellulitis extends circumferentially about the lower leg.      Impression    CONCLUSION:  1. I agree with the original report.  2. Large soft tissue ulceration along the plantar surface of the hindfoot extends to the margin of the calcaneus where there is evidence of bone destruction and signal abnormality. Findings are suggestive of osteomyelitis.  3. There is a small amount of fluid within the subcutaneous soft tissues along the anterior margin of the ulceration but this likely spontaneously drains to the skin surface.  4. There is a small tibiotalar joint effusion. While there is synovial thickening and enhancement, this is unlikely to represent a septic arthropathy.  5. Extensive but nonspecific edema or cellulitis seen circumferentially about the lower leg.  6. Moderate severity Achilles tendon tendinopathy without evidence for high-grade tearing. There is likely some minimal partial-thickness tearing along the extreme medial margin of the attachment but no retraction.  7. Extensive edema or cellulitis seen circumferentially about the lower leg.  8. No evidence for fracture.  9. Postoperative changes of prior hindfoot arthrodesis. There appears to be solid bony fusion across the posterior facet of the subtalar joint.  10. Edema and laxity within the fibers of the anterior and posterior talofibular ligaments consistent with sprain but this may be a chronic finding.  11. Posterior tibial tendon tendinopathy without tearing.  12. Peroneus brevis and longus tendon tendinopathy without tearing. Findings are more advanced within the longus.       Final Interpretation Dictated By: Bandar New MD  Date: 3/31/2020 7:10 AM    [facility]            PRELIMINARY  REPORT    EXAM: MR FOOT RIGHT W/O AND W CONTRAST  LOCATION: [facility]  DATE/TIME: 3/30/2020 9:08 PM    INDICATION: Chronic wound to calcaneus. Osteomyelitis is suspected.  COMPARISON: 12/04/2019.  TECHNIQUE: Routine. Additional postgadolinium T1 sequences were obtained.  IV CONTRAST: 10 mL Gadavist    FINDINGS:     There is again extensive marrow edema in the calcaneus suspicious for osteomyelitis. Bone destruction or postoperative changes of the posterior aspect of the calcaneus. Large soft tissue defect at the plantar aspect of the hindfoot. Extensive soft tissue   edema. Small peripheral enhancing fluid collection at the anterior aspect of the large wound measuring up to 1.6 cm and may be a abscess.. Small ankle joint effusion.    PRELIMINARY REPORT.    Preliminary Interpretation Dictated By: Nando Kc MD  Date: 3/31/2020 12:35 AM

## 2020-03-31 NOTE — PROGRESS NOTES
[facility] Nurse Inpatient Wound Assessment   Reason for consultation:  Chronic bilateral heels full thickness to bone  neuropathic ulcers with calcaneous osteo     Assessment  Bilateral heels: full thickness to bone, 40 darker gray tissue /40% granulation with moderate sero-sang drainage.  RLE cellulitis per admission dx.   RLE painful  Pt very knowledgeable re: wound care and POC.    Treatment Plan  Bilateral heel wounds: change daily  1. Clean wounds with MicroKlenz.  2. Moisten 4 x4 gauze with VASHE and apply to each wound bed. Let sit for 5 minutes over wound bed.   3. Cover central adaptic  with Plurogel. Apply Pluorgel side down over each wound bed and press down       into place  4. Cover with Optifoam to absorb drainage  5. Secure on Lt left heel/leg  with Spandage.       Secure Rt  heel with tape. (pt currently cannot handle pressure of Spandage on Rt leg  6. Change cover dressing prn drainage  7. Heel suspension @ all times in bed    Orders reviewed and placed in Epic  Continue tx plan until Dr. Travis changes POC  Nursing to notify the Provider(s) and re-consult the [facility] Nurse if wound(s) deteriorates or new skin concern.    Patient History  According to provider note(s):     Shalonda Howard is a 50 year old female with a past medical history of anxiety, depression, anemia, GERD, dyslipidemia, and complicated wound history including prior bilateral bunion repair (Aug 2018) with numerous complications including left ankle septic arthritis requiring prior hardware removal, repeated I&Ds, first ray amputation of left foot, and many extended courses of IV antibiotics. 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 She was discharged home on IV zosyn and has followed closely with vascular wound clinic. She is now s/p 8  weeks of IV antibiotics and completion of 31 treatments of hyperbaric oxygent therapy. She is now directly admitted from vascular wound clinic out of concern for recurrent osteomyelitis of right heel and RLE cellulitis.  She is admitted under inpatient status.     Sepsis due to right foot cellulitis, suspected bilateral heel osteomyelitis, and initial concern for right ankle septic arthritis  Stage 4 pressure ulcers over bilateral heels  Chronic right third toe ulcer\  Objective Data  Active Diet Order:  Orders Placed This Encounter      Regular Diet Adult    Output:   I/O last 3 completed shifts:  In: 1382 [I.V.:1382]  Out: -     Risk Assessment:   Sensory Perception: 4-->no impairment  Moisture: 3-->occasionally moist  Activity: 3-->walks occasionally  Mobility: 4-->no limitation  Nutrition: 3-->adequate  Friction and Shear: 3-->no apparent problem  Eduardo Score: 20                          Labs:   Recent Labs   Lab 03/31/20  0608   HGB 8.6*   WBC 13.0*       Physical Exam  Wound History:   R and L calcaneal osteomyelitis. Had maximized HBO and IV abx therapy.     WOC  photo 3-31-20  Rt heel    Size:  Rounded 6.5cm x 6.5cm PI with 60% granular beefy tissue /40% gray central tissue that probes to             Bone.   Nida-wound skin: small callous @ wound margins, no nida-wound erythema  Drainage: moderate sero-sang, draining thru dressing   Odor: mild  Rt leg: receding area of previously marked erythema from distal Rt malleous Rt lateral calf and extending distal Rt lateral thigh  Rt 3rd and 4th toes:  Tips with abrasions like wounds, pale pink moist bases, slight drainage.   Pain: Rt calf and achilles area. Denies pain in heel    WHI photo Lt heel  3-31-30    Size:  5.0cm x 5.0cm x .5cm with 70% granular base / 30% gray slough  Nida-ulcer skin: slight callous @ edge with no nida-wound erythema  Drainage: moderate sero-sang draining thru dressing  Odor: slight  Pain: denies     Moist hydrofera Blue removed from  each wound bed  Interventions  Current support surface: atmos air  Current off-loading measures: pillows with heel suspension  Visual inspection of wound(s) completed  Wound Care: dressing changed  Supplies: VASHE in pt room . Pt has edemawear @ home  Education provided: change in tx protocol. Aware Dr Travis may change tx  Discussed plan of care with pt and nursing    Anitra Pringle RN WOCN

## 2020-03-31 NOTE — PHARMACY-ADMISSION MEDICATION HISTORY
Pharmacy Medication History  Admission medication history interview status for the 3/30/2020  admission is complete. See EPIC admission navigator for prior to admission medications     Medication history sources: Patient, Surescripts fill history  Medication history source reliability: Good  Adherence assessment: Good    Significant changes made to the medication list:  Deleted propranolol as patient used this for hyperbaric chamber treatments.      Additional medication history information:   Patient confirmed she takes tizanidine and topiramate for sleep    Medication reconciliation completed by provider prior to medication history? No    Time spent in this activity: 20 minutes    Prior to Admission medications    Medication Sig Last Dose Taking? Auth Provider   BELBUCA 900 MCG FILM buccal film place 1 film by buccal route 2 times every day against the inside of the cheek, holding in place for 5 seconds, 3/30/2020 at Unknown time Yes Reported, Patient   buPROPion (WELLBUTRIN XL) 300 MG 24 hr tablet Take 300 mg by mouth daily 3/30/2020 at am Yes Reported, Patient   busPIRone (BUSPAR) 15 MG tablet Take 15 mg by mouth 2 times daily 3/30/2020 at Unknown time Yes Unknown, Entered By History   cetirizine (ZYRTEC) 10 MG tablet Take 10 mg by mouth daily 3/30/2020 at Unknown time Yes Unknown, Entered By History   cholecalciferol (VITAMIN D3) 5000 units TABS tablet Take 10,000 Units by mouth daily  3/30/2020 at Unknown time Yes Unknown, Entered By History   clindamycin (CLEOCIN T) 1 % solution Apply topically nightly as needed (Acne outbreak)   at prn Yes Unknown, Entered By History   docusate sodium (COLACE) 100 MG capsule Take 300 mg by mouth 2 times daily 3/30/2020 at Unknown time Yes Unknown, Entered By History   furosemide (LASIX) 40 MG tablet Take 160 mg by mouth daily Past Week at Unknown time Yes Unknown, Entered By History   gabapentin (NEURONTIN) 300 MG capsule Take 1 Capsule by mouth at bedtime as needed. In  addition to the 800mg tablets 3x a day 3/29/2020 at Unknown time Yes Reported, Patient   gabapentin (NEURONTIN) 800 MG tablet Take 800 mg by mouth 3 times daily Am, supper, hs 3/30/2020 at Unknown time Yes Unknown, Entered By History   hydrOXYzine (ATARAX) 25 MG tablet Take 25-50 mg by mouth every 6 hours as needed for anxiety (Pain)   Yes Reported, Patient   ibuprofen (ADVIL/MOTRIN) 200 MG tablet Take 200 mg by mouth every 4 hours as needed for mild pain  Yes Unknown, Entered By History   ibuprofen (ADVIL/MOTRIN) 800 MG tablet Take 800 mg by mouth 2 times daily as needed for pain   Yes Reported, Patient   lidocaine-prilocaine (EMLA) 2.5-2.5 % external cream Apply 5 g topically as needed   Yes Reported, Patient   NARCAN 4 MG/0.1ML nasal spray Spray 0.1 milliliter by intranasal route in 1 nostril may repeat dose every 2-3 minutes as needed alternating nostrils with each dose  Yes Reported, Patient   omeprazole (PRILOSEC) 20 MG DR capsule Take 40 mg by mouth 2 times daily (before meals)  3/30/2020 at Unknown time Yes Unknown, Entered By History   ondansetron (ZOFRAN) 8 MG tablet Take 8 mg by mouth every 8 hours as needed for nausea  Yes Unknown, Entered By History   oxyCODONE-acetaminophen (PERCOCET)  MG per tablet Take 1 tablet by mouth 3 times daily as needed for severe pain  Yes Sofi Mason MD   phentermine 30 MG capsule Take 30 mg by mouth every morning 3/30/2020 at Unknown time Yes Reported, Patient   polyethylene glycol (MIRALAX/GLYCOLAX) packet Take 17 g by mouth daily as needed for constipation   Yes Reported, Patient   potassium chloride ER (K-TAB) 20 MEQ CR tablet Take 80 mEq by mouth daily  Past Week at Unknown time Yes Unknown, Entered By History   pravastatin (PRAVACHOL) 20 MG tablet Take 20 mg by mouth every evening 3/29/2020 at Unknown time Yes Unknown, Entered By History   sertraline (ZOLOFT) 100 MG tablet Take 150 mg by mouth daily  3/30/2020 at Unknown time Yes Reported, Patient    tiZANidine (ZANAFLEX) 4 MG tablet Take 4 mg by mouth At Bedtime  3/29/2020 at Unknown time Yes Reported, Patient   topiramate (TOPAMAX) 25 MG tablet Take 50 mg by mouth At Bedtime 3/29/2020 at Unknown time Yes Unknown, Entered By History   magic mouthwash (ENTER INGREDIENTS IN COMMENTS) suspension TAKE 5 ML BY MOUTH EVERY 2 HOURS AS NEEDED(SWISH, GARGLE, AND SPIT OUT FOR RELIEF OF PAIN) More than a month at Unknown time  Reported, Patient   order for DME [facility]  [address] [place] [address], [address]  Phone:[phone]  Fax:[phone]  Evaluate and treat for bilateral Crow walker boots  Please call to schedule   Juan Antonio Travis MD   order for DME [employer] [facility] Phone [phone] Fax [phone]    Primary Dressing Qwick 4x4 sheet REF [id number]   Length of Need: 1 month  Frequency of dressing change: daily   Juan Antonio Travis MD   order for DME Flexiplus Pump  Please call patient to evaluate and treat  Tactile Sara Fax [phone]  Include Facesheet and progress notes with limb measurements   Juan Antonio Travis MD   order for DME [employer] [facility] Phone [phone] Fax [phone]  Prontosan Wound Irrigation Solution qty 2 bottles  Edemawear size small/[employer] qty 4 sleeves   Juan Antonio Travis MD

## 2020-03-31 NOTE — PROGRESS NOTES
Patient arrived to the unit at 6:45pm from home.  Writer settled patient.  IV started and IV dilaudid given for 10/10 pain.  Report given to on-coming RN.

## 2020-03-31 NOTE — PROGRESS NOTES
MRI results noted, c/w R and L calcaneal osteomyelitis. Had maximized HBO and IV abx therapy. WOCN order placed for wound cares. Will follow up with patient later today after clinic. Plan is to stabilize cellulitis right leg at this time.

## 2020-03-31 NOTE — PROGRESS NOTES
[facility]  Patient is currently open to home care services with [facility]. The patient is currently receiving Skilled Nursing services. [facility]  and team have been notified of patient admission. [facility] liaison will continue to follow patient during stay. If appropriate provide orders to resume home care at time of discharge.

## 2020-03-31 NOTE — PROGRESS NOTES
Pt updated regarding cares. Wound care nurse did dressing. Text page MD about putting in pt's home meds. PRN dilaudid given X2, oxy X1. Will continue to monitor.

## 2020-03-31 NOTE — CONSULTS
"A consult was originally entered for SW.  Pt has historically been managed in outpatient setting--thus, order corrected \"Care Transition RN / SW IP consult\" so that Care Transition RN can assume the case for the initial assessment.    Care Transition Initial Assessment - RN  Consulted for: discharge planning   Initial consult completed via chart review.     DATA   Active Problems:    Cellulitis of right heel       Cognitive Status: awake, alert and oriented per nursing documentation.    Contact information and PCP information verified: Yes  + [facility] PCP Linda Bernstein PA-C, [phone]   + Pt contact / phone: [phone]   + First emergency contact is Phil Howard (Jay) - spouse [phone]     Pt lives with daughter and spouse.     Insurance concerns: No Insurance issues identified  + Active BLUE PLUS/BLUE PLUS ADVANTAGE MA     ASSESSMENT  Patient currently receives the following services:    + active primary care provider, most recent visit 3/18/2020 ([facility])  + recently on 3/10/2020 completed 31 treatments at [facility] ([phone])  + per [facility] liaison, pt open to Skilled Nursing homecare services     Identified issues/concerns regarding health management:   + per H&P \"Expected discharge: 4 - 7 days, recommended to prior living arrangement once antibiotic plan established\"  + in the event IV antibiotics are recommended at discharge, I have requested an insurance benefit check from internal [facility].  Results: \"Pt is covered 100%. Please let us know if you need anything else.\"   + further plans unknown; per wound/surgery note   MRIs reviewed, no indication for surgical management at this time and she declines discussion regarding the potential ultimate need for amputation at this moment.  He (sic) is completed hyperbaric and IV antibiotic management, additional options to be discussed.  Follow-up tomorrow.    PLAN  Financial " costs for the patient include: per insurance guidelines .  Patient given options and choices for discharge: TBD .  Patient/family is agreeable to the plan?  TBD , inpatient plan in progress .   Patient anticipates discharging to: TBD , pending hospital course .  Patient anticipates needs for home equipment: TBD   Transportation/person available to transport on day of discharge  is: spouse    Plan/Disposition: TBD .    Appointments:   + TBD pending hospital course and recommendations     Care  (CTS) will continue to follow as needed.    Concha Barragan RN, BSN, PHN  [facility] Care Coordination  [facility]   Mobile: [phone]

## 2020-03-31 NOTE — PROVIDER NOTIFICATION
JOE Major @ 17:05 on 3/31/20--[facility] does not have Belbuca (buprenorphine) 900mg.  Patient states she does not take it when she is here.   Okay to d/c Belbuca. Rosi Bello [facility]

## 2020-03-31 NOTE — PROVIDER NOTIFICATION
"MD Notification    Person notified:hosp    Person Name:Dr. Major    Date/Time:3/31/20    Interaction:page    Purpose of Notification:Patient complaining of burning and itching in nida area stating \"i think I have a yeast infection\". Please advise.     Orders Received: Oral antifungal ordered.    Comments:    "

## 2020-03-31 NOTE — PROGRESS NOTES
Spoke with Podiatry- they are not going to see patient as of now.  Dr. Travis to direct care.  Spoke with Dr. Travis - OK for patient to eat. MD updated that no wound care ordered, states will look over pt and address plan of care.

## 2020-03-31 NOTE — CONSULTS
[facility]  March 31, 2020    Consult received.  Chart reviewed.  RN called this AM asking about plan of care.  I asked that she check with Dr. Angy arroyo from Wound Clinic/Vascular Surgery department, as pt appears to be under his care and was admitted per his recommendation.  Notes indicate he is planning to follow up with pt today in house.  Please reconsult if our service is needed/call with any concerns.      Juan Antonio Mccarty DPM, FACFAS  Pager: [phone]

## 2020-03-31 NOTE — PROGRESS NOTES
"3/31/20 1715: Patient is refusing all hospital food. Requesting outside food services but this is not allowed due to pandemic. This was explained to patient but she continues to refuse stating \"the whole concept of hospital food makes me sick, I've had this issue since I was young.\" Writer, charge RN, and Patient Care Supervisor all discussed options and importance of nutrition with healing with patient this evening but patient continues to refuse stating \"I will need to discharge early because otherwise I will not be able to eat here.\"   "

## 2020-03-31 NOTE — PROGRESS NOTES
Hospitalist cross cover    Paged re: patient requesting Zyrtec which I have ordered.    Jennifer Bentley (Reilly), PA-C  Hospitalist [facility]

## 2020-03-31 NOTE — PHARMACY-VANCOMYCIN DOSING SERVICE
Pharmacy Vancomycin Initial Note  Date of Service 2020  Patient's  1970  50 year old, female    Indication: Osteomyelitis    Current estimated CrCl = Estimated Creatinine Clearance: 106.1 mL/min (based on SCr of 0.78 mg/dL).    Creatinine for last 3 days  3/30/2020:  7:52 PM Creatinine 0.78 mg/dL    Recent Vancomycin Level(s) for last 3 days  No results found for requested labs within last 72 hours.      Vancomycin IV Administrations (past 72 hours)      No vancomycin orders with administrations in past 72 hours.              Nephrotoxins and other renal medications (From now, onward)    Start     Dose/Rate Route Frequency Ordered Stop    20  piperacillin-tazobactam (ZOSYN) 3.375 g vial to attach to  mL bag     Note to Pharmacy:  Dose per discussion w/ provider.    3.375 g  over 30 Minutes Intravenous EVERY 6 HOURS 20  vancomycin 2000 mg in 0.9% NaCl 500 ml intermittent infusion 2,000 mg      2,000 mg  over 90 Minutes Intravenous EVERY 12 HOURS 20 193          Contrast Orders - past 72 hours (72h ago, onward)    None            Plan:  1.  Start vancomycin  2000mg IV q12h (19mg/kg)  2.  Goal Trough Level: 15-20 mg/L   3.  Pharmacy will check trough levels as appropriate in 1-3 Days.    4. Serum creatinine levels will be ordered daily for the first week of therapy and at least twice weekly for subsequent weeks.    5. [place] method utilized to dose vancomycin therapy: Method 2    Shruthi Jenkins, PharmD, BCPS

## 2020-03-31 NOTE — PROVIDER NOTIFICATION
Used call service to page Dr. Castorena.    Pt in 10/10 pain, and fever of 102.9    Waiting for response     Re-Paged at 0206:    [facility], [address], [address], [address]  Pain not controlled, fever now 103    Waiting on response    New Orders placed, Talked with Dr Castorena

## 2020-03-31 NOTE — PROVIDER NOTIFICATION
2nd Text page to dr. Major, first around 0045    Pt in 10/10 pain, pain meds not controlling pain.   Shannan RN  *[phone]    Waiting for responce

## 2020-03-31 NOTE — PROGRESS NOTES
"S: Nutrition  B: Patient states that every time she comes into the hospital she does not eat the food. She is pleasant and states \"I don't know why, but I can't do it\". Received a call from Dr Travis and agreed to approach patient again.   A: Talked with patient again emphasized the importance of nutrition in healing. She expressed understanding and responded that she has bananas and pumpkin seeds and she will eat those. She states \"I am not trying to be difficult, but I just can't do it.\"   S: Pain medication  B: Received a call from pharmacy about home medication [place]. Pharmacy had talked with patient via phone stating that we did not have her dose and asked if she could bring it in. Pharmacy is not able to come to room.  A: I talked with patient about medication and she repeated the message she told pharmacy \"I don't take that medication when I come in.\"   R: States she has pain control with current medications and does not need it. She states that she will let us know if she changes her mind about the medication and also about nutrition from here.  "

## 2020-03-31 NOTE — PLAN OF CARE
Pt was a direct admit at 1845 on 3/30. Has bilateral heel ulcers that are covered and cellulitis on right leg Outlines with pen. Grew up the right leg to the thigh. Febrile with max temp at 103.0. most recent temp 100.7. Triggered sepsis lactic acid came back 0.9. Pt pain not controlled most of the night. Gave IV hydromorphone at 1933, 2207, 0114, 0314 and PO oxycodone at 2033, 2330, 0216. Pt was a 10/10 all night. Pt is NPO for possible surgery in the AM. Independent in room, up to commode. NPO since midnight for possible surgery.

## 2020-04-01 ENCOUNTER — APPOINTMENT (OUTPATIENT)
Dept: LAB | Facility: CLINIC | Age: 50
End: 2020-04-01
Attending: INTERNAL MEDICINE
Payer: COMMERCIAL

## 2020-04-01 VITALS
HEART RATE: 65 BPM | RESPIRATION RATE: 18 BRPM | TEMPERATURE: 99.4 F | BODY MASS INDEX: 38.49 KG/M2 | DIASTOLIC BLOOD PRESSURE: 44 MMHG | SYSTOLIC BLOOD PRESSURE: 91 MMHG | WEIGHT: 231.3 LBS | OXYGEN SATURATION: 93 %

## 2020-04-01 LAB
CREAT SERPL-MCNC: 0.77 MG/DL (ref 0.52–1.04)
GFR SERPL CREATININE-BSD FRML MDRD: >90 ML/MIN/{1.73_M2}
VANCOMYCIN SERPL-MCNC: 18.5 MG/L

## 2020-04-01 PROCEDURE — 25000132 ZZH RX MED GY IP 250 OP 250 PS 637: Performed by: INTERNAL MEDICINE

## 2020-04-01 PROCEDURE — 80202 ASSAY OF VANCOMYCIN: CPT | Performed by: HOSPITALIST

## 2020-04-01 PROCEDURE — 25000128 H RX IP 250 OP 636: Performed by: INTERNAL MEDICINE

## 2020-04-01 PROCEDURE — 25000132 ZZH RX MED GY IP 250 OP 250 PS 637: Performed by: PHYSICIAN ASSISTANT

## 2020-04-01 PROCEDURE — 99239 HOSP IP/OBS DSCHRG MGMT >30: CPT | Performed by: HOSPITALIST

## 2020-04-01 PROCEDURE — 25000128 H RX IP 250 OP 636: Performed by: HOSPITALIST

## 2020-04-01 PROCEDURE — 82565 ASSAY OF CREATININE: CPT | Performed by: HOSPITALIST

## 2020-04-01 PROCEDURE — 25000128 H RX IP 250 OP 636: Performed by: PHYSICIAN ASSISTANT

## 2020-04-01 PROCEDURE — 25800030 ZZH RX IP 258 OP 636: Performed by: HOSPITALIST

## 2020-04-01 PROCEDURE — 25000132 ZZH RX MED GY IP 250 OP 250 PS 637: Performed by: SURGERY

## 2020-04-01 PROCEDURE — 36415 COLL VENOUS BLD VENIPUNCTURE: CPT | Performed by: HOSPITALIST

## 2020-04-01 RX ORDER — CLINDAMYCIN HCL 300 MG
300 CAPSULE ORAL EVERY 6 HOURS
Qty: 56 CAPSULE | Refills: 0 | Status: SHIPPED | OUTPATIENT
Start: 2020-04-01 | End: 2020-04-15

## 2020-04-01 RX ORDER — LIDOCAINE 40 MG/G
CREAM TOPICAL
Status: DISCONTINUED | OUTPATIENT
Start: 2020-04-01 | End: 2020-04-01

## 2020-04-01 RX ORDER — CLINDAMYCIN HCL 300 MG
300 CAPSULE ORAL EVERY 6 HOURS SCHEDULED
Status: DISCONTINUED | OUTPATIENT
Start: 2020-04-01 | End: 2020-04-01 | Stop reason: HOSPADM

## 2020-04-01 RX ORDER — HEPARIN SODIUM,PORCINE 10 UNIT/ML
2-5 VIAL (ML) INTRAVENOUS
Status: DISCONTINUED | OUTPATIENT
Start: 2020-04-01 | End: 2020-04-01

## 2020-04-01 RX ORDER — OXYCODONE AND ACETAMINOPHEN 10; 325 MG/1; MG/1
1 TABLET ORAL EVERY 4 HOURS PRN
Qty: 21 TABLET | Refills: 0 | Status: SHIPPED | OUTPATIENT
Start: 2020-04-01 | End: 2020-07-21

## 2020-04-01 RX ADMIN — OXYCODONE HYDROCHLORIDE 10 MG: 5 TABLET ORAL at 09:09

## 2020-04-01 RX ADMIN — BUPROPION HYDROCHLORIDE 300 MG: 300 TABLET, EXTENDED RELEASE ORAL at 08:55

## 2020-04-01 RX ADMIN — OMEPRAZOLE 40 MG: 20 CAPSULE, DELAYED RELEASE ORAL at 06:49

## 2020-04-01 RX ADMIN — SENNOSIDES AND DOCUSATE SODIUM 1 TABLET: 8.6; 5 TABLET ORAL at 09:13

## 2020-04-01 RX ADMIN — SERTRALINE HYDROCHLORIDE 150 MG: 100 TABLET ORAL at 08:55

## 2020-04-01 RX ADMIN — SENNOSIDES AND DOCUSATE SODIUM 1 TABLET: 8.6; 5 TABLET ORAL at 09:09

## 2020-04-01 RX ADMIN — TIZANIDINE 4 MG: 2 TABLET ORAL at 00:13

## 2020-04-01 RX ADMIN — PIPERACILLIN AND TAZOBACTAM 3.38 G: 3; .375 INJECTION, POWDER, FOR SOLUTION INTRAVENOUS at 10:10

## 2020-04-01 RX ADMIN — OXYCODONE HYDROCHLORIDE 10 MG: 5 TABLET ORAL at 15:01

## 2020-04-01 RX ADMIN — BUSPIRONE HYDROCHLORIDE 15 MG: 15 TABLET ORAL at 08:55

## 2020-04-01 RX ADMIN — PIPERACILLIN AND TAZOBACTAM 3.38 G: 3; .375 INJECTION, POWDER, FOR SOLUTION INTRAVENOUS at 03:21

## 2020-04-01 RX ADMIN — GABAPENTIN 800 MG: 800 TABLET, FILM COATED ORAL at 08:55

## 2020-04-01 RX ADMIN — VANCOMYCIN HYDROCHLORIDE 2000 MG: 5 INJECTION, POWDER, LYOPHILIZED, FOR SOLUTION INTRAVENOUS at 06:49

## 2020-04-01 RX ADMIN — HYDROMORPHONE HYDROCHLORIDE 1 MG: 1 INJECTION, SOLUTION INTRAMUSCULAR; INTRAVENOUS; SUBCUTANEOUS at 03:21

## 2020-04-01 RX ADMIN — CETIRIZINE HYDROCHLORIDE 10 MG: 10 TABLET, FILM COATED ORAL at 00:13

## 2020-04-01 RX ADMIN — GABAPENTIN 800 MG: 800 TABLET, FILM COATED ORAL at 12:06

## 2020-04-01 RX ADMIN — OXYCODONE HYDROCHLORIDE 10 MG: 5 TABLET ORAL at 00:13

## 2020-04-01 ASSESSMENT — ACTIVITIES OF DAILY LIVING (ADL)
ADLS_ACUITY_SCORE: 12

## 2020-04-01 NOTE — PHARMACY-CONSULT NOTE
Pharmacy Vancomycin Note  Date of Service 2020  Patient's  1970   50 year old, female    Indication: Osteomyelitis  Goal Trough Level: 15-20 mg/L  Day of Therapy: 3  Current Vancomycin regimen:  2000 mg IV q12h    Current estimated CrCl = Estimated Creatinine Clearance: 107.5 mL/min (based on SCr of 0.77 mg/dL).    Creatinine for last 3 days  3/30/2020:  7:52 PM Creatinine 0.78 mg/dL  3/31/2020:  6:08 AM Creatinine 0.74 mg/dL  2020:  6:51 AM Creatinine 0.77 mg/dL    Recent Vancomycin Levels (past 3 days)  2020:  6:51 AM Vancomycin Level 18.5 mg/L    Vancomycin IV Administrations (past 72 hours)                   vancomycin 2000 mg in 0.9% NaCl 500 ml intermittent infusion 2,000 mg (mg) 2,000 mg Given 20 0649     2,000 mg Given 20     2,000 mg Given  929     2,000 mg Given 20                Nephrotoxins and other renal medications (From now, onward)    Start     Dose/Rate Route Frequency Ordered Stop    20 194  piperacillin-tazobactam (ZOSYN) 3.375 g vial to attach to  mL bag     Note to Pharmacy:  Dose per discussion w/ provider.    3.375 g  over 30 Minutes Intravenous EVERY 6 HOURS 20 1854      20 194  vancomycin 2000 mg in 0.9% NaCl 500 ml intermittent infusion 2,000 mg      2,000 mg  over 90 Minutes Intravenous EVERY 12 HOURS 20 193               Contrast Orders - past 72 hours (72h ago, onward)    Start     Dose/Rate Route Frequency Ordered Stop    20  gadobutrol (GADAVIST) injection 10 mL      10 mL Intravenous ONCE 20          Interpretation of levels and current regimen:  Trough level is  Therapeutic    Has serum creatinine changed > 50% in last 72 hours: No    Urine output:  unable to determine    Renal Function: Stable    Plan:  1.  Continue Current Dose  2.  Pharmacy will check trough levels as appropriate in 1-3 Days.    3. Serum creatinine levels will be ordered daily for the first  week of therapy and at least twice weekly for subsequent weeks.      Dahlia Overton, PharmD  Inpatient Clinical Pharmacist  [phone]          .

## 2020-04-01 NOTE — PLAN OF CARE
[id number]: Patient alert and oriented, VSS on room air. Complains of pain in RLE, prn IV dilaudid given x2, oxycodone x1. Pt up independently in room, wearing bilateral boots for ambulation. PIV infusing. Patient on regular diet but refusing hospital food, see note. Wounds on bilateral LE, dressing on RLE with small amt of serous drainage, RLE with marked erythema, recession noted. Podiatry following, plan for IV abx course. Discharge pending.

## 2020-04-01 NOTE — DISCHARGE SUMMARY
[facility]    Discharge Summary  Hospitalist    Date of Admission:  3/30/2020  Date of Discharge:  4/1/2020  Discharging Provider: Rene Ortega MD  Date of Service (when I saw the patient): 04/01/20    Discharge Diagnoses   Right foot and lower leg cellulitis  Right heel osteomyelitis  Chronic iron deficiency anemia  GERD  Dyslipidemia  Chronic pain  Depression  Anxiety  Morbid Obesity    History of Present Illness   Shalonda Howard is a 51 yo F with PMH of anxiety, depression, anemia, GERD, dyslipidemia, and complicated wound history including prior bilateral bunion repair (Aug 2018) with numerous complications including left ankle septic arthritis requiring prior hardware removal, repeated I&Ds, first ray amputation of left foot, and many extended courses of IV antibiotics. 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 She was discharged home on IV zosyn and has followed closely with vascular wound clinic. She is now s/p 8 weeks of IV antibiotics and completion of 31 treatments of hyperbaric oxygent therapy. She was admitted on 3/30/2020 directly from vascular wound clinic for concern for R heel osteomyelitis.    Hospital Course   Shalonda Howard was admitted on 3/30/2020.  The following problems were addressed during her hospitalization:    Right foot and lower leg cellulitis  Right heel osteomyelitis  As detailed above, complicated wound history. Most recently completed course of home IV zosyn therapy and 31 hyperbaric oxygen treatments via [facility]. Last dose of antibiotics was approximately 3/19 per patient. Three days PTA began to notice increased pain to right calf with erythema, swelling extending from heel towards knee. She was seen by home care for wound check today with concern of cellulitis. MRI 3/30/2020  suggestive of R heel osteomyelitis.  - Blood cultures negative to date.  - Fever improved, but not resolved.  - Dr. Travis consulted, discussed with him on morning of discharge.  - ID consulted, discussed with Dr. Bocanegra.  - Empirically treated with vancomycin and Zosyn.   - Transition to oral keflex and clindamycin at the time of discharge to treat cellulitis, plan 14 day course.   - Intent of antibiotics is to cure cellulitis, but not osteomyelitis.   - Concern that she will developed recurrent cellulitis from the open wounds on her heels.   - BKA has been recommended, but patient refuses.  - Continue wound care per [facility] nurse and Dr. Travis.  - Follow-up with home health and Dr. Travis.     Chronic iron deficiency anemia  Baseline hemoglobin appears to be in the 8.5-10 range.  - Hemoglobin stable and at baseline.  - Receives periodic outpatient iron infusions.     GERD  - Continued PTA omeprazole.     Dyslipidemia  - Continued PTA pravastatin.     Chronic pain  - Resume PTA buprenorphine BID upon discharge.  - Discussed that she can take PRN oxycodone more frequently over the next few days, but then it needs to be decreased back to TID PRN when acute pain resolves.     Depression  Anxiety  - Continued PTA buproprion, buspirone, sertraline, topamax, gabapentin.     Morbid Obesity  - BMI 38.    Rene Ortega MD    Significant Results and Procedures   None    Pending Results   These results will be followed up by PCP.  Unresulted Labs Ordered in the Past 30 Days of this Admission     Date and Time Order Name Status Description    3/31/2020 0209 Blood culture Preliminary     3/31/2020 0209 Blood culture Preliminary     3/30/2020 1919 Blood culture Preliminary     3/30/2020 1919 Blood culture Preliminary         Code Status   Full Code       Primary Care Physician   Linda Bernstein    Physical Exam   Temp: 99.4  F (37.4  C) Temp src: Oral BP: 91/44 Pulse: 65   Resp: 16   O2 Device: None (Room air)    Vitals:     03/30/20 1950 03/31/20 0640   Weight: 105.1 kg (231 lb 9.6 oz) 104.9 kg (231 lb 4.8 oz)     Vital Signs with Ranges  Temp:  [99.3  F (37.4  C)-99.8  F (37.7  C)] 99.4  F (37.4  C)  Pulse:  [] 65  Resp:  [16] 16  BP: ()/(44-55) 91/44  I/O last 3 completed shifts:  In: 1280 [P.O.:1280]  Out: -     Constitutional: awake, alert, cooperative, no apparent distress  Respiratory: clear to auscultation bilaterally, no crackles or wheezing  Cardiovascular: regular rate and rhythm, normal S1 and S2, no murmur noted  GI: normal bowel sounds, soft, non-distended, non-tender  Skin: warm, dry, right lower extremity erythema improved and has receded from the previously drawn line on her skin  Musculoskeletal: no lower extremity pitting edema present, dressings in place on bilateral lower extremity heel ulcers  Neurologic: awake, alert, oriented to name, place and time    Discharge Disposition   Discharged to home  Condition at discharge: Stable    Consultations This Hospital Stay   PHARMACY TO DOSE VANCO  PODIATRY IP CONSULT  SOCIAL WORK IP CONSULT  WOUND OSTOMY CONTINENCE NURSE  IP CONSULT  WOUND OSTOMY CONTINENCE NURSE  IP CONSULT  CARE TRANSITION RN/SW IP CONSULT  PHARMACY IP CONSULT  VASCULAR ACCESS ADULT IP CONSULT  INFECTIOUS DISEASES IP CONSULT    Time Spent on this Encounter   IRene MD, personally saw the patient today and spent greater than 30 minutes discharging this patient.    Discharge Orders      Home care nursing referral      Reason for your hospital stay    You were in the hospital due to a skin infection in your right leg. You are being discharged home with oral antibiotics for two weeks.     Follow-up and recommended labs and tests     Follow up with Dr. Travis as directed.  Follow up with primary care provider as needed.     Activity    Your activity upon discharge: activity as tolerated, offload pressure from your heels as much as possible     Wound care and dressings    Instructions  to care for your wound at home: as directed by Dr. Travis.     Diet    Follow this diet upon discharge: Regular Diet Adult     Discharge Medications   Current Discharge Medication List      START taking these medications    Details   cephALEXin (KEFLEX) 250 MG capsule Take 1 capsule (250 mg) by mouth every 6 hours for 14 days  Qty: 56 capsule, Refills: 0    Associated Diagnoses: Cellulitis of right heel      clindamycin (CLEOCIN) 300 MG capsule Take 1 capsule (300 mg) by mouth every 6 hours for 14 days  Qty: 56 capsule, Refills: 0    Associated Diagnoses: Cellulitis of right heel         CONTINUE these medications which have CHANGED    Details   oxyCODONE-acetaminophen (PERCOCET)  MG per tablet Take 1 tablet by mouth every 4 hours as needed for severe pain x 1 week for acute pain, then back to 3 times daily PRN as previously ordered  Qty: 21 tablet, Refills: 0    Associated Diagnoses: Other acute osteomyelitis, multiple sites (H)         CONTINUE these medications which have NOT CHANGED    Details   BELBUCA 900 MCG FILM buccal film place 1 film by buccal route 2 times every day against the inside of the cheek, holding in place for 5 seconds,  Refills: 0      buPROPion (WELLBUTRIN XL) 300 MG 24 hr tablet Take 300 mg by mouth daily  Refills: 3      busPIRone (BUSPAR) 15 MG tablet Take 15 mg by mouth 2 times daily      cetirizine (ZYRTEC) 10 MG tablet Take 10 mg by mouth daily      cholecalciferol (VITAMIN D3) 5000 units TABS tablet Take 10,000 Units by mouth daily       clindamycin (CLEOCIN T) 1 % solution Apply topically nightly as needed (Acne outbreak)       docusate sodium (COLACE) 100 MG capsule Take 300 mg by mouth 2 times daily      furosemide (LASIX) 40 MG tablet Take 160 mg by mouth daily      gabapentin (NEURONTIN) 300 MG capsule Take 1 Capsule by mouth at bedtime as needed. In addition to the 800mg tablets 3x a day  Refills: 3      gabapentin (NEURONTIN) 800 MG tablet Take 800 mg by mouth 3 times daily  Am, supper, hs      hydrOXYzine (ATARAX) 25 MG tablet Take 25-50 mg by mouth every 6 hours as needed for anxiety (Pain)       !! ibuprofen (ADVIL/MOTRIN) 200 MG tablet Take 200 mg by mouth every 4 hours as needed for mild pain      !! ibuprofen (ADVIL/MOTRIN) 800 MG tablet Take 800 mg by mouth 2 times daily as needed for pain   Refills: 2      lidocaine-prilocaine (EMLA) 2.5-2.5 % external cream Apply 5 g topically as needed       NARCAN 4 MG/0.1ML nasal spray Spray 0.1 milliliter by intranasal route in 1 nostril may repeat dose every 2-3 minutes as needed alternating nostrils with each dose  Refills: 1      omeprazole (PRILOSEC) 20 MG DR capsule Take 40 mg by mouth 2 times daily (before meals)       ondansetron (ZOFRAN) 8 MG tablet Take 8 mg by mouth every 8 hours as needed for nausea      phentermine 30 MG capsule Take 30 mg by mouth every morning      polyethylene glycol (MIRALAX/GLYCOLAX) packet Take 17 g by mouth daily as needed for constipation       potassium chloride ER (K-TAB) 20 MEQ CR tablet Take 80 mEq by mouth daily       pravastatin (PRAVACHOL) 20 MG tablet Take 20 mg by mouth every evening      sertraline (ZOLOFT) 100 MG tablet Take 150 mg by mouth daily       tiZANidine (ZANAFLEX) 4 MG tablet Take 4 mg by mouth At Bedtime       topiramate (TOPAMAX) 25 MG tablet Take 50 mg by mouth At Bedtime      magic mouthwash (ENTER INGREDIENTS IN COMMENTS) suspension TAKE 5 ML BY MOUTH EVERY 2 HOURS AS NEEDED(SWISH, GARGLE, AND SPIT OUT FOR RELIEF OF PAIN)  Refills: 12      !! order for DME [facility]  [address] [place] [address], [address]  Phone:[phone]  Fax:[phone]  Evaluate and treat for bilateral Crow walker boots  Please call to schedule  Qty: 1 Device, Refills: 0    Associated Diagnoses: Pressure ulcer of left heel, stage 3 (H); Pressure injury of right heel, stage 3 (H)      !! order for DME [facility] Phone [phone] Fax [phone]    Primary Dressing Qwick 4x4 sheet  REF [id number]   Length of Need: 1 month  Frequency of dressing change: daily  Qty: 30 days, Refills: 0    Associated Diagnoses: Pressure ulcer of left heel, stage 3 (H); Pressure injury of right heel, stage 3 (H)      !! order for DME Flexiplus Pump  Please call patient to evaluate and treat  Soraida Ruiz Fax [phone]  Include Facesheet and progress notes with limb measurements  Qty: 1 Device, Refills: 0    Associated Diagnoses: Pressure ulcer of left heel, stage 3 (H); Pressure injury of right heel, stage 3 (H)      !! order for DME [facility] Phone [phone] Fax [phone]  Prontosan Wound Irrigation Solution qty 2 bottles  Edem[place]ear size small/[employer] qty 4 sleeves  Qty: 30 days, Refills: 0    Associated Diagnoses: Open wound of third toe of left foot, initial encounter; Pressure ulcer of left heel, stage 3 (H); Pressure injury of right heel, stage 3 (H)       !! - Potential duplicate medications found. Please discuss with provider.        Allergies   Allergies   Allergen Reactions     Amoxicillin-Pot Clavulanate Fatigue, Hives, Itching, Nausea and Vomiting and Rash     Augmentin Hives, Fatigue, Itching, Nausea and Rash     Sulfa Drugs Shortness Of Breath and Rash     10-            Dermat.-     Demerol [Meperidine] Nausea and Vomiting     Erythromycin Nausea and Vomiting     Metformin Nausea and Vomiting     Codeine Rash     Penicillins Rash     Data   Most Recent 3 CBC's:  Recent Labs   Lab Test 03/31/20  0608 03/30/20 1952 03/16/20  1510   WBC 13.0* 11.7* 4.7   HGB 8.6* 8.4* 8.8*   MCV 67* 67* 67*    417 312      Most Recent 3 BMP's:  Recent Labs   Lab Test 04/01/20  0651 03/31/20  0608 03/30/20 1952 03/16/20  1510  12/05/19  0618   NA  --  133 134  --   --  138   POTASSIUM  --  3.7 3.4  --   --  3.4   CHLORIDE  --  104 104  --   --  111*   CO2  --  22 24  --   --  25   BUN  --  13 17 10   < > 9   CR 0.77 0.74 0.78 0.57   < > 0.71   ANIONGAP  --  7 6  --   --  2*   PAULINE  --  8.4*  8.5  --   --  7.9*   GLC  --  140* 126*  --   --  120*    < > = values in this interval not displayed.     Most Recent 6 Bacteria Isolates From Any Culture (See EPIC Reports for Culture Details):  Recent Labs   Lab Test 03/31/20  0249 03/31/20  0242 03/30/20  1952 03/30/20  1951 03/10/20  1315 12/03/19  0940   CULT No growth after 1 day No growth after 1 day No growth after 2 days No growth after 2 days Light growth  Corynebacterium striatum  Identification obtained by MALDI-TOF mass spectrometry research use only database. Test   characteristics determined and verified by the Infectious [facility] Diagnostic Laboratory   ([facility]) [place], [place].  Susceptibility testing not routinely done  * No anaerobes isolated  No growth     Results for orders placed or performed during the hospital encounter of 03/30/20   MR Foot Right w/o & w Contrast    Narrative    FINAL REPORT     OVER READ BY DR. NICOLE KHANNA  ON 03/31/2020    MRI OF THE RIGHT FOOT 03/30/2020 IS REVIEWED    REPORT:    I agree with the original report.    TENDONS: Moderate severity Achilles tendon tendinopathy without evidence for significant intrasubstance tearing. There is likely minimal partial-thickness superficial tearing along the extreme medial aspect of the attachment but no retraction. There is   moderate severity posterior tibial tendon tendinopathy without tearing. The flexor tendons are otherwise negative. There is tendinopathy within both the peroneus brevis and longus tendons, more marked within the longus. No full-thickness tearing or   retraction. The extensor tendons are negative for tendinopathy, tenosynovitis, or tearing.    LIGAMENTS: The anterior and posterior syndesmotic ligaments are intact. There is edema and laxity within the fibers of the anterior and posterior talofibular ligaments consistent with sprain but this may be a chronic finding. No disruption. The deltoid   ligamentous complex is intact. The calcaneofibular ligament  is intact.    BONES AND SOFT TISSUES: There is a soft tissue ulceration along the plantar surface of the foot. There is abnormal T1 and T2 signal within the posteroinferior aspect of the calcaneus superimposed on prior postoperative change. This likely represents   osteomyelitis. Postoperative changes of hindfoot arthrodesis across the posterior facet of the subtalar joint. This appears solid. Complete disruption of the plantar fascia. Extensive edema or cellulitis surrounding the soft tissue ulceration. There is a   small amount of fluid within the soft tissues adjacent to the anterior margin of the ulceration but this likely spontaneously drains to the skin surface. There is a small tibiotalar joint effusion. This is unlikely to represent a septic arthropathy,   however. Extensive edema or cellulitis extends circumferentially about the lower leg.      Impression    CONCLUSION:  1. I agree with the original report.  2. Large soft tissue ulceration along the plantar surface of the hindfoot extends to the margin of the calcaneus where there is evidence of bone destruction and signal abnormality. Findings are suggestive of osteomyelitis.  3. There is a small amount of fluid within the subcutaneous soft tissues along the anterior margin of the ulceration but this likely spontaneously drains to the skin surface.  4. There is a small tibiotalar joint effusion. While there is synovial thickening and enhancement, this is unlikely to represent a septic arthropathy.  5. Extensive but nonspecific edema or cellulitis seen circumferentially about the lower leg.  6. Moderate severity Achilles tendon tendinopathy without evidence for high-grade tearing. There is likely some minimal partial-thickness tearing along the extreme medial margin of the attachment but no retraction.  7. Extensive edema or cellulitis seen circumferentially about the lower leg.  8. No evidence for fracture.  9. Postoperative changes of prior hindfoot  arthrodesis. There appears to be solid bony fusion across the posterior facet of the subtalar joint.  10. Edema and laxity within the fibers of the anterior and posterior talofibular ligaments consistent with sprain but this may be a chronic finding.  11. Posterior tibial tendon tendinopathy without tearing.  12. Peroneus brevis and longus tendon tendinopathy without tearing. Findings are more advanced within the longus.       Final Interpretation Dictated By: Bandar New MD  Date: 3/31/2020 7:10 AM    [facility]            PRELIMINARY REPORT    EXAM: MR FOOT RIGHT W/O AND W CONTRAST  LOCATION: [facility]  DATE/TIME: 3/30/2020 9:08 PM    INDICATION: Chronic wound to calcaneus. Osteomyelitis is suspected.  COMPARISON: 12/04/2019.  TECHNIQUE: Routine. Additional postgadolinium T1 sequences were obtained.  IV CONTRAST: 10 mL Gadavist    FINDINGS:     There is again extensive marrow edema in the calcaneus suspicious for osteomyelitis. Bone destruction or postoperative changes of the posterior aspect of the calcaneus. Large soft tissue defect at the plantar aspect of the hindfoot. Extensive soft tissue   edema. Small peripheral enhancing fluid collection at the anterior aspect of the large wound measuring up to 1.6 cm and may be a abscess.. Small ankle joint effusion.    PRELIMINARY REPORT.    Preliminary Interpretation Dictated By: Nando Kc MD  Date: 3/31/2020 12:35 AM

## 2020-04-01 NOTE — CONSULTS
[facility]    Infectious Disease Consultation     Date of Admission:  3/30/2020  Date of Consult (When I saw the patient): 04/01/20    Assessment & Plan   Shalonda Howard is a 50 year old female who was admitted on 3/30/2020.     Impression:  1. 50 y.o female very well known to me due to numerous previous admissions, who just finished a 3 month long course of IV Antibiotics just about 2 weeks ago.   2. She has a history of bilateral bunion repair surgery done in August 2018. The right side healed intially but the left side the incision did not heel which led to I and D and 6 weeks of IV antibiotics and many more weeks of oral antibiotics since the end of August 2018. Then repeat admission in November 2018, more I and D and another course of IV antibiotics, then followed by another admission in January 2019 for wound non healing, s/p another I and D and 2 antibiotics for 6 weeks. Readmitted in March 2019 with continued wound non healing, concern for underlying osteo, s/p first ray amputation, hardware removal and ulcer debridement 3/20.  3. Admission in  April for left septic ankle joint with staph hominis and staph epi in the cultures, This time the last remaining piece of hardware in the left foot was also taken out. Then again admitted in May for more I and D.   4. She also has chronic heel ulcers bilateral.   5. PCN allergy, tolerates zosyn/cephalosporins, sulfa allergy.   6. Admitted in December 2019, with continued heel ulcers bilateral but tight sided pain in the ankle, worse wound then the left. Concern for osteo in the heel on the right, and fluid in the ankle joint. She still has hardware in this foot and ankle.   7.  Previous cultures from right heel bone positive for : enterococcus, acinetobacter, enterobacter ( 11/19)  And streptococcus, in a previous cultures, left heel bone not cultures recently but the ulcer cultures had MSSA and Strep in it.  8. MRI positive for osteo in b/l heel.   9.  She is now admitted with right foot cellulitis going up to her shin     Recommendations:   I discussed my concern of open wounds in the her heels being the cause of current presentation. I am afrain this can happen again and quick and she can get very sick.   I also discussed the risks and side effects of prolonged antibiotics: C diff, MDRO.   I think at this point antibiotics to treat her heel osteo are futile, having said that I completely agree that the acute cellulitis should be treated with antibiotics, for which I will recommend oral Keflex and clindamycin, She has in past broken out into hives with Augmentin but tolerates cephalosporins ok.   I recommend surgical evaluation for BKA, patient refuses.       Ignacia Bocanegra MD    Reason for Consult   Reason for consult: I was asked to evaluate this patient for right leg cellulitis. .    Primary Care Physician   Linda Bernstein    Chief Complaint   Right foot cellulitis     History is obtained from the patient and medical records    History of Present Illness   Shalonda Howard is a 50 year old female s with anxiety, depression, anemia, GERD, dyslipidemia, and complicated wound history including prior bilateral bunion repair (Aug 2018) with numerous complications including left ankle septic arthritis requiring prior hardware removal, repeated I&Ds, first ray amputation of left foot, and many extended courses of IV antibiotics. 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 She was discharged home on IV zosyn and has followed closely with vascular wound clinic. She is now s/p 8 weeks of IV antibiotics and completion of 31 treatments of hyperbaric oxygent therapy. She is now directly admitted from vascular wound clinic out of concern for recurrent cellulitis  right heel. She is admitted under  inpatient status.    Past Medical History   I have reviewed this patient's medical history and updated it with pertinent information if needed.   Past Medical History:   Diagnosis Date     Allergic rhinitis, cause unspecified      Anxiety state, unspecified      Cellulitis and abscess of leg, except foot      Disuse osteoporosis      Dysthymic disorder      Edema      Encounter for long-term (current) use of other medications      Esophageal reflux      Foot ulcer on Heel bilaterally  5/22/2019     Kidney stones      Myalgia and myositis, unspecified      Obesity, unspecified      Open wound of foot except toe(s) alone, complicated      Opioid type dependence, unspecified      Other acne      Other congenital valgus deformity of feet      Other ventral hernia without mention of obstruction or gangrene      Polycystic ovaries      PONV (postoperative nausea and vomiting)      Systemic lupus erythematosus (H)     unsure     Tibialis tendinitis      Unspecified hereditary and idiopathic peripheral neuropathy        Past Surgical History   I have reviewed this patient's surgical history and updated it with pertinent information if needed.  Past Surgical History:   Procedure Laterality Date     APPENDECTOMY       APPLY WOUND VAC Left 1/24/2019    Procedure: WOUND VAC APPLICATION;  Surgeon: Miguel Mosher MD;  Location:  OR     ARTHRODESIS FOOT Left 2/4/2015    Procedure: ARTHRODESIS FOOT;  Surgeon: Andre Harman MD;  Location: [facility]     ARTHROSCOPY ANKLE Left 4/22/2019    Procedure: ARTHROSCOPIC IRRIGATION AND DEBRIDEMENT LEFT ANKLE. HARDWARE REMOVAL;  Surgeon: Andre Harman MD;  Location:  OR     BUNIONECTOMY RT/LT  08/29/2018     DILATION AND CURETTAGE       EXCISE TOENAIL(S) Left 2/4/2015    Procedure: EXCISE TOENAIL(S);  Surgeon: Andre Harman MD;  Location:  [facility]     HERNIA REPAIR      umbilical x 2     HERNIA REPAIR      ventral open x 3     IRRIGATION AND DEBRIDEMENT FOOT,  COMBINED Left 9/26/2018    Procedure: COMBINED IRRIGATION AND DEBRIDEMENT FOOT;  IRRIGATION AND DEBRIDEMENT LEFT FOOT;  Surgeon: Andre Harman MD;  Location:  [facility]     IRRIGATION AND DEBRIDEMENT FOOT, COMBINED Left 11/26/2018    Procedure: COMBINED IRRIGATION AND DEBRIDEMENT LEFT FOOT;  Surgeon: Andre Harman MD;  Location:  [facility]     IRRIGATION AND DEBRIDEMENT FOOT, COMBINED Left 1/24/2019    Procedure: LEFT HALLUX WOUND IRRIGATION AND DEBRIDEMENT WITH BONE BIOPSY;  Surgeon: Miguel Mosher MD;  Location:  OR     IRRIGATION AND DEBRIDEMENT FOOT, COMBINED Left 3/20/2019    Procedure: LEFT FOOT IRRIGATION AND  DEBRIDEMENT, FIRST RAY RESECTION AND HARDWARE REMOVAL FROM LEFT FOOT;  Surgeon: Andre Harman MD;  Location:  OR     IRRIGATION AND DEBRIDEMENT LOWER EXTREMITY, COMBINED Bilateral 11/14/2019    Procedure: MISONIX DEBRIDEMENT RIGHT AND LEFT HEELS.  APPLY MYRIAD RIGHT HEEL, APPLY TOTAL CONTACT CAST;  Surgeon: Juan Antonio Travis MD;  Location:  OR     REMOVE HARDWARE LOWER EXTREMITY Left 3/20/2019    Procedure: REMOVE HARDWARE LOWER EXTREMITY;  Surgeon: Andre Harman MD;  Location:  OR     REPAIR HAMMER TOE Left 11/26/2018    Procedure: REPAIR HAMMER TOE;  Surgeon: Andre Harman MD;  Location: [facility]     TONSILLECTOMY         Prior to Admission Medications   Prior to Admission Medications   Prescriptions Last Dose Informant Patient Reported? Taking?   BELBUCA 900 MCG FILM buccal film 3/30/2020 at Unknown time Self Yes Yes   Sig: place 1 film by buccal route 2 times every day against the inside of the cheek, holding in place for 5 seconds,   NARCAN 4 MG/0.1ML nasal spray  Self Yes Yes   Sig: Spray 0.1 milliliter by intranasal route in 1 nostril may repeat dose every 2-3 minutes as needed alternating nostrils with each dose   buPROPion (WELLBUTRIN XL) 300 MG 24 hr tablet 3/30/2020 at am Self Yes Yes   Sig: Take 300 mg by mouth daily   busPIRone (BUSPAR) 15 MG tablet  3/30/2020 at Unknown time Self Yes Yes   Sig: Take 15 mg by mouth 2 times daily   cetirizine (ZYRTEC) 10 MG tablet 3/30/2020 at Unknown time Self Yes Yes   Sig: Take 10 mg by mouth daily   cholecalciferol (VITAMIN D3) 5000 units TABS tablet 3/30/2020 at Unknown time Self Yes Yes   Sig: Take 10,000 Units by mouth daily    clindamycin (CLEOCIN T) 1 % solution  at prn Self Yes Yes   Sig: Apply topically nightly as needed (Acne outbreak)    docusate sodium (COLACE) 100 MG capsule 3/30/2020 at Unknown time Self Yes Yes   Sig: Take 300 mg by mouth 2 times daily   furosemide (LASIX) 40 MG tablet Past Week at Unknown time Self Yes Yes   Sig: Take 160 mg by mouth daily   gabapentin (NEURONTIN) 300 MG capsule 3/29/2020 at Unknown time Self Yes Yes   Sig: Take 1 Capsule by mouth at bedtime as needed. In addition to the 800mg tablets 3x a day   gabapentin (NEURONTIN) 800 MG tablet 3/30/2020 at Unknown time Self Yes Yes   Sig: Take 800 mg by mouth 3 times daily Am, supper, hs   hydrOXYzine (ATARAX) 25 MG tablet  Self Yes Yes   Sig: Take 25-50 mg by mouth every 6 hours as needed for anxiety (Pain)    ibuprofen (ADVIL/MOTRIN) 200 MG tablet  Self Yes Yes   Sig: Take 200 mg by mouth every 4 hours as needed for mild pain   ibuprofen (ADVIL/MOTRIN) 800 MG tablet  Self Yes Yes   Sig: Take 800 mg by mouth 2 times daily as needed for pain    lidocaine-prilocaine (EMLA) 2.5-2.5 % external cream  Self Yes Yes   Sig: Apply 5 g topically as needed    magic mouthwash (ENTER INGREDIENTS IN COMMENTS) suspension More than a month at Unknown time Self Yes No   Sig: TAKE 5 ML BY MOUTH EVERY 2 HOURS AS NEEDED(SWISH, GARGLE, AND SPIT OUT FOR RELIEF OF PAIN)   omeprazole (PRILOSEC) 20 MG DR capsule 3/30/2020 at Unknown time Self Yes Yes   Sig: Take 40 mg by mouth 2 times daily (before meals)    ondansetron (ZOFRAN) 8 MG tablet  Self Yes Yes   Sig: Take 8 mg by mouth every 8 hours as needed for nausea   order for DME  Self No No   Sig: [facility]  Order Phone [phone] Fax [phone]  Prontosan Wound Irrigation Solution qty 2 bottles  Edemawear size small/Navy qty 4 sleeves   order for DME  Self No No   Sig: [employer]  Please call patient to evaluate and treat  Soraida Ruiz Fax [phone]  Include Facesheet and progress notes with limb measurements   order for DME  Self No No   Sig: [facility] Order Phone [phone] Fax [phone]    Primary Dressing Qwick 4x4 sheet REF [id number]   Length of Need: 1 month  Frequency of dressing change: daily   order for DME  Self No No   Sig: [facility]  [address] [place] [address], [address]  Phone:[phone]  Fax:[phone]  Evaluate and treat for bilateral Crow walker boots  Please call to schedule   oxyCODONE-acetaminophen (PERCOCET)  MG per tablet  Self No Yes   Sig: Take 1 tablet by mouth 3 times daily as needed for severe pain   phentermine 30 MG capsule 3/30/2020 at Unknown time Self Yes Yes   Sig: Take 30 mg by mouth every morning   polyethylene glycol (MIRALAX/GLYCOLAX) packet  Self Yes Yes   Sig: Take 17 g by mouth daily as needed for constipation    potassium chloride ER (K-TAB) 20 MEQ CR tablet Past Week at Unknown time Self Yes Yes   Sig: Take 80 mEq by mouth daily    pravastatin (PRAVACHOL) 20 MG tablet 3/29/2020 at Unknown time Self Yes Yes   Sig: Take 20 mg by mouth every evening   sertraline (ZOLOFT) 100 MG tablet 3/30/2020 at Unknown time Self Yes Yes   Sig: Take 150 mg by mouth daily    tiZANidine (ZANAFLEX) 4 MG tablet 3/29/2020 at Unknown time Self Yes Yes   Sig: Take 4 mg by mouth At Bedtime    topiramate (TOPAMAX) 25 MG tablet 3/29/2020 at Unknown time Self Yes Yes   Sig: Take 50 mg by mouth At Bedtime      Facility-Administered Medications: None     Allergies   Allergies   Allergen Reactions     Amoxicillin-Pot Clavulanate Fatigue, Hives, Itching, Nausea and Vomiting and Rash     Augmentin Hives, Fatigue, Itching, Nausea and Rash     Sulfa Drugs Shortness Of  Breath and Rash     10-            Dermat.-     Demerol [Meperidine] Nausea and Vomiting     Erythromycin Nausea and Vomiting     Metformin Nausea and Vomiting     Codeine Rash     Penicillins Rash       Immunization History   Immunization History   Administered Date(s) Administered     Influenza Vaccine IM > 6 months Valent IIV4 10/16/2018       Social History   I have reviewed this patient's social history and updated it with pertinent information if needed. Shalonda Howard  reports that she quit smoking about 17 years ago. Her smoking use included cigarettes. She has a 6.00 pack-year smoking history. She has never used smokeless tobacco. She reports previous alcohol use. She reports that she does not use drugs.    Family History   I have reviewed this patient's family history and updated it with pertinent information if needed.   Family History   Problem Relation Age of Onset     Pulmonary Hypertension Mother      Esophageal Cancer Father      Heart Disease Maternal Grandfather      Esophageal Cancer Paternal Grandfather        Review of Systems   The 10 point Review of Systems is negative other than noted in the HPI or here.     Physical Exam   Temp: 99.4  F (37.4  C) Temp src: Oral BP: 91/44 Pulse: 65   Resp: 16   O2 Device: None (Room air)    Vital Signs with Ranges  Temp:  [99.3  F (37.4  C)-99.8  F (37.7  C)] 99.4  F (37.4  C)  Pulse:  [] 65  Resp:  [16] 16  BP: ()/(44-55) 91/44  231 lbs 4.8 oz  Body mass index is 38.49 kg/m .    GENERAL APPEARANCE:  alert and no distress  EYES: Eyes grossly normal to inspection, PERRL and conjunctivae and sclerae normal  HENT: ear canals and TM's normal and nose and mouth without ulcers or lesions  NECK: no adenopathy, no asymmetry, masses, or scars and thyroid normal to palpation  RESP: lungs clear to auscultation - no rales, rhonchi or wheezes  CV: regular rates and rhythm, normal S1 S2, no S3 or S4 and no murmur, click or rub  LYMPHATICS: normal ant/post  cervical and supraclavicular nodes  ABDOMEN: soft, nontender, without hepatosplenomegaly or masses and bowel sounds normal  MS: bilateral heel ulcers and right sided shin cellulitis       Data   Lab Results   Component Value Date    WBC 13.0 (H) 03/31/2020    HGB 8.6 (L) 03/31/2020    HCT 29.8 (L) 03/31/2020     03/31/2020     03/31/2020    POTASSIUM 3.7 03/31/2020    CHLORIDE 104 03/31/2020    CO2 22 03/31/2020    BUN 13 03/31/2020    CR 0.77 04/01/2020     (H) 03/31/2020    SED 78 (H) 03/16/2020    NTBNPI 109 12/03/2019    AST 30 03/16/2020    ALT 14 12/03/2019    ALKPHOS 102 12/03/2019    BILITOTAL 0.4 12/03/2019     Recent Labs   Lab 03/31/20  0249 03/31/20  0242 03/30/20  1952 03/30/20  1951   CULT No growth after 22 hours No growth after 23 hours No growth after 2 days No growth after 2 days     Recent Labs   Lab Test 03/31/20  0249 03/31/20  0242 03/30/20  1952 03/30/20  1951 03/10/20  1315 12/03/19  0940 12/02/19  1657 12/02/19  1651 11/21/19  1354   CULT No growth after 22 hours No growth after 23 hours No growth after 2 days No growth after 2 days Light growth  Corynebacterium striatum  Identification obtained by MALDI-TOF mass spectrometry research use only database. Test   characteristics determined and verified by the [facility]   ([facility]) [place], [place].  Susceptibility testing not routinely done  * No anaerobes isolated  No growth No growth No growth Light growth  Coagulase negative Staphylococcus  Susceptibility testing not routinely done  *  Light growth  Alcaligenes faecalis  *       Amount of time performed on this consult: 45 minutes. This includes face to face assessment and care coordination with the primary team.

## 2020-04-01 NOTE — PLAN OF CARE
1345-2920: A&Ox4. VSS on RA. Pt up independently in room, wearing bilateral boots for ambulation. Regular diet but refusing hospital food. Complains of pain in RLE, PRN IV dilaudid given x1. Wounds on bilateral LE, dressing on RLE with small to moderate amount of drainage, RLE with marked erythema, swollen, & hot to touch. Baseline intermittent neuropathy to bilateral lower extremities. IVF infusing. Continent, voiding bathroom. BS active, denies nausea. Umbilical hernia. Podiatry following, plan for IV abx course. Discharge pending.

## 2020-04-01 NOTE — PLAN OF CARE
[id number] Pt A/Ox.4 VSS on RA. Baseline intermittent neuropathy, on and off per pt. Wounds to Bilateral LE, Dressings to RLE with small drainage and marked erythema . Pt ambulates independently in room with bilateral boots. Pain rating 9/10 decreased with PRN IV Dilaudid and Oxycodone. Pt c/o of pain in IV site, IV in R arm removed, new IV placed in L arm per Anesthesia.  Podiatry following. Plan for IV abx course. Discharge pending.

## 2020-04-01 NOTE — PROGRESS NOTES
VSS, CMS intact. Up ind. Dressing changed. Pain managed with oxycodone. Reviewed AVS with patient. Discharge medications and instruction sheets given. A&OX4. Left floor via wheelchair.

## 2020-04-01 NOTE — PROGRESS NOTES
D/w Hospitalist this morning, traded texts with patient last evening. She would like to be discharged to home asap if able, could be PICC line for resolution cellulitis right leg, not intent of calcaneal osteo cure, anticipate 2 weeks. In pandemic situation, options limited. She would follow up with me in wound clinic. Could change to acetic acid moist to moist dressing changes BID R and L heels with edemawear to feet and legs to decrease interstial edema.

## 2020-04-01 NOTE — DISCHARGE INSTRUCTIONS
If an appointment was recommended for you please call ASAP to schedule.   DUE TO COVID-19 PANDEMIC, MANY CLINICS ARE TEMPORARILY NOT TAKING IN-PERSON APPOINTMENTS. The clinic may schedule you for a phone visit--please answer your phone. If you develop any symptoms or have any followup questions please call your primary care clinic. If it is an emergency, please dial 911.      It is very important to check in with your provider--during a phone or clinic visit, talk about your hospital stay.  Tell the clinic provider how you feel.  Talk about the medications listed on the discharge papers.  Your doctor will update records, make sure you are still doing OK, and decide if any tests or medication changes are needed.      NOTE:   Your doctor has ordered home care to help you after your hospital stay.  The staff will contact you to schedule your first visit.  This service will be provided by [facility].  If you have any question, or have not received a call within 48 hours of discharge, please call them at [phone] or [phone].  *please see homecare quality ratings for all homecares in your area at www.medicare.gov

## 2020-04-01 NOTE — PLAN OF CARE
VSS, baseline numbness BLE. Pain managed with oxycodone. Dressing changed as ordered. Up independently with boots on. A&OX4. Discharge home on oral antibiotic.

## 2020-04-05 LAB
BACTERIA SPEC CULT: NO GROWTH
BACTERIA SPEC CULT: NO GROWTH
Lab: NORMAL
SPECIMEN SOURCE: NORMAL
SPECIMEN SOURCE: NORMAL

## 2020-04-06 LAB
BACTERIA SPEC CULT: NO GROWTH
BACTERIA SPEC CULT: NO GROWTH
SPECIMEN SOURCE: NORMAL
SPECIMEN SOURCE: NORMAL

## 2020-04-10 ENCOUNTER — HOSPITAL ENCOUNTER (OUTPATIENT)
Dept: WOUND CARE | Facility: CLINIC | Age: 50
End: 2020-04-10
Attending: SURGERY
Payer: COMMERCIAL

## 2020-04-10 DIAGNOSIS — L03.115 CELLULITIS OF RIGHT HEEL: Primary | ICD-10-CM

## 2020-04-10 DIAGNOSIS — L89.623 PRESSURE ULCER OF LEFT HEEL, STAGE 3 (H): ICD-10-CM

## 2020-04-10 PROCEDURE — 99213 OFFICE O/P EST LOW 20 MIN: CPT | Mod: TEL | Performed by: SURGERY

## 2020-04-10 RX ORDER — PROPRANOLOL HYDROCHLORIDE 20 MG/1
TABLET ORAL
COMMUNITY
Start: 2020-03-24

## 2020-04-10 RX ORDER — ME-TETRAHYDROFOLATE/B12/HRB236 1-1-500 MG
1 CAPSULE ORAL DAILY
Qty: 90 CAPSULE | Refills: 4 | Status: SHIPPED | OUTPATIENT
Start: 2020-04-10 | End: 2020-07-21

## 2020-04-10 RX ORDER — DIOSMIN COMPLEX NO.1 630 MG
1 TABLET ORAL DAILY
Qty: 90 TABLET | Refills: 3 | Status: SHIPPED | OUTPATIENT
Start: 2020-04-10 | End: 2020-07-21

## 2020-04-10 NOTE — DISCHARGE INSTRUCTIONS
[facility]  [address] [address]    Call us at [phone] if you have any questions about your wounds, have redness or swelling around your wound, have a fever of 101 or greater or if you have any other problems or concerns. We answer the phone Monday through Friday 8 am to 4 pm, please leave a message as we check the voicemail frequently throughout the day.     Shalonda Anita      1970  [facility] Phone:[phone] Fax: [phone]    Supplements to aid in healin. 1 packet of Isaiah Supplement into your favorite beverage TWICE a day.You may purchase at [facility] outpatient pharmacy located in [facility].  2. Vitamin D3 10,000 iu per day.  3. Vasculera to help your veins -take one tablet a day. Purchase from [facility]  4. Rheumate to help heal your wound- take one tablet per day. Purchase from [facility]  Wound Dressing Change: Bilateral Heels  Cleanse wound with vinegar solution  Cover wound with vinegar solution damp hydrofera blue, apply edemawear and secure with roll gauze  Change dressing daily.          JUNAID Travis M.D.. April 10, 2020  Appointments for you to make:  Wound Healing Clinic  Follow up with Provider - ***  [phone]

## 2020-04-10 NOTE — PROGRESS NOTES
"Shalonda Howard is a 50 year old female who is being evaluated via a billable telephone visit.      The patient has been notified of following:     \"This telephone visit will be conducted via a call between you and your physician/provider. We have found that certain health care needs can be provided without the need for a physical exam.  This service lets us provide the care you need with a short phone conversation.  If a prescription is necessary we can send it directly to your pharmacy.  If lab work is needed we can place an order for that and you can then stop by our lab to have the test done at a later time.    If during the course of the call the physician/provider feels a telephone visit is not appropriate, you will not be charged for this service.\"     Physician has received verbal consent for a Telephone Visit from the patient? Yes    Shalonda Howard chronic right and left heel wounds, clinical suspicion for persistent osteomyelitis, has had maximal IV support, maximal hyperbaric oxygen treatments, recent 2-day hospitalization, for cellulitis of the right lower extremity, all symptoms have resolved, she denies fevers chills or sweats.  Pandemic conditions limit options.  Previous MRI reviewed and is concerning for persistence of right and left calcaneal osteomyelitis.  She is declined any consideration of lower extremity amputations.  Currently not an option given pandemic conditions.  No systemic symptoms.  He does have chronic anemia, utilizes oral iron.  Discussed additional options including micronutrients, phytonutrients, recommended Rheumate and Vasculera, [facility].  She is on Isaiah, vitamin D 10,000 units daily.  Chief Complaint   Patient presents with     WOUND CARE       I have reviewed and updated the patient's Past Medical History, Social History, Family History and Medication List.    ALLERGIES  Amoxicillin-pot clavulanate; Augmentin; Sulfa drugs; Demerol [meperidine]; " Erythromycin; Metformin; Codeine; and Penicillins     Photos reviewed and compared to previous photodocumentation, medical record reviewed, discussed with patient.    Assessment/Plan: Chronic right and left heel wounds, clinical suspicion for bilateral osteomyelitis, recent cellulitic episode requiring hospitalization right lower extremity  Stay on Keflex 250 mg p.o. 4 times daily for the next 6 weeks  Begin Rheumate and Vasculera 1 tablet daily.  Continue Isaiah 1 pack twice daily.  Vitamin D 10,000 units daily.  Acetic acid moistened Hydrofera Blue dressing changes once or twice a day, edema wear, appropriate offloading of heels as able.  Had previously performed bilateral total contact casting immediately prior to performance of hyperbaric oxygen, this was discontinued due to drying time for total contact cast and this interfered with scheduling of hyperbaric oxygen as you cannot perform hyperbaric oxygen within 24 hours of placement of a total contact cast.  With clinical suspicion of osteomyelitis of the calcanei and recent infection/colitis of the right lower extremity, total contact casting at this time would be clinically inappropriate.  We will follow-up with telehealth physician approximately 3 weeks      Phone call duration: 15 minutes    MCristobal Travis MD

## 2020-04-10 NOTE — PROGRESS NOTES
Patient arrived for wound care telephone visit. Certified Wound Care Nurse time spent evaluating patient record, completed a full evaluation and documented wound(s) & nida-wound skin; provided recommendation based on treatment plan. Applied dressing, reviewed discharge instructions, patient education, and discussed plan of care with appropriate medical team staff members and patient and/or family members.

## 2020-05-06 ENCOUNTER — HOSPITAL ENCOUNTER (OUTPATIENT)
Dept: WOUND CARE | Facility: CLINIC | Age: 50
End: 2020-05-06
Attending: SURGERY
Payer: COMMERCIAL

## 2020-05-06 DIAGNOSIS — L89.623 PRESSURE ULCER OF LEFT HEEL, STAGE 3 (H): ICD-10-CM

## 2020-05-06 DIAGNOSIS — L89.613 PRESSURE INJURY OF RIGHT HEEL, STAGE 3 (H): ICD-10-CM

## 2020-05-06 PROCEDURE — 99213 OFFICE O/P EST LOW 20 MIN: CPT | Mod: 95 | Performed by: SURGERY

## 2020-05-06 RX ORDER — CEFAZOLIN SODIUM 2 G/100ML
2 INJECTION, SOLUTION INTRAVENOUS
Status: CANCELLED | OUTPATIENT
Start: 2020-05-06

## 2020-05-06 RX ORDER — CEFAZOLIN SODIUM 1 G/3ML
1 INJECTION, POWDER, FOR SOLUTION INTRAMUSCULAR; INTRAVENOUS SEE ADMIN INSTRUCTIONS
Status: CANCELLED | OUTPATIENT
Start: 2020-05-06

## 2020-05-06 NOTE — DISCHARGE INSTRUCTIONS
[facility]  [address] [address]    Call us at [phone] if you have any questions about your wounds, have redness or swelling around your wound, have a fever of 101 or greater or if you have any other problems or concerns. We answer the phone Monday through Friday 8 am to 4 pm, please leave a message as we check the voicemail frequently throughout the day.     Shalonda oHward      1970    [facility] Phone:[phone] Fax: [phone]  Supplements to aid in healin packet of Isaiah Supplement into your favorite beverage TWICE a day.  Vitamin D3 10,000 iu per day.  Vasculera to help your veins -take one tablet a day. Purchase from [facility]  Rheumate to help heal your wound- take one tablet per day. Purchase from [facility]  Wound Dressing Change: Bilateral Heels  Cleanse wound with vinegar solution  Cover wound with dilute vinegar solution on gauze, apply edemawear and  secure with roll gauze  Change dressing daily.       JUNAID Travis M.D.. May 6, 2020    Follow up with Provider - Plan for OR when able

## 2020-05-06 NOTE — PROGRESS NOTES
Patient arrived for wound care video visit. Certified Wound Care Nurse time spent evaluating patient record, completed a full evaluation and documented wound(s) & nida-wound skin; provided recommendation based on treatment plan. Applied dressing, reviewed discharge instructions, patient education, and discussed plan of care with appropriate medical team staff members and patient and/or family members.

## 2020-05-06 NOTE — PROGRESS NOTES
"Shalonda Howard is a 50 year old year old female who is being evaluated via a billable video visit.      The patient has been notified of following:     \"This video visit will be conducted via a call between you and your physician/provider. We have found that certain health care needs can be provided without the need for an in-person physical exam.  This service lets us provide the care you need with a video conversation.  If a prescription is necessary we can send it directly to your pharmacy.  If lab work is needed we can place an order for that and you can then stop by our lab to have the test done at a later time.    Video visits are billed at different rates depending on your insurance coverage.  Please reach out to your insurance provider with any questions.    If during the course of the call the physician/provider feels a video visit is not appropriate, you will not be charged for this service.\"    Patient has given verbal consent for Video visit? Yes    How would you like to obtain your AVS? Mail a copy    Patient would like the video invitation sent by: Text to cell phone: [phone]     Will anyone else be joining your video visit? No  {    Video-Visit Details    Type of service:  Video Visit    Video Start Time: 13:06  Video End Time: 13:21    Originating Location (pt. Location): Home    Distant Location (provider location):  Wound Clinic    Mode of Communication:  Video Conference via Doximity    ALLERGIES  Amoxicillin-pot clavulanate; Augmentin; Sulfa drugs; Demerol [meperidine]; Erythromycin; Metformin; Codeine; and Penicillins       Additional provider notes: Photographs reviewed, discussed with patient, she denies fevers chills or sweats.  We will plan to proceed with surgical management, right foot wound debridement and application of myriad, intraoperative total contact casting.  We have previously discussed the potential that osteomyelitis of the right heel persists given MRI changes.  Remains on " antibiotics at this time through May 22, Keflex.  Acetic acid moist to moist gauze dressing changes daily, she is on Vasculera, Isaiah, Rheumate.  Anesthetic will be sedation with local infiltration of lidocaine, preoperative consultation, discussed pandemic guidelines and necessity of checking for COVID 19 prior to surgical management.  She will utilize her knee  while in a total contact cast on her right foot planned sequential left foot management in similar fashion when right heel is healed.  She understands high risk nature and that this may ultimately not result in complete wound healing.    Assessment/Plan:  Chronic right and left heel ulcerations, neuropathy, nondiabetic    Angy MD

## 2020-05-07 ENCOUNTER — TELEPHONE (OUTPATIENT)
Dept: WOUND CARE | Facility: CLINIC | Age: 50
End: 2020-05-07

## 2020-05-13 DIAGNOSIS — T14.8XXA WOUND INFECTION: Primary | ICD-10-CM

## 2020-05-13 DIAGNOSIS — L08.9 WOUND INFECTION: Primary | ICD-10-CM

## 2020-06-02 ENCOUNTER — TELEPHONE (OUTPATIENT)
Dept: WOUND CARE | Facility: CLINIC | Age: 50
End: 2020-06-02

## 2020-06-02 RX ORDER — CEFAZOLIN SODIUM 1 G/3ML
1 INJECTION, POWDER, FOR SOLUTION INTRAMUSCULAR; INTRAVENOUS SEE ADMIN INSTRUCTIONS
Status: CANCELLED | OUTPATIENT
Start: 2020-06-02

## 2020-06-02 RX ORDER — CEFAZOLIN SODIUM 2 G/100ML
2 INJECTION, SOLUTION INTRAVENOUS
Status: CANCELLED | OUTPATIENT
Start: 2020-06-02

## 2020-06-02 NOTE — TELEPHONE ENCOUNTER
Spoke with Genoveva from home care who wanted to giean update on her wounds.  She states Shalonda is having increased pain in her left inner heel area.  No changes in redness, swelling, or drainage.  Also wants to report that Shalonda has a reaction to the Vasculera she was prescribed. She experiences a rash and swelling that ended when she stopped taking the medication.  She has had reactions to Vitamin C before which she said is a component in Vasculera. Advised that Shalonda is to call the  to get on Dr. Travis's calendar for surgery, given phone number to call.  Understanding expressed, no further questions at this time.

## 2020-06-09 ENCOUNTER — MEDICAL CORRESPONDENCE (OUTPATIENT)
Dept: HEALTH INFORMATION MANAGEMENT | Facility: CLINIC | Age: 50
End: 2020-06-09

## 2020-06-16 ENCOUNTER — TELEPHONE (OUTPATIENT)
Dept: WOUND CARE | Facility: CLINIC | Age: 50
End: 2020-06-16

## 2020-06-16 NOTE — TELEPHONE ENCOUNTER
Nara the home care nurse for Shalonda has a wound change that she would like to discuss with a nurse.

## 2020-06-16 NOTE — TELEPHONE ENCOUNTER
LVM for Nara the nurse to let her know we need to see her in clinic and I noted there are no appointments for her to see us. Need to get an appointment. If she is having pain and there are concerns for infection she will need to go to the nearest emergency room.   Nurse called back and she says that on one heel there is bone showing. There is no appointment noted. She is going to send a picture of the heel.   LVM for patient to schedule an appointment and if in the meantime she is having s/s of infection she should go to the Emergency room.      Spoke to care team, showed the left heel pictures to the MD. Antibiotic request denied twice today and faxed back to pharmacy.

## 2020-07-01 ENCOUNTER — TELEPHONE (OUTPATIENT)
Dept: WOUND CARE | Facility: CLINIC | Age: 50
End: 2020-07-01

## 2020-07-01 NOTE — TELEPHONE ENCOUNTER
[facility]    Who is the name of the provider?:  Angy      What is the location you see this provider at?: [place]    Reason for call:  Wound update    Can we leave a detailed message on this number?  YES

## 2020-07-01 NOTE — TELEPHONE ENCOUNTER
Spoke with home care nurse Nara.  States Shalonda did not take antibiotics as ordered and just finished them in the last few days.  Drainage is increased, needing to use blue chux to contain drainage in her boots.  Nara is asking of Shalonda whould be restarted on antibiotics until her visit with Dr. Travis on 7/8/2020.  Spoke with Dr. Travis who recommended painting wounds with Betadine twice a day until her next visit with him.  Called Nara, home care nurse, left these instructions. Requested she call back to confirm.

## 2020-07-09 NOTE — PROVIDER NOTIFICATION
MD Notification    Notified Person: MD Heredia    Notified Person Name:    Notification Date/Time: 11/25/18 0758    Notification Interaction: text page    Purpose of Notification: Good morning-FYI patient's potassium level is 3.2 this morning-down from yesterday's 3.5. There is not a potassium replacement available for PRN's. Thank you     Orders Received: Awaiting call back    Comments:       Cimetidine Counseling:  I discussed with the patient the risks of Cimetidine including but not limited to gynecomastia, headache, diarrhea, nausea, drowsiness, arrhythmias, pancreatitis, skin rashes, psychosis, bone marrow suppression and kidney toxicity.

## 2020-07-13 ENCOUNTER — TELEPHONE (OUTPATIENT)
Dept: WOUND CARE | Facility: CLINIC | Age: 50
End: 2020-07-13

## 2020-07-13 NOTE — TELEPHONE ENCOUNTER
Received notification that [facility] is unable to reach patient for follow up of bilateral AFO's that were ordered. Will address with patient at next office visit.

## 2020-07-21 ENCOUNTER — HOSPITAL ENCOUNTER (OUTPATIENT)
Dept: WOUND CARE | Facility: CLINIC | Age: 50
Discharge: HOME | End: 2020-07-21
Attending: SURGERY | Admitting: SURGERY
Payer: COMMERCIAL

## 2020-07-21 VITALS — TEMPERATURE: 97.6 F | SYSTOLIC BLOOD PRESSURE: 156 MMHG | DIASTOLIC BLOOD PRESSURE: 83 MMHG | HEART RATE: 92 BPM

## 2020-07-21 DIAGNOSIS — L89.623 PRESSURE ULCER OF LEFT HEEL, STAGE 3 (H): ICD-10-CM

## 2020-07-21 DIAGNOSIS — L89.614 PRESSURE INJURY OF RIGHT HEEL, STAGE 4 (H): ICD-10-CM

## 2020-07-21 DIAGNOSIS — L89.613 PRESSURE INJURY OF RIGHT HEEL, STAGE 3 (H): ICD-10-CM

## 2020-07-21 PROBLEM — L97.409 HEEL ULCER (H): Status: ACTIVE | Noted: 2020-07-21

## 2020-07-21 PROBLEM — F33.1 MAJOR DEPRESSIVE DISORDER, RECURRENT, MODERATE (H): Status: ACTIVE | Noted: 2017-08-14

## 2020-07-21 PROBLEM — L02.211 CUTANEOUS ABSCESS OF ABDOMINAL WALL: Status: ACTIVE | Noted: 2018-02-09

## 2020-07-21 PROBLEM — K43.0 RECURRENT VENTRAL HERNIA WITH INCARCERATION: Status: ACTIVE | Noted: 2018-01-29

## 2020-07-21 PROBLEM — D50.9 IRON DEFICIENCY ANEMIA: Status: ACTIVE | Noted: 2019-10-31

## 2020-07-21 PROBLEM — E78.2 MIXED HYPERLIPIDEMIA: Status: ACTIVE | Noted: 2018-08-23

## 2020-07-21 PROBLEM — M62.08 RECTUS DIASTASIS: Status: ACTIVE | Noted: 2018-01-29

## 2020-07-21 PROBLEM — L97.529 FOOT ULCER, LEFT (H): Status: ACTIVE | Noted: 2020-07-21

## 2020-07-21 PROBLEM — K66.0 INTRA-ABDOMINAL ADHESIONS: Status: ACTIVE | Noted: 2018-01-29

## 2020-07-21 PROBLEM — M86.379: Status: ACTIVE | Noted: 2019-12-30

## 2020-07-21 PROBLEM — L97.519 RIGHT FOOT ULCER (H): Status: ACTIVE | Noted: 2020-07-21

## 2020-07-21 PROBLEM — M86.8X7: Status: ACTIVE | Noted: 2019-05-22

## 2020-07-21 PROBLEM — M86.171 OSTEOMYELITIS OF ANKLE OR FOOT, RIGHT, ACUTE (H): Status: ACTIVE | Noted: 2019-10-31

## 2020-07-21 PROCEDURE — 97602 WOUND(S) CARE NON-SELECTIVE: CPT

## 2020-07-21 PROCEDURE — 99213 OFFICE O/P EST LOW 20 MIN: CPT | Performed by: SURGERY

## 2020-07-21 RX ORDER — MORPHINE SULFATE 30 MG/1
TABLET ORAL
COMMUNITY
Start: 2020-07-09

## 2020-07-21 NOTE — PROGRESS NOTES
[facility] Progress Note    Subject: Shalonda Howard, bilateral heel ulcerations, returns for evaluation, status post 30 dives at [facility] wound [facility], completed early March 2020.  She underwent a MRI of the right foot within 10 days after completion of dives, this demonstrated osteomyelitis though changes can certainly lag after completion of hyperbaric.  Denies fevers chills or sweats.    Patient Active Problem List   Diagnosis     Infection     Left foot Postop Wound infection     Post op infection     Systemic lupus erythematosus (H)     Osteomyelitis Left 1st metatarsal -- S/P Amputation     Cellulitis of right foot     Pressure injury of right heel, stage 4 (H)     Pressure ulcer of left heel, stage 3 (H)     Open wound of third toe of left foot     Open wound of heel     Open toe wound     Osteomyelitis (H)     Cellulitis of right heel     Adverse effect of iron or its compound, sequela     Allergic rhinitis     Anxiety     Dysthymia     Edema     Esophageal reflux     Fibromyalgia     Intra-abdominal adhesions     Iron deficiency anemia     Major depressive disorder, recurrent, moderate (H)     Methadone maintenance therapy patient (H)     Mixed hyperlipidemia     Non healing left heel wound     Osteoporosis of disuse     Other acne     Pain medication agreement     Peripheral neuropathy     Pes planovalgus     Polycystic ovaries     Cutaneous abscess of abdominal wall     Rectus diastasis     Recurrent ventral hernia with incarceration     Ventral hernia without obstruction or gangrene     Tibia fracture     Tooth pain     Obesity     Chronic multifocal osteomyelitis of foot (H)     Foot deformity, bilateral     Foot ulcer, left (H)     Heel ulcer (H)     Osteomyelitis of ankle or foot, right, acute (H)     Other osteomyelitis, ankle and foot (H)     Right foot ulcer (H)     Right foot infection     Tibialis tendinitis     Past Medical History:   Diagnosis Date     Allergic rhinitis,  cause unspecified      Anxiety state, unspecified      Cellulitis and abscess of leg, except foot      Disuse osteoporosis      Dysthymic disorder      Edema      Encounter for long-term (current) use of other medications      Esophageal reflux      Foot ulcer on Heel bilaterally  5/22/2019     Kidney stones      Myalgia and myositis, unspecified      Obesity, unspecified      Open wound of foot except toe(s) alone, complicated      Opioid type dependence, unspecified      Other acne      Other congenital valgus deformity of feet      Other ventral hernia without mention of obstruction or gangrene      Polycystic ovaries      PONV (postoperative nausea and vomiting)      Systemic lupus erythematosus (H)     unsure     Tibialis tendinitis      Unspecified hereditary and idiopathic peripheral neuropathy      Exam:  BP (!) 156/83   Pulse 92   Temp 97.6  F (36.4  C) (Temporal)   Wound (used by OP [facility] only) 07/29/19 0813 Left heel pressure injury (Active)   Length (cm) 8 07/21/20 1308   Width (cm) 7.5 07/21/20 1308   Depth (cm) 1.4 07/21/20 1308   Wound (cm^2) 60 cm^2 07/21/20 1308   Wound Volume (cm^3) 84 cm^3 07/21/20 1308   Wound healing % -265.85 07/21/20 1308   Dressing Appearance moist drainage 07/21/20 1308   Drainage Characteristics/Odor serosanguineous 07/21/20 1308   Drainage Amount copious 07/21/20 1308   Thickness/Stage Stage 4 07/21/20 1308   Base red/granulating 07/21/20 1308   Periwound intact 07/21/20 1308   Periwound Temperature warm 07/21/20 1308   Periwound Skin Turgor soft 07/21/20 1308   Edges open 07/21/20 1308   Care, Wound non-select wound debridement performed 07/21/20 1308       Wound (used by OP [facility] only) 07/29/19 0814 Right heel pressure injury (Active)   Length (cm) 7.7 07/21/20 1307   Width (cm) 7.7 07/21/20 1307   Depth (cm) 2.1 07/21/20 1307   Wound (cm^2) 59.29 cm^2 07/21/20 1307   Wound Volume (cm^3) 124.51 cm^3 07/21/20 1307   Wound healing % -358.9 07/21/20 1307   Dressing Appearance  moist drainage 07/21/20 1307   Drainage Characteristics/Odor serosanguineous 07/21/20 1307   Drainage Amount copious 07/21/20 1307   Thickness/Stage Stage 4 07/21/20 1307   Base red/granulating 07/21/20 1307   Periwound intact 07/21/20 1307   Periwound Temperature warm 07/21/20 1307   Periwound Skin Turgor soft 07/21/20 1307   Edges open 07/21/20 1307   Care, Wound non-select wound debridement performed 07/21/20 1307       Wound (used by OP [facility] only) 03/10/20 1232 Left 5 toe (Active)   Length (cm) 2 07/21/20 1308   Width (cm) 1.6 07/21/20 1308   Depth (cm) 0.1 07/21/20 1308   Wound (cm^2) 3.2 cm^2 07/21/20 1308   Wound Volume (cm^3) 0.32 cm^3 07/21/20 1308   Wound healing % -1233.33 07/21/20 1308   Dressing Appearance moist drainage 07/21/20 1308   Drainage Characteristics/Odor serosanguineous 07/21/20 1308   Drainage Amount moderate 07/21/20 1308   Thickness/Stage Stage 3 07/21/20 1308   Base red/granulating 07/21/20 1308   Periwound intact 07/21/20 1308   Periwound Temperature warm 07/21/20 1308   Periwound Skin Turgor soft 07/21/20 1308   Edges open 07/21/20 1308   Care, Wound non-select wound debridement performed 07/21/20 1308     50-year-old female, palpable right left dorsalis pedis pulse, wound probed to bone on the right heel, cannot probe to bone on the left.  See photodocumentation and wound measurements.  No foul odor.      Impression: Chronic bilateral heel wounds, probes to bone on right    Plan: Obtain MRI right and left heels, evaluate for osteomyelitis.  Persistent osteomyelitis on the right, given that she is already completed 30 dives, could discuss with podiatry about debridement with VAC vera flow as an option though depends on extent of calcaneal involvement.  Follow-up with patient via video visit upon completion of MRI to discuss.  If it appears improved on the right calcaneus, could consider vera flow with intent to skin grafting, would anticipate a 7 to 14-day hospitalization.   Micronutrient adjective therapy to include vitamin D 10,000 units daily, vitamin C 2000 units daily.  It appears that she had a Vasculera allergy based on timing and cessation of erythema and skin itching upon completion discontinuing Vasculera.  Will list as allergy.  Patient will return to the clinic in 2 weeks time for video follow-up of MRI results.    Juan Antonio Travis MD on 7/21/2020 at 1:48 PM

## 2020-07-21 NOTE — DISCHARGE INSTRUCTIONS
[facility]  [address] [address]    Call us at [phone] if you have any questions about your wounds, have redness or swelling around your wound, have a fever of 101 or greater or if you have any other problems or concerns. We answer the phone Monday through Friday 8 am to 4 pm, please leave a message as we check the voicemail frequently throughout the day.     Shalonda Howard      1970    Medications/supplements to aid in healin. 1 packet of Isaiah Supplement into your favorite beverage TWICE a day  purchase [facility] in [address]  2. Vitamin D3 10,000 iu per day.  3. Vitamin C 2,000 mg daily  4. Folate 400 mg daily   5. Vitamin B 12 1000 mcg daily    Wound Dressing Change:Bilateral Heels  Cleanse wound with dilute vinegar solutions  Cover wound with amd gauze fluffs  Change dressing twice daily  Compression:  Apply clean compression Edemawear first thing in the morning and remove at night before going to bed.   Remove compression dressing if toes turn blue and/or start to feel numb and is not relieved by elevating the leg for one hour.      JUNAID Travis M.D.. 2020

## 2020-08-04 ENCOUNTER — TELEPHONE (OUTPATIENT)
Dept: WOUND CARE | Facility: CLINIC | Age: 50
End: 2020-08-04

## 2020-08-04 NOTE — TELEPHONE ENCOUNTER
Spoke to Shalonda she will set up the MRI and call me back to reschedule the telephone visit with Dr. Travis.

## 2020-08-04 NOTE — TELEPHONE ENCOUNTER
Patient was scheduled today for a telehealth visit to go over MRI results. MRI has not been completed yet. She will need to be rescheduled with Dr. Travis for a telehealth visit after MRI.

## 2020-08-11 ENCOUNTER — TELEPHONE (OUTPATIENT)
Dept: WOUND CARE | Facility: CLINIC | Age: 50
End: 2020-08-11

## 2020-08-11 NOTE — TELEPHONE ENCOUNTER
HC nurse Nara concerned about exposed bone on Left 2nd toe and also has increased drainage to both heel wounds. Toe wound photos included below.     She is getting MRI tomorrow 8/12/2020 @ 6pm. After completed patient needs to see Dr. Travis in clinic to go over results. She is not currently scheduled.

## 2020-08-12 ENCOUNTER — HOSPITAL ENCOUNTER (OUTPATIENT)
Dept: MRI IMAGING | Facility: CLINIC | Age: 50
End: 2020-08-12
Attending: SURGERY
Payer: COMMERCIAL

## 2020-08-12 DIAGNOSIS — L89.623 PRESSURE ULCER OF LEFT HEEL, STAGE 3 (H): ICD-10-CM

## 2020-08-12 DIAGNOSIS — L89.613 PRESSURE INJURY OF RIGHT HEEL, STAGE 3 (H): ICD-10-CM

## 2020-08-12 PROCEDURE — 73723 MRI JOINT LWR EXTR W/O&W/DYE: CPT | Mod: 50

## 2020-08-12 PROCEDURE — 25500064 ZZH RX 255 OP 636: Performed by: SURGERY

## 2020-08-12 PROCEDURE — A9585 GADOBUTROL INJECTION: HCPCS | Performed by: SURGERY

## 2020-08-12 RX ORDER — GADOBUTROL 604.72 MG/ML
10 INJECTION INTRAVENOUS ONCE
Status: COMPLETED | OUTPATIENT
Start: 2020-08-12 | End: 2020-08-12

## 2020-08-12 RX ADMIN — GADOBUTROL 10 ML: 604.72 INJECTION INTRAVENOUS at 19:00

## 2020-08-13 ENCOUNTER — HOSPITAL ENCOUNTER (OUTPATIENT)
Dept: WOUND CARE | Facility: CLINIC | Age: 50
End: 2020-08-13
Attending: SURGERY
Payer: COMMERCIAL

## 2020-08-13 DIAGNOSIS — L89.623 PRESSURE ULCER OF LEFT HEEL, STAGE 3 (H): ICD-10-CM

## 2020-08-13 DIAGNOSIS — L89.614 PRESSURE INJURY OF RIGHT HEEL, STAGE 4 (H): ICD-10-CM

## 2020-08-14 NOTE — PROGRESS NOTES
Patients MRI results were not yet back when telephone appointment.Attempted to reach patient later in the day. Patient did not answer the phone. Will need to reschedule for Monday

## 2020-08-17 ENCOUNTER — HOSPITAL ENCOUNTER (OUTPATIENT)
Dept: WOUND CARE | Facility: CLINIC | Age: 50
End: 2020-08-17
Attending: SURGERY
Payer: COMMERCIAL

## 2020-08-17 DIAGNOSIS — L89.623 PRESSURE ULCER OF LEFT HEEL, STAGE 3 (H): ICD-10-CM

## 2020-08-17 DIAGNOSIS — L89.614 PRESSURE INJURY OF RIGHT HEEL, STAGE 4 (H): ICD-10-CM

## 2020-08-17 PROCEDURE — 99213 OFFICE O/P EST LOW 20 MIN: CPT | Mod: 95 | Performed by: SURGERY

## 2020-08-17 NOTE — PROGRESS NOTES
"Shalonda oHward is a 50 year old female who is being evaluated via a billable telephone visit.      The patient has been notified of following:     \"This telephone visit will be conducted via a call between you and your physician/provider. We have found that certain health care needs can be provided without the need for a physical exam.  This service lets us provide the care you need with a short phone conversation.  If a prescription is necessary we can send it directly to your pharmacy.  If lab work is needed we can place an order for that and you can then stop by our lab to have the test done at a later time.    If during the course of the call the physician/provider feels a telephone visit is not appropriate, you will not be charged for this service.\"     Physician has received verbal consent for a Telephone Visit from the patient? Yes    Shalonda Howard bilateral heel ulcerations, discussed results of MRI of the right and left ankle, progressive calcaneal osteomyelitis bilaterally with disassociation of Achilles tendon noted.  She has had progression despite maximal medical cares including hyperbaric oxygen and IV antibiotics and maximal localized wound cares.  I would recommend evaluation for bilateral below-knee amputation, she has seen Dr. Mosher, [facility] orthopedic in the past as well as Dr. Freitas.  Also recommend that she meet with an orthotist prior to surgical management.  She may benefit from a 1 to 2-day hospitalization prior to performance of amputation is to decrease interstitial edema.  Chief Complaint   Patient presents with     WOUND CARE       I have reviewed and updated the patient's Past Medical History, Social History, Family History and Medication List.    ALLERGIES  Amoxicillin-pot clavulanate; Augmentin; Sulfa drugs; Demerol [meperidine]; Erythromycin; Metformin; Codeine; and Penicillins       Additional provider notes: Reviewed and discussed results of MRI with patient, 14-minute phone " call    Assessment/Plan:  Progressive osteomyelitis bilateral calcaneus, recommendation of right and left lower extremity below-knee amputation     Consultation with orthopedic surgery for bilateral below-knee amputation and preop evaluation with orthotist to discuss lifestyle modifications and long-term implications.  Given the significance of her bilateral lower extremity interstitial edema, surgical outcomes may be improved by a 1 to 2-day hospitalization for leg elevation preoperatively.    Phone call duration: 14 minutes    Angy VILLAREAL

## 2020-08-17 NOTE — DISCHARGE INSTRUCTIONS
[facility]  [address] [address]    Call us at [phone] if you have any questions about your wounds, have redness or swelling around your wound, have a fever of 101 or greater or if you have any other problems or concerns. We answer the phone Monday through Friday 8 am to 4 pm, please leave a message as we check the voicemail frequently throughout the day.     Shalonda Cooperd      1970  [facility] Phone:[phone] Fax: [phone]    Medications/supplements to aid in healin. 1 packet of Isaiah Supplement into your favorite beverage twice a day. Purchase at [facility] in [address]  2. Vitamin D3 10,000 iu per day.  3. Vitamin C 2,000 mg daily  4. Folate 400 mcg daily   5. Vitamin B 12 1000 mcg daily    Wound Dressing Change:Bilateral Heels  Cleanse wound with dilute vinegar solutions  Cover wound with hydrofera blue to wound   Change dressing twice daily  Compression: Apply clean compression Edemawear first thing in the morning and  remove at night before going to bed.  Remove compression dressing if toes turn blue and/or start to feel numb and is not  relieved by elevating the leg for one hour.     JUNAID Travis M.D.. 2020    Wound Clinc follow up Dr. Mosher at [facility] [phone] to discuss Below The Knee Amputations.

## 2020-08-19 ENCOUNTER — TELEPHONE (OUTPATIENT)
Dept: WOUND CARE | Facility: CLINIC | Age: 50
End: 2020-08-19

## 2020-08-19 NOTE — TELEPHONE ENCOUNTER
Genoveva reports there is 2cm of bone visible. After reviewing chart noted she has been referred for a double amputation to due nonhealing wounds and chronic osteomyelitis. I let her know that and that the plan is palliative at this point. She expressed understanding.

## 2020-09-03 ENCOUNTER — TELEPHONE (OUTPATIENT)
Dept: WOUND CARE | Facility: CLINIC | Age: 50
End: 2020-09-03

## 2020-09-03 NOTE — TELEPHONE ENCOUNTER
Chantelle from [facility] wants to know if Shalonda can see there Wound doctor.   She asked us to call her manager Brisa back at .

## 2020-09-03 NOTE — TELEPHONE ENCOUNTER
LVM with Brisa Manager of [facility] that it would be OK for pt to see their Wound MD.     Pt has osteo and BKA was recommended.

## 2020-10-05 PROBLEM — T45.4X5S ADVERSE EFFECT OF IRON AND ITS COMPOUNDS, SEQUELA: Status: ACTIVE | Noted: 2019-11-13

## 2020-12-16 ENCOUNTER — HOME INFUSION (PRE-WILLOW HOME INFUSION) (OUTPATIENT)
Dept: PHARMACY | Facility: CLINIC | Age: 50
End: 2020-12-16

## 2020-12-17 ENCOUNTER — HOME INFUSION (PRE-WILLOW HOME INFUSION) (OUTPATIENT)
Dept: PHARMACY | Facility: CLINIC | Age: 50
End: 2020-12-17

## 2020-12-18 NOTE — PROGRESS NOTES
This is a recent snapshot of the patient's [facility] medical record.  For current drug dose and complete information and questions, call [phone] or In Basket pool, fv home infusion ([id number])  CSN Number:  [id number]

## 2020-12-18 NOTE — PROGRESS NOTES
This is a recent snapshot of the patient's [facility] medical record.  For current drug dose and complete information and questions, call [phone] or In Basket pool, fv home infusion ([id number])  CSN Number:  [id number]

## 2020-12-21 ENCOUNTER — HOME INFUSION (PRE-WILLOW HOME INFUSION) (OUTPATIENT)
Dept: PHARMACY | Facility: CLINIC | Age: 50
End: 2020-12-21

## 2020-12-21 ENCOUNTER — MEDICAL CORRESPONDENCE (OUTPATIENT)
Dept: HEALTH INFORMATION MANAGEMENT | Facility: CLINIC | Age: 50
End: 2020-12-21

## 2020-12-21 LAB
BASOPHILS # BLD AUTO: 0.1 10E9/L (ref 0–0.2)
BASOPHILS NFR BLD AUTO: 0.6 %
BILIRUB DIRECT SERPL-MCNC: 0.1 MG/DL (ref 0–0.2)
DIFFERENTIAL METHOD BLD: NORMAL
EOSINOPHIL # BLD AUTO: 0.4 10E9/L (ref 0–0.7)
EOSINOPHIL NFR BLD AUTO: 4.6 %
ERYTHROCYTE [DISTWIDTH] IN BLOOD BY AUTOMATED COUNT: 13.4 % (ref 10–15)
HCT VFR BLD AUTO: 39.7 % (ref 35–47)
HGB BLD-MCNC: 12.6 G/DL (ref 11.7–15.7)
IMM GRANULOCYTES # BLD: 0.1 10E9/L (ref 0–0.4)
IMM GRANULOCYTES NFR BLD: 0.9 %
LYMPHOCYTES # BLD AUTO: 2.8 10E9/L (ref 0.8–5.3)
LYMPHOCYTES NFR BLD AUTO: 34.1 %
MAGNESIUM SERPL-MCNC: 2.1 MG/DL (ref 1.6–2.3)
MCH RBC QN AUTO: 27.8 PG (ref 26.5–33)
MCHC RBC AUTO-ENTMCNC: 31.7 G/DL (ref 31.5–36.5)
MCV RBC AUTO: 88 FL (ref 78–100)
MONOCYTES # BLD AUTO: 0.8 10E9/L (ref 0–1.3)
MONOCYTES NFR BLD AUTO: 9.2 %
NEUTROPHILS # BLD AUTO: 4.2 10E9/L (ref 1.6–8.3)
NEUTROPHILS NFR BLD AUTO: 50.6 %
NRBC # BLD AUTO: 0 10*3/UL
NRBC BLD AUTO-RTO: 0 /100
PHOSPHATE SERPL-MCNC: 3.9 MG/DL (ref 2.5–4.5)
PLATELET # BLD AUTO: NORMAL 10E9/L (ref 150–450)
RBC # BLD AUTO: 4.53 10E12/L (ref 3.8–5.2)
TRIGL SERPL-MCNC: 154 MG/DL
WBC # BLD AUTO: 8.2 10E9/L (ref 4–11)

## 2020-12-21 PROCEDURE — 82248 BILIRUBIN DIRECT: CPT

## 2020-12-21 PROCEDURE — 85025 COMPLETE CBC W/AUTO DIFF WBC: CPT

## 2020-12-21 PROCEDURE — 84100 ASSAY OF PHOSPHORUS: CPT

## 2020-12-21 PROCEDURE — 83735 ASSAY OF MAGNESIUM: CPT

## 2020-12-21 PROCEDURE — 84478 ASSAY OF TRIGLYCERIDES: CPT

## 2020-12-21 PROCEDURE — 80053 COMPREHEN METABOLIC PANEL: CPT

## 2020-12-22 LAB
ALBUMIN SERPL-MCNC: 2.7 G/DL (ref 3.4–5)
ALP SERPL-CCNC: 708 U/L (ref 40–150)
ALT SERPL W P-5'-P-CCNC: 178 U/L (ref 0–50)
ANION GAP SERPL CALCULATED.3IONS-SCNC: 6 MMOL/L (ref 3–14)
AST SERPL W P-5'-P-CCNC: 127 U/L (ref 0–45)
BILIRUB SERPL-MCNC: 0.3 MG/DL (ref 0.2–1.3)
BUN SERPL-MCNC: 39 MG/DL (ref 7–30)
CALCIUM SERPL-MCNC: 9.3 MG/DL (ref 8.5–10.1)
CHLORIDE SERPL-SCNC: 102 MMOL/L (ref 94–109)
CO2 SERPL-SCNC: 28 MMOL/L (ref 20–32)
CREAT SERPL-MCNC: 0.56 MG/DL (ref 0.52–1.04)
GFR SERPL CREATININE-BSD FRML MDRD: >90 ML/MIN/{1.73_M2}
GLUCOSE SERPL-MCNC: 83 MG/DL (ref 70–99)
POTASSIUM SERPL-SCNC: 4.6 MMOL/L (ref 3.4–5.3)
PROT SERPL-MCNC: 8.4 G/DL (ref 6.8–8.8)
SODIUM SERPL-SCNC: 137 MMOL/L (ref 133–144)

## 2020-12-24 ENCOUNTER — HOME INFUSION (PRE-WILLOW HOME INFUSION) (OUTPATIENT)
Dept: PHARMACY | Facility: CLINIC | Age: 50
End: 2020-12-24

## 2020-12-24 NOTE — PROGRESS NOTES
This is a recent snapshot of the patient's [facility] medical record.  For current drug dose and complete information and questions, call [phone] or In Basket pool, fv home infusion ([id number])  CSN Number:  [id number]

## 2020-12-28 ENCOUNTER — MEDICAL CORRESPONDENCE (OUTPATIENT)
Dept: HEALTH INFORMATION MANAGEMENT | Facility: CLINIC | Age: 50
End: 2020-12-28

## 2020-12-28 LAB
ALBUMIN SERPL-MCNC: 2.4 G/DL (ref 3.4–5)
ALP SERPL-CCNC: 631 U/L (ref 40–150)
ALT SERPL W P-5'-P-CCNC: 101 U/L (ref 0–50)
ANION GAP SERPL CALCULATED.3IONS-SCNC: 5 MMOL/L (ref 3–14)
AST SERPL W P-5'-P-CCNC: 70 U/L (ref 0–45)
BASOPHILS # BLD AUTO: 0 10E9/L (ref 0–0.2)
BASOPHILS NFR BLD AUTO: 0.6 %
BILIRUB DIRECT SERPL-MCNC: 0.2 MG/DL (ref 0–0.2)
BILIRUB SERPL-MCNC: 0.4 MG/DL (ref 0.2–1.3)
BUN SERPL-MCNC: 37 MG/DL (ref 7–30)
CALCIUM SERPL-MCNC: 9.5 MG/DL (ref 8.5–10.1)
CHLORIDE SERPL-SCNC: 104 MMOL/L (ref 94–109)
CO2 SERPL-SCNC: 30 MMOL/L (ref 20–32)
CREAT SERPL-MCNC: 0.61 MG/DL (ref 0.52–1.04)
DIFFERENTIAL METHOD BLD: ABNORMAL
EOSINOPHIL # BLD AUTO: 0.4 10E9/L (ref 0–0.7)
EOSINOPHIL NFR BLD AUTO: 5.7 %
ERYTHROCYTE [DISTWIDTH] IN BLOOD BY AUTOMATED COUNT: 13.2 % (ref 10–15)
GFR SERPL CREATININE-BSD FRML MDRD: >90 ML/MIN/{1.73_M2}
GLUCOSE SERPL-MCNC: 137 MG/DL (ref 70–99)
HCT VFR BLD AUTO: 41.4 % (ref 35–47)
HGB BLD-MCNC: 12.8 G/DL (ref 11.7–15.7)
IMM GRANULOCYTES # BLD: 0 10E9/L (ref 0–0.4)
IMM GRANULOCYTES NFR BLD: 0.1 %
LYMPHOCYTES # BLD AUTO: 2.3 10E9/L (ref 0.8–5.3)
LYMPHOCYTES NFR BLD AUTO: 31.9 %
MAGNESIUM SERPL-MCNC: 2.3 MG/DL (ref 1.6–2.3)
MCH RBC QN AUTO: 27.6 PG (ref 26.5–33)
MCHC RBC AUTO-ENTMCNC: 30.9 G/DL (ref 31.5–36.5)
MCV RBC AUTO: 89 FL (ref 78–100)
MONOCYTES # BLD AUTO: 0.7 10E9/L (ref 0–1.3)
MONOCYTES NFR BLD AUTO: 9.1 %
NEUTROPHILS # BLD AUTO: 3.8 10E9/L (ref 1.6–8.3)
NEUTROPHILS NFR BLD AUTO: 52.6 %
NRBC # BLD AUTO: 0 10*3/UL
NRBC BLD AUTO-RTO: 0 /100
PHOSPHATE SERPL-MCNC: 4.9 MG/DL (ref 2.5–4.5)
PLATELET # BLD AUTO: 255 10E9/L (ref 150–450)
POTASSIUM SERPL-SCNC: 4.4 MMOL/L (ref 3.4–5.3)
PROT SERPL-MCNC: 8 G/DL (ref 6.8–8.8)
RBC # BLD AUTO: 4.64 10E12/L (ref 3.8–5.2)
SODIUM SERPL-SCNC: 139 MMOL/L (ref 133–144)
TRIGL SERPL-MCNC: 168 MG/DL
WBC # BLD AUTO: 7.2 10E9/L (ref 4–11)

## 2020-12-28 PROCEDURE — 82248 BILIRUBIN DIRECT: CPT

## 2020-12-28 PROCEDURE — 84100 ASSAY OF PHOSPHORUS: CPT

## 2020-12-28 PROCEDURE — 85025 COMPLETE CBC W/AUTO DIFF WBC: CPT

## 2020-12-28 PROCEDURE — 83735 ASSAY OF MAGNESIUM: CPT

## 2020-12-28 PROCEDURE — 80053 COMPREHEN METABOLIC PANEL: CPT

## 2020-12-28 PROCEDURE — 84478 ASSAY OF TRIGLYCERIDES: CPT

## 2020-12-29 ENCOUNTER — HOME INFUSION (PRE-WILLOW HOME INFUSION) (OUTPATIENT)
Dept: PHARMACY | Facility: CLINIC | Age: 50
End: 2020-12-29

## 2020-12-29 NOTE — PROGRESS NOTES
This is a recent snapshot of the patient's [facility] medical record.  For current drug dose and complete information and questions, call [phone] or In Basket pool, fv home infusion ([id number])  CSN Number:  [id number]

## 2020-12-31 NOTE — PROGRESS NOTES
This is a recent snapshot of the patient's [facility] medical record.  For current drug dose and complete information and questions, call [phone] or In [facility] pool, fv home infusion ([id number])  CSN Number:  [id number]

## 2021-01-01 ENCOUNTER — APPOINTMENT (OUTPATIENT)
Dept: LAB | Facility: CLINIC | Age: 51
End: 2021-01-01
Attending: PHYSICIAN ASSISTANT
Payer: COMMERCIAL

## 2021-01-04 LAB
ALBUMIN SERPL-MCNC: 2.6 G/DL (ref 3.4–5)
ALP SERPL-CCNC: 501 U/L (ref 40–150)
ALT SERPL W P-5'-P-CCNC: 72 U/L (ref 0–50)
ANION GAP SERPL CALCULATED.3IONS-SCNC: 6 MMOL/L (ref 3–14)
AST SERPL W P-5'-P-CCNC: 57 U/L (ref 0–45)
BASOPHILS # BLD AUTO: 0 10E9/L (ref 0–0.2)
BASOPHILS NFR BLD AUTO: 0.5 %
BILIRUB DIRECT SERPL-MCNC: 0.1 MG/DL (ref 0–0.2)
BILIRUB SERPL-MCNC: 0.3 MG/DL (ref 0.2–1.3)
BUN SERPL-MCNC: 38 MG/DL (ref 7–30)
CALCIUM SERPL-MCNC: 9.4 MG/DL (ref 8.5–10.1)
CHLORIDE SERPL-SCNC: 103 MMOL/L (ref 94–109)
CO2 SERPL-SCNC: 30 MMOL/L (ref 20–32)
CREAT SERPL-MCNC: 0.6 MG/DL (ref 0.52–1.04)
CRP SERPL-MCNC: 9.8 MG/L (ref 0–8)
DIFFERENTIAL METHOD BLD: NORMAL
EOSINOPHIL # BLD AUTO: 0.4 10E9/L (ref 0–0.7)
EOSINOPHIL NFR BLD AUTO: 4.8 %
ERYTHROCYTE [DISTWIDTH] IN BLOOD BY AUTOMATED COUNT: 13.1 % (ref 10–15)
FERRITIN SERPL-MCNC: 85 NG/ML (ref 8–252)
GFR SERPL CREATININE-BSD FRML MDRD: >90 ML/MIN/{1.73_M2}
GLUCOSE SERPL-MCNC: 105 MG/DL (ref 70–99)
HCT VFR BLD AUTO: 41.7 % (ref 35–47)
HGB BLD-MCNC: 13.2 G/DL (ref 11.7–15.7)
IMM GRANULOCYTES # BLD: 0 10E9/L (ref 0–0.4)
IMM GRANULOCYTES NFR BLD: 0.3 %
LYMPHOCYTES # BLD AUTO: 2.4 10E9/L (ref 0.8–5.3)
LYMPHOCYTES NFR BLD AUTO: 32.7 %
MAGNESIUM SERPL-MCNC: 2.1 MG/DL (ref 1.6–2.3)
MCH RBC QN AUTO: 27.4 PG (ref 26.5–33)
MCHC RBC AUTO-ENTMCNC: 31.7 G/DL (ref 31.5–36.5)
MCV RBC AUTO: 87 FL (ref 78–100)
MONOCYTES # BLD AUTO: 0.6 10E9/L (ref 0–1.3)
MONOCYTES NFR BLD AUTO: 8.2 %
NEUTROPHILS # BLD AUTO: 3.9 10E9/L (ref 1.6–8.3)
NEUTROPHILS NFR BLD AUTO: 53.5 %
NRBC # BLD AUTO: 0 10*3/UL
NRBC BLD AUTO-RTO: 0 /100
PHOSPHATE SERPL-MCNC: 4.7 MG/DL (ref 2.5–4.5)
PLATELET # BLD AUTO: 303 10E9/L (ref 150–450)
POTASSIUM SERPL-SCNC: 4 MMOL/L (ref 3.4–5.3)
PROT SERPL-MCNC: 7.9 G/DL (ref 6.8–8.8)
RBC # BLD AUTO: 4.82 10E12/L (ref 3.8–5.2)
SODIUM SERPL-SCNC: 139 MMOL/L (ref 133–144)
TRIGL SERPL-MCNC: 141 MG/DL
WBC # BLD AUTO: 7.3 10E9/L (ref 4–11)

## 2021-01-04 PROCEDURE — 84478 ASSAY OF TRIGLYCERIDES: CPT | Performed by: PHYSICIAN ASSISTANT

## 2021-01-04 PROCEDURE — 84100 ASSAY OF PHOSPHORUS: CPT | Performed by: PHYSICIAN ASSISTANT

## 2021-01-04 PROCEDURE — 82525 ASSAY OF COPPER: CPT | Performed by: PHYSICIAN ASSISTANT

## 2021-01-04 PROCEDURE — 84630 ASSAY OF ZINC: CPT | Performed by: PHYSICIAN ASSISTANT

## 2021-01-04 PROCEDURE — 82728 ASSAY OF FERRITIN: CPT | Performed by: PHYSICIAN ASSISTANT

## 2021-01-04 PROCEDURE — 86140 C-REACTIVE PROTEIN: CPT | Performed by: PHYSICIAN ASSISTANT

## 2021-01-04 PROCEDURE — 80053 COMPREHEN METABOLIC PANEL: CPT | Performed by: PHYSICIAN ASSISTANT

## 2021-01-04 PROCEDURE — 83785 ASSAY OF MANGANESE: CPT | Performed by: PHYSICIAN ASSISTANT

## 2021-01-04 PROCEDURE — 83735 ASSAY OF MAGNESIUM: CPT | Performed by: PHYSICIAN ASSISTANT

## 2021-01-04 PROCEDURE — 82248 BILIRUBIN DIRECT: CPT | Performed by: PHYSICIAN ASSISTANT

## 2021-01-04 PROCEDURE — 84255 ASSAY OF SELENIUM: CPT | Performed by: PHYSICIAN ASSISTANT

## 2021-01-04 PROCEDURE — 85025 COMPLETE CBC W/AUTO DIFF WBC: CPT | Performed by: PHYSICIAN ASSISTANT

## 2021-01-06 ENCOUNTER — HOME INFUSION (PRE-WILLOW HOME INFUSION) (OUTPATIENT)
Dept: PHARMACY | Facility: CLINIC | Age: 51
End: 2021-01-06

## 2021-01-11 LAB
ALBUMIN SERPL-MCNC: 3 G/DL (ref 3.4–5)
ALP SERPL-CCNC: 638 U/L (ref 40–150)
ALT SERPL W P-5'-P-CCNC: 107 U/L (ref 0–50)
ANION GAP SERPL CALCULATED.3IONS-SCNC: 8 MMOL/L (ref 3–14)
AST SERPL W P-5'-P-CCNC: 106 U/L (ref 0–45)
BASOPHILS # BLD AUTO: 0.1 10E9/L (ref 0–0.2)
BASOPHILS NFR BLD AUTO: 0.7 %
BILIRUB DIRECT SERPL-MCNC: 0.3 MG/DL (ref 0–0.2)
BILIRUB SERPL-MCNC: 0.7 MG/DL (ref 0.2–1.3)
BUN SERPL-MCNC: 21 MG/DL (ref 7–30)
CALCIUM SERPL-MCNC: 9.5 MG/DL (ref 8.5–10.1)
CHLORIDE SERPL-SCNC: 98 MMOL/L (ref 94–109)
CO2 SERPL-SCNC: 27 MMOL/L (ref 20–32)
CREAT SERPL-MCNC: 0.85 MG/DL (ref 0.52–1.04)
DIFFERENTIAL METHOD BLD: ABNORMAL
EOSINOPHIL # BLD AUTO: 0.6 10E9/L (ref 0–0.7)
EOSINOPHIL NFR BLD AUTO: 6.6 %
ERYTHROCYTE [DISTWIDTH] IN BLOOD BY AUTOMATED COUNT: 12.9 % (ref 10–15)
GFR SERPL CREATININE-BSD FRML MDRD: 81 ML/MIN/{1.73_M2}
GLUCOSE SERPL-MCNC: 102 MG/DL (ref 70–99)
HCT VFR BLD AUTO: 44 % (ref 35–47)
HGB BLD-MCNC: 14.3 G/DL (ref 11.7–15.7)
IMM GRANULOCYTES # BLD: 0 10E9/L (ref 0–0.4)
IMM GRANULOCYTES NFR BLD: 0.4 %
LYMPHOCYTES # BLD AUTO: 2.8 10E9/L (ref 0.8–5.3)
LYMPHOCYTES NFR BLD AUTO: 32.7 %
MAGNESIUM SERPL-MCNC: 2 MG/DL (ref 1.6–2.3)
MCH RBC QN AUTO: 27.4 PG (ref 26.5–33)
MCHC RBC AUTO-ENTMCNC: 32.5 G/DL (ref 31.5–36.5)
MCV RBC AUTO: 84 FL (ref 78–100)
MONOCYTES # BLD AUTO: 0.6 10E9/L (ref 0–1.3)
MONOCYTES NFR BLD AUTO: 7.1 %
NEUTROPHILS # BLD AUTO: 4.5 10E9/L (ref 1.6–8.3)
NEUTROPHILS NFR BLD AUTO: 52.5 %
NRBC # BLD AUTO: 0 10*3/UL
NRBC BLD AUTO-RTO: 0 /100
PHOSPHATE SERPL-MCNC: 5 MG/DL (ref 2.5–4.5)
PLATELET # BLD AUTO: 261 10E9/L (ref 150–450)
POTASSIUM SERPL-SCNC: 3.3 MMOL/L (ref 3.4–5.3)
PREALB SERPL IA-MCNC: 22 MG/DL (ref 15–45)
PROT SERPL-MCNC: 9 G/DL (ref 6.8–8.8)
RBC # BLD AUTO: 5.22 10E12/L (ref 3.8–5.2)
SODIUM SERPL-SCNC: 133 MMOL/L (ref 133–144)
TRIGL SERPL-MCNC: 164 MG/DL
WBC # BLD AUTO: 8.5 10E9/L (ref 4–11)

## 2021-01-11 PROCEDURE — 84134 ASSAY OF PREALBUMIN: CPT | Performed by: PHYSICIAN ASSISTANT

## 2021-01-11 PROCEDURE — 83735 ASSAY OF MAGNESIUM: CPT | Performed by: PHYSICIAN ASSISTANT

## 2021-01-11 PROCEDURE — 80053 COMPREHEN METABOLIC PANEL: CPT | Performed by: PHYSICIAN ASSISTANT

## 2021-01-11 PROCEDURE — 84100 ASSAY OF PHOSPHORUS: CPT | Performed by: PHYSICIAN ASSISTANT

## 2021-01-11 PROCEDURE — 85025 COMPLETE CBC W/AUTO DIFF WBC: CPT | Performed by: PHYSICIAN ASSISTANT

## 2021-01-11 PROCEDURE — 84478 ASSAY OF TRIGLYCERIDES: CPT | Performed by: PHYSICIAN ASSISTANT

## 2021-01-11 PROCEDURE — 82248 BILIRUBIN DIRECT: CPT | Performed by: PHYSICIAN ASSISTANT

## 2021-01-12 ENCOUNTER — HOME INFUSION (PRE-WILLOW HOME INFUSION) (OUTPATIENT)
Dept: PHARMACY | Facility: CLINIC | Age: 51
End: 2021-01-12

## 2021-01-13 NOTE — PROGRESS NOTES
This is a recent snapshot of the patient's [facility] medical record.  For current drug dose and complete information and questions, call [phone] or In Basket pool, fv home infusion ([id number])  CSN Number:  [id number]

## 2021-01-14 NOTE — PROGRESS NOTES
This is a recent snapshot of the patient's [facility] medical record.  For current drug dose and complete information and questions, call [phone] or In Basket pool, fv home infusion ([id number])  CSN Number:  [id number]

## 2021-01-18 LAB
ALBUMIN SERPL-MCNC: 2.8 G/DL (ref 3.4–5)
ALP SERPL-CCNC: 596 U/L (ref 40–150)
ALT SERPL W P-5'-P-CCNC: 115 U/L (ref 0–50)
ANION GAP SERPL CALCULATED.3IONS-SCNC: 10 MMOL/L (ref 3–14)
AST SERPL W P-5'-P-CCNC: 110 U/L (ref 0–45)
BASOPHILS # BLD AUTO: 0 10E9/L (ref 0–0.2)
BASOPHILS NFR BLD AUTO: 0.6 %
BILIRUB DIRECT SERPL-MCNC: 0.2 MG/DL (ref 0–0.2)
BILIRUB SERPL-MCNC: 0.5 MG/DL (ref 0.2–1.3)
BUN SERPL-MCNC: 22 MG/DL (ref 7–30)
CALCIUM SERPL-MCNC: 9.7 MG/DL (ref 8.5–10.1)
CHLORIDE SERPL-SCNC: 99 MMOL/L (ref 94–109)
CO2 SERPL-SCNC: 26 MMOL/L (ref 20–32)
CREAT SERPL-MCNC: 0.84 MG/DL (ref 0.52–1.04)
CRP SERPL-MCNC: 11 MG/L (ref 0–8)
DIFFERENTIAL METHOD BLD: NORMAL
EOSINOPHIL # BLD AUTO: 0.4 10E9/L (ref 0–0.7)
EOSINOPHIL NFR BLD AUTO: 6 %
ERYTHROCYTE [DISTWIDTH] IN BLOOD BY AUTOMATED COUNT: 12.9 % (ref 10–15)
FERRITIN SERPL-MCNC: 138 NG/ML (ref 8–252)
GFR SERPL CREATININE-BSD FRML MDRD: 82 ML/MIN/{1.73_M2}
GLUCOSE SERPL-MCNC: 151 MG/DL (ref 70–99)
HCT VFR BLD AUTO: 43.6 % (ref 35–47)
HGB BLD-MCNC: 13.8 G/DL (ref 11.7–15.7)
IMM GRANULOCYTES # BLD: 0 10E9/L (ref 0–0.4)
IMM GRANULOCYTES NFR BLD: 0.3 %
LYMPHOCYTES # BLD AUTO: 2.5 10E9/L (ref 0.8–5.3)
LYMPHOCYTES NFR BLD AUTO: 37.2 %
MAGNESIUM SERPL-MCNC: 1.9 MG/DL (ref 1.6–2.3)
MCH RBC QN AUTO: 27.2 PG (ref 26.5–33)
MCHC RBC AUTO-ENTMCNC: 31.7 G/DL (ref 31.5–36.5)
MCV RBC AUTO: 86 FL (ref 78–100)
MONOCYTES # BLD AUTO: 0.5 10E9/L (ref 0–1.3)
MONOCYTES NFR BLD AUTO: 7.5 %
NEUTROPHILS # BLD AUTO: 3.3 10E9/L (ref 1.6–8.3)
NEUTROPHILS NFR BLD AUTO: 48.4 %
NRBC # BLD AUTO: 0 10*3/UL
NRBC BLD AUTO-RTO: 0 /100
PHOSPHATE SERPL-MCNC: 5.4 MG/DL (ref 2.5–4.5)
PLATELET # BLD AUTO: 279 10E9/L (ref 150–450)
POTASSIUM SERPL-SCNC: 3.5 MMOL/L (ref 3.4–5.3)
PROT SERPL-MCNC: 8 G/DL (ref 6.8–8.8)
RBC # BLD AUTO: 5.08 10E12/L (ref 3.8–5.2)
SODIUM SERPL-SCNC: 135 MMOL/L (ref 133–144)
TRIGL SERPL-MCNC: 259 MG/DL
WBC # BLD AUTO: 6.8 10E9/L (ref 4–11)

## 2021-01-18 PROCEDURE — 86140 C-REACTIVE PROTEIN: CPT | Performed by: PHYSICIAN ASSISTANT

## 2021-01-18 PROCEDURE — 82248 BILIRUBIN DIRECT: CPT | Performed by: PHYSICIAN ASSISTANT

## 2021-01-18 PROCEDURE — 84255 ASSAY OF SELENIUM: CPT | Performed by: PHYSICIAN ASSISTANT

## 2021-01-18 PROCEDURE — 84478 ASSAY OF TRIGLYCERIDES: CPT | Performed by: PHYSICIAN ASSISTANT

## 2021-01-18 PROCEDURE — 84100 ASSAY OF PHOSPHORUS: CPT | Performed by: PHYSICIAN ASSISTANT

## 2021-01-18 PROCEDURE — 80053 COMPREHEN METABOLIC PANEL: CPT | Performed by: PHYSICIAN ASSISTANT

## 2021-01-18 PROCEDURE — 83735 ASSAY OF MAGNESIUM: CPT | Performed by: PHYSICIAN ASSISTANT

## 2021-01-18 PROCEDURE — 82728 ASSAY OF FERRITIN: CPT | Performed by: PHYSICIAN ASSISTANT

## 2021-01-18 PROCEDURE — 82525 ASSAY OF COPPER: CPT | Performed by: PHYSICIAN ASSISTANT

## 2021-01-18 PROCEDURE — 85025 COMPLETE CBC W/AUTO DIFF WBC: CPT | Performed by: PHYSICIAN ASSISTANT

## 2021-01-18 PROCEDURE — 84630 ASSAY OF ZINC: CPT | Performed by: PHYSICIAN ASSISTANT

## 2021-01-18 PROCEDURE — 83785 ASSAY OF MANGANESE: CPT | Performed by: PHYSICIAN ASSISTANT

## 2021-01-19 ENCOUNTER — HOME INFUSION (PRE-WILLOW HOME INFUSION) (OUTPATIENT)
Dept: PHARMACY | Facility: CLINIC | Age: 51
End: 2021-01-19

## 2021-01-19 ENCOUNTER — TELEPHONE (OUTPATIENT)
Dept: CARE COORDINATION | Facility: CLINIC | Age: 51
End: 2021-01-19

## 2021-01-19 NOTE — TELEPHONE ENCOUNTER
[facility] now requests orders and shares plan of care/discharge summaries for some patients through [facility].  Please REPLY TO THIS MESSAGE OR ROUTE BACK TO THE AUTHOR in order to give authorization for orders when needed.  This is considered a verbal order, you will still receive a faxed copy of orders for signature.  Thank you for your assistance in improving collaboration for our patients.    FYI, message from lab letting us know that some labs that were ordered are not available.  I am unsure which as that info was not left.  Please let me know if you want any changes with what is ordered.    Thank you,  Concha Dong RN   [facility]   [phone]  [email or web address]

## 2021-01-21 LAB
COPPER SERPL-MCNC: 169.3 UG/DL (ref 80–155)
MANGANESE BLD-MCNC: 19.2 UG/L (ref 4.2–16.5)

## 2021-01-21 NOTE — PROGRESS NOTES
This is a recent snapshot of the patient's [facility] medical record.  For current drug dose and complete information and questions, call [phone] or In Basket pool, fv home infusion ([id number])  CSN Number:  [id number]

## 2021-01-25 ENCOUNTER — MEDICAL CORRESPONDENCE (OUTPATIENT)
Dept: HEALTH INFORMATION MANAGEMENT | Facility: CLINIC | Age: 51
End: 2021-01-25

## 2021-01-25 LAB
ALBUMIN SERPL-MCNC: 3.3 G/DL (ref 3.4–5)
ALP SERPL-CCNC: 718 U/L (ref 40–150)
ALT SERPL W P-5'-P-CCNC: 109 U/L (ref 0–50)
ANION GAP SERPL CALCULATED.3IONS-SCNC: 6 MMOL/L (ref 3–14)
AST SERPL W P-5'-P-CCNC: 75 U/L (ref 0–45)
BASOPHILS # BLD AUTO: 0.1 10E9/L (ref 0–0.2)
BASOPHILS NFR BLD AUTO: 0.8 %
BILIRUB DIRECT SERPL-MCNC: 0.3 MG/DL (ref 0–0.2)
BILIRUB SERPL-MCNC: 0.7 MG/DL (ref 0.2–1.3)
BUN SERPL-MCNC: 26 MG/DL (ref 7–30)
CALCIUM SERPL-MCNC: 9.6 MG/DL (ref 8.5–10.1)
CHLORIDE SERPL-SCNC: 99 MMOL/L (ref 94–109)
CO2 SERPL-SCNC: 26 MMOL/L (ref 20–32)
CREAT SERPL-MCNC: 1.05 MG/DL (ref 0.52–1.04)
DIFFERENTIAL METHOD BLD: ABNORMAL
EOSINOPHIL # BLD AUTO: 0.7 10E9/L (ref 0–0.7)
EOSINOPHIL NFR BLD AUTO: 6.1 %
ERYTHROCYTE [DISTWIDTH] IN BLOOD BY AUTOMATED COUNT: 13.1 % (ref 10–15)
GFR SERPL CREATININE-BSD FRML MDRD: 62 ML/MIN/{1.73_M2}
GLUCOSE SERPL-MCNC: 136 MG/DL (ref 70–99)
HCT VFR BLD AUTO: 49.7 % (ref 35–47)
HGB BLD-MCNC: 15.8 G/DL (ref 11.7–15.7)
IMM GRANULOCYTES # BLD: 0 10E9/L (ref 0–0.4)
IMM GRANULOCYTES NFR BLD: 0.3 %
LYMPHOCYTES # BLD AUTO: 2.9 10E9/L (ref 0.8–5.3)
LYMPHOCYTES NFR BLD AUTO: 27.1 %
MAGNESIUM SERPL-MCNC: 2 MG/DL (ref 1.6–2.3)
MCH RBC QN AUTO: 26.7 PG (ref 26.5–33)
MCHC RBC AUTO-ENTMCNC: 31.8 G/DL (ref 31.5–36.5)
MCV RBC AUTO: 84 FL (ref 78–100)
MONOCYTES # BLD AUTO: 0.8 10E9/L (ref 0–1.3)
MONOCYTES NFR BLD AUTO: 7.6 %
NEUTROPHILS # BLD AUTO: 6.2 10E9/L (ref 1.6–8.3)
NEUTROPHILS NFR BLD AUTO: 58.1 %
NRBC # BLD AUTO: 0 10*3/UL
NRBC BLD AUTO-RTO: 0 /100
PHOSPHATE SERPL-MCNC: 4.3 MG/DL (ref 2.5–4.5)
PLATELET # BLD AUTO: 267 10E9/L (ref 150–450)
POTASSIUM SERPL-SCNC: 4.7 MMOL/L (ref 3.4–5.3)
PROT SERPL-MCNC: 9.4 G/DL (ref 6.8–8.8)
RBC # BLD AUTO: 5.92 10E12/L (ref 3.8–5.2)
SODIUM SERPL-SCNC: 131 MMOL/L (ref 133–144)
TRIGL SERPL-MCNC: 224 MG/DL
WBC # BLD AUTO: 10.6 10E9/L (ref 4–11)

## 2021-01-25 PROCEDURE — 85025 COMPLETE CBC W/AUTO DIFF WBC: CPT

## 2021-01-25 PROCEDURE — 84100 ASSAY OF PHOSPHORUS: CPT

## 2021-01-25 PROCEDURE — 82248 BILIRUBIN DIRECT: CPT

## 2021-01-25 PROCEDURE — 84478 ASSAY OF TRIGLYCERIDES: CPT

## 2021-01-25 PROCEDURE — 83735 ASSAY OF MAGNESIUM: CPT

## 2021-01-25 PROCEDURE — 80053 COMPREHEN METABOLIC PANEL: CPT

## 2021-01-26 ENCOUNTER — HOME INFUSION (PRE-WILLOW HOME INFUSION) (OUTPATIENT)
Dept: PHARMACY | Facility: CLINIC | Age: 51
End: 2021-01-26

## 2021-01-28 NOTE — PROGRESS NOTES
This is a recent snapshot of the patient's [facility] medical record.  For current drug dose and complete information and questions, call [phone] or In [facility] pool, fv home infusion ([id number])  CSN Number:  [id number]

## 2021-02-01 ENCOUNTER — APPOINTMENT (OUTPATIENT)
Dept: LAB | Facility: CLINIC | Age: 51
End: 2021-02-01
Attending: PHYSICIAN ASSISTANT
Payer: COMMERCIAL

## 2021-02-01 ENCOUNTER — MEDICAL CORRESPONDENCE (OUTPATIENT)
Dept: HEALTH INFORMATION MANAGEMENT | Facility: CLINIC | Age: 51
End: 2021-02-01

## 2021-02-01 LAB
ALBUMIN SERPL-MCNC: 3.2 G/DL (ref 3.4–5)
ALP SERPL-CCNC: 566 U/L (ref 40–150)
ALT SERPL W P-5'-P-CCNC: 95 U/L (ref 0–50)
ANION GAP SERPL CALCULATED.3IONS-SCNC: 9 MMOL/L (ref 3–14)
AST SERPL W P-5'-P-CCNC: 68 U/L (ref 0–45)
BASOPHILS # BLD AUTO: 0.1 10E9/L (ref 0–0.2)
BASOPHILS NFR BLD AUTO: 1 %
BILIRUB DIRECT SERPL-MCNC: 0.3 MG/DL (ref 0–0.2)
BILIRUB SERPL-MCNC: 0.6 MG/DL (ref 0.2–1.3)
BUN SERPL-MCNC: 29 MG/DL (ref 7–30)
CALCIUM SERPL-MCNC: 9.5 MG/DL (ref 8.5–10.1)
CHLORIDE SERPL-SCNC: 104 MMOL/L (ref 94–109)
CO2 SERPL-SCNC: 23 MMOL/L (ref 20–32)
CREAT SERPL-MCNC: 0.88 MG/DL (ref 0.52–1.04)
DIFFERENTIAL METHOD BLD: ABNORMAL
EOSINOPHIL # BLD AUTO: 0.5 10E9/L (ref 0–0.7)
EOSINOPHIL NFR BLD AUTO: 5.6 %
ERYTHROCYTE [DISTWIDTH] IN BLOOD BY AUTOMATED COUNT: 13.2 % (ref 10–15)
GFR SERPL CREATININE-BSD FRML MDRD: 77 ML/MIN/{1.73_M2}
GLUCOSE SERPL-MCNC: 198 MG/DL (ref 70–99)
HCT VFR BLD AUTO: 46 % (ref 35–47)
HGB BLD-MCNC: 14.9 G/DL (ref 11.7–15.7)
IMM GRANULOCYTES # BLD: 0 10E9/L (ref 0–0.4)
IMM GRANULOCYTES NFR BLD: 0.3 %
LYMPHOCYTES # BLD AUTO: 2.6 10E9/L (ref 0.8–5.3)
LYMPHOCYTES NFR BLD AUTO: 27.5 %
MAGNESIUM SERPL-MCNC: 2 MG/DL (ref 1.6–2.3)
MCH RBC QN AUTO: 26.8 PG (ref 26.5–33)
MCHC RBC AUTO-ENTMCNC: 32.4 G/DL (ref 31.5–36.5)
MCV RBC AUTO: 83 FL (ref 78–100)
MONOCYTES # BLD AUTO: 0.6 10E9/L (ref 0–1.3)
MONOCYTES NFR BLD AUTO: 6.6 %
NEUTROPHILS # BLD AUTO: 5.6 10E9/L (ref 1.6–8.3)
NEUTROPHILS NFR BLD AUTO: 59 %
NRBC # BLD AUTO: 0 10*3/UL
NRBC BLD AUTO-RTO: 0 /100
PHOSPHATE SERPL-MCNC: 4.4 MG/DL (ref 2.5–4.5)
PLATELET # BLD AUTO: 282 10E9/L (ref 150–450)
POTASSIUM SERPL-SCNC: 3.2 MMOL/L (ref 3.4–5.3)
PROT SERPL-MCNC: 8.8 G/DL (ref 6.8–8.8)
RBC # BLD AUTO: 5.57 10E12/L (ref 3.8–5.2)
SODIUM SERPL-SCNC: 136 MMOL/L (ref 133–144)
TRIGL SERPL-MCNC: 277 MG/DL
WBC # BLD AUTO: 9.4 10E9/L (ref 4–11)

## 2021-02-01 PROCEDURE — 84478 ASSAY OF TRIGLYCERIDES: CPT

## 2021-02-01 PROCEDURE — 82248 BILIRUBIN DIRECT: CPT

## 2021-02-01 PROCEDURE — 80053 COMPREHEN METABOLIC PANEL: CPT

## 2021-02-01 PROCEDURE — 85025 COMPLETE CBC W/AUTO DIFF WBC: CPT

## 2021-02-01 PROCEDURE — 84100 ASSAY OF PHOSPHORUS: CPT

## 2021-02-01 PROCEDURE — 83735 ASSAY OF MAGNESIUM: CPT

## 2021-02-02 ENCOUNTER — HOME INFUSION (PRE-WILLOW HOME INFUSION) (OUTPATIENT)
Dept: PHARMACY | Facility: CLINIC | Age: 51
End: 2021-02-02

## 2021-02-03 NOTE — PROGRESS NOTES
This is a recent snapshot of the patient's [facility] medical record.  For current drug dose and complete information and questions, call [phone] or In Basket pool, fv home infusion ([id number])  CSN Number:  [id number]

## 2021-02-12 ENCOUNTER — HOME INFUSION (PRE-WILLOW HOME INFUSION) (OUTPATIENT)
Dept: PHARMACY | Facility: CLINIC | Age: 51
End: 2021-02-12

## 2021-02-15 NOTE — PROGRESS NOTES
This is a recent snapshot of the patient's [facility] medical record.  For current drug dose and complete information and questions, call [phone] or In Basket pool, fv home infusion ([id number])  CSN Number:  [id number]

## 2021-02-16 ENCOUNTER — HOME INFUSION (PRE-WILLOW HOME INFUSION) (OUTPATIENT)
Dept: PHARMACY | Facility: CLINIC | Age: 51
End: 2021-02-16

## 2021-02-17 NOTE — PROGRESS NOTES
This is a recent snapshot of the patient's [facility] medical record.  For current drug dose and complete information and questions, call [phone] or In Basket pool, fv home infusion ([id number])  CSN Number:  [id number]

## 2021-02-28 ENCOUNTER — HEALTH MAINTENANCE LETTER (OUTPATIENT)
Age: 51
End: 2021-02-28

## 2021-03-15 ENCOUNTER — TELEPHONE (OUTPATIENT)
Dept: FAMILY MEDICINE | Facility: CLINIC | Age: 51
End: 2021-03-15

## 2021-04-24 ENCOUNTER — HEALTH MAINTENANCE LETTER (OUTPATIENT)
Age: 51
End: 2021-04-24

## 2021-06-18 NOTE — LETTER
Letter by Wendy Moeller at      Author: Wendy Moeller Service: -- Author Type: --    Filed:  Encounter Date: 1/3/2019 Status: (Other)       01/03/19      Shalonda Howard  [address]  [place] [address]    Dear Shalonda,    As a valued [facility] patient, your healthcare needs are our priority. Our clinic records indicate we have attempted to contact you to schedule a wound care consultation, but have not heard back from you after three (3) attempts. To prevent further delays in your care please contact our office to schedule your appointment as soon as possible.  We can be reached at: [phone]    Sincerely,    Wendy

## 2021-09-21 ENCOUNTER — MEDICAL CORRESPONDENCE (OUTPATIENT)
Dept: HEALTH INFORMATION MANAGEMENT | Facility: CLINIC | Age: 51
End: 2021-09-21

## 2021-09-23 ENCOUNTER — REFERRAL (OUTPATIENT)
Dept: TRANSPLANT | Facility: CLINIC | Age: 51
End: 2021-09-23

## 2021-09-23 NOTE — LETTER
September 28, 2021        Shalonda Howard  [address]  [place] [address]      Dear Shalonda,       You have recently expressed interest in our Solid Organ Transplant Program and have requested to begin the evaluation process.  We have made several attempts to get in touch with you but have been unable to reach you.    If you are still interested and/or have any questions, please contact us at  [phone] or [phone] and ask to speak with the .      Monday - Friday, between the hours of 8:00am and 5:00pm central time.    We look forward to hearing from you.      Regards,     Solid Organ Transplant Intake Team  [address]  [address], [facility] [address]  [place] [address]

## 2021-09-29 NOTE — TELEPHONE ENCOUNTER
Spoke to patient at this time and she is wanting put to the liver transplant referral on hold. She verbalized speaking to the referring provider Alton Sandhu NP and according to patient referring provider it is ok with patient holding off on referral process for now. Will let patients RNCC know.     GOMEZ Jang, LPN   Solid Organ Transplant

## 2021-10-03 ENCOUNTER — HEALTH MAINTENANCE LETTER (OUTPATIENT)
Age: 51
End: 2021-10-03

## 2021-11-08 NOTE — H&P
[facility]    History and Physical - Hospitalist Service       Date of Admission:  3/30/2020    Assessment & Plan   Shalonda Howard is a 50 year old female with a past medical history of anxiety, depression, anemia, GERD, dyslipidemia, and complicated wound history including prior bilateral bunion repair (Aug 2018) with numerous complications including left ankle septic arthritis requiring prior hardware removal, repeated I&Ds, first ray amputation of left foot, and many extended courses of IV antibiotics. 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 She was discharged home on IV zosyn and has followed closely with vascular wound clinic. She is now s/p 8 weeks of IV antibiotics and completion of 31 treatments of hyperbaric oxygent therapy. She is now directly admitted from vascular wound clinic out of concern for recurrent osteomyelitis of right heel. She is admitted under inpatient status.    Right heel, lower leg cellulitis with suspected osteomyelitis  As detailed above, complicated wound history. Most recently completed course of home IV zosyn therapy and 31 hyperbaric oxygen treatments via [facility]. Last dose of Abx was approximately 3/19 per patient. 3d PTA began to notice increased pain to right calf with erythema, swelling extending from heel towards knee. She was seen by home care for wound check today with concern of cellulitis. She is afebrile.  - CBC, BMP now and in AM  - Blood cultures x2  - IV vancomycin, zosyn  - MRI R foot w/wo contrast  - Podiatry, Dr. Travis consulted  - Pain control with PRN PO oxycodone, IV dilaudid  - NPO p MN pending surgical evaluation, plan  - Monitor fever curve    Chronic iron deficiency anemia  Recent baseline Hgb 10. Receives periodic outpatient iron infusions.    GERD  - Continue PTA  Patient called today with regards to the following prescription request: New    Provider: Mk Dong MD  Medication: See List    Pharmacy:   [id number] - [place], [place] - [address] AT [place] [id number] [place] [address]  Phone: [phone] Fax: [phone]      For additional information, or if you need to contact the caller at the following number:    Contact Phone Number:   Mobile [phone]     .   omeprazole    Dyslipidemia  - Continue PTA pravastatin    Depression  Anxiety  - Continue PTA buproprion, buspirone, sertraline, hydroxyzine    Morbid Obesity  BMI 38.     Diet: Combination Diet Regular Diet Adult  NPO per Anesthesia Guidelines for Procedure/Surgery Except for: Meds    DVT Prophylaxis: Pneumatic Compression Devices  Salgado Catheter: not present  Code Status: Full code    Disposition Plan   Expected discharge: 4 - 7 days, recommended to prior living arrangement once antibiotic plan established and safe disposition plan/ TCU bed available.  Entered: Walter Swartz PA-C 03/30/2020, 7:40 PM     The patient's care was discussed with the Attending Physician, Dr. Manjarrez, Bedside Nurse and Patient.    Walter Swartz PA-C  [facility]    ______________________________________________________________________    Chief Complaint   Right lower leg infection    History is obtained from the patient    History of Present Illness   Shalonda Howard is a 50 year old female with a past medical history of anxiety, depression, anemia, GERD, dyslipidemia, and complicated wound history including prior bilateral bunion repair (Aug 2018) with numerous complications including left ankle septic arthritis requiring prior hardware removal, repeated I&Ds, first ray amputation of left foot, and many extended courses of IV antibiotics. 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 She was discharged home on IV zosyn and has followed closely with vascular wound clinic. She completed 31 treatments of hyperbaric oxygent therapy, has been off antibiotics since approximately 3/19/2020. She has been managing at home, performing her own dressing changes with visits from home care. Beginning 2-3 days ago, she noted increasing pain to her right heel  which has since progressed to include the right calf. There is associated redness and slight warmth. Home care presented today and was concerned of infection, therefore contacted pt's vascular wound clinic who recommended admission for antibiotics, MRI, and evaluation. Hospitalist service was contacted for admission.     Pt is presently evaluated in hospital room. She is uncomfortable, reporting intermittent sharp, stabbing pains to the right lower leg, rated 10/10. Reports pain has been worsening recently. Denies fevers or chills at home. No cp, sob.    Review of Systems    The 10 point Review of Systems is negative other than noted in the HPI or here.     Past Medical History    I have reviewed this patient's medical history and updated it with pertinent information if needed.   Past Medical History:   Diagnosis Date     Allergic rhinitis, cause unspecified      Anxiety state, unspecified      Cellulitis and abscess of leg, except foot      Disuse osteoporosis      Dysthymic disorder      Edema      Encounter for long-term (current) use of other medications      Esophageal reflux      Foot ulcer on Heel bilaterally  5/22/2019     Kidney stones      Myalgia and myositis, unspecified      Obesity, unspecified      Open wound of foot except toe(s) alone, complicated      Opioid type dependence, unspecified      Other acne      Other congenital valgus deformity of feet      Other ventral hernia without mention of obstruction or gangrene      Polycystic ovaries      PONV (postoperative nausea and vomiting)      Systemic lupus erythematosus (H)     unsure     Tibialis tendinitis      Unspecified hereditary and idiopathic peripheral neuropathy        Past Surgical History   I have reviewed this patient's surgical history and updated it with pertinent information if needed.  Past Surgical History:   Procedure Laterality Date     APPENDECTOMY       APPLY WOUND VAC Left 1/24/2019    Procedure: WOUND VAC APPLICATION;   Surgeon: Miguel Mosher MD;  Location:  OR     ARTHRODESIS FOOT Left 2/4/2015    Procedure: ARTHRODESIS FOOT;  Surgeon: nAdre Harman MD;  Location: [facility]     ARTHROSCOPY ANKLE Left 4/22/2019    Procedure: ARTHROSCOPIC IRRIGATION AND DEBRIDEMENT LEFT ANKLE. HARDWARE REMOVAL;  Surgeon: Andre Harman MD;  Location:  OR     BUNIONECTOMY RT/LT  08/29/2018     DILATION AND CURETTAGE       EXCISE TOENAIL(S) Left 2/4/2015    Procedure: EXCISE TOENAIL(S);  Surgeon: Andre Harman MD;  Location: [facility]     HERNIA REPAIR      umbilical x 2     HERNIA REPAIR      ventral open x 3     IRRIGATION AND DEBRIDEMENT FOOT, COMBINED Left 9/26/2018    Procedure: COMBINED IRRIGATION AND DEBRIDEMENT FOOT;  IRRIGATION AND DEBRIDEMENT LEFT FOOT;  Surgeon: Andre Harman MD;  Location: [facility]     IRRIGATION AND DEBRIDEMENT FOOT, COMBINED Left 11/26/2018    Procedure: COMBINED IRRIGATION AND DEBRIDEMENT LEFT FOOT;  Surgeon: Andre Harman MD;  Location: [facility]     IRRIGATION AND DEBRIDEMENT FOOT, COMBINED Left 1/24/2019    Procedure: LEFT HALLUX WOUND IRRIGATION AND DEBRIDEMENT WITH BONE BIOPSY;  Surgeon: Miguel Mosher MD;  Location:  OR     IRRIGATION AND DEBRIDEMENT FOOT, COMBINED Left 3/20/2019    Procedure: LEFT FOOT IRRIGATION AND  DEBRIDEMENT, FIRST RAY RESECTION AND HARDWARE REMOVAL FROM LEFT FOOT;  Surgeon: Andre Harman MD;  Location:  OR     IRRIGATION AND DEBRIDEMENT LOWER EXTREMITY, COMBINED Bilateral 11/14/2019    Procedure: MISONIX DEBRIDEMENT RIGHT AND LEFT HEELS.  APPLY MYRIAD RIGHT HEEL, APPLY TOTAL CONTACT CAST;  Surgeon: Juan Antonio Travis MD;  Location:  OR     REMOVE HARDWARE LOWER EXTREMITY Left 3/20/2019    Procedure: REMOVE HARDWARE LOWER EXTREMITY;  Surgeon: Andre Harman MD;  Location:  OR     REPAIR HAMMER TOE Left 11/26/2018    Procedure: REPAIR HAMMER TOE;  Surgeon: Andre Harman MD;  Location: [facility]     TONSILLECTOMY         Social  History   I have reviewed this patient's social history and updated it with pertinent information if needed.  Social History     Tobacco Use     Smoking status: Former Smoker     Packs/day: 0.50     Years: 12.00     Pack years: 6.00     Types: Cigarettes     Last attempt to quit: 10/1/2002     Years since quittin.5     Smokeless tobacco: Never Used   Substance Use Topics     Alcohol use: Not Currently     Drug use: No       Family History   I have reviewed this patient's family history and updated it with pertinent information if needed.   Family History   Problem Relation Age of Onset     Pulmonary Hypertension Mother      Esophageal Cancer Father      Heart Disease Maternal Grandfather      Esophageal Cancer Paternal Grandfather        Prior to Admission Medications   Prior to Admission Medications   Prescriptions Last Dose Informant Patient Reported? Taking?   BELBUCA 900 MCG FILM buccal film   Yes No   Sig: place 1 film by buccal route 2 times every day against the inside of the cheek, holding in place for 5 seconds,   NARCAN 4 MG/0.1ML nasal spray  Self Yes No   Sig: Spray 0.1 milliliter by intranasal route in 1 nostril may repeat dose every 2-3 minutes as needed alternating nostrils with each dose   NONFORMULARY   Yes No   Sig: TAKE 5 ML BY MOUTH EVERY 2 HOURS AS NEEDED(SWISH, GARGLE, AND SPIT OUT FOR RELIEF OF PAIN)   amoxicillin-clavulanate (AUGMENTIN) 875-125 MG tablet   No No   Sig: Take 1 tablet by mouth 2 times daily for 14 days   Patient taking differently: No sig reported   buPROPion (WELLBUTRIN XL) 300 MG 24 hr tablet  Self Yes No   Sig: Take 300 mg by mouth daily   busPIRone (BUSPAR) 15 MG tablet  Self Yes No   Sig: Take 15 mg by mouth 2 times daily   cetirizine (ZYRTEC) 10 MG tablet  Self Yes No   Sig: Take 10 mg by mouth daily   cholecalciferol (VITAMIN D3) 5000 units TABS tablet  Self Yes No   Sig: Take 10,000 Units by mouth daily    clindamycin (CLEOCIN T) 1 % solution  Self Yes No   Sig:  Apply topically nightly as needed (Acne outbreak)    docusate sodium (COLACE) 100 MG capsule  Self Yes No   Sig: Take 300 mg by mouth 2 times daily   doxycycline hyclate (VIBRAMYCIN) 100 MG capsule   No No   Sig: Take 1 capsule (100 mg) by mouth 2 times daily   furosemide (LASIX) 40 MG tablet  Self Yes No   Sig: Take 160 mg by mouth daily   gabapentin (NEURONTIN) 300 MG capsule  Self Yes No   Sig: Take 1 Capsule by mouth at bedtime as needed. In addition to the 800mg tablets 3x a day   gabapentin (NEURONTIN) 800 MG tablet  Self Yes No   Sig: Take 800 mg by mouth 3 times daily Am, supper, hs   hydrOXYzine (ATARAX) 25 MG tablet   Yes No   ibuprofen (ADVIL/MOTRIN) 800 MG tablet  Self Yes No   Sig: TAKE ONE TABLET BY MOUTH TWICE DAILY WITH FOOD AS NEEDED FOR PAIN   lidocaine-prilocaine (EMLA) 2.5-2.5 % external cream  Self Yes No   Sig: Apply 5 g topically as needed    magic mouthwash (ENTER INGREDIENTS IN COMMENTS) suspension   Yes No   Sig: TAKE 5 ML BY MOUTH EVERY 2 HOURS AS NEEDED(SWISH, GARGLE, AND SPIT OUT FOR RELIEF OF PAIN)   omeprazole (PRILOSEC) 20 MG DR capsule  Self Yes No   Sig: Take 40 mg by mouth 2 times daily (before meals)    ondansetron (ZOFRAN) 8 MG tablet  Self Yes No   Sig: Take 8 mg by mouth every 8 hours as needed for nausea   order for DME  Self No No   Sig: [facility] Phone [phone] Fax [phone]  Prontosan Wound Irrigation Solution qty 2 bottles  Edem[place]ear size small/Navy qty 4 sleeves   order for DME   No No   Sig: Flexiplus Pump  Please call patient to evaluate and treat  Sor[facility]iz Fax [phone]  Include Facesheet and progress notes with limb measurements   order for DME   No No   Sig: [employer] [facility] Phone [phone] Fax [phone]    Primary Dressing Qwick 4x4 sheet REF [id number]   Length of Need: 1 month  Frequency of dressing change: daily   order for DME   No No   Sig: [facility]  [facility] [address]  Phone:[phone]  Fax:[phone]  Evaluate and treat for bilateral Crow walker boots  Please call to schedule   oxyCODONE-acetaminophen (PERCOCET)  MG per tablet   No No   Sig: Take 1 tablet by mouth 3 times daily as needed for severe pain   phentermine 30 MG capsule  Self Yes No   Sig: Take 30 mg by mouth every morning   piperacillin-tazobactam (ZOSYN) 4-0.5 GM/100ML SOLN IVPB   No No   Sig: Inject 100 mLs (4.5 g) into the vein every 8 hours CBC with differential, creatinine, SGOT weekly while on this medication to be faxed to Dr. Canada office.   polyethylene glycol (MIRALAX/GLYCOLAX) packet  Self Yes No   Sig: Take 17 g by mouth daily as needed for constipation    potassium chloride ER (K-TAB) 20 MEQ CR tablet  Self Yes No   Sig: Take 80 mEq by mouth daily    pravastatin (PRAVACHOL) 20 MG tablet  Self Yes No   Sig: Take 20 mg by mouth every evening   propranolol (INDERAL) 20 MG tablet   No No   Sig: Take 1 tablet (20 mg) by mouth daily as needed (anxiety with HBO treatments)   Patient not taking: Reported on 2/4/2020   sertraline (ZOLOFT) 100 MG tablet  Self Yes No   Sig: Take 150 mg by mouth daily    tiZANidine (ZANAFLEX) 4 MG tablet   Yes No   topiramate (TOPAMAX) 25 MG tablet  Self Yes No   Sig: Take 50 mg by mouth At Bedtime      Facility-Administered Medications: None     Allergies   Allergies   Allergen Reactions     Amoxicillin-Pot Clavulanate Fatigue, Hives, Itching, Nausea and Vomiting and Rash     Augmentin Hives, Fatigue, Itching, Nausea and Rash     Sulfa Drugs Shortness Of Breath and Rash     10-            Dermat.-     Demerol [Meperidine] Nausea and Vomiting     Erythromycin Nausea and Vomiting     Metformin Nausea and Vomiting     Codeine Rash     Penicillins Rash       Physical Exam   Vital Signs: Temp: 99.7  F (37.6  C) Temp src: Oral BP: 126/70 Pulse: 88   Resp: 17        Weight: 0 lbs 0 oz    GEN: well-developed, well-nourished, appears comfortable  HEENT: NCAT, PERRL, EOM  intact bilaterally, sclera clear, conjunctiva normal, nose & mouth patent, mucous membranes moist  CHEST: lungs CTA bilaterally, no increased work of breathing, no wheeze, rales, rhonchi  HEART: RRR, S1 & S2, no murmur  ABD: soft, nontender, nondistended, no guarding or rigidity, +BS in all 4 quadrants  MSK: full AROM bilateral UE/LE, pedal & radial pulses 2+ bilaterally  NEURO: awake, alert, oriented to name, place, and time. CN II-XII grossly intact. Sensory grossly intact. Muscle strength 5/5 bilaterally  SKIN: warm & dry without rash; bilateral heels with dressings in place, right heel dressing appears saturated with drainage, posterior right heel erythematous, painful to palpation, erythema extending up posterior calf to middle lower leg, warm to touch, no crepitus, no lymphadenic streaking appreciated    Data   Data reviewed today: I reviewed all medications, new labs and imaging results over the last 24 hours. I personally reviewed no images or EKG's today.    No lab results found in last 7 days.    No results found for this or any previous visit (from the past 24 hour(s)).

## 2022-03-20 ENCOUNTER — HEALTH MAINTENANCE LETTER (OUTPATIENT)
Age: 52
End: 2022-03-20

## 2022-05-15 ENCOUNTER — HEALTH MAINTENANCE LETTER (OUTPATIENT)
Age: 52
End: 2022-05-15

## 2022-07-05 NOTE — PROGRESS NOTES
This is a recent snapshot of the patient's [facility] medical record.  For current drug dose and complete information and questions, call [phone] or In Basket pool, fv home infusion ([id number])  CSN Number:  [id number]       clear

## 2022-09-11 ENCOUNTER — HEALTH MAINTENANCE LETTER (OUTPATIENT)
Age: 52
End: 2022-09-11

## 2022-12-21 NOTE — TELEPHONE ENCOUNTER
Liver Transplant Referral Status  December 21, 2022    Patient's case reviewed via chart review.  Plan was made in regards to the status of the patient's Liver Transplant Referral:    Close referral, last note from 9-29-21 indicated patient wanted to hold off on perusing transplant.    Primary liver coordinator notified.

## 2023-04-30 ENCOUNTER — HEALTH MAINTENANCE LETTER (OUTPATIENT)
Age: 53
End: 2023-04-30

## 2023-05-26 NOTE — PROGRESS NOTES
Shalonda Howard  2019  POD #2 L ankle arthroscopic debridement/hardware removal    Doing well.  No excessive bleeding  Awaiting results of LE Doppler and possible further vascular testing.  ID to determine PO vs. PICC line  Temperatures:  Current - Temp: 98.8  F (37.1  C); Max - Temp  Av.9  F (37.2  C)  Min: 98.4  F (36.9  C)  Max: 99.5  F (37.5  C)  Pulse range: Pulse  Av  Min: 85  Max: 85  Blood pressure range: Systolic (24hrs), Av , Min:103 , Max:124   ; Diastolic (24hrs), Av, Min:62, Max:80    CMS: intact to B LE  Labs:per vascular/ID    PLAN:OK to discharge home from ortho standpoint.  WBAT with boots for ambulation.  Additional problems to be followed by medicine physician     yes

## 2023-07-29 ENCOUNTER — HEALTH MAINTENANCE LETTER (OUTPATIENT)
Age: 53
End: 2023-07-29

## 2023-12-19 NOTE — PROVIDER NOTIFICATION
12/19/2023   [facility] [employer]  N/A  For questions about this resource list or additional care needs, please contact your primary care clinic or care manager.  Phone: [phone]   Email: N/A   Address: [address] [place] [address]   Hours: N/A        Exercise and Recreation       Gym or workout facility  1  [address] [place] [address] - Kickboxing Classes Distance: 7.08 miles      In-Person   [address] [place] [address]  Language: English  Hours: Mon - Fri 6:00 AM - 12:30 PM , Mon - Fri 3:00 PM - 8:00 PM , Sat 8:00 AM - 12:00 PM  Fees: Self Pay   Phone: [phone] Website: [email or web address]     2  [employer] [facility] Distance: 7.95 miles      In-Person   [address] [place] [address]  Language: English  Hours: Mon - Sun Open 24 Hours  Fees: Insurance, Self Pay, Sliding Fee   Phone: [phone] Website: [email or web address]          Important Numbers & Websites       Emergency Services   [address]  [place] [address]  Poison Control   (520) 770-1856  Suicide Prevention Lifeline   (236) 190-2401 (TALK)  Child Abuse Hotline   (785) 828-6909 (4-A-Child)  Sexual Assault Hotline   (563) 771-1508 (HOPE)  National Runaway Safeline   (931) 643-4692 (RUNAWAY)  All-Options Talkline   (986) 738-8737  Substance Abuse Referral   (626) 192-8264 (HELP)     Jon Malik notified regarding patient's low K+ 2.9. Order for standard potassium replacement protocol. Order for routine hospitalist consult.

## (undated) DEVICE — GOWN XXLG REINFORCED 9071EL

## (undated) DEVICE — SOL NACL 0.9% INJ 1000ML BAG 07983-09

## (undated) DEVICE — DRSG GAUZE 4X4" 3033

## (undated) DEVICE — PACK EXTREMITY SOP15EXFSD

## (undated) DEVICE — GLOVE PROTEXIS MICRO 8.5  2D73PM85

## (undated) DEVICE — LINEN TOWEL PACK X5 5464

## (undated) DEVICE — BNDG ELASTIC 4"X5YDS UNSTERILE 6611-40

## (undated) DEVICE — SUCTION CANISTER MEDIVAC LINER 3000ML W/LID 65651-530

## (undated) DEVICE — SPECIMEN CULTURETTE DBL SWAB 220109

## (undated) DEVICE — GLOVE PROTEXIS W/NEU-THERA 8.0  2D73TE80

## (undated) DEVICE — GLOVE PROTEXIS W/NEU-THERA 7.5  2D73TE75

## (undated) DEVICE — GLOVE PROTEXIS POWDER FREE 8.0 ORTHOPEDIC 2D73ET80

## (undated) DEVICE — ESU PENCIL W/SMOKE EVAC CVPLP2000

## (undated) DEVICE — SU ETHILON 3-0 FS-1 18" 669H

## (undated) DEVICE — DRSG ABDOMINAL 07 1/2X8" 7197D

## (undated) DEVICE — SYR BULB IRRIG 50ML LATEX FREE 0035280

## (undated) DEVICE — IMM LIMB ELEVATOR DC40-0203

## (undated) DEVICE — SOL WATER IRRIG 1000ML BOTTLE 2F7114

## (undated) DEVICE — BLADE SHAVER ARTHRO 4.2MM ULTRACUT C9405A

## (undated) DEVICE — DRAPE POUCH IRR 1016

## (undated) DEVICE — PREP DURAPREP 26ML APL 8630

## (undated) DEVICE — SOL NACL 0.9% IRRIG 1000ML BOTTLE 2F7124

## (undated) DEVICE — Device

## (undated) DEVICE — ESU GROUND PAD UNIVERSAL W/O CORD

## (undated) DEVICE — DRAPE SHEET REV FOLD 3/4 9349

## (undated) DEVICE — BASIN SET MINOR DISP

## (undated) DEVICE — BLADE KNIFE SURG 10 371110

## (undated) DEVICE — GLOVE PROTEXIS W/NEU-THERA 8.5  2D73TE85

## (undated) DEVICE — DRSG ADAPTIC 3X8" 6113

## (undated) DEVICE — ESU PENCIL W/HOLSTER E2350H

## (undated) DEVICE — SU ETHILON 4-0 P-3 18" BLACK 699G

## (undated) DEVICE — MANIFOLD NEPTUNE 4 PORT 700-20

## (undated) DEVICE — DRAPE EXTREMITY W/ARMBOARD 29405

## (undated) DEVICE — SUCTION IRR SYSTEM W/O TIP INTERPULSE HANDPIECE 0210-100-000

## (undated) DEVICE — SU PROLENE 1 CTX 30" 8435H

## (undated) DEVICE — SOL NACL 0.9% IRRIG 3000ML BAG 2B7477

## (undated) DEVICE — BLADE SHAVER ARTHRO 4.2MM CUDA C9254

## (undated) DEVICE — BONE CLEANING TIP INTERPULSE  0210-010-000

## (undated) DEVICE — BNDG ELASTIC 4"X5YDS STERILE 6611-4S

## (undated) DEVICE — TUBING ARTHRO CONMED/LINVATEC PUMP BLUE INFLOW 10K100

## (undated) DEVICE — SU MONOCRYL 4-0 PS-2 18" UND Y496G

## (undated) DEVICE — CAST PADDING 4" UNSTERILE 9044

## (undated) DEVICE — PACK MINOR SBA15MIFSE

## (undated) DEVICE — PREP SKIN SCRUB TRAY 4461A

## (undated) DEVICE — SUCTION TIP YANKAUER W/O VENT K86

## (undated) DEVICE — SPONGE RAY-TEC 4X8" 7318

## (undated) RX ORDER — PROPOFOL 10 MG/ML
INJECTION, EMULSION INTRAVENOUS
Status: DISPENSED
Start: 2019-11-14

## (undated) RX ORDER — PROPOFOL 10 MG/ML
INJECTION, EMULSION INTRAVENOUS
Status: DISPENSED
Start: 2019-03-20

## (undated) RX ORDER — LIDOCAINE HYDROCHLORIDE 10 MG/ML
INJECTION, SOLUTION EPIDURAL; INFILTRATION; INTRACAUDAL; PERINEURAL
Status: DISPENSED
Start: 2019-12-03

## (undated) RX ORDER — FENTANYL CITRATE 50 UG/ML
INJECTION, SOLUTION INTRAMUSCULAR; INTRAVENOUS
Status: DISPENSED
Start: 2019-03-20

## (undated) RX ORDER — SCOLOPAMINE TRANSDERMAL SYSTEM 1 MG/1
PATCH, EXTENDED RELEASE TRANSDERMAL
Status: DISPENSED
Start: 2019-03-20

## (undated) RX ORDER — ACETAMINOPHEN 325 MG/1
TABLET ORAL
Status: DISPENSED
Start: 2019-04-22

## (undated) RX ORDER — HYDROMORPHONE HYDROCHLORIDE 1 MG/ML
INJECTION, SOLUTION INTRAMUSCULAR; INTRAVENOUS; SUBCUTANEOUS
Status: DISPENSED
Start: 2019-11-14

## (undated) RX ORDER — DEXAMETHASONE SODIUM PHOSPHATE 4 MG/ML
INJECTION, SOLUTION INTRA-ARTICULAR; INTRALESIONAL; INTRAMUSCULAR; INTRAVENOUS; SOFT TISSUE
Status: DISPENSED
Start: 2019-04-22

## (undated) RX ORDER — SCOLOPAMINE TRANSDERMAL SYSTEM 1 MG/1
PATCH, EXTENDED RELEASE TRANSDERMAL
Status: DISPENSED
Start: 2018-11-26

## (undated) RX ORDER — HYDROMORPHONE HYDROCHLORIDE 1 MG/ML
INJECTION, SOLUTION INTRAMUSCULAR; INTRAVENOUS; SUBCUTANEOUS
Status: DISPENSED
Start: 2019-03-20

## (undated) RX ORDER — HYDROMORPHONE HYDROCHLORIDE 1 MG/ML
INJECTION, SOLUTION INTRAMUSCULAR; INTRAVENOUS; SUBCUTANEOUS
Status: DISPENSED
Start: 2019-01-24

## (undated) RX ORDER — CEFAZOLIN SODIUM 2 G/100ML
INJECTION, SOLUTION INTRAVENOUS
Status: DISPENSED
Start: 2019-11-14

## (undated) RX ORDER — HYDROMORPHONE HYDROCHLORIDE 1 MG/ML
INJECTION, SOLUTION INTRAMUSCULAR; INTRAVENOUS; SUBCUTANEOUS
Status: DISPENSED
Start: 2019-04-22

## (undated) RX ORDER — DEXAMETHASONE SODIUM PHOSPHATE 4 MG/ML
INJECTION, SOLUTION INTRA-ARTICULAR; INTRALESIONAL; INTRAMUSCULAR; INTRAVENOUS; SOFT TISSUE
Status: DISPENSED
Start: 2019-03-20

## (undated) RX ORDER — ONDANSETRON 2 MG/ML
INJECTION INTRAMUSCULAR; INTRAVENOUS
Status: DISPENSED
Start: 2019-11-14

## (undated) RX ORDER — PROPOFOL 10 MG/ML
INJECTION, EMULSION INTRAVENOUS
Status: DISPENSED
Start: 2019-04-22

## (undated) RX ORDER — HYDROMORPHONE HYDROCHLORIDE 1 MG/ML
INJECTION, SOLUTION INTRAMUSCULAR; INTRAVENOUS; SUBCUTANEOUS
Status: DISPENSED
Start: 2018-11-26

## (undated) RX ORDER — ONDANSETRON 2 MG/ML
INJECTION INTRAMUSCULAR; INTRAVENOUS
Status: DISPENSED
Start: 2019-03-20

## (undated) RX ORDER — FENTANYL CITRATE 50 UG/ML
INJECTION, SOLUTION INTRAMUSCULAR; INTRAVENOUS
Status: DISPENSED
Start: 2019-04-22

## (undated) RX ORDER — OXYCODONE AND ACETAMINOPHEN 10; 325 MG/1; MG/1
TABLET ORAL
Status: DISPENSED
Start: 2019-11-14

## (undated) RX ORDER — SCOLOPAMINE TRANSDERMAL SYSTEM 1 MG/1
PATCH, EXTENDED RELEASE TRANSDERMAL
Status: DISPENSED
Start: 2018-09-26

## (undated) RX ORDER — LIDOCAINE HYDROCHLORIDE 20 MG/ML
INJECTION, SOLUTION EPIDURAL; INFILTRATION; INTRACAUDAL; PERINEURAL
Status: DISPENSED
Start: 2019-03-20

## (undated) RX ORDER — FENTANYL CITRATE 50 UG/ML
INJECTION, SOLUTION INTRAMUSCULAR; INTRAVENOUS
Status: DISPENSED
Start: 2018-11-26

## (undated) RX ORDER — CEFAZOLIN SODIUM 1 G/3ML
INJECTION, POWDER, FOR SOLUTION INTRAMUSCULAR; INTRAVENOUS
Status: DISPENSED
Start: 2019-11-14

## (undated) RX ORDER — CEFAZOLIN SODIUM 2 G/100ML
INJECTION, SOLUTION INTRAVENOUS
Status: DISPENSED
Start: 2018-09-26

## (undated) RX ORDER — CEFAZOLIN SODIUM 2 G/100ML
INJECTION, SOLUTION INTRAVENOUS
Status: DISPENSED
Start: 2019-03-20

## (undated) RX ORDER — CEFAZOLIN SODIUM 2 G/100ML
INJECTION, SOLUTION INTRAVENOUS
Status: DISPENSED
Start: 2019-04-22

## (undated) RX ORDER — OXYCODONE AND ACETAMINOPHEN 5; 325 MG/1; MG/1
TABLET ORAL
Status: DISPENSED
Start: 2018-09-26

## (undated) RX ORDER — PROPOFOL 10 MG/ML
INJECTION, EMULSION INTRAVENOUS
Status: DISPENSED
Start: 2019-01-24

## (undated) RX ORDER — FENTANYL CITRATE 0.05 MG/ML
INJECTION, SOLUTION INTRAMUSCULAR; INTRAVENOUS
Status: DISPENSED
Start: 2019-11-14

## (undated) RX ORDER — PROPOFOL 10 MG/ML
INJECTION, EMULSION INTRAVENOUS
Status: DISPENSED
Start: 2018-11-26

## (undated) RX ORDER — LIDOCAINE HYDROCHLORIDE 10 MG/ML
INJECTION, SOLUTION EPIDURAL; INFILTRATION; INTRACAUDAL; PERINEURAL
Status: DISPENSED
Start: 2019-11-14

## (undated) RX ORDER — NEOSTIGMINE METHYLSULFATE 1 MG/ML
VIAL (ML) INJECTION
Status: DISPENSED
Start: 2019-11-14

## (undated) RX ORDER — PROPOFOL 10 MG/ML
INJECTION, EMULSION INTRAVENOUS
Status: DISPENSED
Start: 2018-09-26

## (undated) RX ORDER — SCOLOPAMINE TRANSDERMAL SYSTEM 1 MG/1
PATCH, EXTENDED RELEASE TRANSDERMAL
Status: DISPENSED
Start: 2019-04-22

## (undated) RX ORDER — FENTANYL CITRATE 50 UG/ML
INJECTION, SOLUTION INTRAMUSCULAR; INTRAVENOUS
Status: DISPENSED
Start: 2018-09-26

## (undated) RX ORDER — CEFAZOLIN SODIUM 2 G/100ML
INJECTION, SOLUTION INTRAVENOUS
Status: DISPENSED
Start: 2018-11-26

## (undated) RX ORDER — DEXAMETHASONE SODIUM PHOSPHATE 4 MG/ML
INJECTION, SOLUTION INTRA-ARTICULAR; INTRALESIONAL; INTRAMUSCULAR; INTRAVENOUS; SOFT TISSUE
Status: DISPENSED
Start: 2019-11-14

## (undated) RX ORDER — FENTANYL CITRATE 50 UG/ML
INJECTION, SOLUTION INTRAMUSCULAR; INTRAVENOUS
Status: DISPENSED
Start: 2019-01-24

## (undated) RX ORDER — SCOLOPAMINE TRANSDERMAL SYSTEM 1 MG/1
PATCH, EXTENDED RELEASE TRANSDERMAL
Status: DISPENSED
Start: 2019-01-24

## (undated) RX ORDER — GLYCOPYRROLATE 0.2 MG/ML
INJECTION, SOLUTION INTRAMUSCULAR; INTRAVENOUS
Status: DISPENSED
Start: 2019-11-14

## (undated) RX ORDER — LIDOCAINE HYDROCHLORIDE 20 MG/ML
INJECTION, SOLUTION EPIDURAL; INFILTRATION; INTRACAUDAL; PERINEURAL
Status: DISPENSED
Start: 2019-04-22

## (undated) RX ORDER — LIDOCAINE HYDROCHLORIDE 20 MG/ML
INJECTION, SOLUTION EPIDURAL; INFILTRATION; INTRACAUDAL; PERINEURAL
Status: DISPENSED
Start: 2018-09-26

## (undated) RX ORDER — ONDANSETRON 2 MG/ML
INJECTION INTRAMUSCULAR; INTRAVENOUS
Status: DISPENSED
Start: 2019-04-22

## (undated) RX ORDER — CEFAZOLIN SODIUM 2 G/100ML
INJECTION, SOLUTION INTRAVENOUS
Status: DISPENSED
Start: 2019-01-24